# Patient Record
Sex: MALE | Race: WHITE | NOT HISPANIC OR LATINO | Employment: OTHER | URBAN - METROPOLITAN AREA
[De-identification: names, ages, dates, MRNs, and addresses within clinical notes are randomized per-mention and may not be internally consistent; named-entity substitution may affect disease eponyms.]

---

## 2017-02-16 ENCOUNTER — AMBULATORY - HEALTHEAST (OUTPATIENT)
Dept: VASCULAR SURGERY | Facility: CLINIC | Age: 82
End: 2017-02-16

## 2017-02-16 ENCOUNTER — AMBULATORY - HEALTHEAST (OUTPATIENT)
Dept: ADMINISTRATIVE | Facility: REHABILITATION | Age: 82
End: 2017-02-16

## 2017-02-16 ENCOUNTER — OFFICE VISIT (OUTPATIENT)
Dept: FAMILY MEDICINE | Facility: CLINIC | Age: 82
End: 2017-02-16

## 2017-02-16 VITALS
TEMPERATURE: 97.7 F | DIASTOLIC BLOOD PRESSURE: 74 MMHG | SYSTOLIC BLOOD PRESSURE: 149 MMHG | WEIGHT: 167 LBS | OXYGEN SATURATION: 97 % | HEART RATE: 67 BPM | BODY MASS INDEX: 27.82 KG/M2 | RESPIRATION RATE: 20 BRPM | HEIGHT: 65 IN

## 2017-02-16 DIAGNOSIS — B35.6 TINEA CRURIS: ICD-10-CM

## 2017-02-16 DIAGNOSIS — R21 GROIN RASH: ICD-10-CM

## 2017-02-16 DIAGNOSIS — I10 ESSENTIAL HYPERTENSION, BENIGN: Primary | ICD-10-CM

## 2017-02-16 DIAGNOSIS — I73.9 PERIPHERAL VASCULAR DISEASE (H): ICD-10-CM

## 2017-02-16 DIAGNOSIS — I73.9 CLAUDICATION (H): ICD-10-CM

## 2017-02-16 DIAGNOSIS — M19.019 OSTEOARTHRITIS OF GLENOHUMERAL JOINT, UNSPECIFIED LATERALITY: ICD-10-CM

## 2017-02-16 DIAGNOSIS — E11.9 TYPE 2 DIABETES MELLITUS WITHOUT COMPLICATION, WITHOUT LONG-TERM CURRENT USE OF INSULIN (H): ICD-10-CM

## 2017-02-16 DIAGNOSIS — E78.01 FAMILIAL HYPERCHOLESTEROLEMIA: ICD-10-CM

## 2017-02-16 LAB
BUN SERPL-MCNC: 19 MG/DL (ref 7–30)
CALCIUM SERPL-MCNC: 9.5 MG/DL (ref 8.5–10.4)
CHLORIDE SERPLBLD-SCNC: 102 MMOL/L (ref 94–109)
CHOLEST SERPL-MCNC: 124 MG/DL
CHOLEST/HDLC SERPL: 3.3 RATIO
CO2 SERPL-SCNC: 27 MMOL/L (ref 20–32)
CREAT SERPL-MCNC: 1.3 MG/DL (ref 0.8–1.5)
EGFR CALCULATED (BLACK REFERENCE): 68 ML/MIN
EGFR CALCULATED (NON BLACK REFERENCE): 56.2 ML/MIN
GLUCOSE SERPL-MCNC: 135 MG/DL (ref 60–109)
HBA1C MFR BLD: 6.3 % (ref 4.1–5.7)
HDLC SERPL-MCNC: 38 MG/DL
LDLC SERPL CALC-MCNC: 61 MG/DL (ref 0–99)
POTASSIUM SERPL-SCNC: 4.7 MMOL/L (ref 3.4–5.3)
SODIUM SERPL-SCNC: 140 MMOL/L (ref 133–144)
TRIGL SERPL-MCNC: 122 MG/DL
VLDL-CHOLESTEROL: 24 MG/DL (ref 7–32)

## 2017-02-16 RX ORDER — CLOTRIMAZOLE AND BETAMETHASONE DIPROPIONATE 10; .64 MG/G; MG/G
CREAM TOPICAL 2 TIMES DAILY
Qty: 30 G | Refills: 1 | Status: SHIPPED | OUTPATIENT
Start: 2017-02-16 | End: 2017-05-01

## 2017-02-16 RX ORDER — CLOTRIMAZOLE 1 %
CREAM (GRAM) TOPICAL 2 TIMES DAILY
Qty: 30 G | Refills: 3 | Status: CANCELLED | OUTPATIENT
Start: 2017-02-16

## 2017-02-16 NOTE — NURSING NOTE
Eye Exam referral  Foot exam- today  Labs due: A1c, Lipids, Urine MACR, BMP  Flu shot- Pt reports getting late October, early nov 2016  PCV13

## 2017-02-16 NOTE — PATIENT INSTRUCTIONS
New medication for jock itch-       Physical Therapy referral - Arthritis of both shoulder    Vascular surgery referral - Peripheral vascular disease      Follow up after you see vascular surgeon and angiogram is complete      ~~~~~~~~~~~~~~~~~~~~~~~    Your medication list is printed, please keep this with you, it is helpful to bring this current list to any other medical appointments, the emergency room or hospital.    If you had lab testing today and your results are reassuring or normal they will be be mailed to you within 7 days.     If the lab tests need quick action we will call you with the results.   The phone number we will call with results is # [phone] (home) . If this is not the best number please call our clinic and change the number.    If you need any refills please call your pharmacy and they will contact us.    If you have any further concerns or wish to schedule another appointment you must call our office during normal business hours  [phone] (8-5:00 M-F)  If you have urgent medical questions that cannot wait  you may also call [phone] at any time of day.  If you have a medical emergency please call 911.    Thank you for coming to [facility].    What: Physical Therapy   Where: [facility], [address] [place] [address] [address], [place] [address]   When: 02/20/17 at 1:30pm    Who is with:   Telephone: [phone]  Fax:   Any additional comments:   Scheduled by: Trini Duenas      Referral for ( TEST )  :      Vascular Surgery  LOCATION/PLACE/Provider :    [facility]  DATE & TIME :     To be determined   PHONE :     [phone]  FAX :     [phone]    Office notes, med list, allergies and labs faxed to clinic on 2-16-17  ADDITIONAL INFORMATION :     Vascular center will contact patient to schedule appointment   Appointment made by clinic staff/:    Gali    2-17-17

## 2017-02-16 NOTE — LETTER
February 22, 2017      Zack Johns  [place] [facility] [id number]        Dear Zack,    The cholesterol levels are good. The diabetes is still under good control    Please see below for your test results.    Resulted Orders   Hemoglobin A1c ([facility])   Result Value Ref Range    Hemoglobin A1C 6.3 (H) 4.1 - 5.7 %   Basic Metabolic Panel ([facility]) - Results < 1 hr   Result Value Ref Range    Glucose 135.0 (H) 60.0 - 109.0 mg/dL    Urea Nitrogen 19.0 7.0 - 30.0 mg/dL    Creatinine 1.3 0.8 - 1.5 mg/dL    Sodium 140.0 133.0 - 144.0 mmol/L    Potassium 4.7 3.4 - 5.3 mmol/L    Chloride 102.0 94.0 - 109.0 mmol/L    Carbon Dioxide 27.0 20.0 - 32.0 mmol/L    Calcium 9.5 8.5 - 10.4 mg/dL    eGFR Calculated (Non Black Reference) 56.2 (L) >60.0 mL/min    eGFR Calculated (Black Reference) 68.0 >60.0 mL/min   Lipid Panel ([facility]) - Results < 1 hr   Result Value Ref Range    Cholesterol 124.0 <200.0 mg/dL    Triglycerides 122.0 <150.0 mg/dL    HDL Cholesterol 38.0 (L) >40.0 mg/dL      Comment:      If diabetic or CVD then reference range <100    VLDL-Cholesterol 24.0 7.0 - 32.0 mg/dL    LDL Cholesterol Direct 61.0 0.0 - 99.0 mg/dL    Cholesterol/HDL Ratio 3.3 <5.0 RATIO       If you have any questions, please call the clinic to make an appointment.    Sincerely,    Reece Weir MD

## 2017-02-16 NOTE — PROGRESS NOTES
HPI:       Zack Johns is a 82 year old  male who presents for claudication, tinea cruris, and shoulder pain.     Has had increasing claudication - unable to walk around the lawn when mowing. Has to stop every time time before finishing a [place] around the lawn. Unable to go places. Unable to exercise or walk. Significantly effecting his lifestyle. He has waited to do anything for two years, but finds it is not getting to be too much. Occurs regularly at specific distance. Relieved following 20 seconds of rest.        Also complains of increasing shoulder stiffness bilaterally. Now getting difficult to raise arms up to take things from shelves.  No history of injury, no localizing pain.  Feels that the shoulder are getting stiffer.  No radiculopathy.        Also complains of groin rash and itching. Has previously had 'jock itch' and been treated for this with clotrimazole. Has also received diprolene cream for treatment of itching. Has been having more itching lately. Had a good response with diprolene, but not helped with over the counter medications.        History of 'diabetes', however patient has been able to keep his A1c under 6.0-6.5 with medications and activity.  Currently on no medications for glucose control.                 PMHX:   Current Medications:   Current Outpatient Prescriptions   Medication Sig Dispense Refill     lisinopril-hydrochlorothiazide (PRINZIDE,ZESTORETIC) 20-12.5 MG per tablet Take 1 tablet by mouth daily 60 tablet 0     atorvastatin (LIPITOR) 40 MG tablet Take 1 tablet (40 mg) by mouth daily 60 tablet 0     metoprolol (LOPRESSOR) 25 MG tablet Take 0.5 tablets (12.5 mg) by mouth 2 times daily 60 tablet 0     aspirin 81 MG tablet Take 1 tablet by mouth daily       acetaminophen (TYLENOL) 500 MG tablet Take 500-1,000 mg by mouth every 6 hours as needed       alprostadil (EDEX) 20 MCG injection Use as directed       Multiple Vitamin (MULTIVITAMIN) per tablet Take 1 tablet by  "mouth daily.       nitroglycerin (NITROSTAT) 0.4 MG SL tablet Take one under the tongue prn chest pain. Repeat at 5 min intervals x 2 if needed       zinc 50 MG TABS Take 50 mg by mouth daily.         Existing Problems  Patient Active Problem List   Diagnosis     Actinic keratosis     Cardiovascular disease     Essential hypertension, benign     CT Lung Pulmonary Nodule Multiple     Diabetes mellitus, type 2 (H)     Diverticulosis of large intestine     Hyperlipidemia     Peripheral vascular disease (H)     Internal hemorrhoids     Local infection of skin and subcutaneous tissue     Disorder of bursae and tendons in shoulder region     Synovial cyst     Trigger finger, acquired     Osteoarthritis of glenohumeral joint       Allergies:  Allergies   Allergen Reactions     Pletal [Cilostazol]        Previous labs:  Lab Results   Component Value Date    CHOL 141.0 12/05/2014    TRIG 148.0 12/05/2014    HDL 42.0 12/05/2014    .0 12/05/2014    BUN 23.0 12/05/2014    CO2 31.0 12/05/2014               Review of Systems:    CONSTITUTIONAL: no fatigue, no unexpected change in weight  SKIN: no worrisome rashes, no worrisome moles, no worrisome lesions  EYES: no acute vision problems or changes  ENT: no ear problems, no mouth problems, no throat problems  RESP: no significant cough, no shortness of breath  CV: History of previous MI.  N current chest pain, no palpitations, no new or worsening peripheral edema  GI: no nausea, no vomiting, no constipation, no diarrhea          Physical Exam:     Vitals:    02/16/17 0903   BP: 149/74   BP Location: Right arm   Patient Position: Chair   Cuff Size: Adult Regular   Pulse: 67   Resp: 20   Temp: 97.7  F (36.5  C)   TempSrc: Oral   SpO2: 97%   Weight: 167 lb (75.8 kg)   Height: 5' 4.5\" (163.8 cm)     Body mass index is 28.22 kg/(m^2).    GENERAL:alert, well hydrated, no distress  EYES: Eyes grossly normal to inspection, extraocular movements - intact, and PERRL  HENT: ear canals- " some cerumen in the left ear. Right is normal.; TMs- normal; Nose- normal; Mouth- no ulcers, no lesions  NECK: no tenderness, no adenopathy, no asymmetry, no masses, no stiffness; thyroid- normal to palpation  RESP: lungs clear to auscultation - no rales, no rhonchi, no wheezes  CV: regular rates and rhythm, normal S1 S2, no S3 or S4 and no murmur, no click or rub -  - circumcised. No erythema in inguinal crease.  ABDOMEN: soft, no tenderness, no  hepatosplenomegaly, no masses, normal bowel sounds  EXTREMITIES: DTR-KJ normal and symmetrical.  Pulses- Popliteal -           1/4 R 2/4 L                Dorsalis pedis    1/4 R, 1/4 L                Posterior Tib       1/4 R, 1/4 L               Labs and Procedures     Orders Only on 12/05/2014   Component Date Value Ref Range Status     Hemoglobin A1C 12/05/2014 6.0* 4.1 - 5.7 % Final     Glucose 12/05/2014 124.0* 60.0 - 109.0 mg/dL Final     Urea Nitrogen 12/05/2014 23.0  7.0 - 30.0 mg/dL Final     Creatinine 12/05/2014 0.9  0.8 - 1.5 mg/dL Final     Sodium 12/05/2014 142.0  133.0 - 144.0 mmol/L Final     Potassium 12/05/2014 4.1  3.4 - 5.3 mmol/L Final     Chloride 12/05/2014 100.0  94.0 - 109.0 mmol/L Final     Carbon Dioxide 12/05/2014 31.0  20.0 - 32.0 mmol/L Final     Calcium 12/05/2014 9.2  8.5 - 10.4 mg/dL Final     eGFR Calculated (Non Black Referen* 12/05/2014 86.3  >60.0 mL/min Final     eGFR Calculated (Black Reference) 12/05/2014 104.4  >60.0 mL/min Final     Cholesterol 12/05/2014 141.0  <200.0 mg/dL Final     Triglycerides 12/05/2014 148.0  <150.0 mg/dL Final     HDL Cholesterol 12/05/2014 42.0  >40.0 mg/dL Final    If diabetic or CVD then reference range <100     VLDL-Cholesterol 12/05/2014 30.0  7.0 - 32.0 mg/dL Final     LDL Cholesterol Direct 12/05/2014 69.0  0.0 - 99.0 mg/dL Final     Cholesterol/HDL Ratio 12/05/2014 3.3  <5.0 RATIO Final              Assessment and Plan     1.Progressing claudication that now affects his lifestyle and ability to  move around.  Will refer to vascular surgery for further evaluation and possible intervention.   2. Shoulder pain - degenerative joint disease with increasing stiffness. Will initiate physical therapy for increasing flexibility and strength.  Refer to PT for evaluation and treatment.   3. Tinea cruris by history - no evidence currently of ongoing erythema or existing fungal infection. Has had good success with dirprolene, and has been treated with clotrimazole before.  Will provide lotrisone (antifungal and steroid) for symptomatic relief.    4. Pre-diabetes- no futher treatment at this time.   5. HTN, post MI - continue current therapy at this time.  No further alteration at this time.       Options for treatment and follow-up care were reviewed with the patient and/or guardian. Zack Johns and/or guardian engaged in the decision making process and verbalized understanding of the options discussed and agreed with the final plan.    Reece Weir MD

## 2017-02-16 NOTE — MR AVS SNAPSHOT
After Visit Summary   2/16/2017    Zack Johns    MRN: [id number]           Patient Information     Date Of Birth          9/19/1934        Visit Information        Provider Department      2/16/2017 9:00 AM Reece Weir MD [facility]        Today's Diagnoses     Essential hypertension, benign    -  1    Diabetes mellitus, type 2 (H)        Hyperlipidemia        Groin rash        Osteoarthritis of glenohumeral joint, unspecified laterality        Peripheral vascular disease (H)        Tinea cruris          Care Instructions    New medication for jock itch-       Physical Therapy referral - Arthritis of both shoulder    Vascular surgery referral - Peripheral vascular disease      Follow up after you see vascular surgeon and angiogram is complete      ~~~~~~~~~~~~~~~~~~~~~~~    Your medication list is printed, please keep this with you, it is helpful to bring this current list to any other medical appointments, the emergency room or hospital.    If you had lab testing today and your results are reassuring or normal they will be be mailed to you within 7 days.     If the lab tests need quick action we will call you with the results.   The phone number we will call with results is # [phone] (home) . If this is not the best number please call our clinic and change the number.    If you need any refills please call your pharmacy and they will contact us.    If you have any further concerns or wish to schedule another appointment you must call our office during normal business hours  [phone] (8-5:00 M-F)  If you have urgent medical questions that cannot wait  you may also call [phone] at any time of day.  If you have a medical emergency please call [phone].    Thank you for coming to [facility].          Follow-ups after your visit        Additional Services     PHYSICAL THERAPY REFERRAL       PT/OT REFERRAL  Zack Johns  9/19/1934  Phone #: [phone]  "(home)    needed: No  Language: English    PT/OT  Facility:   Other: [facility]     History: Arthritis, Bilateral shoulders    Precautions/Contraindications: None      Surgical Procedure/Test Results: None    Treatment Goals:   Increase: Flexibility    Prognosis: good    Visits: Up to 10    Plan: Flexibility/ Strength Exercise            VASCULAR SURGERY REFERRAL       Your provider has referred you to:  [facility]) Vascular Center    Please be aware that coverage of these services is subject to the terms and limitations of your health insurance plan.  Call member services at your health plan with any benefit or coverage questions.      Please bring the following with you to your appointment:  Medications     (1) Any X-Rays, CTs or MRIs which have been performed.  Contact the facility where they were done to arrange for  prior to your scheduled appointment.    (2) List of current medications   (3) This referral request   (4) Any documents/labs given to you for this referral                  Who to contact     Please call your clinic at [phone] to:    Ask questions about your health    Make or cancel appointments    Discuss your medicines    Learn about your test results    Speak to your doctor   If you have compliments or concerns about an experience at your clinic, or if you wish to file a complaint, please contact [facility] Patient Relations at [phone] or email us at [email or web address]         Additional Information About Your Visit        Care EveryWhere ID     This is your Care EveryWhere ID. This could be used by other organizations to access your [place] medical records  [id number]        Your Vitals Were     Pulse Temperature Respirations Height Pulse Oximetry BMI (Body Mass Index)    67 97.7  F (36.5  C) (Oral) 20 5' 4.5\" (163.8 cm) 97% 28.22 kg/m2       Blood Pressure from Last 3 Encounters:   02/16/17 149/74   12/08/14 " 126/72   06/09/14 132/62    Weight from Last 3 Encounters:   02/16/17 167 lb (75.8 kg)   12/08/14 166 lb (75.3 kg)   06/09/14 167 lb (75.8 kg)              We Performed the Following     Basic Metabolic Panel ([facility]) - Results < 1 hr     C FOOT EXAM  NO CHARGE     Hemoglobin A1c ([facility])     Lipid Panel ([facility]) - Results < 1 hr     PHYSICAL THERAPY REFERRAL     VASCULAR SURGERY REFERRAL          Today's Medication Changes          These changes are accurate as of: 2/16/17  9:57 AM.  If you have any questions, ask your nurse or doctor.               Start taking these medicines.        Dose/Directions    clotrimazole-betamethasone cream   Commonly known as:  LOTRISONE   Used for:  Tinea cruris   Started by:  Reece Weir MD        Apply topically 2 times daily   Quantity:  30 g   Refills:  1            Where to get your medicines      Some of these will need a paper prescription and others can be bought over the counter.  Ask your nurse if you have questions.     Bring a paper prescription for each of these medications     clotrimazole-betamethasone cream                Primary Care Provider Office Phone # Fax #    Reece Weir [phone]       [facility] [address] [place] [address]        Thank you!     Thank you for choosing [facility]  for your care. Our goal is always to provide you with excellent care. Hearing back from our patients is one way we can continue to improve our services. Please take a few minutes to complete the written survey that you may receive in the mail after your visit with us. Thank you!             Your Updated Medication List - Protect others around you: Learn how to safely use, store and throw away your medicines at www.disposemymeds.org.          This list is accurate as of: 2/16/17  9:57 AM.  Always use your most recent med list.                   Brand Name Dispense Instructions for use    acetaminophen 500 MG tablet     TYLENOL     Take 500-1,000 mg by mouth every 6 hours as needed       aspirin 81 MG tablet      Take 1 tablet by mouth daily       atorvastatin 40 MG tablet    LIPITOR    60 tablet    Take 1 tablet (40 mg) by mouth daily       clotrimazole-betamethasone cream    LOTRISONE    30 g    Apply topically 2 times daily       EDEX 20 MCG kit   Generic drug:  alprostadil      Use as directed       lisinopril-hydrochlorothiazide 20-12.5 MG per tablet    PRINZIDE/ZESTORETIC    60 tablet    Take 1 tablet by mouth daily       metoprolol 25 MG tablet    LOPRESSOR    60 tablet    Take 0.5 tablets (12.5 mg) by mouth 2 times daily       multivitamin per tablet      Take 1 tablet by mouth daily.       NITROSTAT 0.4 MG sublingual tablet   Generic drug:  nitroglycerin      Take one under the tongue prn chest pain. Repeat at 5 min intervals x 2 if needed       zinc 50 MG Tabs      Take 50 mg by mouth daily.

## 2017-02-20 ENCOUNTER — OFFICE VISIT - HEALTHEAST (OUTPATIENT)
Dept: PHYSICAL THERAPY | Facility: REHABILITATION | Age: 82
End: 2017-02-20

## 2017-02-20 DIAGNOSIS — M25.511 CHRONIC PAIN OF BOTH SHOULDERS: ICD-10-CM

## 2017-02-20 DIAGNOSIS — M25.611 DECREASED ROM OF RIGHT SHOULDER: ICD-10-CM

## 2017-02-20 DIAGNOSIS — M19.012 PRIMARY OSTEOARTHRITIS OF BOTH SHOULDERS: ICD-10-CM

## 2017-02-20 DIAGNOSIS — M62.81 GENERALIZED MUSCLE WEAKNESS: ICD-10-CM

## 2017-02-20 DIAGNOSIS — G89.29 CHRONIC PAIN OF BOTH SHOULDERS: ICD-10-CM

## 2017-02-20 DIAGNOSIS — M25.512 CHRONIC PAIN OF BOTH SHOULDERS: ICD-10-CM

## 2017-02-20 DIAGNOSIS — M19.011 PRIMARY OSTEOARTHRITIS OF BOTH SHOULDERS: ICD-10-CM

## 2017-02-20 DIAGNOSIS — M25.612 DECREASED ROM OF LEFT SHOULDER: ICD-10-CM

## 2017-02-21 NOTE — PROGRESS NOTES
Please call patient and send results letter  The cholesterol levels are good. The diabetes is still under good control.

## 2017-02-23 DIAGNOSIS — I10 ESSENTIAL HYPERTENSION, BENIGN: ICD-10-CM

## 2017-02-23 DIAGNOSIS — I25.2 CORONARY ARTERY DISEASE WITH HX OF MYOCARDIAL INFARCT W/O HX OF CABG: ICD-10-CM

## 2017-02-23 DIAGNOSIS — I25.10 CORONARY ARTERY DISEASE WITH HX OF MYOCARDIAL INFARCT W/O HX OF CABG: ICD-10-CM

## 2017-02-23 RX ORDER — ATORVASTATIN CALCIUM 40 MG/1
40 TABLET, FILM COATED ORAL DAILY
Qty: 90 TABLET | Refills: 1 | Status: SHIPPED
Start: 2017-02-23 | End: 2017-08-24

## 2017-02-23 RX ORDER — METOPROLOL TARTRATE 25 MG/1
12.5 TABLET, FILM COATED ORAL 2 TIMES DAILY
Qty: 90 TABLET | Refills: 1 | Status: SHIPPED
Start: 2017-02-23 | End: 2017-08-24

## 2017-02-23 RX ORDER — LISINOPRIL AND HYDROCHLOROTHIAZIDE 12.5; 2 MG/1; MG/1
1 TABLET ORAL DAILY
Qty: 90 TABLET | Refills: 1 | Status: SHIPPED
Start: 2017-02-23 | End: 2017-08-24

## 2017-03-01 ENCOUNTER — TRANSFERRED RECORDS (OUTPATIENT)
Dept: HEALTH INFORMATION MANAGEMENT | Facility: CLINIC | Age: 82
End: 2017-03-01

## 2017-03-02 ENCOUNTER — OFFICE VISIT - HEALTHEAST (OUTPATIENT)
Dept: VASCULAR SURGERY | Facility: CLINIC | Age: 82
End: 2017-03-02

## 2017-03-02 ENCOUNTER — RECORDS - HEALTHEAST (OUTPATIENT)
Dept: VASCULAR ULTRASOUND | Facility: CLINIC | Age: 82
End: 2017-03-02

## 2017-03-02 ENCOUNTER — TRANSFERRED RECORDS (OUTPATIENT)
Dept: HEALTH INFORMATION MANAGEMENT | Facility: CLINIC | Age: 82
End: 2017-03-02

## 2017-03-02 DIAGNOSIS — I73.9 CLAUDICATION (H): ICD-10-CM

## 2017-03-02 DIAGNOSIS — I73.9 PAD (PERIPHERAL ARTERY DISEASE) (H): ICD-10-CM

## 2017-03-02 DIAGNOSIS — Z87.891 HISTORY OF SMOKING: ICD-10-CM

## 2017-03-02 DIAGNOSIS — I73.9 PERIPHERAL VASCULAR DISEASE, UNSPECIFIED (H): ICD-10-CM

## 2017-03-02 ASSESSMENT — MIFFLIN-ST. JEOR: SCORE: 1355.32

## 2017-03-07 ENCOUNTER — COMMUNICATION - HEALTHEAST (OUTPATIENT)
Dept: VASCULAR SURGERY | Facility: CLINIC | Age: 82
End: 2017-03-07

## 2017-03-07 ENCOUNTER — RECORDS - HEALTHEAST (OUTPATIENT)
Dept: ADMINISTRATIVE | Facility: OTHER | Age: 82
End: 2017-03-07

## 2017-03-10 ENCOUNTER — HOSPITAL ENCOUNTER (OUTPATIENT)
Dept: INTERVENTIONAL RADIOLOGY/VASCULAR | Facility: HOSPITAL | Age: 82
Discharge: HOME | End: 2017-03-10
Attending: SURGERY

## 2017-03-10 DIAGNOSIS — I73.9 PAD (PERIPHERAL ARTERY DISEASE) (H): ICD-10-CM

## 2017-03-10 LAB
CREAT SERPL-MCNC: 1.11 MG/DL (ref 0.7–1.3)
GFR SERPL CREATININE-BSD FRML MDRD: >60 ML/MIN/1.73M2

## 2017-03-10 ASSESSMENT — MIFFLIN-ST. JEOR: SCORE: 1396.14

## 2017-03-13 ENCOUNTER — COMMUNICATION - HEALTHEAST (OUTPATIENT)
Dept: INTERVENTIONAL RADIOLOGY/VASCULAR | Facility: HOSPITAL | Age: 82
End: 2017-03-13

## 2017-03-14 ENCOUNTER — AMBULATORY - HEALTHEAST (OUTPATIENT)
Dept: VASCULAR SURGERY | Facility: CLINIC | Age: 82
End: 2017-03-14

## 2017-03-14 ENCOUNTER — COMMUNICATION - HEALTHEAST (OUTPATIENT)
Dept: VASCULAR SURGERY | Facility: CLINIC | Age: 82
End: 2017-03-14

## 2017-04-06 ENCOUNTER — OFFICE VISIT - HEALTHEAST (OUTPATIENT)
Dept: VASCULAR SURGERY | Facility: CLINIC | Age: 82
End: 2017-04-06

## 2017-04-06 ENCOUNTER — TRANSFERRED RECORDS (OUTPATIENT)
Dept: HEALTH INFORMATION MANAGEMENT | Facility: CLINIC | Age: 82
End: 2017-04-06

## 2017-04-06 DIAGNOSIS — E78.5 HYPERLIPIDEMIA: ICD-10-CM

## 2017-04-06 DIAGNOSIS — I73.9 PAD (PERIPHERAL ARTERY DISEASE) (H): ICD-10-CM

## 2017-04-06 DIAGNOSIS — Z87.891 HISTORY OF SMOKING: ICD-10-CM

## 2017-04-06 DIAGNOSIS — I10 HYPERTENSION: ICD-10-CM

## 2017-04-06 DIAGNOSIS — I73.9 CLAUDICATION (H): ICD-10-CM

## 2017-04-06 ASSESSMENT — MIFFLIN-ST. JEOR: SCORE: 1396.14

## 2017-04-07 ENCOUNTER — TRANSFERRED RECORDS (OUTPATIENT)
Dept: HEALTH INFORMATION MANAGEMENT | Facility: CLINIC | Age: 82
End: 2017-04-07

## 2017-04-14 ENCOUNTER — AMBULATORY - HEALTHEAST (OUTPATIENT)
Dept: SURGERY | Facility: CLINIC | Age: 82
End: 2017-04-14

## 2017-05-01 ENCOUNTER — OFFICE VISIT (OUTPATIENT)
Dept: FAMILY MEDICINE | Facility: CLINIC | Age: 82
End: 2017-05-01

## 2017-05-01 VITALS
BODY MASS INDEX: 28.36 KG/M2 | WEIGHT: 170.2 LBS | HEART RATE: 67 BPM | SYSTOLIC BLOOD PRESSURE: 145 MMHG | DIASTOLIC BLOOD PRESSURE: 67 MMHG | RESPIRATION RATE: 18 BRPM | OXYGEN SATURATION: 97 % | TEMPERATURE: 97.3 F | HEIGHT: 65 IN

## 2017-05-01 DIAGNOSIS — H61.22 IMPACTED CERUMEN OF LEFT EAR: ICD-10-CM

## 2017-05-01 DIAGNOSIS — Z01.818 PREOP GENERAL PHYSICAL EXAM: Primary | ICD-10-CM

## 2017-05-01 LAB
BUN SERPL-MCNC: 22 MG/DL (ref 7–30)
CALCIUM SERPL-MCNC: 9.3 MG/DL (ref 8.5–10.4)
CHLORIDE SERPLBLD-SCNC: 102 MMOL/L (ref 94–109)
CO2 SERPL-SCNC: 29 MMOL/L (ref 20–32)
CREAT SERPL-MCNC: 1.4 MG/DL (ref 0.8–1.5)
EGFR CALCULATED (BLACK REFERENCE): 62.4 ML/MIN
EGFR CALCULATED (NON BLACK REFERENCE): 51.6 ML/MIN
GLUCOSE SERPL-MCNC: 98 MG/DL (ref 60–109)
HCT VFR BLD AUTO: 42 % (ref 40–53)
HEMOGLOBIN: 13.1 G/DL (ref 13.3–17.7)
MCH RBC QN AUTO: 30.7 PG (ref 26.5–35)
MCHC RBC AUTO-ENTMCNC: 31.2 G/DL (ref 32–36)
MCV RBC AUTO: 98.4 FL (ref 78–100)
PLATELET # BLD AUTO: 260 K/UL (ref 150–450)
POTASSIUM SERPL-SCNC: 4.9 MMOL/L (ref 3.4–5.3)
RBC # BLD AUTO: 4.27 M/UL (ref 4.4–5.9)
SODIUM SERPL-SCNC: 137 MMOL/L (ref 133–144)
WBC # BLD AUTO: 8 K/UL (ref 4–11)

## 2017-05-01 NOTE — MR AVS SNAPSHOT
After Visit Summary   5/1/2017    Zack Johns    MRN: [id number]           Patient Information     Date Of Birth          9/19/1934        Visit Information        Provider Department      5/1/2017 1:00 PM Reece Weir MD [facility]        Today's Diagnoses     Preop general physical exam    -  1    Impacted cerumen of left ear          Care Instructions      Presurgery Checklist  You are scheduled to have surgery. The healthcare staff will try to make your stay comfortable. Use the guidelines below to remind yourself what to do before surgery. Be sure to follow any specific pre-op instructions from your surgeon or nurse.   Preparing for Surgery  Ask your surgeon if you ll need a blood transfusion during surgery and if so, how to prepare for it. In some cases, you can donate blood before surgery. If needed, this blood can be given back (transfused) to you during or after surgery.  If you are having abdominal surgery, ask what you need to do to clear your bowel.  Tell your surgeon if you have allergies to any medications or foods.  Arrange for an adult family member or friend to drive you home after surgery. If possible, have someone ready to help you at home as you recover.  Call the surgeon if you get a cold, fever, sore throat, diarrhea, or other health problem just before surgery. Your surgeon can decide whether or not to postpone the surgery.  Medications  Tell your surgeon about all medications you take, including prescription and over-the-counter products such as herbal remedies and vitamins. Ask if you should continue taking them.  If you take ibuprofen, naproxen, or  blood thinners  such as aspirin, clopidogrel (Plavix), or warfarin (Coumadin), ask your surgeon whether you should stop taking them and how long before surgery you should stop.  You may be told to take antibiotics just before surgery to prevent infection. If so, follow instructions carefully on how to take  them.  If you are told to take medications called anticoagulants to prevent blood clots after surgery, be sure to follow the instructions on how to take them.  Stop Smoking  If you smoke, healing may take longer. So at least 2 week(s) before surgery, stop smoking.  Bathing or Showering Before Surgery  If instructed, wash with antibacterial soap. Afterward, do not use lotions or powders.  If you are having surgery on the head, you may be asked to shampoo with antibacterial soap. Follow instructions for doing so.  Do Not Remove Hair from the Surgery Site  Do not shave hair from the incision site, unless you are given specific instructions to do so. Usually, if hair needs to be removed, it will be done at the hospital right before surgery.  Don t Eat or Drink  Your doctor will tell you when to stop eating and drinking. If you do not follow your doctor's instructions, your procedure may be postponed or rescheduled for another day.  If your surgeon tells you to continue any medications, take them with small sips of water.  You can brush your teeth and rinse your mouth, but don t swallow any water.  Day of Surgery  Do not wear makeup. Do not use perfume, deodorant, or hairspray. Remove nail polish and artificial nails.  Leave jewelry (including rings), watches, and other valuables at home.  Be sure to bring health insurance cards or forms and a photo ID.  Bring a list of your medications (include the name, dose, how often you take them, and the time last dose was taken).  Arrive on time at the hospital or surgery facility.    ~No eating after midnight  ~No Aspirin, Tylenol  ~Do not take Lisinopril the day of surgery  ~Take Atorvastatin medication the night before  ~On day of surgery, take only Metoprolol half tablet with a sip of water        Follow-ups after your visit        Who to contact     Please call your clinic at [phone] to:    Ask questions about your health    Make or cancel appointments    Discuss your  "medicines    Learn about your test results    Speak to your doctor   If you have compliments or concerns about an experience at your clinic, or if you wish to file a complaint, please contact [facility] Patient Relations at [phone] or email us at [email or web address]         Additional Information About Your Visit        Care EveryWhere ID     This is your Care EveryWhere ID. This could be used by other organizations to access your [place] medical records  [id number]        Your Vitals Were     Pulse Temperature Respirations Height Pulse Oximetry BMI (Body Mass Index)    67 97.3  F (36.3  C) (Oral) 18 5' 4.5\" (163.8 cm) 97% 28.76 kg/m2       Blood Pressure from Last 3 Encounters:   05/01/17 145/67   02/16/17 149/74   12/08/14 126/72    Weight from Last 3 Encounters:   05/01/17 170 lb 3.2 oz (77.2 kg)   02/16/17 167 lb (75.8 kg)   12/08/14 166 lb (75.3 kg)              We Performed the Following     Basic Metabolic Panel ([facility])  - Results < 1 hr     CBC with Plt ([facility])     EKG 12-lead complete w/read - Clinics     REMOVE IMPACTED ELIZ        Primary Care Provider Office Phone # Fax #    Reece Weir [phone]       [facility] [address] [place] [address]        Thank you!     Thank you for choosing [facility]  for your care. Our goal is always to provide you with excellent care. Hearing back from our patients is one way we can continue to improve our services. Please take a few minutes to complete the written survey that you may receive in the mail after your visit with us. Thank you!             Your Updated Medication List - Protect others around you: Learn how to safely use, store and throw away your medicines at www.disposemymeds.org.          This list is accurate as of: 5/1/17  2:34 PM.  Always use your most recent med list.                   Brand Name Dispense Instructions for use    acetaminophen " 500 MG tablet    TYLENOL     Take 500-1,000 mg by mouth every 6 hours as needed       aspirin 81 MG tablet      Take 1 tablet by mouth daily       atorvastatin 40 MG tablet    LIPITOR    90 tablet    Take 1 tablet (40 mg) by mouth daily       lisinopril-hydrochlorothiazide 20-12.5 MG per tablet    PRINZIDE/ZESTORETIC    90 tablet    Take 1 tablet by mouth daily       metoprolol 25 MG tablet    LOPRESSOR    90 tablet    Take 0.5 tablets (12.5 mg) by mouth 2 times daily       multivitamin per tablet      Take 1 tablet by mouth daily.       NITROSTAT 0.4 MG sublingual tablet   Generic drug:  nitroglycerin      Take one under the tongue prn chest pain. Repeat at 5 min intervals x 2 if needed       zinc 50 MG Tabs      Take 50 mg by mouth daily.

## 2017-05-01 NOTE — LETTER
May 2, 2017      Zack Johns  [address]  [facility] [id number]        Dear Zack,    Your pre op testing show some worseing of kidney tests    Please see below for your test results.    Resulted Orders   Basic Metabolic Panel ([facility])  - Results < 1 hr   Result Value Ref Range    Glucose 98.0 60.0 - 109.0 mg/dL    Urea Nitrogen 22.0 7.0 - 30.0 mg/dL    Creatinine 1.4 0.8 - 1.5 mg/dL    Sodium 137.0 133.0 - 144.0 mmol/L    Potassium 4.9 3.4 - 5.3 mmol/L    Chloride 102.0 94.0 - 109.0 mmol/L    Carbon Dioxide 29.0 20.0 - 32.0 mmol/L    Calcium 9.3 8.5 - 10.4 mg/dL    eGFR Calculated (Non Black Reference) 51.6 (L) >60.0 mL/min    eGFR Calculated (Black Reference) 62.4 >60.0 mL/min   CBC with Plt ([facility])   Result Value Ref Range    WBC 8.0 4.0 - 11.0 K/uL    RBC 4.27 (L) 4.40 - 5.90 M/uL    Hemoglobin 13.1 (L) 13.3 - 17.7 g/dL    Hematocrit 42.0 40.0 - 53.0 %    MCV 98.4 78.0 - 100.0 fL    MCH 30.7 26.5 - 35.0 pg    MCHC 31.2 (L) 32.0 - 36.0 g/dL    Platelets 260.0 150.0 - 450.0 K/uL       If you have any questions, please call the clinic to make an appointment.    Sincerely,    Reece Weir MD

## 2017-05-01 NOTE — PATIENT INSTRUCTIONS
Presurgery Checklist  You are scheduled to have surgery. The healthcare staff will try to make your stay comfortable. Use the guidelines below to remind yourself what to do before surgery. Be sure to follow any specific pre-op instructions from your surgeon or nurse.   Preparing for Surgery  Ask your surgeon if you ll need a blood transfusion during surgery and if so, how to prepare for it. In some cases, you can donate blood before surgery. If needed, this blood can be given back (transfused) to you during or after surgery.  If you are having abdominal surgery, ask what you need to do to clear your bowel.  Tell your surgeon if you have allergies to any medications or foods.  Arrange for an adult family member or friend to drive you home after surgery. If possible, have someone ready to help you at home as you recover.  Call the surgeon if you get a cold, fever, sore throat, diarrhea, or other health problem just before surgery. Your surgeon can decide whether or not to postpone the surgery.  Medications  Tell your surgeon about all medications you take, including prescription and over-the-counter products such as herbal remedies and vitamins. Ask if you should continue taking them.  If you take ibuprofen, naproxen, or  blood thinners  such as aspirin, clopidogrel (Plavix), or warfarin (Coumadin), ask your surgeon whether you should stop taking them and how long before surgery you should stop.  You may be told to take antibiotics just before surgery to prevent infection. If so, follow instructions carefully on how to take them.  If you are told to take medications called anticoagulants to prevent blood clots after surgery, be sure to follow the instructions on how to take them.  Stop Smoking  If you smoke, healing may take longer. So at least 2 week(s) before surgery, stop smoking.  Bathing or Showering Before Surgery  If instructed, wash with antibacterial soap. Afterward, do not use lotions or powders.  If you  are having surgery on the head, you may be asked to shampoo with antibacterial soap. Follow instructions for doing so.  Do Not Remove Hair from the Surgery Site  Do not shave hair from the incision site, unless you are given specific instructions to do so. Usually, if hair needs to be removed, it will be done at the hospital right before surgery.  Don t Eat or Drink  Your doctor will tell you when to stop eating and drinking. If you do not follow your doctor's instructions, your procedure may be postponed or rescheduled for another day.  If your surgeon tells you to continue any medications, take them with small sips of water.  You can brush your teeth and rinse your mouth, but don t swallow any water.  Day of Surgery  Do not wear makeup. Do not use perfume, deodorant, or hairspray. Remove nail polish and artificial nails.  Leave jewelry (including rings), watches, and other valuables at home.  Be sure to bring health insurance cards or forms and a photo ID.  Bring a list of your medications (include the name, dose, how often you take them, and the time last dose was taken).  Arrive on time at the hospital or surgery facility.    ~No eating after midnight  ~No Aspirin, Tylenol  ~Do not take Lisinopril the day of surgery  ~Take Atorvastatin medication the night before  ~On day of surgery, take only Metoprolol half tablet with a sip of water    Pre-op faxed to fax number:  [phone]   Location:   [facility]  Date:  5/1/17    Time: 2:49pm  By:  KEIRA Curtis

## 2017-05-01 NOTE — NURSING NOTE
5/1/2017 [facility] Previsit Plan   Called, spoke with pt and reminded of appt time and to bring all forms to appt.  DUE FOR:  Eye Exam referral  Labs due: Urine Microalbumin, TSH  Advance Directive Planning  PCV13  Preop workup    Care everywhere for HE signed

## 2017-05-01 NOTE — PROGRESS NOTES
[facility] [address] [place] [address]  Phone: [phone]  Fax: [phone]    5/1/2017    Adult PRE-OP Evaluation:    Zack Johns, 9/19/1934 presents for pre-operative evaluation and assessment as requested by Dr. Carty, prior to undergoing surgery for treatment of claudication.     Proposed procedure: endearterectomy of both legs.      Date of Surgery/ Procedure: May 3, 2017  Hospital/Surgical Facility: [facility], Fax: [phone]     Primary Physician: Reece Weir  Type of Anesthesia Anticipated: General  History of anesthesia complications: NONE  History of  abnormal bleeding: NONE   History of blood transfusions: NO  Patient has a Health Care Directive or Living Will:  YES His wife has this.    PH: Patient has a history of claudication for several years. This has recently been evaluated by Dr. Carty and found to amenable to surgical procedure.  Pain has been getting worse for the patient, and now comes on at walking about 100 yards.  This has begun to significant. y affect his lifestyle, and he would like it repaired.        Preoperative Questions   1. YES - Do you have a history of heart attack, stroke, stent, bypass or surgery on an artery in the head, neck, heart or legs? Had a heart attack in 1989. No stent.  No longer has chest pain. Carries nitroglycerin, but has never used it.   2. NO - Do you ever have any pain or discomfort in your chest?  3. YES - Have you ever had a severe pain across the front of your chest lasting for half an hour or more? When he had a heart attack he had arm pain mostly, but some chest pain at that time.  He has not had chest pain since that time. He does not experience chest pain currently.   4. NO - Do you have a history of Congestive Heart Failure?  5. NO - Are you troubled by shortness of breath when: walking on the level/ up a slight hill/ at night?  6. NO - Does your chest ever sound wheezy or whistling?  7. NO - Do you  currently have a cold, bronchitis or other respiratory infection?  8. NO - Have you had a cold, bronchitis or other respiratory infection within the last 2 weeks?  9. NO - Do you usually have a cough?  10. NO - Do you sometimes get pains in the calves of your legs when you walk?  11. NO - Do you or anyone in your family have previous history of blood clots?   11. NO - Do you or anyone in your family have previous history of blood clots?  12. NO - Do you or does anyone in your family have a serious bleeding problem such as prolonged bleeding following surgeries or cuts?  13. NO - Have you ever had problems with anemia or been told to take iron pills?  14. NO - Have you had any abnormal blood loss such as black, tarry or bloody stools, or abnormal vaginal bleeding?  15. NO - Have you ever had a blood transfusion?  16. NO - Have you or any of your relatives ever had problems with anesthesia?  17. NO - Do you have sleep apnea, excessive snoring or daytime drowsiness?  18. NO - Do you have any prosthetic heart valves?  19. NO - Do you have prosthetic joints?      Patient Active Problem List   Diagnosis     Actinic keratosis     Cardiovascular disease     Essential hypertension, benign     CT Lung Pulmonary Nodule Multiple     Diabetes mellitus, type 2 (H)     Diverticulosis of large intestine     Hyperlipidemia     Peripheral vascular disease (H)     Internal hemorrhoids     Local infection of skin and subcutaneous tissue     Disorder of bursae and tendons in shoulder region     Synovial cyst     Trigger finger, acquired     Osteoarthritis of glenohumeral joint         Current Outpatient Prescriptions on File Prior to Visit:  atorvastatin (LIPITOR) 40 MG tablet Take 1 tablet (40 mg) by mouth daily   lisinopril-hydrochlorothiazide (PRINZIDE/ZESTORETIC) 20-12.5 MG per tablet Take 1 tablet by mouth daily   metoprolol (LOPRESSOR) 25 MG tablet Take 0.5 tablets (12.5 mg) by mouth 2 times daily   aspirin 81 MG tablet Take 1  "tablet by mouth daily   acetaminophen (TYLENOL) 500 MG tablet Take 500-1,000 mg by mouth every 6 hours as needed   Multiple Vitamin (MULTIVITAMIN) per tablet Take 1 tablet by mouth daily.   nitroglycerin (NITROSTAT) 0.4 MG SL tablet Take one under the tongue prn chest pain. Repeat at 5 min intervals x 2 if needed   zinc 50 MG TABS Take 50 mg by mouth daily.     No current facility-administered medications on file prior to visit.     OTC products: None, except as noted above    Allergies   Allergen Reactions     Pletal [Cilostazol]      Latex Allergy: NO    Social History     Social History     Marital status:      Spouse name: N/A     Number of children: N/A     Years of education: N/A     Social History Main Topics     Smoking status: Former Smoker     Smokeless tobacco: None     Alcohol use None     Drug use: None     Sexual activity: Not Asked     Other Topics Concern     None     Social History Narrative       REVIEW OF SYSTEMS:   Constitutional, HEENT, cardiovascular, pulmonary, gi and gu systems are negative, except as otherwise noted.    EXAM:   Patient Vitals for the past 24 hrs:   BP Temp Temp src Pulse Resp SpO2 Height Weight   05/01/17 1300 145/67 - - - - - - -   05/01/17 1252 146/66 97.3  F (36.3  C) Oral 67 18 97 % 5' 4.5\" (163.8 cm) 170 lb 3.2 oz (77.2 kg)     Body mass index is 28.76 kg/(m^2).  GENERAL: healthy, alert and no distress  EYES: Eyes grossly normal to inspection, extraocular movements - intact, and PERRL  HENT: ear canals- Left cerumen obstruction right canal normal; TMs- normal; Nose- normal; Mouth- no ulcers, no lesions  NECK: no tenderness, no adenopathy, no asymmetry, no masses, no stiffness; thyroid- normal to palpation  RESP: lungs clear to auscultation - no rales, no rhonchi, no wheezes  CV: regular rates and rhythm, normal S1 S2, no S3 or S4 and no murmur, no click or rub -  ABDOMEN: soft, no tenderness, no  hepatosplenomegaly, no masses, normal bowel sounds  MS: " extremities- no gross deformities noted, no edema  SKIN: no suspicious lesions, no rashes  NEURO: strength and tone- normal, sensory exam- grossly normal, mentation- intact, speech- normal, reflexes- symmetric  BACK: no CVA tenderness, no paralumbar tenderness  PSYCH: Alert and oriented times 3; speech- coherent , normal rate and volume; able to articulate logical thoughts  LYMPHATICS: ant. cervical- normal, post. cervical- normal    DIAGNOSTICS:      EKG: appears normal, NSR, normal axis, normal intervals, no acute ST/T changes c/w ischemia, no LVH by voltage criteria, unchanged from previous tracings  Labs Resulted Today:   Results for orders placed or performed in visit on 02/16/17   Hemoglobin A1c ([facility])   Result Value Ref Range    Hemoglobin A1C 6.3 (H) 4.1 - 5.7 %   Basic Metabolic Panel ([facility]) - Results < 1 hr   Result Value Ref Range    Glucose 135.0 (H) 60.0 - 109.0 mg/dL    Urea Nitrogen 19.0 7.0 - 30.0 mg/dL    Creatinine 1.3 0.8 - 1.5 mg/dL    Sodium 140.0 133.0 - 144.0 mmol/L    Potassium 4.7 3.4 - 5.3 mmol/L    Chloride 102.0 94.0 - 109.0 mmol/L    Carbon Dioxide 27.0 20.0 - 32.0 mmol/L    Calcium 9.5 8.5 - 10.4 mg/dL    eGFR Calculated (Non Black Reference) 56.2 (L) >60.0 mL/min    eGFR Calculated (Black Reference) 68.0 >60.0 mL/min   Lipid Panel ([facility]) - Results < 1 hr   Result Value Ref Range    Cholesterol 124.0 <200.0 mg/dL    Triglycerides 122.0 <150.0 mg/dL    HDL Cholesterol 38.0 (L) >40.0 mg/dL    VLDL-Cholesterol 24.0 7.0 - 32.0 mg/dL    LDL Cholesterol Direct 61.0 0.0 - 99.0 mg/dL    Cholesterol/HDL Ratio 3.3 <5.0 RATIO       RISK ASSESSMENT:     Cardiovascular Risk:  -Patient is able to walk up a flight of stairs without chest pain.  -The patient does not have chest pain with exertion.  -Patient does not have a history of congestive heart failure.    -The patient does not have a history of stroke and does not have a history of valvular disease.    Pulmonary Risk:  -In terms of risk  factors for pulmonary complication, the patient previous smoker - quit in 1984.    Perioperative Complications:  -The patient does not have a history of bleeding or clotting problems in the past.    -The patient has not had complications from surgeries.    -The patient does not have a family history of any anesthesia or surgical complications.      IMPRESSION:   Reason for surgery/procedure: Claudication - both legs when walking about 100 yards.     The proposed surgical procedure is considered INTERMEDIATE risk.    For above listed surgery and anesthesia:   Patient is at moderate risk for surgery/procedure and perioperative/procedure complications.    RECOMMENDATIONS:     Labs:  EKG and CBC, BMP    Fasting:  NPO for 12 hours prior to surgery    Preop Plan:  --Patient has   Cardiovascular Risk  Performs 4 METs exercise without symptoms (Climb a flight of stairs) .   Patient is already on a Beta Blocker. Continue Betablocker therapy after surgery, using Beta blocker order set as necessary for NPO status.    --Patient is taking an  Ace inhibitor or Angiotensin Receptor Blocker (ARB) and should HOLD this medication for the 24 hours prior to surgery.  Left ear canal irrigated - cleared of cerumen.      Medications:  Patient should take their regular medications the morning of surgery unless otherwise instructed.    Hold aspirin 7 days prior to surgery.  Hold ibuprofen for 5 days prior.  Hold ACE inhibitors and ARB's the day of surgery.  Take metoprolol AM of surgery.   Take statin and metoprolol evening before surgery.       Reece Weir MD    Please contact our office if there are any further questions or information required about this patient.

## 2017-05-01 NOTE — PROGRESS NOTES
Please call patient and send results letter  Your pre op testing show some worseing of kidney tests, suggesting

## 2017-05-02 ASSESSMENT — MIFFLIN-ST. JEOR: SCORE: 1370.05

## 2017-05-03 ENCOUNTER — SURGERY - HEALTHEAST (OUTPATIENT)
Dept: SURGERY | Facility: HOSPITAL | Age: 82
End: 2017-05-03

## 2017-05-03 ENCOUNTER — ANESTHESIA - HEALTHEAST (OUTPATIENT)
Dept: SURGERY | Facility: HOSPITAL | Age: 82
End: 2017-05-03

## 2017-05-04 ASSESSMENT — MIFFLIN-ST. JEOR: SCORE: 1370.05

## 2017-05-08 ENCOUNTER — TELEPHONE (OUTPATIENT)
Dept: FAMILY MEDICINE | Facility: CLINIC | Age: 82
End: 2017-05-08

## 2017-05-08 NOTE — TELEPHONE ENCOUNTER
Date of discharge: 05/05/2017  Facility of discharge: [facility]  Patient concerns about condition: No concerns at this time.  Patient concerns about medications: No concerns at this time.  Full med reconciliation will be completed at clinic visit.  Patient concerns about transitioning: No concerns at this time.  Clinic office visit appointment date: I called the pt to help set up a hospital follow up, the pt stated that he did not feel like he should see the doctor again.  Pt stated that he saw his doctor for the pre-op for his surgery, and he will just follow up with his surgeon in about a week anyway.   Patient reminded to bring all medications (prescription and over-the-counter) to clinic appointment: Yes    Using the date of discharge as day 1, the 30th day post discharge is 06/04/2017.

## 2017-05-17 ENCOUNTER — COMMUNICATION - HEALTHEAST (OUTPATIENT)
Dept: VASCULAR SURGERY | Facility: CLINIC | Age: 82
End: 2017-05-17

## 2017-05-17 ENCOUNTER — RECORDS - HEALTHEAST (OUTPATIENT)
Dept: VASCULAR ULTRASOUND | Facility: CLINIC | Age: 82
End: 2017-05-17

## 2017-05-17 ENCOUNTER — AMBULATORY - HEALTHEAST (OUTPATIENT)
Dept: VASCULAR SURGERY | Facility: CLINIC | Age: 82
End: 2017-05-17

## 2017-05-17 DIAGNOSIS — I73.9 PERIPHERAL VASCULAR DISEASE, UNSPECIFIED (H): ICD-10-CM

## 2017-05-17 DIAGNOSIS — Z98.890 H/O ENDARTERECTOMY: ICD-10-CM

## 2017-05-17 DIAGNOSIS — I73.9 PERIPHERAL VASCULAR DISEASE (H): ICD-10-CM

## 2017-05-17 DIAGNOSIS — Z98.890 OTHER SPECIFIED POSTPROCEDURAL STATES: ICD-10-CM

## 2017-05-23 ENCOUNTER — AMBULATORY - HEALTHEAST (OUTPATIENT)
Dept: VASCULAR SURGERY | Facility: CLINIC | Age: 82
End: 2017-05-23

## 2017-05-25 ENCOUNTER — OFFICE VISIT - HEALTHEAST (OUTPATIENT)
Dept: VASCULAR SURGERY | Facility: CLINIC | Age: 82
End: 2017-05-25

## 2017-05-25 ENCOUNTER — TRANSFERRED RECORDS (OUTPATIENT)
Dept: HEALTH INFORMATION MANAGEMENT | Facility: CLINIC | Age: 82
End: 2017-05-25

## 2017-05-25 DIAGNOSIS — Z09 POSTOP CHECK: ICD-10-CM

## 2017-05-25 DIAGNOSIS — I10 HYPERTENSION: ICD-10-CM

## 2017-05-25 DIAGNOSIS — Z87.891 HISTORY OF SMOKING: ICD-10-CM

## 2017-05-25 DIAGNOSIS — Z98.890 H/O ENDARTERECTOMY: ICD-10-CM

## 2017-05-25 DIAGNOSIS — E78.5 HYPERLIPIDEMIA: ICD-10-CM

## 2017-05-25 DIAGNOSIS — I73.9 PAD (PERIPHERAL ARTERY DISEASE) (H): ICD-10-CM

## 2017-05-25 ASSESSMENT — MIFFLIN-ST. JEOR: SCORE: 1369.86

## 2017-08-24 DIAGNOSIS — I10 ESSENTIAL HYPERTENSION, BENIGN: ICD-10-CM

## 2017-08-24 DIAGNOSIS — I25.2 CORONARY ARTERY DISEASE WITH HX OF MYOCARDIAL INFARCT W/O HX OF CABG: ICD-10-CM

## 2017-08-24 DIAGNOSIS — I25.10 CORONARY ARTERY DISEASE WITH HX OF MYOCARDIAL INFARCT W/O HX OF CABG: ICD-10-CM

## 2017-08-24 RX ORDER — ATORVASTATIN CALCIUM 40 MG/1
40 TABLET, FILM COATED ORAL DAILY
Qty: 90 TABLET | Refills: 3 | Status: SHIPPED | OUTPATIENT
Start: 2017-08-24 | End: 2018-08-20

## 2017-08-24 RX ORDER — METOPROLOL TARTRATE 25 MG/1
12.5 TABLET, FILM COATED ORAL 2 TIMES DAILY
Qty: 90 TABLET | Refills: 3 | Status: SHIPPED | OUTPATIENT
Start: 2017-08-24 | End: 2018-08-20

## 2017-08-24 RX ORDER — LISINOPRIL AND HYDROCHLOROTHIAZIDE 12.5; 2 MG/1; MG/1
1 TABLET ORAL DAILY
Qty: 90 TABLET | Refills: 3 | Status: SHIPPED | OUTPATIENT
Start: 2017-08-24 | End: 2018-08-20

## 2017-11-14 ENCOUNTER — RECORDS - HEALTHEAST (OUTPATIENT)
Dept: ADMINISTRATIVE | Facility: OTHER | Age: 82
End: 2017-11-14

## 2017-11-14 ENCOUNTER — RECORDS - HEALTHEAST (OUTPATIENT)
Dept: VASCULAR ULTRASOUND | Facility: CLINIC | Age: 82
End: 2017-11-14

## 2017-11-14 DIAGNOSIS — I73.9 PERIPHERAL VASCULAR DISEASE, UNSPECIFIED (H): ICD-10-CM

## 2017-11-22 ENCOUNTER — COMMUNICATION - HEALTHEAST (OUTPATIENT)
Dept: VASCULAR SURGERY | Facility: CLINIC | Age: 82
End: 2017-11-22

## 2017-12-11 ENCOUNTER — TRANSFERRED RECORDS (OUTPATIENT)
Dept: HEALTH INFORMATION MANAGEMENT | Facility: CLINIC | Age: 82
End: 2017-12-11

## 2017-12-11 ENCOUNTER — OFFICE VISIT - HEALTHEAST (OUTPATIENT)
Dept: VASCULAR SURGERY | Facility: CLINIC | Age: 82
End: 2017-12-11

## 2017-12-11 DIAGNOSIS — I73.9 PAD (PERIPHERAL ARTERY DISEASE) (H): ICD-10-CM

## 2017-12-18 ENCOUNTER — TELEPHONE (OUTPATIENT)
Dept: FAMILY MEDICINE | Facility: CLINIC | Age: 82
End: 2017-12-18

## 2017-12-18 NOTE — TELEPHONE ENCOUNTER
Attempted to reach patient to help facilitate a hospital follow up appointment.  for RC    Date of discharge: 12/16/2017  Facility of discharge: [facility]  Patient concerns about condition: No concerns at this time.  Patient concerns about medications: No concerns at this time.  Full med reconciliation will be completed at clinic visit.  Patient concerns about transitioning: Concerns include that he needs to schedule holter placement at [facility], needs order.  Clinic office visit appointment date: 12/21/2017  Dr Weir  Patient reminded to bring all medications (prescription and over-the-counter) to clinic appointment: Yes    Using the date of discharge as day 1, the 30th day post discharge is 1/15/2017.    12/18/17 Patient calls back and RS till 12/22 with Dr Bravo

## 2017-12-22 ENCOUNTER — OFFICE VISIT (OUTPATIENT)
Dept: FAMILY MEDICINE | Facility: CLINIC | Age: 82
End: 2017-12-22
Payer: COMMERCIAL

## 2017-12-22 VITALS
TEMPERATURE: 97.4 F | DIASTOLIC BLOOD PRESSURE: 74 MMHG | HEART RATE: 59 BPM | BODY MASS INDEX: 28.66 KG/M2 | RESPIRATION RATE: 18 BRPM | OXYGEN SATURATION: 99 % | SYSTOLIC BLOOD PRESSURE: 165 MMHG | WEIGHT: 172 LBS | HEIGHT: 65 IN

## 2017-12-22 DIAGNOSIS — R09.81 NASAL CONGESTION: ICD-10-CM

## 2017-12-22 DIAGNOSIS — I10 BENIGN ESSENTIAL HYPERTENSION: ICD-10-CM

## 2017-12-22 DIAGNOSIS — R55 SYNCOPE, UNSPECIFIED SYNCOPE TYPE: Primary | ICD-10-CM

## 2017-12-22 NOTE — PROGRESS NOTES
Hospitalization Follow-up Visit         [facility]       Hospital Follow-up Visit:    Hospital:  [facility]   Date of Admission: 12/15/17  Date of Discharge: 12/16/17  Reason(s) for Admission: Syncope    Problems taking medications regularly:  None  Post Discharge Medication Reconciliation: discharge medications reconciled, continue medications without change.  Problems adhering to non-medication therapy:  None  Medications reviewed by: by myself.  Summary of hospitalization:  [facility] discharge summary reviewed  Diagnostic Tests/Treatments reviewed.  Follow up needed: none  Other Healthcare Providers Involved in Patient s Care: None  Update since discharge: improved.   Plan of care communicated with patient     Presented to [facility] after an episode of unwitnessed episode of syncope. He does not remember what exactly happened but did noted that he woke with soaked in urine and felt a bit dizzy. Denies any prior episode of syncope and similar symptoms with his previous myocardial infarction decades ago. He denied chest pain, shortness of breath, weakness, headache, or palpitations. He was admitted and monitor overnight without any further events. Was discharge after 24 hours and was told that it was likely due to vasovagal vs dehydration given decrease PO intake prior to event. EKG + troponin ×3 were within normal limits and no other concerning associated signs or symptoms.    Since his discharge, he feels that he is back to baseline. He has not had any further episodes of syncope or presyncope. He check his pulse everyday and states that his pulse has been normal. Denies any issues with medications and has not started any other medications. Has been trying to drink more fluid and keep well hydrated. Denies any chest pain, lightheadedness, dizziness, presyncope, back pain, abdominal pain, diarrhea, constipation, numbness or weakness. Has been having issues with nasal congestion which is chronic and no  "different despite hospitalization. No other concerns.         Review of Systems:   CONSTITUTIONAL: no fatigue, no unexpected change in weight  SKIN: no worrisome rashes, no worrisome moles, no worrisome lesions  EYES: no acute vision problems or changes  ENT: no ear problems, no mouth problems, no throat problems  RESP: no significant cough, no shortness of breath  CV: no chest pain, no palpitations, no new or worsening peripheral edema  GI: no nausea, no vomiting, no constipation, no diarrhea  : no frequency, no dysuria, no hematuria  MS: no claudication, no myalgias, no joint aches  NEURO: no weakness, no dizziness, no syncope, no headaches  PSYCHIATRIC: NEGATIVE for changes in mood or affect            Physical Exam:     Vitals:    12/22/17 0954   BP: 165/74   Pulse: 59   Resp: 18   Temp: 97.4  F (36.3  C)   TempSrc: Oral   SpO2: 99%   Weight: 172 lb (78 kg)   Height: 5' 5\" (165.1 cm)     Body mass index is 28.62 kg/(m^2).    GENERAL: healthy, alert and no distress  EYES: Eyes grossly normal to inspection, extraocular movements - intact, and PERRL  NECK: no tenderness, no adenopathy, no asymmetry, no masses, no stiffness; thyroid- normal to palpation  RESP: lungs clear to auscultation - no rales, no rhonchi, no wheezes  CV: regular rates and rhythm, normal S1 S2, no S3 or S4 and no murmur, no click or rub -  ABDOMEN: soft, no tenderness, no  hepatosplenomegaly, no masses, normal bowel sounds  MS: extremities- no gross deformities noted, no edema  NEURO: strength and tone- normal, sensory exam- grossly normal, mentation- intact, speech- normal, reflexes- symmetric  PSYCH: Alert and oriented times 3; speech- coherent , normal rate and volume; able to articulate logical thoughts, able to abstract reason, no tangential thoughts, no hallucinations or delusions, affect- normal         Results:   Results from the last 24 hours  No results found for this or any previous visit (from the past 24 hour(s)).    Assessment " and Plan     Zack was seen today for hospital f/u.  Diagnoses and all orders for this visit:    Syncope, unspecified syncope type  Vasovagal vs dehydration vs cardiac arrhythmias. Cardiac monitor during hospitalization was unremarkable and cardiac workup was negative. Discuss etiology with patient but given no further episode, will not pursue cardiac monitoring at this time. Did discuss further evaluation with stress test given CAD history, which he will think about.  - Continue with current medication  - Consider stress test, further discussion at future visit    Benign essential hypertension  Elevated today but stated that BP was well controlled during hospitalization. Currently taking metoprolol and Prinzide 20-12.5.  - Continue with current anti-hypertensive medication  - Recheck BP at next visit (CPE exam)    Nasal congestion  Long standing issues and has not tried any therapy. Would like over the counter solution and avoid medication as much as possible. Discuss options such as Neti Pot or nasal saline  - Try Neti Pot  - Try Nasal Saline    E&M code to be billed if TCM cannot be: 90012    Type of decision making: Moderate complexity (72569)    Options for treatment and follow-up care were reviewed with the patient  Zack Johns   engaged in the decision making process and verbalized understanding of the options discussed and agreed with the final plan.      Jagdish Bravo MD

## 2017-12-22 NOTE — PATIENT INSTRUCTIONS
- MAY USE NETI POT FOR NASAL CONGESTION  - MAY USE NASAL SALINE FOR NASAL CONGESTION  - CONTINUE WITH CURRENT MEDICATIONS  - FOLLOW UP IN 1 MONTH FOR CPE  - WORK ON HEALTHIER DIET AND WEIGHT LOSS

## 2017-12-22 NOTE — MR AVS SNAPSHOT
After Visit Summary   2017    Zack Johns    MRN: [id number]           Patient Information     Date Of Birth          1934        Visit Information        Provider Department      2017 10:00 AM Jagdish Bravo MD [facility]        Today's Diagnoses     Syncope, unspecified syncope type    -  1    Benign essential hypertension          Care Instructions    - MAY USE NETI POT FOR NASAL CONGESTION  - MAY USE NASAL SALINE FOR NASAL CONGESTION  - CONTINUE WITH CURRENT MEDICATIONS  - FOLLOW UP IN 1 MONTH FOR CPE  - WORK ON HEALTHIER DIET AND WEIGHT LOSS              Follow-ups after your visit        Follow-up notes from your care team     Return in about 1 month (around 2018).      Who to contact     Please call your clinic at [phone] to:    Ask questions about your health    Make or cancel appointments    Discuss your medicines    Learn about your test results    Speak to your doctor   If you have compliments or concerns about an experience at your clinic, or if you wish to file a complaint, please contact [facility] Patient Relations at [phone] or email us at [email or web address]         Additional Information About Your Visit        MyChart Information     [employer] is an electronic gateway that provides easy, online access to your medical records. With [employer], you can request a clinic appointment, read your test results, renew a prescription or communicate with your care team.     To sign up for [employer] visit the website at [email or web address]   You will be asked to enter the access code listed below, as well as some personal information. Please follow the directions to create your username and password.     Your access code is: [email or web address]  Expires: 3/22/2018 10:41 AM     Your access code will  in 90 days. If you need help or a new code, please contact your [facility]  "or call [phone] for assistance.        Care EveryWhere ID     This is your Care EveryWhere ID. This could be used by other organizations to access your [place] medical records  [id number]        Your Vitals Were     Pulse Temperature Respirations Height Pulse Oximetry BMI (Body Mass Index)    59 97.4  F (36.3  C) (Oral) 18 5' 5\" (165.1 cm) 99% 28.62 kg/m2       Blood Pressure from Last 3 Encounters:   12/22/17 165/74   05/01/17 145/67   02/16/17 149/74    Weight from Last 3 Encounters:   12/22/17 172 lb (78 kg)   05/01/17 170 lb 3.2 oz (77.2 kg)   02/16/17 167 lb (75.8 kg)              Today, you had the following     No orders found for display       Primary Care Provider Office Phone # Fax #    Reece Weir [phone]       [facility] [address] [place] [address]        Equal Access to Services     [facility]: Hadii aad ku hadasho Soomaali, waaxda luqadaha, qaybta kaalmada adeegyada, waxay nasrin hayraz bland . So [facility] [phone].    ATENCIÓN: Si habla español, tiene a andrews disposición servicios gratuitos de asistencia lingüística. [facility] [phone].    We comply with applicable federal civil rights laws and [place] laws. We do not discriminate on the basis of race, color, national origin, age, disability, sex, sexual orientation, or gender identity.            Thank you!     Thank you for choosing [facility]  for your care. Our goal is always to provide you with excellent care. Hearing back from our patients is one way we can continue to improve our services. Please take a few minutes to complete the written survey that you may receive in the mail after your visit with us. Thank you!             Your Updated Medication List - Protect others around you: Learn how to safely use, store and throw away your medicines at www.disposemymeds.org.          This list is accurate as of: 12/22/17 10:41 AM.  Always use your most recent " med list.                   Brand Name Dispense Instructions for use Diagnosis    acetaminophen 500 MG tablet    TYLENOL     Take 500-1,000 mg by mouth every 6 hours as needed        aspirin 81 MG tablet      Take 1 tablet by mouth daily        atorvastatin 40 MG tablet    LIPITOR    90 tablet    Take 1 tablet (40 mg) by mouth daily    Coronary artery disease with hx of myocardial infarct w/o hx of CABG       lisinopril-hydrochlorothiazide 20-12.5 MG per tablet    PRINZIDE/ZESTORETIC    90 tablet    Take 1 tablet by mouth daily    Coronary artery disease with hx of myocardial infarct w/o hx of CABG, Essential hypertension, benign       metoprolol 25 MG tablet    LOPRESSOR    90 tablet    Take 0.5 tablets (12.5 mg) by mouth 2 times daily    Coronary artery disease with hx of myocardial infarct w/o hx of CABG, Essential hypertension, benign       multivitamin per tablet      Take 1 tablet by mouth daily.        NITROSTAT 0.4 MG sublingual tablet   Generic drug:  nitroGLYcerin      Take one under the tongue prn chest pain. Repeat at 5 min intervals x 2 if needed        zinc 50 MG Tabs      Take 50 mg by mouth daily.

## 2017-12-22 NOTE — PROGRESS NOTES
Preceptor Attestation:  Patient's case reviewed and discussed with Jagdish Bravo MD Patient seen and discussed with the resident.. I agree with assessment and plan of care.  Supervising Physician:  Ivette Varela MD  [facility]

## 2017-12-28 ENCOUNTER — TELEPHONE (OUTPATIENT)
Dept: FAMILY MEDICINE | Facility: CLINIC | Age: 82
End: 2017-12-28

## 2017-12-28 NOTE — TELEPHONE ENCOUNTER
TCM APPOINTMENT FOLLOW UP    Language:English    Medication questions: no    Specialist follow up: no    Concerns since last visit: no    Next appointment at [facility]:Yes    Comments: Pt stated that he is doing fine, pt is coming in later in January for a follow up.      @[facility]@ Yes   Wero Rodriguez

## 2018-01-24 ENCOUNTER — TELEPHONE (OUTPATIENT)
Dept: FAMILY MEDICINE | Facility: CLINIC | Age: 83
End: 2018-01-24

## 2018-01-24 NOTE — TELEPHONE ENCOUNTER
"[facility] Family Medicine phone call message- general phone call:    Reason for call: Patient is coming in tomorrow for a physical appt. He had a couple of questions. I explained to him that with fasting, water with medications is usually okay prior to coming in. Called Shanta to confirm however she wanted me to send a message to the [facility] team to make sure that it is okay if patient takes his medications tomorrow in the morning since fasting labs will be done. Patient states he will not be home later so to please leave a detailed message simply stating \"yes, its okay to take your medications in the morning\" or \"No, its not okay to take medications\". Please call, and advise.     Return call needed: Yes    OK to leave a message on voice mail? Yes    Primary language: English      needed? No    Call taken on January 24, 2018 at 9:43 AM by Amarilis Huang    "

## 2018-01-24 NOTE — TELEPHONE ENCOUNTER
Called at 10:25 AM on 01/24/18. Informed patient he should take his morning medications with water prior to coming in. He can drink as much water as he wants. Also reminded patient to bring in all medications to his visit.    Dutch Murrieta MD  Family Medicine Resident, R3

## 2018-01-25 ENCOUNTER — OFFICE VISIT (OUTPATIENT)
Dept: FAMILY MEDICINE | Facility: CLINIC | Age: 83
End: 2018-01-25
Payer: COMMERCIAL

## 2018-01-25 VITALS
RESPIRATION RATE: 16 BRPM | TEMPERATURE: 97.8 F | HEIGHT: 65 IN | OXYGEN SATURATION: 98 % | BODY MASS INDEX: 28.69 KG/M2 | HEART RATE: 63 BPM | SYSTOLIC BLOOD PRESSURE: 146 MMHG | WEIGHT: 172.2 LBS | DIASTOLIC BLOOD PRESSURE: 65 MMHG

## 2018-01-25 DIAGNOSIS — H61.21 IMPACTED CERUMEN OF RIGHT EAR: ICD-10-CM

## 2018-01-25 DIAGNOSIS — R73.03 PREDIABETES: Primary | ICD-10-CM

## 2018-01-25 DIAGNOSIS — I10 BENIGN ESSENTIAL HYPERTENSION: ICD-10-CM

## 2018-01-25 LAB
BUN SERPL-MCNC: 20 MG/DL (ref 7–30)
CALCIUM SERPL-MCNC: 9.7 MG/DL (ref 8.5–10.4)
CHLORIDE SERPLBLD-SCNC: 98 MMOL/L (ref 94–109)
CHOLEST SERPL-MCNC: 135 MG/DL
CHOLEST/HDLC SERPL: 3 RATIO
CO2 SERPL-SCNC: 27 MMOL/L (ref 20–32)
CREAT SERPL-MCNC: 1.2 MG/DL (ref 0.8–1.5)
EGFR CALCULATED (BLACK REFERENCE): 74.4 ML/MIN
EGFR CALCULATED (NON BLACK REFERENCE): 61.5 ML/MIN
GLUCOSE SERPL-MCNC: 136 MG/DL (ref 60–109)
HBA1C MFR BLD: 6.1 % (ref 4.1–5.7)
HCT VFR BLD AUTO: 44.1 % (ref 40–53)
HDLC SERPL-MCNC: 45 MG/DL
HEMOGLOBIN: 13.6 G/DL (ref 13.3–17.7)
LDLC SERPL CALC-MCNC: 61 MG/DL (ref 0–99)
MCH RBC QN AUTO: 29.9 PG (ref 26.5–35)
MCHC RBC AUTO-ENTMCNC: 30.8 G/DL (ref 32–36)
MCV RBC AUTO: 96.9 FL (ref 78–100)
PLATELET # BLD AUTO: 263 K/UL (ref 150–450)
POTASSIUM SERPL-SCNC: 4 MMOL/L (ref 3.4–5.3)
RBC # BLD AUTO: 4.55 M/UL (ref 4.4–5.9)
SODIUM SERPL-SCNC: 138 MMOL/L (ref 133–144)
TRIGL SERPL-MCNC: 145 MG/DL
VLDL-CHOLESTEROL: 29 MG/DL (ref 7–32)
WBC # BLD AUTO: 7.9 K/UL (ref 4–11)

## 2018-01-25 NOTE — PROGRESS NOTES
HPI:       Zack Johns is a 83 year old  male who presents for recheck of BP and prediabetes.  Patient returns for recheck. He has been feeling well. No further episodes of syncope after his last episode which put him in the hospital in December.  Appears that episode of syncope was secondary to mild dehydration and HCTZ.  Has history of prediabetes - will recheck.   Impacted cerumen right ear.   Has several small skin tags he would like frozen if possible               PMHX:   Current Medications:   Current Outpatient Prescriptions   Medication Sig Dispense Refill     atorvastatin (LIPITOR) 40 MG tablet Take 1 tablet (40 mg) by mouth daily 90 tablet 3     lisinopril-hydrochlorothiazide (PRINZIDE/ZESTORETIC) 20-12.5 MG per tablet Take 1 tablet by mouth daily 90 tablet 3     metoprolol (LOPRESSOR) 25 MG tablet Take 0.5 tablets (12.5 mg) by mouth 2 times daily 90 tablet 3     aspirin 81 MG tablet Take 1 tablet by mouth daily       acetaminophen (TYLENOL) 500 MG tablet Take 500-1,000 mg by mouth every 6 hours as needed       Multiple Vitamin (MULTIVITAMIN) per tablet Take 1 tablet by mouth daily.       zinc 50 MG TABS Take 50 mg by mouth daily.       nitroglycerin (NITROSTAT) 0.4 MG SL tablet Take one under the tongue prn chest pain. Repeat at 5 min intervals x 2 if needed         Existing Problems  Patient Active Problem List   Diagnosis     Actinic keratosis     Cardiovascular disease     Essential hypertension, benign     CT Lung Pulmonary Nodule Multiple     Diabetes mellitus, type 2 (H)     Diverticulosis of large intestine     Hyperlipidemia     Peripheral vascular disease (H)     Internal hemorrhoids     Local infection of skin and subcutaneous tissue     Disorder of bursae and tendons in shoulder region     Synovial cyst     Trigger finger, acquired     Osteoarthritis of glenohumeral joint     Syncope       Allergies:  Allergies   Allergen Reactions     Pletal [Cilostazol]        Previous  "labs:  Lab Results   Component Value Date    HGB 13.6 01/25/2018    HCT 44.1 01/25/2018    CHOL 135.0 01/25/2018    TRIG 145.0 01/25/2018    HDL 45.0 01/25/2018    .0 01/25/2018    BUN 20.0 01/25/2018    CO2 27.0 01/25/2018               Review of Systems:    CONSTITUTIONAL: no fatigue, no unexpected change in weight  SKIN: no worrisome rashes, no worrisome moles, no worrisome lesions  EYES: no acute vision problems or changes  ENT: mild decrease in hearing, no mouth problems, no throat problems  RESP: no significant cough, no shortness of breath  CV: no chest pain, no palpitations, no new or worsening peripheral edema  GI: no nausea, no vomiting, no constipation, no diarrhea          Physical Exam:     Vitals:    01/25/18 0845   BP: 146/65   BP Location: Right arm   Pulse: 63   Resp: 16   Temp: 97.8  F (36.6  C)   TempSrc: Oral   SpO2: 98%   Weight: 172 lb 3.2 oz (78.1 kg)   Height: 5' 5\" (165.1 cm)     Body mass index is 28.66 kg/(m^2).    GENERAL:alert, well hydrated, no distress  EYES: Eyes grossly normal to inspection, extraocular movements - intact, and PERRL  HENT: ear canals- right - cerumen obstruction, left normal; TMs- normal; Nose- normal; Mouth- no ulcers, no lesions  NECK: no tenderness, no adenopathy, no asymmetry, no masses, no stiffness; thyroid- normal to palpation  RESP: lungs clear to auscultation - no rales, no rhonchi, no wheezes  CV: regular rates and rhythm, normal S1 S2, no S3 or S4 and no murmur, no click or rub -    Procedure note:   Cryo gun used for freezing three lesions: skin tag right base of neck, seborrheic keratosis on right wrist, and another on the left forearm.  60 second double freeze technique. No complications. Patient did well following procedure.              Labs and Procedures     Office Visit on 05/01/2017   Component Date Value Ref Range Status     Glucose 05/01/2017 98.0  60.0 - 109.0 mg/dL Final     Urea Nitrogen 05/01/2017 22.0  7.0 - 30.0 mg/dL Final     " Creatinine 05/01/2017 1.4  0.8 - 1.5 mg/dL Final     Sodium 05/01/2017 137.0  133.0 - 144.0 mmol/L Final     Potassium 05/01/2017 4.9  3.4 - 5.3 mmol/L Final     Chloride 05/01/2017 102.0  94.0 - 109.0 mmol/L Final     Carbon Dioxide 05/01/2017 29.0  20.0 - 32.0 mmol/L Final     Calcium 05/01/2017 9.3  8.5 - 10.4 mg/dL Final     eGFR Calculated (Non Black Referen* 05/01/2017 51.6* >60.0 mL/min Final     eGFR Calculated (Black Reference) 05/01/2017 62.4  >60.0 mL/min Final     WBC 05/01/2017 8.0  4.0 - 11.0 K/uL Final     RBC 05/01/2017 4.27* 4.40 - 5.90 M/uL Final     Hemoglobin 05/01/2017 13.1* 13.3 - 17.7 g/dL Final     Hematocrit 05/01/2017 42.0  40.0 - 53.0 % Final     MCV 05/01/2017 98.4  78.0 - 100.0 fL Final     MCH 05/01/2017 30.7  26.5 - 35.0 pg Final     MCHC 05/01/2017 31.2* 32.0 - 36.0 g/dL Final     Platelets 05/01/2017 260.0  150.0 - 450.0 K/uL Final              Assessment and Plan     1.Freeze of three small skin lesions  2. History of  prediabetes - Screen for diabetes  3. Hypertension - continued mild elevation.  In view of syncopal episode, will hold on further treatment at this time. Recheck at next visit.   4. Cholesterol screen, and recheck of bloods for HTN check.    5. Irrigated right external ear canal to remove impacted cerumen.       Options for treatment and follow-up care were reviewed with the patient and/or guardian. Zack Johns and/or guardian engaged in the decision making process and verbalized understanding of the options discussed and agreed with the final plan.    Reece Weir MD

## 2018-01-25 NOTE — NURSING NOTE
DUE FOR:  Eye Exam referral  Labs due: A1C, urine microalbumin, TSH, Lipids  Wellness Visit  Fall Risk Assessment  PCV13  DM Foot exam    Not able to review HM with patient, provider ready to see patient.

## 2018-01-25 NOTE — LETTER
January 30, 2018      Zack Johns  [address]  [facility] [id number]        Dear Zack,    Your cholesterol is in good shape. Still at risk of develop diabetes (pre-diabetes), and other blood tests within expected values.     Please see below for your test results.    Resulted Orders   Hemoglobin A1c ([facility])   Result Value Ref Range    Hemoglobin A1C 6.1 (H) 4.1 - 5.7 %   Basic Metabolic Panel ([facility])  - Results < 1 hr   Result Value Ref Range    Glucose 136.0 (H) 60.0 - 109.0 mg/dL    Urea Nitrogen 20.0 7.0 - 30.0 mg/dL    Creatinine 1.2 0.8 - 1.5 mg/dL    Sodium 138.0 133.0 - 144.0 mmol/L    Potassium 4.0 3.4 - 5.3 mmol/L    Chloride 98.0 94.0 - 109.0 mmol/L    Carbon Dioxide 27.0 20.0 - 32.0 mmol/L    Calcium 9.7 8.5 - 10.4 mg/dL    eGFR Calculated (Non Black Reference) 61.5 >60.0 mL/min    eGFR Calculated (Black Reference) 74.4 >60.0 mL/min   Lipid Panel ([facility])   Result Value Ref Range    Cholesterol 135.0 <200.0 mg/dL    Triglycerides 145.0 <150.0 mg/dL    HDL Cholesterol 45.0 >40.0 mg/dL      Comment:      If diabetic or CVD then reference range <100    VLDL-Cholesterol 29.0 7.0 - 32.0 mg/dL    LDL Cholesterol Direct 61.0 0.0 - 99.0 mg/dL    Cholesterol/HDL Ratio 3.0 <5.0 RATIO   CBC with Plt ([facility])   Result Value Ref Range    WBC 7.9 4.0 - 11.0 K/uL    RBC 4.55 4.40 - 5.90 M/uL    Hemoglobin 13.6 13.3 - 17.7 g/dL    Hematocrit 44.1 40.0 - 53.0 %    MCV 96.9 78.0 - 100.0 fL    MCH 29.9 26.5 - 35.0 pg    MCHC 30.8 (L) 32.0 - 36.0 g/dL    Platelets 263.0 150.0 - 450.0 K/uL       If you have any questions, please call the clinic to make an appointment.    Sincerely,    Reece Weir MD

## 2018-01-25 NOTE — MR AVS SNAPSHOT
After Visit Summary   1/25/2018    Zack Johns    MRN: [id number]           Patient Information     Date Of Birth          9/19/1934        Visit Information        Provider Department      1/25/2018 8:40 AM Reece Weir MD [facility]        Today's Diagnoses     Type 2 diabetes mellitus without complication, without long-term current use of insulin (H)          Care Instructions    You can try chondroitin for arthritis pain    You can try Naprosyn/ Naproxen (Aleve) for headaches.    We will call you when blood work result is back.      Your medication list is printed, please keep this with you, it is helpful to bring this current list to any other medical appointments, the emergency room or hospital.    If you had lab testing today and your results are reassuring or normal they will be be mailed to you within 7 days.     If the lab tests need quick action we will call you with the results.   The phone number we will call with results is # [phone] (home) . If this is not the best number please call our clinic and change the number.    If you need any refills please call your pharmacy and they will contact us.    If you have any further concerns or wish to schedule another appointment you must call our office during normal business hours  [phone] (8-5:00 M-F)  If you have urgent medical questions that cannot wait  you may also call [phone] at any time of day.  If you have a medical emergency please call 482.    Thank you for coming to [facility].            Follow-ups after your visit        Who to contact     Please call your clinic at [phone] to:    Ask questions about your health    Make or cancel appointments    Discuss your medicines    Learn about your test results    Speak to your doctor   If you have compliments or concerns about an experience at your clinic, or if you wish to file a complaint, please contact [facility]  "Physicians Patient Relations at [phone] or email us at [email or web address]         Additional Information About Your Visit        Tipjoyhart Information     [employer] is an electronic gateway that provides easy, online access to your medical records. With [employer], you can request a clinic appointment, read your test results, renew a prescription or communicate with your care team.     To sign up for [employer] visit the website at [email or web address]   You will be asked to enter the access code listed below, as well as some personal information. Please follow the directions to create your username and password.     Your access code is: [email or web address]  Expires: 3/22/2018 10:41 AM     Your access code will  in 90 days. If you need help or a new code, please contact your [facility] or call [phone] for assistance.        Care EveryWhere ID     This is your Care EveryWhere ID. This could be used by other organizations to access your [place] medical records  [id number]        Your Vitals Were     Pulse Temperature Respirations Height Pulse Oximetry BMI (Body Mass Index)    63 97.8  F (36.6  C) (Oral) 16 5' 5\" (165.1 cm) 98% 28.66 kg/m2       Blood Pressure from Last 3 Encounters:   18 146/65   17 165/74   17 145/67    Weight from Last 3 Encounters:   18 172 lb 3.2 oz (78.1 kg)   17 172 lb (78 kg)   17 170 lb 3.2 oz (77.2 kg)              We Performed the Following     Basic Metabolic Panel ([facility])  - Results < 1 hr     C FOOT EXAM  NO CHARGE     CBC with Plt ([facility])     Hemoglobin A1c ([facility])     Lipid Panel ([facility])        Primary Care Provider Office Phone # Fax #    Reece Weir [phone]       [facility] [address] [place] [address]        Equal Access to Services     RISHI THOMAS AH: Hadii yoselin vaughano Sojaime, waaxda luqadaha, qaybdo levi, [facility] idiin " kaleigh pilarviviana judahparrish latoddmickey ah. So [facility] [phone].    ATENCIÓN: Si carrolla eve, tiene a andrews disposición servicios gratuitos de asistencia lingüística. Alisson al [phone].    We comply with applicable federal civil rights laws and [place] laws. We do not discriminate on the basis of race, color, national origin, age, disability, sex, sexual orientation, or gender identity.            Thank you!     Thank you for choosing [facility]  for your care. Our goal is always to provide you with excellent care. Hearing back from our patients is one way we can continue to improve our services. Please take a few minutes to complete the written survey that you may receive in the mail after your visit with us. Thank you!             Your Updated Medication List - Protect others around you: Learn how to safely use, store and throw away your medicines at www.disposemymeds.org.          This list is accurate as of 1/25/18  9:24 AM.  Always use your most recent med list.                   Brand Name Dispense Instructions for use Diagnosis    acetaminophen 500 MG tablet    TYLENOL     Take 500-1,000 mg by mouth every 6 hours as needed        aspirin 81 MG tablet      Take 1 tablet by mouth daily        atorvastatin 40 MG tablet    LIPITOR    90 tablet    Take 1 tablet (40 mg) by mouth daily    Coronary artery disease with hx of myocardial infarct w/o hx of CABG       lisinopril-hydrochlorothiazide 20-12.5 MG per tablet    PRINZIDE/ZESTORETIC    90 tablet    Take 1 tablet by mouth daily    Coronary artery disease with hx of myocardial infarct w/o hx of CABG, Essential hypertension, benign       metoprolol tartrate 25 MG tablet    LOPRESSOR    90 tablet    Take 0.5 tablets (12.5 mg) by mouth 2 times daily    Coronary artery disease with hx of myocardial infarct w/o hx of CABG, Essential hypertension, benign       multivitamin per tablet      Take 1 tablet by mouth daily.        NITROSTAT 0.4 MG sublingual tablet    Generic drug:  nitroGLYcerin      Take one under the tongue prn chest pain. Repeat at 5 min intervals x 2 if needed        zinc 50 MG Tabs      Take 50 mg by mouth daily.

## 2018-01-30 NOTE — PROGRESS NOTES
Please call patient and send results letter  Your cholesterol is in good shape. Still at risk of develop diabetes (pre-diabetes), and other blood tests within expected values.

## 2018-02-13 ENCOUNTER — TRANSFERRED RECORDS (OUTPATIENT)
Dept: HEALTH INFORMATION MANAGEMENT | Facility: CLINIC | Age: 83
End: 2018-02-13

## 2018-05-14 ENCOUNTER — RECORDS - HEALTHEAST (OUTPATIENT)
Dept: VASCULAR ULTRASOUND | Facility: CLINIC | Age: 83
End: 2018-05-14

## 2018-05-14 ENCOUNTER — MEDICAL CORRESPONDENCE (OUTPATIENT)
Dept: HEALTH INFORMATION MANAGEMENT | Facility: CLINIC | Age: 83
End: 2018-05-14

## 2018-05-14 ENCOUNTER — OFFICE VISIT - HEALTHEAST (OUTPATIENT)
Dept: VASCULAR SURGERY | Facility: CLINIC | Age: 83
End: 2018-05-14

## 2018-05-14 DIAGNOSIS — I73.9 PERIPHERAL VASCULAR DISEASE, UNSPECIFIED (H): ICD-10-CM

## 2018-05-14 DIAGNOSIS — I73.9 PVD (PERIPHERAL VASCULAR DISEASE) (H): ICD-10-CM

## 2018-06-12 ENCOUNTER — COMMUNICATION - HEALTHEAST (OUTPATIENT)
Dept: VASCULAR SURGERY | Facility: CLINIC | Age: 83
End: 2018-06-12

## 2018-07-03 ENCOUNTER — HOSPITAL ENCOUNTER (OUTPATIENT)
Dept: NUCLEAR MEDICINE | Facility: HOSPITAL | Age: 83
Discharge: HOME | End: 2018-07-03

## 2018-07-03 ENCOUNTER — TRANSFERRED RECORDS (OUTPATIENT)
Dept: HEALTH INFORMATION MANAGEMENT | Facility: CLINIC | Age: 83
End: 2018-07-03

## 2018-07-03 ENCOUNTER — HOSPITAL ENCOUNTER (OUTPATIENT)
Dept: CARDIOLOGY | Facility: HOSPITAL | Age: 83
Discharge: HOME | End: 2018-07-03

## 2018-07-03 DIAGNOSIS — R07.9 ACUTE CHEST PAIN: ICD-10-CM

## 2018-07-03 LAB
CV STRESS CURRENT BP HE: NORMAL
CV STRESS CURRENT HR HE: 55
CV STRESS CURRENT HR HE: 56
CV STRESS CURRENT HR HE: 63
CV STRESS CURRENT HR HE: 65
CV STRESS CURRENT HR HE: 65
CV STRESS CURRENT HR HE: 66
CV STRESS CURRENT HR HE: 67
CV STRESS CURRENT HR HE: 67
CV STRESS CURRENT HR HE: 68
CV STRESS CURRENT HR HE: 68
CV STRESS CURRENT HR HE: 70
CV STRESS CURRENT HR HE: 71
CV STRESS CURRENT HR HE: 71
CV STRESS DEVIATION TIME HE: NORMAL
CV STRESS ECHO PERCENT HR HE: NORMAL
CV STRESS EXERCISE STAGE HE: NORMAL
CV STRESS FINAL RESTING BP HE: NORMAL
CV STRESS FINAL RESTING HR HE: 66
CV STRESS MAX HR HE: 73
CV STRESS MAX TREADMILL GRADE HE: 0
CV STRESS MAX TREADMILL SPEED HE: 0
CV STRESS PEAK DIA BP HE: NORMAL
CV STRESS PEAK SYS BP HE: NORMAL
CV STRESS PHASE HE: NORMAL
CV STRESS PROTOCOL HE: NORMAL
CV STRESS RESTING PT POSITION HE: NORMAL
CV STRESS ST DEVIATION AMOUNT HE: NORMAL
CV STRESS ST DEVIATION ELEVATION HE: NORMAL
CV STRESS ST EVELATION AMOUNT HE: NORMAL
CV STRESS TEST TYPE HE: NORMAL
CV STRESS TOTAL STAGE TIME MIN 1 HE: NORMAL
NUC STRESS EJECTION FRACTION: 60 %
STRESS ECHO BASELINE BP: NORMAL
STRESS ECHO BASELINE HR: 56
STRESS ECHO CALCULATED PERCENT HR: 53 %
STRESS ECHO LAST STRESS BP: NORMAL
STRESS ECHO LAST STRESS HR: 70
STRESS ECHO POST ESTIMATED WORKLOAD: 1
STRESS ECHO POST EXERCISE DUR MIN: 4
STRESS ECHO POST EXERCISE DUR SEC: 0

## 2018-07-09 ENCOUNTER — TRANSFERRED RECORDS (OUTPATIENT)
Dept: HEALTH INFORMATION MANAGEMENT | Facility: CLINIC | Age: 83
End: 2018-07-09

## 2018-07-09 ENCOUNTER — OFFICE VISIT - HEALTHEAST (OUTPATIENT)
Dept: VASCULAR SURGERY | Facility: CLINIC | Age: 83
End: 2018-07-09

## 2018-07-09 DIAGNOSIS — I73.9 PAD (PERIPHERAL ARTERY DISEASE) (H): ICD-10-CM

## 2018-07-13 ENCOUNTER — COMMUNICATION - HEALTHEAST (OUTPATIENT)
Dept: VASCULAR SURGERY | Facility: CLINIC | Age: 83
End: 2018-07-13

## 2018-07-16 ENCOUNTER — COMMUNICATION - HEALTHEAST (OUTPATIENT)
Dept: CARDIOLOGY | Facility: CLINIC | Age: 83
End: 2018-07-16

## 2018-07-16 DIAGNOSIS — R94.39 ABNORMAL CARDIOVASCULAR STRESS TEST: ICD-10-CM

## 2018-07-26 ENCOUNTER — COMMUNICATION - HEALTHEAST (OUTPATIENT)
Dept: CARDIOLOGY | Facility: CLINIC | Age: 83
End: 2018-07-26

## 2018-07-26 ENCOUNTER — SURGERY - HEALTHEAST (OUTPATIENT)
Dept: CARDIOLOGY | Facility: CLINIC | Age: 83
End: 2018-07-26

## 2018-07-31 ENCOUNTER — OFFICE VISIT (OUTPATIENT)
Dept: FAMILY MEDICINE | Facility: CLINIC | Age: 83
End: 2018-07-31
Payer: COMMERCIAL

## 2018-07-31 VITALS
HEART RATE: 62 BPM | HEIGHT: 66 IN | RESPIRATION RATE: 18 BRPM | SYSTOLIC BLOOD PRESSURE: 114 MMHG | WEIGHT: 172 LBS | TEMPERATURE: 98 F | BODY MASS INDEX: 27.64 KG/M2 | DIASTOLIC BLOOD PRESSURE: 62 MMHG | OXYGEN SATURATION: 95 %

## 2018-07-31 DIAGNOSIS — Z01.818 PREOP GENERAL PHYSICAL EXAM: Primary | ICD-10-CM

## 2018-07-31 DIAGNOSIS — I10 ESSENTIAL HYPERTENSION, BENIGN: ICD-10-CM

## 2018-07-31 PROBLEM — R94.39 ABNORMAL CARDIOVASCULAR STRESS TEST: Status: ACTIVE | Noted: 2018-07-20

## 2018-07-31 PROBLEM — R07.9 CHEST PAIN: Status: ACTIVE | Noted: 2018-06-16

## 2018-07-31 LAB
% GRANULOCYTES: 77.1 %G (ref 40–75)
BUN SERPL-MCNC: 21 MG/DL (ref 7–30)
CALCIUM SERPL-MCNC: 9.5 MG/DL (ref 8.5–10.4)
CHLORIDE SERPLBLD-SCNC: 98 MMOL/L (ref 94–109)
CO2 SERPL-SCNC: 30 MMOL/L (ref 20–32)
CREAT SERPL-MCNC: 1.1 MG/DL (ref 0.8–1.5)
EGFR CALCULATED (BLACK REFERENCE): 82.2 ML/MIN
EGFR CALCULATED (NON BLACK REFERENCE): 67.9 ML/MIN
GLUCOSE SERPL-MCNC: 148 MG/DL (ref 60–109)
GRANULOCYTES #: 6 K/UL (ref 1.6–8.3)
HCT VFR BLD AUTO: 41.7 % (ref 40–53)
HEMOGLOBIN: 13.3 G/DL (ref 13.3–17.7)
LYMPHOCYTES # BLD AUTO: 1.3 K/UL (ref 0.8–5.3)
LYMPHOCYTES NFR BLD AUTO: 16.7 %L (ref 20–48)
MCH RBC QN AUTO: 30.6 PG (ref 26.5–35)
MCHC RBC AUTO-ENTMCNC: 31.9 G/DL (ref 32–36)
MCV RBC AUTO: 96 FL (ref 78–100)
MID #: 0.5 K/UL (ref 0–2.2)
MID %: 6.2 %M (ref 0–20)
PLATELET # BLD AUTO: 261 K/UL (ref 150–450)
POTASSIUM SERPL-SCNC: 4.3 MMOL/L (ref 3.4–5.3)
RBC # BLD AUTO: 4.34 M/UL (ref 4.4–5.9)
SODIUM SERPL-SCNC: 139 MMOL/L (ref 133–144)
WBC # BLD AUTO: 7.8 K/UL (ref 4–11)

## 2018-07-31 NOTE — PROGRESS NOTES
[facility] [address] [place] [address]  Phone: [phone]  Fax: [phone]    7/31/2018    Adult PRE-OP Evaluation:    Zack Johns, 9/19/1934 presents for pre-operative evaluation and assessment as requested by Dr. Moss, prior to undergoing surgery/procedure for treatment of Coronary angiogram .    Proposed procedure: Stent placement after positive stress test    Date of Surgery/ Procedure: 8/6/18  Hospital/Surgical Facility: [facility], Fax: [phone]     Primary Physician: Reece Weir  Type of Anesthesia Anticipated: Local  History of anesthesia complications: NONE  History of  abnormal bleeding: NONE   History of blood transfusions: NO  Patient has a Health Care Directive or Living Will:  YES, is on file with wife    Preoperative Questions   1. NO - Do you have a history of heart attack, stroke, stent, bypass or surgery on an artery in the head, neck, heart or legs?  3. YES - Have you ever had a severe pain across the front of your chest lasting for half an hour or more? Had an episode in early June, was hospitalized and then outpatient stress test was positive.   3. NO - Have you ever had a severe pain across the front of your chest lasting for half an hour or more?  4. NO - Do you have a history of Congestive Heart Failure?  5. NO - Are you troubled by shortness of breath when: walking on the level/ up a slight hill/ at night?  6. NO - Does your chest ever sound wheezy or whistling?  7. NO - Do you currently have a cold, bronchitis or other respiratory infection?  8. NO - Have you had a cold, bronchitis or other respiratory infection within the last 2 weeks?  9. YES - Do you usually have a cough? Dry cough, no productive, thinks it might be from lisinopril  10. YES - Do you sometimes get pains in the calves of your legs when you walk? Is having stents placed in heart prior to femoral artery stents  11. NO - Do you or anyone in your family have previous  history of blood clots?  12. NO - Do you or does anyone in your family have a serious bleeding problem such as prolonged bleeding following surgeries or cuts?  13. NO - Have you ever had problems with anemia or been told to take iron pills?  14. NO - Have you had any abnormal blood loss such as black, tarry or bloody stools, or abnormal vaginal bleeding?  15. NO - Have you ever had a blood transfusion?  16. NO - Have you or any of your relatives ever had problems with anesthesia?  17. NO - Do you have sleep apnea, excessive snoring or daytime drowsiness?  18. NO - Do you have any prosthetic heart valves?  19. NO - Do you have prosthetic joints?  20. NO - Is there any chance that you may be pregnant?    Patient Active Problem List   Diagnosis     Actinic keratosis     Cardiovascular disease     Essential hypertension, benign     CT Lung Pulmonary Nodule Multiple     Diabetes mellitus, type 2 (H)     Diverticulosis of large intestine     Hyperlipidemia     Peripheral vascular disease (H)     Internal hemorrhoids     Local infection of skin and subcutaneous tissue     Disorder of bursae and tendons in shoulder region     Synovial cyst     Trigger finger, acquired     Osteoarthritis of glenohumeral joint     Syncope         Current Outpatient Prescriptions on File Prior to Visit:  acetaminophen (TYLENOL) 500 MG tablet Take 500-1,000 mg by mouth every 6 hours as needed   aspirin 81 MG tablet Take 1 tablet by mouth daily   atorvastatin (LIPITOR) 40 MG tablet Take 1 tablet (40 mg) by mouth daily   lisinopril-hydrochlorothiazide (PRINZIDE/ZESTORETIC) 20-12.5 MG per tablet Take 1 tablet by mouth daily   metoprolol (LOPRESSOR) 25 MG tablet Take 0.5 tablets (12.5 mg) by mouth 2 times daily   Multiple Vitamin (MULTIVITAMIN) per tablet Take 1 tablet by mouth daily.   nitroglycerin (NITROSTAT) 0.4 MG SL tablet Take one under the tongue prn chest pain. Repeat at 5 min intervals x 2 if needed   zinc 50 MG TABS Take 50 mg by mouth  "daily.     No current facility-administered medications on file prior to visit.     OTC products: None, except as noted above    Allergies   Allergen Reactions     Pletal [Cilostazol]      Latex Allergy: NO    Social History     Social History     Marital status:      Spouse name: N/A     Number of children: N/A     Years of education: N/A     Social History Main Topics     Smoking status: Former Smoker     Smokeless tobacco: Never Used     Alcohol use None     Drug use: None     Sexual activity: Not Asked     Other Topics Concern     None     Social History Narrative       REVIEW OF SYSTEMS:   Constitutional, HEENT, cardiovascular, pulmonary, GI, , musculoskeletal, neuro, skin, endocrine and psych systems are negative, except as otherwise noted.    EXAM:     Patient Vitals for the past 24 hrs:   BP Temp Temp src Pulse Resp SpO2 Height Weight   07/31/18 1109 114/62 - - - - - - -   07/31/18 1104 146/56 98  F (36.7  C) Oral 62 18 95 % 5' 5.5\" (166.4 cm) 172 lb (78 kg)     Body mass index is 28.19 kg/(m^2).  GENERAL: healthy, alert and no distress  EYES: Eyes grossly normal to inspection, extraocular movements - intact, and PERRL  HENT: Nose- normal; Mouth- no ulcers, no lesions  NECK: no tenderness, no adenopathy, no asymmetry, no masses, no stiffness; thyroid- normal to palpation  RESP: lungs clear to auscultation though diminished in apices bilaterally - no rales, no rhonchi, no wheezes  CV: regular rates and rhythm though distant heart sounds, normal S1 S2, no S3 or S4 and no murmur, no click or rub -  ABDOMEN: soft, no tenderness, no  hepatosplenomegaly, no masses, normal bowel sounds  MS: extremities- no gross deformities noted, no edema  SKIN: no suspicious lesions, no rashes  NEURO: strength and tone- normal, sensory exam- grossly normal, mentation- intact, speech- normal, reflexes- symmetric  BACK: no CVA tenderness, no paralumbar tenderness  PSYCH: Alert and oriented times 3; speech- coherent , " normal rate and volume; able to articulate logical thoughts      DIAGNOSTICS:      Patient failed outpatient nuclear stress test.     RISK ASSESSMENT:     Cardiovascular Risk:  -Patient is able to walk up a flight of stairs without chest pain.  -The patient does not have chest pain at rest.  -Patient does not have a history of congestive heart failure.    -The patient does not have a history of stroke and does not have a history of valvular disease.    Pulmonary Risk:  -In terms of risk factors for pulmonary complication, the patient is older then 60    Perioperative Complications:  -The patient does not have a history of bleeding or clotting problems in the past.    -The patient has not had complications from surgeries.    -The patient does not have a family history of any anesthesia or surgical complications.      IMPRESSION:   Reason for surgery/procedure: Coronary stent to get femoral stents placed    The proposed surgical procedure is considered HIGH risk.    For above listed surgery and anesthesia:   Patient is at moderate risk for surgery/procedure and perioperative/procedure complications.    RECOMMENDATIONS:     Labs:  Hgb and BMP     7/31/2018 12:07   Sodium 139.0   Potassium 4.3   Chloride 98.0   Carbon Dioxide 30.0   Urea Nitrogen 21.0   Creatinine 1.1   eGFR Calculated (Black Reference) 82.2   eGFR Calculated (Non Black Reference) 67.9   Calcium 9.5   Glucose 148.0 (H)   WBC 7.8   Hemoglobin 13.3   Hematocrit 41.7   Platelets 261.0   RBC 4.34 (L)   MCV 96.0       Fasting:  NPO for 12 hours prior to surgery    Preop Plan:  --Approval given to proceed with proposed procedure, without further diagnostic evaluation    Medications:  Patient should HOLD regular medications morning of procedure, take later in the day.   Was told to take 4 baby aspirin day of procedure      Dallas Watkins MD    Please contact our office if there are any further questions or information required about this patient.

## 2018-07-31 NOTE — PROGRESS NOTES
Preceptor Attestation:   Patient seen, evaluated and discussed with the resident. I have verified the content of the note, which accurately reflects my assessment of the patient and the plan of care.    Supervising Physician:Avril Avila MD    [facility]

## 2018-07-31 NOTE — MR AVS SNAPSHOT
After Visit Summary   7/31/2018    Zack Johns    MRN: [id number]           Patient Information     Date Of Birth          9/19/1934        Visit Information        Provider Department      7/31/2018 11:00 AM Dallas Watkins MD [facility]        Today's Diagnoses     Preop general physical exam    -  1    Essential hypertension, benign          Care Instructions      Presurgery Checklist  You are scheduled to have surgery. The healthcare staff will try to make your stay comfortable. Use the guidelines below to remind yourself what to do before surgery. Be sure to follow any specific pre-op instructions from your surgeon or nurse.   Preparing for Surgery  Ask your surgeon if you ll need a blood transfusion during surgery and if so, how to prepare for it. In some cases, you can donate blood before surgery. If needed, this blood can be given back (transfused) to you during or after surgery.  If you are having abdominal surgery, ask what you need to do to clear your bowel.  Tell your surgeon if you have allergies to any medications or foods.  Arrange for an adult family member or friend to drive you home after surgery. If possible, have someone ready to help you at home as you recover.  Call the surgeon if you get a cold, fever, sore throat, diarrhea, or other health problem just before surgery. Your surgeon can decide whether or not to postpone the surgery.  Medications  Tell your surgeon about all medications you take, including prescription and over-the-counter products such as herbal remedies and vitamins. Ask if you should continue taking them.  If you take ibuprofen, naproxen, or  blood thinners  such as aspirin, clopidogrel (Plavix), or warfarin (Coumadin), ask your surgeon whether you should stop taking them and how long before surgery you should stop.  You may be told to take antibiotics just before surgery to prevent infection. If so, follow instructions carefully on how to  take them.  If you are told to take medications called anticoagulants to prevent blood clots after surgery, be sure to follow the instructions on how to take them.  Stop Smoking  If you smoke, healing may take longer. So at least 2 week(s) before surgery, stop smoking.  Bathing or Showering Before Surgery  If instructed, wash with antibacterial soap. Afterward, do not use lotions or powders.  If you are having surgery on the head, you may be asked to shampoo with antibacterial soap. Follow instructions for doing so.  Do Not Remove Hair from the Surgery Site  Do not shave hair from the incision site, unless you are given specific instructions to do so. Usually, if hair needs to be removed, it will be done at the hospital right before surgery.  Don t Eat or Drink  Your doctor will tell you when to stop eating and drinking. If you do not follow your doctor's instructions, your procedure may be postponed or rescheduled for another day.  If your surgeon tells you to continue any medications, take them with small sips of water.  You can brush your teeth and rinse your mouth, but don t swallow any water.  Day of Surgery  Do not wear makeup. Do not use perfume, deodorant, or hairspray. Remove nail polish and artificial nails.  Leave jewelry (including rings), watches, and other valuables at home.  Be sure to bring health insurance cards or forms and a photo ID.  Bring a list of your medications (include the name, dose, how often you take them, and the time last dose was taken).  Arrive on time at the hospital or surgery facility.          Follow-ups after your visit        Your next 10 appointments already scheduled     Aug 15, 2018  2:40 PM CDT   Return Visit with Dallas Watkins MD   [facility] ([facility])    [address] [place] [address]   [phone]              Who to contact     Please call your clinic at [phone] to:    Ask questions about your health    Make or cancel  "appointments    Discuss your medicines    Learn about your test results    Speak to your doctor            Additional Information About Your Visit        Care EveryWhere ID     This is your Care EveryWhere ID. This could be used by other organizations to access your [facility] medical records  [id number]        Your Vitals Were     Pulse Temperature Respirations Height Pulse Oximetry BMI (Body Mass Index)    62 98  F (36.7  C) (Oral) 18 5' 5.5\" (166.4 cm) 95% 28.19 kg/m2       Blood Pressure from Last 3 Encounters:   07/31/18 114/62   01/25/18 146/65   12/22/17 165/74    Weight from Last 3 Encounters:   07/31/18 172 lb (78 kg)   01/25/18 172 lb 3.2 oz (78.1 kg)   12/22/17 172 lb (78 kg)              We Performed the Following     Basic Metabolic Panel ([facility])     CBC with Diff Plt ([facility])        Primary Care Provider Office Phone # Fax #    Reece Weir [phone]       [facility] [place] [address]        Equal Access to Services     [facility]: Hadii aad ku hadasho Soomaali, waaxda luqadaha, qaybta kaalmada adeegyada, gloria bland . So [facility] [phone].    ATENCIÓN: Si habla español, tiene a andrews disposición servicios gratuitos de asistencia lingüística. Llame al [phone].    We comply with applicable federal civil rights laws and [place] laws. We do not discriminate on the basis of race, color, national origin, age, disability, sex, sexual orientation, or gender identity.            Thank you!     Thank you for choosing [facility]  for your care. Our goal is always to provide you with excellent care. Hearing back from our patients is one way we can continue to improve our services. Please take a few minutes to complete the written survey that you may receive in the mail after your visit with us. Thank you!             Your Updated Medication List - Protect others around you: Learn how to safely use, store and throw away your " medicines at www.disposemymeds.org.          This list is accurate as of 7/31/18  4:08 PM.  Always use your most recent med list.                   Brand Name Dispense Instructions for use Diagnosis    acetaminophen 500 MG tablet    TYLENOL     Take 500-1,000 mg by mouth every 6 hours as needed        aspirin 81 MG tablet      Take 1 tablet by mouth daily        atorvastatin 40 MG tablet    LIPITOR    90 tablet    Take 1 tablet (40 mg) by mouth daily    Coronary artery disease with hx of myocardial infarct w/o hx of CABG       lisinopril-hydrochlorothiazide 20-12.5 MG per tablet    PRINZIDE/ZESTORETIC    90 tablet    Take 1 tablet by mouth daily    Coronary artery disease with hx of myocardial infarct w/o hx of CABG, Essential hypertension, benign       metoprolol tartrate 25 MG tablet    LOPRESSOR    90 tablet    Take 0.5 tablets (12.5 mg) by mouth 2 times daily    Coronary artery disease with hx of myocardial infarct w/o hx of CABG, Essential hypertension, benign       multivitamin per tablet      Take 1 tablet by mouth daily.        NITROSTAT 0.4 MG sublingual tablet   Generic drug:  nitroGLYcerin      Take one under the tongue prn chest pain. Repeat at 5 min intervals x 2 if needed        zinc 50 MG Tabs      Take 50 mg by mouth daily.

## 2018-08-01 ENCOUNTER — AMBULATORY - HEALTHEAST (OUTPATIENT)
Dept: CARDIOLOGY | Facility: CLINIC | Age: 83
End: 2018-08-01

## 2018-08-06 ENCOUNTER — SURGERY - HEALTHEAST (OUTPATIENT)
Dept: CARDIOLOGY | Facility: CLINIC | Age: 83
End: 2018-08-06

## 2018-08-06 ASSESSMENT — MIFFLIN-ST. JEOR: SCORE: 1409.74

## 2018-08-13 ENCOUNTER — COMMUNICATION - HEALTHEAST (OUTPATIENT)
Dept: CARDIOLOGY | Facility: CLINIC | Age: 83
End: 2018-08-13

## 2018-08-13 ENCOUNTER — SURGERY - HEALTHEAST (OUTPATIENT)
Dept: CARDIOLOGY | Facility: CLINIC | Age: 83
End: 2018-08-13

## 2018-08-13 DIAGNOSIS — I25.82 CHRONIC TOTAL OCCLUSION OF NATIVE CORONARY ARTERY: ICD-10-CM

## 2018-08-13 DIAGNOSIS — I25.10 CHRONIC TOTAL OCCLUSION OF NATIVE CORONARY ARTERY: ICD-10-CM

## 2018-08-13 DIAGNOSIS — I25.10 CAD (CORONARY ARTERY DISEASE): ICD-10-CM

## 2018-08-20 DIAGNOSIS — I25.10 CORONARY ARTERY DISEASE WITH HX OF MYOCARDIAL INFARCT W/O HX OF CABG: ICD-10-CM

## 2018-08-20 DIAGNOSIS — I25.2 CORONARY ARTERY DISEASE WITH HX OF MYOCARDIAL INFARCT W/O HX OF CABG: ICD-10-CM

## 2018-08-20 DIAGNOSIS — I10 ESSENTIAL HYPERTENSION, BENIGN: ICD-10-CM

## 2018-08-20 RX ORDER — METOPROLOL TARTRATE 25 MG/1
12.5 TABLET, FILM COATED ORAL 2 TIMES DAILY
Qty: 90 TABLET | Refills: 3 | Status: SHIPPED | OUTPATIENT
Start: 2018-08-20 | End: 2019-07-30

## 2018-08-20 RX ORDER — LISINOPRIL AND HYDROCHLOROTHIAZIDE 12.5; 2 MG/1; MG/1
1 TABLET ORAL DAILY
Qty: 90 TABLET | Refills: 3 | Status: SHIPPED | OUTPATIENT
Start: 2018-08-20 | End: 2019-07-30

## 2018-08-20 RX ORDER — ATORVASTATIN CALCIUM 40 MG/1
40 TABLET, FILM COATED ORAL DAILY
Qty: 90 TABLET | Refills: 3 | Status: SHIPPED | OUTPATIENT
Start: 2018-08-20 | End: 2019-07-30

## 2018-08-22 ENCOUNTER — SURGERY - HEALTHEAST (OUTPATIENT)
Dept: CARDIOLOGY | Facility: CLINIC | Age: 83
End: 2018-08-22

## 2018-08-22 ASSESSMENT — MIFFLIN-ST. JEOR: SCORE: 1396.14

## 2018-08-23 ASSESSMENT — MIFFLIN-ST. JEOR: SCORE: 1414.28

## 2018-08-27 ENCOUNTER — AMBULATORY - HEALTHEAST (OUTPATIENT)
Dept: VASCULAR SURGERY | Facility: CLINIC | Age: 83
End: 2018-08-27

## 2018-08-27 ENCOUNTER — COMMUNICATION - HEALTHEAST (OUTPATIENT)
Dept: VASCULAR SURGERY | Facility: CLINIC | Age: 83
End: 2018-08-27

## 2018-08-27 DIAGNOSIS — I73.9 PAD (PERIPHERAL ARTERY DISEASE) (H): ICD-10-CM

## 2018-08-27 DIAGNOSIS — I73.9 CLAUDICATION (H): ICD-10-CM

## 2018-08-31 ENCOUNTER — AMBULATORY - HEALTHEAST (OUTPATIENT)
Dept: VASCULAR SURGERY | Facility: CLINIC | Age: 83
End: 2018-08-31

## 2018-09-04 ENCOUNTER — OFFICE VISIT (OUTPATIENT)
Dept: FAMILY MEDICINE | Facility: CLINIC | Age: 83
End: 2018-09-04
Payer: COMMERCIAL

## 2018-09-04 VITALS
BODY MASS INDEX: 28.16 KG/M2 | HEIGHT: 65 IN | DIASTOLIC BLOOD PRESSURE: 63 MMHG | WEIGHT: 169 LBS | TEMPERATURE: 98.2 F | SYSTOLIC BLOOD PRESSURE: 103 MMHG | RESPIRATION RATE: 16 BRPM | OXYGEN SATURATION: 96 % | HEART RATE: 74 BPM

## 2018-09-04 DIAGNOSIS — I73.9 PERIPHERAL VASCULAR DISEASE (H): ICD-10-CM

## 2018-09-04 DIAGNOSIS — I10 ESSENTIAL HYPERTENSION, BENIGN: ICD-10-CM

## 2018-09-04 DIAGNOSIS — I25.10 CARDIOVASCULAR DISEASE: Primary | ICD-10-CM

## 2018-09-04 NOTE — PATIENT INSTRUCTIONS
Thank you for coming to [facility].  Follow up with cardiology as scheduled Friday, 9/7/18    **If you had lab testing today and your results are reassuring or normal they will be be mailed to you within 7 days.   **If the lab tests need quick action we will call you with the results.  The phone number we will call with results is # [phone] (home) . If this is not the best number please call our clinic and change the number.  If you need any refills please call your pharmacy and they will contact us.  If you have any concerns about today's visit or wish to schedule another appointment please call our office during normal business hours [phone] (8-5:00 M-F)  If you have urgent medical concerns please call [phone] at any time of the day.  If you a medical emergency please call [phone]  Again thank you for choosing [facility] and please let us know how we can best partner with you to improve you and your family's health.

## 2018-09-04 NOTE — MR AVS SNAPSHOT
After Visit Summary   9/4/2018    Zack Johns    MRN: [id number]           Patient Information     Date Of Birth          9/19/1934        Visit Information        Provider Department      9/4/2018 3:00 PM Dallas Watkins MD [facility]        Today's Diagnoses     Cardiovascular disease    -  1    Essential hypertension, benign        Peripheral vascular disease (H)          Care Instructions    Thank you for coming to [facility].  Follow up with cardiology as scheduled Friday, 9/7/18    **If you had lab testing today and your results are reassuring or normal they will be be mailed to you within 7 days.   **If the lab tests need quick action we will call you with the results.  The phone number we will call with results is # [phone] (home) . If this is not the best number please call our clinic and change the number.  If you need any refills please call your pharmacy and they will contact us.  If you have any concerns about today's visit or wish to schedule another appointment please call our office during normal business hours [phone] (8-5:00 M-F)  If you have urgent medical concerns please call [phone] at any time of the day.  If you a medical emergency please call [phone]  Again thank you for choosing [facility] and please let us know how we can best partner with you to improve you and your family's health.              Follow-ups after your visit        Who to contact     Please call your clinic at [phone] to:    Ask questions about your health    Make or cancel appointments    Discuss your medicines    Learn about your test results    Speak to your doctor            Additional Information About Your Visit        Care EveryWhere ID     This is your Care EveryWhere ID. This could be used by other organizations to access your [place] medical records  [id number]        Your Vitals Were     Pulse Temperature Respirations Height Pulse Oximetry  "BMI (Body Mass Index)    74 98.2  F (36.8  C) (Oral) 16 5' 5\" (165.1 cm) 96% 28.12 kg/m2       Blood Pressure from Last 3 Encounters:   09/04/18 103/63   07/31/18 114/62   01/25/18 146/65    Weight from Last 3 Encounters:   09/04/18 169 lb (76.7 kg)   07/31/18 172 lb (78 kg)   01/25/18 172 lb 3.2 oz (78.1 kg)              Today, you had the following     No orders found for display       Primary Care Provider Office Phone # Fax #    Reece Weir [phone]       [facility] [place] [address]        Equal Access to Services     RISHI THOMAS : Hadii aad ku hadasho Sojuanali, waaxda luqadaha, qaybta kaalmada adeegyada, gloria bland . So [facility] [phone].    ATENCIÓN: Si habla español, tiene a andrews disposición servicios gratuitos de asistencia lingüística. [facility] [phone].    We comply with applicable federal civil rights laws and [place] laws. We do not discriminate on the basis of race, color, national origin, age, disability, sex, sexual orientation, or gender identity.            Thank you!     Thank you for choosing [facility]  for your care. Our goal is always to provide you with excellent care. Hearing back from our patients is one way we can continue to improve our services. Please take a few minutes to complete the written survey that you may receive in the mail after your visit with us. Thank you!             Your Updated Medication List - Protect others around you: Learn how to safely use, store and throw away your medicines at www.disposemymeds.org.          This list is accurate as of 9/4/18  3:40 PM.  Always use your most recent med list.                   Brand Name Dispense Instructions for use Diagnosis    acetaminophen 500 MG tablet    TYLENOL     Take 500-1,000 mg by mouth every 6 hours as needed        aspirin 81 MG tablet      Take 1 tablet by mouth daily        atorvastatin 40 MG tablet    LIPITOR    90 tablet    Take 1 " tablet (40 mg) by mouth daily    Coronary artery disease with hx of myocardial infarct w/o hx of CABG       lisinopril-hydrochlorothiazide 20-12.5 MG per tablet    PRINZIDE/ZESTORETIC    90 tablet    Take 1 tablet by mouth daily    Coronary artery disease with hx of myocardial infarct w/o hx of CABG, Essential hypertension, benign       metoprolol tartrate 25 MG tablet    LOPRESSOR    90 tablet    Take 0.5 tablets (12.5 mg) by mouth 2 times daily    Coronary artery disease with hx of myocardial infarct w/o hx of CABG, Essential hypertension, benign       multivitamin per tablet      Take 1 tablet by mouth daily.        NITROSTAT 0.4 MG sublingual tablet   Generic drug:  nitroGLYcerin      Take one under the tongue prn chest pain. Repeat at 5 min intervals x 2 if needed        ticagrelor 90 MG tablet    BRILINTA     Take 90 mg by mouth        zinc 50 MG Tabs      Take 50 mg by mouth daily.

## 2018-09-04 NOTE — PROGRESS NOTES
"Assessment and Plan     1. Cardiovascular disease  Had two stents placed on 8/22/18 (about two weeks ago). Doing well. No chest pain. Tolerating medications well. Does have follow up with cardiology to be cleared for activities this Friday, 9/7.    2. Essential hypertension, benign  Well controlled today to 103/63. Will continue current regimen    3. Peripheral vascular disease (H)  Is getting stents placed bilaterally in lower extremities in October. Stent placement was first part of \"preop\"      Options for treatment and follow-up care were reviewed with the patient and/or guardian. Zack Johns and/or guardian engaged in the decision making process and verbalized understanding of the options discussed and agreed with the final plan.    Dallas Watkins MD   [facility]  Pager #: [phone]    Precepted today with: Dr. Danielle           HPI:       Zack Johns is a 83 year old male who presents for post op follow up.     He had two stents placed on 8/22/18 (about two weeks ago)  Not having any issues  No chest pain  Has some SOB - not an acute process  Asking if he can cut his grass with his push mower  Has follow up with Cardiology this Friday, 9/7  Starting taking Brilinta and thinks this medicine in affecting his stools  Says the color is dark brown  No blood in stool or water      Is scheduled to have stents placed in his legs in October.            PMHX:     Patient Active Problem List   Diagnosis     Actinic keratosis     Cardiovascular disease     Essential hypertension, benign     CT Lung Pulmonary Nodule Multiple     Diabetes mellitus, type 2 (H)     Diverticulosis of large intestine     Hyperlipidemia     Peripheral vascular disease (H)     Internal hemorrhoids     Local infection of skin and subcutaneous tissue     Disorder of bursae and tendons in shoulder region     Synovial cyst     Trigger finger, acquired     Osteoarthritis of glenohumeral joint     Syncope     " Abnormal cardiovascular stress test     Chest pain       Current Outpatient Prescriptions   Medication Sig Dispense Refill     aspirin 81 MG tablet Take 1 tablet by mouth daily       atorvastatin (LIPITOR) 40 MG tablet Take 1 tablet (40 mg) by mouth daily 90 tablet 3     lisinopril-hydrochlorothiazide (PRINZIDE/ZESTORETIC) 20-12.5 MG per tablet Take 1 tablet by mouth daily 90 tablet 3     metoprolol tartrate (LOPRESSOR) 25 MG tablet Take 0.5 tablets (12.5 mg) by mouth 2 times daily 90 tablet 3     Multiple Vitamin (MULTIVITAMIN) per tablet Take 1 tablet by mouth daily.       zinc 50 MG TABS Take 50 mg by mouth daily.       acetaminophen (TYLENOL) 500 MG tablet Take 500-1,000 mg by mouth every 6 hours as needed       nitroglycerin (NITROSTAT) 0.4 MG SL tablet Take one under the tongue prn chest pain. Repeat at 5 min intervals x 2 if needed         Social History     Social History     Marital status:      Spouse name: N/A     Number of children: N/A     Years of education: N/A     Occupational History     Not on file.     Social History Main Topics     Smoking status: Former Smoker     Smokeless tobacco: Never Used     Alcohol use Yes      Comment: occasionally.     Drug use: No     Sexual activity: Not on file     Other Topics Concern     Not on file     Social History Narrative          Allergies   Allergen Reactions     Pletal [Cilostazol]        No results found for this or any previous visit (from the past 24 hour(s)).         Review of Systems:   C: NEGATIVE for fatigue, unexpected change in weight  E: NEGATIVE for acute vision problems or changes  R: NEGATIVE for significant cough or shortness of breath  CV: NEGATIVE for chest pain, palpitations or new or worsening peripheral edema  P: NEGATIVE for changes in mood or affect     Complete ROS negative unless noted above or in HPI       Physical Exam:     Vitals:    09/04/18 1454   BP: 103/63   Pulse: 74   Resp: 16   Temp: 98.2  F (36.8  C)   TempSrc: Oral  "  SpO2: 96%   Weight: 169 lb (76.7 kg)   Height: 5' 5\" (165.1 cm)     Body mass index is 28.12 kg/(m^2).    GENERAL APPEARANCE: alert and no acute distress  PSYCH: mentation appears normal and affect normal/bright  RESP: lungs clear to auscultation - no rales, rhonchi or wheezes  CV: regular rate and rhythm, normal S1 S2, no S3 or S4 and no murmur, click or rub -  EXT: no cyanosis or edema in lower extremities  SKIN: no venous stasis changes      "

## 2018-09-07 ENCOUNTER — AMBULATORY - HEALTHEAST (OUTPATIENT)
Dept: CARDIOLOGY | Facility: CLINIC | Age: 83
End: 2018-09-07

## 2018-09-07 ENCOUNTER — AMBULATORY - HEALTHEAST (OUTPATIENT)
Dept: VASCULAR SURGERY | Facility: CLINIC | Age: 83
End: 2018-09-07

## 2018-09-07 ENCOUNTER — COMMUNICATION - HEALTHEAST (OUTPATIENT)
Dept: CARDIOLOGY | Facility: CLINIC | Age: 83
End: 2018-09-07

## 2018-09-07 ENCOUNTER — OFFICE VISIT - HEALTHEAST (OUTPATIENT)
Dept: CARDIOLOGY | Facility: CLINIC | Age: 83
End: 2018-09-07

## 2018-09-07 DIAGNOSIS — I10 ESSENTIAL HYPERTENSION: ICD-10-CM

## 2018-09-07 DIAGNOSIS — I25.10 CORONARY ARTERY DISEASE INVOLVING NATIVE CORONARY ARTERY OF NATIVE HEART, ANGINA PRESENCE UNSPECIFIED: ICD-10-CM

## 2018-09-07 DIAGNOSIS — Z95.5 S/P CORONARY ARTERY STENT PLACEMENT: ICD-10-CM

## 2018-09-07 DIAGNOSIS — I25.10 CHRONIC TOTAL OCCLUSION OF NATIVE CORONARY ARTERY: ICD-10-CM

## 2018-09-07 DIAGNOSIS — I25.82 CHRONIC TOTAL OCCLUSION OF CORONARY ARTERY: ICD-10-CM

## 2018-09-07 DIAGNOSIS — E11.9 TYPE 2 DIABETES MELLITUS (H): ICD-10-CM

## 2018-09-07 DIAGNOSIS — I73.9 PAD (PERIPHERAL ARTERY DISEASE) (H): ICD-10-CM

## 2018-09-07 DIAGNOSIS — I25.82 CHRONIC TOTAL OCCLUSION OF NATIVE CORONARY ARTERY: ICD-10-CM

## 2018-09-07 DIAGNOSIS — R94.39 ABNORMAL CARDIOVASCULAR STRESS TEST: ICD-10-CM

## 2018-09-07 DIAGNOSIS — E78.5 DYSLIPIDEMIA, GOAL LDL BELOW 70: ICD-10-CM

## 2018-09-07 LAB
ALT SERPL W P-5'-P-CCNC: 19 U/L (ref 0–45)
AST SERPL W P-5'-P-CCNC: 19 U/L (ref 0–40)

## 2018-09-07 ASSESSMENT — MIFFLIN-ST. JEOR: SCORE: 1400.67

## 2018-09-07 NOTE — PROGRESS NOTES
Preceptor Attestation:   Patient seen, evaluated and discussed with the resident. I have verified the content of the note, which accurately reflects my assessment of the patient and the plan of care.  Supervising Physician:[place] Danielle DO [facility]

## 2018-09-27 ENCOUNTER — AMBULATORY - HEALTHEAST (OUTPATIENT)
Dept: CARDIAC REHAB | Facility: HOSPITAL | Age: 83
End: 2018-09-27

## 2018-09-27 DIAGNOSIS — Z95.5 STENTED CORONARY ARTERY: ICD-10-CM

## 2018-09-28 ENCOUNTER — OFFICE VISIT (OUTPATIENT)
Dept: FAMILY MEDICINE | Facility: CLINIC | Age: 83
End: 2018-09-28
Payer: COMMERCIAL

## 2018-09-28 VITALS
OXYGEN SATURATION: 97 % | BODY MASS INDEX: 28.12 KG/M2 | WEIGHT: 168.8 LBS | TEMPERATURE: 97.9 F | HEART RATE: 74 BPM | SYSTOLIC BLOOD PRESSURE: 113 MMHG | HEIGHT: 65 IN | RESPIRATION RATE: 18 BRPM | DIASTOLIC BLOOD PRESSURE: 70 MMHG

## 2018-09-28 DIAGNOSIS — I73.9 PVD (PERIPHERAL VASCULAR DISEASE) (H): ICD-10-CM

## 2018-09-28 DIAGNOSIS — E11.9 TYPE 2 DIABETES MELLITUS WITHOUT COMPLICATION, WITHOUT LONG-TERM CURRENT USE OF INSULIN (H): ICD-10-CM

## 2018-09-28 DIAGNOSIS — Z01.818 PRE-OP EXAM: Primary | ICD-10-CM

## 2018-09-28 LAB
BUN SERPL-MCNC: 21 MG/DL (ref 7–30)
CALCIUM SERPL-MCNC: 9.7 MG/DL (ref 8.5–10.4)
CHLORIDE SERPLBLD-SCNC: 99 MMOL/L (ref 94–109)
CO2 SERPL-SCNC: 27 MMOL/L (ref 20–32)
CREAT SERPL-MCNC: 0.8 MG/DL (ref 0.8–1.5)
EGFR CALCULATED (BLACK REFERENCE): >90 ML/MIN
EGFR CALCULATED (NON BLACK REFERENCE): >90 ML/MIN
GLUCOSE SERPL-MCNC: 140 MG/DL (ref 60–109)
HBA1C MFR BLD: 5.9 % (ref 4.1–5.7)
HCT VFR BLD AUTO: 41.5 % (ref 40–53)
HEMOGLOBIN: 13.4 G/DL (ref 13.3–17.7)
MCH RBC QN AUTO: 30.8 PG (ref 26.5–35)
MCHC RBC AUTO-ENTMCNC: 32.3 G/DL (ref 32–36)
MCV RBC AUTO: 95.4 FL (ref 78–100)
PLATELET # BLD AUTO: 281 K/UL (ref 150–450)
POTASSIUM SERPL-SCNC: 3.9 MMOL/L (ref 3.4–5.3)
RBC # BLD AUTO: 4.35 M/UL (ref 4.4–5.9)
SODIUM SERPL-SCNC: 142 MMOL/L (ref 133–144)
WBC # BLD AUTO: 9 K/UL (ref 4–11)

## 2018-09-28 NOTE — PROGRESS NOTES
Pt is a 84 year old male here today for:   Pre-op clearance for:  femoral angiogram with possible stent placement and endarterectomy on 10/3/2018 with Dr. Kumar.    Cardiac Risk stratification:   High risk surgery? (vascular/ open intrathoracic or intraperitoneal) - Yes   Ischemic heart disease? (prior MI, current chest pain, pos stress test, nitrate therapy, ECG w/ Q waves) - Yes - s/p revascularization and stent placement on 8/22/18; abnl stress test in 7/2018  Heart failure? - No   Significant valvular disease? - No   Significant Arrhythmia? (high-grade heart block, vent/supraventricular, symptomatic bradycardia) - No   CVA? No   DM?  Yes - diet-controlled  Renal failure? (Cr > 2.0)? No     No risk factors - 0.4% (95% CI: 0.1-0.8)   One risk factor - 1.0% (95% CI: 0.5-1.4)   Two risk factors - 2.4% (95% CI: 1.3-3.5)   Three or more risk factors - 5.4% (95% CI: 2.8-7.9)     Calvillo Score: 0.94  [email or web address]    Based on:  1) Age -84  2) Creatinine (Abnl if >1.5) - Normal  3) ASA Class  ASA 3: A patient with a severe systemic disease that is not life-threatening. Example: Patient with some functional limitation as a result of disease (e.g., poorly treated hypertension or diabetes, morbid obesity, chronic renal failure, a bronchospastic disease with intermittent exacerbation, stable angina, implanted pacemaker).   4) Type of Surgery - peripheral vascular  5) Preoperative Functional Status - Totally Independent    2) DM - diet-controlled  Hemoglobin A1C   Date Value Ref Range Status   01/25/2018 6.1 (H) 4.1 - 5.7 % Final   02/16/2017 6.3 (H) 4.1 - 5.7 % Final   12/05/2014 6.0 (H) 4.1 - 5.7 % Final     3) HTN - stable  BP Readings from Last 6 Encounters:   09/28/18 113/70   09/04/18 103/63   07/31/18 114/62   01/25/18 146/65   12/22/17 165/74   05/01/17 145/67     4) CAD - started on dual antiplatlet therapy after stents  No CP now  Now has a new sensation of  "shortness of breath -> mowed lawn slowly and did not experience; went to cardiac rehab yesterday and did not experience is assoc w/ exertion  Per my review, most common adverse reaction to ticagrelor is dyspnea (14%)    Past Medical History:   Diagnosis Date     Claudication (H)      Coronary artery disease     s/p MI     Diabetes mellitus type 2, controlled (H)      Hypertension      Malignant neoplasm of prostate (H) 8/7/2006-10/5/2006    DENIED BY PATIENT (see note from 12/08/2014).  RADIOTHERAPY BY      Osteoarthritis of glenohumeral joint     Left. Injected at [facility] 02/10/2015.      Past Surgical History:   Procedure Laterality Date     ARTHROSCOPY KNEE       CARDIAC SURGERY      two stents placed 8/22/18     PROSTATE BIOPSY, NEEDLE, SATURATION SAMPLING  1.13.2003    \"Biopsy fo the Prostate Needle\"     RELEASE TRIGGER FINGER        Current Outpatient Prescriptions   Medication Sig Dispense Refill     acetaminophen (TYLENOL) 500 MG tablet Take 500-1,000 mg by mouth every 6 hours as needed       aspirin 81 MG tablet Take 1 tablet by mouth daily       atorvastatin (LIPITOR) 40 MG tablet Take 1 tablet (40 mg) by mouth daily 90 tablet 3     lisinopril-hydrochlorothiazide (PRINZIDE/ZESTORETIC) 20-12.5 MG per tablet Take 1 tablet by mouth daily 90 tablet 3     metoprolol tartrate (LOPRESSOR) 25 MG tablet Take 0.5 tablets (12.5 mg) by mouth 2 times daily 90 tablet 3     Multiple Vitamin (MULTIVITAMIN) per tablet Take 1 tablet by mouth daily.       nitroglycerin (NITROSTAT) 0.4 MG SL tablet Take one under the tongue prn chest pain. Repeat at 5 min intervals x 2 if needed       ticagrelor (BRILINTA) 90 MG tablet Take 90 mg by mouth       zinc 50 MG TABS Take 50 mg by mouth daily.        Allergies   Allergen Reactions     Pletal [Cilostazol]       Social History   Substance Use Topics     Smoking status: Former Smoker     Smokeless tobacco: Never Used     Alcohol use Yes      Comment: occasionally.    " "  Family History   Problem Relation Age of Onset     Diabetes No family hx of      Breast Cancer No family hx of      Colon Cancer No family hx of      Prostate Cancer No family hx of      Other Cancer No family hx of       ROS:   Gen- no weight change, no fevers/chills   Head/ Eyes- no blurred vision, no headaches   ENT- no cough, no congestion, no URI symptoms   Cardiac - no palpitations, no chest pain   Respiratory - + shortness of breath - see HPI , no wheezing   GI - no nausea, no vomiting, no diarrhea, no constipation   Urinary - no dysuria, no polyuria   Neuro - no numbness, no tingling   Remainder of ROS negative.     Exam:   /70  Pulse 74  Temp 97.9  F (36.6  C) (Oral)  Resp 18  Ht 5' 5\" (165.1 cm)  Wt 168 lb 12.8 oz (76.6 kg)  SpO2 97%  BMI 28.09 kg/m2   Alert and oriented x 3; No acute distress   HEENT:  oropharynx clear   Neck: no masses, no lymphadenopathy   Resp: clear to auscultation bilaterally, no wheezing/ronchi   CV: rate/rhythm regular, no murmurs/rubs/gallops   ABd: soft, + bowel sounds, nontender/nondistended, no rebound/guarding   Extrem: no clubbing/cyanosis/edema   Neuro: no focal deficits   Derm: no rashes     EKG - NSR, old inferior infarct; no change from previous    (Z01.818) Pre-op exam  (primary encounter diagnosis)  Comment: pt is high-risk for procedure based on risk stratification, but has already undergone stress testing and subsequent stent procedure. He has already been seen by cardiology in follow-up and therefore is appropriate to proceed with the planned vascular surgery  Plan: CBC with Plt ([facility]), Basic Metabolic Panel         ([facility]) - Results < 1 hr            (I73.9) PVD (peripheral vascular disease) (H)  Comment:   Plan: see above; pre-op done today for pending femoral angiogram with possible stent placement and endarterectomy on 10/3/2018 with Dr. Kumar.      (E11.9) Type 2 diabetes mellitus without complication, without long-term current use of insulin " (H)  Comment: diet-controlled  Plan: Hemoglobin A1c ([facility] )            Norris Kincaid MD  September 28, 2018  3:30 PM

## 2018-09-28 NOTE — MR AVS SNAPSHOT
"              After Visit Summary   9/28/2018    Zack Johns    MRN: [id number]           Patient Information     Date Of Birth          9/19/1934        Visit Information        Provider Department      9/28/2018 2:20 PM Norris Kincaid MD [facility]        Today's Diagnoses     Pre-op exam    -  1    Type 2 diabetes mellitus without complication, without long-term current use of insulin (H)        PVD (peripheral vascular disease) (H)           Follow-ups after your visit        Your next 10 appointments already scheduled     Oct 03, 2018 12:00 PM CDT   Other hospital with Deshawn Kumar MD, Simba TREJO PA-C   [facility] ([facility])    [facility] ([facility])   [phone]              Who to contact     Please call your clinic at [phone] to:    Ask questions about your health    Make or cancel appointments    Discuss your medicines    Learn about your test results    Speak to your doctor            Additional Information About Your Visit        Care EveryWhere ID     This is your Care EveryWhere ID. This could be used by other organizations to access your [place] medical records  [id number]        Your Vitals Were     Pulse Temperature Respirations Height Pulse Oximetry BMI (Body Mass Index)    74 97.9  F (36.6  C) (Oral) 18 5' 5\" (165.1 cm) 97% 28.09 kg/m2       Blood Pressure from Last 3 Encounters:   09/28/18 113/70   09/04/18 103/63   07/31/18 114/62    Weight from Last 3 Encounters:   09/28/18 168 lb 12.8 oz (76.6 kg)   09/04/18 169 lb (76.7 kg)   07/31/18 172 lb (78 kg)              We Performed the Following     Basic Metabolic Panel ([facility]) - Results < 1 hr     CBC with Plt ([facility])     Hemoglobin A1c ([facility])        Primary Care Provider Office Phone # Fax #    Reece Weir [phone]       [facility] [place] [address]        Equal Access to Services     " R[facility]I [facility] : Hadii aad ku haddeso Sojuanali, waaxda luqadaha, qaybta kaalmada adeegyada, gloria antoinein hayaamickey quezadaviviana staton edwina . So [facility] [phone].    ATENCIÓN: Si habla español, tiene a andrews disposición servicios gratuitos de asistencia lingüística. Llame al [phone].    We comply with applicable federal civil rights laws and [place] laws. We do not discriminate on the basis of race, color, national origin, age, disability, sex, sexual orientation, or gender identity.            Thank you!     Thank you for choosing [facility]  for your care. Our goal is always to provide you with excellent care. Hearing back from our patients is one way we can continue to improve our services. Please take a few minutes to complete the written survey that you may receive in the mail after your visit with us. Thank you!             Your Updated Medication List - Protect others around you: Learn how to safely use, store and throw away your medicines at www.disposemymeds.org.          This list is accurate as of 9/28/18 11:59 PM.  Always use your most recent med list.                   Brand Name Dispense Instructions for use Diagnosis    acetaminophen 500 MG tablet    TYLENOL     Take 500-1,000 mg by mouth every 6 hours as needed        aspirin 81 MG tablet      Take 1 tablet by mouth daily        atorvastatin 40 MG tablet    LIPITOR    90 tablet    Take 1 tablet (40 mg) by mouth daily    Coronary artery disease with hx of myocardial infarct w/o hx of CABG       lisinopril-hydrochlorothiazide 20-12.5 MG per tablet    PRINZIDE/ZESTORETIC    90 tablet    Take 1 tablet by mouth daily    Coronary artery disease with hx of myocardial infarct w/o hx of CABG, Essential hypertension, benign       metoprolol tartrate 25 MG tablet    LOPRESSOR    90 tablet    Take 0.5 tablets (12.5 mg) by mouth 2 times daily    Coronary artery disease with hx of myocardial infarct w/o hx of CABG, Essential hypertension, benign        multivitamin per tablet      Take 1 tablet by mouth daily.        NITROSTAT 0.4 MG sublingual tablet   Generic drug:  nitroGLYcerin      Take one under the tongue prn chest pain. Repeat at 5 min intervals x 2 if needed        ticagrelor 90 MG tablet    BRILINTA     Take 90 mg by mouth        zinc 50 MG Tabs      Take 50 mg by mouth daily.

## 2018-09-28 NOTE — Clinical Note
Pre-op for pt.  Should be safe to proceed given he is now s/p stents and doing cardiac rehab.  Please let me know if you have any questions/ concerns. Hemal Pisano

## 2018-09-28 NOTE — LETTER
October 1, 2018      Zack Johns  [address]  [facility] [address]        Dear Zack,     All of your labwork was stable/ normal including your diabetes test (A1c) which was 5.9.  This is nearly in the non-diabetic range so keep up the excellent work. Please keep me updated on your shortness of breath and best of luck with your upcoming procedure.r    Please see below for your test results.    Resulted Orders   CBC with Plt ([facility])   Result Value Ref Range    WBC 9.0 4.0 - 11.0 K/uL    RBC 4.35 (L) 4.40 - 5.90 M/uL    Hemoglobin 13.4 13.3 - 17.7 g/dL    Hematocrit 41.5 40.0 - 53.0 %    MCV 95.4 78.0 - 100.0 fL    MCH 30.8 26.5 - 35.0 pg    MCHC 32.3 32.0 - 36.0 g/dL    Platelets 281.0 150.0 - 450.0 K/uL   Basic Metabolic Panel ([facility]) - Results < 1 hr   Result Value Ref Range    Glucose 140.0 (H) 60.0 - 109.0 mg/dL    Urea Nitrogen 21.0 7.0 - 30.0 mg/dL    Creatinine 0.8 0.8 - 1.5 mg/dL    Sodium 142.0 133.0 - 144.0 mmol/L    Potassium 3.9 3.4 - 5.3 mmol/L    Chloride 99.0 94.0 - 109.0 mmol/L    Carbon Dioxide 27.0 20.0 - 32.0 mmol/L    Calcium 9.7 8.5 - 10.4 mg/dL    eGFR Calculated (Non Black Reference) >90 >60.0 mL/min    eGFR Calculated (Black Reference) >90 >60.0 mL/min   Hemoglobin A1c ([facility])   Result Value Ref Range    Hemoglobin A1C 5.9 (H) 4.1 - 5.7 %       If you have any questions, please call the clinic to make an appointment.    Sincerely,    Norris Kincaid MD

## 2018-09-28 NOTE — NURSING NOTE
What: ENDARTERECTOMY, FEMORAL - ILIAC STENT, FEMORAL ARTERY ANGIOPLASTY AND STENT   Where: [facility]  Who: Deshawn Ordonez  When:10/3/2018  Time:10:00AM    Anesthesia: General    Fax:

## 2018-10-02 ENCOUNTER — ANESTHESIA - HEALTHEAST (OUTPATIENT)
Dept: SURGERY | Facility: CLINIC | Age: 83
End: 2018-10-02

## 2018-10-02 ENCOUNTER — COMMUNICATION - HEALTHEAST (OUTPATIENT)
Dept: VASCULAR SURGERY | Facility: CLINIC | Age: 83
End: 2018-10-02

## 2018-10-03 ENCOUNTER — APPOINTMENT (OUTPATIENT)
Dept: SURGERY | Facility: PHYSICIAN GROUP | Age: 83
End: 2018-10-03
Payer: COMMERCIAL

## 2018-10-03 ENCOUNTER — SURGERY - HEALTHEAST (OUTPATIENT)
Dept: SURGERY | Facility: CLINIC | Age: 83
End: 2018-10-03

## 2018-10-03 PROCEDURE — 75716 ARTERY X-RAYS ARMS/LEGS: CPT | Mod: 26 | Performed by: SURGERY

## 2018-10-03 PROCEDURE — 37221 ZZHC REVASC ILIAC ART, ANGIO/STENT, INIT VESSEL: CPT | Mod: 50 | Performed by: SURGERY

## 2018-10-03 PROCEDURE — 37221 ZZHC REVASC ILIAC ART, ANGIO/STENT, INIT VESSEL: CPT | Mod: AS | Performed by: PHYSICIAN ASSISTANT

## 2018-10-03 ASSESSMENT — MIFFLIN-ST. JEOR: SCORE: 1418.82

## 2018-10-05 ASSESSMENT — MIFFLIN-ST. JEOR: SCORE: 1421.99

## 2018-10-06 ASSESSMENT — MIFFLIN-ST. JEOR: SCORE: 1430.16

## 2018-10-07 ASSESSMENT — MIFFLIN-ST. JEOR: SCORE: 1414.73

## 2018-10-08 ENCOUNTER — COMMUNICATION - HEALTHEAST (OUTPATIENT)
Dept: VASCULAR SURGERY | Facility: CLINIC | Age: 83
End: 2018-10-08

## 2018-10-11 ENCOUNTER — AMBULATORY - HEALTHEAST (OUTPATIENT)
Dept: CARDIAC REHAB | Facility: HOSPITAL | Age: 83
End: 2018-10-11

## 2018-10-11 DIAGNOSIS — Z95.5 STENTED CORONARY ARTERY: ICD-10-CM

## 2018-10-19 ENCOUNTER — RECORDS - HEALTHEAST (OUTPATIENT)
Dept: VASCULAR ULTRASOUND | Facility: CLINIC | Age: 83
End: 2018-10-19

## 2018-10-19 ENCOUNTER — OFFICE VISIT - HEALTHEAST (OUTPATIENT)
Dept: VASCULAR SURGERY | Facility: CLINIC | Age: 83
End: 2018-10-19

## 2018-10-19 ENCOUNTER — RECORDS - HEALTHEAST (OUTPATIENT)
Dept: ADMINISTRATIVE | Facility: OTHER | Age: 83
End: 2018-10-19

## 2018-10-19 DIAGNOSIS — I73.9 PERIPHERAL VASCULAR DISEASE, UNSPECIFIED (H): ICD-10-CM

## 2018-10-19 DIAGNOSIS — I70.211 INTERMITTENT CLAUDICATION OF RIGHT LOWER EXTREMITY DUE TO ATHEROSCLEROSIS (H): ICD-10-CM

## 2018-10-19 DIAGNOSIS — Z95.828 PRESENCE OF OTHER VASCULAR IMPLANTS AND GRAFTS: ICD-10-CM

## 2018-10-25 ENCOUNTER — AMBULATORY - HEALTHEAST (OUTPATIENT)
Dept: VASCULAR SURGERY | Facility: CLINIC | Age: 83
End: 2018-10-25

## 2018-10-25 DIAGNOSIS — I25.10 CORONARY ARTERY DISEASE INVOLVING NATIVE CORONARY ARTERY OF NATIVE HEART, ANGINA PRESENCE UNSPECIFIED: ICD-10-CM

## 2018-10-30 ENCOUNTER — AMBULATORY - HEALTHEAST (OUTPATIENT)
Dept: CARDIAC REHAB | Facility: HOSPITAL | Age: 83
End: 2018-10-30

## 2018-10-30 DIAGNOSIS — Z95.5 STENTED CORONARY ARTERY: ICD-10-CM

## 2018-11-01 ENCOUNTER — AMBULATORY - HEALTHEAST (OUTPATIENT)
Dept: CARDIAC REHAB | Facility: HOSPITAL | Age: 83
End: 2018-11-01

## 2018-11-01 DIAGNOSIS — Z95.5 STENTED CORONARY ARTERY: ICD-10-CM

## 2018-11-05 ENCOUNTER — RECORDS - HEALTHEAST (OUTPATIENT)
Dept: ADMINISTRATIVE | Facility: OTHER | Age: 83
End: 2018-11-05

## 2018-11-05 ENCOUNTER — AMBULATORY - HEALTHEAST (OUTPATIENT)
Dept: CARDIOLOGY | Facility: CLINIC | Age: 83
End: 2018-11-05

## 2018-11-05 ENCOUNTER — TRANSFERRED RECORDS (OUTPATIENT)
Dept: HEALTH INFORMATION MANAGEMENT | Facility: CLINIC | Age: 83
End: 2018-11-05

## 2018-11-05 ENCOUNTER — OFFICE VISIT - HEALTHEAST (OUTPATIENT)
Dept: CARDIOLOGY | Facility: CLINIC | Age: 83
End: 2018-11-05

## 2018-11-05 DIAGNOSIS — E78.2 MIXED HYPERLIPIDEMIA: ICD-10-CM

## 2018-11-05 DIAGNOSIS — I25.10 CHRONIC TOTAL OCCLUSION OF NATIVE CORONARY ARTERY: ICD-10-CM

## 2018-11-05 DIAGNOSIS — I25.82 CHRONIC TOTAL OCCLUSION OF NATIVE CORONARY ARTERY: ICD-10-CM

## 2018-11-05 DIAGNOSIS — R94.39 ABNORMAL CARDIOVASCULAR STRESS TEST: ICD-10-CM

## 2018-11-05 DIAGNOSIS — I73.9 PAD (PERIPHERAL ARTERY DISEASE) (H): ICD-10-CM

## 2018-11-05 ASSESSMENT — MIFFLIN-ST. JEOR: SCORE: 1396.14

## 2018-11-06 ENCOUNTER — AMBULATORY - HEALTHEAST (OUTPATIENT)
Dept: CARDIAC REHAB | Facility: HOSPITAL | Age: 83
End: 2018-11-06

## 2018-11-06 DIAGNOSIS — Z95.5 STENTED CORONARY ARTERY: ICD-10-CM

## 2018-11-08 ENCOUNTER — AMBULATORY - HEALTHEAST (OUTPATIENT)
Dept: CARDIAC REHAB | Facility: HOSPITAL | Age: 83
End: 2018-11-08

## 2018-11-08 DIAGNOSIS — Z95.5 STENTED CORONARY ARTERY: ICD-10-CM

## 2018-11-13 ENCOUNTER — AMBULATORY - HEALTHEAST (OUTPATIENT)
Dept: CARDIAC REHAB | Facility: HOSPITAL | Age: 83
End: 2018-11-13

## 2018-11-13 DIAGNOSIS — Z95.5 STENTED CORONARY ARTERY: ICD-10-CM

## 2018-11-15 ENCOUNTER — AMBULATORY - HEALTHEAST (OUTPATIENT)
Dept: CARDIAC REHAB | Facility: HOSPITAL | Age: 83
End: 2018-11-15

## 2018-11-15 DIAGNOSIS — Z95.5 STENTED CORONARY ARTERY: ICD-10-CM

## 2018-11-20 ENCOUNTER — AMBULATORY - HEALTHEAST (OUTPATIENT)
Dept: CARDIAC REHAB | Facility: HOSPITAL | Age: 83
End: 2018-11-20

## 2018-11-20 DIAGNOSIS — Z95.5 STENTED CORONARY ARTERY: ICD-10-CM

## 2018-11-27 ENCOUNTER — AMBULATORY - HEALTHEAST (OUTPATIENT)
Dept: CARDIAC REHAB | Facility: HOSPITAL | Age: 83
End: 2018-11-27

## 2018-11-27 DIAGNOSIS — Z95.5 STENTED CORONARY ARTERY: ICD-10-CM

## 2018-11-29 ENCOUNTER — AMBULATORY - HEALTHEAST (OUTPATIENT)
Dept: CARDIAC REHAB | Facility: HOSPITAL | Age: 83
End: 2018-11-29

## 2018-11-29 DIAGNOSIS — Z95.5 STENTED CORONARY ARTERY: ICD-10-CM

## 2018-12-04 ENCOUNTER — AMBULATORY - HEALTHEAST (OUTPATIENT)
Dept: CARDIAC REHAB | Facility: HOSPITAL | Age: 83
End: 2018-12-04

## 2018-12-04 DIAGNOSIS — Z95.5 STENTED CORONARY ARTERY: ICD-10-CM

## 2018-12-06 ENCOUNTER — AMBULATORY - HEALTHEAST (OUTPATIENT)
Dept: CARDIAC REHAB | Facility: HOSPITAL | Age: 83
End: 2018-12-06

## 2018-12-06 DIAGNOSIS — Z95.5 STENTED CORONARY ARTERY: ICD-10-CM

## 2018-12-11 ENCOUNTER — AMBULATORY - HEALTHEAST (OUTPATIENT)
Dept: CARDIAC REHAB | Facility: HOSPITAL | Age: 83
End: 2018-12-11

## 2018-12-11 DIAGNOSIS — Z95.5 STENTED CORONARY ARTERY: ICD-10-CM

## 2018-12-13 ENCOUNTER — AMBULATORY - HEALTHEAST (OUTPATIENT)
Dept: CARDIAC REHAB | Facility: HOSPITAL | Age: 83
End: 2018-12-13

## 2018-12-13 DIAGNOSIS — Z95.5 STENTED CORONARY ARTERY: ICD-10-CM

## 2018-12-18 ENCOUNTER — AMBULATORY - HEALTHEAST (OUTPATIENT)
Dept: CARDIAC REHAB | Facility: HOSPITAL | Age: 83
End: 2018-12-18

## 2018-12-18 DIAGNOSIS — Z95.5 STENTED CORONARY ARTERY: ICD-10-CM

## 2018-12-20 ENCOUNTER — AMBULATORY - HEALTHEAST (OUTPATIENT)
Dept: CARDIAC REHAB | Facility: HOSPITAL | Age: 83
End: 2018-12-20

## 2018-12-20 DIAGNOSIS — Z95.5 STENTED CORONARY ARTERY: ICD-10-CM

## 2018-12-27 ENCOUNTER — AMBULATORY - HEALTHEAST (OUTPATIENT)
Dept: CARDIAC REHAB | Facility: HOSPITAL | Age: 83
End: 2018-12-27

## 2018-12-27 DIAGNOSIS — Z95.5 STENTED CORONARY ARTERY: ICD-10-CM

## 2019-01-03 ENCOUNTER — AMBULATORY - HEALTHEAST (OUTPATIENT)
Dept: CARDIAC REHAB | Facility: HOSPITAL | Age: 84
End: 2019-01-03

## 2019-01-03 DIAGNOSIS — Z95.5 STENTED CORONARY ARTERY: ICD-10-CM

## 2019-01-08 ENCOUNTER — AMBULATORY - HEALTHEAST (OUTPATIENT)
Dept: CARDIAC REHAB | Facility: HOSPITAL | Age: 84
End: 2019-01-08

## 2019-01-08 DIAGNOSIS — Z95.5 STENTED CORONARY ARTERY: ICD-10-CM

## 2019-01-10 ENCOUNTER — AMBULATORY - HEALTHEAST (OUTPATIENT)
Dept: CARDIAC REHAB | Facility: HOSPITAL | Age: 84
End: 2019-01-10

## 2019-01-10 DIAGNOSIS — Z95.5 STENTED CORONARY ARTERY: ICD-10-CM

## 2019-01-15 ENCOUNTER — AMBULATORY - HEALTHEAST (OUTPATIENT)
Dept: CARDIAC REHAB | Facility: HOSPITAL | Age: 84
End: 2019-01-15

## 2019-01-15 DIAGNOSIS — Z95.5 STENTED CORONARY ARTERY: ICD-10-CM

## 2019-01-17 ENCOUNTER — AMBULATORY - HEALTHEAST (OUTPATIENT)
Dept: CARDIAC REHAB | Facility: HOSPITAL | Age: 84
End: 2019-01-17

## 2019-01-17 DIAGNOSIS — Z95.5 STENTED CORONARY ARTERY: ICD-10-CM

## 2019-01-22 ENCOUNTER — AMBULATORY - HEALTHEAST (OUTPATIENT)
Dept: CARDIAC REHAB | Facility: HOSPITAL | Age: 84
End: 2019-01-22

## 2019-01-22 DIAGNOSIS — Z95.5 STENTED CORONARY ARTERY: ICD-10-CM

## 2019-01-24 ENCOUNTER — AMBULATORY - HEALTHEAST (OUTPATIENT)
Dept: CARDIAC REHAB | Facility: HOSPITAL | Age: 84
End: 2019-01-24

## 2019-01-24 DIAGNOSIS — Z95.5 STENTED CORONARY ARTERY: ICD-10-CM

## 2019-03-25 ENCOUNTER — RECORDS - HEALTHEAST (OUTPATIENT)
Dept: ADMINISTRATIVE | Facility: OTHER | Age: 84
End: 2019-03-25

## 2019-03-25 ENCOUNTER — OFFICE VISIT (OUTPATIENT)
Dept: FAMILY MEDICINE | Facility: CLINIC | Age: 84
End: 2019-03-25
Payer: COMMERCIAL

## 2019-03-25 VITALS
TEMPERATURE: 97.9 F | DIASTOLIC BLOOD PRESSURE: 76 MMHG | HEART RATE: 55 BPM | BODY MASS INDEX: 27.89 KG/M2 | SYSTOLIC BLOOD PRESSURE: 169 MMHG | RESPIRATION RATE: 16 BRPM | WEIGHT: 167.6 LBS | OXYGEN SATURATION: 98 %

## 2019-03-25 DIAGNOSIS — R94.39 ABNORMAL CARDIOVASCULAR STRESS TEST: ICD-10-CM

## 2019-03-25 DIAGNOSIS — R29.898 SHOULDER WEAKNESS: ICD-10-CM

## 2019-03-25 DIAGNOSIS — M25.512 ACUTE PAIN OF LEFT SHOULDER: Primary | ICD-10-CM

## 2019-03-25 RX ORDER — NITROGLYCERIN 0.4 MG/1
0.4 TABLET SUBLINGUAL EVERY 5 MIN PRN
Qty: 30 TABLET | Refills: 0 | Status: SHIPPED | OUTPATIENT
Start: 2019-03-25 | End: 2021-11-30

## 2019-03-25 NOTE — PROGRESS NOTES
"       HPI:     Zack Johns is a 84 year old  male with PMH of arthritis of both shoulders who presents with acute left shoulder pain and limited ROM.     Zack Johns is here on his own.     A few days ago he was doing chores around the house when he felt a snap and sudden pain in his left shoulder. He doesn't remember the exact activity he was doing. He has had continued pain since that time and quite limited range of motion. He has been needing help from his wife to get dressed because he can't internally rotate or abduct his arm. Can't get wallet out of back pocket so he's been now storing in the front pocket. No numbness or tingling. Has been taking tylenol \"arthritis strength\" at home (he shows me the bottle is just XR acetaminophen) and this helps a bit. He really does not want to take any other pills for it.     He has been through a lot of surgeries in the last year so he's hoping he doesn't have to have surgery.     He has appt with Dr. Weir on Thursday for other concerns but his main focus today is the shoulder since it's so bothersome.              PMHX:     Patient Active Problem List   Diagnosis     Actinic keratosis     Cardiovascular disease     Essential hypertension, benign     CT Lung Pulmonary Nodule Multiple     Diabetes mellitus, type 2 (H)     Diverticulosis of large intestine     Hyperlipidemia     Peripheral vascular disease (H)     Internal hemorrhoids     Local infection of skin and subcutaneous tissue     Disorder of bursae and tendons in shoulder region     Synovial cyst     Trigger finger, acquired     Osteoarthritis of glenohumeral joint     Syncope     Abnormal cardiovascular stress test     Chest pain     Coronary atherosclerosis       Current Outpatient Medications   Medication Sig Dispense Refill     aspirin 81 MG tablet Take 1 tablet by mouth daily       atorvastatin (LIPITOR) 40 MG tablet Take 1 tablet (40 mg) by mouth daily 90 tablet 3     " lisinopril-hydrochlorothiazide (PRINZIDE/ZESTORETIC) 20-12.5 MG per tablet Take 1 tablet by mouth daily 90 tablet 3     metoprolol tartrate (LOPRESSOR) 25 MG tablet Take 0.5 tablets (12.5 mg) by mouth 2 times daily 90 tablet 3     Multiple Vitamin (MULTIVITAMIN) per tablet Take 1 tablet by mouth daily.       nitroglycerin (NITROSTAT) 0.4 MG SL tablet Take one under the tongue prn chest pain. Repeat at 5 min intervals x 2 if needed       ticagrelor (BRILINTA) 90 MG tablet Take 90 mg by mouth       zinc 50 MG TABS Take 50 mg by mouth daily.       acetaminophen (TYLENOL) 500 MG tablet Take 500-1,000 mg by mouth every 6 hours as needed         Social History     Tobacco Use     Smoking status: Former Smoker     Smokeless tobacco: Never Used   Substance Use Topics     Alcohol use: Yes     Comment: occasionally.     Drug use: No       Social History     Social History Narrative     Not on file       Allergies   Allergen Reactions     Pletal [Cilostazol]        No results found for this or any previous visit (from the past 24 hour(s)).         Review of Systems:   7 point ROS negative except as noted.           Physical Exam:     Vitals:    03/25/19 1015   BP: 169/76   Pulse: 55   Resp: 16   Temp: 97.9  F (36.6  C)   TempSrc: Oral   SpO2: 98%   Weight: 76 kg (167 lb 9.6 oz)     Body mass index is 27.89 kg/m .    General: Alert, well-appearing very pleasant male in NAD  HEENT: PERRL, moist oral mucus membranes    Right shoulder/Upper Extremity  Inspection:  Skin normal in appearance, no erythema/induration  Palpation:  Nontender to palpation over biceps tendon and AC joint  Motor:  5/5 supraspinatus, ER, subscapularis (belly press), , finger abduction, wrist extension, wrist flexion, biceps, triceps, and deltoid  Sensory:  Sensation intact to light touch throughout all dermatomes  Vascular: Warm and well perfused  ROM:  FE 0-180 , ER 70 , IR L1, no crepitance    Left shoulder/Upper Extremity  Inspection:  Skin normal in  appearance, no erythema/induration  Palpation:  + Tender to palpation over biceps tendon at coracoid and no tenderness at AC joint  Motor:  3/5 supraspinatus, 3/5 ER, 2/5 subscapularis (belly press), 5/5 , 5/5 finger abduction, 5/5 wrist extension, 5/5 wrist flexion, 5/5 biceps, and 3/5 deltoid  Sensory:  Sensation intact to light touch throughout all dermatomes  Vascular: Warm and well perfused  ROM:  Abduction- 45 deg, ER- limited, IR -cannot make it to midline. Makes it to about SI joint, no crepitace  Patient has significant pain with abduction and internal rotation of the arm.       Skin: No rash on limited skin exam  Psych: Mood appropriate to visit content, full affect, rational thought content and process      Assessment and Plan     1. Acute pain of left shoulder  I am concerned for rotator cuff tear vs impingement syndrome. Given his degree of limited mobility as well as acute onset of symptoms, will evaluated for tear with MRI. He will try to get this today or tomorrow. Has appt Thursday with Destin. If torn he is willing to go to ortho and I would recommend referral if this is the case.   - MR Shoulder Left w/o Contrast; Future      3. Abnormal cardiovascular stress test  Patient needs refill for nitroglycerin. Has known CAD. No increase in chest pain recently.   - nitroGLYcerin (NITROSTAT) 0.4 MG sublingual tablet; Place 1 tablet (0.4 mg) under the tongue every 5 minutes as needed for chest pain Repeat at 5 min intervals x 2 if needed  Dispense: 30 tablet; Refill: 0    Options for treatment and follow-up care were reviewed with the patient and/or guardian. Zack Johns and/or guardian engaged in the decision making process and verbalized understanding of the options discussed and agreed with the final plan.    Avril Avila MD  [facility]   Pager: [phone]

## 2019-03-25 NOTE — PATIENT INSTRUCTIONS
Referral for ( TEST )  :      MRI Shoulder (L) w/o Contrast   LOCATION/PLACE/Provider :    [facility] [place] [facility]   DATE & TIME :     3- at 4:00  PHONE :     [phone]  FAX :     [phone]  Appointment made by clinic staff/:    Trini

## 2019-03-28 ENCOUNTER — HOSPITAL ENCOUNTER (OUTPATIENT)
Dept: MRI IMAGING | Facility: HOSPITAL | Age: 84
Discharge: HOME | End: 2019-03-28
Attending: STUDENT IN AN ORGANIZED HEALTH CARE EDUCATION/TRAINING PROGRAM

## 2019-03-28 ENCOUNTER — OFFICE VISIT (OUTPATIENT)
Dept: FAMILY MEDICINE | Facility: CLINIC | Age: 84
End: 2019-03-28
Payer: COMMERCIAL

## 2019-03-28 DIAGNOSIS — R73.9 HIGH BLOOD SUGAR: ICD-10-CM

## 2019-03-28 DIAGNOSIS — G89.29 CHRONIC LEFT SHOULDER PAIN: ICD-10-CM

## 2019-03-28 DIAGNOSIS — R29.898 SHOULDER WEAKNESS: ICD-10-CM

## 2019-03-28 DIAGNOSIS — Z00.00 WELLNESS EXAMINATION: Primary | ICD-10-CM

## 2019-03-28 DIAGNOSIS — E78.01 FAMILIAL HYPERCHOLESTEROLEMIA: Primary | ICD-10-CM

## 2019-03-28 DIAGNOSIS — M25.512 ACUTE PAIN OF LEFT SHOULDER: ICD-10-CM

## 2019-03-28 DIAGNOSIS — M25.512 CHRONIC LEFT SHOULDER PAIN: ICD-10-CM

## 2019-03-28 LAB
ALT SERPL-CCNC: 24 U/L (ref 0–45)
AST SERPL-CCNC: 28 U/L (ref 0–55)
CHOLEST SERPL-MCNC: 140 MG/DL
CHOLEST/HDLC SERPL: 3.3 RATIO
GLUCOSE SERPL-MCNC: 141 MG/DL (ref 60–109)
HBA1C MFR BLD: 6.1 % (ref 4.1–5.7)
HDLC SERPL-MCNC: 42 MG/DL
LDLC SERPL CALC-MCNC: 66 MG/DL (ref 0–99)
TRIGL SERPL-MCNC: 157 MG/DL
VLDL-CHOLESTEROL: 31 MG/DL (ref 7–32)

## 2019-03-28 RX ORDER — NAPROXEN 500 MG/1
500 TABLET ORAL 2 TIMES DAILY WITH MEALS
Qty: 60 TABLET | Refills: 1 | Status: SHIPPED | OUTPATIENT
Start: 2019-03-28 | End: 2019-05-30

## 2019-03-28 NOTE — NURSING NOTE
"Medicare Wellness Visit  Health Risk Assessment           Health Risk Assessment / Review of Systems     Constitutional: Any fevers or night sweats? No     Eyes:  Vision problems   No     Hearing Do you feel you have hearing loss?  No     Cardiovascular: Any chest pain, fast or irregular heart beat, calf pain with walking?     No           Respiratory:   Any breathing problems or cough?   No     Gastrointestinal: Any stomach or stool problems?   No      Genitourinary: Do you have difficulty controlling urination?   No     Muscles and Joints: Any joint stiffness or soreness?    YES bilateral shoulder pain. Left shoulder pain, \"snapped\", has MRI scheduled for today    Skin: Any concerning lesions or moles?    YES concerning moles/skin tags on neck. WOuld like removed or dermatology referral    Nervous System: Any loss of strength or feeling, numbness or tingling, shaking, dizziness, or headache?  No     Mental Health: Any depression, anxiety or problems sleeping?    No     Cognition: Do you have any problems with your memory?   YES recall sometimes lacking, \"tip of tongue\" and cannot remember names of shows, movies.            Medical Care     What other specialists or organizations are involved in your medical care?    Patient Care Team       Relationship Specialty Notifications Start End    Reece Weir MD PCP - [facility]  9/24/13     Phone: [phone] Fax: [phone] [address] [place] [address]          Have you been to the ER or overnight in the hospital in the last year?   YES venous surgery, stents placed          Social History / Home Safety       Marital Status:  Who lives in your household? Self and wife    Do you feel threatened or controlled by a partner, ex-partner or anyone in your life? No     Has anyone hurt you physically, for example by pushing, hitting, slapping or kicking you   or forcing you to have sex? No          Does your home have any of the " following safety concerns; loose rugs in the hallway,  bathrooms with no grab bars by the tub or toilet, stairs with no handrails or poorly lit areas?  No     Do you need help with dressing yourself, bathing, eating or getting around your home?  No     Do you need help with the phone, transportation, shopping, preparing meals, housework, laundry, medications or managing money?No       Risk Behaviors and Healthy Habits     History   Smoking Status     Former Smoker   Smokeless Tobacco     Never Used     How many servings of fruits and vegetables do you eat a day? 1-2/day. Mostly eats out at restaurants, educated on 5/day    Exercise: 2-3 days/week for an average of 15-30 minutes walking      Do you frequently drive without a seatbelt? No     Do you use tobacco?  No     Do you use any other drugs? No         Do you use alcohol?Yes  Number of drinks per day 1-2/day  Number of drinking days a week 5      Frailty Assessment            Have you lost 10 or more pounds unintentionally in the previous year? No     How difficult is walking from one room to the other on the same level?not   No     Is it difficult to lift or carry something as heavy as 10 pounds?not   No    Compared with most (men/women) your age, would you say  that you are more active, less active, or about the same? less   Yes; r/t leg pain and arthiritis    Timed up and Go, could not complete  FALL RISK ASSESSMENT 3/28/2019 1/25/2018   Fallen 2 or more times in the past year? No No   Any fall with injury in the past year? No No         Advance Directives:  Discussed with patient and family as appropriate. Has patient  completed advance directives and/or a living will? yes  Provided blank copy if wanting to update current. Requested brining current to next clinic visit to be scanned in.    Ryanne Marcus RN

## 2019-03-28 NOTE — PROGRESS NOTES
HPI:       Zack Johns is a 84 year old  male who presents for  Had stents placed this last year - two stents for  Coronary artery. Presents today for regular checkup. No acute problems   1. CV- On aspirin and Ticagrelor. No chest pains. Had nitroglycerin refilled, but has not used it.   2. Peripheral vascular disease - had two surgeries this year for claudication.  Has done well since that time.  Is able to walk without leg pain now.   3. Shoulder pain - left. Patient experienced another snap in the left arm - localizes pain at the long head of the biceps in the shoulder. Was seen earlier for this, and was prescribed an MRI for further evaluation. Scheduled this afternoon.   4. High blood sugar in the past. The A1c was better last September, but this was after his two surggerie, and may be falsely lowered due to blood loss.  Will recheck today.   5. Check lipids at patient request.   No new complaints at this time.                  PMHX:   Current Medications:   Current Outpatient Medications   Medication Sig Dispense Refill     acetaminophen (TYLENOL) 500 MG tablet Take 500-1,000 mg by mouth every 6 hours as needed       aspirin 81 MG tablet Take 1 tablet by mouth daily       atorvastatin (LIPITOR) 40 MG tablet Take 1 tablet (40 mg) by mouth daily 90 tablet 3     lisinopril-hydrochlorothiazide (PRINZIDE/ZESTORETIC) 20-12.5 MG per tablet Take 1 tablet by mouth daily 90 tablet 3     metoprolol tartrate (LOPRESSOR) 25 MG tablet Take 0.5 tablets (12.5 mg) by mouth 2 times daily 90 tablet 3     Multiple Vitamin (MULTIVITAMIN) per tablet Take 1 tablet by mouth daily.       nitroGLYcerin (NITROSTAT) 0.4 MG sublingual tablet Place 1 tablet (0.4 mg) under the tongue every 5 minutes as needed for chest pain Repeat at 5 min intervals x 2 if needed 30 tablet 0     ticagrelor (BRILINTA) 90 MG tablet Take 90 mg by mouth       zinc 50 MG TABS Take 50 mg by mouth daily.         Existing Problems  Patient Active  Problem List   Diagnosis     Actinic keratosis     Cardiovascular disease     Essential hypertension, benign     CT Lung Pulmonary Nodule Multiple     Diabetes mellitus, type 2 (H)     Diverticulosis of large intestine     Hyperlipidemia     Peripheral vascular disease (H)     Internal hemorrhoids     Local infection of skin and subcutaneous tissue     Disorder of bursae and tendons in shoulder region     Synovial cyst     Trigger finger, acquired     Osteoarthritis of glenohumeral joint     Syncope     Abnormal cardiovascular stress test     Chest pain     Coronary atherosclerosis       Allergies:  Allergies   Allergen Reactions     Pletal [Cilostazol]        Previous labs:  Lab Results   Component Value Date    HGB 13.4 09/28/2018    HCT 41.5 09/28/2018    CHOL 135.0 01/25/2018    TRIG 145.0 01/25/2018    HDL 45.0 01/25/2018    .0 09/28/2018    BUN 21.0 09/28/2018    CO2 27.0 09/28/2018               Review of Systems:    CONSTITUTIONAL: no fatigue, no unexpected change in weight  SKIN: no worrisome rashes, no worrisome moles, no worrisome lesions  EYES: no acute vision problems or changes  ENT: no ear problems, no mouth problems, no throat problems  RESP: no significant cough, no shortness of breath  CV: no chest pain, no palpitations, no new or worsening peripheral edema  GI: no nausea, no vomiting, no constipation, no diarrhea          Physical Exam:   Vitals WNL - as recorded by nurse.  (Not showing up here - ZEYNEP)  There is no height or weight on file to calculate BMI.    GENERAL:alert, well hydrated, no distress  EYES: Eyes grossly normal to inspection, extraocular movements - intact, and PERRL  HENT: ear canals- normal; TMs- normal; Nose- normal; Mouth- no ulcers, no lesions  NECK: no tenderness, no adenopathy, no asymmetry, no masses, no stiffness; thyroid- normal to palpation  RESP: lungs clear to auscultation - no rales, no rhonchi, no wheezes  CV: regular rates and rhythm, normal S1 S2, no S3 or S4  and no murmur, no click or rub -               Labs and Procedures     Office Visit on 09/28/2018   Component Date Value Ref Range Status     WBC 09/28/2018 9.0  4.0 - 11.0 K/uL Final     RBC 09/28/2018 4.35* 4.40 - 5.90 M/uL Final     Hemoglobin 09/28/2018 13.4  13.3 - 17.7 g/dL Final     Hematocrit 09/28/2018 41.5  40.0 - 53.0 % Final     MCV 09/28/2018 95.4  78.0 - 100.0 fL Final     MCH 09/28/2018 30.8  26.5 - 35.0 pg Final     MCHC 09/28/2018 32.3  32.0 - 36.0 g/dL Final     Platelets 09/28/2018 281.0  150.0 - 450.0 K/uL Final     Glucose 09/28/2018 140.0* 60.0 - 109.0 mg/dL Final     Urea Nitrogen 09/28/2018 21.0  7.0 - 30.0 mg/dL Final     Creatinine 09/28/2018 0.8  0.8 - 1.5 mg/dL Final     Sodium 09/28/2018 142.0  133.0 - 144.0 mmol/L Final     Potassium 09/28/2018 3.9  3.4 - 5.3 mmol/L Final     Chloride 09/28/2018 99.0  94.0 - 109.0 mmol/L Final     Carbon Dioxide 09/28/2018 27.0  20.0 - 32.0 mmol/L Final     Calcium 09/28/2018 9.7  8.5 - 10.4 mg/dL Final     eGFR Calculated (Non Black Referen* 09/28/2018 >90  >60.0 mL/min Final     eGFR Calculated (Black Reference) 09/28/2018 >90  >60.0 mL/min Final     Hemoglobin A1C 09/28/2018 5.9* 4.1 - 5.7 % Final              Assessment and Plan     1.Left arm and shoulder - MRI scheduled for this afternoon. Suspect an injury to the long head of the biceps.   2. A1c screen - the last one was after surgery and may be erroneously low. Will repeat today.   3. Lipid screen- per patient request.   4. Skin tags. Patient has multiple skin tags.  Have agreed to remove a couple of these today.   Two pedunculated skin tags at the base of the neck identified. Both less than 1 cm in size.  Two skin tags removed with #15 scalpel. Silver nitrate cautery. Bandaged in usual sterile fashion.   Discussed that we could do other flat lesions using cautery at a later time.   Both lesions were completely normal looking skin tags - not sent to pathology.       Options for treatment and  follow-up care were reviewed with the patient and/or guardian. Zack Johns and/or guardian engaged in the decision making process and verbalized understanding of the options discussed and agreed with the final plan.    Reece Weir MD

## 2019-03-28 NOTE — PATIENT INSTRUCTIONS
PERSONAL PREVENTIVE SERVICES PLAN - SERVICES     Review these tests with your medical staff then decide which ones you want and take this page home for your reference      SCREENING TESTS     Description   Year of Last Screening   Recommended Today?   Heart disease screening blood tests    Cholesterol level Reducing cholesterol can reduce your risk of heart attacks by 25%.  Screening is recommended yearly if you are at risk of heart disease otherwise every 4-5 years 01/2018 Yes; Recommended and ordered.   Diabetes screening tests    Hemoglobin A1c blood test   Finding and treating diabetes early can reduce complications.  Screening recommended/covered yearly if you have high blood pressure, high cholesterol, obesity (BMI >30), or a history of high blood glucose tests; or 2 of the following: family history of diabetes, overweight (BMI >25 but <30), age 65 years or older, and a history of diabetes of pregnancy or gave birth to baby weighing more than 9 lbs. 10/2018 No; is up to date.   Hepatitis B screening Finding hepatitis B early can reduce complications.  Screening is recommended for persons with selected risk factors. - No: is not indicated today.   Hepatitis C screening Finding hepatitis C early can reduce complications.  Screening is recommended for all persons born from 1945 through 1965 and for those with selected other risk factors.  - No: is not indicated today.   HIV screening Finding HIV early can reduce complications.  Screening is recommended for persons with risk factors for HIV infection. - No: is not indicated today.   Glaucoma screening Early detection of glaucoma can prevent blindness.   Please talk to your eye doctor about this.       SCREENING TESTS     Description   Year of Last Screening   Recommended Today?   Colorectal cancer screening    Fecal occult blood test     Screening colonoscopy Screening for colon cancer has been shown to reduce death from colon cancer by 25-30%. Screening  recommended to start at 50 years and continuing until age 75 years.   - No: is not indicated today.   Breast Cancer Screening (women)    Mammogram Mammogram screening for breast cancer has been shown to reduce the risk of dying from breast cancer and prolong life. Screening is recommended every 1-2 years for women aged 50 to 74 years.  - No: is not indicated today.   Cervical Cancer screening (women)    Pap Cervical pap smears can reduce cervical cancer. Screening is recommended annually if high risk (history of abnormal pap smears) otherwise every 2-3 years, stop screening at 65 years of age if history of normal paps. - No: is not indicated today.   Screening for Osteoporosis:  Bone mass measurements (women)    Dexa Scan Screening and treating Osteoporosis can reduce the risk of hip and spine fractures. Screening is recommended in women 65 years or older and in women and men at risk of osteoporosis. - No: is not indicated today.   Screening for Lung Cancer     Low-dose CT scanning Screening can reduce mortality in persons aged 55-80 who have smoked at least 30 pack-years and who are either still smoking or have quit in the past 15 years. - Yes; Recommended and ordered.   Abdominal Aortic Aneurysm (AAA) screening    Ultrasound (US)   An aneurysm treated before rupture is very safe -a ruptured aneurysm can be fatal.  Screening  by US for AAA is limited to patients who meet one of the following criteria:    Men who are 65-75 years old and have smoked more than 100 cigarettes in their lifetime    Anyone with a family history of abdominal aortic aneurysm - No: is not indicated today.     Here are your recommended immunizations.  Take this home for your reference.                                                    IMMUNIZATIONS Description Recommend today?     Influenza (Flu shot) Prevents flu; should get every year No; is up to date.   PCV 13 Pneumonia vaccination; you get it once Yes; Recommended    PPSV 23 Second  pneumonia vaccination; usually get it 1 year after PCV 13 No; is up to date.   Zoster (Shingles) Prevents shingles; you get it once  (Check with Part D insurance for coverage, must receive at a pharmacy, not clinic) Yes; Recommended    Tetanus Prevents tetanus; once every 10 years Yes; Recommended      Hepatitis B  If you have any of the following risk factors you should be immunized for hepatitis B: severe kidney disease, people who live in the same house as a carrier of Hepatitis B virus, people who live in  institutions (e.g. nursing homes or group homes), homosexual men, patients with hemophilia who received Factor VIII or IX concentrates, abusers of illicit injectable drugs No: is not indicated today.      PATIENT INSTRUCTIONS    Yearly exam:     See your health care provider every year in order to review changes in your health, review medicines that you take, and discuss preventive care needs such as immunizations and cancer screening.    Get a flu shot each year.     Advance Directives:    If you have not done so, you are encouraged to complete advance directives and/or a living will.   More information about advance directives can be found at: http://www.[place]med.org/advocacy/Key-Issues/Advance-Directives    Nutrition:     Eat at least 5 servings of fruits and vegetables each day.     Eat whole-grain bread, whole-wheat pasta and brown rice instead of white grains and rice.     Talk to your doctor about Calcium and Vitamin D.     Lifestyle:    Exercise for at least 150 minutes a week (30 minutes a day, 5 days a week). This will help you control your weight and prevent disease.     Limit alcohol to one drink per day.     If you smoke, try to quit - your doctor will be happy to help.     Wear sunscreen to prevent skin cancer.     See your dentist every six months for an exam and cleaning.     See your eye doctor every 1 to 2 years to screen for conditions such as glaucoma, macular degeneration and  cataracts.

## 2019-03-28 NOTE — LETTER
March 29, 2019      Zack Johns  [address]  [facility] [address]        Dear Zack,    The A1c test suggests a higher than normal risk for developing diabetes in the future.  The cholesterol is excellent, and needs no further treatment. .  Please see below for your test results.    Resulted Orders   Lipid Panel Plus ([facility]) - Results < 1 hr   Result Value Ref Range    Glucose 141.0 (H) 60.0 - 109.0 mg/dL    ALT 24.0 0.0 - 45.0 U/L    AST 28.0 0.0 - 55.0 U/L    Cholesterol 140.0 <200.0 mg/dL    Triglycerides 157.0 (H) <150.0 mg/dL    HDL Cholesterol 42.0 >40.0 mg/dL      Comment:      If diabetic or CVD then reference range <100    VLDL-Cholesterol 31.0 7.0 - 32.0 mg/dL    LDL Cholesterol Direct 66.0 0.0 - 99.0 mg/dL    Cholesterol/HDL Ratio 3.3 <5.0 RATIO   Hemoglobin A1c ([facility])   Result Value Ref Range    Hemoglobin A1C 6.1 (H) 4.1 - 5.7 %       If you have any questions, please call the clinic to make an appointment.    Sincerely,    Reece Weir MD

## 2019-03-28 NOTE — RESULT ENCOUNTER NOTE
Please call patient and send results letter  The A1c test suggests a higher than normal risk for developing diabetes in the future.  The cholesterol is excellent, and needs no further treatment. .

## 2019-04-04 ENCOUNTER — OFFICE VISIT (OUTPATIENT)
Dept: FAMILY MEDICINE | Facility: CLINIC | Age: 84
End: 2019-04-04
Payer: COMMERCIAL

## 2019-04-04 VITALS
BODY MASS INDEX: 27.29 KG/M2 | SYSTOLIC BLOOD PRESSURE: 150 MMHG | DIASTOLIC BLOOD PRESSURE: 78 MMHG | OXYGEN SATURATION: 99 % | TEMPERATURE: 98 F | RESPIRATION RATE: 18 BRPM | WEIGHT: 164 LBS | HEART RATE: 57 BPM

## 2019-04-04 DIAGNOSIS — L91.8 SKIN TAG: ICD-10-CM

## 2019-04-04 DIAGNOSIS — M19.012 OSTEOARTHRITIS OF LEFT GLENOHUMERAL JOINT: Primary | ICD-10-CM

## 2019-04-04 DIAGNOSIS — R73.03 PREDIABETES: ICD-10-CM

## 2019-04-04 NOTE — PATIENT INSTRUCTIONS
Result Impression   CONCLUSION:  1.  Severe end-stage left glenohumeral joint osteoarthritis.  2.  Acquired glenoid retroversion with diminished glenoid bone stock and circumferential glenoid labral tearing.  3.  Moderate-sized shoulder joint effusion with synovitis and likely loose bodies.  4.  Moderate supraspinatus, infraspinatus and subscapularis tendinopathy.  5.  Intermediate to high-grade partial-thickness tearing at the supraspinatus footprint. Poorly defined intrasubstance partial-thickness infraspinatus tearing. High-grade deep fibers tearing superior subscapularis.  6.  Moderate to severe chronic teres minor muscle atrophy.  7.  Moderate acromioclavicular joint osteoarthritis.  8.  Moderate to severe long head biceps tendinopathy with medial subluxation.   Result Narrative   EXAM: MR SHOULDER WO CONTRAST LEFT  LOCATION: [facility]  DATE/TIME: 3/28/2019 4:41 PM    INDICATION: Left shoulder pain.  COMPARISON: None.  TECHNIQUE: Unenhanced.    INTERPRETATION:  ROTATOR CUFF: Moderate supraspinatus tendinopathy. Partial-thickness tear at the supraspinatus footprint extends anteroposterior 1 cm, intermediate to high-grade (images 8-10 of series 3; image 21 of series 5). No definitive full-thickness supraspinatus   tear or medial myotendinous junctional retraction.    Moderate infraspinatus tendinopathy with poorly defined intrasubstance partial-thickness tearing. No full-thickness tear or medial myotendinous junctional retraction.    Moderate subscapularis tendinopathy with high-grade deep fibers tearing of the superior subscapularis.    Teres minor tendon intact. No tendinopathy.    Mild supraspinatus, infraspinatus and subscapularis muscle atrophy with moderate to severe teres minor muscle atrophy. No substantial rotator cuff intramuscular edema.    CORACOACROMIAL ARCH: Type II acromion. Intact coracoacromial and coracoclavicular ligaments. Mild posterior subluxation of the humeral head. No fluid in  the subacromial-subdeltoid bursa.    ACROMIOCLAVICULAR JOINT: Moderate acromioclavicular joint osteoarthritis.    BICEPS TENDON: Moderate-severe long head biceps tendinopathy with mild medial subluxation at no donna detachment from the superior labral-bicipital anchor.    GLENOHUMERAL JOINT AND LABRUM: Severe glenohumeral joint osteoarthritis with large areas of full-thickness cartilage denudation and bone-on-bone abutment. Juxta-articular marrow edema and large marginal osteophyte production. Diminished glenoid bone   stock with acquired glenoid retroversion. Moderate-sized shoulder joint effusion with synovitis/debris. Several small loose bodies suspected. Mild inferior glenohumeral pericapsulitis.    BONES AND SOFT TISSUES: No acute left shoulder fracture or dislocation. Deltoid muscle bulk normal. Visualized chest wall and axilla unremarkable. No Hill-Sachs lesion.   Other Result Information   Interface, Rad Results In - 03/29/2019 10:38 AM CDT  EXAM: MR SHOULDER WO CONTRAST LEFT  LOCATION: [facility]  DATE/TIME: 3/28/2019 4:41 PM    INDICATION: Left shoulder pain.  COMPARISON: None.  TECHNIQUE: Unenhanced.    INTERPRETATION:  ROTATOR CUFF: Moderate supraspinatus tendinopathy. Partial-thickness tear at the supraspinatus footprint extends anteroposterior 1 cm, intermediate to high-grade (images 8-10 of series 3; image 21 of series 5). No definitive full-thickness supraspinatus   tear or medial myotendinous junctional retraction.    Moderate infraspinatus tendinopathy with poorly defined intrasubstance partial-thickness tearing. No full-thickness tear or medial myotendinous junctional retraction.    Moderate subscapularis tendinopathy with high-grade deep fibers tearing of the superior subscapularis.    Teres minor tendon intact. No tendinopathy.    Mild supraspinatus, infraspinatus and subscapularis muscle atrophy with moderate to severe teres minor muscle atrophy. No substantial rotator cuff  intramuscular edema.    CORACOACROMIAL ARCH: Type II acromion. Intact coracoacromial and coracoclavicular ligaments. Mild posterior subluxation of the humeral head. No fluid in the subacromial-subdeltoid bursa.    ACROMIOCLAVICULAR JOINT: Moderate acromioclavicular joint osteoarthritis.    BICEPS TENDON: Moderate-severe long head biceps tendinopathy with mild medial subluxation at no donna detachment from the superior labral-bicipital anchor.    GLENOHUMERAL JOINT AND LABRUM: Severe glenohumeral joint osteoarthritis with large areas of full-thickness cartilage denudation and bone-on-bone abutment. Juxta-articular marrow edema and large marginal osteophyte production. Diminished glenoid bone   stock with acquired glenoid retroversion. Moderate-sized shoulder joint effusion with synovitis/debris. Several small loose bodies suspected. Mild inferior glenohumeral pericapsulitis.    BONES AND SOFT TISSUES: No acute left shoulder fracture or dislocation. Deltoid muscle bulk normal. Visualized chest wall and axilla unremarkable. No Hill-Sachs lesion.    IMPRESSION:   CONCLUSION:  1.  Severe end-stage left glenohumeral joint osteoarthritis.  2.  Acquired glenoid retroversion with diminished glenoid bone stock and circumferential glenoid labral tearing.  3.  Moderate-sized shoulder joint effusion with synovitis and likely loose bodies.  4.  Moderate supraspinatus, infraspinatus and subscapularis tendinopathy.  5.  Intermediate to high-grade partial-thickness tearing at the supraspinatus footprint. Poorly defined intrasubstance partial-thickness infraspinatus tearing. High-grade deep fibers tearing superior subscapularis.  6.  Moderate to severe chronic teres minor muscle atrophy.  7.  Moderate acromioclavicular joint osteoarthritis.  8.  Moderate to severe long head biceps tendinopathy with medial subluxation.

## 2019-04-04 NOTE — PROGRESS NOTES
HPI:       Zack Johns is a 84 year old  male who presents for discussion of labs and skin tag removal.   Discussed results of blood tests, including a discussion of prediabetes. Discussed methods avoiding the development of diabetes, including weight loss and exercise. Discussed other aspects of blood testing, including lipids and renal function. Previous lesions were healing well with no evidence of erythema or drainage.     Patient wanted removal of several more skin tags. He had two removed last week. Today we identified 5 additional (2 skin tags and 3 seborrheic keratosis).  These were treated with cryotherapy.     Procedure Note: ,These were treated using cryotherapy using liquid nitrogen.  Liquid was applied using cotton swab, and the liquid nitrogen cryogun.  Each lesion was treated with a 60 second double freeze technique.  Sites were located at the base of the neck on the left side, and one seborrheic keratosis was located medial to the left clavicle.  Patient tolerated procedure well and without complication.        Discussed the results of the MRI of the shoulder. Patient has severe arthritis, and some evidence of tendon damage.  Patient is not interested in discussing surgical options at this time. Will continue with naprosyn at this time.          PMHX:   Current Medications:   Current Outpatient Medications   Medication Sig Dispense Refill     acetaminophen (TYLENOL) 500 MG tablet Take 500-1,000 mg by mouth every 6 hours as needed       aspirin 81 MG tablet Take 1 tablet by mouth daily       atorvastatin (LIPITOR) 40 MG tablet Take 1 tablet (40 mg) by mouth daily 90 tablet 3     lisinopril-hydrochlorothiazide (PRINZIDE/ZESTORETIC) 20-12.5 MG per tablet Take 1 tablet by mouth daily 90 tablet 3     metoprolol tartrate (LOPRESSOR) 25 MG tablet Take 0.5 tablets (12.5 mg) by mouth 2 times daily 90 tablet 3     Multiple Vitamin (MULTIVITAMIN) per tablet Take 1 tablet by mouth daily.        naproxen (NAPROSYN) 500 MG tablet Take 1 tablet (500 mg) by mouth 2 times daily (with meals) 60 tablet 1     nitroGLYcerin (NITROSTAT) 0.4 MG sublingual tablet Place 1 tablet (0.4 mg) under the tongue every 5 minutes as needed for chest pain Repeat at 5 min intervals x 2 if needed 30 tablet 0     ticagrelor (BRILINTA) 90 MG tablet Take 90 mg by mouth       zinc 50 MG TABS Take 50 mg by mouth daily.         Existing Problems  Patient Active Problem List   Diagnosis     Actinic keratosis     Cardiovascular disease     Essential hypertension, benign     CT Lung Pulmonary Nodule Multiple     Diabetes mellitus, type 2 (H)     Diverticulosis of large intestine     Hyperlipidemia     Peripheral vascular disease (H)     Internal hemorrhoids     Local infection of skin and subcutaneous tissue     Disorder of bursae and tendons in shoulder region     Synovial cyst     Trigger finger, acquired     Osteoarthritis of glenohumeral joint     Syncope     Abnormal cardiovascular stress test     Chest pain     Coronary atherosclerosis       Allergies:  Allergies   Allergen Reactions     Pletal [Cilostazol]        Previous labs:  Lab Results   Component Value Date    HGB 13.4 09/28/2018    HCT 41.5 09/28/2018    CHOL 140.0 03/28/2019    TRIG 157.0 (H) 03/28/2019    HDL 42.0 03/28/2019    ALT 24.0 03/28/2019    AST 28.0 03/28/2019    .0 09/28/2018    BUN 21.0 09/28/2018    CO2 27.0 09/28/2018               Review of Systems:    CONSTITUTIONAL: no fatigue, no unexpected change in weight  SKIN: no worrisome rashes, no worrisome moles, no worrisome lesions  EYES: no acute vision problems or changes  ENT: no ear problems, no mouth problems, no throat problems  RESP: no significant cough, no shortness of breath  CV: no chest pain, no palpitations, no new or worsening peripheral edema  GI: no nausea, no vomiting, no constipation, no diarrhea          Physical Exam:     Vitals:    04/04/19 0910   BP: 150/78   Pulse: 57   Resp: 18    Temp: 98  F (36.7  C)   TempSrc: Oral   SpO2: 99%   Weight: 74.4 kg (164 lb)     Body mass index is 27.29 kg/m .    GENERAL:alert, well hydrated, no distress  EYES: Eyes grossly normal to inspection, extraocular movements - intact, and PERRL  HENT: ear canals- normal; TMs- normal; Nose- normal; Mouth- no ulcers, no lesions  NECK: no tenderness, no adenopathy, no asymmetry, no masses, no stiffness;   Two pedunculated skin tags and three sebK lesions, all less than 1 cm, at the left base of the neck. These were treated with cryotherapy using iquid nitrogen as described above.   RESP: lungs clear to auscultation - no rales, no rhonchi, no wheezes  CV: regular rates and rhythm, normal S1 S2, no S3 or S4 and no murmur, no click or rub -               Labs and Procedures     Office Visit on 03/28/2019   Component Date Value Ref Range Status     Glucose 03/28/2019 141.0* 60.0 - 109.0 mg/dL Final     ALT 03/28/2019 24.0  0.0 - 45.0 U/L Final     AST 03/28/2019 28.0  0.0 - 55.0 U/L Final     Cholesterol 03/28/2019 140.0  <200.0 mg/dL Final     Triglycerides 03/28/2019 157.0* <150.0 mg/dL Final     HDL Cholesterol 03/28/2019 42.0  >40.0 mg/dL Final    If diabetic or CVD then reference range <100     VLDL-Cholesterol 03/28/2019 31.0  7.0 - 32.0 mg/dL Final     LDL Cholesterol Direct 03/28/2019 66.0  0.0 - 99.0 mg/dL Final     Cholesterol/HDL Ratio 03/28/2019 3.3  <5.0 RATIO Final     Hemoglobin A1C 03/28/2019 6.1* 4.1 - 5.7 % Final              Assessment and Plan     1.Discussed laboratory results.   2. Prediabetes - discussed methods of lowering the risk of developing  diabetes.   3. Procedure - treated 5 skin lesions using cryotherapy.   4. Discussed MRI of shoulder, and provided the patient, at his request, of the MRI results.   5. Discussed risks and benefits of naprosyn in the face of the existing heart problems. The naprosyn appears to be effectively controlling the pain at this time, and I advised continuing naprosyn  use at this time.    Options for treatment and follow-up care were reviewed with the patient and/or guardian. Zack Johns and/or guardian engaged in the decision making process and verbalized understanding of the options discussed and agreed with the final plan.    Reece Weir MD

## 2019-04-19 ENCOUNTER — OFFICE VISIT - HEALTHEAST (OUTPATIENT)
Dept: VASCULAR SURGERY | Facility: CLINIC | Age: 84
End: 2019-04-19

## 2019-04-19 DIAGNOSIS — I70.213 INTERMITTENT CLAUDICATION OF BOTH LOWER EXTREMITIES DUE TO ATHEROSCLEROSIS (H): ICD-10-CM

## 2019-05-30 DIAGNOSIS — E78.01 FAMILIAL HYPERCHOLESTEROLEMIA: ICD-10-CM

## 2019-06-03 RX ORDER — NAPROXEN 500 MG/1
500 TABLET ORAL 2 TIMES DAILY WITH MEALS
Qty: 60 TABLET | Refills: 2 | Status: SHIPPED | OUTPATIENT
Start: 2019-06-03 | End: 2019-10-23

## 2019-07-30 DIAGNOSIS — I10 ESSENTIAL HYPERTENSION, BENIGN: ICD-10-CM

## 2019-07-30 DIAGNOSIS — I25.2 CORONARY ARTERY DISEASE WITH HX OF MYOCARDIAL INFARCT W/O HX OF CABG: ICD-10-CM

## 2019-07-30 DIAGNOSIS — I25.10 CORONARY ARTERY DISEASE WITH HX OF MYOCARDIAL INFARCT W/O HX OF CABG: ICD-10-CM

## 2019-07-30 NOTE — TELEPHONE ENCOUNTER
[facility] phone call message- patient requesting a refill:    Full Medication Name: Atorvastatin 40 MG, lisinooril , metoprolol 25 mg, ticagrelor 90 MG    Dose:     Pharmacy confirmed as   [facility]  [address] [facility] [address]  Phone: [phone] Fax: [phone]    [id number] [place], [place] - [address] [place] [address] [address]  [address] [place] [id number]  Phone: [phone] Fax: [phone]  : No: [facility]   Phone [phone]    Additional Comments: Patient would like [facility] medication refill. Patient try calling them but he did not know who his new PCP was at the time so they could not fill his mediation. Please refill or call patient.     OK to leave a message on voice mail? Yes    Primary language: English      needed? No    Call taken on July 30, 2019 at 9:42 AM by Liborio Geronimo

## 2019-07-30 NOTE — TELEPHONE ENCOUNTER
Message to physician: Patient called and requesting a refill     Date of last visit: 4/4/2019     Date of next visit if scheduled: Visit date not found      Last Comprehensive Metabolic Panel:  Sodium   Date Value Ref Range Status   09/28/2018 142.0 133.0 - 144.0 mmol/L Final     Potassium   Date Value Ref Range Status   09/28/2018 3.9 3.4 - 5.3 mmol/L Final     Chloride   Date Value Ref Range Status   09/28/2018 99.0 94.0 - 109.0 mmol/L Final     Carbon Dioxide   Date Value Ref Range Status   09/28/2018 27.0 20.0 - 32.0 mmol/L Final     Glucose   Date Value Ref Range Status   03/28/2019 141.0 (H) 60.0 - 109.0 mg/dL Final     Urea Nitrogen   Date Value Ref Range Status   09/28/2018 21.0 7.0 - 30.0 mg/dL Final     Creatinine   Date Value Ref Range Status   09/28/2018 0.8 0.8 - 1.5 mg/dL Final     GFR Estimate   Date Value Ref Range Status   09/24/2013 > 60 >60 ml/min/1.73m2 Final     Calcium   Date Value Ref Range Status   09/28/2018 9.7 8.5 - 10.4 mg/dL Final       BP Readings from Last 3 Encounters:   04/04/19 150/78   03/25/19 169/76   09/28/18 113/70       Lab Results   Component Value Date    A1C 6.1 03/28/2019    A1C 5.9 09/28/2018    A1C 6.1 01/25/2018    A1C 6.3 02/16/2017    A1C 6.0 12/05/2014                Please complete refill and CLOSE ENCOUNTER.  Closing the encounter signifies the refill is complete.

## 2019-08-01 RX ORDER — LISINOPRIL AND HYDROCHLOROTHIAZIDE 12.5; 2 MG/1; MG/1
1 TABLET ORAL DAILY
Qty: 90 TABLET | Refills: 0 | Status: SHIPPED | OUTPATIENT
Start: 2019-08-01 | End: 2019-10-21

## 2019-08-01 RX ORDER — ATORVASTATIN CALCIUM 40 MG/1
40 TABLET, FILM COATED ORAL DAILY
Qty: 90 TABLET | Refills: 0 | Status: SHIPPED | OUTPATIENT
Start: 2019-08-01 | End: 2019-10-21

## 2019-08-01 RX ORDER — METOPROLOL TARTRATE 25 MG/1
12.5 TABLET, FILM COATED ORAL 2 TIMES DAILY
Qty: 90 TABLET | Refills: 0 | Status: SHIPPED | OUTPATIENT
Start: 2019-08-01 | End: 2019-10-21

## 2019-08-30 ENCOUNTER — COMMUNICATION - HEALTHEAST (OUTPATIENT)
Dept: VASCULAR SURGERY | Facility: CLINIC | Age: 84
End: 2019-08-30

## 2019-10-21 DIAGNOSIS — I10 ESSENTIAL HYPERTENSION, BENIGN: ICD-10-CM

## 2019-10-21 DIAGNOSIS — I25.2 CORONARY ARTERY DISEASE WITH HX OF MYOCARDIAL INFARCT W/O HX OF CABG: ICD-10-CM

## 2019-10-21 DIAGNOSIS — I25.10 CORONARY ARTERY DISEASE WITH HX OF MYOCARDIAL INFARCT W/O HX OF CABG: ICD-10-CM

## 2019-10-23 ENCOUNTER — OFFICE VISIT (OUTPATIENT)
Dept: FAMILY MEDICINE | Facility: CLINIC | Age: 84
End: 2019-10-23
Payer: COMMERCIAL

## 2019-10-23 VITALS
RESPIRATION RATE: 20 BRPM | SYSTOLIC BLOOD PRESSURE: 145 MMHG | BODY MASS INDEX: 27.49 KG/M2 | HEIGHT: 65 IN | TEMPERATURE: 98.2 F | OXYGEN SATURATION: 99 % | HEART RATE: 74 BPM | WEIGHT: 165 LBS | DIASTOLIC BLOOD PRESSURE: 62 MMHG

## 2019-10-23 DIAGNOSIS — E78.01 FAMILIAL HYPERCHOLESTEROLEMIA: ICD-10-CM

## 2019-10-23 DIAGNOSIS — I73.9 PERIPHERAL VASCULAR DISEASE (H): ICD-10-CM

## 2019-10-23 DIAGNOSIS — I25.10 ATHEROSCLEROSIS OF NATIVE CORONARY ARTERY OF NATIVE HEART WITHOUT ANGINA PECTORIS: Primary | ICD-10-CM

## 2019-10-23 DIAGNOSIS — I10 ESSENTIAL HYPERTENSION, BENIGN: ICD-10-CM

## 2019-10-23 RX ORDER — ATORVASTATIN CALCIUM 40 MG/1
40 TABLET, FILM COATED ORAL DAILY
Qty: 90 TABLET | Refills: 1 | Status: SHIPPED | OUTPATIENT
Start: 2019-10-23 | End: 2019-11-11

## 2019-10-23 RX ORDER — METOPROLOL TARTRATE 25 MG/1
12.5 TABLET, FILM COATED ORAL 2 TIMES DAILY
Qty: 90 TABLET | Refills: 1 | Status: SHIPPED | OUTPATIENT
Start: 2019-10-23 | End: 2019-11-11

## 2019-10-23 RX ORDER — LISINOPRIL AND HYDROCHLOROTHIAZIDE 12.5; 2 MG/1; MG/1
1 TABLET ORAL DAILY
Qty: 90 TABLET | Refills: 1 | Status: SHIPPED | OUTPATIENT
Start: 2019-10-23 | End: 2019-11-11

## 2019-10-23 ASSESSMENT — MIFFLIN-ST. JEOR: SCORE: 1365.93

## 2019-10-23 NOTE — LETTER
10/23/2019      RE: Zack Johns  [address]  [facility] [address]       Assessment and Plan     1. Atherosclerosis of native coronary artery of native heart without angina pectoris  It doesn't sound that the patient has a reason (easily discoverable) for DAPT beyond one year. Chart review shows stenting on 22 Aug 19. I don't personally know enough of the data for ticagrelor vs clopidogrel. Recommend follow up with cardiology for the discussion. Likely would benefit from continued platelet inhibition with clopidogrel for his PAD without ASA (if ticagrelor was stopped). I don't know if monotherapy with ticagrelor is sufficient for PAD. Will defer these decisions to cardiology and vascular surgery. Given his age, it would be great to get to a single agent if safe. He would also like the cost reduction. Discussed this with the patient at length.  - ticagrelor (BRILINTA) 90 MG tablet; Take 1 tablet (90 mg) by mouth 2 times daily  Dispense: 60 tablet; Refill: 2  - CARDIOLOGY EVAL ADULT REFERRAL    2. Peripheral vascular disease (H)  See above discussion.  - ticagrelor (BRILINTA) 90 MG tablet; Take 1 tablet (90 mg) by mouth 2 times daily  Dispense: 60 tablet; Refill: 2    3. Familial hypercholesterolemia  Continue lipitor. Refill order processed in t-con but was seen today anyway.    4. Essential hypertension, benign  Controlled (per JNC8). Continue. Recheck in 6 months. Refill order processed in t-con but was seen today anyway.    Wants to get his shoulders looked at. Will see at earliest convenience.  Overall, I counseled and coordinated care on the above issues for greater than 50% of the 45 minute face-to-face visit.  Options for treatment and follow-up care were reviewed with the patient and/or guardian. Zack Johns and/or guardian engaged in the decision making process and verbalized understanding of the options discussed and agreed with the final plan. I answered all of their questions.    Haim  RICKI Galaviz III, MD, FAAFP  [facility]  10/23/19 1:40 PM           HPI:       Zack Johns is a 85 year old  male  Patient presents with:  Refill Request: Brilinta for stents in the heart  Medication Reconciliation: completed.       Patient urgently needs refill of Brilinta as he just took his last pill. He was told that his stents will close if he doesn't take it. He has been on DAPT for at least a year. Does complain of easy bruising but no other bleeding events.    Has follow up with vascular surgery on Friday. Doesn't remember the last time he saw cards.    Tolerating his statin well. Needs a refill.    The patient denies signs and symptoms of orthostatic hypotension. Denies chest pain.         PMHX:     Patient Active Problem List   Diagnosis     Actinic keratosis     Cardiovascular disease     Essential hypertension, benign     CT Lung Pulmonary Nodule Multiple     Diabetes mellitus, type 2 (H)     Diverticulosis of large intestine     Hyperlipidemia     Peripheral vascular disease (H)     Internal hemorrhoids     Local infection of skin and subcutaneous tissue     Disorder of bursae and tendons in shoulder region     Synovial cyst     Trigger finger, acquired     Osteoarthritis of glenohumeral joint     Syncope     Abnormal cardiovascular stress test     Chest pain     Coronary atherosclerosis       Current Outpatient Medications   Medication Sig Dispense Refill     aspirin 81 MG tablet Take 1 tablet by mouth daily       Multiple Vitamin (MULTIVITAMIN) per tablet Take 1 tablet by mouth daily.       nitroGLYcerin (NITROSTAT) 0.4 MG sublingual tablet Place 1 tablet (0.4 mg) under the tongue every 5 minutes as needed for chest pain Repeat at 5 min intervals x 2 if needed 30 tablet 0     ticagrelor (BRILINTA) 90 MG tablet Take 1 tablet (90 mg) by mouth 2 times daily 60 tablet 2     zinc 50 MG TABS Take 50 mg by mouth daily.       acetaminophen (TYLENOL) 500 MG tablet Take 500-1,000 mg by  mouth every 6 hours as needed       atorvastatin (LIPITOR) 40 MG tablet Take 1 tablet (40 mg) by mouth daily 90 tablet 1     lisinopril-hydrochlorothiazide (PRINZIDE/ZESTORETIC) 20-12.5 MG tablet Take 1 tablet by mouth daily 90 tablet 1     metoprolol tartrate (LOPRESSOR) 25 MG tablet Take 0.5 tablets (12.5 mg) by mouth 2 times daily 90 tablet 1       Social History     Socioeconomic History     Marital status:      Spouse name: Not on file     Number of children: Not on file     Years of education: Not on file     Highest education level: Not on file   Occupational History     Not on file   Social Needs     Financial resource strain: Not on file     Food insecurity:     Worry: Not on file     Inability: Not on file     Transportation needs:     Medical: Not on file     Non-medical: Not on file   Tobacco Use     Smoking status: Former Smoker     Smokeless tobacco: Never Used   Substance and Sexual Activity     Alcohol use: Yes     Comment: occasionally.     Drug use: No     Sexual activity: Not on file   Lifestyle     Physical activity:     Days per week: Not on file     Minutes per session: Not on file     Stress: Not on file   Relationships     Social connections:     Talks on phone: Not on file     Gets together: Not on file     Attends [employer] service: Not on file     Active member of club or organization: Not on file     Attends meetings of clubs or organizations: Not on file     Relationship status: Not on file     Intimate partner violence:     Fear of current or ex partner: Not on file     Emotionally abused: Not on file     Physically abused: Not on file     Forced sexual activity: Not on file   Other Topics Concern     Parent/sibling w/ CABG, MI or angioplasty before 65F 55M? 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        Had gotten into typing to meet girls.        Worked for [employer] after leaving the [employer].       Allergies  "  Allergen Reactions     Pletal [Cilostazol]        No results found for this or any previous visit (from the past 24 hour(s)).         Review of Systems:     Review of Systems   All other systems reviewed and are negative.           Physical Exam:     Vitals:    10/23/19 1108   BP: (!) 145/62   Pulse: 74   Resp: 20   Temp: 98.2  F (36.8  C)   TempSrc: Oral   SpO2: 99%   Weight: 74.8 kg (165 lb)   Height: 1.66 m (5' 5.35\")     Body mass index is 27.16 kg/m .    Physical Exam  Vitals signs and nursing note reviewed.   Constitutional:       General: He is not in acute distress.     Appearance: Normal appearance. He is not ill-appearing, toxic-appearing or diaphoretic.   Pulmonary:      Effort: No respiratory distress.   Neurological:      Mental Status: He is alert and oriented to person, place, and time.     Haim Galaviz MD  "

## 2019-10-23 NOTE — Clinical Note
10/23/2019      RE: Zack Fierro  [facility] [id number]         Assessment and Plan     1. Atherosclerosis of native coronary artery of native heart without angina pectoris  It doesn't sound that the patient has a reason (easily discoverable) for DAPT beyond one year.  - ticagrelor (BRILINTA) 90 MG tablet; Take 1 tablet (90 mg) by mouth 2 times daily  Dispense: 60 tablet; Refill: 2  - CARDIOLOGY EVAL ADULT REFERRAL    2. Peripheral vascular disease (H)  ***  - ticagrelor (BRILINTA) 90 MG tablet; Take 1 tablet (90 mg) by mouth 2 times daily  Dispense: 60 tablet; Refill: 2    3. Familial hypercholesterolemia  ***    4. Essential hypertension, benign  ***      Options for treatment and follow-up care were reviewed with the patient and/or guardian. Zack Johns and/or guardian engaged in the decision making process and verbalized understanding of the options discussed and agreed with the final plan. I answered all of their questions.    Haim Galaviz III, MD, FAAFP  [facility]  10/23/19 1:40 PM           HPI:       Zack Johns is a 85 year old  male  Patient presents with:  Refill Request: Brilinta for stents in the heart  Medication Reconciliation: completed.       ***    {:[id number]}         PMHX:     Patient Active Problem List   Diagnosis     Actinic keratosis     Cardiovascular disease     Essential hypertension, benign     CT Lung Pulmonary Nodule Multiple     Diabetes mellitus, type 2 (H)     Diverticulosis of large intestine     Hyperlipidemia     Peripheral vascular disease (H)     Internal hemorrhoids     Local infection of skin and subcutaneous tissue     Disorder of bursae and tendons in shoulder region     Synovial cyst     Trigger finger, acquired     Osteoarthritis of glenohumeral joint     Syncope     Abnormal cardiovascular stress test     Chest pain     Coronary atherosclerosis       Current Outpatient Medications   Medication Sig Dispense  Refill     aspirin 81 MG tablet Take 1 tablet by mouth daily       Multiple Vitamin (MULTIVITAMIN) per tablet Take 1 tablet by mouth daily.       nitroGLYcerin (NITROSTAT) 0.4 MG sublingual tablet Place 1 tablet (0.4 mg) under the tongue every 5 minutes as needed for chest pain Repeat at 5 min intervals x 2 if needed 30 tablet 0     ticagrelor (BRILINTA) 90 MG tablet Take 1 tablet (90 mg) by mouth 2 times daily 60 tablet 2     zinc 50 MG TABS Take 50 mg by mouth daily.       acetaminophen (TYLENOL) 500 MG tablet Take 500-1,000 mg by mouth every 6 hours as needed       atorvastatin (LIPITOR) 40 MG tablet Take 1 tablet (40 mg) by mouth daily 90 tablet 1     lisinopril-hydrochlorothiazide (PRINZIDE/ZESTORETIC) 20-12.5 MG tablet Take 1 tablet by mouth daily 90 tablet 1     metoprolol tartrate (LOPRESSOR) 25 MG tablet Take 0.5 tablets (12.5 mg) by mouth 2 times daily 90 tablet 1       Social History     Socioeconomic History     Marital status:      Spouse name: Not on file     Number of children: Not on file     Years of education: Not on file     Highest education level: Not on file   Occupational History     Not on file   Social Needs     Financial resource strain: Not on file     Food insecurity:     Worry: Not on file     Inability: Not on file     Transportation needs:     Medical: Not on file     Non-medical: Not on file   Tobacco Use     Smoking status: Former Smoker     Smokeless tobacco: Never Used   Substance and Sexual Activity     Alcohol use: Yes     Comment: occasionally.     Drug use: No     Sexual activity: Not on file   Lifestyle     Physical activity:     Days per week: Not on file     Minutes per session: Not on file     Stress: Not on file   Relationships     Social connections:     Talks on phone: Not on file     Gets together: Not on file     Attends Cheondoism service: Not on file     Active member of club or organization: Not on file     Attends meetings of clubs or organizations: Not on  "file     Relationship status: Not on file     Intimate partner violence:     Fear of current or ex partner: Not on file     Emotionally abused: Not on file     Physically abused: Not on file     Forced sexual activity: Not on file   Other Topics Concern     Parent/sibling w/ CABG, MI or angioplasty before 65F 55M? 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        Had gotten into typing to meet girls.        Worked for [employer] after leaving the [employer].       Allergies   Allergen Reactions     Pletal [Cilostazol]        No results found for this or any previous visit (from the past 24 hour(s)).         Review of Systems:     ROS         Physical Exam:     Vitals:    10/23/19 1108   BP: (!) 145/62   Pulse: 74   Resp: 20   Temp: 98.2  F (36.8  C)   TempSrc: Oral   SpO2: 99%   Weight: 74.8 kg (165 lb)   Height: 1.66 m (5' 5.35\")     Body mass index is 27.16 kg/m .    Physical Exam          Assessment and Plan     1. Atherosclerosis of native coronary artery of native heart without angina pectoris  It doesn't sound that the patient has a reason (easily discoverable) for DAPT beyond one year. Chart review shows stenting on 22 Aug 19. I don't personally know enough of the data for ticagrelor vs clopidogrel. Recommend follow up with cardiology for the discussion. Likely would benefit from continued platelet inhibition with clopidogrel for his PAD without ASA (if ticagrelor was stopped). I don't know if monotherapy with ticagrelor is sufficient for PAD. Will defer these decisions to cardiology and vascular surgery. Given his age, it would be great to get to a single agent if safe. He would also like the cost reduction. Discussed this with the patient at length.  - ticagrelor (BRILINTA) 90 MG tablet; Take 1 tablet (90 mg) by mouth 2 times daily  Dispense: 60 tablet; Refill: 2  - CARDIOLOGY EVAL ADULT REFERRAL    2. Peripheral vascular disease (H)  See above " discussion.  - ticagrelor (BRILINTA) 90 MG tablet; Take 1 tablet (90 mg) by mouth 2 times daily  Dispense: 60 tablet; Refill: 2    3. Familial hypercholesterolemia  Continue lipitor. Refill order processed in t-con but was seen today anyway.    4. Essential hypertension, benign  Controlled (per JNC8). Continue. Recheck in 6 months. Refill order processed in t-con but was seen today anyway.    Wants to get his shoulders looked at. Will see at earliest convenience.    Overall, I counseled and coordinated care on the above issues for greater than 50% of the 45 minute face-to-face visit.    Options for treatment and follow-up care were reviewed with the patient and/or guardian. Zack Johns and/or guardian engaged in the decision making process and verbalized understanding of the options discussed and agreed with the final plan. I answered all of their questions.    Haim Galaviz III, MD, FAAFP  [facility]  10/23/19 1:40 PM           HPI:       Zack Johns is a 85 year old  male  Patient presents with:  Refill Request: Brilinta for stents in the heart  Medication Reconciliation: completed.       Patient urgently needs refill of Brilinta as he just took his last pill. He was told that his stents will close if he doesn't take it. He has been on DAPT for at least a year. Does complain of easy bruising but no other bleeding events.    Has follow up with vascular surgery on Friday. Doesn't remember the last time he saw cards.    Tolerating his statin well. Needs a refill.    The patient denies signs and symptoms of orthostatic hypotension. Denies chest pain.         PMHX:     Patient Active Problem List   Diagnosis     Actinic keratosis     Cardiovascular disease     Essential hypertension, benign     CT Lung Pulmonary Nodule Multiple     Diabetes mellitus, type 2 (H)     Diverticulosis of large intestine     Hyperlipidemia     Peripheral vascular disease (H)     Internal hemorrhoids     Local  infection of skin and subcutaneous tissue     Disorder of bursae and tendons in shoulder region     Synovial cyst     Trigger finger, acquired     Osteoarthritis of glenohumeral joint     Syncope     Abnormal cardiovascular stress test     Chest pain     Coronary atherosclerosis       Current Outpatient Medications   Medication Sig Dispense Refill     aspirin 81 MG tablet Take 1 tablet by mouth daily       Multiple Vitamin (MULTIVITAMIN) per tablet Take 1 tablet by mouth daily.       nitroGLYcerin (NITROSTAT) 0.4 MG sublingual tablet Place 1 tablet (0.4 mg) under the tongue every 5 minutes as needed for chest pain Repeat at 5 min intervals x 2 if needed 30 tablet 0     ticagrelor (BRILINTA) 90 MG tablet Take 1 tablet (90 mg) by mouth 2 times daily 60 tablet 2     zinc 50 MG TABS Take 50 mg by mouth daily.       acetaminophen (TYLENOL) 500 MG tablet Take 500-1,000 mg by mouth every 6 hours as needed       atorvastatin (LIPITOR) 40 MG tablet Take 1 tablet (40 mg) by mouth daily 90 tablet 1     lisinopril-hydrochlorothiazide (PRINZIDE/ZESTORETIC) 20-12.5 MG tablet Take 1 tablet by mouth daily 90 tablet 1     metoprolol tartrate (LOPRESSOR) 25 MG tablet Take 0.5 tablets (12.5 mg) by mouth 2 times daily 90 tablet 1       Social History     Socioeconomic History     Marital status:      Spouse name: Not on file     Number of children: Not on file     Years of education: Not on file     Highest education level: Not on file   Occupational History     Not on file   Social Needs     Financial resource strain: Not on file     Food insecurity:     Worry: Not on file     Inability: Not on file     Transportation needs:     Medical: Not on file     Non-medical: Not on file   Tobacco Use     Smoking status: Former Smoker     Smokeless tobacco: Never Used   Substance and Sexual Activity     Alcohol use: Yes     Comment: occasionally.     Drug use: No     Sexual activity: Not on file   Lifestyle     Physical activity:      "Days per week: Not on file     Minutes per session: Not on file     Stress: Not on file   Relationships     Social connections:     Talks on phone: Not on file     Gets together: Not on file     Attends Jainism service: Not on file     Active member of club or organization: Not on file     Attends meetings of clubs or organizations: Not on file     Relationship status: Not on file     Intimate partner violence:     Fear of current or ex partner: Not on file     Emotionally abused: Not on file     Physically abused: Not on file     Forced sexual activity: Not on file   Other Topics Concern     Parent/sibling w/ CABG, MI or angioplasty before 65F 55M? 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        Had gotten into typing to meet girls.        Worked for [employer] after leaving the [employer].       Allergies   Allergen Reactions     Pletal [Cilostazol]        No results found for this or any previous visit (from the past 24 hour(s)).         Review of Systems:     Review of Systems   All other systems reviewed and are negative.           Physical Exam:     Vitals:    10/23/19 1108   BP: (!) 145/62   Pulse: 74   Resp: 20   Temp: 98.2  F (36.8  C)   TempSrc: Oral   SpO2: 99%   Weight: 74.8 kg (165 lb)   Height: 1.66 m (5' 5.35\")     Body mass index is 27.16 kg/m .    Physical Exam  Vitals signs and nursing note reviewed.   Constitutional:       General: He is not in acute distress.     Appearance: Normal appearance. He is not ill-appearing, toxic-appearing or diaphoretic.   Pulmonary:      Effort: No respiratory distress.   Neurological:      Mental Status: He is alert and oriented to person, place, and time.             Haim Galaviz MD  "

## 2019-10-23 NOTE — PATIENT INSTRUCTIONS
Please ask vascular surgery and cardiology what their preference is for your blood thinners. Currently on DAPT. Can do plavix only for PAD?    Referral for :     Cardiology    LOCATION/PLACE/Provider :    [facility] (aka [facility]) w/Dr. Cruz  DATE & TIME :    Dec. 20th at 3:00 pm   PHONE :     [phone]  FAX :    [phone]  Appointment made by clinic staff/:    Missy

## 2019-10-23 NOTE — PROGRESS NOTES
Assessment and Plan     1. Atherosclerosis of native coronary artery of native heart without angina pectoris  It doesn't sound that the patient has a reason (easily discoverable) for DAPT beyond one year. Chart review shows stenting on 22 Aug 18. I don't personally know enough of the data for ticagrelor vs clopidogrel. Recommend follow up with cardiology for the discussion. Likely would benefit from continued platelet inhibition with clopidogrel for his PAD without ASA (if ticagrelor was stopped). I don't know if monotherapy with ticagrelor is sufficient for PAD. Will defer these decisions to cardiology and vascular surgery. Given his age, it would be great to get to a single agent if safe. He would also like the cost reduction. Discussed this with the patient at length.  - ticagrelor (BRILINTA) 90 MG tablet; Take 1 tablet (90 mg) by mouth 2 times daily  Dispense: 60 tablet; Refill: 2  - CARDIOLOGY EVAL ADULT REFERRAL    2. Peripheral vascular disease (H)  See above discussion.  - ticagrelor (BRILINTA) 90 MG tablet; Take 1 tablet (90 mg) by mouth 2 times daily  Dispense: 60 tablet; Refill: 2    3. Familial hypercholesterolemia  Continue lipitor. Refill order processed in t-con but was seen today anyway.    4. Essential hypertension, benign  Controlled (per JNC8). Continue. Recheck in 6 months. Refill order processed in t-con but was seen today anyway.    Wants to get his shoulders looked at. Will see at earliest convenience.    Overall, I counseled and coordinated care on the above issues for greater than 50% of the 45 minute face-to-face visit.    Options for treatment and follow-up care were reviewed with the patient and/or guardian. Zack Johns and/or guardian engaged in the decision making process and verbalized understanding of the options discussed and agreed with the final plan. I answered all of their questions.    Haim Galaviz III, MD, FAAFP  [facility]  10/23/19 1:40 PM            HPI:       Zakc Johns is a 85 year old  male  Patient presents with:  Refill Request: Brilinta for stents in the heart  Medication Reconciliation: completed.       Patient urgently needs refill of Brilinta as he just took his last pill. He was told that his stents will close if he doesn't take it. He has been on DAPT for at least a year. Does complain of easy bruising but no other bleeding events.    Has follow up with vascular surgery on Friday. Doesn't remember the last time he saw cards.    Tolerating his statin well. Needs a refill.    The patient denies signs and symptoms of orthostatic hypotension. Denies chest pain.         PMHX:     Patient Active Problem List   Diagnosis     Actinic keratosis     Cardiovascular disease     Essential hypertension, benign     CT Lung Pulmonary Nodule Multiple     Diabetes mellitus, type 2 (H)     Diverticulosis of large intestine     Hyperlipidemia     Peripheral vascular disease (H)     Internal hemorrhoids     Local infection of skin and subcutaneous tissue     Disorder of bursae and tendons in shoulder region     Synovial cyst     Trigger finger, acquired     Osteoarthritis of glenohumeral joint     Syncope     Abnormal cardiovascular stress test     Chest pain     Coronary atherosclerosis       Current Outpatient Medications   Medication Sig Dispense Refill     aspirin 81 MG tablet Take 1 tablet by mouth daily       Multiple Vitamin (MULTIVITAMIN) per tablet Take 1 tablet by mouth daily.       nitroGLYcerin (NITROSTAT) 0.4 MG sublingual tablet Place 1 tablet (0.4 mg) under the tongue every 5 minutes as needed for chest pain Repeat at 5 min intervals x 2 if needed 30 tablet 0     ticagrelor (BRILINTA) 90 MG tablet Take 1 tablet (90 mg) by mouth 2 times daily 60 tablet 2     zinc 50 MG TABS Take 50 mg by mouth daily.       acetaminophen (TYLENOL) 500 MG tablet Take 500-1,000 mg by mouth every 6 hours as needed       atorvastatin (LIPITOR) 40 MG tablet Take 1  tablet (40 mg) by mouth daily 90 tablet 1     lisinopril-hydrochlorothiazide (PRINZIDE/ZESTORETIC) 20-12.5 MG tablet Take 1 tablet by mouth daily 90 tablet 1     metoprolol tartrate (LOPRESSOR) 25 MG tablet Take 0.5 tablets (12.5 mg) by mouth 2 times daily 90 tablet 1       Social History     Socioeconomic History     Marital status:      Spouse name: Not on file     Number of children: Not on file     Years of education: Not on file     Highest education level: Not on file   Occupational History     Not on file   Social Needs     Financial resource strain: Not on file     Food insecurity:     Worry: Not on file     Inability: Not on file     Transportation needs:     Medical: Not on file     Non-medical: Not on file   Tobacco Use     Smoking status: Former Smoker     Smokeless tobacco: Never Used   Substance and Sexual Activity     Alcohol use: Yes     Comment: occasionally.     Drug use: No     Sexual activity: Not on file   Lifestyle     Physical activity:     Days per week: Not on file     Minutes per session: Not on file     Stress: Not on file   Relationships     Social connections:     Talks on phone: Not on file     Gets together: Not on file     Attends Islam service: Not on file     Active member of club or organization: Not on file     Attends meetings of clubs or organizations: Not on file     Relationship status: Not on file     Intimate partner violence:     Fear of current or ex partner: Not on file     Emotionally abused: Not on file     Physically abused: Not on file     Forced sexual activity: Not on file   Other Topics Concern     Parent/sibling w/ CABG, MI or angioplasty before 65F 55M? 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        Had gotten into typing to meet girls.        Worked for [employer] after leaving the [employer].       Allergies   Allergen Reactions     Pletal [Cilostazol]        No results found for this or any  "previous visit (from the past 24 hour(s)).         Review of Systems:     Review of Systems   All other systems reviewed and are negative.           Physical Exam:     Vitals:    10/23/19 1108   BP: (!) 145/62   Pulse: 74   Resp: 20   Temp: 98.2  F (36.8  C)   TempSrc: Oral   SpO2: 99%   Weight: 74.8 kg (165 lb)   Height: 1.66 m (5' 5.35\")     Body mass index is 27.16 kg/m .    Physical Exam  Vitals signs and nursing note reviewed.   Constitutional:       General: He is not in acute distress.     Appearance: Normal appearance. He is not ill-appearing, toxic-appearing or diaphoretic.   Pulmonary:      Effort: No respiratory distress.   Neurological:      Mental Status: He is alert and oriented to person, place, and time.           "

## 2019-10-23 NOTE — NURSING NOTE
"Chief Complaint   Patient presents with     Refill Request     Brilinta for stents in the heart     Medication Reconciliation     completed.        BP (!) 145/62   Pulse 74   Temp 98.2  F (36.8  C) (Oral)   Resp 20   Ht 1.66 m (5' 5.35\")   Wt 74.8 kg (165 lb)   SpO2 99%   BMI 27.16 kg/m      "

## 2019-10-25 ENCOUNTER — OFFICE VISIT - HEALTHEAST (OUTPATIENT)
Dept: VASCULAR SURGERY | Facility: CLINIC | Age: 84
End: 2019-10-25

## 2019-10-25 DIAGNOSIS — I73.9 PAD (PERIPHERAL ARTERY DISEASE) (H): ICD-10-CM

## 2019-10-25 ASSESSMENT — MIFFLIN-ST. JEOR: SCORE: 1400.67

## 2019-10-30 ENCOUNTER — RECORDS - HEALTHEAST (OUTPATIENT)
Dept: ADMINISTRATIVE | Facility: OTHER | Age: 84
End: 2019-10-30

## 2019-11-06 ENCOUNTER — OFFICE VISIT (OUTPATIENT)
Dept: FAMILY MEDICINE | Facility: CLINIC | Age: 84
End: 2019-11-06
Payer: COMMERCIAL

## 2019-11-06 VITALS
WEIGHT: 160 LBS | SYSTOLIC BLOOD PRESSURE: 126 MMHG | HEIGHT: 65 IN | DIASTOLIC BLOOD PRESSURE: 70 MMHG | TEMPERATURE: 97.3 F | BODY MASS INDEX: 26.66 KG/M2 | OXYGEN SATURATION: 98 % | HEART RATE: 72 BPM | RESPIRATION RATE: 16 BRPM

## 2019-11-06 DIAGNOSIS — M75.41 IMPINGEMENT SYNDROME OF SHOULDER REGION, RIGHT: ICD-10-CM

## 2019-11-06 DIAGNOSIS — M75.42 IMPINGEMENT SYNDROME OF SHOULDER REGION, LEFT: ICD-10-CM

## 2019-11-06 DIAGNOSIS — S16.1XXA STRAIN OF NECK MUSCLE, INITIAL ENCOUNTER: Primary | ICD-10-CM

## 2019-11-06 RX ADMIN — TRIAMCINOLONE ACETONIDE 40 MG: 40 INJECTION, SUSPENSION INTRA-ARTICULAR; INTRAMUSCULAR at 11:33

## 2019-11-06 ASSESSMENT — MIFFLIN-ST. JEOR: SCORE: 1343.25

## 2019-11-06 NOTE — PATIENT INSTRUCTIONS
Referral for :     Physical Therapy    LOCATION/PLACE/Provider :    [facility] ([facility] [facility])   DATE & TIME :   Referral faxed, location will contact patient  PHONE :     [phone]  FAX :    [phone]  Appointment made by clinic staff/:    Missy

## 2019-11-06 NOTE — PROGRESS NOTES
Assessment and Plan     1. Strain of neck muscle, initial encounter  Discussed the natural history of the disease. Start physical therapy. Answered all of their questions. Return to clinic in 1-2 months if not improving. Had significant side effects from the narcotics he got in the ED and would consider avoiding these in the future. Not asking for them today anyway.  - PHYSICAL THERAPY REFERRAL    2. Impingement syndrome of shoulder region, left  Discussed the natural history of the disease. Offered injection and he accepted. Start physical therapy. Answered all of their questions. Return to clinic in 1-2 months if not improving.  - PHYSICAL THERAPY REFERRAL  - DRAIN/INJECT LARGE JOINT/BURSA  - triamcinolone (KENALOG-40) injection 40 mg    3. Impingement syndrome of shoulder region, right  As above.  - PHYSICAL THERAPY REFERRAL  - DRAIN/INJECT LARGE JOINT/BURSA  - triamcinolone (KENALOG-40) injection 40 mg    Options for treatment and follow-up care were reviewed with the patient and/or guardian. Zack Johns and/or guardian engaged in the decision making process and verbalized understanding of the options discussed and agreed with the final plan. I answered all of their questions.    Haim Galaviz III, MD, FAAFP  [facility]  11/07/19 10:51 AM           HPI:       Zack Johns is a 85 year old  male  Patient presents with:  Pain: Neck and bilateral shoulder pain, started a coupel weeks ago  Medication Reconciliation: Completed    Complains of neck pain for 1-2 weeks. Slowing getting better. Denies numbness, tingling, or weakness. Denies trauma. Wonders if his shoulder pain is the cause. Went to the ED a couple of times for it.    Complains of bilateral shoulder pain for several years. No trauma. Has done physical therapy before which was helpful. Also had done steroid injections with improvement. Interested in doing this again. Pain increases with above the head movements and  improves with rest.    Interested in physical therapy for these conditions.         PMHX:     Patient Active Problem List   Diagnosis     Actinic keratosis     Cardiovascular disease     Essential hypertension, benign     CT Lung Pulmonary Nodule Multiple     Diabetes mellitus, type 2 (H)     Diverticulosis of large intestine     Hyperlipidemia     Peripheral vascular disease (H)     Internal hemorrhoids     Local infection of skin and subcutaneous tissue     Disorder of bursae and tendons in shoulder region     Synovial cyst     Trigger finger, acquired     Osteoarthritis of glenohumeral joint     Syncope     Abnormal cardiovascular stress test     Chest pain     Coronary atherosclerosis       Current Outpatient Medications   Medication Sig Dispense Refill     acetaminophen (TYLENOL) 500 MG tablet Take 500-1,000 mg by mouth every 6 hours as needed       aspirin 81 MG tablet Take 1 tablet by mouth daily       atorvastatin (LIPITOR) 40 MG tablet Take 1 tablet (40 mg) by mouth daily 90 tablet 1     lisinopril-hydrochlorothiazide (PRINZIDE/ZESTORETIC) 20-12.5 MG tablet Take 1 tablet by mouth daily 90 tablet 1     metoprolol tartrate (LOPRESSOR) 25 MG tablet Take 0.5 tablets (12.5 mg) by mouth 2 times daily 90 tablet 1     Multiple Vitamin (MULTIVITAMIN) per tablet Take 1 tablet by mouth daily.       nitroGLYcerin (NITROSTAT) 0.4 MG sublingual tablet Place 1 tablet (0.4 mg) under the tongue every 5 minutes as needed for chest pain Repeat at 5 min intervals x 2 if needed 30 tablet 0     ticagrelor (BRILINTA) 90 MG tablet Take 1 tablet (90 mg) by mouth 2 times daily 60 tablet 2     zinc 50 MG TABS Take 50 mg by mouth daily.         Social History     Socioeconomic History     Marital status:      Spouse name: Not on file     Number of children: Not on file     Years of education: Not on file     Highest education level: Not on file   Occupational History     Not on file   Social Needs     Financial resource strain:  "Not on file     Food insecurity:     Worry: Not on file     Inability: Not on file     Transportation needs:     Medical: Not on file     Non-medical: Not on file   Tobacco Use     Smoking status: Former Smoker     Smokeless tobacco: Never Used   Substance and Sexual Activity     Alcohol use: Yes     Comment: occasionally.     Drug use: No     Sexual activity: Not on file   Lifestyle     Physical activity:     Days per week: Not on file     Minutes per session: Not on file     Stress: Not on file   Relationships     Social connections:     Talks on phone: Not on file     Gets together: Not on file     Attends Taoism service: Not on file     Active member of club or organization: Not on file     Attends meetings of clubs or organizations: Not on file     Relationship status: Not on file     Intimate partner violence:     Fear of current or ex partner: Not on file     Emotionally abused: Not on file     Physically abused: Not on file     Forced sexual activity: Not on file   Other Topics Concern     Parent/sibling w/ CABG, MI or angioplasty before 65F 55M? Not Asked   Social History Narrative    Was drafted into the [employer] between [place] [employer]. Worked in admin.        Had gotten into typing to meet girls.        Worked for [employer] after leaving the [employer].       Allergies   Allergen Reactions     Pletal [Cilostazol]        No results found for this or any previous visit (from the past 24 hour(s)).         Review of Systems:     Review of Systems   Constitutional: Negative.    Respiratory: Negative.    Cardiovascular: Negative.    Neurological: Negative.    Psychiatric/Behavioral: Negative.             Physical Exam:     Vitals:    11/06/19 1041   BP: 126/70   Pulse: 72   Resp: 16   Temp: 97.3  F (36.3  C)   TempSrc: Oral   SpO2: 98%   Weight: 72.6 kg (160 lb)   Height: 1.66 m (5' 5.35\")     Body mass index is 26.34 kg/m .    Physical Exam  Vitals signs and nursing note reviewed.   Constitutional:       General: He is " not in acute distress.     Appearance: Normal appearance. He is not ill-appearing, toxic-appearing or diaphoretic.   Neck:      Musculoskeletal: Normal range of motion. Pain with movement and muscular tenderness present. No edema, erythema, neck rigidity, torticollis or spinous process tenderness.     Pulmonary:      Effort: No respiratory distress.   Musculoskeletal:      Right shoulder: He exhibits decreased range of motion and pain. He exhibits no tenderness, no bony tenderness, no swelling, no effusion, no crepitus, no deformity and normal strength.      Left shoulder: He exhibits decreased range of motion. He exhibits no tenderness, no bony tenderness, no swelling, no effusion, no crepitus, no deformity and normal strength.      Comments: Shoulders: positive neers and modified toure bilaterally, left worse than right   Neurological:      Mental Status: He is alert and oriented to person, place, and time.

## 2019-11-07 ENCOUNTER — AMBULATORY - HEALTHEAST (OUTPATIENT)
Dept: ADMINISTRATIVE | Facility: REHABILITATION | Age: 84
End: 2019-11-07

## 2019-11-07 DIAGNOSIS — S16.1XXA STRAIN OF NECK MUSCLE, INITIAL ENCOUNTER: ICD-10-CM

## 2019-11-07 DIAGNOSIS — M75.42 IMPINGEMENT SYNDROME OF SHOULDER REGION, LEFT: ICD-10-CM

## 2019-11-07 DIAGNOSIS — M75.41 IMPINGEMENT SYNDROME OF SHOULDER REGION, RIGHT: ICD-10-CM

## 2019-11-07 RX ORDER — TRIAMCINOLONE ACETONIDE 40 MG/ML
40 INJECTION, SUSPENSION INTRA-ARTICULAR; INTRAMUSCULAR ONCE
Status: COMPLETED | OUTPATIENT
Start: 2019-11-06 | End: 2019-11-07

## 2019-11-07 RX ORDER — TRIAMCINOLONE ACETONIDE 40 MG/ML
40 INJECTION, SUSPENSION INTRA-ARTICULAR; INTRAMUSCULAR ONCE
Status: COMPLETED | OUTPATIENT
Start: 2019-11-06 | End: 2019-11-06

## 2019-11-07 RX ADMIN — TRIAMCINOLONE ACETONIDE 40 MG: 40 INJECTION, SUSPENSION INTRA-ARTICULAR; INTRAMUSCULAR at 11:35

## 2019-11-07 ASSESSMENT — ENCOUNTER SYMPTOMS
NEUROLOGICAL NEGATIVE: 1
CONSTITUTIONAL NEGATIVE: 1
PSYCHIATRIC NEGATIVE: 1
RESPIRATORY NEGATIVE: 1
CARDIOVASCULAR NEGATIVE: 1

## 2019-11-07 NOTE — PROCEDURES
Subacromial INJECTION  The patient was prepped with alcohol. A 22G needle was inserted via the posterior lateral approach into the right subacromial space and 40 mg of Kenalog and 6 mL of 1% lido w/o epi was injected. The same procedure was repeated on the left side. The patient tolerated the procedure well and had improvement in their pain post procedure. EBL 0.    Haim Galaviz III, MD, FAAFP  [facility]  11/07/19 11:38 AM

## 2019-11-11 DIAGNOSIS — I25.10 CORONARY ARTERY DISEASE WITH HX OF MYOCARDIAL INFARCT W/O HX OF CABG: ICD-10-CM

## 2019-11-11 DIAGNOSIS — I25.10 ATHEROSCLEROSIS OF NATIVE CORONARY ARTERY OF NATIVE HEART WITHOUT ANGINA PECTORIS: ICD-10-CM

## 2019-11-11 DIAGNOSIS — I25.2 CORONARY ARTERY DISEASE WITH HX OF MYOCARDIAL INFARCT W/O HX OF CABG: ICD-10-CM

## 2019-11-11 DIAGNOSIS — I10 ESSENTIAL HYPERTENSION, BENIGN: ICD-10-CM

## 2019-11-11 DIAGNOSIS — I73.9 PERIPHERAL VASCULAR DISEASE (H): ICD-10-CM

## 2019-11-11 RX ORDER — METOPROLOL TARTRATE 25 MG/1
12.5 TABLET, FILM COATED ORAL 2 TIMES DAILY
Qty: 90 TABLET | Refills: 1 | Status: SHIPPED | OUTPATIENT
Start: 2019-11-11 | End: 2020-05-06

## 2019-11-11 RX ORDER — ATORVASTATIN CALCIUM 40 MG/1
40 TABLET, FILM COATED ORAL DAILY
Qty: 90 TABLET | Refills: 1 | Status: SHIPPED | OUTPATIENT
Start: 2019-11-11 | End: 2020-05-06

## 2019-11-11 RX ORDER — LISINOPRIL AND HYDROCHLOROTHIAZIDE 12.5; 2 MG/1; MG/1
1 TABLET ORAL DAILY
Qty: 90 TABLET | Refills: 1 | Status: SHIPPED | OUTPATIENT
Start: 2019-11-11 | End: 2020-05-06

## 2019-11-14 ENCOUNTER — TRANSFERRED RECORDS (OUTPATIENT)
Dept: HEALTH INFORMATION MANAGEMENT | Facility: CLINIC | Age: 84
End: 2019-11-14

## 2019-11-14 ENCOUNTER — OFFICE VISIT - HEALTHEAST (OUTPATIENT)
Dept: PHYSICAL THERAPY | Facility: REHABILITATION | Age: 84
End: 2019-11-14

## 2019-11-14 ENCOUNTER — MEDICAL CORRESPONDENCE (OUTPATIENT)
Dept: HEALTH INFORMATION MANAGEMENT | Facility: CLINIC | Age: 84
End: 2019-11-14

## 2019-11-14 DIAGNOSIS — M54.2 NECK PAIN: ICD-10-CM

## 2019-11-25 ENCOUNTER — COMMUNICATION - HEALTHEAST (OUTPATIENT)
Dept: CARDIOLOGY | Facility: CLINIC | Age: 84
End: 2019-11-25

## 2019-12-03 ENCOUNTER — OFFICE VISIT - HEALTHEAST (OUTPATIENT)
Dept: PHYSICAL THERAPY | Facility: REHABILITATION | Age: 84
End: 2019-12-03

## 2019-12-03 DIAGNOSIS — M54.2 NECK PAIN: ICD-10-CM

## 2019-12-17 DIAGNOSIS — I73.9 PERIPHERAL VASCULAR DISEASE (H): ICD-10-CM

## 2019-12-17 DIAGNOSIS — I25.10 ATHEROSCLEROSIS OF NATIVE CORONARY ARTERY OF NATIVE HEART WITHOUT ANGINA PECTORIS: ICD-10-CM

## 2019-12-20 ENCOUNTER — OFFICE VISIT - HEALTHEAST (OUTPATIENT)
Dept: CARDIOLOGY | Facility: CLINIC | Age: 84
End: 2019-12-20

## 2019-12-20 DIAGNOSIS — I25.82 CHRONIC TOTAL OCCLUSION OF NATIVE CORONARY ARTERY: ICD-10-CM

## 2019-12-20 DIAGNOSIS — I25.10 CHRONIC TOTAL OCCLUSION OF NATIVE CORONARY ARTERY: ICD-10-CM

## 2019-12-20 DIAGNOSIS — I10 ESSENTIAL HYPERTENSION: ICD-10-CM

## 2019-12-20 RX ORDER — TICAGRELOR 90 MG/1
TABLET ORAL
Qty: 60 TABLET | Refills: 0 | Status: SHIPPED | OUTPATIENT
Start: 2019-12-20 | End: 2019-12-31

## 2019-12-20 ASSESSMENT — MIFFLIN-ST. JEOR: SCORE: 1368.92

## 2019-12-30 DIAGNOSIS — I73.9 PERIPHERAL VASCULAR DISEASE (H): ICD-10-CM

## 2019-12-30 DIAGNOSIS — I25.10 ATHEROSCLEROSIS OF NATIVE CORONARY ARTERY OF NATIVE HEART WITHOUT ANGINA PECTORIS: ICD-10-CM

## 2020-01-13 RX ORDER — TICAGRELOR 90 MG/1
TABLET ORAL
Qty: 60 TABLET | Refills: 11 | Status: SHIPPED | OUTPATIENT
Start: 2020-01-13 | End: 2021-02-12

## 2020-01-13 NOTE — TELEPHONE ENCOUNTER
Cards recommended one more year of this. Refilled.    Haim Galaviz III, MD, FAAFP  [facility]  01/13/20 5:17 PM

## 2020-05-05 DIAGNOSIS — I10 ESSENTIAL HYPERTENSION, BENIGN: ICD-10-CM

## 2020-05-05 DIAGNOSIS — I25.2 CORONARY ARTERY DISEASE WITH HX OF MYOCARDIAL INFARCT W/O HX OF CABG: ICD-10-CM

## 2020-05-05 DIAGNOSIS — I25.10 CORONARY ARTERY DISEASE WITH HX OF MYOCARDIAL INFARCT W/O HX OF CABG: ICD-10-CM

## 2020-05-07 RX ORDER — ATORVASTATIN CALCIUM 40 MG/1
TABLET, FILM COATED ORAL
Qty: 90 TABLET | Refills: 3 | Status: SHIPPED | OUTPATIENT
Start: 2020-05-07 | End: 2021-04-19

## 2020-05-07 RX ORDER — LISINOPRIL AND HYDROCHLOROTHIAZIDE 12.5; 2 MG/1; MG/1
TABLET ORAL
Qty: 90 TABLET | Refills: 0 | Status: SHIPPED | OUTPATIENT
Start: 2020-05-07 | End: 2020-08-05

## 2020-05-07 RX ORDER — METOPROLOL TARTRATE 25 MG/1
TABLET, FILM COATED ORAL
Qty: 90 TABLET | Refills: 0 | Status: SHIPPED | OUTPATIENT
Start: 2020-05-07 | End: 2020-08-05

## 2020-05-07 NOTE — TELEPHONE ENCOUNTER
I refilled his lipitor for a year.  I refilled his BP agents for 3 months. He should come to clinic for a recheck before it runs out if COVID has abated enough.    Haim Galaviz III, MD, FAAFP  [facility]  05/07/20 3:25 PM

## 2020-05-12 NOTE — TELEPHONE ENCOUNTER
Spoke to patient, informed him that Dr Galaviz would like to see him before he run out of his blood pressure medication.

## 2020-06-26 ENCOUNTER — OFFICE VISIT - HEALTHEAST (OUTPATIENT)
Dept: VASCULAR SURGERY | Facility: CLINIC | Age: 85
End: 2020-06-26

## 2020-06-26 ENCOUNTER — RECORDS - HEALTHEAST (OUTPATIENT)
Dept: VASCULAR ULTRASOUND | Facility: CLINIC | Age: 85
End: 2020-06-26

## 2020-06-26 DIAGNOSIS — I73.9 PERIPHERAL VASCULAR DISEASE, UNSPECIFIED (H): ICD-10-CM

## 2020-06-26 DIAGNOSIS — R29.898 LOWER EXTREMITY WEAKNESS: ICD-10-CM

## 2020-06-26 DIAGNOSIS — I73.9 PAD (PERIPHERAL ARTERY DISEASE) (H): ICD-10-CM

## 2020-06-26 DIAGNOSIS — I73.9 CLAUDICATION (H): ICD-10-CM

## 2020-06-26 ASSESSMENT — MIFFLIN-ST. JEOR: SCORE: 1409.74

## 2020-07-13 LAB — RETINOPATHY: NORMAL

## 2020-08-04 DIAGNOSIS — I10 ESSENTIAL HYPERTENSION, BENIGN: ICD-10-CM

## 2020-08-04 DIAGNOSIS — I25.10 CORONARY ARTERY DISEASE WITH HX OF MYOCARDIAL INFARCT W/O HX OF CABG: ICD-10-CM

## 2020-08-04 DIAGNOSIS — I25.2 CORONARY ARTERY DISEASE WITH HX OF MYOCARDIAL INFARCT W/O HX OF CABG: ICD-10-CM

## 2020-08-05 RX ORDER — METOPROLOL TARTRATE 25 MG/1
TABLET, FILM COATED ORAL
Qty: 90 TABLET | Refills: 0 | Status: SHIPPED | OUTPATIENT
Start: 2020-08-05 | End: 2020-10-22

## 2020-08-05 RX ORDER — LISINOPRIL AND HYDROCHLOROTHIAZIDE 12.5; 2 MG/1; MG/1
TABLET ORAL
Qty: 90 TABLET | Refills: 0 | Status: SHIPPED | OUTPATIENT
Start: 2020-08-05 | End: 2020-10-22

## 2020-09-25 ENCOUNTER — RECORDS - HEALTHEAST (OUTPATIENT)
Dept: VASCULAR ULTRASOUND | Facility: CLINIC | Age: 85
End: 2020-09-25

## 2020-09-25 ENCOUNTER — OFFICE VISIT - HEALTHEAST (OUTPATIENT)
Dept: VASCULAR SURGERY | Facility: CLINIC | Age: 85
End: 2020-09-25

## 2020-09-25 DIAGNOSIS — I73.9 PAD (PERIPHERAL ARTERY DISEASE) (H): ICD-10-CM

## 2020-09-25 DIAGNOSIS — I73.9 PERIPHERAL VASCULAR DISEASE, UNSPECIFIED (H): ICD-10-CM

## 2020-09-25 DIAGNOSIS — R29.898 OTHER SYMPTOMS AND SIGNS INVOLVING THE MUSCULOSKELETAL SYSTEM: ICD-10-CM

## 2020-10-20 DIAGNOSIS — I10 ESSENTIAL HYPERTENSION, BENIGN: ICD-10-CM

## 2020-10-20 DIAGNOSIS — I25.2 CORONARY ARTERY DISEASE WITH HX OF MYOCARDIAL INFARCT W/O HX OF CABG: ICD-10-CM

## 2020-10-20 DIAGNOSIS — I25.10 CORONARY ARTERY DISEASE WITH HX OF MYOCARDIAL INFARCT W/O HX OF CABG: ICD-10-CM

## 2020-10-22 RX ORDER — LISINOPRIL AND HYDROCHLOROTHIAZIDE 12.5; 2 MG/1; MG/1
TABLET ORAL
Qty: 90 TABLET | Refills: 1 | Status: SHIPPED | OUTPATIENT
Start: 2020-10-22 | End: 2021-04-20

## 2020-10-22 RX ORDER — METOPROLOL TARTRATE 25 MG/1
TABLET, FILM COATED ORAL
Qty: 90 TABLET | Refills: 1 | Status: SHIPPED | OUTPATIENT
Start: 2020-10-22 | End: 2021-04-20

## 2021-01-15 ENCOUNTER — AMBULATORY - HEALTHEAST (OUTPATIENT)
Dept: OTHER | Facility: CLINIC | Age: 86
End: 2021-01-15

## 2021-01-15 ENCOUNTER — DOCUMENTATION ONLY (OUTPATIENT)
Dept: OTHER | Facility: CLINIC | Age: 86
End: 2021-01-15

## 2021-02-01 DIAGNOSIS — I73.9 PERIPHERAL VASCULAR DISEASE (H): ICD-10-CM

## 2021-02-01 DIAGNOSIS — I25.10 ATHEROSCLEROSIS OF NATIVE CORONARY ARTERY OF NATIVE HEART WITHOUT ANGINA PECTORIS: ICD-10-CM

## 2021-02-12 RX ORDER — TICAGRELOR 90 MG/1
TABLET ORAL
Qty: 60 TABLET | Refills: 1 | Status: SHIPPED | OUTPATIENT
Start: 2021-02-12 | End: 2021-09-20

## 2021-02-12 NOTE — TELEPHONE ENCOUNTER
Previous cardiology note from Dr. Cruz talked about only continuing this for one more year but I can't tell what the plan after would be. Please have the patient follow up with Dr. Cruz but I will send in a small refill.    Haim Galaviz III, MD, FAAFP  [facility]  02/12/21 9:10 AM

## 2021-02-12 NOTE — TELEPHONE ENCOUNTER
Spoke to Patient and informed him that Dr. Galaviz sent over a small refill. Instructed to make an appointment and follow-up with Dr. Guillen. Provided the number for Dr. Cruz office. Also, gave pt the phone number to schedule an appointment to get the COVID vaccine.

## 2021-03-03 ENCOUNTER — OFFICE VISIT - HEALTHEAST (OUTPATIENT)
Dept: CARDIOLOGY | Facility: CLINIC | Age: 86
End: 2021-03-03

## 2021-03-03 DIAGNOSIS — I25.82 CHRONIC TOTAL OCCLUSION OF NATIVE CORONARY ARTERY: ICD-10-CM

## 2021-03-03 DIAGNOSIS — I25.10 CORONARY ARTERY DISEASE INVOLVING NATIVE CORONARY ARTERY OF NATIVE HEART, ANGINA PRESENCE UNSPECIFIED: ICD-10-CM

## 2021-03-03 DIAGNOSIS — I10 ESSENTIAL HYPERTENSION: ICD-10-CM

## 2021-03-03 DIAGNOSIS — I73.9 PAD (PERIPHERAL ARTERY DISEASE) (H): ICD-10-CM

## 2021-03-03 DIAGNOSIS — I25.10 CHRONIC TOTAL OCCLUSION OF NATIVE CORONARY ARTERY: ICD-10-CM

## 2021-03-03 ASSESSMENT — MIFFLIN-ST. JEOR: SCORE: 1377.99

## 2021-03-05 ENCOUNTER — IMMUNIZATION (OUTPATIENT)
Dept: NURSING | Facility: CLINIC | Age: 86
End: 2021-03-05
Payer: COMMERCIAL

## 2021-03-05 PROCEDURE — 0001A PR COVID VAC PFIZER DIL RECON 30 MCG/0.3 ML IM: CPT

## 2021-03-05 PROCEDURE — 91300 PR COVID VAC PFIZER DIL RECON 30 MCG/0.3 ML IM: CPT

## 2021-03-26 ENCOUNTER — IMMUNIZATION (OUTPATIENT)
Dept: NURSING | Facility: CLINIC | Age: 86
End: 2021-03-26
Attending: FAMILY MEDICINE
Payer: COMMERCIAL

## 2021-03-26 PROCEDURE — 91300 PR COVID VAC PFIZER DIL RECON 30 MCG/0.3 ML IM: CPT

## 2021-03-26 PROCEDURE — 0002A PR COVID VAC PFIZER DIL RECON 30 MCG/0.3 ML IM: CPT

## 2021-04-18 DIAGNOSIS — I25.2 CORONARY ARTERY DISEASE WITH HX OF MYOCARDIAL INFARCT W/O HX OF CABG: ICD-10-CM

## 2021-04-18 DIAGNOSIS — I25.10 CORONARY ARTERY DISEASE WITH HX OF MYOCARDIAL INFARCT W/O HX OF CABG: ICD-10-CM

## 2021-04-19 DIAGNOSIS — I25.2 CORONARY ARTERY DISEASE WITH HX OF MYOCARDIAL INFARCT W/O HX OF CABG: ICD-10-CM

## 2021-04-19 DIAGNOSIS — I25.10 CORONARY ARTERY DISEASE WITH HX OF MYOCARDIAL INFARCT W/O HX OF CABG: ICD-10-CM

## 2021-04-19 DIAGNOSIS — I10 ESSENTIAL HYPERTENSION, BENIGN: ICD-10-CM

## 2021-04-20 RX ORDER — LISINOPRIL AND HYDROCHLOROTHIAZIDE 12.5; 2 MG/1; MG/1
TABLET ORAL
Qty: 90 TABLET | Refills: 3 | Status: SHIPPED | OUTPATIENT
Start: 2021-04-20 | End: 2022-04-15

## 2021-04-20 RX ORDER — ATORVASTATIN CALCIUM 40 MG/1
TABLET, FILM COATED ORAL
Qty: 90 TABLET | Refills: 3 | Status: SHIPPED | OUTPATIENT
Start: 2021-04-20 | End: 2022-04-15

## 2021-04-20 RX ORDER — METOPROLOL TARTRATE 25 MG/1
TABLET, FILM COATED ORAL
Qty: 90 TABLET | Refills: 3 | Status: SHIPPED | OUTPATIENT
Start: 2021-04-20 | End: 2022-04-15

## 2021-05-24 ENCOUNTER — RECORDS - HEALTHEAST (OUTPATIENT)
Dept: ADMINISTRATIVE | Facility: CLINIC | Age: 86
End: 2021-05-24

## 2021-05-26 ENCOUNTER — RECORDS - HEALTHEAST (OUTPATIENT)
Dept: ADMINISTRATIVE | Facility: CLINIC | Age: 86
End: 2021-05-26

## 2021-05-27 ENCOUNTER — RECORDS - HEALTHEAST (OUTPATIENT)
Dept: ADMINISTRATIVE | Facility: CLINIC | Age: 86
End: 2021-05-27

## 2021-05-28 ENCOUNTER — RECORDS - HEALTHEAST (OUTPATIENT)
Dept: ADMINISTRATIVE | Facility: CLINIC | Age: 86
End: 2021-05-28

## 2021-05-28 NOTE — PATIENT INSTRUCTIONS - HE
Please follow up with your cardiologist in the near future regarding your shortness of breath. Dr. Garcia [phone]

## 2021-05-28 NOTE — PROGRESS NOTES
6m f/u. NO STUDIES NEEDED. 10/3/18 Bilateral Femoral Iliac endarterectomy. ASA and statin. Currently doing conservative treatment. Patient complains of weakness in legs and feet in the morning when waking up. He has exercise his feet a little bit every morning before actually getting up. he states his primary believes it is arthritis related. No pain. He states that he gets pain with walking at first but after a few steps pain goes away and he is fine.   
Mr. Johns is a very pleasant 84-year-old gentleman who I have seen previously for bilateral lower extremity claudication.    He has bilateral superficial femoral artery disease.  Once previously, under my care he has undergone attempted recanalization of the right superficial femoral artery.  I was unable to recanalize the right superficial femoral artery.  Surgery was performed in October 2018.    He tells me that he is able to walk without any difficulty.  This is certainly an improvement from before.  Complaint, however, is that in early morning.  He reports that his legs feel cold in the morning.  He does not wake up with rest pain or nocturnal pain.  He does not consider any of the above to have lifestyle limiting implications.    I have reassured him that with mild claudication risk of limb loss is low.  As far as the discomfort in the morning is concerned that this is due to poor perfusion.  I explained the role of gravity and enhancing perfusion of the day.  I have advised him to spend some time sitting up in his bed and letting his feet warm up before he starts to ambulate.  I do not think surgical intervention is advised for the symptoms.    I will see him back in 6 months for a clinic visit and to go over his symptomatology.  He also tells me that he has been having increasing shortness of breath.  He has had previous coronary stents and has known coronary artery disease.  I would be requesting that he visit with his cardiologist sometime soon.  
present

## 2021-05-30 ENCOUNTER — RECORDS - HEALTHEAST (OUTPATIENT)
Dept: ADMINISTRATIVE | Facility: CLINIC | Age: 86
End: 2021-05-30

## 2021-05-30 VITALS — WEIGHT: 165 LBS | BODY MASS INDEX: 27.49 KG/M2 | HEIGHT: 65 IN

## 2021-05-30 VITALS — BODY MASS INDEX: 26.21 KG/M2 | WEIGHT: 167 LBS | HEIGHT: 67 IN

## 2021-05-30 VITALS — BODY MASS INDEX: 28.32 KG/M2 | HEIGHT: 65 IN | WEIGHT: 170 LBS

## 2021-05-31 ENCOUNTER — RECORDS - HEALTHEAST (OUTPATIENT)
Dept: ADMINISTRATIVE | Facility: CLINIC | Age: 86
End: 2021-05-31

## 2021-05-31 VITALS — BODY MASS INDEX: 29.13 KG/M2 | WEIGHT: 172.3 LBS

## 2021-05-31 VITALS — BODY MASS INDEX: 29.02 KG/M2 | HEIGHT: 64 IN | WEIGHT: 170 LBS

## 2021-06-01 VITALS — BODY MASS INDEX: 26.37 KG/M2 | WEIGHT: 168 LBS | HEIGHT: 67 IN

## 2021-06-01 VITALS — BODY MASS INDEX: 26.84 KG/M2 | HEIGHT: 67 IN | WEIGHT: 171 LBS

## 2021-06-01 VITALS — WEIGHT: 170 LBS | HEIGHT: 67 IN | BODY MASS INDEX: 26.68 KG/M2

## 2021-06-02 VITALS — BODY MASS INDEX: 26.31 KG/M2 | WEIGHT: 168 LBS

## 2021-06-02 VITALS — WEIGHT: 167 LBS | WEIGHT: 167 LBS | HEIGHT: 67 IN | BODY MASS INDEX: 26.21 KG/M2 | BODY MASS INDEX: 26.16 KG/M2

## 2021-06-02 VITALS — WEIGHT: 168 LBS | BODY MASS INDEX: 26.31 KG/M2

## 2021-06-02 VITALS — BODY MASS INDEX: 26.16 KG/M2 | WEIGHT: 167 LBS

## 2021-06-02 VITALS — WEIGHT: 166 LBS | BODY MASS INDEX: 26 KG/M2

## 2021-06-02 VITALS — BODY MASS INDEX: 26 KG/M2 | WEIGHT: 166 LBS

## 2021-06-02 VITALS — WEIGHT: 167 LBS | BODY MASS INDEX: 26.16 KG/M2

## 2021-06-02 VITALS — WEIGHT: 171.1 LBS | BODY MASS INDEX: 26.85 KG/M2 | HEIGHT: 67 IN

## 2021-06-02 VITALS — BODY MASS INDEX: 26.94 KG/M2 | WEIGHT: 172 LBS

## 2021-06-02 NOTE — PROGRESS NOTES
Mr. Johns underwent bilateral femoral endarterectomies and common iliac artery stenting for bilateral lower extremity claudication.  This was done in October 2018.  He continues to do well and is able to walk without much difficulty.  He does have arthritis of the lower extremities can be troubling in the morning when he is stiffness otherwise he is symptoms are much improved and continued to improve with time.    I have reassured him of this.  We will see him back in 6 months with ultrasonography of bilateral iliac artery stents.

## 2021-06-02 NOTE — PROGRESS NOTES
6m f/u. NO studies. 10/3/18 Bilateral femoral iliac endarterectomy. Asa, statin. Currently doing conservative treatment.

## 2021-06-03 VITALS
BODY MASS INDEX: 26.37 KG/M2 | SYSTOLIC BLOOD PRESSURE: 122 MMHG | RESPIRATION RATE: 16 BRPM | WEIGHT: 168 LBS | TEMPERATURE: 98.3 F | DIASTOLIC BLOOD PRESSURE: 58 MMHG | HEART RATE: 80 BPM | HEIGHT: 67 IN

## 2021-06-03 NOTE — PROGRESS NOTES
"[facility] Rehabilitation Daily Progress     Patient Name: Zack Johns  Date: 12/3/2019  Visit #:   PTA visit #:  -    Date of evaluation: 2019  Referral Diagnosis: Strain of neck muscle, initial encounter  Referring provider: Haim Galaviz III, MD  Visit Diagnosis:     ICD-10-CM    1. Neck pain M54.2          Assessment:     Increased cervical ROM and decreased c/o neck pain.  Bilateral upper trap muscle spasm softens after manual therapy.  HEP/POC compliance is  good .  Patient demonstrates understanding/independence with home program.  Response to Intervention exercises and manual therapy.  Patient is benefitting from skilled physical therapy and is making steady progress toward functional goals.    Patient desires to continue with home exercises and will call if further visits needed, will hold chart open for a month and discharge.       Goal Status:  Pt. will demonstrate/verbalize independence in self-management of condition in : 6 weeks;Progressing toward  Pt. will be independent with home exercise program in : 6 weeks;Progressing toward  Patient Turn Head: for driving;for watching traffic;for conversation;with partial ROM;with less pain;in 6 weeks;Progressing toward  Patient will reach / maintain arm movement: forward;overhead;for home chores;for dressing;with less pain;in 6 weeks;Progressing toward    No data recorded    Plan / Patient Education:     Continue with initial plan of care.patient will do trial of home exercises for 2 weeks and call to schedule visits as needed, will hold chart open for a month.    Subjective:     Pain Ratin  \"I can turn my head now\"      Objective:     Cervical ROM:  Neck in -18o extension           Date: 11-14-19 12-3-19      *Indicate scale AROM AROM AROM   Cervical Flexion 30o 35o      Cervical Extension 10o pain 10o min, pain        Right Left Right Left Right Left   Cervical Sidebending 10o 21o  20o 28o        Cervical Rotation 30o pain 40o pain " 35o   46o       Cervical Protraction         Cervical Retraction               Treatment Today     Exercises:  Exercise #1: shoulders rolls, cervical flexion, lateral flexion, rotation x 10 repetitions each with multiple verbal cues to improve posture.  Comment #1: pendulum, provided written instructions.      TREATMENT MINUTES COMMENTS   Evaluation     Self-care/ Home management     Manual therapy 15 Position seated:  Myofascial release (layers 1-3):bilateral: teres major, pec minor, upper trap. Bilateral: Cervical paraspinals, scalenes, SCM.  -left side-glides of C3-C6 grade 3     Vertical thoracic release, pectoral stretch.    Bilateral segundo scapular release.     Neuromuscular Re-education     Therapeutic Activity     Therapeutic Exercises 15    Gait training     Modality__________________                Total 30    Blank areas are intentional and mean the treatment did not include these items.       Idania Duncan PT  12/3/2019

## 2021-06-03 NOTE — TELEPHONE ENCOUNTER
----- Message from Jayjay Cruz MD sent at 11/22/2019  9:48 AM CST -----      ----- Message -----  From: Ml Zhao  Sent: 10/30/2019   8:56 AM CST  To: Jayjay Cruz MD

## 2021-06-03 NOTE — PATIENT INSTRUCTIONS - HE
-KEEP THE FACE LOOKING FORWARD AND WITH YOUR ARMS ACROSS THE CHEST, TURN YOUR BODY TO ONE SIDE FOR  5 SECONDS  AND REPEAT TO THE OTHER SIDE.

## 2021-06-03 NOTE — PROGRESS NOTES
[facility] Certification Request    November 14, 2019      Patient: Zack Johns  MR Number: [id number]  YOB: 1934  Date of Visit: 11/14/2019      Dear Haim Harry III, MD:    Thank you for this referral.   We are seeing Zack Johns for Physical Therapy of cervical strain.    Medicare and/or Medicaid requires physician review and approval of the treatment plan. Please review the plan of care and verify that you agree with the therapy plan of care by co-signing this note.      Plan of Care  Authorization / Certification Start Date: 11/14/19  Authorization / Certification End Date: 01/13/20  Authorization / Certification Number of Visits: 6-8  Communication with: Referral Source  Patient Related Instruction: Nature of Condition;Treatment plan and rationale;Self Care instruction;Basis of treatment;Posture;Precautions;Next steps;Expected outcome  Times per Week: 1  Number of Weeks: every other week  Number of Visits: 6-8  Discharge Planning: patient will be discharge to self care when goals met.  Therapeutic Exercise: ROM;Stretching;Strengthening  Manual Therapy: myofascial release      Goals:  Pt. will demonstrate/verbalize independence in self-management of condition in : 6 weeks  Pt. will be independent with home exercise program in : 6 weeks  Patient Turn Head: for driving;for watching traffic;for conversation;with partial ROM;with less pain;in 6 weeks  Patient will reach / maintain arm movement: forward;overhead;for home chores;for dressing;with less pain;in 6 weeks        If you have any questions or concerns, please don't hesitate to call.    Sincerely,      Idania Duncan, PT, CLSRUTHI-YA        Physician recommendation:     ___ Follow therapist's recommendation        ___ Modify therapy      *Physician co-signature indicates they certify the need for these services furnished within this plan and while under their care.      [facility]   Cervical Thoracic  "Initial Evaluation  Preferred name :Anthony  Patient Name: Zack Johns  Date of evaluation: 11/14/2019  Referral Diagnosis: Strain of neck muscle, initial encounter  Referring provider: Haim Galaviz III, MD  Visit Diagnosis:     ICD-10-CM    1. Neck pain M54.2        Assessment:     Zack Johns is a 85 y.o. male who presents to therapy today with chief complaints of neck recently, shoulders for years. Onset date of sx was 2-3 months ago got worse.  Pt reported h/o was hospitalized due to neck pain 10 days ago and had a shot in both shoulders at his MD follow up, it helped for 1-2 days and it came back as before.  Pain symptoms are 4-7/10.  Functional impairments include turn head and move shoulders, had to stop playing golf.  Pt demo's signs and sx consistent with cervical strain, bilateral upper trap increased muscle spasm, poor rounded posture, limited cervical and bilateral shoulders ROM.  Patient desires to perform the exercises at home and return in 2 weeks.        Skilled PT is required to improved posture, to increase cervical and shoulders ROM.  Pt. is appropriate for skilled PT intervention as outlined in the Plan of Care (POC).  Pt. is a good candidate for skilled PT services to improve pain levels and function.    Insurance:  medicare  1/8  Goals:  Pt. will demonstrate/verbalize independence in self-management of condition in : 6 weeks  Pt. will be independent with home exercise program in : 6 weeks  Patient Turn Head: for driving;for watching traffic;for conversation;with partial ROM;with less pain;in 6 weeks  Patient will reach / maintain arm movement: forward;overhead;for home chores;for dressing;with less pain;in 6 weeks    Patient's goal:\"get rid of pain\".  Patient's expectations/goals are not realistic.    Barriers to Learning or Achieving Goals:  Chronicity of the problem.  Financial situation.       Plan / Patient Instructions:        Plan of Care:   Authorization / Certification Start " Date: 19  Authorization / Certification End Date: 20  Authorization / Certification Number of Visits: 6-8  Communication with: Referral Source  Patient Related Instruction: Nature of Condition;Treatment plan and rationale;Self Care instruction;Basis of treatment;Posture;Precautions;Next steps;Expected outcome  Times per Week: 1  Number of Weeks: every other week  Number of Visits: 6-8  Discharge Planning: patient will be discharge to self care when goals met.  Therapeutic Exercise: ROM;Stretching;Strengthening  Manual Therapy: myofascial release      Plan for next visit: manual therapy, cervical and shoulders exercises progression as able, upgrade HEP.     Subjective:         Social information:   Living Situation:single family home   Occupation:retired   Work Status:NA   Equipment Available: None    History of Present Illness:    Zack is a 85 y.o. male who presents to therapy today with complaints of neck and shoulders pain. Date of onset/duration of symptoms is 1-2 months ago got worse. Onset was gradual. Symptoms are constant and not improving. He reports  an episodic  history of similar symptoms. He describes their previous level of function as not limited    Pain Ratin  Pain rating at best: 3  Pain rating at worst: 6  Pain description: aching and pain    Functional limitations are described as occurring with:   lifting  reaching at shoulder height, overhead and behind back  yard work/house work, groom/dress.    Patient reports benefit from:  rest           Objective:      Note: Items left blank indicates the item was not performed or not indicated at the time of the evaluation.    Patient Outcome Measures :    Neck Disability Score in %: 30     Scores range from 0-100%, where a score of 0% represents minimal pain and maximal function. The minmal clinically important difference is a score reduction of 10%.    Cervical Thoracic Examination  1. Neck pain       Involved side: Bilateral  Posture  Observation:      General sitting posture is  fair.  General standing posture is fair.  Cervical:  Moderate forward head  Shoulder/Thoracic complex: Moderate bilateral scapular winging  Lumbopelvic complex: Moderately decreased lumbar lordosis    Cervical ROM:  Neck in -18o extension  Date: 11-14-19     *Indicate scale AROM AROM AROM   Cervical Flexion 30o     Cervical Extension 10o apin      Right Left Right Left Right Left   Cervical Sidebending 10o 21o       Cervical Rotation 30o pain 40o pain       Cervical Protraction      Cervical Retraction      Thoracic Flexion      Thoracic Extension      Thoracic Sidebending         Thoracic Rotation           Shoulder/Elbow ROM  Date:      Shoulder and Elbow ROM ( )   AROM in degrees AROM in degrees AROM in degrees    Right Left Right Left Right Left   Shoulder Flexion (0-180 ) 50o 58o       Shoulder Abduction (0-180 ) 75o 50o       Shoulder Extension (0-60 )         Shoulder ER (0-90 )         Shoulder IR (0-70 )         Shoulder IR/EXT         Elbow Flexion (150 )         Elbow Extension (0 )          PROM in degrees PROM in degrees PROM in degrees    Right Left Right Left Right Left   Shoulder Flexion (0-180 )         Shoulder Abduction (0-180 )         Shoulder Extension (0-60 )         Shoulder ER (0-90 )         Shoulder IR (0-70 )         Elbow Flexion (150 )         Elbow Extension (0 )               Strength   : cervical 4-/5    Left hand  20 kg, right  18 kg  Sensation   Intact.        Flexibility:    Palpation:    Passive Mobility-Joint Integrity: WNL.    Cervical Special Tests     Cervical Special Tests Right Left UE Nerve Mobility Right Left   Cervical compression   Median nerve     Cervical distraction   Ulnar nerve     Spurling s test   Radial nerve     Shoulder abduction sign + + Thoracic outlet     Deep neck flexor endurance test   Jamilah     Upper cervical rotation   Adson s     Sharper-Arina   Cervical rotation lateral flexion     Alar ligament test   " Other:     Other:   Other:           Treatment Today     TREATMENT MINUTES COMMENTS   Evaluation 30 low   Self-care/ Home management     Manual therapy 15 MFR Bilateral cervical and segundo scapular  With tissue bending \"S\", \"C\"good release   Neuromuscular Re-education     Therapeutic Activity     Therapeutic Exercises 15 flow   Gait training     Modality__________________                Total 30    Blank areas are intentional and mean the treatment did not include these items.     PT Evaluation Code: (Please list factors)  Patient History/Comorbidities: chronic neck pain, poor posture.  Examination: neck pain, shoulders pain.  Clinical Presentation: stable.  Clinical Decision Making: low.    Patient History/  Comorbidities Examination  (body structures and functions, activity limitations, and/or participation restrictions) Clinical Presentation Clinical Decision Making (Complexity)   No documented Comorbidities or personal factors 1-2 Elements Stable and/or uncomplicated Low   1-2 documented comorbidities or personal factor 3 Elements Evolving clinical presentation with changing characteristics Moderate   3-4 documented comorbidities or personal factors 4 or more Unstable and unpredictable High              Idania Duncan PT  11/14/2019  2:00 PM                 "

## 2021-06-04 VITALS
SYSTOLIC BLOOD PRESSURE: 128 MMHG | HEIGHT: 67 IN | BODY MASS INDEX: 25.27 KG/M2 | DIASTOLIC BLOOD PRESSURE: 58 MMHG | RESPIRATION RATE: 18 BRPM | WEIGHT: 161 LBS | HEART RATE: 85 BPM

## 2021-06-04 VITALS
DIASTOLIC BLOOD PRESSURE: 50 MMHG | HEART RATE: 84 BPM | SYSTOLIC BLOOD PRESSURE: 136 MMHG | TEMPERATURE: 98.4 F | HEIGHT: 67 IN | RESPIRATION RATE: 20 BRPM | BODY MASS INDEX: 26.68 KG/M2 | WEIGHT: 170 LBS

## 2021-06-04 NOTE — PATIENT INSTRUCTIONS - HE
1. Continue Brilinta for 1 more year.  Lower cost alternatives are available, if you are interested.  2. Work your way up to walking 20 minutes on a daily basis.  3. Follow-up with your primary care provider, or here as needed.

## 2021-06-05 ENCOUNTER — RECORDS - HEALTHEAST (OUTPATIENT)
Dept: VASCULAR SURGERY | Facility: CLINIC | Age: 86
End: 2021-06-05

## 2021-06-05 VITALS
HEIGHT: 67 IN | HEART RATE: 88 BPM | DIASTOLIC BLOOD PRESSURE: 50 MMHG | RESPIRATION RATE: 16 BRPM | BODY MASS INDEX: 25.58 KG/M2 | SYSTOLIC BLOOD PRESSURE: 118 MMHG | WEIGHT: 163 LBS

## 2021-06-05 VITALS
TEMPERATURE: 98.2 F | SYSTOLIC BLOOD PRESSURE: 118 MMHG | DIASTOLIC BLOOD PRESSURE: 58 MMHG | RESPIRATION RATE: 15 BRPM | BODY MASS INDEX: 25.53 KG/M2 | WEIGHT: 163 LBS | HEART RATE: 60 BPM

## 2021-06-05 DIAGNOSIS — I73.9 PERIPHERAL VASCULAR DISEASE (H): ICD-10-CM

## 2021-06-07 NOTE — PROGRESS NOTES
[facility] Discharge Summary  Patient Name: Zack Johns  Date: 4/13/2020    Referral Diagnosis: Strain of neck muscle, initial encounter  Referring provider: Haim Galaviz III, MD  Visit Diagnosis:   1. Neck pain         Goals:not met.      Patient was seen for 02 visits from 11/14/2019 to 12/3/2019 with 0 missed appointments.  The patient attended therapy initially, but did not finish the therapy sessions prescribed.  Goals were not fully achieved. Explanation for goals not achieved: no return.    Patient received a home program.    Therapy will be discontinued at this time.  The patient will need a new referral to resume.    Thank you for your referral.  Idania Duncan PT  4/13/2020  10:51 AM

## 2021-06-09 ENCOUNTER — RECORDS - HEALTHEAST (OUTPATIENT)
Dept: ADMINISTRATIVE | Facility: CLINIC | Age: 86
End: 2021-06-09

## 2021-06-09 NOTE — PROGRESS NOTES
VASCULAR SURGERY CLINIC Progress Note   VASCULAR SURGEON: Deshawn Kumar MD    LOCATION:  [facility]    Zack Johns   Medical Record #:  [id number]  YOB: 1934  Age:  85 y.o.     Date of Service: 6/26/2020    PRIMARY CARE PROVIDER: Haim Galaviz III, MD    Reason for follow up visit: Bilateral lower extremity peripheral arterial disease.    IMPRESSION: 1.  Patent bilateral common iliac artery stents.  2.  Lower extremity complaints are a combination of arthritis, peripheral arterial disease and generalized decline due to aging process.    RECOMMENDATION/RISKS:    I do not feel that we are at that point where a revascularization would be beneficial.  His symptoms are multifactorial and therefore I believe continued medical management and physical therapy for strengthening and conditioning of the lower extremities will be helpful.  We will see him back in the vascular surgery clinic in 3 months with ankle-brachial indices without and then with exercise.    HPI:  Zack Johns is a 85 y.o. male who was seen in follow up today for bilateral lower extremity peripheral arterial disease.  In October 2018 he underwent bilateral common iliac artery stenting.  He tells me that over the past few months he has experienced weakness in his lower extremities when he wakes up.  Soon after he wakes up he feels weakness in his legs and he has to do some exercises including squats before he would attempt to walk.  Specifically he denies feeling of coldness, pain or numbness.  During the day he can walk any distance he wants without experiencing claudication.    REVIEW OF SYSTEMS:    A 12 point ROS was reviewed and is negative except for as noted in history of presenting illness.     PMH:    Past Medical History:   Diagnosis Date     Actinic keratitis      Arthritis     bilat shoulders     CAD (coronary artery disease)      Cardiac arrest (H) 09/04/89     Claudication (H)      Diverticulosis       HTN (hypertension)      Hyperlipidemia      MI, old      Multiple pulmonary nodules      Peripheral vascular disease, unspecified (H)      Syncope      Trigger finger, acquired      Type 2 diabetes mellitus (H) 11/6/2012          Past Surgical History:   Procedure Laterality Date     APPENDECTOMY       CARDIAC CATHETERIZATION  08/22/2018     of rca     CV CORONARY ANGIOGRAM N/A 8/6/2018    Procedure: Coronary Angiogram;  Surgeon: Jeane Garcia MD;  Location: [facility] Cath Lab;  Service:      CV CORONARY ANGIOGRAM N/A 8/22/2018    Procedure: Coronary Angiogram;  Surgeon: Jeane Garcia MD;  Location: [facility] Cath Lab;  Service:      CV LEFT HEART CATHETERIZATION WO LEFT VETRICULOGRAM Left 8/6/2018    Procedure: Left Heart Catheterization Without Left Ventriculogram;  Surgeon: Jeane Garcia MD;  Location: [facility] Cath Lab;  Service:      CV LEFT HEART CATHETERIZATION WO LEFT VETRICULOGRAM Left 8/22/2018    Procedure: Left Heart Catheterization Without Left Ventriculogram;  Surgeon: Jeane Garcia MD;  Location: [facility] Cath Lab;  Service:      FEMORAL ENDARTERECTOMY Bilateral 05/03/2017     KNEE ARTHROSCOPY       NOSE SURGERY         ALLERGIES:  Patient has no known allergies.    MEDS:    Current Outpatient Medications:      acetaminophen (TYLENOL) 500 MG tablet, Take 500-1,000 mg by mouth every 6 (six) hours as needed for pain or fever. , Disp: , Rfl:      ascorbic acid (VITAMIN C) 500 MG tablet, Take 500 mg by mouth daily., Disp: , Rfl:      aspirin 81 MG EC tablet, Take 81 mg by mouth daily., Disp: , Rfl:      aspirin-calcium carbonate 81 mg-300 mg calcium(777 mg) Tab, Take 1 tablet by mouth., Disp: , Rfl:      atorvastatin (LIPITOR) 40 MG tablet, Take 40 mg by mouth bedtime., Disp: , Rfl:      lisinopril-hydrochlorothiazide (PRINZIDE,ZESTORETIC) 20-12.5 mg per tablet, Take 1 tablet by mouth daily., Disp: , Rfl:      metoprolol (LOPRESSOR) 25 MG tablet, Take 12.5 mg by mouth 2 (two) times a  "day. , Disp: , Rfl:      multivitamin (ONE A DAY) per tablet, Take 1 tablet by mouth daily. , Disp: , Rfl:      naproxen (NAPROSYN) 500 MG tablet, TK 1 T PO BID WITH MEALS, Disp: , Rfl: 1     nitroglycerin (NITROSTAT) 0.4 MG SL tablet, Place 0.4 mg under the tongue every 5 (five) minutes as needed for chest pain., Disp: , Rfl:      ticagrelor (BRILINTA) 90 mg Tab, Take 1 tablet (90 mg total) by mouth 2 (two) times a day., Disp: 180 tablet, Rfl: 3     zinc 50 mg Tab, Take 50 mg by mouth daily. , Disp: , Rfl:     SOCIAL HABITS:    Social History     Tobacco Use   Smoking Status Former Smoker     Packs/day: 2.00     Years: 40.00     Pack years: 80.00     Types: Cigarettes     Last attempt to quit: 4/10/1984     Years since quittin.2   Smokeless Tobacco Never Used       Social History     Substance and Sexual Activity   Alcohol Use Yes     Alcohol/week: 6.0 standard drinks     Types: 6 Shots of liquor per week       Social History     Substance and Sexual Activity   Drug Use No       FAMILY HISTORY:    Family History   Problem Relation Age of Onset     No Medical Problems Mother      No Medical Problems Father        PE:  /50   Pulse 84   Temp 98.4  F (36.9  C)   Resp 20   Ht 5' 7\" (1.702 m)   Wt 170 lb (77.1 kg) Comment: pt states  BMI 26.63 kg/m    Wt Readings from Last 1 Encounters:   20 170 lb (77.1 kg)     Body mass index is 26.63 kg/m .    EXAM:  GENERAL: This is a well-developed 85 y.o. male who appears his stated age    CARDIAC:  Normal S1 and S2, no Murmur  CHEST/LUNG:  Clear lung fields bilaterally  MUSCULOSKELETAL: Grossly normal and both lower extremities are intact.  NEUROLOGIC: Focally intact, Alert and oriented x 3.   INTEGUMENT: No open lesions or ulcers  Pulse Exam:   Carotid: No Bruit    Radial: Left +2   Right  +2    Femoral: Left +2   Right  +2    DIAGNOSTIC STUDIES:     Images:  Us Iliac Artery Bilateral    Result Date: 2020  Bilateral Iliac Arteries with Stents " IIndication:  Surveillance of bilateral iliac stents   Angiogram Date:  BILATERAL ILIAC STENTS (10-3-18), BILATERAL FEMORAL ENDARTERECTOMIES (05-03-17).                                  Previous Study: 10/19/18 History: Previous Smoker, PAD, CAD, MI, Angioplasty and Vascular Surgery Technique: Duplex imaging is performed utilizing gray-scale, two dimensional images, and color-flow imaging. Doppler waveform analysis and spectral Doppler imagine is also performed. Velocities and Waveforms Distal Aorta 97 and waveform is Triphasic. Right Iliac Artery Location Velocities (cm/sec)  Waveforms Right ESTRELLA Proximal  209  T Right ESTRELLA Mid 200 T  Right ESTRLELA Distal 141  T Right Iliac Artery Stent ----------------------  ------------------- Proximal to Stent  97  T Proximal Stent  139  T Distal Stent  158  T Distal to Stent  192  T Waveforms: T=Triphasic, M=Monophasic, B=Biphasic Left Iliac Artery Location Velocities (cm/sec) Waveforms Left ESTRELLA Proximal  209  T Left ESTRELLA Mid  220  134 Left ESTRELLA Distal  134  T Left Iliac Artery Stent ---------------------- ------------------- Proximal to Stent  97  T Proximal Stent  168 T Distal Stent  209  T Distal to Stent  188  T Waveforms: T=Triphasic, M=Monophasic, B=Biphasic Comments: iliacs are difficult to visualize Impression: Patent bilateral common iliac artery stents. RUDDY KUMAR M.D., FACS, VI       I personally reviewed the images and my interpretation is as noted above.      Total time spent 30 minutes face to face with patient with more than 50% time spent in counseling and coordination of care.    Ruddy Kumar MD  VASCULAR SURGERY

## 2021-06-09 NOTE — H&P
[facility] Radiology History and Physical Note  Date/Time: 3/10/2017/7:54 AM    Procedure Requested: Bilateral lower extremity angiogram  Requesting Provider: Leo Carty MD    HPI: Zack Johns is a 82 y.o. male with history of HTN, CAD, MI, PVD. He was recently seen in clinic by Dr. Carty for claudication. Patient reports calf pain extending up into thigh while walking about 200 yards. He thinks the right may be slightly worse than left but that they are about evenly painful. Once he rests, the pain goes away. Patient reports claudication started about 2 years ago and has become progressively worse over the last year. ABIs obtained which show abnormal resting ABIs, with abnormal exercise response bilaterally, consistent with significant peripheral arterial disease. Patient presents today for bilateral lower extremity angiogram.     IMAGING:   EXAM: RESTING SEGMENTAL PRESSURES AND POSTEXERCISE ANKLE-BRACHIAL INDICES (ABIs)  3/2/2017 9:44 AM     INDICATION: Peripheral vascular disease. Lower extremity claudication.  COMPARISON: 12/11/2014.     FINDINGS:  Right:  PTA 0.61  DPA 0.61     Left:  PTA 0.77  DPA 0.77     Previous ABIs were 0.51 on the right and 0.92 on the left. Current segmental pressures demonstrate a diminished right high thigh index, 0.65, predicting significant inflow disease. On the left, there is a more gradual decline, suggesting multilevel   disease. Digital pressures predict adequate wound healing potential, 64 mmHg on the right and 72 mmHg on the left.     Distal waveforms are relatively dampened on the right compared to the left.     The patient walked on a treadmill for 5 minutes at 1.5 mph and 10% incline, complaining of bilateral leg pain at 2.5 minutes. The postexercise ABIs were markedly abnormal, 0.26 on the right and 0.34 on the left.      IMPRESSION:   1. Abnormal resting ABIs, with abnormal exercise response bilaterally, consistent with significant peripheral arterial disease.  These findings also correlate with symptoms of claudication.  2. Segmental pressures predict significant inflow disease on the right.    NPO Status: MN  Anticoagulation/Antiplatelets/Bleeding tendencies: Aspirin  Antibiotics: None    REVIEW OF SYMPTOMS: Denies recent fevers, chills, night sweats, abdominal pain, N/V/D.    PAST MEDICAL HISTORY:   Past Medical History:   Diagnosis Date     Arthritis     bilat shoulders     CAD (coronary artery disease)      Cardiac arrest 09/04/89     Claudication      HTN (hypertension)      Hyperlipidemia      MI, old      Peripheral vascular disease, unspecified        PAST SURGICAL HISTORY:   Past Surgical History:   Procedure Laterality Date     APPENDECTOMY       KNEE ARTHROSCOPY         ALLERGIES:  Pletal [cilostazol]    MEDICATIONS:  Current Outpatient Prescriptions   Medication Sig Dispense Refill     acetaminophen (TYLENOL) 500 MG tablet Take 500 mg by mouth every 6 (six) hours as needed for pain.       ascorbic acid (VITAMIN C) 500 MG tablet Take 500 mg by mouth daily.       aspirin 325 MG tablet Take 81 mg by mouth daily.        atorvastatin (LIPITOR) 40 MG tablet Take 40 mg by mouth bedtime.       lisinopril-hydrochlorothiazide (PRINZIDE,ZESTORETIC) 20-12.5 mg per tablet Take 1 tablet by mouth daily.       metoprolol (LOPRESSOR) 25 MG tablet Take 25 mg by mouth 2 (two) times a day.       multivitamin therapeutic (THERAGRAN) tablet Take 1 tablet by mouth daily.       nitroglycerin (NITROSTAT) 0.4 MG SL tablet Place 0.4 mg under the tongue every 5 (five) minutes as needed for chest pain.       zinc gluconate 50 mg tablet Take 50 mg by mouth daily.       clotrimazole (LOTRIMIN) 1 % cream Apply topically 2 (two) times a day as needed.        dextrose 5% SolP with alprostadil 500 mcg/mL Soln 20 mcg/mL Infuse into a venous catheter continuous.       naproxen (NAPROSYN) 500 MG tablet Take 500 mg by mouth 2 (two) times a day as needed.        Current Facility-Administered  "Medications   Medication Dose Route Frequency Provider Last Rate Last Dose     lidocaine 10 mg/mL (1 %) injection 0.1-0.3 mL  0.1-0.3 mL Subcutaneous PRN Missy Curiel CNP         sodium chloride 0.9 % flush 3 mL (NS)  3 mL Intravenous Line Care Missy Curiel CNP         sodium chloride 0.9%  125 mL/hr Intravenous Continuous Missy Curiel  mL/hr at 03/10/17 0746 125 mL/hr at 03/10/17 0746       LABS:  INR (no units)   Date Value   03/10/2017 0.95     HEMOGLOBIN (g/dL)   Date Value   03/10/2017 13.9 (L)     PLATELETS (thou/uL)   Date Value   03/10/2017 275     CREATININE (mg/dL)   Date Value   03/10/2017 1.11       EXAM:  Visit Vitals     /69     Pulse (!) 59     Temp 97.4  F (36.3  C) (Oral)     Resp 16     Ht 5' 7\" (1.702 m)     Wt 167 lb (75.8 kg)     SpO2 100%     BMI 26.16 kg/m2     General: Stable. In no acute distress.  Neuro: A&O x 3.   Resp: Lungs clear to auscultation bilaterally.  Cardio: S1S2 and reg, without murmur, clicks or rubs  Vascular: +2/4 bilateral femoral pulses  Skin: Without excoriations, ecchymosis, erythema, lesions or open sores on bilateral groin.    Pre-Sedation Assessment:  Mallampati Airway Classification: Class 3: soft and hard palates clearly visible  Previous reaction to anesthesia/sedation: no  Sedation plan based on assessment: Moderate  Sleep Apnea: no  Dentures: yes  COPD: no  ASA Classification: ASA 3 - Patient with moderate systemic disease with functional limitations     ASSESSMENT: 82 year old male with history of PVD and worsening claudication.    PLAN: Bilateral lower extremity angiogram with possible stent, angioplasty, lysis with sedation.     The procedure, risks and moderate sedation were discussed with the patient, all questions answered and patient agrees to proceed with the procedure. Written consent obtained.    Missy Curiel    : Interventional Radiology   [phone]    "

## 2021-06-09 NOTE — PROGRESS NOTES
82 year old returning patient, eval PAD w/ increasing claudication. Patient w/ HTN, HLD and Hx of smoking. Patients last ABIs done 2014 showed. Patient is having ABIs prior to appt. Patient reports increased claudication bilaterally,  Right greater than left leg. He is not able to walk as far he had previously, can walk roughly 100-200 yards before he stops due to significant pain. Patient has no contrast allergies or decreased KF.

## 2021-06-09 NOTE — PROGRESS NOTES
[facility] Discharge Summary  Patient Name: Zack Johns  Date: 3/28/2017  Referral Diagnosis: Osteoarthritis of GHJ, unspecified laterality  Referring provider: Reece Weir MD  Visit Diagnosis:   1. Chronic pain of both shoulders     2. Primary osteoarthritis of both shoulders     3. Generalized muscle weakness     4. Decreased ROM of right shoulder     5. Decreased ROM of left shoulder         Goals:  Pt. will demonstrate/verbalize independence in self-management of condition in : 6 weeks;Not Met  Pt. will be independent with home exercise program in : 4 weeks;Not Met  Patient will decrease : SPADI score;by _ points;for improved quality of function;in 6 weeks;Not Met  by ___ points: 9  Pt will: be able to lift gallon of milk with less pain and difficulty in order to improve QOL within 8 weeks. (GOAL NOT MET)  Pt will: be able to dress with less pain and difficulty within 8 weeks in order to improve QOL. (GOAL NOT MET)    Patient was seen for 1 visits.  Patient was referred to Orthopedics. He did not return or contact PT again after 1st visit.    Therapy will be discontinued at this time.  The patient will need a new referral to resume.    Thank you for your referral.  Matthew Sinha, PT, DPT  3/28/2017  12:10 PM    [facility]   Shoulder Initial Evaluation    Patient Name: Zack Johns  Date of evaluation: 3/28/2017  Referral Diagnosis:  Osteoarthritis of glenohumeral joint, unspecified laterality   Referring provider: Reece Weir MD  Visit Diagnosis:     ICD-10-CM    1. Chronic pain of both shoulders M25.512     G89.29     M25.511    2. Primary osteoarthritis of both shoulders M19.011     M19.012    3. Generalized muscle weakness M62.81    4. Decreased ROM of right shoulder M25.611    5. Decreased ROM of left shoulder M25.612        Assessment:      Zack Johns is a 82 y.o. male who presents to therapy today with chief complaints of chronic bilateral shoulder  pain that seems to be getting worse. Onset date of sx was about 10 years ago with gradual onset.  Pt has had injections and PT in the past with some relief.  Pain symptoms are only present with use of shoulders.  Functional impairments include difficulty with dressing, bathing, lifting, reaching, weightbearing through UE's, and performing recreational activities.  Pt demo's signs and sx consistent with bilateral shoulder pain, likely due to osteoarthritis. Due to severity of his symptoms, it is unclear if he also has a labral tear. I'm referring patient to Ortho for further evaluation and treatment options to help improve his rehab potential.    Goals:  Pt. will demonstrate/verbalize independence in self-management of condition in : 6 weeks;Not Met  Pt. will be independent with home exercise program in : 4 weeks;Not Met  Patient will decrease : SPADI score;by _ points;for improved quality of function;in 6 weeks;Not Met  by ___ points: 9  Pt will: be able to lift gallon of milk with less pain and difficulty in order to improve QOL within 8 weeks. (GOAL NOT MET)  Pt will: be able to dress with less pain and difficulty within 8 weeks in order to improve QOL. (GOAL NOT MET)    Patient's expectations/goals are realistic.    Barriers to Learning or Achieving Goals:  Chronicity of the problem.  Age.        Plan / Patient Instructions:        Plan of Care:   No Data Recorded     Exercises:  Exercise #1: pendulum  Comment #1: x10-20 in bilateral shoulders (bent over, no WBing due to increase in shoulder pain)    POC and pathology of condition were reviewed with patient.  Pt. is in agreement with the Plan of Care  A Home Exercise Program (HEP) was initiated today.  Pt. was instructed in exercises by PT and patient was given a handout with detailed instructions.       Plan for next visit: SPADI  .   Subjective:       Social information:   Occupation:retired (office work)   Work Status:NA    Live in home with wife. Wife is in  good health       History of Present Illness:    Zack is a 82 y.o. male who presents to therapy today with complaints of chronic bilateral shoulder pain (R>L). Date of onset/duration of symptoms is about 10 years ago without injury. Onset was gradual. Was an athlete growing up. Symptoms are intermittent and getting worse. He describes their previous level of function as limited with activities involving shoulders and walking due to vascular issues. He has had therapy and injections in the past, which helped a little bit. He does mow his own lawn. Pain in his shoulders only with use. Picking up a gallon of milk causes quite a bit of pain if he lifts with one hand; he has to use 2. Has not seen an orthopedic surgeon. Hasn't had x-rays in several years ([facility]).    Hobbies include: walking (but limited due to vascular issues), golf (had to stop because of shoulder pain, chess, swimming (although he hasn't done this in awhile)    PMH: PVD-seeing vascular for this.    Pain Ratin  Pain rating at best: 0  Pain rating at worst:   Pain description: aching, pain, sharp and soreness    Functional limitations are described as occurring with:   Dressing  Lifting   Reaching  recreation         Objective:      Note: Items left blank indicates the item was not performed or not indicated at the time of the evaluation.    Shoulder Examination  1. Chronic pain of both shoulders     2. Primary osteoarthritis of both shoulders     3. Generalized muscle weakness     4. Decreased ROM of right shoulder     5. Decreased ROM of left shoulder       Involved side: Bilateral  Posture Observation:      General sitting posture is  fair. Protracted head and shoulders      Shoulder/Elbow ROM    Date:      Shoulder and Elbow ROM ( )   AROM in degrees AROM in degrees AROM in degrees    Right Left Right Left Right Left   Shoulder Flexion (0-180 ) 130, limited by pain 130, limited by pain       Shoulder Abduction (0-180 ) 80 degrees,  limited by pain 80 degrees, limited by pain       Shoulder Extension (0-60 )         Shoulder ER (0-90 ) C3 with index finger, limited by pain C3 with index finger. Limited by pain       Shoulder IR (0-70 ) L5, limited by pain L5, limited by pain       Shoulder IR/Ext         Elbow Flexion (150 )         Elbow Extension (0 )          PROM in degrees PROM in degrees PROM in degrees    Right Left Right Left Right Left   Shoulder Flexion (0-180 )         Shoulder Abduction (0-180 )         Shoulder Extension (0-60 )         Shoulder ER (0-90 )         Shoulder IR (0-70 )         Elbow Flexion (150 )         Elbow Extension (0 )           Shoulder/Elbow Strength: patient unable to attain full ROM so MMT was not performed. ~<3/5 B UE strength, limited by pain    Flexibility:  poor    Palpation: tender B biceps proximal insertion, B SS, B TM    Increased pain with weightbearing of UE's on countertop for pendulum exs      Shoulder Special Tests   *unable to get patient into most testing positions due to pain  Impingement Cluster Right (+/-) Left (+/-) Rotator Cuff Tests Right (+/-) Left (+/-)   Eldridge-Schuyler + + Drop Arm Sign     Painful Arc + + Hornblowers     Infraspinatus Test + + ERLS     AC Tests Right (+/-) Left (+/-) IRLS     Active Compression   Labral Tests Right (+/-) Left (+/-)   Crossbody Adduction + + Biceps Load Test II     AC Resisted Extension   Jerk Test     GH Instability Tests Right (+/-) Left (+/-) Mell Test     Sulcus Sign   Biceps Tests Right (+/-) Left (+/-)   Apprehension   Speed     Relocation   Keri salcido     Other:   Other: N[place]alvador + +   Other:   Other: RAJ scour (labrum) + +       Treatment Today    TREATMENT MINUTES COMMENTS   Evaluation 15    Self-care/ Home management     Manual therapy     Neuromuscular Re-education     Therapeutic Activity     Therapeutic Exercises 30 Discussed PT POC and pathology of condition. Discussed PT findings. Answered patient questions. Recommend patient use ice as  needed at home. Began HEP. Recommend patient see ortho for possible injections   Gait training     Modality__________________                Total 45    Blank areas are intentional and mean the treatment did not include these items.     PT Evaluation Code: (Please list factors)  Patient History/Comorbidities: PVD  Examination: shoulder pain, decreased ROM, weakness  Clinical Presentation: stable  Clinical Decision Making: low    Patient History/  Comorbidities Examination  (body structures and functions, activity limitations, and/or participation restrictions) Clinical Presentation Clinical Decision Making (Complexity)   No documented Comorbidities or personal factors 1-2 Elements Stable and/or uncomplicated Low   1-2 documented comorbidities or personal factor 3 Elements Evolving clinical presentation with changing characteristics Moderate   3-4 documented comorbidities or personal factors 4 or more Unstable and unpredictable High              Matthew Sinha, PT, DPT  3/28/2017  1:28 PM

## 2021-06-09 NOTE — PATIENT INSTRUCTIONS - HE
Ultrasound    Thank you for choosing [facility] as partners in your care.  Please read the following information before your appointment.  Feel free to ask questions.    An ultrasound is an exam that uses sound waves to create pictures.  The special camera that takes the pictures is called a transducer.  An ultrasound technologist will perform the exam under the direction of a physician.    Preparation  Ultrasound preps vary.  If you are scheduled for an Aorta Ultrasound, please do not eat or drink anything 8 hours before your exam.  You may take daily medications with a small sip of water.  There is no preparation required for any of the other ultrasound exams performed at [facility].    The Examination  You may or may not need to change clothes for your exam.  The technologist will explain the exam to you.  He or she will ask you a few questions and answer any questions you may have.    You will lie on a table and a gel will be applied to your skin.  A small handheld instrument called a transducer will be moved across the area we are looking at while pictures are taken.  Also, your exam may include measuring your blood pressure on your arms, legs and/or toes.    When the Exam Is Completed  Your physician will receive the results of the exam and explain them to you.  You may return to your normal diet and activities unless otherwise directed by your doctor.  Feel free to ask questions if something is not clear to you.  We welcome comments.    At [facility], we are dedicated to providing the best possible care.  Thank you for taking time to read these instructions.  We hope your experience is as pleasant as possible.      You are scheduled for the following exam(s):    []Carotid Duplex:  Evaluates the carotid arteries in the neck that feed the brain with blood.  Gel is applied to the skin of the neck.  A transducer will be placed on the gel covered areas to obtain images and evaluate  and listen to the blood flow in the arteries.  This exam takes approximately 30 minutes.    []Venous Duplex:  Evaluates the veins that carry blood to the heart from the arms and/or legs.  Gel is applied to the skin of the arms and/or legs.  A transducer will be placed on the gel covered areas to obtain images and evaluate flow in the veins.  This exam takes approximately 30 minutes per arm/leg.    []Arterial Duplex:  Evaluates the arteries that feed the arms and legs with blood.  Gel is applied to the skin of the arms and/or legs.  A transducer will be placed on the gel covered areas to obtain images and listen to the blood flow in the arms and/or legs.  This exam takes approximately 30 minutes per arm/leg.    [x]KIRK or Segmental Pressures:  Ultrasound and blood pressure cuffs are used to evaluate the arteries that supply the arms and legs with blood.  Several blood pressure cuffs will be place on your arms and legs.  When inflated, the cuffs will provide blood pressure readings that are used to determine where blockages in the arteries may be.  You may be asked to do a moderate level of exercise during this exam.  This exam takes approximately 30-60 minutes.    []Aorta:  Evaluates the abdominal aorta for blockages and aneurysm.  Gel is applied to the stomach.  A transducer will be placed on the gel covered areas to obtain images of the aorta.  This exam takes approximately 30 minutes.    Prep instructions:  Do not eat or drink anything 8 hours before procedure.  You may take daily medications with a small sip of water.

## 2021-06-09 NOTE — PROGRESS NOTES
PAD follow-up, patient underwent LE angio 03/10/2007 that showed bilateral disease. discuss bilateral CFA/proximal SFA endarterectomy with possible endovascular reconstruction of the distal right SFA. Patient reports continued claudication with short distances.

## 2021-06-09 NOTE — PROGRESS NOTES
"HPI: Mr. Johns presents for follow-up of his peripheral vascular disease.  We have last seen him in December 2014 for claudication.  He notes that he tried walking, with some initial success, but now he has noted increased pain and shortened claudication distance.  He has bilateral calf claudication at approximately 200 yards.  It is slightly worse on the right than the left, but his left side has worsened and so they are almost equally bad.    Allergies, Medications, Social History, Past Medical History and Past Surgical History were reviewed and are noted in the chart.      Visit Vitals     /68 (Patient Site: Left Arm, Patient Position: Sitting, Cuff Size: Adult Regular)     Pulse 70     Temp 98.8  F (37.1  C) (Temporal)     Resp 16     Ht 5' 5\" (1.651 m)     Wt 165 lb (74.8 kg)     BMI 27.46 kg/m2     Body mass index is 27.46 kg/(m^2).    EXAM:  GENERAL: This is a well developed male in no distress.  HEAD AND NECK: Cranial nerves intact. No neck masses or bruits.  CHEST/LUNG: Clear  GROINS: Both femoral pulse palpable.  EXTREM: Hair growth to mid-calf. Elevation pallor and dependency rubor. No pedal pulses palpable.      IMAGES:   Us Arterial Pressures With Exercise Legs Bilateral    Result Date: 3/2/2017  EXAM: RESTING SEGMENTAL PRESSURES AND POSTEXERCISE ANKLE-BRACHIAL INDICES (ABIs) 3/2/2017 9:44 AM INDICATION: Peripheral vascular disease. Lower extremity claudication. COMPARISON: 12/11/2014. FINDINGS: Right: PTA 0.61 DPA 0.61 Left: PTA 0.77 DPA 0.77 Previous ABIs were 0.51 on the right and 0.92 on the left. Current segmental pressures demonstrate a diminished right high thigh index, 0.65, predicting significant inflow disease. On the left, there is a more gradual decline, suggesting multilevel disease. Digital pressures predict adequate wound healing potential, 64 mmHg on the right and 72 mmHg on the left. Distal waveforms are relatively dampened on the right compared to the left. The patient walked " on a treadmill for 5 minutes at 1.5 mph and 10% incline, complaining of bilateral leg pain at 2.5 minutes. The postexercise ABIs were markedly abnormal, 0.26 on the right and 0.34 on the left.     1.  Abnormal resting ABIs, with abnormal exercise response bilaterally, consistent with significant peripheral arterial disease. These findings also correlate with symptoms of claudication. 2.  Segmental pressures predict significant inflow disease on the right.       I reviewed his ABIs with him. The raw numbers suggest worsening of his peripheral vascular disease.      Assessment/Plan: Mr. Johns appears to have worsening of his peripheral vascular disease.  He is claudication, and I discussed with him both an exercise regime and angiography.  He prefers to have an angiogram, as he has tried an exercise regime in the past with limited success, and his claudication significantly interferes with his activities of daily living.  We will obtain an angiogram to evaluate the situation, and he will return after the angiogram.      Leo Carty MD

## 2021-06-09 NOTE — PROGRESS NOTES
"HPI: Mr. Johns presents for follow-up of his peripheral vascular disease.  He had undergone an angiogram 3 weeks ago, and returns for discussion of the findings and further intervention.  He is still having difficulty walking, and this significantly interferes with his activities of daily life.    Allergies, Medications, Social History, Past Medical History and Past Surgical History were reviewed and are noted in the chart.      /70 (Patient Site: Left Arm, Patient Position: Sitting, Cuff Size: Adult Regular)  Pulse 68  Temp 97.8  F (36.6  C) (Oral)   Resp 16  Ht 5' 7\" (1.702 m)  Wt 167 lb (75.8 kg)  BMI 26.16 kg/m2  Body mass index is 26.16 kg/(m^2).    EXAM:  GENERAL: This is a well developed male in no distress.  IMAGES:   Ir Extremity Angiogram Bilateral    Result Date: 3/10/2017  [facility] 03/10/2017 PROCEDURE: ABDOMINAL AORTOGRAM AND BILATERAL LOWER EXTREMITY ANGIOGRAM 1. ABDOMINAL AORTOGRAM. 2. BILATERAL LOWER EXTREMITY ANGIOGRAM. 3. EXTERNAL ILIAC CONTRALATERAL SECOND ORDER SELECTIVE CATHETERIZATION 4. MODERATE SEDATION ATTENDING: Shay Schilling MD. INDICATION: The patient is an 82-year-old male with a history of peripheral vascular disease and bilateral claudication and presents to enter radiology for bilateral lower extremity angiography with possible intervention. SEDATION: Versed 4 mg. Fentanyl 200 mcg. The procedure was performed with administration intravenous conscious sedation with appropriate preoperative, intraoperative, and postoperative evaluation. 69 minutes of supervised face to face conscious sedation time was provided by a radiology nurse under my direct supervision. ANTIBIOTICS: None. ADDITIONAL MEDICATIONS: None. FLUOROSCOPIC TIME: 11.1 minutes. AIR KERMA (Ka,r): 801 mGy. EBL:  Minimal CONTRAST: 80 mL of Visipaque COMPLICATIONS: No immediate complications. STERILE BARRIER TECHNIQUE: Maximum sterile barrier technique was used. Cutaneous antisepsis was performed at " the operative site with application of 2% chlorhexidine and large sterile drape. Prior to the procedure, the surgeon and assistant performed hand  hygiene and wore hat, mask, sterile gown, and sterile gloves during the entire procedure. PROCEDURE: Informed consent was obtained. The patient was placed supine on the fluoroscopy table. Patient was sterilely prepped and draped in normal fashion. Under ultrasound guidance, the left common femoral artery was identified and appeared patent. An image of this was saved. A 21-gauge micropuncture needle, again under ultrasound guidance, was advanced into the left common femoral artery. A 0.018 wire was placed through the needle into the vessel. The needle was exchanged for 3/5 Nigerian coaxial dilator. The wire and inner 3 Nigerian dilator removed. A 0.035 inch [employer]son  wire was then placed through the 5 Nigerian coaxial dilator. The dilator was exchanged over the wire for a 5 Nigerian vascular sheath. A 5 Nigerian Omni Flush catheter was advanced into the upper abdominal aorta followed by a flush abdominal aortogram. The catheter was pulled back into the distal abdominal aorta followed by a flush pelvic angiogram. Using the side port of the access sheath, left lower extremity angiography was performed. Utilizing a 5 Nigerian glide Cobra catheter and 0.035 inch angled Glidewire, the catheter was advanced into the contralateral right common femoral artery followed by right lower extremity angiography.  The findings in the angiogram were discussed with the patient's vascular surgeon (Dr. Leo Carty) where the decision was made to postpone endovascular reconstruction in favor of bilateral CFA/proximal SFA endarterectomy. Access was removed. Hemostasis was obtained with manual compression. FINDINGS: ABDOMEN AND PELVIS: Normal caliber abdominal aorta. Patent renal arteries. Mild disease seen at the distal aorta with involvement of the ostia of both the left and right common iliac arteries  due to bulky atherosclerotic plaque. LEFT LOWER EXTREMITY: Bulky atherosclerotic plaque burden causing severe stenosis seen in the left common femoral artery and left superficial femoral artery. The profunda femoral artery is patent. The remainder of the superficial femoral artery does demonstrate bulky atherosclerotic plaque but only mild disease. The popliteal artery is intact with three-vessel runoff. The PTA and EVANGELISTA are both patent to the level of the foot. RIGHT LOWER EXTREMITY: Bulky atherosclerotic plaque involving the common femoral artery causing moderate stenosis in the proximal superficial femoral artery causing severe stenosis. There is a short segment occlusion involving the superficial femoral artery distally with reconstitution at the adductor hiatus. The profunda femoral artery is patent. The popliteal artery is patent with three-vessel runoff. The PTA and EVANGELISTA are both patent to the foot.     1.  Bulky atherosclerotic plaque burden in the distal abdominal aorta causing mild stenosis of the aorta and bilateral common iliac artery ostia. The abdominal aorta otherwise demonstrates a normal caliber. 2.  RIGHT LEG: Bulky atherosclerotic plaque burden involving the common femoral artery causing moderate stenosis and proximal superficial femoral artery causing severe stenosis. Short segment occlusion of the distal superficial femoral artery with reconstitution at the adductor hiatus. Popliteal artery is patent with good three-vessel runoff. 3.  LEFT LEG: Bulky atherosclerotic plaque burden involving the common femoral artery and proximal superficial femoral artery causing severe stenosis. The remainder of the superficial femoral artery does demonstrate bulky atherosclerotic plaque but only mild stenosis. Popliteal artery is patent with good three-vessel runoff. PLAN: After discussion with Dr. Leo Carty, we'll tentatively plan for the patient to proceed with bilateral CFA/proximal SFA endarterectomy with  possible endovascular reconstruction of the distal right SFA after surgery.  CPT codes for physician reference only: 21622 [id number] x 4 [id number]         Assessment/Plan: Mr. Johns has bilateral common femoral and proximal superficial femoral disease.  In addition, he has a short segment occlusion in the distal SFA on the right side.  I discussed this at length with him and josie him diagrams of the obstruction.  On the left side, we should perform a femoral endarterectomy; but on the right side there are several options.  We could perform an endarterectomy, and assess the results.  Alternatively we could perform a femoral-popliteal bypass.  Lastly, there is the option of an endovascular intervention for the distal SFA lesion if the femoral endarterectomy is insufficient.  We decided that we would proceed with bilateral femoral endarterectomies, and if the endarterectomy on the right side is insufficient to relieve his discomfort we will send him for an angiogram for stenting of the distal SFA lesion.    We will set him up for the procedure.    Total time spent 25 minutes face to face with patient with more than 50% time spent in counseling and coordination of care.     Leo Carty MD

## 2021-06-09 NOTE — PROGRESS NOTES
US PRIOR. 6 month f/u. 10/3/18 Bilateral femoral iliac endarterectomy. Asa, brilinta, statin. He states in the last 3-4 months he needs to exercise his legs first thing in the morning to get his blood pumping so that he can walk afterwards. He states he has not fallen but he would if he did not do this morning exercise.

## 2021-06-10 NOTE — PROGRESS NOTES
Follow-up, PAD, patient is s/p bilateral femoral endarterectomy 05/03/2017. Acute postoperative respiratory insufficiency. Patient had  post procedure groin lump, US done 05/17/2017 showed no evidence for femoral pseudoaneurysm. Patient reports decreased claudication, was able to mow his loan w/o significant leg pain.

## 2021-06-10 NOTE — ANESTHESIA POSTPROCEDURE EVALUATION
Patient: Zack Johns  BILATERAL FEMORAL ENDARTERECTOMIES  Anesthesia type: general    Patient location: PACU  Last vitals:   Vitals:    05/03/17 1630   BP: 126/69   Pulse: 76   Resp: 17   Temp:    SpO2: 97%     Post vital signs: stable  Level of consciousness: awake and responds to simple questions  Post-anesthesia pain: pain controlled  Post-anesthesia nausea and vomiting: no  Pulmonary: unassisted, return to baseline  Cardiovascular: stable and blood pressure at baseline  Hydration: adequate  Anesthetic events: no    QCDR Measures:  ASA# 11 - Barbara-op Cardiac Arrest: ASA11B - Patient did NOT experience unanticipated cardiac arrest  ASA# 12 - Barbara-op Mortality Rate: ASA12B - Patient did NOT die  ASA# 13 - PACU Re-Intubation Rate: ASA13B - Patient did NOT require a new airway mgmt  ASA# 10 - Composite Anes Safety: ASA10A - No serious adverse event  ASA# 38 - New Corneal Injury: ASA38A - No new exposure keratitis or corneal abrasion in PACU    Additional Notes:

## 2021-06-10 NOTE — ANESTHESIA CARE TRANSFER NOTE
Last vitals:   Vitals:    05/03/17 1600   BP: 178/77   Pulse: 80   Resp: 14   Temp: 36.4  C (97.6  F)   SpO2: 100%     Patient's level of consciousness is drowsy  Spontaneous respirations: yes  Maintains airway independently: yes  Dentition unchanged: yes  Oropharynx: oropharynx clear of all foreign objects    QCDR Measures:  ASA# 20 - Surgical Safety Checklist: ASA20A - Safety Checks Done  PQRS# 430 - Adult PONV Prevention: 4558F - Pt received => 2 anti-emetic agents (different classes) preop & intraop  ASA# 8 - Peds PONV Prevention: NA - Not pediatric patient, not GA or 2 or more risk factors NOT present  PQRS# 424 - Barbara-op Temp Management: 4559F - At least one body temp DOCUMENTED => 35.5C or 95.9F within required timeframe  PQRS# 426 - PACU Transfer Protocol: - Transfer of care checklist used  ASA# 14 - Acute Post-op Pain: ASA14B - Patient did NOT experience pain >= 7 out of 10    I completed my SBAR handoff to the receiving nurse per policy and procedure.

## 2021-06-10 NOTE — ANESTHESIA PREPROCEDURE EVALUATION
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

## 2021-06-10 NOTE — PROGRESS NOTES
"HPI: Pt is here for follow up of a bilateral femoral endarterectomy.  He is doing well. His appetite is good, and bowel function regular.  No fevers or chills. Ambulating without problems.  He was able to mow his lawn yesterday without stopping.  He had presented with a right groin lump a few weeks ago, and underwent a right groin ultrasound that showed no evidence of a pseudoaneurysm.  He notes that the lump is diminishing in size.    Allergies, Medications, Social History, Past Medical History and Past Surgical History were reviewed and are noted in the chart.    /76 (Patient Site: Left Arm, Patient Position: Sitting, Cuff Size: Adult Regular)  Pulse 78  Temp 97.7  F (36.5  C)  Resp 14  Ht 5' 4.49\" (1.638 m)  Wt 170 lb (77.1 kg)  BMI 28.74 kg/m2      EXAM: This is a  82 y.o. male in no distress  GENERAL: Appears well  CHEST clear  CVS S1S2 NSR  ABDOMEN: Soft, non-tender.   EXTREM: Incisions healing nicely.  No evidence of significant right groin mass.  Feet perfused.  Left pedal pulses faintly palpable, right pedal pulses not palpable.    Assessment/Plan: Zack Johns is following up after bilateral femoral endarterectomy. Doing well. Return in 6 months or sooner if any problems.    Leo Carty MD  [facility] Department of Surgery  "

## 2021-06-11 NOTE — PROGRESS NOTES
VASCULAR SURGERY OUTPATIENT CONSULT OR VISIT   VASCULAR SURGEON: Rowan Cole MD    LOCATION:  [facility]    Zack Johns   Medical Record #:  [id number]  YOB: 1934  Age:  86 y.o.     Date of Service: 9/25/2020    PRIMARY CARE PROVIDER: Haim Galaviz III, MD    Reason for consultation: Follow-up for surveillance of bilateral common femoral artery endarterectomy and bilateral common iliac artery stenting    IMPRESSION:   86-year-old gentleman with history of coronary artery disease, hypertension, hyperlipidemia, and peripheral vascular disease who is status post bilateral common femoral artery endarterectomies and bilateral common iliac artery stenting who presents for surveillance.  Patient currently reports that he has no claudication symptoms, rest pain, or wounds on his feet.  Patient is very grateful for the care that he is received and the position that he is in with regards to his lower extremity symptoms.  His main complaint is bilateral shoulder pain.  Ankle-brachial indexes are 0.63 on the right with toe pressures of 49.  Patient had a right KIRK that came down to 0.3 with walking.    RECOMMENDATION:   -Given that patient is currently asymptomatic and reports overall improvement with bilateral lower extremities, will have patient follow-up in 1 year with repeat arterial duplex and ABIs.        HPI:  Zack Johns is a 86-year-old gentleman with history of coronary artery disease, hypertension, hyperlipidemia, and peripheral vascular disease who is status post bilateral common femoral artery endarterectomies and bilateral common iliac artery stenting who presents for surveillance.  Patient currently reports that he has no claudication symptoms, rest pain, or wounds on his feet.  Patient is very grateful for the care that he is received and the position that he is in with regards to his lower extremity symptoms.  His main complaint is bilateral shoulder pain.   Ankle-brachial indexes are 0.63 on the right with toe pressures of 49.  Patient had a right KIRK that came down to 0.3 with walking.      PHH:    Past Medical History:   Diagnosis Date     Actinic keratitis      Arthritis     bilat shoulders     CAD (coronary artery disease)      Cardiac arrest (H) 09/04/89     Claudication (H)      Diverticulosis      HTN (hypertension)      Hyperlipidemia      MI, old      Multiple pulmonary nodules      Peripheral vascular disease, unspecified (H)      Syncope      Trigger finger, acquired      Type 2 diabetes mellitus (H) 11/6/2012        Past Surgical History:   Procedure Laterality Date     APPENDECTOMY       CARDIAC CATHETERIZATION  08/22/2018     of rca     CV CORONARY ANGIOGRAM N/A 8/6/2018    Procedure: Coronary Angiogram;  Surgeon: Jeane Garcia MD;  Location: [facility];  Service:      CV CORONARY ANGIOGRAM N/A 8/22/2018    Procedure: Coronary Angiogram;  Surgeon: Jeane Garcia MD;  Location: [facility];  Service:      CV LEFT HEART CATHETERIZATION WO LEFT VETRICULOGRAM Left 8/6/2018    Procedure: Left Heart Catheterization Without Left Ventriculogram;  Surgeon: Jeane Garcia MD;  Location: [facility];  Service:      CV LEFT HEART CATHETERIZATION WO LEFT VETRICULOGRAM Left 8/22/2018    Procedure: Left Heart Catheterization Without Left Ventriculogram;  Surgeon: Jeane Garcia MD;  Location: [facility];  Service:      FEMORAL ENDARTERECTOMY Bilateral 05/03/2017     KNEE ARTHROSCOPY       NOSE SURGERY         ALLERGIES:  Patient has no known allergies.    MEDS:    Current Outpatient Medications:      acetaminophen (TYLENOL) 500 MG tablet, Take 500-1,000 mg by mouth every 6 (six) hours as needed for pain or fever. , Disp: , Rfl:      ascorbic acid (VITAMIN C) 500 MG tablet, Take 500 mg by mouth daily., Disp: , Rfl:      aspirin 81 MG EC tablet, Take 81 mg by mouth daily., Disp: , Rfl:      aspirin-calcium carbonate 81 mg-300  mg calcium(777 mg) Tab, Take 1 tablet by mouth., Disp: , Rfl:      atorvastatin (LIPITOR) 40 MG tablet, Take 40 mg by mouth bedtime., Disp: , Rfl:      lisinopril-hydrochlorothiazide (PRINZIDE,ZESTORETIC) 20-12.5 mg per tablet, Take 1 tablet by mouth daily., Disp: , Rfl:      metoprolol (LOPRESSOR) 25 MG tablet, Take 12.5 mg by mouth 2 (two) times a day. , Disp: , Rfl:      multivitamin (ONE A DAY) per tablet, Take 1 tablet by mouth daily. , Disp: , Rfl:      naproxen (NAPROSYN) 500 MG tablet, TK 1 T PO BID WITH MEALS, Disp: , Rfl: 1     nitroglycerin (NITROSTAT) 0.4 MG SL tablet, Place 0.4 mg under the tongue every 5 (five) minutes as needed for chest pain., Disp: , Rfl:      ticagrelor (BRILINTA) 90 mg Tab, Take 1 tablet (90 mg total) by mouth 2 (two) times a day., Disp: 180 tablet, Rfl: 3     zinc 50 mg Tab, Take 50 mg by mouth daily. , Disp: , Rfl:     SOCIAL HABITS:    Social History     Tobacco Use   Smoking Status Former Smoker     Packs/day: 2.00     Years: 40.00     Pack years: 80.00     Types: Cigarettes     Last attempt to quit: 4/10/1984     Years since quittin.4   Smokeless Tobacco Never Used       Social History     Substance and Sexual Activity   Alcohol Use Yes     Alcohol/week: 6.0 standard drinks     Types: 6 Shots of liquor per week       Social History     Substance and Sexual Activity   Drug Use No       FAMILY HISTORY:    Family History   Problem Relation Age of Onset     No Medical Problems Mother      No Medical Problems Father        REVIEW OF SYSTEMS:    A 12 point ROS was reviewed and except for what is listed in the HPI above, all others are negative    PE:  /58   Pulse 60   Temp 98.2  F (36.8  C)   Resp 15   Wt 163 lb (73.9 kg)   BMI 25.53 kg/m    Wt Readings from Last 1 Encounters:   20 163 lb (73.9 kg)     Body mass index is 25.53 kg/m .    EXAM:  General: Well-appearing gentleman  Lungs: Unlabored on room air  Cardiac: Regular  Extremities: Palpable femoral  pulses bilaterally; monophasic DP and PT signals bilaterally    DIAGNOSTIC STUDIES:     Images:  I personally reviewed the images and my interpretation is diminished ABIs on the right side consistent with moderate peripheral arterial disease.    LABS:      Sodium   Date Value Ref Range Status   10/30/2019 135 (L) 136 - 145 mmol/L Final   10/27/2019 135 (L) 136 - 145 mmol/L Final   10/05/2018 136 136 - 145 mmol/L Final     Potassium   Date Value Ref Range Status   10/30/2019 4.0 3.5 - 5.0 mmol/L Final   10/27/2019 3.9 3.5 - 5.0 mmol/L Final   10/05/2018 4.1 3.5 - 5.0 mmol/L Final     Chloride   Date Value Ref Range Status   10/30/2019 102 98 - 107 mmol/L Final   10/27/2019 103 98 - 107 mmol/L Final   10/05/2018 108 (H) 98 - 107 mmol/L Final     BUN   Date Value Ref Range Status   10/30/2019 40 (H) 8 - 28 mg/dL Final   10/27/2019 27 8 - 28 mg/dL Final   10/05/2018 27 8 - 28 mg/dL Final     Creatinine   Date Value Ref Range Status   10/30/2019 1.69 (H) 0.70 - 1.30 mg/dL Final   10/27/2019 1.37 (H) 0.70 - 1.30 mg/dL Final   10/05/2018 1.11 0.70 - 1.30 mg/dL Final     Hemoglobin   Date Value Ref Range Status   10/30/2019 10.5 (L) 14.0 - 18.0 g/dL Final   10/27/2019 11.3 (L) 14.0 - 18.0 g/dL Final   10/07/2018 7.4 (L) 14.0 - 18.0 g/dL Final     Platelets   Date Value Ref Range Status   10/30/2019 333 140 - 440 thou/uL Final   10/27/2019 327 140 - 440 thou/uL Final   10/07/2018 202 140 - 440 thou/uL Final     BNP   Date Value Ref Range Status   06/16/2018 59 0 - 93 pg/mL Final   12/15/2017 89 0 - 93 pg/mL Final     INR   Date Value Ref Range Status   10/30/2019 1.17 (H) 0.90 - 1.10 Final   06/16/2018 0.95 0.90 - 1.10 Final   12/15/2017 0.96 0.90 - 1.10 Final       Total time spent 30 minutes face to face with patient with more than 50% time spent in counseling and coordination of care.    Elie Childress MD  VASCULAR SURGERY     I have seen and evaluated this patient with Dr Childress .  Past medical history, surgical  history, past family history review of systems, HPI, and physical exam were reviewed by me personally.  I agree with all the above.        Blue Horton MD, [facility]  VASCULAR SURGERY

## 2021-06-11 NOTE — PROGRESS NOTES
Patient is in clinic today to see MD Della Horton.      Patient is here for a 3 month follow up to discuss PAD    The patient does not smoke.    Pt is currently taking aspirin, Brilinta and statin.    Pt rates pain 6/10 located at shoulders.    The provider has been notified that the patient has no complaints.       At the end of the visit the patient was provided with education both verbally and on their AVS regarding procedures or testing.

## 2021-06-14 NOTE — PROGRESS NOTES
Pt is here for f/u after bilat. endarterectomy. KIRK's show  abnormal, measuring 0.56. TBI is also abnormal. Previously, on 3/2/2017, the KIRK was measured at 0.61. Doppler ultrasound demonstrates patency of the right ower extremity arteries. Readingin indicates  significant disease in one of the areas of shadowing bulky atherosclerotic plaque. Radiology recommended further evaluation with diagnostic angiography. Left KIRK .81 previous .77. Pt maintains on ASA and HTN medication. Pt not walking a lot but the little he does is improved after endarts. Pt maintains on statin, HTN medication and ASA.

## 2021-06-15 NOTE — PATIENT INSTRUCTIONS - HE
1. Stop Brilinta  2. Start clopidogrel 75 mg daily.  This is also known as Plavix  3. Get back on your exercise regimen, targeting 150 minutes of walking each week.  This could be done outdoors or on your treadmill.  Swimming pool exercises may be better for your back discomfort.  4. I look forward to seeing you back in 1 year.

## 2021-06-16 NOTE — TELEPHONE ENCOUNTER
Telephone Encounter by Mitali Bustamante RN at 11/25/2019  3:12 PM     Author: Mitali Bustamante RN Service: -- Author Type: Registered Nurse    Filed: 11/25/2019  3:12 PM Encounter Date: 11/25/2019 Status: Signed    : Mitali Bustamante RN (Registered Nurse)       Patient scheduled follow up 12/20.    -----------------------------  Author: Jayjay Cruz MD Service: -- Author Type: Physician   Filed: 11/22/2019  9:48 AM Encounter Date: 10/23/2019 Status: Signed   : Jayjay Cruz MD (Physician)        []Hide copied text    []Hover for details  Please call patient and tell them we need to discuss his meds in clinic f/u. [facility]

## 2021-06-17 NOTE — PATIENT INSTRUCTIONS - HE
Patient Instructions by Idania Duncan PT at 11/14/2019  2:00 PM     Author: Idania Duncan PT Service: -- Author Type: Physical Therapist    Filed: 11/14/2019  2:42 PM Encounter Date: 11/14/2019 Status: Signed    : Idania Duncan PT (Physical Therapist)       Continue doing your exercises 10 repetitions of each 2 times a day.     PENDULUM SHOULDER FORWARD/BACK    Shift your body weight forward then back to allow your injured arm to swing forward and back freely. Your injured arm should be fully relaxed.    PENDULUM SHOULDER LATERAL    Shift your body weight side to side to allow your injured arm to swing side to side freely. Your injured arm should be fully relaxed.    PENDULUM SHOULDER CIRCLES    Shift your body weight in circles to allow your injured arm to swing in circles freely. Your injured arm should be fully relaxed.          SHOULDER ROLLS    Move your shoulders in a circular pattern as shown so that your are moving in an up, back and down direction. Perform small cicles if needed for comfort.

## 2021-06-18 NOTE — PATIENT INSTRUCTIONS - HE
Patient Instructions by Ella Cooper RN at 9/25/2020 11:00 AM     Author: Ella Cooper RN Service: -- Author Type: Registered Nurse    Filed: 9/25/2020 11:07 AM Encounter Date: 9/25/2020 Status: Signed    : Ella Cooper RN (Registered Nurse)       Ankle-Brachial Index (KIRK) or Physiologic Test    Description  An ankle-brachial index test is relatively pain free. Blood pressure cuffs of various sizes are placed on your thigh, calf, foot and toes.  Similar to having your blood pressure checked with an arm cuff, as the technician inflates the cuffs, they progressively tighten and are then quickly released.  You may feel some discomfort, but generally for less than 60 seconds for each measurement. You will be asked to remove your socks and shoes and possibly your pants or shorts. Gowns will be provided. It usually takes about 30-60 minutes.   Depending on the initial readings and patient symptoms, you may be asked to perform a light walk on a treadmill.  The technician will apply ultrasound gel, usually warmed for your comfort, to your ankles and wrists. Through the gel, the technician will use a small hand-held device that emits sound waves.  Risks  There are typically no side effects or complications associated with a physiologic study.  How to Prepare  Eat and take medications as usual.  There is no preparation required for an ankle-brachial index (KIRK) or physiologic exam.  What Can I Expect After the Test?  The technician will send the ultrasound images to your vascular surgeon for evaluation. Typically, a report is available in 2-3 days. If anything critical is found, it is standard practice to notify the vascular surgeon immediately.  Reference: https://vascular.org/patient-resources/vascular-tests

## 2021-06-18 NOTE — PROGRESS NOTES
Mr. Johns underwent bilateral femoral endarterectomies in May 2017. He still feels heaviness and pain in his lower extremities. Right worse than left.  I reviewed the images of the non invasive studies myself.   Right ABIs are 0.59 and 0.60 and left are 0.91 and 1.01.   Duplex shows high grade right SFA disease.  I believe there is room for improvement on the right. Also as I was revieweing the pre operative arteriogram, I noticed bilateral common iliac artery disease.   We will plan for a CTA of abdomen and pelvis with run off.   I will call him after I look at the CTA.

## 2021-06-18 NOTE — PROGRESS NOTES
6  month f/u, PVD. Bilat. femoral endarts on 5/3/17 by Dr. Carty. He is able to walk a couple of blocks without stopping for 10-12 seconds. He is able to walk on his treadmill for 15 minutes.He does not have rest pain. DM2, HTN, CAD. ASA, statin.

## 2021-06-19 NOTE — PROGRESS NOTES
F/U post MR stress test. Able to walk 50 yards without pain. Rt leg pain>Lt. in calf. Denies pain at rest. On ASA and Lipitor. ER visit 6/16 for chest pain. Denies chest pain since.

## 2021-06-19 NOTE — LETTER
Letter by Deshawn Kumar MD at      Author: Deshawn Kumar MD Service: -- Author Type: --    Filed:  Encounter Date: 4/19/2019 Status: (Other)         Reece Weir MD  [address] [place] [address]                                  April 19, 2019    Patient: Zack Johns   MR Number: [id number]   YOB: 1934   Date of Visit: 4/19/2019     Dear Dr. Destin MD:    Thank you for referring Zack Johns to me for evaluation. Below are the relevant portions of my assessment and plan of care.    If you have questions, please do not hesitate to call me. I look forward to following Zack along with you.    Sincerely,        Deshawn Kumar MD            MD Stacy Alonzo Kamran M, MD  4/19/2019 11:57 AM  Sign at close encounter  Mr. Johns is a very pleasant 84-year-old gentleman who I have seen previously for bilateral lower extremity claudication.    He has bilateral superficial femoral artery disease.  Once previously, under my care he has undergone attempted recanalization of the right superficial femoral artery.  I was unable to recanalize the right superficial femoral artery.  Surgery was performed in October 2018.    He tells me that he is able to walk without any difficulty.  This is certainly an improvement from before.  Complaint, however, is that in early morning.  He reports that his legs feel cold in the morning.  He does not wake up with rest pain or nocturnal pain.  He does not consider any of the above to have lifestyle limiting implications.    I have reassured him that with mild claudication risk of limb loss is low.  As far as the discomfort in the morning is concerned that this is due to poor perfusion.  I explained the role of gravity and enhancing perfusion of the day.  I have advised him to spend some time sitting up in his bed and letting his feet warm up before he starts to ambulate.  I do not think surgical intervention is advised for the  symptoms.    I will see him back in 6 months for a clinic visit and to go over his symptomatology.  He also tells me that he has been having increasing shortness of breath.  He has had previous coronary stents and has known coronary artery disease.  I would be requesting that he visit with his cardiologist sometime soon.

## 2021-06-19 NOTE — LETTER
Letter by Deshawn Kumar MD at      Author: Deshawn Kumar MD Service: -- Author Type: --    Filed:  Encounter Date: 8/30/2019 Status: (Other)         08/30/19          Zack Johns  [address] [facility] [address]    Dear Zack,    As a valued [facility] patient, your healthcare needs are our priority. We are contacting you in regards to your appointment on October 18, 2019 with Dr. Kumar. Our clinic records indicate we have attempted to contact you to re-schedule the appointment, but we are unable to leave a message for a call back due to you not having a voicemail box. To prevent further delays in your care please contact our office to re-schedule your appointment as soon as possible.      Sincerely,  Claudette

## 2021-06-19 NOTE — LETTER
Letter by Idania Duncan PT at      Author: Idania Duncan PT Service: -- Author Type: --    Filed:  Encounter Date: 11/14/2019 Status: Signed         November 14, 2019    Haim Galaviz III, MD  [address] [place] [address]    Patient: Zack Johns   MR Number: [id number]   YOB: 1934   Date of Visit: 11/14/2019       Dear Dr. Guillaume MD:    Thank you for this referral.   We are seeing Zack Johns for Physical Therapy of cervical strain.    Medicare and/or Medicaid requires physician review and approval of the treatment plan. Please review the plan of care and verify that you agree with the therapy plan of care by co-signing this note.      Plan of Care  Authorization / Certification Start Date: 11/14/19  Authorization / Certification End Date: 01/13/20  Authorization / Certification Number of Visits: 6-8  Communication with: Referral Source  Patient Related Instruction: Nature of Condition;Treatment plan and rationale;Self Care instruction;Basis of treatment;Posture;Precautions;Next steps;Expected outcome  Times per Week: 1  Number of Weeks: every other week  Number of Visits: 6-8  Discharge Planning: patient will be discharge to self care when goals met.  Therapeutic Exercise: ROM;Stretching;Strengthening  Manual Therapy: myofascial release      Goals  Pt. will demonstrate/verbalize independence in self-management of condition in : 6 weeks  Pt. will be independent with home exercise program in : 6 weeks  Patient [place]: for driving;for watching traffic;for conversation;with partial ROM;with less pain;in 6 weeks  Patient will reach / maintain arm movement: forward;overhead;for home chores;for dressing;with less pain;in 6 weeks          If you have any questions or concerns, please don't hesitate to call.    Sincerely,    Idania Duncan PT      CC  No Recipients        Physician recommendation:     ___ Follow therapist's recommendation        ___ Modify  therapy      I certify the need for these services furnished within this plan and while under my care.    Referring Provider's  Printed Name:   _____________________________________________    Referring Provider's signature:  __________________________________________________  Date/time:    ________________        After signing this form, please return to the fax number in the header.  Thank you.

## 2021-06-19 NOTE — PROGRESS NOTES
Mr. Johns returns to clinic for follow-up.  He continues to have short distance lifestyle limiting claudication in the right lower extremity.  I reviewed the CT angiogram of the abdomen and pelvis with runoff.  He has high-grade stenosis of the origin of the right common iliac artery and moderate stenosis of the left common iliac artery.  He has 80-90% stenosis of the proximal superficial femoral artery which I suspect is the endpoint of the previous endarterectomy.  He also has multiple areas of stenosis in the superficial femoral artery along with a few areas of short segment occlusion.  My plan would have been to stent the right common and left common iliac artery as well as redo endarterectomy of the right common femoral artery and endovascular intervention for the superficial femoral artery.  He recently underwent a stress test which is grossly positive.  I have gotten in touch with Dr. Garcia from intervention cardiology for potential cardiac catheterization and intervention as necessary.  Once that is completed we can proceed with surgical intervention.  All of this was explained to him in detail.  All questions answered.

## 2021-06-20 NOTE — ANESTHESIA PREPROCEDURE EVALUATION
Anesthesia Evaluation      Patient summary reviewed     Airway   Mallampati: III   Pulmonary - negative ROS and normal exam                          Cardiovascular - normal exam  (+) hypertension, past MI, CAD, CABG/stent, ,     ECG reviewed     ROS comment: Recent stents x 2  EF 60%     Neuro/Psych - negative ROS     Endo/Other    (+) diabetes mellitus type 2 well controlled,      GI/Hepatic/Renal - negative ROS      Other findings: Hb 12.6, K 4.1      Dental    (+) lower dentures                       Anesthesia Plan  Planned anesthetic: general endotracheal    ASA 3   Induction: intravenous   Anesthetic plan and risks discussed with: patient  Anesthesia plan special considerations: video-assisted,   Post-op plan: routine recovery

## 2021-06-20 NOTE — PROGRESS NOTES
Pt came in for his 2nd day of cardiac rehab. He had L/E PAD surgery on 10/2/2018 and is still quite sore and we did not get a resume Outpatient CR order. He also had some low Hgb at discharge and has a f/u on 10/19/2018. We are going to hold him from any further rehab until he feels better and we get a resume cardiac rehab order.

## 2021-06-20 NOTE — PROGRESS NOTES
Surgery/Procedure: Endareteectomy femorla iliac stent femoral artery angioplasty and stent     Special Equipment: vascular c-arm     Location: [facility]    Date: 10/3/2018    Time:1200pm     Surgeon: Dr. Kumar    Assist: to be determined     Length of Surgery: 240 minutes    OR Confirmed/ :  Yes with franck  on 9/7/2018    Orders In:  Yes    Provider Team Notified:  Yes    Entered on [employer] Calendar:  Yes    Post Op: 10/19/2018 uls and ov Dr Kumar     Pre-op Instructions Reviewed with Nurse:  Lonny

## 2021-06-20 NOTE — ANESTHESIA CARE TRANSFER NOTE
Last vitals:   Vitals:    10/03/18 1704   BP: 142/68   Pulse: 82   Resp: 20   Temp: 36.4  C (97.6  F)   SpO2: 100%     Patient's level of consciousness is awake and drowsy  Spontaneous respirations: yes  Maintains airway independently: yes  Dentition unchanged: yes  Oropharynx: oropharynx clear of all foreign objects    QCDR Measures:  ASA# 20 - Surgical Safety Checklist: WHO surgical safety checklist completed prior to induction  PQRS# 430 - Adult PONV Prevention: 4558F - Pt received => 2 anti-emetic agents (different classes) preop & intraop  ASA# 8 - Peds PONV Prevention: NA - Not pediatric patient, not GA or 2 or more risk factors NOT present  PQRS# 424 - Barbara-op Temp Management: 4559F - At least one body temp DOCUMENTED => 35.5C or 95.9F within required timeframe  PQRS# 426 - PACU Transfer Protocol: - Transfer of care checklist used  ASA# 14 - Acute Post-op Pain: ASA14B - Patient did NOT experience pain >= 7 out of 10

## 2021-06-20 NOTE — ANESTHESIA POSTPROCEDURE EVALUATION
Patient: Zack Johns  ENDARTERECTOMY, FEMORAL - ILIAC STENT, FEMORAL ARTERY ANGIOPLASTY AND STENT, LEFT FEMORAL ARTERY CUTDOWN   Anesthesia type: general    Patient location: PACU  Last vitals:   Vitals:    10/03/18 2000   BP: 97/51   Pulse: 83   Resp: 14   Temp:    SpO2: 98%     Post vital signs: stable  Level of consciousness: awake and responds to simple questions  Post-anesthesia pain: pain controlled  Post-anesthesia nausea and vomiting: no  Pulmonary: face mask  Cardiovascular: stable and blood pressure at baseline  Hydration: adequate  Anesthetic events: yes: Pt experienced episode of unresponsiveness with HR less than 40 bpm. Pt received mask ventilation briefly and was restored to consciousness and stable VS.    QCDR Measures:  ASA# 11 - Barbara-op Cardiac Arrest: ASA11B - Patient did NOT experience unanticipated cardiac arrest  ASA# 12 - Barbara-op Mortality Rate: ASA12B - Patient did NOT die  ASA# 13 - PACU Re-Intubation Rate: ASA13B - Patient did NOT require a new airway mgmt  ASA# 10 - Composite Anes Safety: ASA10A - No serious adverse event    Additional Notes:Repeat 12 lead EKG showed no changes from baseline.

## 2021-06-20 NOTE — PROGRESS NOTES
ITP ASSESSMENT   Assessment Day: Initial  Session Number: 1  Precautions: Falls/ PAD/ Arthritis of shoulders  Diagnosis: Stent  Risk Stratification: Medium  Referring Provider: Jeane Garcia MD  EXERCISE  Exercise Assessment: Initial   Pt completed 11.5 min of low level exercise the first session of CR without sx's. Met level was 1.9                       Exercise Plan  Goals Next 30 days  ADL'S: Resume yardwork without sx's  Leisure: Resume walking program  Work: Continue volunteer        Education Goals: All goals in this section met  Education Goals Met: Patient can state cardiac s/s and appropriate emergency response.;Has system for taking medication.;Medication review.    Exercise Prescription  Exercise Mode: Treadmill;Bike;Nustep;Arm Erg.  Frequency: 2 x week  Duration: 25-40 min  Intensity / THR: 20-30 beats above resting heart rate  RPE 11-14  Progression / Met level: 2-2.6  Resistive Training?: Yes (As tolerated)    Current Exercise (mins/week): 5    Interventions  Home Exercise:  Mode: Walking/ TM  Frequency: 3-4 x week  Duration: 15-20 min    Education Material : Educational videos;Provide written material;Individual education and counseling;Offer educational classes    Education Completed  Exercise Education Completed: Cardiac Anatomy;Signs and Symptoms;Medication review;RPE;Emergency Plan;Home Exercise;Warm up/cool down;FITT Principles;BP/HR Reponse to exercise;Benefits of Exercise;End point of exercise            Exercise Follow-up/Discharge  Follow up/Discharge: Discussed home walking program NUTRITION  Nutrition Assessment: Initial    Nutrition Risk Factors:  Nutrition Risk Factors: Diabetes;Dyslipidemia  HbA1c: 6.1  Monitors blood sugar at home: No  Cholesterol: 135  LDL: 61  HDL: 45  Triglycerides: 145    Nutrition Plan  Interventions  Other Nutrition Intervention: Diet Class;Therapist/Pt Discussion;Educational Videos;Provide with Written Material    Education Completed  Nutrition  "Education Completed: Risk factor overview    Goals  Nutrition Goals (Next 30 days): Patient will follow a low sodium diet;Patient will follow a low saturated fat diet;Patient able to demonstrate carbohydrate counting;Patient can identify their risk factors for CAD    Goals Met  Nutrition Goals Met: Completed Nutritional Risk Screen;Provided Rate your Plate Survey;Reviewed Dietitian schedule    Height, Weight, and  BMI  Weight: 172 lb (78 kg)  Height: 5' 6\" (1.676 m)  BMI: 27.77    Nutrition Follow-up  Follow-up/Discharge: Enc pt to return diet habit survey and consult with the dietitian       Other Risk Factors  Other Risk Factor Assessment: Initial    HTN Risk Factor: Hypertension    Pre Exercise BP: 132/58  Post Exercise BP: 146/65    Hypertension Plan  Goals  HTN Goals: Follow low sodium diet;Take medication as prescribed;Exercises regularly    Goals Met  HTN Goals Met: Take medication as prescribed    HTN Interventions  HTN Interventions: Diet consult;Therapist/patient discussion;Provide written material;Offer educational classes;Offer educational videos    HTN Education Completed  HTN Education Completed: Medication review;Risk factor overview    Tobacco Risk Factor: NA      Risk Factor Follow-up   Follow-up/Discharge: Reviewed risk factors and offered education for risk factor management   PSYCHOSOCIAL  Psychosocial Assessment: Initial     [facility] Q of L Summary Score: 22    SHELBY-D Score: 0    Psychosocial Risk Factor: Stress (\"with recent health issues\")    Psychosocial Plan  Interventions  Interventions: Offer Spiritual Care consult;Offer educational videos and classes;Provide written material;Individual education and counseling    Education Completed  Education Completed: Relaxation/Coping Techniques;S/S of depression;Effects of stress on body    Goals  Goals (Next 30 days): Identify stressors;Practicing stress management skills    Goals Met  Goals Met: Identified Support system;Oriented to stress " management classes    Psychosocial Follow-up  Follow-up/Discharge: Reviewed effects of stress      Patient involved in Goal setting?: Yes    Signature: _____________________________________________________________    Date: __________________    Time: __________________See Doc Flowsheet

## 2021-06-20 NOTE — PROGRESS NOTES
Cardiac Rehab  Phase II Assessment    Assessment Date: 9-27-18    Diagnosis: CAD, Known  of RCA    Procedure: Successful staged PCI of RCA  and LUZ x 2   Date of Onset: 8-22-18  ICD/Pacemaker: No   Post-procedure Complications: none  ECG History: SB EF%:60%  Past Medical History:   Patient Active Problem List   Diagnosis     Essential hypertension     H/O endarterectomy     Type 2 diabetes mellitus (H)     Diverticular disease of large intestine     Hyperlipidemia     Acquired trigger finger     Coronary artery disease involving native coronary artery of native heart, angina presence unspecified     Abnormal cardiovascular stress test     Chronic total occlusion of native coronary artery     PAD (peripheral artery disease) (H)     Past Medical History:   Diagnosis Date     Actinic keratitis      Arthritis     bilat shoulders     CAD (coronary artery disease)      Cardiac arrest (H) 09/04/89     Claudication (H)      Diverticulosis      HTN (hypertension)      Hyperlipidemia      MI, old      Multiple pulmonary nodules      Peripheral vascular disease, unspecified      Syncope      Trigger finger, acquired      Type 2 diabetes mellitus (H) 11/6/2012     Past Surgical History:   Procedure Laterality Date     APPENDECTOMY       CARDIAC CATHETERIZATION  08/22/2018     of rca     CV CORONARY ANGIOGRAM N/A 8/6/2018    Procedure: Coronary Angiogram;  Surgeon: Jeane Garcia MD;  Location: [facility];  Service:      CV CORONARY ANGIOGRAM N/A 8/22/2018    Procedure: Coronary Angiogram;  Surgeon: Jeane Garcia MD;  Location: [facility];  Service:      CV LEFT HEART CATHETERIZATION WO LEFT VETRICULOGRAM Left 8/6/2018    Procedure: Left Heart Catheterization Without Left Ventriculogram;  Surgeon: Jeane Garcia MD;  Location: [facility] Lab;  Service:      CV LEFT HEART CATHETERIZATION WO LEFT VETRICULOGRAM Left 8/22/2018    Procedure: Left Heart Catheterization Without Left  Ventriculogram;  Surgeon: Jeane Garcia MD;  Location: [facility] Cath Lab;  Service:      FEMORAL ENDARTERECTOMY Bilateral 05/03/2017     KNEE ARTHROSCOPY       NOSE SURGERY         Physical Assessment  Precautions/ Physical Limitations: Falls /  PAD/ Arthritis of shoulders  Oxygen: No  O2 Sats: 95-96% Lung Sounds: Clear Edema: none  Incisions: na  Sleeping Pattern: fair   Appetite: good   Nutrition Risk Screen: no risk at this time    Pain  Location: none  Intensity: (0-10 scale) 0  Pt does have PAD and c/o leg discomfort with walking. He is scheduled for a femoral angiogram and possible stent placement on 11-3-18     Psychosocial/ Emotional Health  1. In the past 12 months, have you been in a relationship where you have been abused physically, emotionally, sexually or financially? No  notified: NA  2. Who do you turn to for emotional support?: Wife  3. Do you have cultural or spiritual needs? No  4. Have there been any major life changes in the past 12 months? No    Referral Information  Primary Physician: Reece Weir MD  Cardiologist: Anthony  Surgeon: jose    Home exercise/Equipment: TM    Patient's long-term goal(s): none    1. Living Accommodations: Home Steps: Yes      Support people at home: Wife   2. Marital Status:   3. Family is able to assist with cares      Alevism/Community involvement: yes  4. Recreation/Hobbies: Spend time with family/ Volunteer         See Doc Flowsheet

## 2021-06-21 ENCOUNTER — COMMUNICATION - HEALTHEAST (OUTPATIENT)
Dept: VASCULAR SURGERY | Facility: CLINIC | Age: 86
End: 2021-06-21

## 2021-06-21 NOTE — PROGRESS NOTES
"ITP ASSESSMENT   Assessment Day: 60 Day    Session Number: 9  Precautions: Falls    Diagnosis: Stent    Risk Stratification: Medium    Referring Provider: Reece Weir MD  EXERCISE  Exercise Assessment: Reassessment     Pt continues to participate/tolerates low level outpatient cardiac rehab 2x/week                           Exercise Plan  Goals Next 30 days  ADL'S: Use TM 3-5x/week for 15-30 minutes    Work: Volunteer  more often.      Education Goals: All goals in this section met    Education Goals Met: Patient can state cardiac s/s and appropriate emergency response.;Has system for taking medication.;Medication review.                          Goals Met    Initial ADL's goals met: Not met. Due to L/E surgery he was unable to do yardwork.    Initial Leisure goals met: Not met, he is just starting to walk on TM.    Intial Work goals met: Met. 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    Initial Progression: Doing well enough in rehab. Very little tolerance to increase workloads.      Exercise Prescription  Exercise Mode: Treadmill;Bike;Nustep;Arm Erg.    Frequency: 2x/week    Duration: 30-40 minutes    Intensity / THR: 20-30 beats above resting heart rate    RPE 11-14  Progression / Met level: 2-3    Resistive Training?: Yes      Current Exercise (mins/week): 20      Interventions  Home Exercise:  Mode: walking/TM    Frequency: daily    Duration: 20-30 minutes      Education Material : Educational videos;Provide written material;Individual education and counseling;Offer educational classes      Education Completed  Exercise Education Completed: Cardiac Anatomy;Signs and Symptoms;Medication review;RPE;Emergency Plan;Home Exercise;Warm up/cool down;FITT Principles;BP/HR Reponse to exercise;Benefits of Exercise;End point of exercise              Exercise Follow-up/Discharge  Follow up/Discharge: He has been using his TM \"a little\" knows that he does need to increase his home exercise to get " "stronger.   NUTRITION  Nutrition Assessment: Reassessment      Nutrition Risk Factors:  Nutrition Risk Factors: Diabetes;Dyslipidemia  HbA1c: 6.1  Monitors blood sugar at home: No  Cholesterol: 135  LDL: 61  HDL: 45  Triglycerides: 145      Nutrition Plan  Interventions    Other Nutrition Intervention: Diet Class;Therapist/Pt Discussion;Educational Videos;Provide with Written Material      Education Completed  Nutrition Education Completed: Low Saturated fat diet;Risk factor overview      Goals  Nutrition Goals (Next 30 days): Patient will follow a low sodium diet;Patient will follow a low saturated fat diet;Patient able to demonstrate carbohydrate counting;Patient can identify their risk factors for CAD      Goals Met  Nutrition Goals Met: Completed Nutritional Risk Screen;Provided Rate your Plate Survey;Reviewed Dietitian schedule      Height, Weight, and  BMI  Weight: 168 lb (76.2 kg)  Height: 5' 6\" (1.676 m)  BMI: 27.13      Nutrition Follow-up  Follow-up/Discharge: will encourage him to complete diet survery         Other Risk Factors  Other Risk Factor Assessment: Reassessment      HTN Risk Factor: Hypertension      Pre Exercise BP: 120/66  Post Exercise BP: 120/68      Hypertension Plan  Goals  HTN Goals: Follow low sodium diet;Take medication as prescribed;Exercises regularly      Goals Met  HTN Goals Met: Take medication as prescribed      HTN Interventions  HTN Interventions: Diet consult;Therapist/patient discussion;Provide written material;Offer educational classes;Offer educational videos      HTN Education Completed  HTN Education Completed: Medication review;Risk factor overview      Tobacco Risk Factor: NA        Risk Factor Follow-up   Follow-up/Discharge: Still has not met with dietician, schedule is not working out for him to meet with her.     PSYCHOSOCIAL  Psychosocial Assessment: Reassessment       [facility] LUCY Q of L Summary Score: 22      SHELBY-D Score: 0      Psychosocial Risk Factor: " Stress      Psychosocial Plan  Interventions  Interventions: Offer Spiritual Care consult;Offer educational videos and classes;Provide written material;Individual education and counseling      Education Completed  Education Completed: Relaxation/Coping Techniques;S/S of depression;Effects of stress on body      Goals  Goals (Next 30 days): Identify stressors;Practicing stress management skills      Goals Met  Goals Met: Identified Support system;Oriented to stress management classes      Psychosocial Follow-up  Follow-up/Discharge: handling stress, if any without problerm.             Patient involved in Goal setting?: Yes      Signature: _____________________________________________________________    Date: __________________    Time: __________________See Doc Flowsheet

## 2021-06-21 NOTE — PROGRESS NOTES
CARDIOLOGY CLINIC CONSULT NOTE     Assessment/Plan:   1.  Coronary artery disease, status post inferior myocardial infarction in 1989.  Recent opening of chronic total occlusion of the right coronary artery apparently and without associated symptoms.  Doing well  2.  Peripheral arterial disease, status post recent revascularization.  Encouraged to follow-up with surgeon to answer questions regarding activity and incisional healing that were not answered with previous visit.  3.  Atherosclerotic disease.  Encouraged to continue his atorvastatin and walk at a gradually increasing pace.  4.  Essential hypertension.  Less than ideal control today.  Advised to monitor his blood pressure and check in with primary care provider regarding medication adjustments.    Follow-up one year or as needed     History of Present Illness:     It is my pleasure to see Zack Johns at the [facility] [place] [facility] clinic for evaluation of coronary artery disease.    Zack Johns is a 84 y.o. male with a past medical history of coronary artery disease, status post inferior myocardial infarction 1989, has had no recurrent anginal symptoms since that time.  He presented this summer with claudication symptoms.  He was seen by a doctor Breanna, who suggested peripheral revascularization surgery.  Preoperatively he underwent nuclear stress testing which showed inferior infarct and ischemia.  Coronary angiography showed chronic total occlusion of the right coronary artery.  Although he was having no symptoms of angina, he underwent percutaneous opening of his chronic total occlusion.  This procedure went well.  He subsequently underwent peripheral revascularization.  Since that time, he has been fearful of resuming activities without direction.  He continues to have incisional pain in his thighs, but no calf claudication.  He did try to see Dr. Carlos in follow-up but reports that the physician was running late and did not give time to  answer his questions.  He thought he was coming in today to see vascular surgery for follow-up.    Since his coronary procedure, he is continued to feel well.  He is chronically short of breath with activities but this is begun to improve since he is started walking again.  He was able to walk on the treadmill at home last night for 12 minutes with some shortness of breath but no change in his thigh pain with activities.  He had no claudication in the calf.  He has not followed up with his primary care provider, he sees him rarely.    Past Medical History:     Patient Active Problem List   Diagnosis     Essential hypertension     H/O endarterectomy     Type 2 diabetes mellitus (H)     Diverticular disease of large intestine     Hyperlipidemia     Acquired trigger finger     Coronary artery disease involving native coronary artery of native heart, angina presence unspecified     Abnormal cardiovascular stress test     Chronic total occlusion of native coronary artery     PAD (peripheral artery disease) (H)       Past Surgical History:     Past Surgical History:   Procedure Laterality Date     APPENDECTOMY       CARDIAC CATHETERIZATION  08/22/2018     of rca     CV CORONARY ANGIOGRAM N/A 8/6/2018    Procedure: Coronary Angiogram;  Surgeon: Jeane Garcia MD;  Location: [facility] Lab;  Service:      CV CORONARY ANGIOGRAM N/A 8/22/2018    Procedure: Coronary Angiogram;  Surgeon: Jeane Garcia MD;  Location: [facility];  Service:      CV LEFT HEART CATHETERIZATION WO LEFT VETRICULOGRAM Left 8/6/2018    Procedure: Left Heart Catheterization Without Left Ventriculogram;  Surgeon: Jeane Garcia MD;  Location: [facility] Lab;  Service:      CV LEFT HEART CATHETERIZATION WO LEFT VETRICULOGRAM Left 8/22/2018    Procedure: Left Heart Catheterization Without Left Ventriculogram;  Surgeon: Jeane Garcia MD;  Location: [facility] Lab;  Service:      FEMORAL ENDARTERECTOMY Bilateral 05/03/2017      KNEE ARTHROSCOPY       NOSE SURGERY         Family History:     Family History   Problem Relation Age of Onset     No Medical Problems Mother      No Medical Problems Father      Family history reviewed and is not pertinent to the chief complaint or presenting problem    Social History:    reports that he quit smoking about 34 years ago. His smoking use included Cigarettes. He has a 80.00 pack-year smoking history. He has never used smokeless tobacco. He reports that he drinks about 3.6 oz of alcohol per week  He reports that he does not use illicit drugs.    Exercise: Fearful of walking because he has been given no instruction by Dr. Carlos.  Has now started physical therapy but is upset that it took so long.          Meds:     Current Outpatient Prescriptions   Medication Sig Dispense Refill     acetaminophen (TYLENOL) 500 MG tablet Take 500-1,000 mg by mouth every 6 (six) hours as needed for pain or fever.        ascorbic acid (VITAMIN C) 500 MG tablet Take 500 mg by mouth daily.       aspirin 81 MG EC tablet Take 81 mg by mouth daily.       atorvastatin (LIPITOR) 40 MG tablet Take 40 mg by mouth bedtime.       lisinopril-hydrochlorothiazide (PRINZIDE,ZESTORETIC) 20-12.5 mg per tablet Take 1 tablet by mouth daily.       metoprolol (LOPRESSOR) 25 MG tablet Take 12.5 mg by mouth 2 (two) times a day.        multivitamin (ONE A DAY) per tablet Take 1 tablet by mouth daily.        nitroglycerin (NITROSTAT) 0.4 MG SL tablet Place 0.4 mg under the tongue every 5 (five) minutes as needed for chest pain.       ticagrelor (BRILINTA) 90 mg Tab Take 1 tablet (90 mg total) by mouth 2 (two) times a day. 180 tablet 3     zinc 50 mg Tab Take 50 mg by mouth daily.        No current facility-administered medications for this visit.        Allergies:   Review of patient's allergies indicates no known allergies.    Review of Systems:     General: WNL  Eyes: WNL  Ears/Nose/Throat: WNL  Lungs: Shortness of Breath  Heart: Shortness  "of Breath with activity  Stomach: Diarrhea  Bladder: WNL  Muscle/Joints: WNL  Skin: Poor Wound Healing  Nervous System: WNL  Mental Health: WNL     Blood: WNL        Objective:      Physical Exam  167 lb (75.8 kg)  5' 7\" (1.702 m)  Body mass index is 26.16 kg/(m^2).  /52 (Patient Site: Right Arm, Patient Position: Sitting, Cuff Size: Adult Regular)  Pulse 74  Resp 18  Ht 5' 7\" (1.702 m)  Wt 167 lb (75.8 kg)  BMI 26.16 kg/m2      General Appearance : Awake, Alert, No acute distress  HEENT: No Scleral icterus; the mucous membranes were pink and moist.  Conjunctivae not injected  Neck:  No cervical bruits, jugular venous distention, or thyromegaly   Chest: The spine was straight  Lungs: Respirations unlabored; the lungs are clear to auscultation.  No wheezing   Cardiovascular:   Normal point of maximal impulse.  Auscultation reveals normal first and second heart sounds with no murmurs, rubs, or gallops.  Carotid, radial, and dorsalis pedal pulses are intact and symmetric.  Abdomen: No organomegaly, masses, bruits, or tenderness. Bowels sounds are present  Extremities: Diminished distal pulses but warm.  No edema  Skin: No xanthelasma. Warm, Dry.  Musculoskeletal: No tenderness.  Neurologic: Alert and oriented ×3.        Cardiac Imaging Studies:  Nuclear stress test 6/2018:  Conclusion     The pharmacologic nuclear stress test is abnormal.    Stress nuclear images demonstrate a medium size area of moderate to severe ischemia involving the inferior and inferoseptal wall, from base to apex, with small area of transmural infarction involving the mid to basal inferior wall.    The left ventricular ejection fraction is 60% with inferior hypokinesis.    The images of 2/13/2008 were not available for comparison.    The patient is at an intermediate risk of future cardiac ischemic events.     Coronary angiogram 7/2018:    LM 30% mid stenosis  LAD mild to moderate diffuse dz  Ramus 50-60% proximal stenosis  LCx mild " dz, small vessel  RCA occluded proximally; collateral filling of PDA/NURYS from LCA    Lab Review   Lab Results   Component Value Date     10/05/2018    K 4.1 10/05/2018     (H) 10/05/2018    CO2 22 10/05/2018    BUN 27 10/05/2018    CREATININE 1.11 10/05/2018    CALCIUM 7.7 (L) 10/05/2018     Lab Results   Component Value Date    WBC 9.4 10/07/2018    HGB 7.4 (L) 10/07/2018    HCT 22.3 (L) 10/07/2018    MCV 93 10/07/2018     10/07/2018     No results found for: CHOL, TRIG, HDL, LDLCALC  Lab Results   Component Value Date    TROPONINI <0.01 06/17/2018     Lab Results   Component Value Date    BNP 59 06/16/2018     No results found for: TSH    Jayjay Cruz MD [facility]    His note created using Dragon voice recognition software. Sound alike errors may have escaped editing.

## 2021-06-21 NOTE — PROGRESS NOTES
Barb Johns underwent left superficial femoral artery cutdown and right common femoral artery cutdown for the purpose of placement of bilateral common iliac artery stents.  Surgery was done in 3 October 2018.  Patient tells me that he is recovering slowly and is actually able to walk more.  Both surgical incisions are healing well.  On ultrasonography his iliac stents are widely patent.  Interestingly there is no improvement in the ankle-brachial indices on either side.  I discussed the findings with the patient.  Our recommendation would be to continue with conservative management at this time.  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  And I do not be possible to do an up and over approach from the left side.  If he continues to have significant pain after at least 6 months of conservative therapy then we will consider doing a right femoral-popliteal bypass.  We will see him back in 6 months.

## 2021-06-21 NOTE — PROGRESS NOTES
ITP ASSESSMENT   Assessment Day: 30 Day  Session Number: 2  Precautions: falls  Diagnosis: Stent  Risk Stratification: Medium  Referring Provider: Reece Weir MD  EXERCISE  Exercise Assessment: Reassessment  Pt has not returned for outpatient cardiac rehab since his 10/3/2018 L/E stent placement. He is doing well despite some tightness in the groin surgical sight.                          Exercise Plan  Goals Next 30 days  Work: Volunteer  more often.    Education Goals: All goals in this section met  Education Goals Met: Patient can state cardiac s/s and appropriate emergency response.;Has system for taking medication.;Medication review.                        Goals Met  Initial ADL's goals met: Not met. Due to L/E surgery he was unable to do yardwork.  Initial Leisure goals met: Not met, he is just starting to walk on TM.  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   Initial Progression: Slow progress due to L/E surgery.     Exercise Prescription  Exercise Mode: Treadmill;Bike;Nustep;Arm Erg.  Frequency: 2x/week  Duration: 30-40 minutes  Intensity / THR: 20-30 beats above resting heart rate  RPE 11-14  Progression / Met level: 2-3  Resistive Training?: Yes    Current Exercise (mins/week): 20    Interventions  Home Exercise:  Mode: walking/TM  Frequency: daily   Duration: 10-15 minutes    Education Material : Educational videos;Provide written material;Individual education and counseling;Offer educational classes    Education Completed  Exercise Education Completed: Cardiac Anatomy;Signs and Symptoms;Medication review;RPE;Emergency Plan;Home Exercise;Warm up/cool down;FITT Principles;BP/HR Reponse to exercise;Benefits of Exercise;End point of exercise            Exercise Follow-up/Discharge  Follow up/Discharge: Encouraged him to slowly get back in to home TM use.         NUTRITION  Nutrition Assessment: Reassessment    Nutrition Risk Factors:  Nutrition Risk Factors:  "Diabetes;Dyslipidemia  HbA1c: 6.1  Monitors blood sugar at home: No  Cholesterol: 135  LDL: 61  HDL: 45  Triglycerides: 145    Nutrition Plan  Interventions  Other Nutrition Intervention: Diet Class;Therapist/Pt Discussion    Education Completed  Nutrition Education Completed: Risk factor overview    Goals  Nutrition Goals (Next 30 days): Patient will follow a low sodium diet;Patient will follow a low saturated fat diet;Patient able to demonstrate carbohydrate counting;Patient can identify their risk factors for CAD    Goals Met  Nutrition Goals Met: Completed Nutritional Risk Screen;Provided Rate your Plate Survey;Reviewed Dietitian schedule    Height, Weight, and  BMI  Weight: 167 lb (75.8 kg)  Height: 5' 6\" (1.676 m)  BMI: 26.97    Nutrition Follow-up  Follow-up/Discharge: will encourage him to complete diet survery     Other Risk Factors  Other Risk Factor Assessment: Reassessment    HTN Risk Factor: Hypertension    Pre Exercise BP: 130/60  Post Exercise BP: 136/48    Hypertension Plan  Goals  HTN Goals: Follow low sodium diet;Take medication as prescribed;Exercises regularly    Goals Met  HTN Goals Met: Take medication as prescribed    HTN Interventions  HTN Interventions: Diet consult;Therapist/patient discussion;Provide written material;Offer educational classes;Offer educational videos    HTN Education Completed  HTN Education Completed: Medication review;Risk factor overview    Tobacco Risk Factor: NA      Risk Factor Follow-up   Follow-up/Discharge: Will need to reschedule his times to allow him to see the dietician.        PSYCHOSOCIAL  Psychosocial Assessment: Reassessment     [place] COOP Q of L Summary Score: 22    SHELBY-D Score: 0    Psychosocial Risk Factor: Stress    Psychosocial Plan  Interventions  Interventions: Offer Spiritual Care consult;Offer educational videos and classes;Provide written material;Individual education and counseling    Education Completed  Education Completed: " Relaxation/Coping Techniques;S/S of depression;Effects of stress on body    Goals  Goals (Next 30 days): Identify stressors;Practicing stress management skills    Goals Met  Goals Met: Identified Support system;Oriented to stress management classes    Psychosocial Follow-up  Follow-up/Discharge: Starting to feel better, so stress is less.          Patient involved in Goal setting?: Yes    Signature: _____________________________________________________________    Date: __________________    Time: __________________See Doc Flowsheet

## 2021-06-23 NOTE — PROGRESS NOTES
ITP ASSESSMENT   Assessment Day: 120 Day    Session Number: 22  Precautions: Falls    Diagnosis: Stent    Risk Stratification: Medium    Referring Provider: Reece Weir MD   ITP: Dr. Villeda  EXERCISE  Exercise Assessment: Reassessment    Pt exercised for 40+ minutes at 2.6 mets without any complaints.                          Exercise Plan  Goals Next 30 days  ADL'S: Use Treadmill Daily 20-30 minutes    Leisure: Meet with dietician    Education Goals: All goals in this section met    Education Goals Met: Patient can state cardiac s/s and appropriate emergency response.;Has system for taking medication.;Medication review.                        Goals Met  90 day ADL'S goals met: Goal Met - Pt is using TM at home    90 day Leisure goals met: Goal Not Met  90 Day Progress: Pt is slowly progressing towards goals.    60 day ADL'S goals met: Has used TM 2x/week    60 Day Progression: Pt hurt his knee, limited TM use.    Initial ADL's goals met: Not met. Due to L/E surgery he was unable to do yardwork.    Initial Leisure goals met: Not met, he is just starting to walk on TM.    Intial Work goals met: Met. 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    Initial Progression: Doing well enough in rehab. Very little tolerance to increase workloads.    Exercise Prescription  Exercise Mode: Treadmill;Bike;Nustep;Arm Erg.    Frequency: 2x/week    Duration: 40 Minutes    Intensity / THR: 20-30 beats above resting heart rate    RPE 11-14  Progression / Met level: 2.5-3.8    Resistive Training?: Yes    Current Exercise (mins/week): 120    Interventions  Home Exercise:  Mode: Walking/TM    Frequency: Daily    Duration: 20-30 Minutes    Education Material : Educational videos;Provide written material;Individual education and counseling;Offer educational classes    Education Completed  Exercise Education Completed: Cardiac Anatomy;Signs and Symptoms;Medication review;RPE;Emergency Plan;Home Exercise;Warm up/cool down;FITT  "Principles;BP/HR Reponse to exercise;Benefits of Exercise;End point of exercise            Exercise Follow-up/Discharge  Follow up/Discharge: Reviewed home exercise program. Encouraged to increase as tolerated.   NUTRITION  Nutrition Assessment: Reassessment    Nutrition Risk Factors:  Nutrition Risk Factors: Diabetes;Dyslipidemia  HbA1c: 6.1  Monitors blood sugar at home: No  Cholesterol: 135  LDL: 61  HDL: 45  Triglycerides: 145    Nutrition Plan  Interventions  Other Nutrition Intervention: Diet Class;Therapist/Pt Discussion;Educational Videos;Provide with Written Material    Education Completed  Nutrition Education Completed: Low Saturated fat diet;Risk factor overview    Goals  Nutrition Goals (Next 30 days): Patient will follow a low sodium diet;Patient will follow a low saturated fat diet;Patient able to demonstrate carbohydrate counting;Patient can identify their risk factors for CAD;Patient knows appropriate portion size    Goals Met  Nutrition Goals Met: Completed Nutritional Risk Screen;Provided Rate your Plate Survey;Reviewed Dietitian schedule    Height, Weight, and  BMI  Weight: 168 lb (76.2 kg)  Height: 5' 6\" (1.676 m)  BMI: 27.13    Nutrition Follow-up  Follow-up/Discharge: Will encourage pt to meet with dietician         Other Risk Factors  Other Risk Factor Assessment: Reassessment    HTN Risk Factor: Hypertension    Pre Exercise BP: 110/50  Post Exercise BP: 110/52    Hypertension Plan  Goals  HTN Goals: Exercises regularly    Goals Met  HTN Goals Met: Take medication as prescribed;Follow low sodium diet    HTN Interventions  HTN Interventions: Diet consult;Therapist/patient discussion;Provide written material;Offer educational videos;Offer educational classes    HTN Education Completed  HTN Education Completed: Medication review;Risk factor overview    Tobacco Risk Factor: NA    Risk Factor Follow-up   Follow-up/Discharge: Continue to offer support and education on risk factor management.     " PSYCHOSOCIAL  Psychosocial Assessment: Reassessment    [facility]: [facility]    SHELBY-D Score: 0    Psychosocial Risk Factor: Stress    Psychosocial Plan  Interventions  Interventions: Offer Spiritual Care consult;Offer educational videos and classes;Provide written material;Individual education and counseling    Education Completed  Education Completed: Relaxation/Coping Techniques;S/S of depression;Effects of stress on body    Goals  Goals (Next 30 days): Practicing stress management skills;Improvement in [facility] COOP score    Goals Met  Goals Met: Identified Support system;Oriented to stress management classes    Psychosocial Follow-up  Follow-up/Discharge: Pt states having minimal stress. States he has a good support system.       Patient involved in Goal setting?: Yes      Signature: _____________________________________________________________    Date: __________________    Time: __________________

## 2021-06-23 NOTE — PATIENT INSTRUCTIONS - HE
[facility] Exercise Program    Exercise Goal:    Modality Duration Intensity   Warm-up 5 min low        Treadmill 30-60 min 2.0-2.5mph        Walking 30-60 min 2mph pace        Cool Down 5 min low   Strength     Stretching       Target Heart Rate: 20-20>resting heart rate    Range of Perceived Exertion: 11-13    Perceived exertion  (RPE)  Jermaine Scale   6  7      Very, very light  8  9      Very light  10  11    Fairly light  12  13    Somewhat hard  14  15    Hard  16  17    Very hard  18  19    Very, very hard  20     Special Comments/ Recommendations:  -follow up with Physician  -exercise daily  -follow a cardiac diet  -have fun!    Stop Exercise!!! If any of the following occur:    Angina/chest pain    Dizziness    Excessive perspiration/cold sweats    Abnormal shortness of breath    Changes in heart rate (slow, fast, irregular)    Sudden fatigue or numbness    Nausea      Also...    Avoid extreme temperatures - exercise indoors if necessary    Wait at least 1 hour after a meal before strenuous activity    Do not exercise if you have a fever or are ill    Wear comfortable, supportive athletic clothing

## 2021-06-23 NOTE — PROGRESS NOTES
ITP ASSESSMENT   Assessment Day: 150 Day - Discharge    Session Number: 24  Precautions: Falls    Diagnosis: Stent    Risk Stratification: Medium    Referring Provider: Reece Weir MD  EXERCISE  Exercise Assessment: Discharge     Completed 24 sessions of rehab at peak of 2.5                       Exercise Plan    Education Goals Met: Patient can state cardiac s/s and appropriate emergency response.;Has system for taking medication.;Medication review.                          Goals Met    120 day ADL'S goals met: Pt is using TM 30 min most days - goal met    120 Day Progress: Progressed well - ready for discharge      90 day ADL'S goals met: Goal Met - Pt is using TM at home    90 day Leisure goals met: Goal Not Met      90 Day Progress: Pt is slowly progressing towards goals.      60 day ADL'S goals met: Has used TM 2x/week    60 Day Progression: Pt hurt his knee, limited TM use.        Initial ADL's goals met: Not met. Due to L/E surgery he was unable to do yardwork.    Initial Leisure goals met: Not met, he is just starting to walk on TM.    Intial Work goals met: Met. 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    Initial Progression: Doing well enough in rehab. Very little tolerance to increase workloads.        Current Exercise (mins/week): 200      Interventions  Home Exercise:  Mode: TM    Frequency: 4-7 days/week    Duration: 30-40'      Education Material : Educational videos;Provide written material;Offer educational classes;Individual education and counseling      Education Completed  Exercise Education Completed: Cardiac Anatomy;Signs and Symptoms;Medication review;RPE;Emergency Plan;Home Exercise;Warm up/cool down;FITT Principles;BP/HR Reponse to exercise;Benefits of Exercise;End point of exercise              Exercise Follow-up/Discharge  Follow up/Discharge: Reviewed and issued home exercise program   NUTRITION  Nutrition Assessment: Discharge      Nutrition Risk Factors:  Nutrition Risk  "Factors: Diabetes;Dyslipidemia  HbA1c: 6.1  Monitors blood sugar at home: No  Cholesterol: 135  LDL: 61  HDL: 45  Triglycerides: 145      Nutrition Plan  Interventions    Other Nutrition Intervention: Diet Class;Therapist/Pt Discussion;Educational Videos;Provide with Written Material      Education Completed  Nutrition Education Completed: Low Saturated fat diet;Risk factor overview;Low sodium diet;Weight management        Goals Met  Nutrition Goals Met: Completed Nutritional Risk Screen;Provided Rate your Plate Survey;Reviewed Dietitian schedule;Patient can identify their risk factors for CAD;Patient follows a low sodium diet;Patient has lost weight      Height, Weight, and  BMI  Weight: 168 lb (76.2 kg)  Height: 5' 6\" (1.676 m)  BMI: 27.13    Pre BMI: 27.13     Nutrition Follow-up  Follow-up/Discharge: Pt states understanding of heart healthy diet         Other Risk Factors  Other Risk Factor Assessment: Discharge      HTN Risk Factor: Hypertension      Pre Exercise BP: 112/56  Post Exercise BP: 130/58      Hypertension Plan    Goals Met  HTN Goals Met: Follow low sodium diet;Take medication as prescribed;Exercises regularly      HTN Interventions  HTN Interventions: Diet consult;Therapist/patient discussion;Provide written material;Offer educational videos;Offer educational classes      HTN Education Completed  HTN Education Completed: Low sodium diet;Medication review;Risk factor overview      Tobacco Risk Factor: NA    Risk Factor Follow-up   Follow-up/Discharge: Reviewed all risk factors and plan for RF modifications     PSYCHOSOCIAL  Psychosocial Assessment: Discharge       [place] COOP Q of L Summary Score: 16    [facility] Q [facility] L Summary Score: 22     SHELBY-D Score: 0    Pre SHELBY-D Score: 0     Psychosocial Risk Factor: Stress      Psychosocial Plan  Interventions  If SHELBY-D > 15 send letter to MD  Interventions: Offer Spiritual Care consult;Offer educational videos and classes;Provide written " material;Individual education and counseling      Education Completed  Education Completed: Relaxation/Coping Techniques;Effects of stress on body        Goals Met  Goals Met: Identified Support system;Oriented to stress management classes;Identify stressors;Improvement in [place] COOP score;Practicing stress management skills      Psychosocial Follow-up  Follow-up/Discharge: Reviewed importance of stress mgmt       Patient involved in Goal setting?: Yes      Signature: _____________________________________________________________    Date: __________________    Time: __________________

## 2021-06-26 NOTE — PROGRESS NOTES
Progress Notes by Lonny Damico RN at 10/19/2018 10:00 AM     Author: Lonny Damico RN Service: -- Author Type: Registered Nurse    Filed: 10/26/2018 12:33 PM Encounter Date: 10/19/2018 Status: Signed    : Lonny Damico RN (Registered Nurse)       2wk post op of 10/3/18 Bilateral Femoral Iliac endarterectomy. ASA and statin    Left leg is still sore due to bruising from surgery. Incisions are dry and intact. No signs of infection. Still has a few steristrips on the right groin.       Right groin incision      Left groin incision.

## 2021-06-26 NOTE — PROGRESS NOTES
Progress Notes by Janine Corbin NP at 9/7/2018 10:30 AM     Author: Janine Corbin NP Service: -- Author Type: Nurse Practitioner    Filed: 9/7/2018 11:54 AM Encounter Date: 9/7/2018 Status: Signed    : Janine Corbin NP (Nurse Practitioner)                 Click to link to [facility] HEART CARE NOTE      Assessment/Recommendations   1.  Coronary artery disease with known  of pRCA (per angio on 8/6/18):An elective coronary angiogram on 8/6/2018 showed  of proximal RCA and he returned on 8/22/2018 and subsequently underwent successful staged PCI  of RCA  with a drug-eluting stent ×2. Dual antiplatelet therapy is being used with aspirin indefinitely and ticagrelor for 1 year.  We discussed the importance of antiplatelet therapy and talking with his cardiologist prior to stopping these medications for any reason.    Patient denies any  chest pain or angina since recent stent placement.Risk factor modification and lifestyle management topics were discussed including managing comorbidities, weight loss, heart healthy diet, exercise and stress reduction.  Patient doesn't have cardiac rehab order in place- will communicate this to Dr. Garcia.    2.  Dyslipidemia: Zack Johns is on high intensity statin therapy with Atorvastatin 40 mg daily. Most recent LDL is 61 in January 2108. Most recent ALT/AST in 2011 (WNL).  We discussed a diet low in saturated fat, weight loss, and exercise along with medication for better control of cholesterol.     3.  Hypertension: His blood pressure is 9[address]84 and HR 52.  Currently on metoprolol tartrate, lisinopril/HCTZ. Patient reports some cough. He was recommended to discuss with PCP for possible change to ARB.    4.  Diabetes: Most recent A1C is 6.1. We discussed the importance of tightly controlled blood sugars to minimize risk of worsening coronary artery disease. Defer to PCP for diabetes managment.    5.  History of known PAD with status  "post endarterectomy in May 2017: He is scheduled to have repeat femoral angiogram with possible stent placement and adenoidectomy on 10/3/2018 with Dr. Kumar.    Follow up with cardiologist in 8 weeks (needs new primary cardiology and prefers  location) and follow up with Dr. Kumar as scheduled in October post procedure.     History of Present Illness    Zack Johns is 83 y.o. with a significant past medical history of peripheral vascular disease with status post endarterectomy in May 2017, hypertension, diabetes type 2, hyperlipidemia, and recent diagnosis of coronary artery disease who is seen at [facility] for post coronary intervention follow up.  Patient initially had a consult with Dr. Kumar for ongoing leg pain and fatigue July,2018 . Per Dr. Kumar's note, \"He has high-grade stenosis of the origin of right common iliac artery and moderate stenosis of the left common iliac artery.  He was also noted to have 80-90% stenosis in proximal superficial femoral artery and multiple area of stenosis in the superficial femoral arteries along with few areas of short segment of occlusio\".  He was recommended to have repeat femoral angiogram with possible stent placement and repeat endarterectomy. He had also recommended for potential cardiac catheterization prior to his leg procedure. He underwent pharmacologic stress test showed medium-sized area of moderate to severe ischemia involving inferior inferolateral wall from base to apex with small area of transmural infarction involving mid to basal inferior wall with an EF of 60% with inferior hypokinesis. An elective coronary angiogram on 8/6/2018 showed  of proximal RCA and he returned on 8/22/2018 ans subsequently underwent successful staged PCI  of RCA  with a drug-eluting stent ×2. Dual antiplatelet therapy is being used with aspirin indefinitely and ticagrelor for 1 year.     Today, patient denies further chest pain since recent stent " placement. He realized that he has been experiencing left sided chest pain until recent placement since about 3 months ago.He further denies fatigue, lightheadedness, shortness of breath, dyspnea on exertion, orthopnea, PND, palpitations, chest pain, abdominal fullness/bloating and lower extremity edema. His main concern right now is leg fatigue and pain which limits his physical activity.  He is scheduled to have femoral angiogram with possible stent placement and a neurectomy on 10/3/2018.  He will be following up with Dr. Kumar post procedure.    Coronary Angiogram 8/22/18 - reviewed  Conclusion      82 yo M with known CAD (moderate LM, LAD dz, with an occluded RCA), referred for revascularization prior to planned vascular surgery     Successful PCI of prox to mid RCA  using retrograde approach, PTCA and finally stenting with overlapping 3x12 and 2.5x38 Synergy LUZ     0% residuals, KALIE 3 flow post     (Complex case with the use of multiple wires and support catheters to allow passage through occlusion)      Recommendations   ?   DAPT x 1 year   Ongoing risk factor control  Vascular surgery as planned by Dr Kumar        Physical Examination Review of Systems   Vitals:    09/07/18 1032   BP: 132/56   Pulse: (!) 52   Resp: 14   Temp: 97.9  F (36.6  C)     Body mass index is 26.31 kg/(m^2).  Wt Readings from Last 3 Encounters:   09/07/18 168 lb (76.2 kg)   08/23/18 171 lb (77.6 kg)   08/06/18 170 lb (77.1 kg)       General Appearance:     Alert, cooperative and in no acute distress.   ENT/Mouth: membranes moist, no oral lesions or bleeding gums.      EYES:  no scleral icterus, normal conjunctivae   Neck: no carotid bruits or thyromegaly   Chest/Lungs:   lungs are clear to auscultation, no rales or wheezing, respirations unlabored   Cardiovascular:   Heart rate regular. Normal first and second heart sounds with no murmurs, rubs, or gallops; the carotid, radial and posterior tibial pulses are intact, Jugular  venous pressure flat with no HJR, no edema bilateral lower extremities    Abdomen:  Large but soft, nontender, nondistended, bowel sounds present   Extremities: no cyanosis or clubbing   Skin:  Neurologic: warm, dry.  mood and affect are appropriate, alert and oriented x3     Puncture Site: Right radial and Right femoral sites are intact. Radial pulses intact and symmetrical.  CMS intact. Weak pedal pulse -know PAD      General: WNL  Eyes: WNL  Ears/Nose/Throat: WNL  Lungs: WNL  Heart: WNL  Stomach: Diarrhea  Bladder: WNL  Muscle/Joints: WNL  Skin: WNL  Nervous System: WNL  Mental Health: WNL     Blood: WNL     Medical History  Surgical History Family History Social History   Past Medical History:   Diagnosis Date   ? Actinic keratitis    ? Arthritis     bilat shoulders   ? CAD (coronary artery disease)    ? Cardiac arrest (H) 09/04/89   ? Claudication (H)    ? Diverticulosis    ? HTN (hypertension)    ? Hyperlipidemia    ? MI, old    ? Multiple pulmonary nodules    ? Peripheral vascular disease, unspecified    ? Syncope    ? Trigger finger, acquired    ? Type 2 diabetes mellitus (H) 11/6/2012    Past Surgical History:   Procedure Laterality Date   ? APPENDECTOMY     ? CARDIAC CATHETERIZATION  08/22/2018     of rca   ? CV CORONARY ANGIOGRAM N/A 8/6/2018    Procedure: Coronary Angiogram;  Surgeon: Jeane Garcia MD;  Location: [facility];  Service:    ? CV CORONARY ANGIOGRAM N/A 8/22/2018    Procedure: Coronary Angiogram;  Surgeon: Jeane Garcia MD;  Location: [facility];  Service:    ? CV LEFT HEART CATHETERIZATION WO LEFT VETRICULOGRAM Left 8/6/2018    Procedure: Left Heart Catheterization Without Left Ventriculogram;  Surgeon: Jeane Garcia MD;  Location: [facility];  Service:    ? CV LEFT HEART CATHETERIZATION WO LEFT VETRICULOGRAM Left 8/22/2018    Procedure: Left Heart Catheterization Without Left Ventriculogram;  Surgeon: Jeane Garcia MD;  Location: [facility]  Lab;  Service:    ? FEMORAL ENDARTERECTOMY Bilateral 05/03/2017   ? KNEE ARTHROSCOPY     ? NOSE SURGERY      Family History   Problem Relation Age of Onset   ? No Medical Problems Mother    ? No Medical Problems Father     Social History     Social History   ? Marital status:      Spouse name: N/A   ? Number of children: N/A   ? Years of education: N/A     Occupational History   ? Not on file.     Social History Main Topics   ? Smoking status: Former Smoker     Packs/day: 2.00     Years: 40.00     Types: Cigarettes     Quit date: 4/10/1984   ? Smokeless tobacco: Never Used   ? Alcohol use 3.6 oz/week     6 Shots of liquor per week   ? Drug use: No   ? Sexual activity: Not on file     Other Topics Concern   ? Not on file     Social History Narrative          Medications  Allergies   Current Outpatient Prescriptions   Medication Sig Dispense Refill   ? ascorbic acid (VITAMIN C) 500 MG tablet Take 500 mg by mouth daily.     ? aspirin 81 MG EC tablet Take 81 mg by mouth daily.     ? atorvastatin (LIPITOR) 40 MG tablet Take 40 mg by mouth bedtime.     ? lisinopril-hydrochlorothiazide (PRINZIDE,ZESTORETIC) 20-12.5 mg per tablet Take 1 tablet by mouth daily.     ? metoprolol (LOPRESSOR) 25 MG tablet Take 12.5 mg by mouth 2 (two) times a day.      ? multivitamin (ONE A DAY) per tablet Take 1 tablet by mouth daily.      ? nitroglycerin (NITROSTAT) 0.4 MG SL tablet Place 0.4 mg under the tongue every 5 (five) minutes as needed for chest pain.     ? ticagrelor (BRILINTA) 90 mg Tab Take 1 tablet (90 mg total) by mouth 2 (two) times a day. 180 tablet 3   ? zinc 50 mg Tab Take 50 mg by mouth daily.      ? acetaminophen (TYLENOL) 500 MG tablet Take 500-1,000 mg by mouth every 6 (six) hours as needed for pain or fever.        No current facility-administered medications for this visit.       No Known Allergies      Lab Results    Chemistry CBC BNP   Lab Results   Component Value Date    CREATININE 1.07 08/22/2018    BUN 20  08/22/2018     08/22/2018    K 4.1 08/22/2018     08/22/2018    CO2 26 08/22/2018     Creatinine (mg/dL)   Date Value   08/22/2018 1.07   08/06/2018 1.21   06/17/2018 1.07   06/16/2018 1.16    Lab Results   Component Value Date    WBC 7.4 08/22/2018    HGB 12.6 (L) 08/22/2018    HCT 38.7 (L) 08/22/2018    MCV 93 08/22/2018     08/22/2018    Lab Results   Component Value Date    BNP 59 06/16/2018     BNP (pg/mL)   Date Value   06/16/2018 59   12/15/2017 89        Janine Corbin [facility], [facility]        This note has been dictated using voice recognition software. Any grammatical, typographical, or context distortions are unintentional and inherent to the software

## 2021-06-28 NOTE — PROGRESS NOTES
"Progress Notes by Jayjay Cruz MD at 12/20/2019  3:10 PM     Author: Jayjay Cruz MD Service: -- Author Type: Physician    Filed: 12/20/2019  4:07 PM Encounter Date: 12/20/2019 Status: Signed    : Jayjay Cruz MD (Physician)       CARDIOLOGY CLINIC  NOTE     Assessment/Plan:   1.  Coronary artery disease, status post inferior myocardial infarction in 1989.  2018 opening of chronic total occlusion of the right coronary artery.  Stable on current medication.  Would continue dual antiplatelet therapy as long as it is well-tolerated for another year.  Brilinta could be discontinued at that time, but aspirin should be continued long-term.  2.  Peripheral arterial disease, status post revascularization 2018.  Encouraged to walk on a daily basis  3.  Atherosclerotic disease.  Encouraged to continue his atorvastatin and walk at a gradually increasing pace.  4.  Essential hypertension.  Reasonably controlled today.  Encouraged follow-up with primary care provider    Follow-up as needed.  He wishes to follow-up in the future with his primary care provider only.     History of Present Illness:       Zack Johns is a 85 y.o. male with a past medical history of coronary artery disease, status post inferior myocardial infarction 1989, has had no recurrent anginal symptoms since that time.  He presented 1 year ago with claudication symptoms and after abnormal preoperative nuclear stress test subsequently underwent revascularization of a chronic total occlusion of the right coronary artery despite no symptoms.    He returns today for routine follow-up.  He reports he does not know why he needs to follow-up with cardiology \"my heart is good, I think\".  He denies chest pain or exertional dyspnea.  He has not resumed much walking because of joint pains.  He denies any claudication symptoms.  He is unhappy with the price of Brilinta, but is unwilling to switch to clopidogrel at this time.  He denies " breathing difficulties, bleeding problems, or palpitations.      Past Medical History:     Patient Active Problem List   Diagnosis   ? Essential hypertension   ? H/O endarterectomy   ? Type 2 diabetes mellitus (H)   ? Diverticular disease of large intestine   ? Hyperlipidemia   ? Acquired trigger finger   ? Coronary artery disease involving native coronary artery of native heart, angina presence unspecified   ? Abnormal cardiovascular stress test   ? Chronic total occlusion of native coronary artery   ? PAD (peripheral artery disease) (H)   ? Neck pain   ? Upper respiratory tract infection, unspecified type       Past Surgical History:     Past Surgical History:   Procedure Laterality Date   ? APPENDECTOMY     ? CARDIAC CATHETERIZATION  08/22/2018     of rca   ? CV CORONARY ANGIOGRAM N/A 8/6/2018    Procedure: Coronary Angiogram;  Surgeon: Jeane Garcia MD;  Location: [facility] Lab;  Service:    ? CV CORONARY ANGIOGRAM N/A 8/22/2018    Procedure: Coronary Angiogram;  Surgeon: Jeane Garcia MD;  Location: [facility] Lab;  Service:    ? CV LEFT HEART CATHETERIZATION WO LEFT VETRICULOGRAM Left 8/6/2018    Procedure: Left Heart Catheterization Without Left Ventriculogram;  Surgeon: Jeane Garcia MD;  Location: [facility] Lab;  Service:    ? CV LEFT HEART CATHETERIZATION WO LEFT VETRICULOGRAM Left 8/22/2018    Procedure: Left Heart Catheterization Without Left Ventriculogram;  Surgeon: Jeane Garcia MD;  Location: [facility] Lab;  Service:    ? FEMORAL ENDARTERECTOMY Bilateral 05/03/2017   ? KNEE ARTHROSCOPY     ? NOSE SURGERY         Family History:     Family History   Problem Relation Age of Onset   ? No Medical Problems Mother    ? No Medical Problems Father      Family history reviewed and is not pertinent to the chief complaint or presenting problem    Social History:    reports that he quit smoking about 35 years ago. His smoking use included cigarettes. He has a 80.00 pack-year  "smoking history. He has never used smokeless tobacco. He reports current alcohol use of about 6.0 standard drinks of alcohol per week. He reports that he does not use drugs.    Exercise:  Minimal walking, As his joints hurt.  Denies claudication.        Meds:     Current Outpatient Medications   Medication Sig Dispense Refill   ? acetaminophen (TYLENOL) 500 MG tablet Take 500-1,000 mg by mouth every 6 (six) hours as needed for pain or fever.      ? ascorbic acid (VITAMIN C) 500 MG tablet Take 500 mg by mouth daily.     ? aspirin 81 MG EC tablet Take 81 mg by mouth daily.     ? aspirin-calcium carbonate 81 mg-300 mg calcium(777 mg) Tab Take 1 tablet by mouth.     ? atorvastatin (LIPITOR) 40 MG tablet Take 40 mg by mouth bedtime.     ? HYDROcodone-acetaminophen 5-325 mg per tablet Take 1 tablet by mouth every 4 (four) hours as needed for pain. 20 tablet 0   ? lisinopril-hydrochlorothiazide (PRINZIDE,ZESTORETIC) 20-12.5 mg per tablet Take 1 tablet by mouth daily.     ? metoprolol (LOPRESSOR) 25 MG tablet Take 12.5 mg by mouth 2 (two) times a day.      ? multivitamin (ONE A DAY) per tablet Take 1 tablet by mouth daily.      ? naproxen (NAPROSYN) 500 MG tablet TK 1 T PO BID WITH MEALS  1   ? nitroglycerin (NITROSTAT) 0.4 MG SL tablet Place 0.4 mg under the tongue every 5 (five) minutes as needed for chest pain.     ? ticagrelor (BRILINTA) 90 mg Tab Take 1 tablet (90 mg total) by mouth 2 (two) times a day. 180 tablet 3   ? zinc 50 mg Tab Take 50 mg by mouth daily.        No current facility-administered medications for this visit.        Allergies:   Patient has no known allergies.    Review of Systems:     General: WNL  Eyes: WNL  Ears/Nose/Throat: WNL  Lungs: WNL  Heart: WNL  Stomach: WNL  Bladder: WNL  Muscle/Joints: WNL  Skin: WNL  Nervous System: WNL  Mental Health: WNL     Blood: Easy Bruising        Objective:      Physical Exam  161 lb (73 kg)  5' 7\" (1.702 m)  Body mass index is 25.22 kg/m .  /58 (Patient " "Site: Left Arm, Patient Position: Sitting, Cuff Size: Adult Regular)   Pulse 85   Resp 18   Ht 5' 7\" (1.702 m)   Wt 161 lb (73 kg)   BMI 25.22 kg/m        General Appearance : Awake, Alert, No acute distress  HEENT: No Scleral icterus; the mucous membranes were pink and moist.  Conjunctivae not injected  Neck:  No cervical bruits, jugular venous distention, or thyromegaly   Chest: The spine was straight  Lungs: Respirations unlabored; the lungs are clear to auscultation.  No wheezing   Cardiovascular:   Normal point of maximal impulse.  Auscultation reveals normal first and second heart sounds with no murmurs, rubs, or gallops.  Carotid, radial, and dorsalis pedal pulses are intact and symmetric.  Abdomen: No organomegaly, masses, bruits, or tenderness. Bowels sounds are present  Extremities: Warm.  No edema   Skin: No xanthelasma. Warm, Dry.  Musculoskeletal: No tenderness.  Neurologic: Alert and oriented ×3.        Cardiac Imaging Studies:  Nuclear stress test 6/2018:  Conclusion     The pharmacologic nuclear stress test is abnormal.    Stress nuclear images demonstrate a medium size area of moderate to severe ischemia involving the inferior and inferoseptal wall, from base to apex, with small area of transmural infarction involving the mid to basal inferior wall.    The left ventricular ejection fraction is 60% with inferior hypokinesis.    The images of 2/13/2008 were not available for comparison.    The patient is at an intermediate risk of future cardiac ischemic events.     Coronary angiogram 7/2018:    LM 30% mid stenosis  LAD mild to moderate diffuse dz  Ramus 50-60% proximal stenosis  LCx mild dz, small vessel  RCA occluded proximally; collateral filling of PDA/NURYS from LCA    Lab Review   Lab Results   Component Value Date     (L) 10/30/2019    K 4.0 10/30/2019     10/30/2019    CO2 21 (L) 10/30/2019    BUN 40 (H) 10/30/2019    CREATININE 1.69 (H) 10/30/2019    CALCIUM 9.3 10/30/2019 "     Lab Results   Component Value Date    WBC 14.8 (H) 10/30/2019    HGB 10.5 (L) 10/30/2019    HCT 31.9 (L) 10/30/2019    MCV 90 10/30/2019     10/30/2019     No results found for: CHOL, TRIG, HDL, LDLCALC  Lab Results   Component Value Date    TROPONINI 0.03 10/27/2019     Lab Results   Component Value Date    BNP 59 06/16/2018     No results found for: TSH    Jayjay Cruz MD [facility]        His note created using Dragon voice recognition software. Sound alike errors may have escaped editing.

## 2021-06-30 NOTE — PROGRESS NOTES
Progress Notes by Jayjay Cruz MD at 3/3/2021  2:30 PM     Author: Jayjay Cruz MD Service: -- Author Type: Physician    Filed: 3/3/2021  3:52 PM Encounter Date: 3/3/2021 Status: Signed    : Jayjay Cruz MD (Physician)       CARDIOLOGY CLINIC  NOTE     Assessment/Plan:   1.  Coronary artery disease, status post inferior myocardial infarction in 1989.  8/2018 underwent percutaneous opening of chronic total occlusion of the right coronary artery.  Discussed that at this point there is no indication for him to continue Brilinta.  He is quite reluctant to stop this agent.  We will replace it with clopidogrel, which he can continue for his peripheral artery disease.  Suspect eventually we could discontinue aspirin, if he remains stable  2.  Peripheral arterial disease, status post revascularization 2018.  Stable symptoms on present medical regimen.  Encouraged to get back to walking on a treadmill.  He is considering joining a swimming pool which may be easier on his other joints.  3.  Atherosclerotic disease.  Continue atorvastatin   4.  Essential hypertension.  Good control on his present medical regimen    Follow-up 1 year     History of Present Illness:       Zack Johns is a 86 y.o. male with a past medical history of coronary artery disease, status post inferior myocardial infarction 1989, has had no recurrent anginal symptoms since that time.  He presented 2018 year ago with claudication symptoms and after abnormal preoperative nuclear stress test subsequently underwent revascularization of a chronic total occlusion of the right coronary artery despite no symptoms.  He returns today for routine follow-up.    As I saw him last year he has had no chest pains.  He is not exercising as much as he once did but does have a treadmill in his basement which he walks on when his back allows him to.  He has had no palpitations or lightheadedness.  He does report easy bruising on his aspirin and  Brilinta combination.  He has had no palpitations, syncope or near syncopal spells.  His sleep is mostly restorative.      Past Medical History:     Patient Active Problem List   Diagnosis   ? Essential hypertension   ? H/O endarterectomy   ? Type 2 diabetes mellitus (H)   ? Diverticular disease of large intestine   ? Hyperlipidemia   ? Acquired trigger finger   ? Coronary artery disease involving native coronary artery of native heart, angina presence unspecified   ? Abnormal cardiovascular stress test   ? Chronic total occlusion of native coronary artery   ? PAD (peripheral artery disease) (H)   ? Neck pain   ? Upper respiratory tract infection, unspecified type       Past Surgical History:     Past Surgical History:   Procedure Laterality Date   ? APPENDECTOMY     ? CARDIAC CATHETERIZATION  08/22/2018     of rca   ? CV CORONARY ANGIOGRAM N/A 8/6/2018    Procedure: Coronary Angiogram;  Surgeon: Jeane Garcia MD;  Location: [facility];  Service:    ? CV CORONARY ANGIOGRAM N/A 8/22/2018    Procedure: Coronary Angiogram;  Surgeon: Jeane Garcia MD;  Location: [facility];  Service:    ? CV LEFT HEART CATHETERIZATION WO LEFT VETRICULOGRAM Left 8/6/2018    Procedure: Left Heart Catheterization Without Left Ventriculogram;  Surgeon: Jeane Garcia MD;  Location: [facility] Cath Lab;  Service:    ? CV LEFT HEART CATHETERIZATION WO LEFT VETRICULOGRAM Left 8/22/2018    Procedure: Left Heart Catheterization Without Left Ventriculogram;  Surgeon: Jeane Garcia MD;  Location: [facility] Cath Lab;  Service:    ? FEMORAL ENDARTERECTOMY Bilateral 05/03/2017   ? KNEE ARTHROSCOPY     ? NOSE SURGERY         Family History:     Family History   Problem Relation Age of Onset   ? No Medical Problems Mother    ? No Medical Problems Father      Family history reviewed and is not pertinent to the chief complaint or presenting problem    Social History:    reports that he quit smoking about 36 years ago. His  "smoking use included cigarettes. He has a 80.00 pack-year smoking history. He has never used smokeless tobacco. He reports current alcohol use of about 6.0 standard drinks of alcohol per week. He reports that he does not use drugs.    Exercise:  Walks some on his basement treadmill, limited by low back pain and various joint pains.  Denies claudication.    Sleep history:  Mostly restorative no snoring.    Meds:     Current Outpatient Medications   Medication Sig Dispense Refill   ? acetaminophen (TYLENOL) 500 MG tablet Take 500-1,000 mg by mouth every 6 (six) hours as needed for pain or fever.      ? ascorbic acid (VITAMIN C) 500 MG tablet Take 500 mg by mouth daily.     ? aspirin 81 MG EC tablet Take 81 mg by mouth daily.     ? atorvastatin (LIPITOR) 40 MG tablet Take 40 mg by mouth bedtime.     ? lisinopril-hydrochlorothiazide (PRINZIDE,ZESTORETIC) 20-12.5 mg per tablet Take 1 tablet by mouth daily.     ? metoprolol (LOPRESSOR) 25 MG tablet Take 12.5 mg by mouth 2 (two) times a day.      ? multivitamin (ONE A DAY) per tablet Take 1 tablet by mouth daily.      ? naproxen (NAPROSYN) 500 MG tablet TK 1 T PO BID WITH MEALS  1   ? nitroglycerin (NITROSTAT) 0.4 MG SL tablet Place 0.4 mg under the tongue every 5 (five) minutes as needed for chest pain.     ? ticagrelor (BRILINTA) 90 mg Tab Take 1 tablet (90 mg total) by mouth 2 (two) times a day. 180 tablet 3   ? zinc 50 mg Tab Take 50 mg by mouth daily.      ? aspirin-calcium carbonate 81 mg-300 mg calcium(777 mg) Tab Take 1 tablet by mouth.       No current facility-administered medications for this visit.        Allergies:   Patient has no known allergies.    Review of Systems:     General: WNL  Eyes: WNL  Ears/Nose/Throat: WNL  Lungs: WNL  Heart: WNL  Stomach: WNL  Bladder: WNL  Muscle/Joints: WNL  Skin: WNL  Nervous System: WNL  Mental Health: WNL     Blood: WNL        Objective:      Physical Exam  163 lb (73.9 kg)  5' 7\" (1.702 m)  Body mass index is 25.53 " "kg/m .  /50 (Patient Site: Left Arm, Patient Position: Sitting, Cuff Size: Adult Regular)   Pulse 88   Resp 16   Ht 5' 7\" (1.702 m)   Wt 163 lb (73.9 kg)   BMI 25.53 kg/m        General Appearance : Awake, Alert, No acute distress  HEENT: No Scleral icterus; the mucous membranes were pink and moist.  Conjunctivae not injected  Neck:  No cervical bruits, jugular venous distention, or thyromegaly   Chest: The spine was straight  Lungs: Respirations unlabored; the lungs are clear to auscultation.  No wheezing   Cardiovascular:   Normal point of maximal impulse.  Auscultation reveals normal first and second heart sounds with no murmurs, rubs, or gallops.  Carotid, radial, and dorsalis pedal pulses are intact and symmetric.  Abdomen: No organomegaly, masses, bruits, or tenderness. Bowels sounds are present  Extremities: Warm.  No edema   Skin: No xanthelasma. Warm, Dry.  Musculoskeletal: No tenderness.  Neurologic: Alert and oriented ×3.        Cardiac Imaging Studies:  Nuclear stress test 6/2018:  Conclusion     The pharmacologic nuclear stress test is abnormal.    Stress nuclear images demonstrate a medium size area of moderate to severe ischemia involving the inferior and inferoseptal wall, from base to apex, with small area of transmural infarction involving the mid to basal inferior wall.    The left ventricular ejection fraction is 60% with inferior hypokinesis.    The images of 2/13/2008 were not available for comparison.    The patient is at an intermediate risk of future cardiac ischemic events.     Coronary angiogram 7/2018:    LM 30% mid stenosis  LAD mild to moderate diffuse dz  Ramus 50-60% proximal stenosis  LCx mild dz, small vessel  RCA occluded proximally; collateral filling of PDA/NURYS from LCA    Lab Review   Lab Results   Component Value Date     (L) 10/30/2019    K 4.0 10/30/2019     10/30/2019    CO2 21 (L) 10/30/2019    BUN 40 (H) 10/30/2019    CREATININE 1.69 (H) 10/30/2019 "    CALCIUM 9.3 10/30/2019     Lab Results   Component Value Date    WBC 14.8 (H) 10/30/2019    HGB 10.5 (L) 10/30/2019    HCT 31.9 (L) 10/30/2019    MCV 90 10/30/2019     10/30/2019     No results found for: CHOL, TRIG, HDL, LDLCALC  Lab Results   Component Value Date    TROPONINI 0.03 10/27/2019     Lab Results   Component Value Date    BNP 59 06/16/2018     No results found for: TSH    Jayjay Cruz MD [facility]        His note created using Dragon voice recognition software. Sound alike errors may have escaped editing.

## 2021-07-04 NOTE — LETTER
Letter by Blue Horton MD at      Author: Blue Horton MD Service: -- Author Type: --    Filed:  Encounter Date: 6/21/2021 Status: (Other)         06/21/21      Zack Johns  [address]  [facility] [address]    Dear Zack,    Below you will find the details for your upcoming appointment with [facility]:     -Monday 9/20/21 starting at 1:15PM for an ultrasound followed by an office visit with Dr. Derek Horton    We are located in the [facility] at: [address], [address], [place], [address]  Please arrive 10-15 minutes early to your appointment. Please wear a mask. Be sure to bring in your insurance cards as well as a photo ID. If you are having any symptoms of a cough, fever, rash, loss of taste and smell, or shortness of breath we ask that you please call and reschedule your appointment.  If you have any questions or concerns, please call our office at: [phone]    Sincerely,   [facility]

## 2021-07-14 PROBLEM — I25.10 CAD (CORONARY ARTERY DISEASE): Status: RESOLVED | Noted: 2018-08-13 | Resolved: 2018-09-07

## 2021-08-03 PROBLEM — I73.9 PERIPHERAL VASCULAR DISEASE (H): Status: RESOLVED | Noted: 2017-05-03 | Resolved: 2018-09-07

## 2021-09-20 ENCOUNTER — OFFICE VISIT (OUTPATIENT)
Dept: VASCULAR SURGERY | Facility: CLINIC | Age: 86
End: 2021-09-20
Attending: SURGERY
Payer: COMMERCIAL

## 2021-09-20 ENCOUNTER — ANCILLARY PROCEDURE (OUTPATIENT)
Dept: VASCULAR ULTRASOUND | Facility: CLINIC | Age: 86
End: 2021-09-20
Attending: SURGERY
Payer: COMMERCIAL

## 2021-09-20 VITALS — HEART RATE: 68 BPM | TEMPERATURE: 97.9 F | SYSTOLIC BLOOD PRESSURE: 116 MMHG | DIASTOLIC BLOOD PRESSURE: 54 MMHG

## 2021-09-20 DIAGNOSIS — I73.9 PAD (PERIPHERAL ARTERY DISEASE) (H): ICD-10-CM

## 2021-09-20 DIAGNOSIS — I73.9 CLAUDICATION (H): ICD-10-CM

## 2021-09-20 DIAGNOSIS — I73.9 PAD (PERIPHERAL ARTERY DISEASE) (H): Primary | ICD-10-CM

## 2021-09-20 PROBLEM — I25.10 CHRONIC TOTAL OCCLUSION OF NATIVE CORONARY ARTERY: Status: ACTIVE | Noted: 2018-08-13

## 2021-09-20 PROBLEM — I25.82 CHRONIC TOTAL OCCLUSION OF NATIVE CORONARY ARTERY: Status: ACTIVE | Noted: 2018-08-13

## 2021-09-20 PROBLEM — M54.2 NECK PAIN: Status: ACTIVE | Noted: 2019-10-27

## 2021-09-20 PROBLEM — J06.9 UPPER RESPIRATORY TRACT INFECTION, UNSPECIFIED TYPE: Status: ACTIVE | Noted: 2019-10-27

## 2021-09-20 PROCEDURE — 93922 UPR/L XTREMITY ART 2 LEVELS: CPT | Mod: 26 | Performed by: SURGERY

## 2021-09-20 PROCEDURE — 93925 LOWER EXTREMITY STUDY: CPT

## 2021-09-20 PROCEDURE — 93922 UPR/L XTREMITY ART 2 LEVELS: CPT

## 2021-09-20 PROCEDURE — 93925 LOWER EXTREMITY STUDY: CPT | Mod: 26 | Performed by: SURGERY

## 2021-09-20 PROCEDURE — 99213 OFFICE O/P EST LOW 20 MIN: CPT | Performed by: SURGERY

## 2021-09-20 RX ORDER — CLOPIDOGREL BISULFATE 75 MG/1
75 TABLET ORAL
COMMUNITY
Start: 2021-03-03 | End: 2021-11-30

## 2021-09-20 NOTE — PROGRESS NOTES
VASCULAR SURGERY CLINIC CONSULTATION    VASCULAR SURGEON: Dr. Horton    LOCATION: [facility]       Zack Johns  Medical Record #:  [id number]  YOB: 1934  Age:  87 year old     Date of Service: 9/20/2021     PRIMARY CARE PROVIDER: Haim Galaviz      Reason for visit:  Followup PAD    IMPRESSION:  Bilateral leg weakness, no obvious claudication or rest pain. Otherwise doing well. Patent bilateral iliac stents. LLE with triphasic flow down to DPA and R leg with triphasic flow till mid SFA. There is a mid SFA ooclusion with monophasic flow distally. US ABIs (R slightly lower clair previous)  stable with adequate toe pressures     RECOMMENDATION:      - Recommended exercise regimen . Continue ASA and statin  - Followup in Vascular clinic in 6 months with repeat non invasive testing     HPI:  Zack Johns is a 87 year old male who was seen today in followup for PAD. Reports bilateral LE weakness on ambulation, feels that since he stoppd exercising. Denies any cramping pain, claudication or rest pain, No open wounds.         PHH:    Past Medical History:   Diagnosis Date     Actinic keratitis      Arthritis     bilat shoulders     CAD (coronary artery disease)      Cardiac arrest (H) 09/04/89     Claudication (H)      Claudication (H)      Coronary artery disease     s/p MI     Diabetes mellitus type 2, controlled (H)      Diverticulosis      HTN (hypertension)      Hyperlipidemia      Hypertension      Malignant neoplasm of prostate (H) 8/7/2006-10/5/2006    DENIED BY PATIENT (see note from 12/08/2014).  RADIOTHERAPY BY DR.CHO LEGGETT, old      Multiple pulmonary nodules      Osteoarthritis of glenohumeral joint     Left. Injected at [facility] 02/10/2015.     Peripheral vascular disease, unspecified (H)      Syncope      Trigger finger, acquired      Type 2 diabetes mellitus (H) 11/6/2012         Past Surgical History:   Procedure Laterality Date     APPENDECTOMY        "ARTHROSCOPY KNEE       ARTHROSCOPY KNEE       CARDIAC CATHETERIZATION  08/22/2018     of rca     CARDIAC SURGERY  08/22/2018    two stents placed 8/22/18     CV CORONARY ANGIOGRAM N/A 8/6/2018    Procedure: Coronary Angiogram;  Surgeon: Jeane Garcia MD;  Location: [facility];  Service:      CV CORONARY ANGIOGRAM N/A 8/22/2018    Procedure: Coronary Angiogram;  Surgeon: Jeane Garcia MD;  Location: [facility];  Service:      CV LEFT HEART CATHETERIZATION WITHOUT LEFT VENTRICULOGRAM Left 8/6/2018    Procedure: Left Heart Catheterization Without Left Ventriculogram;  Surgeon: Jeane Garcia MD;  Location: [facility];  Service:      CV LEFT HEART CATHETERIZATION WITHOUT LEFT VENTRICULOGRAM Left 8/22/2018    Procedure: Left Heart Catheterization Without Left Ventriculogram;  Surgeon: Jeane Garcia MD;  Location: [facility];  Service:      FEMORAL ENDARTERECTOMY Bilateral 05/03/2017     IR EXTREMITY ANGIOGRAM BILATERAL  3/10/2017     NOSE SURGERY       PROSTATE BIOPSY, NEEDLE, SATURATION SAMPLING  1.13.2003    \"Biopsy fo the Prostate Needle\"     RELEASE TRIGGER FINGER          ALLERGIES:     Allergies   Allergen Reactions     Pletal [Cilostazol]         MEDS:    Current Outpatient Medications   Medication     acetaminophen (TYLENOL) 500 MG tablet     aspirin 81 MG tablet     atorvastatin (LIPITOR) 40 MG tablet     clopidogrel (PLAVIX) 75 MG tablet     lisinopril-hydrochlorothiazide (ZESTORETIC) 20-12.5 MG tablet     metoprolol tartrate (LOPRESSOR) 25 MG tablet     Multiple Vitamin (MULTIVITAMIN) per tablet     nitroGLYcerin (NITROSTAT) 0.4 MG sublingual tablet     zinc 50 MG TABS     No current facility-administered medications for this visit.        SOCIAL HABITS:    History   Smoking Status     Former Smoker     Packs/day: 2.00     Years: 40.00     Types: Cigarettes     Quit date: 4/10/1984   Smokeless Tobacco     Never Used        Alcohol Use:      Frequency of Alcohol " Consumption:      Average Number of Drinks:      Frequency of Binge Drinking:         History   Drug Use No        FAMILY HISTORY:    Family History   Problem Relation Age of Onset     Diabetes No family hx of      Breast Cancer No family hx of      Colon Cancer No family hx of      Prostate Cancer No family hx of      Other Cancer No family hx of      No Known Problems Mother      No Known Problems Father        REVIEW OF SYSTEMS:    A 12 point ROS was reviewed and except for what is listed in the HPI above, all others are negative    PE:  /54   Pulse 68   Temp 97.9  F (36.6  C)    Wt Readings from Last 1 Encounters:   03/03/21 73.9 kg (163 lb)     There is no height or weight on file to calculate BMI.    EXAM:  GENERAL: This is a well-developed 87 year old male who appears his stated age  EYES: Grossly normal.  MOUTH: Buccal mucosa normal   CARDIAC:  NS1 S2, No Murmur  CHEST/LUNG:  Clear lung fields bilaterally   GASTROINTESINAL (ABDOMEN): Soft, non-tender, B/S present, no pulsatile mass  MUSCULOSKELETAL: Grossly normal and both lower extremities are intact.  HEME/LYMPH: No lymphedema  NEUROLOGIC: Focally intact, Alert and oriented x 3.   PSYCH: appropriate affect  INTEGUMENT: No open lesions or ulcers            DIAGNOSTIC STUDIES:     Images:  US Lower Extremity Arterial Duplex Bilateral    Result Date: 9/20/2021  Arterial Duplex Ultrasound (Date: 09/20/21) Lower Extremity Artery Evaluation Indication: SURVEILLANCE: PAD. HX: BILATERAL ILIAC ARTERY STENTS ([id number]),  BILATERAL PROXIMAL SUPERFICIAL ARTERIAL ENDARTERECTOMIES (5/3/2017), MI YEARS AGO.  Previous: 9/25/2020, 10/23/2018 History: Previous Smoker, PAD, CAD, MI, Angioplasty and Vascular Surgery Technique: Duplex imaging is performed utilizing gray-scale, two-dimensional images, and color-flow imaging. Doppler waveform analysis and spectral Doppler imaging is also performed. LOWER EXTREMITY ARTERIAL DUPLEX EXAM WITH WAVEFORMS Right Leg:(cm/s)  Location: Velocities Waveforms EIA:   135  T CFA:   124  T PFA:   210  T SFA Proximal:   181/ 108/ 129 B SFA Mid:   OCCLUDED  N/A SFA Distal:   135  M Popliteal Artery:   42 /35 M PTA:   53   M EVANGELISTA:   37  M DPA:   43  M Waveforms: T=Triphasic, M=Monophasic, B=Biphasic Stenotic Profile Ratio: 181 / 124 = 1.45 Left Leg:(cm/s) Location: Velocities Waveforms EIA:   240  B CFA:   168  B PFA:   203  B SFA Proximal:   195 / 155 B SFA Mid:   154 /181  B SFA Distal:   144  B Popliteal Artery:   50/  102 B PTA:   54   B EVANGELISTA:   130  B DPA:   48  B Waveforms: T=Triphasic, M=Monophasic, B=Biphasic Comments: RIGHT POPLITEAL FOSSA BAKERS CYST (5.17 X 1.28 X 2.11 cm) Impression: Right Lower Extremity: Transition to monophasic flow at the level of the mid SFA consistent with occlusion of the mid SFA. Left Lower Extremity: Moderately elevated velocities in the external iliac artery, common femoral artery, profunda femoris, and proximal SFA without changingin the quality of the waveform suggesting that increase in velocity is probably not significant. Reference: Category Normal 1-19% 20-49% 50-99% Occluded PSV <160 cm/sec without spectral broadening <160 cm/sec with spectral broadening Increased Increased Absent Flow Ratio N/A N/A < 2.0 >2.0 N/A Post-Stenotic Turbulence No No No Yes N/A     US KIRK Doppler No Exercise 1-2 Levels Bilateral    Result Date: 9/20/2021  BILATERAL RESTING ANKLE-BRACHIAL INDICES (KIRK'S) (Date: 09/20/21) Indication: SURVEILLANCE KIRK'S: PAD. HX: BILATERAL ILIAC ARTERY STENTS ([id number]),  BILATERAL PROXIMAL SUPERFICIAL ARTERIAL ENDARTERECTOMIES (5/3/2017), MI YEARS AGO. Previous: 9/25/2020, 10/23/2018 History: Previous Smoker, PAD, CAD, MI, Angioplasty and Vascular Surgery  Resting KIRK's          Right: mmHg Index     Brachial: 165  Ankle-(PT): 86 0.52 Ankle-(DP): 97 0.59           Digit: 63 0.38               Left: mmHg Index     Brachial: 152  Ankle-(PT): 144 0.87 Ankle-(DP): 142 0.86           Digit: 87 0.53  Resting ankle-brachial index of 0.59 on the right. Toe Pressures of 63 mmHg and TBI of 0.38  Resting ankle-brachial index of 0.87 on the left. Toe Pressures of 87 mmHg and TBI of 0.53  WAVEFORMS: The right dorsalis pedis and posterior tibial arteries show biphasic waveforms. The left dorsalis pedis and posterior tibial arteries show biphasic waveforms.  Impression:  1. RIGHT LOWER EXTREMITY: KIRK is Abnormal with an KIRK of 0.59. and Mildly abnormal, but adequate for healing toe pressures of 63 mmHg. 2. LEFT LOWER EXTREMITY: KIRK is Abnormal with an KIRK of 0.87. and Normal toe pressures of 87 mmHg. Reference: Wound classification Grade KIRK Ankle Systolic Pressure Toe Pressures 0 > 0.80 > 100 mmHg > 60 mmHg 1 0.6 - 0.79 70 - 100 mmHg 40 - 59 mmHg 2 0.4 - 0.59 50-70 mmHg 30 - 39 mmHg 3 < 0.39 < 50 mmHg < 30 mmHg Digit Pressures DBI Disease Category > 0.70 Normal < 0.70 Abnormal > 30 mmHg Potential wound healing < 30 mmHg Impaired wound healing Ankle Brachial Pressures KIRK Disease Category > 1.3  Likely vessel calcification with monophasic waveforms, non-diagnostic 0.95-1.30 Normal with multiphasic waveforms 0.50-0.95 Single level disease 0.30-0.50 Multilevel disease < 0.30 Critical limb ischema Volume Plethysmography Recording (VPR) at all levels Normal Sharp systolic peak, fast upstroke, prominent dicrotic notch in wave Mild Sharp systolic peak, fast upstroke, absent dicrotic notch in wave Moderate Flattened systolic peak, slowed upstroke, absent dicrotic notch in wave Severe Low-amplitude wave with = upslope and down slope Occluded Flat Line Multiple Level Segmental Pressures  Normal Abnormal Upper Thigh > 1.2 pressure indices, <30 mmHg between lateral and horizontal cuffs < 0.8 pressure indices suggest proximal occlusion, 0.8-1.2 pressure indices suggest inflow disease, > 30 mmHg between lateral and horizontal cuffs  Lower Thigh < 30 mmHg between lateral and horizontal cuffs > 30 mmHg between lateral and horizontal  cuffs Upper Calf < 30 mmHg between lateral and horizontal cuffs > 30 mmHg between lateral and horizontal cuffs Note: As limb diameter increases, pressure measurements also increase.      I personally reviewed the images and my interpretation is    Patent bilateral iliac stents. LLE with triphasic flow down to DPA and R leg with triphasic flow till mid SFA. There is a mid SFA ooclusion with monophasic flow distally. US ABIs (R slightly lower clair previous)  stable with adequate toe pressures     LABS:      Sodium   Date Value Ref Range Status   10/30/2019 135 (L) 136 - 145 mmol/L Final   10/27/2019 135 (L) 136 - 145 mmol/L Final   10/05/2018 136 136 - 145 mmol/L Final   09/28/2018 142.0 133.0 - 144.0 mmol/L Final   07/31/2018 139.0 133.0 - 144.0 mmol/L Final   01/25/2018 138.0 133.0 - 144.0 mmol/L Final     Urea Nitrogen   Date Value Ref Range Status   10/30/2019 40 (H) 8 - 28 mg/dL Final   10/27/2019 27 8 - 28 mg/dL Final   10/05/2018 27 8 - 28 mg/dL Final   09/28/2018 21.0 7.0 - 30.0 mg/dL Final   07/31/2018 21.0 7.0 - 30.0 mg/dL Final   01/25/2018 20.0 7.0 - 30.0 mg/dL Final     Hemoglobin   Date Value Ref Range Status   10/30/2019 10.5 (L) 14.0 - 18.0 g/dL Final   10/27/2019 11.3 (L) 14.0 - 18.0 g/dL Final   10/07/2018 7.4 (L) 14.0 - 18.0 g/dL Final   09/28/2018 13.4 13.3 - 17.7 g/dL Final   07/31/2018 13.3 13.3 - 17.7 g/dL Final   01/25/2018 13.6 13.3 - 17.7 g/dL Final     Platelet Count   Date Value Ref Range Status   10/30/2019 333 140 - 440 thou/uL Final   10/27/2019 327 140 - 440 thou/uL Final   10/07/2018 202 140 - 440 thou/uL Final     BNP   Date Value Ref Range Status   06/16/2018 59 0 - 93 pg/mL Final     INR   Date Value Ref Range Status   10/30/2019 1.17 (H) 0.90 - 1.10 Final   06/16/2018 0.95 0.90 - 1.10 Final     30  minutes spent on the day of encounter doing chart review, history and exam, documentation, and further activities as noted.     Mini Marcus MD, MD  [facility]  Surgery

## 2021-09-20 NOTE — PATIENT INSTRUCTIONS
A Walking Program for Peripheral Arterial Disease (PAD)    Peripheral arterial disease (PAD) is a condition where the arteries in the legs are severely damaged. When you have PAD, walking can be painful. So you may start to walk less. Walking less makes your leg muscles weaker. It then becomes harder and more painful to walk. A walking program can break this cycle. Here's how to get started.       Walking farther without pain  Exercise strengthens leg muscles that are out of shape. It also trains these muscles to work with less oxygen. This helps your leg muscles work better even with reduced blood flow to your legs. When you have PAD, a walking program can be helpful. Your program can:     Help you walk longer and farther without claudication. This is an ache in your legs during exercise that goes away with rest.     Let you do more and be more active    Add to your overall health and well-being    Help you control your blood sugar and blood pressure    Help you become healthier with no risk and at little or no cost  Getting started  Your local hospital, vascular center, or cardiac rehab center may have a special walking program for people with PAD. If so, this is your best option. But if you can t find a program, or it s not covered by insurance, you can still walk on your own. Follow these steps at each session:     Step 1. Start at a pace that lets you walk for 5 to 10 minutes before you start to feel claudication. This feeling is unpleasant, but it doesn t hurt you. Keep going until the pain makes you feel that you need to stop.     Step 2. Stop and rest for 3 to 5 minutes, just long enough for the pain to go away. You can rest standing or sitting. (Some people like to bring along a cane or a lightweight folding chair.)     Step 3. Again walk at a pace that lets you walk for 5 to 10 minutes more before you feel pain. This may be slower than your starting pace in step 1. Then rest again.     Step 4. Repeat this  process until you ve walked for 45 minutes. This should be about 60 to 80 minutes total, including resting time. You may not be able to do a full 45 minutes at first. Do as much as you can, and increase your time as you improve.   Making the most of your program    Walk at least 3 times a week. Take no more than 2 days off between sessions. If you stop walking, even for a week or two, you can lose the health benefits of your program.     Find a good place to walk. A treadmill or a track may be better at first. That way, you won t run the risk of going too far and finding that you can t walk back. Be sure to have a place to walk indoors in bad weather, such as a gym or a mall.     Wear shoes with sturdy, flexible soles. The heel should fit without slipping. You should have room to wiggle your toes.     Keep track of how long you walk. A pedometer will show your daily progress. It can also show how much farther you can walk as time goes by.     Ask a friend to keep you company. You may enjoy walking with someone else. Or you may want to make your walking sessions a time to relax by yourself.     Make it fun. Listen to music while you walk and rest. Walk in a favorite park. Get to know the people at the gym, or the folks that you pass on your route. While you rest, you can window-shop, read, or feed the birds. Do whatever will help you enjoy your exercise sessions.       3543-2298 [employer]. [address] [place] [address], [place] [id number]. All rights reserved. This information is not intended as a substitute for professional medical care. Always follow your healthcare professional's instructions          Ankle-Brachial Index (KIRK) or Physiologic Test    Description  An ankle-brachial index test is relatively pain free. Blood pressure cuffs of various sizes are placed on your thigh, calf, foot and toes.  Similar to having your blood pressure checked with an arm cuff, as the technician inflates the cuffs, they  progressively tighten and are then quickly released.  You may feel some discomfort, but generally for less than 60 seconds for each measurement. You will be asked to remove your socks and shoes and possibly your pants or shorts. Gowns will be provided. It usually takes about 30-60 minutes.   Depending on the initial readings and patient symptoms, you may be asked to perform a light walk on a treadmill.  The technician will apply ultrasound gel, usually warmed for your comfort, to your ankles and wrists. Through the gel, the technician will use a small hand-held device that emits sound waves.  Risks  There are typically no side effects or complications associated with a physiologic study.  How to Prepare  Eat and take medications as usual.  There is no preparation required for an ankle-brachial index (KIRK) or physiologic exam.  What Can I Expect After the Test?  The technician will send the ultrasound images to your vascular surgeon for evaluation. Typically, a report is available in 2-3 days. If anything critical is found, it is standard practice to notify the vascular surgeon immediately.  Reference: https://vascular.org/patient-resources/vascular-tests

## 2021-11-30 ENCOUNTER — OFFICE VISIT (OUTPATIENT)
Dept: FAMILY MEDICINE | Facility: CLINIC | Age: 86
End: 2021-11-30
Payer: COMMERCIAL

## 2021-11-30 VITALS
TEMPERATURE: 98.2 F | HEIGHT: 65 IN | RESPIRATION RATE: 20 BRPM | WEIGHT: 173 LBS | DIASTOLIC BLOOD PRESSURE: 69 MMHG | BODY MASS INDEX: 28.82 KG/M2 | HEART RATE: 70 BPM | SYSTOLIC BLOOD PRESSURE: 132 MMHG | OXYGEN SATURATION: 95 %

## 2021-11-30 DIAGNOSIS — Z00.00 WELLNESS EXAMINATION: Primary | ICD-10-CM

## 2021-11-30 DIAGNOSIS — I10 ESSENTIAL HYPERTENSION, BENIGN: ICD-10-CM

## 2021-11-30 DIAGNOSIS — I73.9 PERIPHERAL VASCULAR DISEASE (H): ICD-10-CM

## 2021-11-30 DIAGNOSIS — R73.03 PREDIABETES: ICD-10-CM

## 2021-11-30 DIAGNOSIS — I25.10 ATHEROSCLEROSIS OF NATIVE CORONARY ARTERY OF NATIVE HEART WITHOUT ANGINA PECTORIS: ICD-10-CM

## 2021-11-30 DIAGNOSIS — Z00.00 MEDICARE ANNUAL WELLNESS VISIT, SUBSEQUENT: Primary | ICD-10-CM

## 2021-11-30 DIAGNOSIS — M65.4 DE QUERVAIN'S DISEASE (TENOSYNOVITIS): ICD-10-CM

## 2021-11-30 LAB
ALBUMIN SERPL-MCNC: 3.5 G/DL (ref 3.4–5)
ALP SERPL-CCNC: 82 U/L (ref 40–150)
ALT SERPL W P-5'-P-CCNC: 19 U/L
ANION GAP SERPL CALCULATED.3IONS-SCNC: 8 MMOL/L (ref 3–14)
AST SERPL W P-5'-P-CCNC: 28 U/L (ref 0–45)
BILIRUB SERPL-MCNC: 0.6 MG/DL (ref 0.2–1.3)
BUN SERPL-MCNC: 27 MG/DL (ref 7–30)
CALCIUM SERPL-MCNC: 8.8 MG/DL (ref 8.5–10.1)
CHLORIDE BLD-SCNC: 106 MMOL/L
CHOLEST SERPL-MCNC: 124 MG/DL
CO2 SERPL-SCNC: 27 MMOL/L (ref 20–32)
CREAT SERPL-MCNC: 1.2 MG/DL
FASTING STATUS PATIENT QL REPORTED: ABNORMAL
GFR SERPL CREATININE-BSD FRML MDRD: 54 ML/MIN/1.73M2
GLUCOSE BLD-MCNC: 130 MG/DL (ref 70–99)
HBA1C MFR BLD: 6.4 % (ref 0–5.6)
HDLC SERPL-MCNC: 41 MG/DL
LDLC SERPL CALC-MCNC: 47 MG/DL
POTASSIUM BLD-SCNC: 4.6 MMOL/L (ref 3.4–5.3)
PROT SERPL-MCNC: 6.4 G/DL (ref 6.8–8.8)
SODIUM SERPL-SCNC: 141 MMOL/L (ref 133–144)
TRIGL SERPL-MCNC: 181 MG/DL

## 2021-11-30 PROCEDURE — 99213 OFFICE O/P EST LOW 20 MIN: CPT | Mod: 25 | Performed by: FAMILY MEDICINE

## 2021-11-30 PROCEDURE — G0439 PPPS, SUBSEQ VISIT: HCPCS | Performed by: FAMILY MEDICINE

## 2021-11-30 PROCEDURE — 83036 HEMOGLOBIN GLYCOSYLATED A1C: CPT | Performed by: FAMILY MEDICINE

## 2021-11-30 PROCEDURE — 36415 COLL VENOUS BLD VENIPUNCTURE: CPT | Performed by: FAMILY MEDICINE

## 2021-11-30 PROCEDURE — 80053 COMPREHEN METABOLIC PANEL: CPT | Performed by: FAMILY MEDICINE

## 2021-11-30 PROCEDURE — 99207 PR NO BILLABLE SERVICE THIS VISIT: CPT

## 2021-11-30 PROCEDURE — 80061 LIPID PANEL: CPT | Performed by: FAMILY MEDICINE

## 2021-11-30 RX ORDER — NITROGLYCERIN 0.4 MG/1
0.4 TABLET SUBLINGUAL EVERY 5 MIN PRN
Qty: 30 TABLET | Refills: 0 | Status: SHIPPED | OUTPATIENT
Start: 2021-11-30 | End: 2023-03-21

## 2021-11-30 ASSESSMENT — MIFFLIN-ST. JEOR: SCORE: 1385.98

## 2021-11-30 NOTE — PATIENT INSTRUCTIONS
PERSONAL PREVENTIVE SERVICES PLAN - SERVICES     Review these tests with your medical staff then decide which ones you want and take this page home for your reference      SCREENING TESTS     Description   Year of Last Screening   Recommended Today?   Heart disease screening blood tests    Cholesterol level Reducing cholesterol can reduce your risk of heart attacks by 25%.  Screening is recommended yearly if you are at risk of heart disease otherwise every 4-5 years 3/28/2019 Yes; Recommended.   Diabetes screening tests    Hemoglobin A1c blood test   Finding and treating diabetes early can reduce complications.  Screening recommended/covered yearly if you have high blood pressure, high cholesterol, obesity (BMI >30), or a history of high blood glucose tests; or 2 of the following: family history of diabetes, overweight (BMI >25 but <30), age 65 years or older, and a history of diabetes of pregnancy or gave birth to baby weighing more than 9 lbs. 3/28/2019  hgbA1c  6.1 Yes; Recommended.   Hepatitis B screening Finding hepatitis B early can reduce complications.  Screening is recommended for persons with selected risk factors.  No: is not indicated today.   Hepatitis C screening Finding hepatitis C early can reduce complications.  Screening is recommended for all persons born from 1945 through 1965 and for those with selected other risk factors.   No: is not indicated today.   HIV screening Finding HIV early can reduce complications.  Screening is recommended for persons with risk factors for HIV infection.  No: is not indicated today.   Glaucoma screening Early detection of glaucoma can prevent blindness.   Please talk to your eye doctor about this.       SCREENING TESTS     Description   Year of Last Screening   Recommended Today?   Colorectal cancer screening    Fecal occult blood test     Screening colonoscopy Screening for colon cancer has been shown to reduce death from colon cancer by 25-30%. Screening  recommended to start at 50 years and continuing until age 75 years.    No: is not indicated today.   Breast Cancer Screening (women)    Mammogram Mammogram screening for breast cancer has been shown to reduce the risk of dying from breast cancer and prolong life. Screening is recommended every 1-2 years for women aged 50 to 74 years.   No: is not indicated today.   Cervical Cancer screening (women)    Pap Cervical pap smears can reduce cervical cancer. Screening is recommended annually if high risk (history of abnormal pap smears) otherwise every 2-3 years, stop screening at 65 years of age if history of normal paps.  No: is not indicated today.   Screening for Osteoporosis:  Bone mass measurements (women)    Dexa Scan Screening and treating Osteoporosis can reduce the risk of hip and spine fractures. Screening is recommended in women 65 years or older and in women and men at risk of osteoporosis.  No: is not indicated today.   Screening for Lung Cancer     Low-dose CT scanning Screening can reduce mortality in persons aged 55-80 who have smoked at least 30 pack-years and who are either still smoking or have quit in the past 15 years.  No: is not indicated today.   Abdominal Aortic Aneurysm (AAA) screening    Ultrasound (US)   An aneurysm treated before rupture is very safe -a ruptured aneurysm can be fatal.  Screening  by US for AAA is limited to patients who meet one of the following criteria:    Men who are 65-75 years old and have smoked more than 100 cigarettes in their lifetime    Anyone with a family history of abdominal aortic aneurysm  No: is not indicated today.     Here are your recommended immunizations.  Take this home for your reference.                                                    IMMUNIZATIONS Description Recommend today?     Influenza (Flu shot) Prevents flu; should get every year No; is up to date.   PCV 13 Pneumonia vaccination; you get it once Yes; Recommended   PPSV 23 Second pneumonia  vaccination; usually get it 1 year after PCV 13 No: is not indicated today.   Zoster (Shingles) Prevents shingles; you get it once  (Check with Part D insurance for coverage, must receive at a pharmacy, not clinic) No: is not indicated today.   Tetanus Prevents tetanus; once every 10 years No; is up to date.     Hepatitis B  If you have any of the following risk factors you should be immunized for hepatitis B: severe kidney disease, people who live in the same house as a carrier of Hepatitis B virus, people who live in  institutions (e.g. nursing homes or group homes), homosexual men, patients with hemophilia who received Factor VIII or IX concentrates, abusers of illicit injectable drugs No: is not indicated today.      PATIENT INSTRUCTIONS    Yearly exam:     See your health care provider every year in order to review changes in your health, review medicines that you take, and discuss preventive care needs such as immunizations and cancer screening.    Get a flu shot each year.     Advance Directives:    If you have not done so, you are encouraged to complete advance directives and/or a living will.   More information about advance directives can be found at: http://www.mnmed.org/advocacy/Key-Issues/Advance-Directives    Nutrition:     Eat at least 5 servings of fruits and vegetables each day.     Eat whole-grain bread, whole-wheat pasta and brown rice instead of white grains and rice.     Talk to your doctor about Calcium and Vitamin D.     Lifestyle:    Exercise for at least 150 minutes a week (30 minutes a day, 5 days a week). This will help you control your weight and prevent disease.     Limit alcohol to one drink per day.     If you smoke, try to quit - your doctor will be happy to help.     Wear sunscreen to prevent skin cancer.     See your dentist every six months for an exam and cleaning.     See your eye doctor every 1 to 2 years to screen for conditions such as glaucoma, macular degeneration and  cataracts.

## 2021-11-30 NOTE — PROGRESS NOTES
"    Annual Wellness Visit for 65 years and older    HPI  This 87 year old male presents as an established patient  Haim Galaviz who presents for an annual wellness visit.    Other issues patient wants to be addressed today:  Chief Complaint   Patient presents with     Wellness Visit     no pressing concerns      Medication Reconciliation     completed      Wrist Pain     Left, no trauma     Complains of wrist pain for several days. No trauma. No inciting event. Better with rest and worse with thumb movement. Mild/moderate pain. Hasn't tried anything to help it.    He has an advanced directive on file.    His walking has significantly decreased. Talking to his wife, they are not able to travel or even go for walks around the neighborhood because his legs get tired. The patient repeatedly talks about needing to use the treadmill at home and that it's \"[his] fault.\" He comes back to this point several times. Feels that his legs take a few minutes to \"wake up\" in the morning. If he gets moving too quickly he feels that he would fall. Has not been falling.    Does lament not being able to play golf anymore due to his shoulder pain.    Overall, is thankful for his health and longevity.    Needs a refill of his nitroglycerin.    Isn't taking Plavix as far as he knows.    Patient Active Problem List   Diagnosis     Actinic keratosis     Cardiovascular disease     Essential hypertension, benign     CT Lung Pulmonary Nodule Multiple     Prediabetes     Diverticulosis of large intestine     Hyperlipidemia     Peripheral vascular disease (H)     Internal hemorrhoids     Local infection of skin and subcutaneous tissue     Disorder of bursae and tendons in shoulder region     Synovial cyst     Trigger finger, acquired     Osteoarthritis of glenohumeral joint     Syncope     Abnormal cardiovascular stress test     Chest pain     Coronary atherosclerosis     Upper respiratory tract infection, unspecified type     Neck pain     " Chronic total occlusion of native coronary artery     Past Medical History:   Diagnosis Date     Actinic keratitis      Arthritis     bilat shoulders     CAD (coronary artery disease)      Cardiac arrest (H) 09/04/89     Claudication (H)      Claudication (H)      Coronary artery disease     s/p MI     Diabetes mellitus type 2, controlled (H)      Diverticulosis      HTN (hypertension)      Hyperlipidemia      Hypertension      Malignant neoplasm of prostate (H) 8/7/2006-10/5/2006    DENIED BY PATIENT (see note from 12/08/2014).  RADIOTHERAPY BY DR.CHO LEGGETT, old      Multiple pulmonary nodules      Osteoarthritis of glenohumeral joint     Left. Injected at [facility] 02/10/2015.     Peripheral vascular disease, unspecified (H)      Syncope      Trigger finger, acquired      Type 2 diabetes mellitus (H) 11/6/2012       Family History   Problem Relation Age of Onset     Diabetes No family hx of      Breast Cancer No family hx of      Colon Cancer No family hx of      Prostate Cancer No family hx of      Other Cancer No family hx of      No Known Problems Mother      No Known Problems Father      Problem List, Family History and past Medical History reviewed and  unchanged/updated.    Nursing Notes:   Leisa Jasmine  11/30/2021  3:08 PM  Signed  [facility]    Diclofenac and nitroglycerin     [id number]    Leisa Jasmine, EMT  November 30, 2021  3:08 PM      Frailty Assessment  1. Weight loss (>5% in year)  No  Wt Readings from Last 5 Encounters:   11/30/21 78.5 kg (173 lb)   03/03/21 73.9 kg (163 lb)   09/25/20 73.9 kg (163 lb)   06/26/20 77.1 kg (170 lb)   12/20/19 73 kg (161 lb)       2. Exhaustion (perceived effort for a given activity)   How difficult is walking from one room to the other on the same level?not   No     3. Weakness (handgrip strength)   How difficult is lifting or carrying something as heavy as 10 pounds? not   No    4. Decreased physical activity    Compared with most  (men/women) your age, would you say  that you are more active, less active, or about the same? less   Yes    5. Slow gait speed (timed up and go > 3.52 sec.)  No  FALL RISK ASSESSMENT 11/30/2021 3/28/2019 1/25/2018   Fallen 2 or more times in the past year? No No No   Any fall with injury in the past year? No No No   Timed Up and Go Test/Seconds (13.5 is a fall risk; contact physician) 13 - -       Frailty screen score: 1    Frail Assessment:1-2 Prefrail      EVALUATION OF COGNITIVE FUNCTION  Mood/affect:Normal  Appearance:Normal  Family member/caregiver input: Normal    PHQ-2 Score:   PHQ-2 ( 1999 Pfizer) 11/30/2021 11/6/2019   Q1: Little interest or pleasure in doing things 0 0   Q2: Feeling down, depressed or hopeless 0 0   PHQ-2 Score 0 0   PHQ-2 Total Score (12-17 Years)- Positive if 3 or more points; Administer PHQ-A if positive - 0       PHQ-9 Score:   No flowsheet data found.    Mini Cog Test:    Recall result:  2 points   Clock Draw Test result: Normal    Cognitive screen is:Negative    Other Assessments: Has ASCVD, on therapy    Advance Directives: Discussed with patient and family as appropriate. Has patient completed advance directives and/or a living will? yes    Immunization History   Administered Date(s) Administered     COVID-19,PF,Pfizer (12+ Yrs) 03/05/2021, 03/26/2021, 10/20/2021     FLU 6-35 months 09/09/2020     Flu 65+ Years 10/01/2018     Influenza (H1N1) 01/07/2010     Influenza (High Dose) 3 valent vaccine 09/23/2013, 10/02/2014, 10/14/2016, 10/11/2017, 11/15/2017, 10/01/2019     Influenza (IIV3) PF 10/21/2004, 11/05/2010, 10/14/2011, 10/11/2012, 11/01/2013, 11/01/2014, 10/31/2016, 10/01/2018     Influenza Vaccine, 6+MO IM (QUADRIVALENT W/PRESERVATIVES) 10/01/2019     Pneumococcal 23 valent 10/12/2000     TD (ADULT, 7+) 03/24/2009     Td (Adult), Adsorbed 03/24/2009     Tdap (Adacel,Boostrix) 06/09/2014     Tdap (Adult) Unspecified Formulation 06/09/2014     Zoster vaccine, live 02/05/2009  "    Reviewed Immunization Record Today  Physical Exam  Vitals: /69   Pulse 70   Temp 98.2  F (36.8  C)   Resp 20   Ht 1.65 m (5' 4.96\")   Wt 78.5 kg (173 lb)   SpO2 95%   BMI 28.82 kg/m    BMI= Body mass index is 28.82 kg/m .  EXAM:  Physical Exam  Vitals and nursing note reviewed.   Constitutional:       General: He is not in acute distress.     Appearance: Normal appearance. He is not ill-appearing, toxic-appearing or diaphoretic.   Cardiovascular:      Rate and Rhythm: Normal rate and regular rhythm.   Pulmonary:      Effort: No respiratory distress.   Musculoskeletal:      Left wrist: Bony tenderness present. No swelling, deformity, effusion or snuff box tenderness. Normal range of motion.        Hands:    Neurological:      Mental Status: He is alert and oriented to person, place, and time.       Recent Results (from the past 168 hour(s))   Comprehensive metabolic panel    Collection Time: 11/30/21  2:10 PM   Result Value Ref Range    Sodium 141 133 - 144 mmol/L    Potassium 4.6 3.4 - 5.3 mmol/L    Chloride 106 mmol/L    Carbon Dioxide (CO2) 27 20 - 32 mmol/L    Anion Gap 8 3 - 14 mmol/L    Urea Nitrogen 27 7 - 30 mg/dL    Creatinine 1.20 mg/dL    Calcium 8.8 8.5 - 10.1 mg/dL    Glucose 130 (H) 70 - 99 mg/dL    Alkaline Phosphatase 82 40 - 150 U/L    AST 28 0 - 45 U/L    ALT 19 U/L    Protein Total 6.4 (L) 6.8 - 8.8 g/dL    Albumin 3.5 3.4 - 5.0 g/dL    Bilirubin Total 0.6 0.2 - 1.3 mg/dL    GFR Estimate 54 (L) >60 mL/min/1.73m2   Hemoglobin A1c    Collection Time: 11/30/21  2:10 PM   Result Value Ref Range    Hemoglobin A1C 6.4 (H) 0.0 - 5.6 %   Lipid panel    Collection Time: 11/30/21  2:10 PM   Result Value Ref Range    Cholesterol 124 <=199 mg/dL    Triglycerides 181 (H) <=149 mg/dL    Direct Measure HDL 41 >=40 mg/dL    LDL Cholesterol Calculated 47 <=129 mg/dL    Patient Fasting > 8hrs? Unknown          Assessment and Plan:  Reviewed Preventive Services and Plan form with patient as specified " in Patient Instructions.  Positive findings on assessment: De Quervains, leg pain/weakness    1. Medicare annual wellness visit, subsequent  Reviewed counseling sheet.  With his decreased walking and activity, we had a long discussion about going to physical therapy with him and his wife. He was originally against this but eventually accepted. His wife is very excited about this idea. Check in on this in 1 month.    2. Atherosclerosis of native coronary artery of native heart without angina pectoris  Refill of nitroglycerin sent. In review of his cardiology notes, they had stopped Brilinta and replaced it with Plavix. The patient doesn't have this bottle with him today and doesn't recall being on it. I'll try to send a message to Cardiology to see if they think that he should be on this. He does continue on ASA and has this on hand. When he saw Vascular, they have him only on ASA.  - Lipid panel; Future  - Lipid panel  - nitroGLYcerin (NITROSTAT) 0.4 MG sublingual tablet; Place 1 tablet (0.4 mg) under the tongue every 5 minutes as needed for chest pain Repeat at 5 min intervals x 2 if needed  Dispense: 30 tablet; Refill: 0    3. De Quervain's disease (tenosynovitis)  Discussed the natural history of the disease. Start scheduled topical NSAIDs. Reviewed the Sports Medicine Patient Advisor with the patient. Currently only mild symptoms. Plan for recheck in 1 month with possible injection if needed. Answered all of their questions.  - diclofenac (VOLTAREN) 1 % topical gel; Apply 2 g topically 4 times daily (to left thumb/wrist)  Dispense: 50 g; Refill: 1    4. Essential hypertension, benign  Controlled. Continue. Due for lytes today.  - Comprehensive metabolic panel; Future  - Comprehensive metabolic panel    5. Peripheral vascular disease (H)  See above discussions.  - Physical Therapy Referral; Future    6. Prediabetes  Still pre-diabetes. Hopefully, we can increase his physical activity. Continue to monitor. Plan  for recheck in 6-12 months.  - Hemoglobin A1c; Future  - Hemoglobin A1c    Options for treatment and follow-up care were reviewed with the Zack JEAN Andreas and/or guardian engaged in the decision making process and verbalized understanding of the options discussed and agreed with the final plan.      Haim Galaviz III, MD, FAAFP  [facility]  12/03/21 2:43 PM

## 2021-11-30 NOTE — NURSING NOTE
[facility].    Diclofenac and nitroglycerin     [id number]    Leisa Jasmine, EMT  November 30, 2021  3:08 PM

## 2021-11-30 NOTE — PROGRESS NOTES
Medicare Wellness Visit  Health Risk Assessment           Health Risk Assessment / Review of Systems     Constitutional: Any fevers or night sweats? No     Eyes:  Vision problems   No     Hearing Do you feel you have hearing loss?   YES -currently receiving further evaluation at hearing aid clinic.    Cardiovascular: Any chest pain, fast or irregular heart beat, calf pain with walking?     No           Respiratory:   Any breathing problems or cough?    YES -chronic, every once in awhile cough,clearing of throat. Has had intermittently over years. Not worse,not of concern at the moment.    Gastrointestinal: Any stomach or stool problems?   No      Genitourinary: Do you have difficulty controlling urination?   No     Muscles and Joints: Any joint stiffness or soreness?    YES -generalized body aches, hx arthritis in bilateral shoulders. For past two months has had left wrist discomfort,no injury/trauma.    Skin: Any concerning lesions or moles?   No     Nervous System: Any loss of strength or feeling, numbness or tingling, shaking, dizziness, or headache?  No     Mental Health: Any depression, anxiety or problems sleeping?    No     Cognition: Do you have any problems with your memory?   YES -noticed decrease in short term memory.           Medical Care     What other specialists or organizations are involved in your medical care?  NONE  Patient Care Team       Relationship Specialty Notifications Start End    Haim Galaviz PCP - General Family Practice  4/26/19     Phone: [phone] Fax: [phone] [address] [place] [address]    Haim Galaviz Assigned PCP   5/27/21     Phone: [phone] Fax: [phone] [address] [place] [address]    Blue Horton MD Assigned Heart and Vascular Provider   9/26/21     Phone: [phone] Fax: [phone]         [address] [facility] [address] [facility] [address]          Have you been to the ER or overnight in the hospital  in the last year?  No          Social History / Home Safety       Marital Status:  Who lives in your household? Self and wife    Do you feel threatened or controlled by a partner, ex-partner or anyone in your life? No     Has anyone hurt you physically, for example by pushing, hitting, slapping or kicking you   or forcing you to have sex? No          Does your home have any of the following safety concerns; loose rugs in the hallway,  bathrooms with no grab bars by the tub or toilet, stairs with no handrails or poorly lit areas?  No     Do you need help with dressing yourself, bathing, eating or getting around your home?  No     Do you need help with the phone, transportation, shopping, preparing meals, housework, laundry, medications or managing money?No       Risk Behaviors and Healthy Habits     History   Smoking Status     Former Smoker     Packs/day: 2.00     Years: 40.00     Types: Cigarettes     Quit date: 4/10/1984   Smokeless Tobacco     Never Used     How many servings of fruits and vegetables do you eat a day? Not consistent intake, may be < 1 a day. Reviewed daily intake recommendation and encouraged when able to increase intake.    Exercise: None No routine exercise     Do you frequently drive without a seatbelt? No     Do you use tobacco?  No     Do you use any other drugs? No         Do you use alcohol?No, very seldom may drink only.      Frailty Assessment            Have you lost 10 or more pounds unintentionally in the previous year? No     How difficult is walking from one room to the other on the same level?not       Is it difficult to lift or carry something as heavy as 10 pounds?not      Compared with most (men/women) your age, would you say  that you are more active, less active, or about the same? less        FALL RISK ASSESSMENT 11/30/2021 3/28/2019 1/25/2018   Fallen 2 or more times in the past year? No No No   Any fall with injury in the past year? No No No   Timed Up and Go  Test/Seconds (13.5 is a fall risk; contact physician) 13 - -         Advance Directives:   Discussed with patient and family as appropriate. Has patient  completed advance directives and/or a living will? No-blank copy of an advance directive has been given to patient to take home, review and complete it at his convenience.      Shanta Pan RN

## 2021-11-30 NOTE — Clinical Note
Dr. Cruz,  Our shared patient, Zack oJhns, saw me this week. As I was reviewing his medicines, I saw that he didn't have the clopidogrel that you prescribed and doesn't remember having it. He does remember you stopping the Brilinta and doesn't have that anymore.    If you think he should restart the clopidogrel, I can have my office reach out to him.    Thanks for helping me take care of this patient.    Zach Galaviz III, MD, FAAFP  [facility]  12/03/21 4:50 PM

## 2021-12-27 ENCOUNTER — HOSPITAL ENCOUNTER (OUTPATIENT)
Dept: PHYSICAL THERAPY | Facility: REHABILITATION | Age: 86
End: 2021-12-27
Attending: FAMILY MEDICINE
Payer: COMMERCIAL

## 2021-12-27 DIAGNOSIS — M79.604 PAIN IN BOTH LOWER EXTREMITIES: ICD-10-CM

## 2021-12-27 DIAGNOSIS — Z74.09 DECREASED STRENGTH, ENDURANCE, AND MOBILITY: ICD-10-CM

## 2021-12-27 DIAGNOSIS — I73.9 PERIPHERAL VASCULAR DISEASE (H): Primary | ICD-10-CM

## 2021-12-27 DIAGNOSIS — R53.1 DECREASED STRENGTH, ENDURANCE, AND MOBILITY: ICD-10-CM

## 2021-12-27 DIAGNOSIS — M62.81 MUSCLE WEAKNESS (GENERALIZED): ICD-10-CM

## 2021-12-27 DIAGNOSIS — R68.89 DECREASED STRENGTH, ENDURANCE, AND MOBILITY: ICD-10-CM

## 2021-12-27 DIAGNOSIS — M79.605 PAIN IN BOTH LOWER EXTREMITIES: ICD-10-CM

## 2021-12-27 PROCEDURE — 97161 PT EVAL LOW COMPLEX 20 MIN: CPT | Mod: GP | Performed by: PHYSICAL THERAPIST

## 2021-12-27 PROCEDURE — 97110 THERAPEUTIC EXERCISES: CPT | Mod: GP | Performed by: PHYSICAL THERAPIST

## 2021-12-27 NOTE — PROGRESS NOTES
"   12/27/21 1500   General Information   Type of Visit Initial OP Ortho PT Evaluation   Start of Care Date 12/27/21   Certification Required? Yes   Medical Diagnosis Peripheral vascular disease (H)       Present No   Body Part(s)   Body Part(s) Lumbar Spine/SI   Presentation and Etiology   Pertinent history of current problem (include personal factors and/or comorbidities that impact the POC) Pt reports he has leg pain in the morning that affects his ability to get up right away. He has to do some exercises in the morning - prior to getting up. He has several surgeries for his heart that involved going into his groin. He is not really active and has pain with getting up. He also reports pain into his wrist from arthritis.    Impairments A. Pain;G. Impaired balance;H. Impaired gait;F. Decreased strength and endurance   Symptom Location taz thighs and into lower legs   How/Where did it occur Other  (peripheral artery disease)   Onset date of current episode/exacerbation   (started a long time ago)   Chronicity Chronic   Pain rating (0-10 point scale) Best (/10);Worst (/10)   Best (/10) 0   Worst (/10) 5   Pain quality   (\"pain with walking\")   Frequency of pain/symptoms C. With activity   Pain/symptoms are: Worse in the morning   Pain/symptoms exacerbated by B. Walking   Pain/symptoms eased by C. Rest   Fall Risk Screen   Fall screen completed by PT   Have you fallen 2 or more times in the past year? No   Have you fallen and had an injury in the past year? No   Is patient a fall risk? No   Abuse Screen (yes response referral indicated)   Feels Unsafe at Home or Work/School no   Feels Threatened by Someone no   Does Anyone Try to Keep You From Having Contact with Others or Doing Things Outside Your Home? no   Physical Signs of Abuse Present no   Lumbar Spine/SI Objective Findings   Hip Flexion (L2) Strength 4/5 onR, 4-/5 on L   Hip Extension Strength weakness noted with bridge exercise   Knee " "Extension (L3) Strength 5/5 taz   Ankle Dorsiflexion (L4) Strength 5/5 taz   Lumbar/Hip/Knee/Foot Strength Comments APTA 30 sec STS: 11 reps with \"tired feeling\" in his knees, Age-matched norm: 11   Lumbar/SI Special Tests Comments 2 min walk test: 100 m, attempted 6 min walk test- stopped once to rest standing, able to complete 4 min at 181 m, Age-matched norm: 436 m (6 min walk), 134 m (2 min walk)   Planned Therapy Interventions   Planned Therapy Interventions gait training;manual therapy;neuromuscular re-education;ROM;strengthening;stretching;balance training;prosthetic fitting/training   Clinical Impression   Criteria for Skilled Therapeutic Interventions Met yes, treatment indicated   PT Diagnosis PAD, LE weakness, decreased strength, endurance and function   Influenced by the following impairments LE weakness, pain with prolonged ambulation, inactivity   Functional limitations due to impairments walking, stairs, community mobility, shopping   Clinical Presentation Stable/Uncomplicated   Clinical Decision Making (Complexity) Low complexity   Therapy Frequency 1 time/week   Predicted Duration of Therapy Intervention (days/wks) 8-12 weeks for up to 8 sessions   Risk & Benefits of therapy have been explained Yes   Patient, Family & other staff in agreement with plan of care Yes   Clinical Impression Comments Pt presents with taz LE pain from peripheral artery disease. Pt reports his pain started after walking even short distances and is worse in the morning when he wakes up. His pain limits his overall mobility and activity level throughout the day.   ORTHO GOALS   PT Ortho Eval Goals 1;2;3   Ortho Goal 1   Goal Identifier HEP   Goal Description Pt will be independent in HEP to improve function and endurance   Target Date 01/26/22   Ortho Goal 2   Goal Identifier Walking   Goal Description Pt will be able to walk for 20 min total (with rest breaks if needed) to improve aerobic endurance and community mobility "   Target Date 03/27/22   Ortho Goal 3   Goal Identifier 6 min walk   Goal Description Pt will be able to complete the 6 min walk test without stopping and >230 m    Target Date 03/27/22   Total Evaluation Time   PT Eval, Low Complexity Minutes (09762) 27   Therapy Certification   Certification date from 12/27/21   Certification date to 03/27/22   Medical Diagnosis Peripheral vascular disease (H)     Kelley Castellano, PT, DPT, CLT-YA

## 2021-12-27 NOTE — PROGRESS NOTES
[facility]    OUTPATIENT PHYSICAL THERAPY ORTHOPEDIC EVALUATION  PLAN OF TREATMENT FOR OUTPATIENT REHABILITATION  (COMPLETE FOR INITIAL CLAIMS ONLY)  Patient's Last Name, First Name, M.I.  YOB: 1934  AndreasZack  TED    Provider s Name:  [facility]   Medical Record No.  [id number]   Start of Care Date:  12/27/21   Onset Date:   11/30/21   Type:     _X__PT   ___OT   ___SLP Medical Diagnosis:  Peripheral vascular disease (H)     PT Diagnosis:  PAD, LE weakness, decreased strength, endurance and function   Visits from SOC:  1      _________________________________________________________________________________  Plan of Treatment/Functional Goals:  gait training,manual therapy,neuromuscular re-education,ROM,strengthening,stretching,balance training,prosthetic fitting/training           Goals  Goal Identifier: HEP  Goal Description: Pt will be independent in HEP to improve function and endurance  Target Date: 01/26/22    Goal Identifier: Walking  Goal Description: Pt will be able to walk for 20 min total (with rest breaks if needed) to improve aerobic endurance and community mobility  Target Date: 03/27/22    Goal Identifier: 6 min walk  Goal Description: Pt will be able to complete the 6 min walk test without stopping and >230 m   Target Date: 03/27/22                         Therapy Frequency:  1 time/week  Predicted Duration of Therapy Intervention:  8-12 weeks for up to 8 sessions    Kelley Castellano, PT                 I CERTIFY THE NEED FOR THESE SERVICES FURNISHED UNDER        THIS PLAN OF TREATMENT AND WHILE UNDER MY CARE     (Physician co-signature of this document indicates review and certification of the therapy plan).                       Certification Date From:  12/27/21   Certification Date To:  03/27/22    Referring Provider:   Haim WARD  Guillaume    Initial Assessment        See Epic Evaluation Start of Care Date: 12/27/21

## 2022-01-03 ENCOUNTER — HOSPITAL ENCOUNTER (OUTPATIENT)
Dept: PHYSICAL THERAPY | Facility: REHABILITATION | Age: 87
End: 2022-01-03
Payer: COMMERCIAL

## 2022-01-03 DIAGNOSIS — M79.605 PAIN IN BOTH LOWER EXTREMITIES: ICD-10-CM

## 2022-01-03 DIAGNOSIS — R53.1 DECREASED STRENGTH, ENDURANCE, AND MOBILITY: ICD-10-CM

## 2022-01-03 DIAGNOSIS — M79.604 PAIN IN BOTH LOWER EXTREMITIES: ICD-10-CM

## 2022-01-03 DIAGNOSIS — Z74.09 DECREASED STRENGTH, ENDURANCE, AND MOBILITY: ICD-10-CM

## 2022-01-03 DIAGNOSIS — I73.9 PERIPHERAL VASCULAR DISEASE (H): Primary | ICD-10-CM

## 2022-01-03 DIAGNOSIS — R68.89 DECREASED STRENGTH, ENDURANCE, AND MOBILITY: ICD-10-CM

## 2022-01-03 DIAGNOSIS — M62.81 MUSCLE WEAKNESS (GENERALIZED): ICD-10-CM

## 2022-01-03 PROCEDURE — 97110 THERAPEUTIC EXERCISES: CPT | Mod: CQ | Performed by: PHYSICAL THERAPY ASSISTANT

## 2022-01-17 ENCOUNTER — HOSPITAL ENCOUNTER (OUTPATIENT)
Dept: PHYSICAL THERAPY | Facility: REHABILITATION | Age: 87
End: 2022-01-17
Payer: COMMERCIAL

## 2022-01-17 DIAGNOSIS — M79.604 PAIN IN BOTH LOWER EXTREMITIES: ICD-10-CM

## 2022-01-17 DIAGNOSIS — M62.81 MUSCLE WEAKNESS (GENERALIZED): ICD-10-CM

## 2022-01-17 DIAGNOSIS — I73.9 PERIPHERAL VASCULAR DISEASE (H): Primary | ICD-10-CM

## 2022-01-17 DIAGNOSIS — R53.1 DECREASED STRENGTH, ENDURANCE, AND MOBILITY: ICD-10-CM

## 2022-01-17 DIAGNOSIS — R68.89 DECREASED STRENGTH, ENDURANCE, AND MOBILITY: ICD-10-CM

## 2022-01-17 DIAGNOSIS — Z74.09 DECREASED STRENGTH, ENDURANCE, AND MOBILITY: ICD-10-CM

## 2022-01-17 DIAGNOSIS — M79.605 PAIN IN BOTH LOWER EXTREMITIES: ICD-10-CM

## 2022-01-17 PROCEDURE — 97110 THERAPEUTIC EXERCISES: CPT | Mod: GP | Performed by: PHYSICAL THERAPIST

## 2022-01-19 ENCOUNTER — OFFICE VISIT (OUTPATIENT)
Dept: FAMILY MEDICINE | Facility: CLINIC | Age: 87
End: 2022-01-19
Payer: COMMERCIAL

## 2022-01-19 VITALS
DIASTOLIC BLOOD PRESSURE: 72 MMHG | SYSTOLIC BLOOD PRESSURE: 137 MMHG | RESPIRATION RATE: 22 BRPM | HEART RATE: 57 BPM | WEIGHT: 178 LBS | OXYGEN SATURATION: 97 % | BODY MASS INDEX: 29.66 KG/M2 | TEMPERATURE: 97.5 F | HEIGHT: 65 IN

## 2022-01-19 DIAGNOSIS — M25.512 CHRONIC PAIN OF BOTH SHOULDERS: ICD-10-CM

## 2022-01-19 DIAGNOSIS — M25.511 CHRONIC PAIN OF BOTH SHOULDERS: ICD-10-CM

## 2022-01-19 DIAGNOSIS — G89.29 CHRONIC PAIN OF BOTH SHOULDERS: ICD-10-CM

## 2022-01-19 DIAGNOSIS — I73.9 PERIPHERAL VASCULAR DISEASE (H): Primary | ICD-10-CM

## 2022-01-19 DIAGNOSIS — M65.4 DE QUERVAIN'S DISEASE (TENOSYNOVITIS): ICD-10-CM

## 2022-01-19 PROCEDURE — 99215 OFFICE O/P EST HI 40 MIN: CPT | Mod: 25 | Performed by: FAMILY MEDICINE

## 2022-01-19 PROCEDURE — 99417 PROLNG OP E/M EACH 15 MIN: CPT | Performed by: FAMILY MEDICINE

## 2022-01-19 PROCEDURE — 20550 NJX 1 TENDON SHEATH/LIGAMENT: CPT | Performed by: FAMILY MEDICINE

## 2022-01-19 RX ORDER — TRIAMCINOLONE ACETONIDE 40 MG/ML
20 INJECTION, SUSPENSION INTRA-ARTICULAR; INTRAMUSCULAR ONCE
Status: COMPLETED | OUTPATIENT
Start: 2022-01-19 | End: 2022-01-19

## 2022-01-19 RX ORDER — LIDOCAINE HYDROCHLORIDE 10 MG/ML
1 INJECTION, SOLUTION INFILTRATION; PERINEURAL ONCE
Status: COMPLETED | OUTPATIENT
Start: 2022-01-19 | End: 2022-01-19

## 2022-01-19 RX ADMIN — TRIAMCINOLONE ACETONIDE 20 MG: 40 INJECTION, SUSPENSION INTRA-ARTICULAR; INTRAMUSCULAR at 12:30

## 2022-01-19 RX ADMIN — LIDOCAINE HYDROCHLORIDE 1 ML: 10 INJECTION, SOLUTION INFILTRATION; PERINEURAL at 12:30

## 2022-01-19 ASSESSMENT — MIFFLIN-ST. JEOR: SCORE: 1406.1

## 2022-01-19 NOTE — PROCEDURES
DE QUERVAIN'S INJECTION  The patient was prepped with alcohol. A 25G needle was inserted via the proximal approach into the left de quervains tendon sheath and 20 mg of Kenalog and 1 mL of 1% lido w/o epi was injected. The patient tolerated the procedure well and had improvement in their pain post procedure.    Haim Galaviz III, MD, FAAFP  [facility]  01/19/22 1:14 PM

## 2022-01-19 NOTE — PROGRESS NOTES
Assessment and Plan     1. Peripheral vascular disease (H)  Has started physical therapy but doesn't have too many sessions left and hasn't made much personal progress with a routine. Wife seems at her wits end with him. I created a calendar for him to chart his exercise on the treadmill. Goal is 5 sessions per week starting at 10 min a day and increasing 5 min/day/week. Recheck in 4 weeks. Discussed his expectations for aging and what we could do to help him live better. Encouraged the wife to shift the responsibility for the required workouts to me and not her. He is to bring his assignment checklist to his next appointment.    2. De Quervain's disease (tenosynovitis)  Still mild/moderate even with the topical diclofenac. Offered injection and the patient accepted. Significant improvement in pain post-procedure. Return to clinic as needed. OK to stop the diclofenac.  - Injection Single Tendon Sheath/Ligament [20550]  - triamcinolone (KENALOG-40) injection 20 mg  - lidocaine 1 % injection 1 mL    3. Chronic pain of both shoulders  Didn't have time to really get into today. On chart review, I see that I injected his shoulders previously and he went to physical therapy. Hope to check back in on this in the future. Start scheduled tylenol 1000 BID.    On reflection, I have some background concern for geriatric depression with his potentially profound lack of motivation. Plan to watch this.    91 minutes spent on the date of the encounter doing chart review, history and exam, documentation, and further activities as noted above. This time is in addition to the required time for the procedure.    Options for treatment and follow-up care were reviewed with the patient and/or guardian. Zack Johns and/or guardian engaged in the decision making process and verbalized understanding of the options discussed and agreed with the final plan. I answered all of their questions.    Haim Galaviz III, MD, FAAFP  .  [facility]  01/19/22 1:15 PM           HPI:       Zack Johns is a 87 year old  male  Patient presents with:  Follow Up: PT- seems to be helping slightly (maybe) he would like to start doing better   Medication Reconciliation    Needs to be doing a better job with physical therapy but does think that it will help. Has gone twice. Isn't using the treadmill at home yet.    Has shoulder pain all of the time. Previous history of PT and injections. It limits his activity. Quit golfing years ago. Not currently taking medicine for it.    His thumb/wrist is doing a little better. Would like a refill of the voltaren. Doesn't have pain at rest.    His wife continues to be frustrated with his lack of effort at the home exercises.    Morning routine:  Gets up around 8-8:30. Goes to get breakfast and [employer] or [employer]. Eats, reads paper.  Skips lunch, not very hungry.         PMHX:     Patient Active Problem List   Diagnosis     Actinic keratosis     Cardiovascular disease     Essential hypertension, benign     CT Lung Pulmonary Nodule Multiple     Prediabetes     Diverticulosis of large intestine     Hyperlipidemia     Peripheral vascular disease (H)     Internal hemorrhoids     Local infection of skin and subcutaneous tissue     Disorder of bursae and tendons in shoulder region     Synovial cyst     Trigger finger, acquired     Osteoarthritis of glenohumeral joint     Syncope     Abnormal cardiovascular stress test     Chest pain     Coronary atherosclerosis     Upper respiratory tract infection, unspecified type     Neck pain     Chronic total occlusion of native coronary artery       Current Outpatient Medications   Medication Sig Dispense Refill     acetaminophen (TYLENOL) 500 MG tablet Take 500-1,000 mg by mouth every 6 hours as needed       aspirin 81 MG tablet Take 1 tablet by mouth daily       atorvastatin (LIPITOR) 40 MG tablet TAKE 1 TABLET DAILY 90 tablet 3     diclofenac (VOLTAREN) 1 %  topical gel Apply 2 g topically 4 times daily (to left thumb/wrist) 50 g 1     lisinopril-hydrochlorothiazide (ZESTORETIC) 20-12.5 MG tablet TAKE 1 TABLET DAILY 90 tablet 3     metoprolol tartrate (LOPRESSOR) 25 MG tablet TAKE ONE-HALF (1/2) TABLET TWICE A DAY 90 tablet 3     Multiple Vitamin (MULTIVITAMIN) per tablet Take 1 tablet by mouth daily.       nitroGLYcerin (NITROSTAT) 0.4 MG sublingual tablet Place 1 tablet (0.4 mg) under the tongue every 5 minutes as needed for chest pain Repeat at 5 min intervals x 2 if needed 30 tablet 0     zinc 50 MG TABS Take 50 mg by mouth daily.         Social History     Socioeconomic History     Marital status:      Spouse name: Not on file     Number of children: Not on file     Years of education: Not on file     Highest education level: Not on file   Occupational History     Not on file   Tobacco Use     Smoking status: Former Smoker     Packs/day: 2.00     Years: 40.00     Pack years: 80.00     Types: Cigarettes     Quit date: 4/10/1984     Years since quittin.8     Smokeless tobacco: Never Used   Substance and Sexual Activity     Alcohol use: Yes     Alcohol/week: 6.0 standard drinks     Comment: occasionally.     Drug use: No     Sexual activity: Not on file   Other Topics Concern     Parent/sibling w/ CABG, MI or angioplasty before 65F 55M? 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        Had gotten into typing to meet girls.        Worked for [employer] after leaving the [employer].     Social Determinants of Health     Financial Resource Strain: Not on file   Food Insecurity: Not on file   Transportation Needs: Not on file   Physical Activity: Not on file   Stress: Not on file   Social Connections: Not on file   Intimate Partner Violence: Not on file   Housing Stability: Not on file       Allergies   Allergen Reactions     Pletal [Cilostazol]        No results found for this or any previous visit (from  "the past 24 hour(s)).         Review of Systems:     ROS         Physical Exam:     Vitals:    01/19/22 1134 01/19/22 1137 01/19/22 1148   BP: (!) 151/77 (!) 174/66 137/72   Pulse: 57     Resp: 22     Temp: 97.5  F (36.4  C)     TempSrc: Oral     SpO2: 97%     Weight: 80.7 kg (178 lb)     Height: 1.646 m (5' 4.8\")       Body mass index is 29.8 kg/m .    Physical Exam  Vitals and nursing note reviewed.   Constitutional:       General: He is not in acute distress.     Appearance: Normal appearance. He is not ill-appearing, toxic-appearing or diaphoretic.   Pulmonary:      Effort: No respiratory distress.   Musculoskeletal:        Hands:       Comments: Positive Finklesteins, pain increased with resisted extension   Neurological:      Mental Status: He is alert and oriented to person, place, and time.           "

## 2022-01-31 ENCOUNTER — HOSPITAL ENCOUNTER (OUTPATIENT)
Dept: PHYSICAL THERAPY | Facility: REHABILITATION | Age: 87
End: 2022-01-31
Payer: COMMERCIAL

## 2022-01-31 DIAGNOSIS — R68.89 DECREASED STRENGTH, ENDURANCE, AND MOBILITY: ICD-10-CM

## 2022-01-31 DIAGNOSIS — M62.81 MUSCLE WEAKNESS (GENERALIZED): ICD-10-CM

## 2022-01-31 DIAGNOSIS — R53.1 DECREASED STRENGTH, ENDURANCE, AND MOBILITY: ICD-10-CM

## 2022-01-31 DIAGNOSIS — M79.605 PAIN IN BOTH LOWER EXTREMITIES: ICD-10-CM

## 2022-01-31 DIAGNOSIS — Z74.09 DECREASED STRENGTH, ENDURANCE, AND MOBILITY: ICD-10-CM

## 2022-01-31 DIAGNOSIS — M79.604 PAIN IN BOTH LOWER EXTREMITIES: ICD-10-CM

## 2022-01-31 DIAGNOSIS — I73.9 PERIPHERAL VASCULAR DISEASE (H): Primary | ICD-10-CM

## 2022-01-31 PROCEDURE — 97110 THERAPEUTIC EXERCISES: CPT | Mod: GP | Performed by: PHYSICAL THERAPIST

## 2022-02-16 ENCOUNTER — OFFICE VISIT (OUTPATIENT)
Dept: FAMILY MEDICINE | Facility: CLINIC | Age: 87
End: 2022-02-16
Payer: COMMERCIAL

## 2022-02-16 VITALS
OXYGEN SATURATION: 95 % | TEMPERATURE: 97.6 F | WEIGHT: 174.08 LBS | HEART RATE: 55 BPM | BODY MASS INDEX: 29 KG/M2 | SYSTOLIC BLOOD PRESSURE: 145 MMHG | RESPIRATION RATE: 16 BRPM | DIASTOLIC BLOOD PRESSURE: 65 MMHG | HEIGHT: 65 IN

## 2022-02-16 DIAGNOSIS — I73.9 PERIPHERAL VASCULAR DISEASE (H): Primary | ICD-10-CM

## 2022-02-16 PROCEDURE — 99215 OFFICE O/P EST HI 40 MIN: CPT | Performed by: FAMILY MEDICINE

## 2022-02-16 ASSESSMENT — ENCOUNTER SYMPTOMS: CONSTITUTIONAL NEGATIVE: 1

## 2022-02-16 NOTE — PROGRESS NOTES
Assessment and Plan     1. Peripheral vascular disease (H)  His activity has increased from 0 min per week to 60+ minutes. He is very happy with the progress as is his wife. We co-created goals which really ends with the ability to travel internationally. We all agree that this would likely require his ability to do 45-60 min per walk at a minimum. This goal was written on his new assignment chart. I encouraged a follow up in 4 weeks and he accepted. I encouraged him to push through his leg/knee pain as exercise is good for his arthritis and his PAD. Change his nighttime dose of tylenol to suppertime (since this is right before he usually exercises).    49 minutes spent on the date of the encounter doing chart review, history and exam, documentation, and further activities as noted above.    Options for treatment and follow-up care were reviewed with the patient and/or guardian. Zack Johns and/or guardian engaged in the decision making process and verbalized understanding of the options discussed and agreed with the final plan. I answered all of their questions.    Haim Galaviz III, MD, FAAFP  [facility]  02/16/22 12:19 PM           HPI:       Zack Johns is a 87 year old  male  Patient presents with:  Follow Up: walking      Uses his treadmill 5-7 times per week. Maximum time was 20 min at a time. Mostly has been around 15min at a time. He has been taking the tylenol 1000mg twice a day, morning and before bed. He usually uses the treadmill after supper. Has also been attending physical therapy. Does feel like he is partially limited by pain and this could be a reason to stop the treadmill early.    Goals for him and his wife are to be able to travel. Proabably needs to be able to walk at least 45-60 minutes at a time. She would like them to be able to return to [place] for walks and also to travel to [place] (to see her family) and to [place] (to meet his).    His thumb is  better.    He is accompanied by his wife who also provides history.         PMHX:     Patient Active Problem List   Diagnosis     Actinic keratosis     Cardiovascular disease     Essential hypertension, benign     CT Lung Pulmonary Nodule Multiple     Prediabetes     Diverticulosis of large intestine     Hyperlipidemia     Peripheral vascular disease (H)     Internal hemorrhoids     Local infection of skin and subcutaneous tissue     Disorder of bursae and tendons in shoulder region     Synovial cyst     Trigger finger, acquired     Osteoarthritis of glenohumeral joint     Syncope     Abnormal cardiovascular stress test     Chest pain     Coronary atherosclerosis     Upper respiratory tract infection, unspecified type     Neck pain     Chronic total occlusion of native coronary artery       Current Outpatient Medications   Medication Sig Dispense Refill     acetaminophen (TYLENOL) 500 MG tablet Take 500-1,000 mg by mouth every 6 hours as needed       aspirin 81 MG tablet Take 1 tablet by mouth daily       atorvastatin (LIPITOR) 40 MG tablet TAKE 1 TABLET DAILY 90 tablet 3     lisinopril-hydrochlorothiazide (ZESTORETIC) 20-12.5 MG tablet TAKE 1 TABLET DAILY 90 tablet 3     metoprolol tartrate (LOPRESSOR) 25 MG tablet TAKE ONE-HALF (1/2) TABLET TWICE A DAY 90 tablet 3     Multiple Vitamin (MULTIVITAMIN) per tablet Take 1 tablet by mouth daily.       zinc 50 MG TABS Take 50 mg by mouth daily.       nitroGLYcerin (NITROSTAT) 0.4 MG sublingual tablet Place 1 tablet (0.4 mg) under the tongue every 5 minutes as needed for chest pain Repeat at 5 min intervals x 2 if needed (Patient not taking: Reported on 2/16/2022) 30 tablet 0       Social History     Socioeconomic History     Marital status:      Spouse name: Not on file     Number of children: Not on file     Years of education: Not on file     Highest education level: Not on file   Occupational History     Not on file   Tobacco Use     Smoking status: Former  "Smoker     Packs/day: 2.00     Years: 40.00     Pack years: 80.00     Types: Cigarettes     Quit date: 4/10/1984     Years since quittin.8     Smokeless tobacco: Never Used   Substance and Sexual Activity     Alcohol use: Yes     Alcohol/week: 6.0 standard drinks     Comment: occasionally.     Drug use: No     Sexual activity: Not on file   Other Topics Concern     Parent/sibling w/ CABG, MI or angioplasty before 65F 55M? 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        Had gotten into typing to meet girls.        Worked for [employer] after leaving the [employer].     Social Determinants of Health     Financial Resource Strain: Not on file   Food Insecurity: Not on file   Transportation Needs: Not on file   Physical Activity: Not on file   Stress: Not on file   Social Connections: Not on file   Intimate Partner Violence: Not on file   Housing Stability: Not on file       Allergies   Allergen Reactions     Pletal [Cilostazol]        No results found for this or any previous visit (from the past 24 hour(s)).         Review of Systems:     Review of Systems   Constitutional: Negative.             Physical Exam:     Vitals:    22 1119 22 1123   BP: (!) 156/65 (!) 145/65   BP Location: Right arm Right arm   Cuff Size: Adult Regular Adult Regular   Pulse: 55    Resp: 16    Temp: 97.6  F (36.4  C)    TempSrc: Oral    SpO2: 95%    Weight: 79 kg (174 lb 1.3 oz)    Height: 1.651 m (5' 5\")      Body mass index is 28.97 kg/m .    Physical Exam  Vitals and nursing note reviewed.   Constitutional:       General: He is not in acute distress.     Appearance: Normal appearance. He is not ill-appearing, toxic-appearing or diaphoretic.   Pulmonary:      Effort: No respiratory distress.   Neurological:      Mental Status: He is alert and oriented to person, place, and time.           "

## 2022-02-17 ENCOUNTER — HOSPITAL ENCOUNTER (OUTPATIENT)
Dept: PHYSICAL THERAPY | Facility: REHABILITATION | Age: 87
End: 2022-02-17
Payer: COMMERCIAL

## 2022-02-17 DIAGNOSIS — I73.9 PERIPHERAL VASCULAR DISEASE (H): Primary | ICD-10-CM

## 2022-02-17 DIAGNOSIS — R68.89 DECREASED STRENGTH, ENDURANCE, AND MOBILITY: ICD-10-CM

## 2022-02-17 DIAGNOSIS — Z74.09 DECREASED STRENGTH, ENDURANCE, AND MOBILITY: ICD-10-CM

## 2022-02-17 DIAGNOSIS — R53.1 DECREASED STRENGTH, ENDURANCE, AND MOBILITY: ICD-10-CM

## 2022-02-17 DIAGNOSIS — M62.81 MUSCLE WEAKNESS (GENERALIZED): ICD-10-CM

## 2022-02-17 DIAGNOSIS — M79.605 PAIN IN BOTH LOWER EXTREMITIES: ICD-10-CM

## 2022-02-17 DIAGNOSIS — M79.604 PAIN IN BOTH LOWER EXTREMITIES: ICD-10-CM

## 2022-02-17 PROCEDURE — 97110 THERAPEUTIC EXERCISES: CPT | Mod: GP | Performed by: PHYSICAL THERAPIST

## 2022-02-17 NOTE — PROGRESS NOTES
[facility]    Outpatient Physical Therapy Discharge Note  Patient: Zack Johns  : 1934    Beginning/End Dates of Reporting Period:  21 to 22    Referring Provider:   Haim Galaviz III, MD, [facility]    Therapy Diagnosis: PAD, LE weakness, decreased strength, endurance and function      Client Self Report: Pt still has not been as consistent with his HEP but has been walking on treadmill for 15 minutes. He does get limited by his R knee pain.     Objective Measurements:  Objective Measure: 6 min walk test  Details: 230 m with 3 standing rest breaks, 105 m at 2 min         Goals:  Goal Identifier HEP   Goal Description Pt will be independent in HEP to improve function and endurance   Target Date 22   Date Met      Progress (detail required for progress note): inconsistent performance at this time     Goal Identifier Walking   Goal Description Pt will be able to walk for 20 min total (with rest breaks if needed) to improve aerobic endurance and community mobility   Target Date 22   Date Met      Progress (detail required for progress note): improving- up to 15 min on the treadmill     Goal Identifier 6 min walk   Goal Description Pt will be able to complete the 6 min walk test without stopping and >230 m    Target Date 22   Date Met  22   Progress (detail required for progress note): Met         Plan:  Discharge from therapy.    Discharge:    Reason for Discharge: Pt has met 6 min walk test and has reached a plateau towards other goals. He is to continue with his HEP and walking program.     Equipment Issued: none    Discharge Plan: Patient to continue home program.    Kelley Castellano, PT, DPT, CLT-YA

## 2022-03-16 ENCOUNTER — OFFICE VISIT (OUTPATIENT)
Dept: FAMILY MEDICINE | Facility: CLINIC | Age: 87
End: 2022-03-16
Payer: COMMERCIAL

## 2022-03-16 VITALS
OXYGEN SATURATION: 97 % | WEIGHT: 175 LBS | DIASTOLIC BLOOD PRESSURE: 65 MMHG | BODY MASS INDEX: 29.16 KG/M2 | RESPIRATION RATE: 20 BRPM | TEMPERATURE: 98.2 F | HEIGHT: 65 IN | SYSTOLIC BLOOD PRESSURE: 138 MMHG | HEART RATE: 66 BPM

## 2022-03-16 DIAGNOSIS — F32.1 CURRENT MODERATE EPISODE OF MAJOR DEPRESSIVE DISORDER WITHOUT PRIOR EPISODE (H): Primary | ICD-10-CM

## 2022-03-16 DIAGNOSIS — I73.9 PERIPHERAL VASCULAR DISEASE (H): ICD-10-CM

## 2022-03-16 DIAGNOSIS — I25.2 CORONARY ARTERY DISEASE WITH HX OF MYOCARDIAL INFARCT W/O HX OF CABG: ICD-10-CM

## 2022-03-16 DIAGNOSIS — I25.10 CORONARY ARTERY DISEASE WITH HX OF MYOCARDIAL INFARCT W/O HX OF CABG: ICD-10-CM

## 2022-03-16 PROCEDURE — 99417 PROLNG OP E/M EACH 15 MIN: CPT | Performed by: FAMILY MEDICINE

## 2022-03-16 PROCEDURE — 99215 OFFICE O/P EST HI 40 MIN: CPT | Performed by: FAMILY MEDICINE

## 2022-03-16 RX ORDER — CITALOPRAM HYDROBROMIDE 20 MG/1
20 TABLET ORAL DAILY
Qty: 30 TABLET | Refills: 1 | Status: SHIPPED | OUTPATIENT
Start: 2022-03-16 | End: 2022-04-19

## 2022-03-16 RX ORDER — METHYLPHENIDATE HYDROCHLORIDE 5 MG/1
5 TABLET ORAL DAILY
Qty: 30 TABLET | Refills: 0 | Status: SHIPPED | OUTPATIENT
Start: 2022-04-16 | End: 2022-05-16

## 2022-03-16 RX ORDER — METHYLPHENIDATE HYDROCHLORIDE 5 MG/1
5 TABLET ORAL DAILY
Qty: 30 TABLET | Refills: 0 | Status: SHIPPED | OUTPATIENT
Start: 2022-03-16 | End: 2022-04-15

## 2022-03-16 ASSESSMENT — GERIATRIC DEPRESSION SCALE SHORT VERSION (GDS-SV)
DO YOU THINK IT IS WONDERFUL TO BE ALIVE NOW: YES
DO YOU OFTEN FEEL HELPLESS: YES
DO YOU FEEL PRETTY WORTHLESS THE WAY YOU ARE NOW: NO
DO YOU OFTEN GET BORED: YES
DO YOU FEEL YOU HAVE MORE PROBLEMS WITH MEMORY THAN MOST: NO
DO YOU FEEL HAPPY MOST OF THE TIME: YES
ARE YOU AFRAID THAT SOMETHING BAD IS GOING TO HAPPEN TO YOU: YES
DO YOU FEEL THAT YOUR LIFE IS EMPTY: NO
GDS TOTAL SCORE: 5
HAVE YOU DROPPED MANY OF YOUR ACTIVITIES AND INTERESTS?: NO
ARE YOU BASICALLY SATISFIED WITH YOUR LIFE: YES
DO YOU FEEL FULL OF ENERGY: NO
DO YOU FEEL THAT YOUR SITUATION IS HOPELESS: YES
ARE YOU IN GOOD SPIRITS MOST OF THE TIME: YES
DO YOU PREFER TO STAY AT HOME, RATHER THAN GOING OUT AND DOING NEW THINGS: NO
DO YOU THINK THAT MOST PEOPLE ARE BETTER OFF THAN YOU ARE: NO

## 2022-03-16 ASSESSMENT — ENCOUNTER SYMPTOMS
CARDIOVASCULAR NEGATIVE: 1
SHORTNESS OF BREATH: 0

## 2022-03-16 NOTE — PROGRESS NOTES
Assessment and Plan     1. Current moderate episode of major depressive disorder without prior episode (H)  Had a long discussion with him and his wife about the diagnosis. He is willing to try the medicine for a month and recheck. Reviewed combination therapy and talked about side effects. Stable and asymptomatic CAD and I worry that he won't get better exercise with out maximum treatment for his depression. Recheck in 1 month.  - citalopram (CELEXA) 20 MG tablet; Take 1 tablet (20 mg) by mouth daily  Dispense: 30 tablet; Refill: 1  - methylphenidate (RITALIN) 5 MG tablet; Take 1 tablet (5 mg) by mouth daily  Dispense: 30 tablet; Refill: 0  - methylphenidate (RITALIN) 5 MG tablet; Take 1 tablet (5 mg) by mouth daily  Dispense: 30 tablet; Refill: 0    2. Peripheral vascular disease (H)  Continues to slowly increase his exercise. New goal calendar completed. He is only allowed to take off one day if he isn't meeting at least 15 min per exercise. Goals are now 20-25 minutes. Increase tylenol to tid. He was instructed to push through leg pain but should stop if he feels unsteady or that he will fall. Recheck in 1 month.    3. Coronary artery disease with hx of myocardial infarct w/o hx of CABG  See above.    71 minutes spent on the date of the encounter doing chart review, history and exam, documentation, and further activities as noted above.    Options for treatment and follow-up care were reviewed with the patient and/or guardian. Zack Johns and/or guardian engaged in the decision making process and verbalized understanding of the options discussed and agreed with the final plan. I answered all of their questions.    Haim Galaviz III, MD, FAAFP  [facility]  03/16/22 12:38 PM           HPI:       Zack Johns is a 87 year old  male  Patient presents with:  Follow Up: walking and mood      Continues to have low motivation to get things done. His wife sent me a letter concerned for  "his mood and motivation. In her two page typed letter to me, here are the highlights: \"prod him to get onto the treadmill, to work on our taxes with me, to call  to get the form\" and notes that this was not previously necessary. He even needs \"to be reminded to change underwear, take a shower, and shave.\" She notes that their sons have noted these behaviors.    Geriatric Depression Scale is 5/15. (bored, something bad to happen, helpless, energy, hopeless)    Most weeks gets 4-5 days on the treadmill. Max time across the past month was 17 when goal was 15-20 minutes. He stops when his legs start to hurt.    No recent chest pain or palpitations. Sees cardiology regularly. His vascular appointment was rescheduled and is upcoming.         PMHX:     Patient Active Problem List   Diagnosis     Actinic keratosis     Cardiovascular disease     Essential hypertension, benign     CT Lung Pulmonary Nodule Multiple     Prediabetes     Diverticulosis of large intestine     Hyperlipidemia     Peripheral vascular disease (H)     Internal hemorrhoids     Local infection of skin and subcutaneous tissue     Disorder of bursae and tendons in shoulder region     Synovial cyst     Trigger finger, acquired     Osteoarthritis of glenohumeral joint     Syncope     Abnormal cardiovascular stress test     Chest pain     Coronary atherosclerosis     Upper respiratory tract infection, unspecified type     Neck pain     Chronic total occlusion of native coronary artery       Current Outpatient Medications   Medication Sig Dispense Refill     citalopram (CELEXA) 20 MG tablet Take 1 tablet (20 mg) by mouth daily 30 tablet 1     methylphenidate (RITALIN) 5 MG tablet Take 1 tablet (5 mg) by mouth daily 30 tablet 0     [START ON 4/16/2022] methylphenidate (RITALIN) 5 MG tablet Take 1 tablet (5 mg) by mouth daily 30 tablet 0     acetaminophen (TYLENOL) 500 MG tablet Take 500-1,000 mg by mouth every 6 hours as needed       aspirin 81 MG tablet Take " 1 tablet by mouth daily       atorvastatin (LIPITOR) 40 MG tablet TAKE 1 TABLET DAILY 90 tablet 3     lisinopril-hydrochlorothiazide (ZESTORETIC) 20-12.5 MG tablet TAKE 1 TABLET DAILY 90 tablet 3     metoprolol tartrate (LOPRESSOR) 25 MG tablet TAKE ONE-HALF (1/2) TABLET TWICE A DAY 90 tablet 3     Multiple Vitamin (MULTIVITAMIN) per tablet Take 1 tablet by mouth daily.       nitroGLYcerin (NITROSTAT) 0.4 MG sublingual tablet Place 1 tablet (0.4 mg) under the tongue every 5 minutes as needed for chest pain Repeat at 5 min intervals x 2 if needed (Patient not taking: Reported on 2022) 30 tablet 0     zinc 50 MG TABS Take 50 mg by mouth daily.         Social History     Socioeconomic History     Marital status:      Spouse name: Not on file     Number of children: Not on file     Years of education: Not on file     Highest education level: Not on file   Occupational History     Not on file   Tobacco Use     Smoking status: Former Smoker     Packs/day: 2.00     Years: 40.00     Pack years: 80.00     Types: Cigarettes     Quit date: 4/10/1984     Years since quittin.9     Smokeless tobacco: Never Used   Substance and Sexual Activity     Alcohol use: Yes     Alcohol/week: 6.0 standard drinks     Comment: occasionally.     Drug use: No     Sexual activity: Not on file   Other Topics Concern     Parent/sibling w/ CABG, MI or angioplasty before 65F 55M? Not Asked   Social History Narrative    Was drafted into the [employer] between [place] and [place]. Worked in admin.        Had gotten into typing to meet girls.        Worked for [employer] after leaving the [employer].     Social Determinants of Health     Financial Resource Strain: Not on file   Food Insecurity: Not on file   Transportation Needs: Not on file   Physical Activity: Not on file   Stress: Not on file   Social Connections: Not on file   Intimate Partner Violence: Not on file   Housing Stability: Not on file       Allergies   Allergen Reactions     Pletal  "[Cilostazol]        No results found for this or any previous visit (from the past 24 hour(s)).         Review of Systems:     Review of Systems   Respiratory: Negative for shortness of breath.    Cardiovascular: Negative.             Physical Exam:     Vitals:    03/16/22 1121   BP: 138/65   Pulse: 66   Resp: 20   Temp: 98.2  F (36.8  C)   TempSrc: Oral   SpO2: 97%   Weight: 79.4 kg (175 lb)   Height: 1.651 m (5' 5\")     Body mass index is 29.12 kg/m .    Physical Exam  Vitals and nursing note reviewed.   Constitutional:       General: He is not in acute distress.     Appearance: Normal appearance. He is not ill-appearing, toxic-appearing or diaphoretic.   Pulmonary:      Effort: No respiratory distress.   Neurological:      Mental Status: He is alert and oriented to person, place, and time.           "

## 2022-03-28 ENCOUNTER — OFFICE VISIT (OUTPATIENT)
Dept: VASCULAR SURGERY | Facility: CLINIC | Age: 87
End: 2022-03-28
Attending: SURGERY
Payer: COMMERCIAL

## 2022-03-28 ENCOUNTER — ANCILLARY PROCEDURE (OUTPATIENT)
Dept: VASCULAR ULTRASOUND | Facility: CLINIC | Age: 87
End: 2022-03-28
Attending: SURGERY
Payer: COMMERCIAL

## 2022-03-28 VITALS — TEMPERATURE: 99.2 F | HEART RATE: 52 BPM

## 2022-03-28 DIAGNOSIS — I73.9 PAD (PERIPHERAL ARTERY DISEASE) (H): ICD-10-CM

## 2022-03-28 DIAGNOSIS — I73.9 PAD (PERIPHERAL ARTERY DISEASE) (H): Primary | ICD-10-CM

## 2022-03-28 PROCEDURE — 99213 OFFICE O/P EST LOW 20 MIN: CPT | Performed by: SURGERY

## 2022-03-28 PROCEDURE — 93922 UPR/L XTREMITY ART 2 LEVELS: CPT

## 2022-03-28 PROCEDURE — 93922 UPR/L XTREMITY ART 2 LEVELS: CPT | Mod: 26 | Performed by: SURGERY

## 2022-03-28 NOTE — PROGRESS NOTES
US BEFORE, 6 month f/u PAD, s/p bilateral iliac stents 10/2018, bilateral femoral endart 5/2017. Last visit c/o bilateral LE weakness on ambulation, feels this started when stopped exercising. Patient is walking approx. 6 days a week for 15 minutes on the treadmill. Has c/o right knee pain with walking, he has a appointment with his PCP for this. On ASA, statin.

## 2022-03-28 NOTE — PATIENT INSTRUCTIONS
Patient Education     Discharge Instructions for Peripheral Arterial Disease (PAD)    You have been diagnosed with peripheral arterial disease (PAD). Peripheral arteries deliver oxygen-rich blood to your legs and feet. Over time, your blood vessel walls may thicken as they build up with a fatty substance (plaque). As plaque builds up in an artery, blood flow can be reduced or even blocked. This causes PAD. This can lead to pain when you walk (claudication) and pain when you rest. It can even cause ulcers or tissue death due to lack of blood supply (gangrene).  Home care    Stay at a healthy weight. Get help to lose any extra pounds (kilograms).    Eat more fresh fruits and vegetables    Limit canned, dried, packaged, and fast foods.    Limit your salt intake. Don t add more salt to your food at the table.    Season foods with herbs instead of salt when you cook.    Lower the amount of cholesterol, and saturated and trans fats in your diet.     Start an exercise program. Ask your healthcare provider how to get started. You can benefit from simple activities such as walking or gardening.    Break your smoking habit. Join a stop-smoking program for a better chance of success.    Take your medicines as directed. Don t skip doses.    If you have diabetes, manage your blood sugar as directed by your healthcare provider.    Follow-up  Talk to your healthcare provider about treatment options. These may include an exercise program, medicines, angioplasty, or surgery.  When to call your healthcare provider  Call your provider or get medical care right away if you have any of the following:    Pain in your legs or a feeling that your legs are weak or giving out    Constant tingling, numbness, weakness, or coldness in your feet    Change in the color of your toes    Open sores that won t heal on your toes, feet, or legs    Chest pain    Shortness of breath    Trouble speaking or understanding  Radha last reviewed this  educational content on 10/1/2019    2254-4936 [employer]. All rights reserved. This information is not intended as a substitute for professional medical care. Always follow your healthcare professional's instructions.    Ankle-Brachial Index (KIRK) or Physiologic Test    Description  An ankle-brachial index test is relatively pain free. Blood pressure cuffs of various sizes are placed on your thigh, calf, foot and toes.  Similar to having your blood pressure checked with an arm cuff, as the technician inflates the cuffs, they progressively tighten and are then quickly released.  You may feel some discomfort, but generally for less than 60 seconds for each measurement. You will be asked to remove your socks and shoes and possibly your pants or shorts. Gowns will be provided. It usually takes about 30-60 minutes.   Depending on the initial readings and patient symptoms, you may be asked to perform a light walk on a treadmill.  The technician will apply ultrasound gel, usually warmed for your comfort, to your ankles and wrists. Through the gel, the technician will use a small hand-held device that emits sound waves.  Risks  There are typically no side effects or complications associated with a physiologic study.  How to Prepare  Eat and take medications as usual.  There is no preparation required for an ankle-brachial index (KIRK) or physiologic exam.  What Can I Expect After the Test?  The technician will send the ultrasound images to your vascular surgeon for evaluation. Typically, a report is available in 2-3 days. If anything critical is found, it is standard practice to notify the vascular surgeon immediately.  Reference: https://vascular.org/patient-resources/vascular-tests

## 2022-03-28 NOTE — PROGRESS NOTES
VASCULAR SURGERY PROGRESS NOTE   VASCULAR SURGEON: Blue Horton MD, RPVI     LOCATION:  [facility]     Zack Johns   Medical Record #:  [id number]  YOB: 1934  Age:  87 year old     Date of Service: 3/28/2022    PRIMARY CARE PROVIDER: Haim Galaviz      Reason for visit:  Follow up PAD    IMPRESSION:  87 year old male with bilateral leg weakness. Hx PAD s/p bilateral femoral endarterectomies in 2017. Known right mid SFA occlusion with distal reconstitution, asymptomatic, US/ABIs today stable with adequate toe pressures.     RECOMMENDATION/RISKS:    - Continue exercise regimen  - Will follow up with PCP for right knee XR/workup  - Follow up with Vascular Surgery in 1 year with repeat studies    HPI:  Zack Johns is a 87 year old male who was seen today in follow up for PAD. Since last visit in Sept 2021, has been exercising more over the last 1-2 months. Is up to walking 10-12 minutes on the treadmill, but then develops limiting right anteromedial knee pain, which was not present prior to increasing his activity level. Does not have any pain or cramping in the calves. No rest pain or open wounds. Otherwise feels well, no other complaints.    REVIEW OF SYSTEMS:    A 12 point ROS was reviewed and is negative as stated in HPI.     PHH:    Past Medical History:   Diagnosis Date     Actinic keratitis      Arthritis     bilat shoulders     CAD (coronary artery disease)      Cardiac arrest (H) 09/04/89     Claudication (H)      Claudication (H)      Coronary artery disease     s/p MI     Diabetes mellitus type 2, controlled (H)      Diverticulosis      HTN (hypertension)      Hyperlipidemia      Hypertension      Malignant neoplasm of prostate (H) 8/7/2006-10/5/2006    DENIED BY PATIENT (see note from 12/08/2014).  RADIOTHERAPY BY DR.CHO LEGGETT, old      Multiple pulmonary nodules      Osteoarthritis of glenohumeral joint     Left. Injected at [facility]  "02/10/2015.     Peripheral vascular disease, unspecified (H)      Syncope      Trigger finger, acquired      Type 2 diabetes mellitus (H) 11/6/2012          Past Surgical History:   Procedure Laterality Date     APPENDECTOMY       ARTHROSCOPY KNEE       ARTHROSCOPY KNEE       CARDIAC CATHETERIZATION  08/22/2018     of rca     CARDIAC SURGERY  08/22/2018    two stents placed 8/22/18     CV CORONARY ANGIOGRAM N/A 8/6/2018    Procedure: Coronary Angiogram;  Surgeon: Jeane Garcia MD;  Location: [facility];  Service:      CV CORONARY ANGIOGRAM N/A 8/22/2018    Procedure: Coronary Angiogram;  Surgeon: Jeane Garcia MD;  Location: [facility];  Service:      CV LEFT HEART CATHETERIZATION WITHOUT LEFT VENTRICULOGRAM Left 8/6/2018    Procedure: Left Heart Catheterization Without Left Ventriculogram;  Surgeon: Jeane Garcia MD;  Location: [facility];  Service:      CV LEFT HEART CATHETERIZATION WITHOUT LEFT VENTRICULOGRAM Left 8/22/2018    Procedure: Left Heart Catheterization Without Left Ventriculogram;  Surgeon: Jeane Garcia MD;  Location: [facility];  Service:      FEMORAL ENDARTERECTOMY Bilateral 05/03/2017     IR EXTREMITY ANGIOGRAM BILATERAL  3/10/2017     NOSE SURGERY       PROSTATE BIOPSY, NEEDLE, SATURATION SAMPLING  1.13.2003    \"Biopsy fo the Prostate Needle\"     RELEASE TRIGGER FINGER         ALLERGIES:  Pletal [cilostazol]    MEDS:    Current Outpatient Medications:      acetaminophen (TYLENOL) 500 MG tablet, Take 500-1,000 mg by mouth every 6 hours as needed, Disp: , Rfl:      aspirin 81 MG tablet, Take 1 tablet by mouth daily, Disp: , Rfl:      atorvastatin (LIPITOR) 40 MG tablet, TAKE 1 TABLET DAILY, Disp: 90 tablet, Rfl: 3     citalopram (CELEXA) 20 MG tablet, Take 1 tablet (20 mg) by mouth daily, Disp: 30 tablet, Rfl: 1     lisinopril-hydrochlorothiazide (ZESTORETIC) 20-12.5 MG tablet, TAKE 1 TABLET DAILY, Disp: 90 tablet, Rfl: 3     methylphenidate " (RITALIN) 5 MG tablet, Take 1 tablet (5 mg) by mouth daily, Disp: 30 tablet, Rfl: 0     [START ON 4/16/2022] methylphenidate (RITALIN) 5 MG tablet, Take 1 tablet (5 mg) by mouth daily, Disp: 30 tablet, Rfl: 0     metoprolol tartrate (LOPRESSOR) 25 MG tablet, TAKE ONE-HALF (1/2) TABLET TWICE A DAY, Disp: 90 tablet, Rfl: 3     Multiple Vitamin (MULTIVITAMIN) per tablet, Take 1 tablet by mouth daily., Disp: , Rfl:      nitroGLYcerin (NITROSTAT) 0.4 MG sublingual tablet, Place 1 tablet (0.4 mg) under the tongue every 5 minutes as needed for chest pain Repeat at 5 min intervals x 2 if needed (Patient not taking: Reported on 2/16/2022), Disp: 30 tablet, Rfl: 0     zinc 50 MG TABS, Take 50 mg by mouth daily., Disp: , Rfl:     SOCIAL HABITS:    History   Smoking Status     Former Smoker     Packs/day: 2.00     Years: 40.00     Types: Cigarettes     Quit date: 4/10/1984   Smokeless Tobacco     Never Used     Social History    Substance and Sexual Activity      Alcohol use: Yes        Alcohol/week: 6.0 standard drinks        Comment: occasionally.      History   Drug Use No       FAMILY HISTORY:    Family History   Problem Relation Age of Onset     Diabetes No family hx of      Breast Cancer No family hx of      Colon Cancer No family hx of      Prostate Cancer No family hx of      Other Cancer No family hx of      No Known Problems Mother      No Known Problems Father        PE:  There were no vitals taken for this visit.  Wt Readings from Last 1 Encounters:   03/16/22 79.4 kg (175 lb)     There is no height or weight on file to calculate BMI.    EXAM:  GENERAL: This is a well-developed 87 year old male who appears his stated age  EYES: Grossly normal.  MOUTH: Buccal mucosa normal   CARDIAC: Normal   CHEST/LUNG: Nonlabored breathing on room air, wearing face mask   GASTROINTESINAL soft nondistended  MUSCULOSKELETAL: Grossly normal and both lower extremities are intact.  HEME/LYMPH: No lymphedema  NEUROLOGIC: Focally intact,  Alert and oriented x 3.   PSYCH: appropriate affect  INTEGUMENT: No open lesions or ulcers      DIAGNOSTIC STUDIES:     Images:    I personally reviewed the images. KIRK bilateral lower extremities stable: R 0.62 L 0.85, toe pressures R 55 L 52.    LABS:      Sodium   Date Value Ref Range Status   11/30/2021 141 133 - 144 mmol/L Final   10/30/2019 135 (L) 136 - 145 mmol/L Final   10/27/2019 135 (L) 136 - 145 mmol/L Final   09/28/2018 142.0 133.0 - 144.0 mmol/L Final   07/31/2018 139.0 133.0 - 144.0 mmol/L Final   01/25/2018 138.0 133.0 - 144.0 mmol/L Final     Urea Nitrogen   Date Value Ref Range Status   11/30/2021 27 7 - 30 mg/dL Final   10/30/2019 40 (H) 8 - 28 mg/dL Final   10/27/2019 27 8 - 28 mg/dL Final   09/28/2018 21.0 7.0 - 30.0 mg/dL Final   07/31/2018 21.0 7.0 - 30.0 mg/dL Final   01/25/2018 20.0 7.0 - 30.0 mg/dL Final     Hemoglobin   Date Value Ref Range Status   10/30/2019 10.5 (L) 14.0 - 18.0 g/dL Final   10/27/2019 11.3 (L) 14.0 - 18.0 g/dL Final   10/07/2018 7.4 (L) 14.0 - 18.0 g/dL Final   09/28/2018 13.4 13.3 - 17.7 g/dL Final   07/31/2018 13.3 13.3 - 17.7 g/dL Final   01/25/2018 13.6 13.3 - 17.7 g/dL Final     Platelet Count   Date Value Ref Range Status   10/30/2019 333 140 - 440 thou/uL Final   10/27/2019 327 140 - 440 thou/uL Final   10/07/2018 202 140 - 440 thou/uL Final     BNP   Date Value Ref Range Status   06/16/2018 59 0 - 93 pg/mL Final     INR   Date Value Ref Range Status   10/30/2019 1.17 (H) 0.90 - 1.10 Final   06/16/2018 0.95 0.90 - 1.10 Final       20 minutes spent on the day of encounter doing chart review, history and exam, documentation, and further activities as noted.     65109 (15-29 minutes)   46074 (30-40 minutes)   85432 (45-59 minutes)   61235 (60-74 minutes)     Established Patients:   74937 (10-19 minutes)   96602 (20-29 minutes)   24440 (30-39 minutes)   35056 (40-54 minutes)     Sheeba Jurado MD  PGY-2 General Surgery   VASCULAR SURGERY

## 2022-04-15 DIAGNOSIS — I25.10 CORONARY ARTERY DISEASE WITH HX OF MYOCARDIAL INFARCT W/O HX OF CABG: ICD-10-CM

## 2022-04-15 DIAGNOSIS — I10 ESSENTIAL HYPERTENSION, BENIGN: ICD-10-CM

## 2022-04-15 DIAGNOSIS — I25.2 CORONARY ARTERY DISEASE WITH HX OF MYOCARDIAL INFARCT W/O HX OF CABG: ICD-10-CM

## 2022-04-19 ENCOUNTER — OFFICE VISIT (OUTPATIENT)
Dept: FAMILY MEDICINE | Facility: CLINIC | Age: 87
End: 2022-04-19
Payer: COMMERCIAL

## 2022-04-19 VITALS
DIASTOLIC BLOOD PRESSURE: 54 MMHG | TEMPERATURE: 97.8 F | WEIGHT: 174 LBS | BODY MASS INDEX: 28.99 KG/M2 | HEART RATE: 58 BPM | OXYGEN SATURATION: 96 % | HEIGHT: 65 IN | RESPIRATION RATE: 20 BRPM | SYSTOLIC BLOOD PRESSURE: 159 MMHG

## 2022-04-19 DIAGNOSIS — I73.9 PERIPHERAL VASCULAR DISEASE (H): ICD-10-CM

## 2022-04-19 DIAGNOSIS — F32.1 CURRENT MODERATE EPISODE OF MAJOR DEPRESSIVE DISORDER WITHOUT PRIOR EPISODE (H): Primary | ICD-10-CM

## 2022-04-19 PROCEDURE — 99215 OFFICE O/P EST HI 40 MIN: CPT | Performed by: FAMILY MEDICINE

## 2022-04-19 RX ORDER — METHYLPHENIDATE HYDROCHLORIDE 5 MG/1
5 TABLET ORAL DAILY
Qty: 30 TABLET | Refills: 0 | Status: SHIPPED | OUTPATIENT
Start: 2022-05-20 | End: 2022-05-23

## 2022-04-19 RX ORDER — METHYLPHENIDATE HYDROCHLORIDE 5 MG/1
5 TABLET ORAL DAILY
Qty: 30 TABLET | Refills: 0 | Status: SHIPPED | OUTPATIENT
Start: 2022-04-19 | End: 2022-05-19

## 2022-04-19 RX ORDER — CITALOPRAM HYDROBROMIDE 20 MG/1
20 TABLET ORAL DAILY
Qty: 60 TABLET | Refills: 1 | Status: SHIPPED | OUTPATIENT
Start: 2022-04-19 | End: 2022-05-24

## 2022-04-19 ASSESSMENT — PATIENT HEALTH QUESTIONNAIRE - PHQ9: SUM OF ALL RESPONSES TO PHQ QUESTIONS 1-9: 0

## 2022-04-19 NOTE — PROGRESS NOTES
"  Assessment and Plan     1. Current moderate episode of major depressive disorder without prior episode (H)  Had a long talk about depression and what that means and doesn't mean. Discussed how the meds are helping. Reviewed that his bottle for the ritalin will always say \"No Refills\" because they are controlled substances. However, I have sent over an additional prescription that he should be able to fill in a month. Encouraged them to contact us if they run out of meds or aren't sure what to do. Recheck in about 6 weeks. New exercise goals written on a calendar. Discussed communications between spouses and nagging vs supporting and how to work through this in a supportive way.  - methylphenidate (RITALIN) 5 MG tablet; Take 1 tablet (5 mg) by mouth daily  Dispense: 30 tablet; Refill: 0  - methylphenidate (RITALIN) 5 MG tablet; Take 1 tablet (5 mg) by mouth daily  Dispense: 30 tablet; Refill: 0  - citalopram (CELEXA) 20 MG tablet; Take 1 tablet (20 mg) by mouth daily  Dispense: 60 tablet; Refill: 1    2. Peripheral vascular disease (H)    54 minutes spent on the date of the encounter doing chart review, history and exam, documentation, and further activities as noted above.    Options for treatment and follow-up care were reviewed with the patient and/or guardian. Zack Johns and/or guardian engaged in the decision making process and verbalized understanding of the options discussed and agreed with the final plan. I answered all of their questions.    Haim Galaviz III, MD, FAAFP  [facility]  04/19/22 4:58 PM           HPI:       Zack Johns is a 87 year old  male  Patient presents with:  Follow Up: Legs- feels like if he does more than 10 minutes of exercises then his knees get very sore so he does not like doing them   MOOD CHANGES: Still feels \"old\". Says is learning how to \"be old\" but friends are \"in the same boat\"  Medication Reconciliation: Has not been on ritalin for 2 weeks " "now. Wife thinks \"when taking both mood pills things were better\"    Continues to walk on the treadmill about 5 times per week. Seems to be averaging about 10-12 minutes per time. He and his wife noticed a significant difference when he was on the ritalin. Ran out last week and didn't have a refill. Continues on the Celexa and had a refill there. Has a more positive outlook on life and has been more motivated at home to do things. No CP or shortness of breath. Will stop his walking after his knees get some pains. Is taking TID tylenol pretty regularly.    Went to his vascular surgeon and they had a good checkup. No changes.         PMHX:     Patient Active Problem List   Diagnosis     Actinic keratosis     Cardiovascular disease     Essential hypertension, benign     CT Lung Pulmonary Nodule Multiple     Prediabetes     Diverticulosis of large intestine     Hyperlipidemia     Peripheral vascular disease (H)     Internal hemorrhoids     Local infection of skin and subcutaneous tissue     Disorder of bursae and tendons in shoulder region     Synovial cyst     Trigger finger, acquired     Osteoarthritis of glenohumeral joint     Syncope     Abnormal cardiovascular stress test     Chest pain     Coronary atherosclerosis     Upper respiratory tract infection, unspecified type     Neck pain     Chronic total occlusion of native coronary artery       Current Outpatient Medications   Medication Sig Dispense Refill     citalopram (CELEXA) 20 MG tablet Take 1 tablet (20 mg) by mouth daily 60 tablet 1     methylphenidate (RITALIN) 5 MG tablet Take 1 tablet (5 mg) by mouth daily 30 tablet 0     [START ON 5/20/2022] methylphenidate (RITALIN) 5 MG tablet Take 1 tablet (5 mg) by mouth daily 30 tablet 0     methylphenidate (RITALIN) 5 MG tablet Take 1 tablet (5 mg) by mouth daily 30 tablet 0     acetaminophen (TYLENOL) 500 MG tablet Take 500-1,000 mg by mouth every 6 hours as needed       aspirin 81 MG tablet Take 1 tablet by mouth " daily       atorvastatin (LIPITOR) 40 MG tablet TAKE 1 TABLET DAILY 90 tablet 3     lisinopril-hydrochlorothiazide (ZESTORETIC) 20-12.5 MG tablet TAKE 1 TABLET DAILY 90 tablet 3     metoprolol tartrate (LOPRESSOR) 25 MG tablet TAKE ONE-HALF (1/2) TABLET TWICE A DAY 90 tablet 3     Multiple Vitamin (MULTIVITAMIN) per tablet Take 1 tablet by mouth daily.       nitroGLYcerin (NITROSTAT) 0.4 MG sublingual tablet Place 1 tablet (0.4 mg) under the tongue every 5 minutes as needed for chest pain Repeat at 5 min intervals x 2 if needed (Patient not taking: Reported on 2022) 30 tablet 0     zinc 50 MG TABS Take 50 mg by mouth daily.         Social History     Socioeconomic History     Marital status:      Spouse name: Not on file     Number of children: Not on file     Years of education: Not on file     Highest education level: Not on file   Occupational History     Not on file   Tobacco Use     Smoking status: Former Smoker     Packs/day: 2.00     Years: 40.00     Pack years: 80.00     Types: Cigarettes     Quit date: 4/10/1984     Years since quittin.0     Smokeless tobacco: Never Used   Substance and Sexual Activity     Alcohol use: Yes     Alcohol/week: 6.0 standard drinks     Comment: occasionally.     Drug use: No     Sexual activity: Not on file   Other Topics Concern     Parent/sibling w/ CABG, MI or angioplasty before 65F 55M? 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        Had gotten into typing to meet girls.        Worked for [employer] after leaving the [employer].     Social Determinants of Health     Financial Resource Strain: Not on file   Food Insecurity: Not on file   Transportation Needs: Not on file   Physical Activity: Not on file   Stress: Not on file   Social Connections: Not on file   Intimate Partner Violence: Not on file   Housing Stability: Not on file       Allergies   Allergen Reactions     Pletal [Cilostazol]        No  "results found for this or any previous visit (from the past 24 hour(s)).         Review of Systems:     ROS         Physical Exam:     Vitals:    04/19/22 1459 04/19/22 1502   BP: (!) 161/72 (!) 159/54   Pulse: 58    Resp: 20    Temp: 97.8  F (36.6  C)    SpO2: 96%    Weight: 78.9 kg (174 lb)    Height: 1.66 m (5' 5.35\")      Body mass index is 28.64 kg/m .    Physical Exam      "

## 2022-04-22 RX ORDER — ATORVASTATIN CALCIUM 40 MG/1
TABLET, FILM COATED ORAL
Qty: 90 TABLET | Refills: 3 | Status: SHIPPED | OUTPATIENT
Start: 2022-04-22 | End: 2023-04-12

## 2022-04-22 RX ORDER — METOPROLOL TARTRATE 25 MG/1
TABLET, FILM COATED ORAL
Qty: 90 TABLET | Refills: 1 | Status: SHIPPED | OUTPATIENT
Start: 2022-04-22 | End: 2022-10-12

## 2022-04-22 RX ORDER — LISINOPRIL AND HYDROCHLOROTHIAZIDE 12.5; 2 MG/1; MG/1
TABLET ORAL
Qty: 90 TABLET | Refills: 1 | Status: SHIPPED | OUTPATIENT
Start: 2022-04-22 | End: 2022-10-12

## 2022-05-23 DIAGNOSIS — F32.1 CURRENT MODERATE EPISODE OF MAJOR DEPRESSIVE DISORDER WITHOUT PRIOR EPISODE (H): ICD-10-CM

## 2022-05-23 NOTE — TELEPHONE ENCOUNTER
[facility] phone call message- medication clarification/question:    Full Medication Name: methylphenidate (RITALIN) & citalopram (CELEXA)        Dose: 5 MG tablet & 20 MG tablet    Question: Per wife patient is completely out of medication and did not notice  And would like to know if  is able to send refills to pharmacy. Call and advise if needed.      [id number] [address]  [facility] [place] [facility] [address]  : Yes    OK to leave a message on voice mail? Yes    Primary language: English      needed? No    Call taken on May 23, 2022 at 10:42 AM by Nina Hurd

## 2022-05-23 NOTE — TELEPHONE ENCOUNTER
Message to physician:     Date of last visit: 4/19/2022    Date of next visit if scheduled:     Potassium   Date Value Ref Range Status   11/30/2021 4.6 3.4 - 5.3 mmol/L Final   09/28/2018 3.9 3.4 - 5.3 mmol/L Final     Creatinine   Date Value Ref Range Status   11/30/2021 1.20 mg/dL Final   09/28/2018 0.8 0.8 - 1.5 mg/dL Final     GFR Estimate   Date Value Ref Range Status   11/30/2021 54 (L) >60 mL/min/1.73m2 Final     Comment:     As of July 11, 2021, eGFR is calculated by the CKD-EPI creatinine equation, without race adjustment. eGFR can be influenced by muscle mass, exercise, and diet. The reported eGFR is an estimation only and is only applicable if the renal function is stable.   10/30/2019 39 (L) >60 mL/min/1.73m2 Final   09/24/2013 > 60 >60 ml/min/1.73m2 Final       BP Readings from Last 3 Encounters:   04/19/22 (!) 159/54   03/16/22 138/65   02/16/22 (!) 145/65       Hemoglobin A1C POCT   Date Value Ref Range Status   03/28/2019 6.1 (H) 4.1 - 5.7 % Final     Hemoglobin A1C   Date Value Ref Range Status   11/30/2021 6.4 (H) 0.0 - 5.6 % Final     Comment:     Normal <5.7%   Prediabetes 5.7-6.4%    Diabetes 6.5% or higher     Note: Adopted from ADA consensus guidelines.       Please complete refill and CLOSE ENCOUNTER.  Closing the encounter signifies the refill is complete.

## 2022-05-24 RX ORDER — METHYLPHENIDATE HYDROCHLORIDE 5 MG/1
5 TABLET ORAL DAILY
Qty: 30 TABLET | Refills: 0 | Status: SHIPPED | OUTPATIENT
Start: 2022-05-24 | End: 2022-06-17

## 2022-05-24 RX ORDER — CITALOPRAM HYDROBROMIDE 20 MG/1
20 TABLET ORAL DAILY
Qty: 60 TABLET | Refills: 1 | Status: SHIPPED | OUTPATIENT
Start: 2022-05-24 | End: 2022-07-19

## 2022-05-24 NOTE — TELEPHONE ENCOUNTER
1. Current moderate episode of major depressive disorder without prior episode (H)  Refilled. Had previously sent so I'm not sure what the issue was.  - methylphenidate (RITALIN) 5 MG tablet; Take 1 tablet (5 mg) by mouth daily  Dispense: 30 tablet; Refill: 0  - citalopram (CELEXA) 20 MG tablet; Take 1 tablet (20 mg) by mouth daily  Dispense: 60 tablet; Refill: 1      Haim Galaviz III, MD, FAAFP  [facility]  05/24/22 12:19 PM

## 2022-06-08 ENCOUNTER — OFFICE VISIT (OUTPATIENT)
Dept: FAMILY MEDICINE | Facility: CLINIC | Age: 87
End: 2022-06-08
Payer: COMMERCIAL

## 2022-06-08 VITALS
TEMPERATURE: 97.9 F | HEIGHT: 64 IN | SYSTOLIC BLOOD PRESSURE: 116 MMHG | DIASTOLIC BLOOD PRESSURE: 55 MMHG | HEART RATE: 46 BPM | OXYGEN SATURATION: 97 % | WEIGHT: 171 LBS | RESPIRATION RATE: 20 BRPM | BODY MASS INDEX: 29.19 KG/M2

## 2022-06-08 DIAGNOSIS — I73.9 PERIPHERAL VASCULAR DISEASE (H): ICD-10-CM

## 2022-06-08 DIAGNOSIS — F32.1 CURRENT MODERATE EPISODE OF MAJOR DEPRESSIVE DISORDER WITHOUT PRIOR EPISODE (H): Primary | ICD-10-CM

## 2022-06-08 PROCEDURE — 99215 OFFICE O/P EST HI 40 MIN: CPT | Mod: 25 | Performed by: FAMILY MEDICINE

## 2022-06-08 PROCEDURE — 90732 PPSV23 VACC 2 YRS+ SUBQ/IM: CPT | Performed by: FAMILY MEDICINE

## 2022-06-08 PROCEDURE — G0009 ADMIN PNEUMOCOCCAL VACCINE: HCPCS | Performed by: FAMILY MEDICINE

## 2022-06-08 ASSESSMENT — ENCOUNTER SYMPTOMS: CONSTITUTIONAL NEGATIVE: 1

## 2022-06-08 NOTE — PROGRESS NOTES
"  Assessment and Plan     1. Current moderate episode of major depressive disorder without prior episode (H)  Continues to improve. Not yet ready to get refill and they are having trouble navigating the future refill portion of the methylphenadate. They will call for the next one. New calendar created for his exercise and want to continue to build towards 30 minutes a day. Recheck in 4-6 weeks.    2. Peripheral vascular disease (H)  As above. Discussed the best treatment for his legs is more walking.    51 minutes spent on the date of the encounter doing chart review, history and exam, documentation, and further activities as noted above.    Options for treatment and follow-up care were reviewed with the patient and/or guardian. Zack Johns and/or guardian engaged in the decision making process and verbalized understanding of the options discussed and agreed with the final plan. I answered all of their questions.    Haim Galaviz III, MD, FAAFP  [facility]  06/08/22 11:05 AM           HPI:       Zack Johns is a 87 year old  male  Patient presents with:  Follow Up: Legs and mood. Feels that legs are the same. Wife is \"not hounding him\" he has been better at being accountable. Was able to walk all around campus at granddaughter's graduation and attributes that to the work he has been doing. Not meeting goals completely but wants to keep pushing even when it feels painful.     Consistently walking at least 5 days a week. Times range from 15-24 minutes regularly. Pain is getting better with continued walking and scheduled tylenol. Was able to walk all over [facility] for a graduation last month. Gets on the treadmill on his own accord most days. Denies chest pain.    He and his wife are very happy with his progress. Would like a new goal calendar created.         PMHX:     Patient Active Problem List   Diagnosis     Actinic keratosis     Cardiovascular disease     Essential " hypertension, benign     CT Lung Pulmonary Nodule Multiple     Prediabetes     Diverticulosis of large intestine     Hyperlipidemia     Peripheral vascular disease (H)     Internal hemorrhoids     Local infection of skin and subcutaneous tissue     Disorder of bursae and tendons in shoulder region     Synovial cyst     Trigger finger, acquired     Osteoarthritis of glenohumeral joint     Syncope     Abnormal cardiovascular stress test     Chest pain     Coronary atherosclerosis     Upper respiratory tract infection, unspecified type     Neck pain     Chronic total occlusion of native coronary artery       Current Outpatient Medications   Medication Sig Dispense Refill     acetaminophen (TYLENOL) 500 MG tablet Take 500-1,000 mg by mouth every 6 hours as needed       aspirin 81 MG tablet Take 1 tablet by mouth daily       atorvastatin (LIPITOR) 40 MG tablet TAKE 1 TABLET DAILY 90 tablet 3     citalopram (CELEXA) 20 MG tablet Take 1 tablet (20 mg) by mouth daily 60 tablet 1     lisinopril-hydrochlorothiazide (ZESTORETIC) 20-12.5 MG tablet TAKE 1 TABLET DAILY 90 tablet 1     methylphenidate (RITALIN) 5 MG tablet Take 1 tablet (5 mg) by mouth daily 30 tablet 0     metoprolol tartrate (LOPRESSOR) 25 MG tablet TAKE ONE-HALF (1/2) TABLET TWICE A DAY 90 tablet 1     Multiple Vitamin (MULTIVITAMIN) per tablet Take 1 tablet by mouth daily.       nitroGLYcerin (NITROSTAT) 0.4 MG sublingual tablet Place 1 tablet (0.4 mg) under the tongue every 5 minutes as needed for chest pain Repeat at 5 min intervals x 2 if needed 30 tablet 0     zinc 50 MG TABS Take 50 mg by mouth daily.         Social History     Socioeconomic History     Marital status:      Spouse name: Not on file     Number of children: Not on file     Years of education: Not on file     Highest education level: Not on file   Occupational History     Not on file   Tobacco Use     Smoking status: Former Smoker     Packs/day: 2.00     Years: 40.00     Pack years:  "80.00     Types: Cigarettes     Quit date: 4/10/1984     Years since quittin.1     Smokeless tobacco: Never Used   Substance and Sexual Activity     Alcohol use: Yes     Alcohol/week: 6.0 standard drinks     Comment: occasionally.     Drug use: No     Sexual activity: Not on file   Other Topics Concern     Parent/sibling w/ CABG, MI or angioplasty before 65F 55M? 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        Had gotten into typing to meet girls.        Worked for [employer] after leaving the [employer].     Social Determinants of Health     Financial Resource Strain: Not on file   Food Insecurity: Not on file   Transportation Needs: Not on file   Physical Activity: Not on file   Stress: Not on file   Social Connections: Not on file   Intimate Partner Violence: Not on file   Housing Stability: Not on file       Allergies   Allergen Reactions     Pletal [Cilostazol]        No results found for this or any previous visit (from the past 24 hour(s)).         Review of Systems:     Review of Systems   Constitutional: Negative.             Physical Exam:     Vitals:    22 1026 22 1028 22 1103   BP: (!) 165/66 (!) 156/70 116/55   Pulse: (!) 46     Resp: 20     Temp: 97.9  F (36.6  C)     SpO2: 97%     Weight: 77.6 kg (171 lb)     Height: 1.613 m (5' 3.5\")       Body mass index is 29.82 kg/m .    Physical Exam  Vitals and nursing note reviewed.   Constitutional:       General: He is not in acute distress.     Appearance: Normal appearance. He is not ill-appearing, toxic-appearing or diaphoretic.   Pulmonary:      Effort: No respiratory distress.   Neurological:      Mental Status: He is alert and oriented to person, place, and time.           "

## 2022-06-17 DIAGNOSIS — F32.1 CURRENT MODERATE EPISODE OF MAJOR DEPRESSIVE DISORDER WITHOUT PRIOR EPISODE (H): ICD-10-CM

## 2022-06-17 NOTE — TELEPHONE ENCOUNTER
[facility] phone call message- medication clarification/question:    Full Medication Name: methylphenidate (RITALIN) 5 MG tablet    Question: Patient wife requesting refill.     [facility]    [id number] [address] AT [facility] [address]: Yes    OK to leave a message on voice mail? Yes    Primary language: English      needed? No    Call taken on June 17, 2022 at 3:36 PM by Navi Arana

## 2022-06-19 RX ORDER — METHYLPHENIDATE HYDROCHLORIDE 5 MG/1
5 TABLET ORAL DAILY
Qty: 30 TABLET | Refills: 0 | Status: SHIPPED | OUTPATIENT
Start: 2022-06-19 | End: 2022-07-19

## 2022-07-19 ENCOUNTER — OFFICE VISIT (OUTPATIENT)
Dept: FAMILY MEDICINE | Facility: CLINIC | Age: 87
End: 2022-07-19
Payer: COMMERCIAL

## 2022-07-19 VITALS
TEMPERATURE: 98.7 F | WEIGHT: 170 LBS | OXYGEN SATURATION: 95 % | HEART RATE: 68 BPM | BODY MASS INDEX: 28.32 KG/M2 | HEIGHT: 65 IN | RESPIRATION RATE: 20 BRPM

## 2022-07-19 DIAGNOSIS — F43.21 GRIEF: Primary | ICD-10-CM

## 2022-07-19 DIAGNOSIS — F32.1 CURRENT MODERATE EPISODE OF MAJOR DEPRESSIVE DISORDER WITHOUT PRIOR EPISODE (H): ICD-10-CM

## 2022-07-19 DIAGNOSIS — I73.9 PERIPHERAL VASCULAR DISEASE (H): ICD-10-CM

## 2022-07-19 PROCEDURE — 99215 OFFICE O/P EST HI 40 MIN: CPT | Performed by: FAMILY MEDICINE

## 2022-07-19 RX ORDER — METHYLPHENIDATE HYDROCHLORIDE 5 MG/1
5 TABLET ORAL DAILY
Qty: 30 TABLET | Refills: 0 | Status: SHIPPED | OUTPATIENT
Start: 2022-07-19 | End: 2022-08-23

## 2022-07-19 RX ORDER — CITALOPRAM HYDROBROMIDE 20 MG/1
20 TABLET ORAL DAILY
Qty: 90 TABLET | Refills: 3 | Status: SHIPPED | OUTPATIENT
Start: 2022-07-19 | End: 2024-01-27

## 2022-07-19 ASSESSMENT — PATIENT HEALTH QUESTIONNAIRE - PHQ9: SUM OF ALL RESPONSES TO PHQ QUESTIONS 1-9: 3

## 2022-07-20 ASSESSMENT — ENCOUNTER SYMPTOMS
DIAPHORESIS: 0
SHORTNESS OF BREATH: 0
CHILLS: 0
WEIGHT LOSS: 0
FEVER: 0

## 2022-07-20 NOTE — PROGRESS NOTES
"  Assessment and Plan     1. Grief  Initially, it seemed that this was a flare of his geriatric depression. However, it is very much connected chronologically to the death events of his brother. Large emotional and spiritual support provided. His wife is also at risk of burnout and support was provided here as well. Recheck in 3 weeks. If still not starting a return to baseline, consider increasing Ritalin to 10 mg. Encouraged him to decrease his exposure to the news since it isn't helping either.    2. Current moderate episode of major depressive disorder without prior episode (H)  See above.  - methylphenidate (RITALIN) 5 MG tablet; Take 1 tablet (5 mg) by mouth daily  Dispense: 30 tablet; Refill: 0  - citalopram (CELEXA) 20 MG tablet; Take 1 tablet (20 mg) by mouth daily  Dispense: 90 tablet; Refill: 3    3. Peripheral vascular disease (H)    63 minutes spent on the date of the encounter doing chart review, history and exam, documentation, and further activities as noted above.  Options for treatment and follow-up care were reviewed with the patient and/or guardian. Zack Johns and/or guardian engaged in the decision making process and verbalized understanding of the options discussed and agreed with the final plan. I answered all of their questions.    Haim Galaviz III, MD, FAAFP  [facility]  22 9:18 AM           HPI:       Zack Johns is a 87 year old  male  Patient presents with:  Follow Up: Follow up on legs and mood. Phq -9 looks good  Tired all the time  Recheck Medication: Like to go over medication with MD.  Need refill       His exercise, memory, energy, and motivation have significantly decreased. Only exercising 3-4/week. Rarely does more than 12 min. This is a significant change from last month. After discussion, we were able to narrow down that everything changed at the time that his brother entered hospice for cancer. He  at the beginning of the \"homework\" " period for [employer]'s exercise.    He also complains that the news, and the world's atrocities, are weighing significantly on him.    He and his wife like to know current events so they know what to pray for.         PMHX:     Patient Active Problem List   Diagnosis     Actinic keratosis     Cardiovascular disease     Essential hypertension, benign     CT Lung Pulmonary Nodule Multiple     Prediabetes     Diverticulosis of large intestine     Hyperlipidemia     Peripheral vascular disease (H)     Internal hemorrhoids     Local infection of skin and subcutaneous tissue     Disorder of bursae and tendons in shoulder region     Synovial cyst     Trigger finger, acquired     Osteoarthritis of glenohumeral joint     Syncope     Abnormal cardiovascular stress test     Chest pain     Coronary atherosclerosis     Upper respiratory tract infection, unspecified type     Neck pain     Chronic total occlusion of native coronary artery       Current Outpatient Medications   Medication Sig Dispense Refill     citalopram (CELEXA) 20 MG tablet Take 1 tablet (20 mg) by mouth daily 90 tablet 3     methylphenidate (RITALIN) 5 MG tablet Take 1 tablet (5 mg) by mouth daily 30 tablet 0     acetaminophen (TYLENOL) 500 MG tablet Take 500-1,000 mg by mouth every 6 hours as needed       aspirin 81 MG tablet Take 1 tablet by mouth daily       atorvastatin (LIPITOR) 40 MG tablet TAKE 1 TABLET DAILY 90 tablet 3     lisinopril-hydrochlorothiazide (ZESTORETIC) 20-12.5 MG tablet TAKE 1 TABLET DAILY 90 tablet 1     metoprolol tartrate (LOPRESSOR) 25 MG tablet TAKE ONE-HALF (1/2) TABLET TWICE A DAY 90 tablet 1     Multiple Vitamin (MULTIVITAMIN) per tablet Take 1 tablet by mouth daily.       nitroGLYcerin (NITROSTAT) 0.4 MG sublingual tablet Place 1 tablet (0.4 mg) under the tongue every 5 minutes as needed for chest pain Repeat at 5 min intervals x 2 if needed 30 tablet 0     zinc 50 MG TABS Take 50 mg by mouth daily.         Social History  "    Socioeconomic History     Marital status:      Spouse name: Not on file     Number of children: Not on file     Years of education: Not on file     Highest education level: Not on file   Occupational History     Not on file   Tobacco Use     Smoking status: Former Smoker     Packs/day: 2.00     Years: 40.00     Pack years: 80.00     Types: Cigarettes     Quit date: 4/10/1984     Years since quittin.3     Smokeless tobacco: Never Used   Substance and Sexual Activity     Alcohol use: Yes     Alcohol/week: 6.0 standard drinks     Comment: occasionally.     Drug use: No     Sexual activity: Not on file   Other Topics Concern     Parent/sibling w/ CABG, MI or angioplasty before 65F 55M? 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        Had gotten into typing to meet girls.        Worked for [employer] after leaving the [employer].     Social Determinants of Health     Financial Resource Strain: Not on file   Food Insecurity: Not on file   Transportation Needs: Not on file   Physical Activity: Not on file   Stress: Not on file   Social Connections: Not on file   Intimate Partner Violence: Not on file   Housing Stability: Not on file       Allergies   Allergen Reactions     Pletal [Cilostazol]        No results found for this or any previous visit (from the past 24 hour(s)).         Review of Systems:     Review of Systems   Constitutional: Positive for malaise/fatigue. Negative for chills, diaphoresis, fever and weight loss.   Respiratory: Negative for shortness of breath.    Cardiovascular: Negative for chest pain and leg swelling.            Physical Exam:     Vitals:    22 1550   Pulse: 68   Resp: 20   Temp: 98.7  F (37.1  C)   TempSrc: Oral   SpO2: 95%   Weight: 77.1 kg (170 lb)   Height: 1.66 m (5' 5.35\")     Body mass index is 27.98 kg/m .    Physical Exam  Vitals and nursing note reviewed.   Constitutional:       General: He is not in acute " distress.     Appearance: Normal appearance. He is not ill-appearing, toxic-appearing or diaphoretic.   Cardiovascular:      Rate and Rhythm: Normal rate and regular rhythm.      Pulses: Normal pulses.      Heart sounds: Normal heart sounds. No murmur heard.  Pulmonary:      Effort: Pulmonary effort is normal. No respiratory distress.      Breath sounds: Normal breath sounds. No stridor. No wheezing, rhonchi or rales.   Musculoskeletal:      Right lower leg: No edema.      Left lower leg: No edema.   Neurological:      Mental Status: He is alert and oriented to person, place, and time.

## 2022-08-09 ENCOUNTER — OFFICE VISIT (OUTPATIENT)
Dept: FAMILY MEDICINE | Facility: CLINIC | Age: 87
End: 2022-08-09
Payer: COMMERCIAL

## 2022-08-09 VITALS
RESPIRATION RATE: 18 BRPM | HEIGHT: 63 IN | DIASTOLIC BLOOD PRESSURE: 54 MMHG | HEART RATE: 54 BPM | WEIGHT: 171 LBS | BODY MASS INDEX: 30.3 KG/M2 | SYSTOLIC BLOOD PRESSURE: 140 MMHG | OXYGEN SATURATION: 96 % | TEMPERATURE: 97.7 F

## 2022-08-09 DIAGNOSIS — F32.1 CURRENT MODERATE EPISODE OF MAJOR DEPRESSIVE DISORDER WITHOUT PRIOR EPISODE (H): ICD-10-CM

## 2022-08-09 DIAGNOSIS — G31.84 MILD COGNITIVE IMPAIRMENT: ICD-10-CM

## 2022-08-09 DIAGNOSIS — I73.9 PERIPHERAL VASCULAR DISEASE (H): ICD-10-CM

## 2022-08-09 DIAGNOSIS — F43.21 GRIEF: Primary | ICD-10-CM

## 2022-08-09 PROCEDURE — 99215 OFFICE O/P EST HI 40 MIN: CPT | Performed by: FAMILY MEDICINE

## 2022-08-11 ASSESSMENT — ENCOUNTER SYMPTOMS: CONSTITUTIONAL NEGATIVE: 1

## 2022-08-11 NOTE — PROGRESS NOTES
Assessment and Plan     1. Grief    2. Current moderate episode of major depressive disorder without prior episode (H)    3. Peripheral vascular disease (H)    4. Mild cognitive impairment    Comparing his activity to now, he is only about 10-15 minutes per week off of his average from before his brother  and seems to be slowly returning to that previous baseline. Had a long discussion about how mood, grief, and cognitive impairment (as a separate thing) lead to memory loss and how they are all connected. Discussed supplements and the lack of evidence for their help in Anthony's situation. Discussed positive spiritual behaviors within their [employer] views and how to leverage them. Recheck in 4-6 weeks. May consider increasing the methyphenidate to 10mg at that time if they both feel like we should try for more improvement.    48 minutes spent on the date of the encounter doing chart review, history and exam, documentation, and further activities as noted above.    Options for treatment and follow-up care were reviewed with the patient and/or guardian. Zack Johns and/or guardian engaged in the decision making process and verbalized understanding of the options discussed and agreed with the final plan. I answered all of their questions.    Haim Galaviz III, MD, FAAFP  [facility]  22 3:18 PM           HPI:       Zack Johns is a 87 year old  male  Patient presents with:  Follow Up: Monthly check up         Maybe doing a little better. Still reading the news frequently and getting bummed about it but doesn't seem to perseverate on it as much. Has been on the treadmill some and even did 26 minutes yesterday! His wife accompanied him and is still concerned. He continues to be forgetful. Continues to mourn the loss of his brother.    Continues to get pain in his legs with longer distances.         PMHX:     Patient Active Problem List   Diagnosis     Actinic keratosis      Cardiovascular disease     Essential hypertension, benign     CT Lung Pulmonary Nodule Multiple     Prediabetes     Diverticulosis of large intestine     Hyperlipidemia     Peripheral vascular disease (H)     Internal hemorrhoids     Local infection of skin and subcutaneous tissue     Disorder of bursae and tendons in shoulder region     Synovial cyst     Trigger finger, acquired     Osteoarthritis of glenohumeral joint     Syncope     Abnormal cardiovascular stress test     Chest pain     Coronary atherosclerosis     Upper respiratory tract infection, unspecified type     Neck pain     Chronic total occlusion of native coronary artery       Current Outpatient Medications   Medication Sig Dispense Refill     acetaminophen (TYLENOL) 500 MG tablet Take 500-1,000 mg by mouth every 6 hours as needed       aspirin 81 MG tablet Take 1 tablet by mouth daily       atorvastatin (LIPITOR) 40 MG tablet TAKE 1 TABLET DAILY 90 tablet 3     citalopram (CELEXA) 20 MG tablet Take 1 tablet (20 mg) by mouth daily 90 tablet 3     lisinopril-hydrochlorothiazide (ZESTORETIC) 20-12.5 MG tablet TAKE 1 TABLET DAILY 90 tablet 1     methylphenidate (RITALIN) 5 MG tablet Take 1 tablet (5 mg) by mouth daily 30 tablet 0     metoprolol tartrate (LOPRESSOR) 25 MG tablet TAKE ONE-HALF (1/2) TABLET TWICE A DAY 90 tablet 1     Multiple Vitamin (MULTIVITAMIN) per tablet Take 1 tablet by mouth daily.       nitroGLYcerin (NITROSTAT) 0.4 MG sublingual tablet Place 1 tablet (0.4 mg) under the tongue every 5 minutes as needed for chest pain Repeat at 5 min intervals x 2 if needed 30 tablet 0     zinc 50 MG TABS Take 50 mg by mouth daily.         Social History     Socioeconomic History     Marital status:      Spouse name: Not on file     Number of children: Not on file     Years of education: Not on file     Highest education level: Not on file   Occupational History     Not on file   Tobacco Use     Smoking status: Former Smoker     Packs/day:  "2.00     Years: 40.00     Pack years: 80.00     Types: Cigarettes     Quit date: 4/10/1984     Years since quittin.3     Smokeless tobacco: Never Used   Substance and Sexual Activity     Alcohol use: Yes     Alcohol/week: 6.0 standard drinks     Comment: occasionally.     Drug use: No     Sexual activity: Not on file   Other Topics Concern     Parent/sibling w/ CABG, MI or angioplasty before 65F 55M? Not Asked   Social History Narrative    Was drafted into the [employer] between [place] and [place]. Worked in admin.        Had gotten into typing to meet girls.        Worked for [employer] after leaving the [employer].     Social Determinants of Health     Financial Resource Strain: Not on file   Food Insecurity: Not on file   Transportation Needs: Not on file   Physical Activity: Not on file   Stress: Not on file   Social Connections: Not on file   Intimate Partner Violence: Not on file   Housing Stability: Not on file       Allergies   Allergen Reactions     Pletal [Cilostazol]        No results found for this or any previous visit (from the past 24 hour(s)).         Review of Systems:     Review of Systems   Constitutional: Negative.             Physical Exam:     Vitals:    22 1605 22 1609 22 1654   BP: (!) 161/80 (!) 148/58 (!) 140/54   Pulse: 54     Resp: 18     Temp: 97.7  F (36.5  C)     SpO2: 96%     Weight: 77.6 kg (171 lb)     Height: 1.6 m (5' 3\")       Body mass index is 30.29 kg/m .    Physical Exam  Vitals and nursing note reviewed.   Constitutional:       General: He is not in acute distress.     Appearance: Normal appearance. He is not ill-appearing, toxic-appearing or diaphoretic.   Pulmonary:      Effort: No respiratory distress.   Neurological:      Mental Status: He is alert and oriented to person, place, and time.           "

## 2022-08-22 ENCOUNTER — DOCUMENTATION ONLY (OUTPATIENT)
Dept: FAMILY MEDICINE | Facility: CLINIC | Age: 87
End: 2022-08-22

## 2022-08-22 NOTE — PROGRESS NOTES
August 22, 2022    I am the on-call resident. Received a page about medication question.    Patient is requesting refill of methylphenidate. Has been working well.   Wife did not realize it was running out until this afternoon. 1 tablet left for tomorrow. That's why she called now. Very apologetic and knows they should be doing this during the day.   Patient is not suicidal.     I discussed that missing 1 or 2 doses of this medicine is not life threatening.   It is a schedule II substance and so should not be refilled overnight per clinic protocol.   I encouraged them to call the clinic and request refill through the daytime channels.     Messaged Dr. Galaviz as well.     Haroon Corbett DO   [facility] Resident   Pager#[phone]

## 2022-08-23 DIAGNOSIS — F32.1 CURRENT MODERATE EPISODE OF MAJOR DEPRESSIVE DISORDER WITHOUT PRIOR EPISODE (H): Primary | ICD-10-CM

## 2022-08-23 RX ORDER — METHYLPHENIDATE HYDROCHLORIDE 5 MG/1
5 TABLET ORAL DAILY
Qty: 30 TABLET | Refills: 0 | Status: SHIPPED | OUTPATIENT
Start: 2022-08-23 | End: 2022-09-20

## 2022-08-23 NOTE — PROGRESS NOTES
1. Current moderate episode of major depressive disorder without prior episode (H)  - methylphenidate (RITALIN) 5 MG tablet; Take 1 tablet (5 mg) by mouth daily  Dispense: 30 tablet; Refill: 0      Haim Galaviz III, MD, FAAFP  [facility]  08/23/22 7:49 AM

## 2022-09-20 ENCOUNTER — TELEPHONE (OUTPATIENT)
Dept: FAMILY MEDICINE | Facility: CLINIC | Age: 87
End: 2022-09-20

## 2022-09-20 DIAGNOSIS — F32.1 CURRENT MODERATE EPISODE OF MAJOR DEPRESSIVE DISORDER WITHOUT PRIOR EPISODE (H): ICD-10-CM

## 2022-09-20 RX ORDER — METHYLPHENIDATE HYDROCHLORIDE 5 MG/1
5 TABLET ORAL DAILY
Qty: 30 TABLET | Refills: 0 | Status: SHIPPED | OUTPATIENT
Start: 2022-09-20 | End: 2022-10-20

## 2022-09-20 NOTE — TELEPHONE ENCOUNTER
PCP is not in clinic until Friday, 9/23/2022. Will route to faculty physician to review and further advise or address request as patient states he is out of medication. Thank you for your help. Latoya BRAN

## 2022-09-20 NOTE — TELEPHONE ENCOUNTER
[facility] phone call message- medication clarification/question:    Full Medication Name:     methylphenidate (RITALIN)       Dose:     5 MG tablet    Question:     Caller advised out of medication and is requesting for medication. Per caller need to call in each time. Please place order or call to advise if need. Thanks.     [id number] [address]  AT [facility] [address]: Yes    OK to leave a message on voice mail? Yes    Primary language: English      needed? No    Call taken on September 20, 2022 at 8:48 AM by Katharine Ortiz

## 2022-10-11 DIAGNOSIS — I25.2 CORONARY ARTERY DISEASE WITH HX OF MYOCARDIAL INFARCT W/O HX OF CABG: ICD-10-CM

## 2022-10-11 DIAGNOSIS — I10 ESSENTIAL HYPERTENSION, BENIGN: ICD-10-CM

## 2022-10-11 DIAGNOSIS — I25.10 CORONARY ARTERY DISEASE WITH HX OF MYOCARDIAL INFARCT W/O HX OF CABG: ICD-10-CM

## 2022-10-13 RX ORDER — LISINOPRIL AND HYDROCHLOROTHIAZIDE 12.5; 2 MG/1; MG/1
TABLET ORAL
Qty: 90 TABLET | Refills: 3 | Status: SHIPPED | OUTPATIENT
Start: 2022-10-13 | End: 2023-10-09

## 2022-10-13 RX ORDER — METOPROLOL TARTRATE 25 MG/1
TABLET, FILM COATED ORAL
Qty: 90 TABLET | Refills: 3 | Status: SHIPPED | OUTPATIENT
Start: 2022-10-13 | End: 2023-10-09

## 2022-10-18 ENCOUNTER — TELEPHONE (OUTPATIENT)
Dept: FAMILY MEDICINE | Facility: CLINIC | Age: 87
End: 2022-10-18

## 2022-10-18 DIAGNOSIS — F32.1 CURRENT MODERATE EPISODE OF MAJOR DEPRESSIVE DISORDER WITHOUT PRIOR EPISODE (H): ICD-10-CM

## 2022-10-18 NOTE — TELEPHONE ENCOUNTER
[facility] phone call message- medication clarification/question:    Full Medication Name:     methylphenidate (RITALIN)       Dose:   5 MG tablet      Question:     Spouse called to placed in medication. Out of medication. Please refill medication and or call to advise if needed. Thanks.     Pharmacy confirmed as    [id number] [address] AT [facility] [address]  : Yes    OK to leave a message on voice mail? Yes    Primary language: English      needed? No    Call taken on October 18, 2022 at 11:33 AM by Katharine Ortiz

## 2022-10-20 RX ORDER — METHYLPHENIDATE HYDROCHLORIDE 5 MG/1
5 TABLET ORAL DAILY
Qty: 30 TABLET | Refills: 0 | Status: SHIPPED | OUTPATIENT
Start: 2022-10-20 | End: 2022-11-18

## 2022-10-20 NOTE — TELEPHONE ENCOUNTER
1. Current moderate episode of major depressive disorder without prior episode (H)  - methylphenidate (RITALIN) 5 MG tablet; Take 1 tablet (5 mg) by mouth daily  Dispense: 30 tablet; Refill: 0      Haim Galaviz III, MD, FAAFP  [facility]  10/20/22 4:52 PM   no

## 2022-11-18 DIAGNOSIS — F32.1 CURRENT MODERATE EPISODE OF MAJOR DEPRESSIVE DISORDER WITHOUT PRIOR EPISODE (H): ICD-10-CM

## 2022-11-18 RX ORDER — METHYLPHENIDATE HYDROCHLORIDE 5 MG/1
5 TABLET ORAL DAILY
Qty: 30 TABLET | Refills: 0 | Status: SHIPPED | OUTPATIENT
Start: 2022-11-18 | End: 2022-12-19

## 2022-11-18 NOTE — TELEPHONE ENCOUNTER
[facility] Medicine [facility] phone call message- medication clarification/question:    Full Medication Name: methylphenidate (RITALIN)  Dose: 5 MG tablet    Question: Spouse called requesting refill on medication.    Pharmacy confirmed as    [facility] #[id number] - [facility] [address] AT [facility] [address]: Yes    OK to leave a message on voice mail? Yes    Primary language: English      needed? No    Call taken on November 18, 2022 at 8:24 AM by Navi Conteh

## 2022-11-18 NOTE — TELEPHONE ENCOUNTER
Message to physician:     Date of last visit: 8/9/2022    Date of next visit if scheduled:     Potassium   Date Value Ref Range Status   11/30/2021 4.6 3.4 - 5.3 mmol/L Final   09/28/2018 3.9 3.4 - 5.3 mmol/L Final     Creatinine   Date Value Ref Range Status   11/30/2021 1.20 mg/dL Final   09/28/2018 0.8 0.8 - 1.5 mg/dL Final     GFR Estimate   Date Value Ref Range Status   11/30/2021 54 (L) >60 mL/min/1.73m2 Final     Comment:     As of July 11, 2021, eGFR is calculated by the CKD-EPI creatinine equation, without race adjustment. eGFR can be influenced by muscle mass, exercise, and diet. The reported eGFR is an estimation only and is only applicable if the renal function is stable.   10/30/2019 39 (L) >60 mL/min/1.73m2 Final   09/24/2013 > 60 >60 ml/min/1.73m2 Final       BP Readings from Last 3 Encounters:   08/09/22 (!) 140/54   06/08/22 116/55   04/19/22 (!) 159/54       Hemoglobin A1C   Date Value Ref Range Status   11/30/2021 6.4 (H) 0.0 - 5.6 % Final     Comment:     Normal <5.7%   Prediabetes 5.7-6.4%    Diabetes 6.5% or higher     Note: Adopted from ADA consensus guidelines.   03/28/2019 6.1 (H) 4.1 - 5.7 % Final       Please complete refill and CLOSE ENCOUNTER.  Closing the encounter signifies the refill is complete.

## 2022-12-19 DIAGNOSIS — F32.1 CURRENT MODERATE EPISODE OF MAJOR DEPRESSIVE DISORDER WITHOUT PRIOR EPISODE (H): ICD-10-CM

## 2022-12-19 RX ORDER — METHYLPHENIDATE HYDROCHLORIDE 5 MG/1
5 TABLET ORAL DAILY
Qty: 30 TABLET | Refills: 0 | Status: SHIPPED | OUTPATIENT
Start: 2022-12-19 | End: 2023-01-25

## 2022-12-19 NOTE — TELEPHONE ENCOUNTER
[facility] Clinic phone call message- medication clarification/question:    Full Medication Name: methylphenidate   Dose: (RITALIN) 5 MG tablet    Question: Spouse called to request refills for patient.      Pharmacy [facility] as    [id number] - [facility] [address] AT [facility] [address]  : Yes    OK to leave a message on voice mail? Yes    Primary language: English      needed? No    Call taken on December 19, 2022 at 8:41 AM by Nina Hurd

## 2023-01-25 ENCOUNTER — TELEPHONE (OUTPATIENT)
Dept: FAMILY MEDICINE | Facility: CLINIC | Age: 88
End: 2023-01-25
Payer: COMMERCIAL

## 2023-01-25 DIAGNOSIS — F32.1 CURRENT MODERATE EPISODE OF MAJOR DEPRESSIVE DISORDER WITHOUT PRIOR EPISODE (H): Primary | ICD-10-CM

## 2023-01-25 NOTE — TELEPHONE ENCOUNTER
[facility] phone call message- medication clarification/question:    Full Medication Name: methylphenidate (RITALIN)   Dose: 5 MG tablet    Question: Spouse calling to request refills      Pharmacy confirmed as    [facility] #[id number] - [facility] [address] AT [facility] [address]  : Yes    OK to leave a message on voice mail? Yes    Primary language: English      needed? No    Call taken on January 25, 2023 at 10:27 AM by Nina Hurd

## 2023-01-27 RX ORDER — METHYLPHENIDATE HYDROCHLORIDE 5 MG/1
5 TABLET ORAL DAILY
Qty: 30 TABLET | Refills: 0 | Status: SHIPPED | OUTPATIENT
Start: 2023-01-27 | End: 2023-02-20

## 2023-01-27 NOTE — TELEPHONE ENCOUNTER
reviewed. Last filled 12/20/22. Requested prescription was sent to pharmacy on record. For adjunct therapy for depression.  E-prescription is currently down. Will try to send again later today.

## 2023-02-20 DIAGNOSIS — F32.1 CURRENT MODERATE EPISODE OF MAJOR DEPRESSIVE DISORDER WITHOUT PRIOR EPISODE (H): ICD-10-CM

## 2023-02-20 RX ORDER — METHYLPHENIDATE HYDROCHLORIDE 5 MG/1
5 TABLET ORAL DAILY
Qty: 30 TABLET | Refills: 0 | Status: SHIPPED | OUTPATIENT
Start: 2023-02-20 | End: 2023-02-27

## 2023-02-27 ENCOUNTER — TELEPHONE (OUTPATIENT)
Dept: FAMILY MEDICINE | Facility: CLINIC | Age: 88
End: 2023-02-27
Payer: COMMERCIAL

## 2023-02-27 DIAGNOSIS — F32.1 CURRENT MODERATE EPISODE OF MAJOR DEPRESSIVE DISORDER WITHOUT PRIOR EPISODE (H): ICD-10-CM

## 2023-02-27 RX ORDER — METHYLPHENIDATE HYDROCHLORIDE 5 MG/1
5 TABLET ORAL DAILY
Qty: 30 TABLET | Refills: 0 | Status: SHIPPED | OUTPATIENT
Start: 2023-02-27 | End: 2023-04-07

## 2023-02-27 NOTE — TELEPHONE ENCOUNTER
[facility] Medicine Clinic phone call message- medication clarification/question:    Full Medication Name:     methylphenidate (RITALIN) 5 MG tablet      Dose:     Sig - Route: Take 1 tablet (5 mg) by mouth daily - Oral        Question:       Spouse is saying that pharmacy call on Wednesday that they are having issue with [employer] and can not  medication. Can we reach out to pharmacy and when we find out the answer please call spouse back. Spouse advise patient is out of medicaiotn.     Pharmacy confirmed as    [id number] [place] [id number] AT [facility] [address]  : Yes    OK to leave a message on voice mail? Yes    Primary language: English      needed? No    Call taken on February 27, 2023 at 10:03 AM by Katharine Ortiz

## 2023-02-27 NOTE — TELEPHONE ENCOUNTER
1. Current moderate episode of major depressive disorder without prior episode (H)  Upcoming appointment.  - methylphenidate (RITALIN) 5 MG tablet; Take 1 tablet (5 mg) by mouth daily  Dispense: 30 tablet; Refill: 0      Haim Galaviz III, MD, FAAFP  [facility]  02/27/23 4:26 PM

## 2023-03-20 DIAGNOSIS — I73.9 PAD (PERIPHERAL ARTERY DISEASE) (H): Primary | ICD-10-CM

## 2023-03-21 ENCOUNTER — OFFICE VISIT (OUTPATIENT)
Dept: FAMILY MEDICINE | Facility: CLINIC | Age: 88
End: 2023-03-21
Payer: COMMERCIAL

## 2023-03-21 VITALS
HEART RATE: 90 BPM | RESPIRATION RATE: 20 BRPM | BODY MASS INDEX: 30.65 KG/M2 | DIASTOLIC BLOOD PRESSURE: 63 MMHG | TEMPERATURE: 98.8 F | WEIGHT: 173 LBS | OXYGEN SATURATION: 96 % | SYSTOLIC BLOOD PRESSURE: 154 MMHG

## 2023-03-21 DIAGNOSIS — Z00.00 WELLNESS EXAMINATION: Primary | ICD-10-CM

## 2023-03-21 DIAGNOSIS — I25.10 ATHEROSCLEROSIS OF NATIVE CORONARY ARTERY OF NATIVE HEART WITHOUT ANGINA PECTORIS: ICD-10-CM

## 2023-03-21 DIAGNOSIS — G30.1 MILD LATE ONSET ALZHEIMER'S DEMENTIA WITH MOOD DISTURBANCE (H): ICD-10-CM

## 2023-03-21 DIAGNOSIS — F02.A3 MILD LATE ONSET ALZHEIMER'S DEMENTIA WITH MOOD DISTURBANCE (H): ICD-10-CM

## 2023-03-21 LAB
ANION GAP SERPL CALCULATED.3IONS-SCNC: 10 MMOL/L (ref 3–14)
BUN SERPL-MCNC: 27 MG/DL (ref 7–30)
CALCIUM SERPL-MCNC: 9.3 MG/DL (ref 8.5–10.1)
CHLORIDE BLD-SCNC: 105 MMOL/L (ref 94–109)
CO2 SERPL-SCNC: 28 MMOL/L (ref 20–32)
CREAT SERPL-MCNC: 1.4 MG/DL (ref 0.66–1.25)
ERYTHROCYTE [DISTWIDTH] IN BLOOD BY AUTOMATED COUNT: 15 % (ref 10–15)
GFR SERPL CREATININE-BSD FRML MDRD: 48 ML/MIN/1.73M2
GLUCOSE BLD-MCNC: 113 MG/DL (ref 70–99)
HBA1C MFR BLD: 6.2 % (ref 0–5.6)
HCT VFR BLD AUTO: 34.4 % (ref 40–53)
HGB BLD-MCNC: 11 G/DL (ref 13.3–17.7)
MCH RBC QN AUTO: 29.3 PG (ref 26.5–33)
MCHC RBC AUTO-ENTMCNC: 32 G/DL (ref 31.5–36.5)
MCV RBC AUTO: 92 FL (ref 78–100)
PLATELET # BLD AUTO: 207 10E3/UL (ref 150–450)
POTASSIUM BLD-SCNC: 4.7 MMOL/L (ref 3.4–5.3)
RBC # BLD AUTO: 3.75 10E6/UL (ref 4.4–5.9)
SODIUM SERPL-SCNC: 143 MMOL/L (ref 133–144)
TSH SERPL DL<=0.005 MIU/L-ACNC: 1.45 UIU/ML (ref 0.3–4.2)
VIT B12 SERPL-MCNC: 676 PG/ML (ref 232–1245)
WBC # BLD AUTO: 8 10E3/UL (ref 4–11)

## 2023-03-21 PROCEDURE — 85027 COMPLETE CBC AUTOMATED: CPT | Performed by: FAMILY MEDICINE

## 2023-03-21 PROCEDURE — 99207 PR NO BILLABLE SERVICE THIS VISIT: CPT

## 2023-03-21 PROCEDURE — 36415 COLL VENOUS BLD VENIPUNCTURE: CPT | Performed by: FAMILY MEDICINE

## 2023-03-21 PROCEDURE — 83036 HEMOGLOBIN GLYCOSYLATED A1C: CPT | Performed by: FAMILY MEDICINE

## 2023-03-21 PROCEDURE — G0439 PPPS, SUBSEQ VISIT: HCPCS | Performed by: FAMILY MEDICINE

## 2023-03-21 PROCEDURE — 99215 OFFICE O/P EST HI 40 MIN: CPT | Mod: 25 | Performed by: FAMILY MEDICINE

## 2023-03-21 PROCEDURE — 84443 ASSAY THYROID STIM HORMONE: CPT | Performed by: FAMILY MEDICINE

## 2023-03-21 PROCEDURE — 80048 BASIC METABOLIC PNL TOTAL CA: CPT | Performed by: FAMILY MEDICINE

## 2023-03-21 PROCEDURE — 82607 VITAMIN B-12: CPT | Performed by: FAMILY MEDICINE

## 2023-03-21 RX ORDER — BNT162B2 ORIGINAL AND OMICRON BA.4/BA.5 .1125; .1125 MG/2.25ML; MG/2.25ML
INJECTION, SUSPENSION INTRAMUSCULAR
COMMUNITY
Start: 2022-10-11

## 2023-03-21 RX ORDER — BNT162B2 0.23 MG/2.25ML
INJECTION, SUSPENSION INTRAMUSCULAR
COMMUNITY
Start: 2022-04-19

## 2023-03-21 RX ORDER — NITROGLYCERIN 0.4 MG/1
0.4 TABLET SUBLINGUAL EVERY 5 MIN PRN
Qty: 30 TABLET | Refills: 0 | Status: SHIPPED | OUTPATIENT
Start: 2023-03-21

## 2023-03-21 RX ORDER — INFLUENZA A VIRUS A/MICHIGAN/45/2015 X-275 (H1N1) ANTIGEN (FORMALDEHYDE INACTIVATED), INFLUENZA A VIRUS A/SINGAPORE/INFIMH-16-0019/2016 IVR-186 (H3N2) ANTIGEN (FORMALDEHYDE INACTIVATED), INFLUENZA B VIRUS B/PHUKET/3073/2013 ANTIGEN (FORMALDEHYDE INACTIVATED), AND INFLUENZA B VIRUS B/MARYLAND/15/2016 BX-69A ANTIGEN (FORMALDEHYDE INACTIVATED) 60; 60; 60; 60 UG/.7ML; UG/.7ML; UG/.7ML; UG/.7ML
INJECTION, SUSPENSION INTRAMUSCULAR
COMMUNITY
Start: 2022-10-03

## 2023-03-21 RX ORDER — DONEPEZIL HYDROCHLORIDE 5 MG/1
5 TABLET, FILM COATED ORAL AT BEDTIME
Qty: 30 TABLET | Refills: 1 | Status: SHIPPED | OUTPATIENT
Start: 2023-03-21 | End: 2023-03-22

## 2023-03-21 ASSESSMENT — PATIENT HEALTH QUESTIONNAIRE - PHQ9: SUM OF ALL RESPONSES TO PHQ QUESTIONS 1-9: 8

## 2023-03-21 NOTE — PATIENT INSTRUCTIONS
PERSONAL PREVENTIVE SERVICES PLAN - SERVICES     Review these tests with your medical staff then decide which ones you want and take this page home for your reference      SCREENING TESTS     Description   Year of Last Screening   Recommended Today?   Heart disease screening blood tests  Cholesterol level Reducing cholesterol can reduce your risk of heart attacks by 25%.  Screening is recommended yearly if you are at risk of heart disease otherwise every 4-5 years 11/30/21  On statin Yes; Recommended    Diabetes screening tests  Hemoglobin A1c blood test   Finding and treating diabetes early can reduce complications.  Screening recommended/covered yearly if you have high blood pressure, high cholesterol, obesity (BMI >30), or a history of high blood glucose tests; or 2 of the following: family history of diabetes, overweight (BMI >25 but <30), age 65 years or older, and a history of diabetes of pregnancy or gave birth to baby weighing more than 9 lbs. 33/20/21  hgbA1c  6.4 Yes; Recommended    Hepatitis B screening Finding hepatitis B early can reduce complications.  Screening is recommended for persons with selected risk factors.  No: is not indicated today.   Hepatitis C screening Finding hepatitis C early can reduce complications.  Screening is recommended for all persons born from 1945 through 1965 and for those with selected other risk factors.   No: is not indicated today.   HIV screening Finding HIV early can reduce complications.  Screening is recommended for persons with risk factors for HIV infection.  No: is not indicated today.   Glaucoma screening Early detection of glaucoma can prevent blindness.   Please talk to your eye doctor about this.       SCREENING TESTS     Description   Year of Last Screening   Recommended Today?   Colorectal cancer screening  Fecal occult blood test   Screening colonoscopy Screening for colon cancer has been shown to reduce death from colon cancer by 25-30%. Screening  recommended to start at 50 years and continuing until age 75 years.    No: is not indicated today.   Breast Cancer Screening (women)  Mammogram Mammogram screening for breast cancer has been shown to reduce the risk of dying from breast cancer and prolong life. Screening is recommended every 1-2 years for women aged 50 to 74 years.   No: is not indicated today.   Cervical Cancer screening (women)  Pap Cervical pap smears can reduce cervical cancer. Screening is recommended annually if high risk (history of abnormal pap smears) otherwise every 2-3 years, stop screening at 65 years of age if history of normal paps.  No: is not indicated today.   Screening for Osteoporosis:  Bone mass measurements (women)  Dexa Scan Screening and treating Osteoporosis can reduce the risk of hip and spine fractures. Screening is recommended in women 65 years or older and in women and men at risk of osteoporosis.  No: is not indicated today.   Screening for Lung Cancer   Low-dose CT scanning Screening can reduce mortality in persons aged 55-80 who have smoked at least 30 pack-years and who are either still smoking or have quit in the past 15 years. 6/6/18  See report    Abdominal Aortic Aneurysm (AAA) screening  Ultrasound (US)   An aneurysm treated before rupture is very safe -a ruptured aneurysm can be fatal.  Screening  by US for AAA is limited to patients who meet one of the following criteria:  Men who are 65-75 years old and have smoked more than 100 cigarettes in their lifetime  Anyone with a family history of abdominal aortic aneurysm  No: is not indicated today.     Here are your recommended immunizations.  Take this home for your reference.                                                    IMMUNIZATIONS Description Recommend today?     Influenza (Flu shot) Prevents flu; should get every year No; is up to date.   PCV 13 Pneumonia vaccination; you get it once No: is not indicated today.   PPSV 23 Second pneumonia vaccination;  usually get it 1 year after PCV 13 No; is up to date.   Zoster (Shingles) Prevents shingles; you get it once  (Check with Part D insurance for coverage, must receive at a pharmacy, not clinic) DUE for #2 Shingrix   Tetanus Prevents tetanus; once every 10 years No; is up to date.     Hepatitis B  If you have any of the following risk factors you should be immunized for hepatitis B: severe kidney disease, people who live in the same house as a carrier of Hepatitis B virus, people who live in  institutions (e.g. nursing homes or group homes), homosexual men, patients with hemophilia who received Factor VIII or IX concentrates, abusers of illicit injectable drugs No: is not indicated today.      PATIENT INSTRUCTIONS    Yearly exam:   See your health care provider every year in order to review changes in your health, review medicines that you take, and discuss preventive care needs such as immunizations and cancer screening.  Get a flu shot each year.     Advance Directives:  If you have not done so, you are encouraged to complete advance directives and/or a living will.   More information about advance directives can be found at: http://www.[place]med.org/advocacy/Key-Issues/Advance-Directives    Nutrition:   Eat at least 5 servings of fruits and vegetables each day.   Eat whole-grain bread, whole-wheat pasta and brown rice instead of white grains and rice.   Talk to your doctor about Calcium and Vitamin D.     Lifestyle:  Exercise for at least 150 minutes a week (30 minutes a day, 5 days a week). This will help you control your weight and prevent disease.   Limit alcohol to one drink per day.   If you smoke, try to quit - your doctor will be happy to help.   Wear sunscreen to prevent skin cancer.   See your dentist every six months for an exam and cleaning.   See your eye doctor every 1 to 2 years to screen for conditions such as glaucoma, macular degeneration and cataracts.              What is dementia?  Dementia is the  "general term for a group of brain disorders that cause memory problems and make it hard to think clearly (figure 1).  What symptoms does dementia cause?  The symptoms of dementia often start off very mild and get worse slowly. Symptoms can include:  ?Forgetfulness  ?Acting confused or disoriented  ?Trouble with speech and writing (for example, not being able to find the right words for things)  ?Trouble concentrating and reasoning  ?Problems with tasks such as paying bills or balancing a checkbook  ?Getting lost in familiar places  As dementia gets worse, people might:  ?Have episodes of anger or aggression  ?See things that aren't there or believe things that aren't true  ?Be unable to eat, bathe, dress, or do other everyday tasks  ?Lose bladder and bowel control  What are the different kinds of dementia?  The most common kinds include:  ?Alzheimer disease - Alzheimer disease is the most common cause of dementia. It is a disorder in which brain cells slowly die over time.  ?Vascular dementia - Vascular dementia happens when parts of the brain do not get enough blood. This can happen when blood vessels in the brain get blocked with blood clots or damaged by high blood pressure or aging. This form of dementia is most common among people who have had strokes or who are at risk for strokes.  ?Parkinson disease dementia - Parkinson disease is a brain disorder that affects movement. It causes trembling, stiffness, and slowness. As Parkinson disease gets worse, some people develop dementia. \"Lewy body dementia\" is a related form of dementia.  ?Other causes of dementia - Dementia can also happen if a person's brain has been damaged. For example, having many head injuries can lead to dementia.  Should I see a doctor or nurse?  Yes, you should see a doctor or nurse if you think you or someone close to you is showing signs of dementia. Sometimes memory loss and confusion are caused by medical problems other than dementia that " can be treated. For example, people with diabetes sometimes show signs of confusion when their blood sugar is not well controlled.  Are there tests I should have?  Your doctor or nurse will decide which tests you should have based on your individual situation. Many people with signs of dementia do not need a brain scan. That's because the tests that are most useful are the ones that look at how you answer questions and do certain tasks. Even so, your doctor might want to do a brain scan (either CT or MRI) to make sure that your symptoms are not caused by a problem unrelated to dementia.  How is dementia treated?  That depends on what kind of dementia you have. If you have Alzheimer disease, there are medicines that might help some. If you have vascular dementia, your doctor will focus on keeping your blood pressure and cholesterol as close to normal as possible. Doing that can help reduce further damage to the brain.  Sadly, there really aren't good treatments for most types of dementia. But doctors can sometimes treat troubling symptoms that come with dementia, such as depression or anxiety.  How do I stay safe?  If you have dementia, you might not be aware of how much your condition affects you. Trust your family and friends to tell you when it is no longer safe for you to drive, cook, or do other things that could be dangerous.  Be aware, too, that people with dementia often fall and hurt themselves. To reduce the risk of falls, it's a good idea to:  ?Secure loose rugs or use non-skid backing on rugs  ?Tuck away loose wires or electrical cords  ?Wear sturdy, comfortable shoes  ?Keep walkways well lit  Can dementia be prevented?  There are no proven ways to prevent dementia. But here are some things that seem to help keep the brain healthy:  ?Physical activity  ?A healthy diet  ?Social interaction        What plans should we make for the future?  After your family member is diagnosed with Alzheimer disease (or  "another form of dementia), you will need to make plans for their:  ?Living situation - If the person lives alone, you need to make sure they are safe and have the help they need to take care of themselves. A time will come when they cannot live alone, and it's important to plan for this. You might have them live at home with help, or move to a nursing home.  ?Decision-making - Early on, your family member should choose a \"power of .\" A power of  is someone who can make decisions for the person when they are no longer able to. It's important for the power of  to talk to the person to understand what they would want in different situations, especially toward the end of life. For example, some people would want to be put on a breathing machine if they could not breathe on their own, while other people would not. It is hard to have these conversations. But it's important to have them as early as possible, while the person is still able to make their own decisions.  ?Money - People have trouble understanding how to manage their money as their disease gets worse. If your family member takes care of their own money, it's important to check that they do it correctly. You will also need to make a plan for when they are unable to manage their money without help.  ?Driving - If your family member still drives, talk with the doctor about when they should stop. The right time to stop is different for each person.  The symptoms of Alzheimer disease get worse over time. It is important to be aware of this and change your plans regularly as needed.  How can I make the home safe?  To help keep your family member from getting hurt around the house, you can:  ?Keep walkways clear of clutter. If you have loose rugs, remove them or tack them down.  ?Put a handrail and non-slip mat in the bathtub or shower.  ?Put child-proof locks on cabinets with dangerous items (such as matches or medicines). You can also put " "child-proof covers on the stove.  How can I keep my family member from wandering away?  To keep them from wandering away or getting lost, you can:  ?Lock the outside door. If your family member can unlock the door, put another lock on the door that they can't reach.  ?Have them wear or carry identification at all times.  ?Put a system in your home that lets you know when people enter or leave.  There are products available that use GPS technology to let you track where your family member is. The Alzheimer's Association also has a program to support caregivers and people who are at risk of wandering. They provide an identification bracelet, necklace, or tag your family member can wear. They can also help if a person does get lost. You can learn more online at: www.medicalert.org/alz.  How can I make daily activities easier?  To make your daily activities easier, you can:  ?Schedule appointments, visits, and activities for times of the day when your family member is at their best.  ?Do activities they enjoy or can still help with.  ?Plan on taking extra time for activities or to get where you are going.  ?Stick to a routine, and avoid new or crowded places.  ?Use simple words, short sentences, and a calm voice (but don't use \"baby talk\"). When you give directions, give only 1 direction at a time.  ?Avoid giving them too many choices. For example, offer only 2 choices for lunch.  ?Buy clothes and shoes that are easy to put on and take off.  ?Remember that it doesn't help to argue. Try to move on to something else.  How can I avoid bladder or bowel accidents?  In the earlier stages of the disease, you can limit accidents by:  ?Having your family member use the toilet every few hours  ?Not giving them drinks before bedtime  In the later stages of dementia, most people will need to wear a pad or adult diaper.  What if my family member isn't eating enough?  To get them to eat more, you can:  ?Give them many small meals " each day, instead of 3 large ones.  ?Give them high-protein or high-calorie drinks, such as shakes.  ?Make food easier to eat by putting it in a bowl or cutting it up.  ?Try making the food taste better by adding spices, sweet and sour flavoring, or soy sauce.  As the disease gets worse, the person might need to be fed.  How can I help my family member sleep better at night?  To help them sleep better at night, you can:  ?Not let them nap during the day.  ?Make sure they get enough exercise and sunlight during the day (but not right before bedtime).  ?Open the shades in the morning to let light in.  ?Keep their wake-up time and bedtime the same every day. Don't have them go to bed too early. Older people require less sleep than younger people, and an early bedtime could lead the person to wake up too early.  ?Keep the bedroom quiet, cool, and dark at night.  ?Ask the doctor or nurse if any of your family member's medicines might be making their sleep worse.  What if my family member's behavior changes suddenly?  If the person's behavior changes suddenly, call the doctor or nurse. It could mean they have an infection or another medical problem that requires treatment. That's because symptoms of Alzheimer disease often get worse when people get infections, especially bladder or lung infections.  What if I am having a hard time?  If you are having a hard time, let the doctor or nurse know so that they can find ways to get you help. Taking care of a person with Alzheimer disease is a tough job, and it usually gets harder as the disease gets worse. Don't feel bad or guilty about asking for help. Make sure to ask for help right away if your family member hurts or threatens you.  One way to get help is to take a break from caregiving. You can hire an aide to help you bathe, dress, or feed your family member. You can also find an adult day care program for your family member to go to during the day.  Another way to get help  is to join a support group for caregivers of people with Alzheimer disease. It can help to talk to other people who are going through similar things.  The Alzheimer's Association has a lot of information for caregivers. Their website is www.alz.org. Their toll-free phone number in the  is 1-529.964.4082.    Senior Linkage Line  https://[place].gov/senior-linkage-line/  1-713.787.2551

## 2023-03-21 NOTE — PROGRESS NOTES
Medicare Wellness Visit  Health Risk Assessment           Health Risk Assessment / Review of Systems     Constitutional: Any fevers or night sweats? No     Eyes:  Vision problems   No     Hearing Do you feel you have hearing loss?  No     Cardiovascular: Any chest pain, fast or irregular heart beat, calf pain with walking?     No           Respiratory:   Any breathing problems or cough?   No     Gastrointestinal: Any stomach or stool problems?   No      Genitourinary: Do you have difficulty controlling urination?   No     Muscles and Joints: Any joint stiffness or soreness?    YES -generalized, arthritic discomfort in bilateral shoulders.    Skin: Any concerning lesions or moles?   No     Nervous System: Any loss of strength or feeling, numbness or tingling, shaking, dizziness, or headache?   YES -generalized loss of strength related to age per patient.    Mental Health: Any depression, anxiety or problems sleeping?    No     Cognition: Do you have any problems with your memory?   YES - reports having trouble remembering things, short term memory. Forgetful of hygiene routine, has to be prompted to brush teeth, shave and shower.           Medical Care     What other specialists or organizations are involved in your medical care?  none  Patient Care Team       Relationship Specialty Notifications Start End    Haim Galaviz MD PCP - General Family Practice  4/26/19     Phone: [phone] Fax: [phone] [address] [place] [address]    Blue Horton MD Assigned Heart and Vascular Provider   9/26/21     Phone: [phone] Fax: [phone]         [facility] [address]    Haim Galaviz MD Assigned PCP   12/12/21     Phone: [phone] Fax: [phone]         [facility] [place] [address]          Have you been to the ER or overnight in the hospital in the last year?  No          Social History / Home Safety       Marital  Status:  Who lives in your household? Self and wife    Do you feel threatened or controlled by a partner, ex-partner or anyone in your life? No     Has anyone hurt you physically, for example by pushing, hitting, slapping or kicking you   or forcing you to have sex? No          Does your home have any of the following safety concerns; loose rugs in the hallway,  bathrooms with no grab bars by the tub or toilet, stairs with no handrails or poorly lit areas?  No     Do you need help with dressing yourself, bathing, eating or getting around your home?  No     Do you need help with the phone, transportation, shopping, preparing meals, housework, laundry, medications or managing money?No       Risk Behaviors and Healthy Habits     History   Smoking Status     Former     Packs/day: 2.00     Years: 40.00     Types: Cigarettes     Quit date: 4/10/1984   Smokeless Tobacco     Never     How many servings of fruits and vegetables do you eat a day? 1 serving a day. Reviewed daily intake recommendation and encouraged when able to increase intake.    Exercise: None No exercise routine, occasionally would walk on treadmill otherwise does not get or do much physical activity exercise.    Do you frequently drive without a seatbelt? No     Do you use tobacco?  No     Do you use any other drugs? No         Do you use alcohol?Yes, has a shot glass of Amaretto almost every evening  Number of drinks per day : 1  Number of drinking days a week : 5      Frailty Assessment            Have you lost 10 or more pounds unintentionally in the previous year? No     How difficult is walking from one room to the other on the same level?not       Is it difficult to lift or carry something as heavy as 10 pounds?not      Compared with most (men/women) your age, would you say  that you are more active, less active, or about the same? less        FALL RISK ASSESSMENT 3/21/2023 7/19/2022 11/30/2021 3/28/2019 1/25/2018   Fallen 2 or more times in  the past year? No No No No No   Any fall with injury in the past year? No No No No No   Timed Up and Go Test/Seconds (13.5 is a fall risk; contact physician) 13 - 13 - -         Advance Directives:   Discussed with patient and family as appropriate. Has patient  completed advance directives and/or a living will? Yes, does not wish to make any changes to advance directive completed in 2020.       Shanta Pan RN

## 2023-03-22 ENCOUNTER — HOSPITAL ENCOUNTER (OUTPATIENT)
Dept: CT IMAGING | Facility: HOSPITAL | Age: 88
Discharge: HOME | End: 2023-03-22
Attending: FAMILY MEDICINE | Admitting: FAMILY MEDICINE
Payer: COMMERCIAL

## 2023-03-22 DIAGNOSIS — G30.1 MILD LATE ONSET ALZHEIMER'S DEMENTIA WITH MOOD DISTURBANCE (H): ICD-10-CM

## 2023-03-22 DIAGNOSIS — F02.A3 MILD LATE ONSET ALZHEIMER'S DEMENTIA WITH MOOD DISTURBANCE (H): ICD-10-CM

## 2023-03-22 PROCEDURE — 70450 CT HEAD/BRAIN W/O DYE: CPT

## 2023-03-22 RX ORDER — DONEPEZIL HYDROCHLORIDE 5 MG/1
TABLET, FILM COATED ORAL
Qty: 90 TABLET | Refills: 0 | Status: SHIPPED | OUTPATIENT
Start: 2023-03-22 | End: 2023-04-25

## 2023-03-22 NOTE — TELEPHONE ENCOUNTER
Message to physician:     Date of last visit: 3/21/2023    Date of next visit if scheduled:     Potassium   Date Value Ref Range Status   03/21/2023 4.7 3.4 - 5.3 mmol/L Final   09/28/2018 3.9 3.4 - 5.3 mmol/L Final     Creatinine   Date Value Ref Range Status   03/21/2023 1.40 (H) 0.66 - 1.25 mg/dL Final   09/28/2018 0.8 0.8 - 1.5 mg/dL Final     GFR Estimate   Date Value Ref Range Status   03/21/2023 48 (L) >60 mL/min/1.73m2 Final     Comment:     eGFR calculated using 2021 CKD-EPI equation.   10/30/2019 39 (L) >60 mL/min/1.73m2 Final   09/24/2013 > 60 >60 ml/min/1.73m2 Final       BP Readings from Last 3 Encounters:   03/21/23 (!) 154/63   08/09/22 (!) 140/54   06/08/22 116/55       Hemoglobin A1C   Date Value Ref Range Status   03/21/2023 6.2 (H) 0.0 - 5.6 % Final     Comment:     Normal <5.7%   Prediabetes 5.7-6.4%    Diabetes 6.5% or higher     Note: Adopted from ADA consensus guidelines.   03/28/2019 6.1 (H) 4.1 - 5.7 % Final       Please complete refill and CLOSE ENCOUNTER.  Closing the encounter signifies the refill is complete.

## 2023-03-23 NOTE — PROGRESS NOTES
SUBJECTIVE:   Zack is a 88 year old who presents for Preventive Visit.  No flowsheet data found.  Are you in the first 12 months of your Medicare coverage?  No    HPI    Wife also really wants him looked at for his memory. It had gotten a little better after starting the citalopram and Adderall. However, it has been getting worse. He also isn't very motivated to get on the treadmill. She doesn't let him drive very far from home and never on the interstate. He has not gotten lost. Sadie (wife) has mostly taken over the finances.    Have you ever done Advance Care Planning? (For example, a Health Directive, POLST, or a discussion with a medical provider or your loved ones about your wishes): Yes, advance care planning is on file.     Fall risk  Fallen 2 or more times in the past year?: No  Any fall with injury in the past year?: No  Timed Up and Go Test (>13.5 is fall risk; contact physician) : 13    Cognitive Screening MOCA =   Patient's background education, high school with some college courses    Do you have sleep apnea, excessive snoring or daytime drowsiness?: no    Reviewed and updated as needed this visit by clinical staff    Allergies  Meds              Reviewed and updated as needed this visit by Provider                 Social History     Tobacco Use     Smoking status: Former     Packs/day: 2.00     Years: 40.00     Pack years: 80.00     Types: Cigarettes     Quit date: 4/10/1984     Years since quittin.9     Smokeless tobacco: Never   Substance Use Topics     Alcohol use: Yes     Alcohol/week: 6.0 standard drinks     Comment: occasionally.       Do you have a current opioid prescription? No  Do you use any other controlled substances or medications that are not prescribed by a provider? Alcohol      Current providers sharing in care for this patient include:  Patient Care Team:  Haim Galaviz MD as PCP - General (Family Practice)  Blue Horton MD as Assigned Heart and  Vascular Provider  Haim Galaviz MD as Assigned PCP    The following health maintenance items are reviewed in Epic and correct as of today:  Health Maintenance   Topic Date Due     DEPRESSION ACTION PLAN  Never done     ZOSTER IMMUNIZATION (2 of 3) 04/02/2009     MEDICARE ANNUAL WELLNESS VISIT  11/30/2022     Pneumococcal Vaccine: 65+ Years (2 - PCV) 06/08/2023     PHQ-9  09/21/2023     FALL RISK ASSESSMENT  03/21/2024     DTAP/TDAP/TD IMMUNIZATION (3 - Td or Tdap) 06/09/2024     ADVANCE CARE PLANNING  01/15/2026     INFLUENZA VACCINE  Completed     COVID-19 Vaccine  Completed     IPV IMMUNIZATION  Aged Out     MENINGITIS IMMUNIZATION  Aged Out     Patient Active Problem List   Diagnosis     Actinic keratosis     Cardiovascular disease     Essential hypertension, benign     CT Lung Pulmonary Nodule Multiple     Prediabetes     Diverticulosis of large intestine     Hyperlipidemia     Peripheral vascular disease (H)     Internal hemorrhoids     Local infection of skin and subcutaneous tissue     Disorder of bursae and tendons in shoulder region     Synovial cyst     Trigger finger, acquired     Osteoarthritis of glenohumeral joint     Syncope     Abnormal cardiovascular stress test     Chest pain     Coronary atherosclerosis     Upper respiratory tract infection, unspecified type     Neck pain     Chronic total occlusion of native coronary artery     Past Surgical History:   Procedure Laterality Date     APPENDECTOMY       ARTHROSCOPY KNEE       ARTHROSCOPY KNEE       CARDIAC CATHETERIZATION  08/22/2018     of rca     CARDIAC SURGERY  08/22/2018    two stents placed 8/22/18     CV CORONARY ANGIOGRAM N/A 8/6/2018    Procedure: Coronary Angiogram;  Surgeon: Jeane Garcia MD;  Location: [facility];  Service:      CV CORONARY ANGIOGRAM N/A 8/22/2018    Procedure: Coronary Angiogram;  Surgeon: Jeane Garcia MD;  Location: [facility];  Service:      CV LEFT HEART CATHETERIZATION WITHOUT LEFT  "VENTRICULOGRAM Left 2018    Procedure: Left Heart Catheterization Without Left Ventriculogram;  Surgeon: Jeane Garcia MD;  Location: [facility];  Service:      CV LEFT HEART CATHETERIZATION WITHOUT LEFT VENTRICULOGRAM Left 2018    Procedure: Left Heart Catheterization Without Left Ventriculogram;  Surgeon: Jeane Garcia MD;  Location: [facility];  Service:      FEMORAL ENDARTERECTOMY Bilateral 2017     IR EXTREMITY ANGIOGRAM BILATERAL  3/10/2017     NOSE SURGERY       PROSTATE BIOPSY, NEEDLE, SATURATION SAMPLING  1    \"Biopsy fo the Prostate Needle\"     RELEASE TRIGGER FINGER         Social History     Tobacco Use     Smoking status: Former     Packs/day: 2.00     Years: 40.00     Pack years: 80.00     Types: Cigarettes     Quit date: 4/10/1984     Years since quittin.9     Smokeless tobacco: Never   Substance Use Topics     Alcohol use: Yes     Alcohol/week: 6.0 standard drinks     Comment: occasionally.     Family History   Problem Relation Age of Onset     Diabetes No family hx of      Breast Cancer No family hx of      Colon Cancer No family hx of      Prostate Cancer No family hx of      Other Cancer No family hx of      No Known Problems Mother      No Known Problems Father            Review of Systems  CONSTITUTIONAL: NEGATIVE for fever, chills, change in weight  ENT/MOUTH: NEGATIVE for ear, mouth and throat problems  RESP: NEGATIVE for significant cough or SOB  CV: NEGATIVE for chest pain, palpitations or peripheral edema    OBJECTIVE:   BP (!) 154/63   Pulse 90   Temp 98.8  F (37.1  C) (Tympanic)   Resp 20   Wt 78.5 kg (173 lb)   SpO2 96%   BMI 30.65 kg/m   Estimated body mass index is 30.65 kg/m  as calculated from the following:    Height as of 22: 1.6 m (5' 3\").    Weight as of this encounter: 78.5 kg (173 lb).  Physical Exam  Vitals and nursing note reviewed.   Constitutional:       General: He is not in acute distress.     Appearance: " Normal appearance. He is not ill-appearing, toxic-appearing or diaphoretic.   Pulmonary:      Effort: No respiratory distress.   Neurological:      Mental Status: He is alert and oriented to person, place, and time.   Psychiatric:         Attention and Perception: Attention and perception normal.         Mood and Affect: Mood normal. Affect is not flat.         Speech: Speech normal.         Behavior: Behavior normal. Behavior is cooperative.         Thought Content: Thought content normal.         Cognition and Memory: Memory is impaired.           ASSESSMENT / PLAN:     1. Wellness examination  Really up to date on most things and has aged out of most screenings. POA on file.  - Hemoglobin A1c; Future  - CBC with platelets; Future  - Vitamin B12; Future  - TSH with free T4 reflex; Future  - Basic metabolic panel; Future  - Hemoglobin A1c  - CBC with platelets  - Vitamin B12  - TSH with free T4 reflex  - Basic metabolic panel    2. 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 Discussed this at length with the patient and wife. Information given in the AVS. Currently, both the wife and patient think he is safe to drive with their current limits on his activities. Offered OT driving assessment and the patient refused. Continue to monitor and offer. Start Aricept. Reviewed expectations and side effects. R/o secondary causes with labs and CT. Recheck in 1 month and consider titrating Aricept.  - CT HEAD W/O CONTRAST; Future    3. Atherosclerosis of native coronary artery of native heart without angina pectoris  Refill provided. Follows with specialty clinics.  - nitroGLYcerin (NITROSTAT) 0.4 MG sublingual tablet; Place 1 tablet (0.4 mg) under the tongue every 5 minutes as needed for chest pain Repeat at 5 min intervals x 2 if needed  Dispense: 30 tablet; Refill: 0    55 additional minutes were spent on the date of the  "encounter doing chart review, history and exam, documentation, and further activities as noted above beyond the requirements and needs of the medicare exam.    COUNSELING:  Reviewed preventive health counseling, as reflected in patient instructions       Regular exercise      BMI:   Estimated body mass index is 30.65 kg/m  as calculated from the following:    Height as of 8/9/22: 1.6 m (5' 3\").    Weight as of this encounter: 78.5 kg (173 lb).         He reports that he quit smoking about 38 years ago. His smoking use included cigarettes. He has a 80.00 pack-year smoking history. He has never used smokeless tobacco.      Appropriate preventive services were discussed with this patient, including applicable screening as appropriate for cardiovascular disease, diabetes, osteopenia/osteoporosis, and glaucoma.  As appropriate for age/gender, discussed screening for colorectal cancer, prostate cancer, breast cancer, and cervical cancer. Checklist reviewing preventive services available has been given to the patient.    Reviewed patients plan of care and provided an AVS. The Basic Care Plan (routine screening as documented in Health Maintenance) and dementia information for Zack meets the Care Plan requirement. This Care Plan has been established and reviewed with the spouse.      Haim Galaviz MD  [facility]      "

## 2023-04-06 ENCOUNTER — TELEPHONE (OUTPATIENT)
Dept: FAMILY MEDICINE | Facility: CLINIC | Age: 88
End: 2023-04-06
Payer: COMMERCIAL

## 2023-04-06 NOTE — TELEPHONE ENCOUNTER
[facility] phone call message- medication clarification/question:    Full Medication Name: methylphenidate (RITALIN) 5 MG tablet    Question: Spouse called for a refill on medication. If able to fill please send to pharmacy thank you.      Pharm[facility]    [id number] [address] AT [facility] [address]: Yes    OK to leave a message on voice mail? Yes    Primary language: English      needed? No    Call taken on April 6, 2023 at 10:24 AM by Navi Conteh

## 2023-04-07 DIAGNOSIS — F32.1 CURRENT MODERATE EPISODE OF MAJOR DEPRESSIVE DISORDER WITHOUT PRIOR EPISODE (H): ICD-10-CM

## 2023-04-07 RX ORDER — METHYLPHENIDATE HYDROCHLORIDE 5 MG/1
5 TABLET ORAL DAILY
Qty: 30 TABLET | Refills: 0 | Status: SHIPPED | OUTPATIENT
Start: 2023-04-07 | End: 2023-05-09

## 2023-04-10 DIAGNOSIS — I25.10 CORONARY ARTERY DISEASE WITH HX OF MYOCARDIAL INFARCT W/O HX OF CABG: ICD-10-CM

## 2023-04-10 DIAGNOSIS — I25.2 CORONARY ARTERY DISEASE WITH HX OF MYOCARDIAL INFARCT W/O HX OF CABG: ICD-10-CM

## 2023-04-12 RX ORDER — ATORVASTATIN CALCIUM 40 MG/1
TABLET, FILM COATED ORAL
Qty: 90 TABLET | Refills: 3 | Status: SHIPPED | OUTPATIENT
Start: 2023-04-12 | End: 2024-03-12

## 2023-04-25 ENCOUNTER — OFFICE VISIT (OUTPATIENT)
Dept: FAMILY MEDICINE | Facility: CLINIC | Age: 88
End: 2023-04-25
Payer: COMMERCIAL

## 2023-04-25 VITALS
BODY MASS INDEX: 30.11 KG/M2 | DIASTOLIC BLOOD PRESSURE: 88 MMHG | OXYGEN SATURATION: 98 % | RESPIRATION RATE: 20 BRPM | WEIGHT: 170 LBS | SYSTOLIC BLOOD PRESSURE: 138 MMHG | HEART RATE: 90 BPM

## 2023-04-25 DIAGNOSIS — F02.A3 MILD LATE ONSET ALZHEIMER'S DEMENTIA WITH MOOD DISTURBANCE (H): Primary | ICD-10-CM

## 2023-04-25 DIAGNOSIS — G30.1 MILD LATE ONSET ALZHEIMER'S DEMENTIA WITH MOOD DISTURBANCE (H): Primary | ICD-10-CM

## 2023-04-25 PROCEDURE — 99417 PROLNG OP E/M EACH 15 MIN: CPT | Performed by: FAMILY MEDICINE

## 2023-04-25 PROCEDURE — 99215 OFFICE O/P EST HI 40 MIN: CPT | Performed by: FAMILY MEDICINE

## 2023-04-25 RX ORDER — DONEPEZIL HYDROCHLORIDE 10 MG/1
10 TABLET, FILM COATED ORAL AT BEDTIME
Qty: 90 TABLET | Refills: 1 | Status: SHIPPED | OUTPATIENT
Start: 2023-04-25 | End: 2023-10-31

## 2023-04-25 ASSESSMENT — PATIENT HEALTH QUESTIONNAIRE - PHQ9: SUM OF ALL RESPONSES TO PHQ QUESTIONS 1-9: 2

## 2023-04-25 ASSESSMENT — ENCOUNTER SYMPTOMS: CONSTITUTIONAL NEGATIVE: 1

## 2023-04-25 NOTE — PROGRESS NOTES
Assessment and Plan     1. 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 Discussed the natural history of the disease. I think he needs to do a driving assessment with OT if he wants to keep driving. Referral placed. Resources given and reviewed with the family. Answered their questions. I also believe that they need to plan to move out of the house to look forward to the support that Anthony needs. Recheck in 1 month.  - Occupational Therapy Referral; Future  - donepezil (ARICEPT) 10 MG tablet; Take 1 tablet (10 mg) by mouth At Bedtime  Dispense: 90 tablet; Refill: 1    96 minutes spent on the date of the encounter doing chart review, history and exam, documentation, and further activities as noted above.    Options for treatment and follow-up care were reviewed with the patient and/or guardian. Zack Johns and/or guardian engaged in the decision making process and verbalized understanding of the options discussed and agreed with the final plan. I answered all of their questions.    Haim Galaviz III, MD, FAAFP  [facility]  04/25/23 10:55 AM           HPI:       Zack Johns is a 88 year old  male  Patient presents with:  Follow Up: Follow up       Presents with his wife and son to review his results and talk about his memory. Is taking the Aricept without difficulty. Anthony doesn't remember the memory testing from last time nor much of our discussion. Continues to not get on the treadmill much. Is doing more puzzles.    Son has his concerns about being in their current house. Wife doesn't feel like they should be their anymore either. Neither of them think that Anthony should be driving. Anthony thinks that he is safe and feels very ganged up on.         PMHX:     Patient Active Problem List   Diagnosis     Actinic keratosis     Cardiovascular disease     Essential  hypertension, benign     CT Lung Pulmonary Nodule Multiple     Prediabetes     Diverticulosis of large intestine     Hyperlipidemia     Peripheral vascular disease (H)     Internal hemorrhoids     Local infection of skin and subcutaneous tissue     Disorder of bursae and tendons in shoulder region     Synovial cyst     Trigger finger, acquired     Osteoarthritis of glenohumeral joint     Syncope     Abnormal cardiovascular stress test     Chest pain     Coronary atherosclerosis     Upper respiratory tract infection, unspecified type     Neck pain     Chronic total occlusion of native coronary artery       Current Outpatient Medications   Medication Sig Dispense Refill     donepezil (ARICEPT) 10 MG tablet Take 1 tablet (10 mg) by mouth At Bedtime 90 tablet 1     lisinopril-hydrochlorothiazide (ZESTORETIC) 20-12.5 MG tablet TAKE 1 TABLET DAILY 90 tablet 3     methylphenidate (RITALIN) 5 MG tablet Take 1 tablet (5 mg) by mouth daily 30 tablet 0     metoprolol tartrate (LOPRESSOR) 25 MG tablet TAKE ONE-HALF (1/2) TABLET TWICE A DAY 90 tablet 3     acetaminophen (TYLENOL) 500 MG tablet Take 500-1,000 mg by mouth every 6 hours as needed       aspirin 81 MG tablet Take 1 tablet by mouth daily       atorvastatin (LIPITOR) 40 MG tablet TAKE 1 TABLET DAILY 90 tablet 3     citalopram (CELEXA) 20 MG tablet Take 1 tablet (20 mg) by mouth daily 90 tablet 3     FLUZONE HIGH-DOSE QUADRIVALENT 0.7 ML JEFFERY injection        Multiple Vitamin (MULTIVITAMIN) per tablet Take 1 tablet by mouth daily.       nitroGLYcerin (NITROSTAT) 0.4 MG sublingual tablet Place 1 tablet (0.4 mg) under the tongue every 5 minutes as needed for chest pain Repeat at 5 min intervals x 2 if needed 30 tablet 0     PFIZER COVID-19 VAC BIVALENT 30 MCG/0.3ML injection        PFIZER-BIONT COVID-19 VAC-KEVIN 30 MCG/0.3ML injection        zinc 50 MG TABS Take 50 mg by mouth daily.         Social History     Socioeconomic History     Marital status:      Spouse  name: Not on file     Number of children: Not on file     Years of education: Not on file     Highest education level: Not on file   Occupational History     Not on file   Tobacco Use     Smoking status: Former     Packs/day: 2.00     Years: 40.00     Pack years: 80.00     Types: Cigarettes     Quit date: 4/10/1984     Years since quittin.0     Smokeless tobacco: Never   Vaping Use     Vaping status: Not on file   Substance and Sexual Activity     Alcohol use: Yes     Alcohol/week: 6.0 standard drinks of alcohol     Comment: occasionally.     Drug use: No     Sexual activity: Not on file   Other Topics Concern     Parent/sibling w/ CABG, MI or angioplasty before 65F 55M? 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        Had gotten into typing to meet girls.        Worked for [employer] after leaving the [employer].     Social Determinants of Health     Financial Resource Strain: Not on file   Food Insecurity: Not on file   Transportation Needs: Not on file   Physical Activity: Not on file   Stress: Not on file   Social Connections: Not on file   Intimate Partner Violence: Not on file   Housing Stability: Not on file       Allergies   Allergen Reactions     Pletal [Cilostazol]        No results found for this or any previous visit (from the past 24 hour(s)).         Review of Systems:     Review of Systems   Constitutional: Negative.             Physical Exam:     Vitals:    23 0854 23 1022 23 1038   BP: (!) 145/63 138/89 138/88   Pulse: 90     Resp: 20     SpO2: 98%     Weight: 77.1 kg (170 lb)       Body mass index is 30.11 kg/m .    Physical Exam  Vitals and nursing note reviewed.   Constitutional:       General: He is not in acute distress.     Appearance: Normal appearance. He is not ill-appearing, toxic-appearing or diaphoretic.   Pulmonary:      Effort: No respiratory distress.   Neurological:      Mental Status: He is alert.

## 2023-04-25 NOTE — PATIENT INSTRUCTIONS
Resources for Caregivers from the National [place] on Aging:  https://www.frances.nih.gov/health/alzheimers/caregiving    Alzheimer's Association  alz.org/care    Senior Linkage Line  https://[place].gov/senior-linkage-line/

## 2023-05-09 ENCOUNTER — HOSPITAL ENCOUNTER (OUTPATIENT)
Dept: OCCUPATIONAL THERAPY | Facility: CLINIC | Age: 88
Setting detail: THERAPIES SERIES
Discharge: HOME | End: 2023-05-09
Attending: FAMILY MEDICINE
Payer: COMMERCIAL

## 2023-05-09 DIAGNOSIS — F32.1 CURRENT MODERATE EPISODE OF MAJOR DEPRESSIVE DISORDER WITHOUT PRIOR EPISODE (H): ICD-10-CM

## 2023-05-09 DIAGNOSIS — F02.A3 MILD LATE ONSET ALZHEIMER'S DEMENTIA WITH MOOD DISTURBANCE (H): ICD-10-CM

## 2023-05-09 DIAGNOSIS — G30.1 MILD LATE ONSET ALZHEIMER'S DEMENTIA WITH MOOD DISTURBANCE (H): ICD-10-CM

## 2023-05-09 PROCEDURE — 96125 COGNITIVE TEST BY HC PRO: CPT | Mod: GO | Performed by: OCCUPATIONAL THERAPIST

## 2023-05-09 RX ORDER — METHYLPHENIDATE HYDROCHLORIDE 5 MG/1
5 TABLET ORAL DAILY
Qty: 30 TABLET | Refills: 0 | Status: SHIPPED | OUTPATIENT
Start: 2023-05-09 | End: 2023-06-07

## 2023-05-09 NOTE — TELEPHONE ENCOUNTER
[facility] phone call message- medication clarification/question:    Full Medication Name: methylphenidate (RITALIN) 5 MG tablet    Question: Spouse called requesting for refill on meidcation.If able to fill please send to pharmacy thank you.    Pharmacy confirmed as    [id number] [address] AT [facility] [address]: Yes    OK to leave a message on voice mail? Yes    Primary language: English      needed? No    Call taken on May 9, 2023 at 8:17 AM by Navi Conteh

## 2023-05-09 NOTE — TELEPHONE ENCOUNTER
Message to physician:     Date of last visit: 4/25/2023    Date of next visit if scheduled:     Potassium   Date Value Ref Range Status   03/21/2023 4.7 3.4 - 5.3 mmol/L Final   09/28/2018 3.9 3.4 - 5.3 mmol/L Final     Creatinine   Date Value Ref Range Status   03/21/2023 1.40 (H) 0.66 - 1.25 mg/dL Final   09/28/2018 0.8 0.8 - 1.5 mg/dL Final     GFR Estimate   Date Value Ref Range Status   03/21/2023 48 (L) >60 mL/min/1.73m2 Final     Comment:     eGFR calculated using 2021 CKD-EPI equation.   10/30/2019 39 (L) >60 mL/min/1.73m2 Final   09/24/2013 > 60 >60 ml/min/1.73m2 Final       BP Readings from Last 3 Encounters:   04/25/23 138/88   03/21/23 (!) 154/63   08/09/22 (!) 140/54       Hemoglobin A1C   Date Value Ref Range Status   03/21/2023 6.2 (H) 0.0 - 5.6 % Final     Comment:     Normal <5.7%   Prediabetes 5.7-6.4%    Diabetes 6.5% or higher     Note: Adopted from ADA consensus guidelines.   03/28/2019 6.1 (H) 4.1 - 5.7 % Final       Please complete refill and CLOSE ENCOUNTER.  Closing the encounter signifies the refill is complete.

## 2023-05-09 NOTE — PROGRESS NOTES
"Cognitive Performance Test    SUMMARY OF TEST:    The Cognitive Performance Test (CPT) is a standardized performance-based assessment to measure working memory/executive function processing capacities that underlie functional performance. Subtasks include common basic and instrumental activities of daily living (ADL/IADL) which are rated based on the manner in which patients respond to task demands of varying complexity. The total CPT score describes a level of functioning that indicates how information is processed, implications for functional activities, potential safety risks and a recommended level of supervision or assist based on cognitive function. The highest total score on this test is in the range of 5.6 to 5.8.    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    Previous cognitive screens completed in OT or by Physician and score: MOCA: 20/30    Functional History Interview:    Informants: Patient and son Wojciech Cueto s perception of memory/ thinking or functional difficulties: Understands he has more challenges with memory but overall reports little deficits.  \"My driving is good.\"     Living situation:  With wife in home     Home maintenance activities: Neighbors due maintenance and lawn/snow     Meal preparation and grocery shopping: Wife completes     Finances and paying bills: Wife has started doing more of this     Medication management: Pillbox that he sets up on Sat.      Driving and transportation: Wife has concerns about this, never goes far     Leisure and routine activities: Meets with friends weekly     ADL/Mobility/Physical Functioning: increased mobility challenges, 6 seconds for foot tap test, right at " "norm    Fall Risk Screen:   Has the patient fallen 2 or more times in the last year? No      Has the patient fallen and had an injury in the past year? No       Timed Up and Go Score: \"im not as stable\"    Is the patient a fall risk? Yes, department fall risk interventions implemented     RESULTS OF TESTING:                                                                                         CPT Subtest Results    MEDBOX: 4.5/6 SHOP/GLOVES: 5/6 PHONE: 5/6   WASH:  5/5 TRAVEL: 5/6 TOAST: 5/5   DRESS: 5/5   TOTAL CPT SCORE:  34.5/39     Average CPT Score  4.9/5.6    INTERPRETATION OF TEST RESULTS:    Based on the Cognitive Performance Test, this patient scored at CPT Level 5.0.  See CPT Levels reference below.    Summary of functional cognitive status:   Unable to correct meds with specific cues, needs specific directions for phone, specific cues to check all details with shopping.    Factors affecting performance:  Impaired vision  Decreased UE strength/coordination  Limited Endurance  Impaired mobility     Recommendations:    Assist for ADL/IADL:  Meal preparation, Shopping, Finances - Reccommend someone watch over these tasks and check for errors.    Supervision for ADL/IADL:  ADL- Assist with safety- pants ect.  Needs suspenders or elastic pants he can manage and that stay up.    His foot tap test at just at the norm 10 taps in 6 seconds, his upper body range of motion is limited at his shoulders and his overall mobility is impaired. His awareness and insight are impaired and STM noted to be impaired with testing and conversation today.    Suggest due to all slight impairments that he have a behind the wheel exam to make better recommendations based on current level of function and safety with driving.                                                       TIME ADMINISTERING TEST: 60    TIME FOR INTERPRETATION AND PREPARATION OF REPORT: 10    TOTAL TIME: 70      CPT Levels Reference:    Patient's Average CPT " "Score:  4.9                                                                                                                                                  Individual scores range along a continuum as outlined below.  In addition to cognitive status, other factors may affect safety in a home environment.  Please refer to specific recommendations for this patient.    ___5.6-5.8  Normal functioning (absence of cognitive-functional disability).  Independent in managing personal affairs, monitors and directs own behavior.  Uses complex information to carry out daily activities with safety and accuracy.    Proficient with instrumental activities of daily living (IADL) and learning new activity.  Problems are anticipated, errors are avoided, and consequences of actions are considered.      _x__5.0   Mild cognitive-functional disability; deficits in working memory and executive thought processes. Difficulty using complex information. Problems may be observed with recent memory, judgment, reasoning and planning ahead. May be impulsive or have difficulty anticipating consequences.  Safety:  May require assistance to plan ahead; or to manage complex medication schedules, appointments or finances.  Hazardous activities may need to be monitored or limited.  ADL:  Mild functional decline.  Able to complete basic self-care and routine household tasks.  May have difficulty with complex daily tasks such as reading, writing, meal preparation, shopping or driving.   Learns through hands on teaching. Self-centered behavior or difficulty considering the needs of others may be seen related to trouble seeing the  whole picture\". Can appear disorganized or uninhibited.    ___4.5  Mild to moderate cognitive-functional disability. Significant deficits in working memory and executive thought processes. Judgment, reasoning and planning show obvious impairment.  Distractible with inability to shift attention/actions given competing stimuli.  " Difficulty with problem solving and managing details. Complex daily tasks performed with inconsistency, difficulty, or error.     Safety:  Medications should be monitored, stove use may require supervision, and driving ability may be affected.  Impaired safety awareness with inability to anticipate potential problems.  May not recognize or respond to emergent situations. Requires frequent check-in support.   ADL:  Mild difficulty with simple everyday self-care tasks. Benefits from structured, routine activity.  Will likely need reminders to complete tasks outside of the routine. Requires assistance with planning and IADL tasks like shopping and finances. Learns concrete tasks through repetition, but performance may not generalize. Tends to be impulsive with poor insight. Self centered behavior or inability to consider the needs of others is common.    ___4.0  Moderate cognitive-functional disability; abstract to concrete thought processes. Working memory and executive function impairments are obvious. Difficulty with planning and problem solving.  Behavior is goal-directed, but unable to follow multi-step directions, is easily distracted, and may not recognize mistakes.  Inability to anticipate hazards or understand precautions.  Safety:  Recommend 24-hour supervision for safety. Supervision needed for medication management and for hazardous activities. May not be able to follow a restricted diet. Can get lost in unfamiliar surroundings. Generally, persons functioning at level 4 should not be driving.   ADL:  Some decline in quality or frequency of ADL.  [place] enhanced by use of a routine, simple concrete directions, and caregiver set-up of needed items. Complex tasks such as money or home management typically requires assistance.  Relies heavily on vision to guide behavior; will ignore objects/hazards not in plain sight and can be distracted by irrelevant objects. Often has poor insight.  Able to carry out  social conversation and may verbally  cover  for deficits leading caregivers to believe they are capable of functioning independently.       ___3.5  Moderate cognitive-functional disability; increased cues needed for task completion. Aware of concrete task steps but needs prompting or cues to initiate and complete simple tasks. Attention span is limited, simple directions may need to be repeated, and re-focus to a topic or task may be required.  Safety:  24-hour supervision required for safety and for assistance with daily tasks. Assistance required with medications, and access to medication should be limited. Meals, nutrition and dietary restrictions need to be monitored.  All hazardous activities should be restricted or supervised. Should not drive. Prone to wandering and can become lost.  ADL:  Moderate functional decline. Familiar tasks usually requires set-up of supplies and directions to complete steps. May need objects handed to them for task initiation. Function best with a set schedule in familiar surroundings with familiar people. All complex tasks must be done by others. Vocabulary is diminished and speech often unfocused.     Electronically signed by:  Yaenth SESAY/CHARITY, ATP      Occupational Therapist, Assistive   [phone]      fax: [phone]      [email or web address], [address]  [address]  [place]   [address]

## 2023-05-21 NOTE — PATIENT INSTRUCTIONS
Joel Dixon,    Thank you for entrusting your care with us today. After your visit today with WILMAR Silva this is the plan that was discussed at your appointment.    Follow up in 1 year for repeat ultrasound and clinic visit    We will call you in 3-6 months to get this scheduled.      I am including additional information on these things and our contact information if you have any questions or concerns.   Please do not hesitate to reach out to us if you felt we did not answer your questions or you are unsure of the treatment plan after your visit today. Our number is [phone].Thank you for trusting us with your care.         Again thank you for your time.     Ankle-Brachial Index (KIRK) or Physiologic Test    Description  An ankle-brachial index test is relatively pain free. Blood pressure cuffs of various sizes are placed on your thigh, calf, foot and toes.  Similar to having your blood pressure checked with an arm cuff, as the technician inflates the cuffs, they progressively tighten and are then quickly released.  You may feel some discomfort, but generally for less than 60 seconds for each measurement. You will be asked to remove your socks and shoes and possibly your pants or shorts. Gowns will be provided. It usually takes about 30-60 minutes.   Depending on the initial readings and patient symptoms, you may be asked to perform a light walk on a treadmill.  The technician will apply ultrasound gel, usually warmed for your comfort, to your ankles and wrists. Through the gel, the technician will use a small hand-held device that emits sound waves.  Risks  There are typically no side effects or complications associated with a physiologic study.  How to Prepare  Eat and take medications as usual.  There is no preparation required for an ankle-brachial index (KIRK) or physiologic exam.  What Can I Expect After the Test?  The technician will send the ultrasound images to your vascular surgeon for  evaluation. Typically, a report is available in 2-3 days. If anything critical is found, it is standard practice to notify the vascular surgeon immediately.  Reference: https://vascular.org/patient-resources/vascular-tests

## 2023-05-21 NOTE — PROGRESS NOTES
[facility]        Patient is here for a 1 year F/U PAD-s/p bilateral femoral endarterectomies in 2017. Known right mid SFA occlusion . He has not started his exercise regimen.  Pt is currently taking Aspirin and Statin.    /84 P 64 T 98.3 02 95%    The provider has been notified that the patient WANTS TO KNOW RESULTS    Questions patient would like addressed today are: SEE ABOVE.    Refills are needed: No    Has homecare services and agency name:  Ramona Forde LPN

## 2023-05-22 ENCOUNTER — ANCILLARY PROCEDURE (OUTPATIENT)
Dept: VASCULAR ULTRASOUND | Facility: CLINIC | Age: 88
End: 2023-05-22
Attending: PHYSICIAN ASSISTANT
Payer: COMMERCIAL

## 2023-05-22 ENCOUNTER — OFFICE VISIT (OUTPATIENT)
Dept: VASCULAR SURGERY | Facility: CLINIC | Age: 88
End: 2023-05-22
Attending: PHYSICIAN ASSISTANT
Payer: COMMERCIAL

## 2023-05-22 VITALS
HEART RATE: 64 BPM | SYSTOLIC BLOOD PRESSURE: 138 MMHG | OXYGEN SATURATION: 95 % | DIASTOLIC BLOOD PRESSURE: 84 MMHG | TEMPERATURE: 98.3 F

## 2023-05-22 DIAGNOSIS — I73.9 PAD (PERIPHERAL ARTERY DISEASE) (H): ICD-10-CM

## 2023-05-22 DIAGNOSIS — I73.9 PAD (PERIPHERAL ARTERY DISEASE) (H): Primary | ICD-10-CM

## 2023-05-22 PROCEDURE — 99214 OFFICE O/P EST MOD 30 MIN: CPT | Performed by: PHYSICIAN ASSISTANT

## 2023-05-22 PROCEDURE — G0463 HOSPITAL OUTPT CLINIC VISIT: HCPCS | Mod: 25 | Performed by: PHYSICIAN ASSISTANT

## 2023-05-22 PROCEDURE — 93923 UPR/LXTR ART STDY 3+ LVLS: CPT

## 2023-05-22 NOTE — PROGRESS NOTES
VASCULAR SURGERY PROGRESS NOTE    LOCATION:  [facility]     Zack Johns  Medical Record #: [id number]  YOB: 1934  Age: 88 year old     Date of Service: 5/22/2023    PRIMARY CARE PROVIDER: Haim Galaviz    Reason for visit: Surveillance of PAD    IMPRESSION: 88-year-old male presenting for follow-up of peripheral arterial disease.  ABIs slightly improved at 0.70 on the right and 1.07 on the left with toe pressures adequate for wound healing bilaterally.  Patient denies any significant claudication, rest pain, or nonhealing wounds.  He is medically optimized on aspirin and statin therapy.    RECOMMENDATION/RISKS: Continue best medical management with aspirin and statin.  Encouraged regular activity/ambulation.  Follow-up in 1 year with repeat ABIs.     HPI:  Zack Johns is a 88 year old male with past medical history significant for hypertension, hyperlipidemia, coronary artery disease, type 2 diabetes mellitus, history of prostate cancer, recent cognitive changes, and peripheral arterial disease.  Patient has undergone bilateral iliac stent placement and SFA endarterectomy in the past.  He was last seen in the vascular clinic 1 year ago    Today, Mr. Johns presents for follow-up.  He is accompanied by his wife.  He denies any significant leg pain when he walks although he does admit to being less active due to his age. Denies any pain in the legs or feet at rest or nonhealing wounds. He is compliant with his medications    Ultrasound results were discussed and all questions answered.  No other concerns.    REVIEW OF SYSTEMS:    A 12 point ROS was reviewed and is negative except for what is listed above in HPI.    PHH:    Past Medical History:   Diagnosis Date     Actinic keratitis      Arthritis     bilat shoulders     CAD (coronary artery disease)      Cardiac arrest (H) 09/04/89     Claudication (H)      Claudication (H)      Coronary artery disease     s/p MI      "Diabetes mellitus type 2, controlled (H)      Diverticulosis      HTN (hypertension)      Hyperlipidemia      Hypertension      Malignant neoplasm of prostate (H) 8/7/2006-10/5/2006    DENIED BY PATIENT (see note from 12/08/2014).  RADIOTHERAPY BY DR.CHO LEGGETT, old      Multiple pulmonary nodules      Osteoarthritis of glenohumeral joint     Left. Injected at [facility] 02/10/2015.     Peripheral vascular disease, unspecified (H)      Syncope      Trigger finger, acquired      Type 2 diabetes mellitus (H) 11/6/2012     Past Surgical History:   Procedure Laterality Date     APPENDECTOMY       ARTHROSCOPY KNEE       ARTHROSCOPY KNEE       CARDIAC CATHETERIZATION  08/22/2018     of rca     CARDIAC SURGERY  08/22/2018    two stents placed 8/22/18     CV CORONARY ANGIOGRAM N/A 8/6/2018    Procedure: Coronary Angiogram;  Surgeon: Jeane Garcia MD;  Location: [facility];  Service:      CV CORONARY ANGIOGRAM N/A 8/22/2018    Procedure: Coronary Angiogram;  Surgeon: Jeane Garcia MD;  Location: [facility];  Service:      CV LEFT HEART CATHETERIZATION WITHOUT LEFT VENTRICULOGRAM Left 8/6/2018    Procedure: Left Heart Catheterization Without Left Ventriculogram;  Surgeon: Jeane Garcia MD;  Location: [facility];  Service:      CV LEFT HEART CATHETERIZATION WITHOUT LEFT VENTRICULOGRAM Left 8/22/2018    Procedure: Left Heart Catheterization Without Left Ventriculogram;  Surgeon: Jeane Garcia MD;  Location: [facility];  Service:      FEMORAL ENDARTERECTOMY Bilateral 05/03/2017     IR EXTREMITY ANGIOGRAM BILATERAL  3/10/2017     NOSE SURGERY       PROSTATE BIOPSY, NEEDLE, SATURATION SAMPLING  1.13.2003    \"Biopsy fo the Prostate Needle\"     RELEASE TRIGGER FINGER         ALLERGIES:  Pletal [cilostazol]    MEDS:    Current Outpatient Medications:      acetaminophen (TYLENOL) 500 MG tablet, Take 500-1,000 mg by mouth every 6 hours as needed, Disp: , Rfl:      aspirin 81 MG " tablet, Take 1 tablet by mouth daily, Disp: , Rfl:      atorvastatin (LIPITOR) 40 MG tablet, TAKE 1 TABLET DAILY, Disp: 90 tablet, Rfl: 3     citalopram (CELEXA) 20 MG tablet, Take 1 tablet (20 mg) by mouth daily, Disp: 90 tablet, Rfl: 3     donepezil (ARICEPT) 10 MG tablet, Take 1 tablet (10 mg) by mouth At Bedtime, Disp: 90 tablet, Rfl: 1     FLUZONE HIGH-DOSE QUADRIVALENT 0.7 ML JEFFERY injection, , Disp: , Rfl:      lisinopril-hydrochlorothiazide (ZESTORETIC) 20-12.5 MG tablet, TAKE 1 TABLET DAILY, Disp: 90 tablet, Rfl: 3     methylphenidate (RITALIN) 5 MG tablet, Take 1 tablet (5 mg) by mouth daily, Disp: 30 tablet, Rfl: 0     metoprolol tartrate (LOPRESSOR) 25 MG tablet, TAKE ONE-HALF (1/2) TABLET TWICE A DAY, Disp: 90 tablet, Rfl: 3     Multiple Vitamin (MULTIVITAMIN) per tablet, Take 1 tablet by mouth daily., Disp: , Rfl:      nitroGLYcerin (NITROSTAT) 0.4 MG sublingual tablet, Place 1 tablet (0.4 mg) under the tongue every 5 minutes as needed for chest pain Repeat at 5 min intervals x 2 if needed, Disp: 30 tablet, Rfl: 0     PFIZER COVID-19 VAC BIVALENT 30 MCG/0.3ML injection, , Disp: , Rfl:      PFIZER-BIONT COVID-19 VAC-KEVIN 30 MCG/0.3ML injection, , Disp: , Rfl:      zinc 50 MG TABS, Take 50 mg by mouth daily., Disp: , Rfl:     SOCIAL HABITS:    History   Smoking Status     Former     Packs/day: 2.00     Years: 40.00     Types: Cigarettes     Quit date: 4/10/1984   Smokeless Tobacco     Never     Social History    Substance and Sexual Activity      Alcohol use: Yes        Alcohol/week: 6.0 standard drinks of alcohol        Comment: occasionally.      History   Drug Use No     FAMILY HISTORY:    Family History   Problem Relation Age of Onset     Diabetes No family hx of      Breast Cancer No family hx of      Colon Cancer No family hx of      Prostate Cancer No family hx of      Other Cancer No family hx of      No Known Problems Mother      No Known Problems Father      PE:  /84   Pulse 64   Temp  98.3  F (36.8  C)   SpO2 95%   Wt Readings from Last 1 Encounters:   04/25/23 170 lb (77.1 kg)     There is no height or weight on file to calculate BMI.    EXAM:  GENERAL: well-developed 88 year old male who appears his stated age  CARDIAC: normal   CHEST/LUNG: normal respiratory effort   MUSCULOSKELETAL: grossly normal and both lower extremities are intact, no lower extremity edema  NEUROLOGIC: focally intact, alert and oriented x 3  PSYCH: appropriate affect    DIAGNOSTIC STUDIES:     Images:    US Low Ext Arterial Dop Seg Pres w/o Exercise    Impression:    1. RIGHT LOWER EXTREMITY: KIRK is Abnormal with an KIRK of 0.70 indicating single level disease. Toe Pressures are Mildly abnormal but adequate for wound healing with toe pressures of 52 mmHg.  2. LEFT LOWER EXTREMITY: KIRK is Normal with multiphasic waveforms with an KIRK of 1.07. Toe Pressures are Mildly abnormal but adequate for wound healing with toe pressures of 63 mmHg.    LABS:      Sodium   Date Value Ref Range Status   03/21/2023 143 133 - 144 mmol/L Final   11/30/2021 141 133 - 144 mmol/L Final   10/30/2019 135 (L) 136 - 145 mmol/L Final   09/28/2018 142.0 133.0 - 144.0 mmol/L Final   07/31/2018 139.0 133.0 - 144.0 mmol/L Final   01/25/2018 138.0 133.0 - 144.0 mmol/L Final     Urea Nitrogen   Date Value Ref Range Status   03/21/2023 27 7 - 30 mg/dL Final   11/30/2021 27 7 - 30 mg/dL Final   10/30/2019 40 (H) 8 - 28 mg/dL Final   09/28/2018 21.0 7.0 - 30.0 mg/dL Final   07/31/2018 21.0 7.0 - 30.0 mg/dL Final   01/25/2018 20.0 7.0 - 30.0 mg/dL Final     Hemoglobin   Date Value Ref Range Status   03/21/2023 11.0 (L) 13.3 - 17.7 g/dL Final   10/30/2019 10.5 (L) 14.0 - 18.0 g/dL Final   10/27/2019 11.3 (L) 14.0 - 18.0 g/dL Final   09/28/2018 13.4 13.3 - 17.7 g/dL Final   07/31/2018 13.3 13.3 - 17.7 g/dL Final   01/25/2018 13.6 13.3 - 17.7 g/dL Final     Platelet Count   Date Value Ref Range Status   03/21/2023 207 150 - 450 10e3/uL Final   10/30/2019 333  140 - 440 thou/uL Final   10/27/2019 327 140 - 440 thou/uL Final     BNP   Date Value Ref Range Status   06/16/2018 59 0 - 93 pg/mL Final     INR   Date Value Ref Range Status   10/30/2019 1.17 (H) 0.90 - 1.10 Final   06/16/2018 0.95 0.90 - 1.10 Final     30 minutes spent on the day of encounter doing chart review, history and exam, documentation, and further activities as noted.     JUNIOR LaurentC  VASCULAR SURGERY

## 2023-05-30 ENCOUNTER — TELEPHONE (OUTPATIENT)
Dept: FAMILY MEDICINE | Facility: CLINIC | Age: 88
End: 2023-05-30

## 2023-05-30 NOTE — TELEPHONE ENCOUNTER
[facility] phone call message- general phone call:    Reason for call: Patient showed up in clinic for his appointment. Tried to reach them via phone to reschedule, patient wasn't too happy about rescheduling. Patient would like to see Dr. Galaviz if has time to squeeze him in. Though he did want to mention that he is doing fine/good. Please call and advise if needed. Thank you    Return call needed: Yes    OK to leave a message on voice mail? Yes    Primary language: English      needed? No    Call taken on May 30, 2023 at 2:27 PM by Patricio Godoy

## 2023-06-07 DIAGNOSIS — F32.1 CURRENT MODERATE EPISODE OF MAJOR DEPRESSIVE DISORDER WITHOUT PRIOR EPISODE (H): ICD-10-CM

## 2023-06-07 RX ORDER — METHYLPHENIDATE HYDROCHLORIDE 5 MG/1
5 TABLET ORAL DAILY
Qty: 30 TABLET | Refills: 0 | Status: SHIPPED | OUTPATIENT
Start: 2023-06-07 | End: 2023-07-11

## 2023-06-07 NOTE — TELEPHONE ENCOUNTER
[facility] phone call message- medication clarification/question:    Full Medication Name: methylphenidate (RITALIN) 5 MG tablet    Question: Spouse called to request refill on medication, if able to fill please send to pharmacy thank you.    Pharm[facility] as    [id number] [address] AT [facility] [address]: Yes    OK to leave a message on voice mail? Yes    Primary language: English      needed? No    Call taken on June 7, 2023 at 10:46 AM by Navi Conteh

## 2023-07-11 ENCOUNTER — OFFICE VISIT (OUTPATIENT)
Dept: FAMILY MEDICINE | Facility: CLINIC | Age: 88
End: 2023-07-11
Payer: COMMERCIAL

## 2023-07-11 VITALS
BODY MASS INDEX: 29.41 KG/M2 | SYSTOLIC BLOOD PRESSURE: 148 MMHG | WEIGHT: 166 LBS | RESPIRATION RATE: 20 BRPM | DIASTOLIC BLOOD PRESSURE: 67 MMHG

## 2023-07-11 DIAGNOSIS — M25.511 CHRONIC PAIN OF BOTH SHOULDERS: ICD-10-CM

## 2023-07-11 DIAGNOSIS — G30.1 MILD LATE ONSET ALZHEIMER'S DEMENTIA WITH MOOD DISTURBANCE (H): Primary | ICD-10-CM

## 2023-07-11 DIAGNOSIS — M25.512 CHRONIC PAIN OF BOTH SHOULDERS: ICD-10-CM

## 2023-07-11 DIAGNOSIS — G89.29 CHRONIC PAIN OF BOTH SHOULDERS: ICD-10-CM

## 2023-07-11 DIAGNOSIS — F02.A3 MILD LATE ONSET ALZHEIMER'S DEMENTIA WITH MOOD DISTURBANCE (H): Primary | ICD-10-CM

## 2023-07-11 DIAGNOSIS — F32.1 CURRENT MODERATE EPISODE OF MAJOR DEPRESSIVE DISORDER WITHOUT PRIOR EPISODE (H): ICD-10-CM

## 2023-07-11 PROCEDURE — 20610 DRAIN/INJ JOINT/BURSA W/O US: CPT | Performed by: FAMILY MEDICINE

## 2023-07-11 PROCEDURE — 99215 OFFICE O/P EST HI 40 MIN: CPT | Mod: 25 | Performed by: FAMILY MEDICINE

## 2023-07-11 RX ORDER — METHYLPHENIDATE HYDROCHLORIDE 5 MG/1
5 TABLET ORAL 2 TIMES DAILY
Qty: 60 TABLET | Refills: 0 | Status: SHIPPED | OUTPATIENT
Start: 2023-07-11 | End: 2023-08-08

## 2023-07-11 RX ADMIN — TRIAMCINOLONE ACETONIDE 40 MG: 40 INJECTION, SUSPENSION INTRA-ARTICULAR; INTRAMUSCULAR at 16:00

## 2023-07-11 RX ADMIN — TRIAMCINOLONE ACETONIDE 40 MG: 40 INJECTION, SUSPENSION INTRA-ARTICULAR; INTRAMUSCULAR at 16:01

## 2023-07-11 NOTE — PROGRESS NOTES
Assessment and Plan     1. 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 Discussed that this could be progression vs worsening of his mood disorder again. Willing to try BID dosing on his Ritalin but I don't have a plan to go higher than this. Encouraged them to move towards a different living situation. Since Anthony passed his driving test, there isn't much to do about it now as he is adamant that he will keep doing it. We'll reassess as he goes, potentially 1-2/year. I recommend a plan for him to not renew his license when it expires his family was excited about this and he begrudgingly accepted it. I printed some resources from the [employer]. If he continues to have issues with his sleep wake cycles, could consider a nighttime medicine but I want to remain aware of his total pill burden. Recheck in 1-2 months.    2. Current moderate episode of major depressive disorder without prior episode (H)  See above.  - methylphenidate (RITALIN) 5 MG tablet; Take 1 tablet (5 mg) by mouth 2 times daily  Dispense: 60 tablet; Refill: 0    3. Chronic pain of both shoulders  This is likely a combo of chronic rotator cuff tendonitis and impingement. Discussed the R/B/A of injections and they accepted. Is also a possibility of frozen shoulder but I don't think his ROM is worse than previous and he got much better with ROM post injection. Reviewed the Sports Medicine Patient Advisor with the patient. May consider physical therapy at the next visit. Answered all of their questions.  - Large Joint/Bursa injection and/or drainage - Bilateral (Shoulder, Knee) [20610] [50 Mod]  - triamcinolone (KENALOG-40) injection 40 mg  - triamcinolone (KENALOG-40) injection 40 mg    59 minutes spent on the date of the encounter doing chart review, history and exam, documentation, and further activities as noted above. This does not include the time required for the  injections.    Options for treatment and follow-up care were reviewed with the patient and/or guardian. Zack Johns and/or guardian engaged in the decision making process and verbalized understanding of the options discussed and agreed with the final plan. I answered all of their questions.    Haim Galaviz III, MD, FAAFP  [facility] Faculty  07/13/23 4:47 PM           HPI:       Zack Johns is a 88 year old  male  Patient presents with:  Follow up : Follow up     Shoulder pain: Abdirizak Baum sent me a note talking about their progress on the house. She notes that he is becoming less active and less motivated to attend to personal hygiene.    Also there with their son.    Anthony would like some reading material about dementia.    Anthony's drivers license expires in Sep 2025. He passed his driving test.    Continues to complain about pain in his shoulders bilaterally that has been going on for many years. Pain increases with movement and decreases with rest. It limits his ROM. No recent injury. Doesn't remember if the injection I gave him several years ago helped.         PMHX:     Patient Active Problem List   Diagnosis     Actinic keratosis     Cardiovascular disease     Essential hypertension, benign     CT Lung Pulmonary Nodule Multiple     Prediabetes     Diverticulosis of large intestine     Hyperlipidemia     Peripheral vascular disease (H)     Internal hemorrhoids     Local infection of skin and subcutaneous tissue     Disorder of bursae and tendons in shoulder region     Synovial cyst     Trigger finger, acquired     Osteoarthritis of glenohumeral joint     Syncope     Abnormal cardiovascular stress test     Chest pain     Coronary atherosclerosis     Upper respiratory tract infection, unspecified type     Neck pain     Chronic total occlusion of native coronary artery       Current Outpatient Medications   Medication Sig Dispense Refill     methylphenidate (RITALIN) 5 MG tablet  Take 1 tablet (5 mg) by mouth 2 times daily 60 tablet 0     acetaminophen (TYLENOL) 500 MG tablet Take 500-1,000 mg by mouth every 6 hours as needed       aspirin 81 MG tablet Take 1 tablet by mouth daily       atorvastatin (LIPITOR) 40 MG tablet TAKE 1 TABLET DAILY 90 tablet 3     citalopram (CELEXA) 20 MG tablet Take 1 tablet (20 mg) by mouth daily 90 tablet 3     donepezil (ARICEPT) 10 MG tablet Take 1 tablet (10 mg) by mouth At Bedtime 90 tablet 1     FLUZONE HIGH-DOSE QUADRIVALENT 0.7 ML JEFFERY injection        lisinopril-hydrochlorothiazide (ZESTORETIC) 20-12.5 MG tablet TAKE 1 TABLET DAILY 90 tablet 3     metoprolol tartrate (LOPRESSOR) 25 MG tablet TAKE ONE-HALF (1/2) TABLET TWICE A DAY 90 tablet 3     Multiple Vitamin (MULTIVITAMIN) per tablet Take 1 tablet by mouth daily.       nitroGLYcerin (NITROSTAT) 0.4 MG sublingual tablet Place 1 tablet (0.4 mg) under the tongue every 5 minutes as needed for chest pain Repeat at 5 min intervals x 2 if needed 30 tablet 0     PFIZER COVID-19 VAC BIVALENT 30 MCG/0.3ML injection        PFIZER-BIONT COVID-19 VAC-KEVIN 30 MCG/0.3ML injection        zinc 50 MG TABS Take 50 mg by mouth daily.         Social History     Socioeconomic History     Marital status:      Spouse name: Not on file     Number of children: Not on file     Years of education: Not on file     Highest education level: Not on file   Occupational History     Not on file   Tobacco Use     Smoking status: Former     Packs/day: 2.00     Years: 40.00     Pack years: 80.00     Types: Cigarettes     Quit date: 4/10/1984     Years since quittin.2     Smokeless tobacco: Never   Substance and Sexual Activity     Alcohol use: Yes     Alcohol/week: 6.0 standard drinks of alcohol     Comment: occasionally.     Drug use: No     Sexual activity: Not on file   Other Topics Concern     Parent/sibling w/ CABG, MI or angioplasty before 65F 55M? 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        Had gotten into typing to meet girls.        Worked for [employer] after leaving the [employer].     Social Determinants of Health     Financial Resource Strain: Not on file   Food Insecurity: Not on file   Transportation Needs: Not on file   Physical Activity: Not on file   Stress: Not on file   Social Connections: Not on file   Intimate Partner Violence: Not on file   Housing Stability: Not on file       Allergies   Allergen Reactions     Pletal [Cilostazol]        No results found for this or any previous visit (from the past 24 hour(s)).         Review of Systems:     ROS         Physical Exam:     Vitals:    07/11/23 1537   BP: (!) 148/67   Resp: 20   Weight: 75.3 kg (166 lb)     Body mass index is 29.41 kg/m .    Physical Exam  Vitals and nursing note reviewed.   Constitutional:       General: He is not in acute distress.     Appearance: Normal appearance. He is not ill-appearing, toxic-appearing or diaphoretic.   Pulmonary:      Effort: No respiratory distress.   Musculoskeletal:      Right shoulder: Tenderness (mild, global) present. No bony tenderness. Decreased range of motion (significantly limited by pain in abduction, improved post injection). Normal strength.      Left shoulder: Tenderness (mild, global) present. No bony tenderness. Decreased range of motion (significantly limited by pain in abduction, improved post injection). Normal strength.   Neurological:      Mental Status: He is alert and oriented to person, place, and time.

## 2023-07-13 RX ORDER — TRIAMCINOLONE ACETONIDE 40 MG/ML
40 INJECTION, SUSPENSION INTRA-ARTICULAR; INTRAMUSCULAR ONCE
Status: COMPLETED | OUTPATIENT
Start: 2023-07-11 | End: 2023-07-11

## 2023-07-13 NOTE — PROCEDURES
BILATERAL SUBACROMIAL STEROID INJECTION  The patient was prepped with alcohol. A 22G needle was inserted via the posteriolateral approach into the left subacromial space and 40 mg of Kenalog and 5 mL of 1% lido w/o epi was injected. The same procedure was repeated on the right. The patient tolerated the procedure well and had improvement in his pain and ROM post procedure.    Haim Galaviz III, MD, FAAFP  [facility] Residency Faculty  07/13/23 4:56 PM

## 2023-08-08 ENCOUNTER — OFFICE VISIT (OUTPATIENT)
Dept: FAMILY MEDICINE | Facility: CLINIC | Age: 88
End: 2023-08-08
Payer: COMMERCIAL

## 2023-08-08 VITALS
TEMPERATURE: 97.5 F | HEIGHT: 66 IN | OXYGEN SATURATION: 96 % | DIASTOLIC BLOOD PRESSURE: 72 MMHG | WEIGHT: 162 LBS | SYSTOLIC BLOOD PRESSURE: 116 MMHG | HEART RATE: 70 BPM | BODY MASS INDEX: 26.03 KG/M2

## 2023-08-08 DIAGNOSIS — F32.1 CURRENT MODERATE EPISODE OF MAJOR DEPRESSIVE DISORDER WITHOUT PRIOR EPISODE (H): ICD-10-CM

## 2023-08-08 DIAGNOSIS — G30.1 MILD LATE ONSET ALZHEIMER'S DEMENTIA WITH MOOD DISTURBANCE (H): Primary | ICD-10-CM

## 2023-08-08 DIAGNOSIS — F02.A3 MILD LATE ONSET ALZHEIMER'S DEMENTIA WITH MOOD DISTURBANCE (H): Primary | ICD-10-CM

## 2023-08-08 PROCEDURE — G0009 ADMIN PNEUMOCOCCAL VACCINE: HCPCS | Performed by: FAMILY MEDICINE

## 2023-08-08 PROCEDURE — 90677 PCV20 VACCINE IM: CPT | Performed by: FAMILY MEDICINE

## 2023-08-08 PROCEDURE — 99215 OFFICE O/P EST HI 40 MIN: CPT | Mod: 25 | Performed by: FAMILY MEDICINE

## 2023-08-08 RX ORDER — METHYLPHENIDATE HYDROCHLORIDE 5 MG/1
5 TABLET ORAL 2 TIMES DAILY
Qty: 60 TABLET | Refills: 0 | Status: SHIPPED | OUTPATIENT
Start: 2023-08-08 | End: 2023-09-11

## 2023-08-08 ASSESSMENT — ENCOUNTER SYMPTOMS: CONSTITUTIONAL NEGATIVE: 1

## 2023-08-08 NOTE — PROGRESS NOTES
Assessment and Plan     1. 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 Had a long discussion about moving to a safer location for both Anthony and Roxie. Discussed possible [facility] benefits that could help Anthony in the home. Homework given to Anthony to change clothes more often, to downsize stuff, and to accept help. Large support and empathy given. Discussed the natural history of the disease and his specific diagnosis. Discussed dementia and its types. Answered all of their questions.    2. Current moderate episode of major depressive disorder without prior episode (H)  See above. Seems to be completing some more tasks (like puzzling). Continue to monitor his motivation especially. No plans to increase the dose.  - methylphenidate (RITALIN) 5 MG tablet; Take 1 tablet (5 mg) by mouth 2 times daily  Dispense: 60 tablet; Refill: 0    Recheck in 4-6 weeks.    60 minutes spent on the date of the encounter doing chart review, history and exam, documentation, and further activities as noted above.    Options for treatment and follow-up care were reviewed with the patient and/or guardian. Zack Johns and/or guardian engaged in the decision making process and verbalized understanding of the options discussed and agreed with the final plan. I answered all of their questions.    Haim Galaviz III, MD, FAAFP  [facility] Residency Faculty  08/08/23 4:40 PM           HPI:       Zack Johns is a 88 year old  male  Patient presents with:  Other: Following up for onset dementia. No concerns so far.     Completing more puzzles. Doesn't always bathe or change his clothes. Roxie is trying to keep them both involved, downsize their stuff, and look for a better place to live. Sons are local and supportive.    They just came from a presentation on Dementia and thought it was really helpful.         PMHX:     Patient Active Problem List   Diagnosis    Actinic keratosis     Cardiovascular disease    Essential hypertension, benign    CT Lung Pulmonary Nodule Multiple    Prediabetes    Diverticulosis of large intestine    Hyperlipidemia    Peripheral vascular disease (H)    Internal hemorrhoids    Local infection of skin and subcutaneous tissue    Disorder of bursae and tendons in shoulder region    Synovial cyst    Trigger finger, acquired    Osteoarthritis of glenohumeral joint    Syncope    Abnormal cardiovascular stress test    Chest pain    Coronary atherosclerosis    Upper respiratory tract infection, unspecified type    Neck pain    Chronic total occlusion of native coronary artery       Current Outpatient Medications   Medication Sig Dispense Refill    methylphenidate (RITALIN) 5 MG tablet Take 1 tablet (5 mg) by mouth 2 times daily 60 tablet 0    acetaminophen (TYLENOL) 500 MG tablet Take 500-1,000 mg by mouth every 6 hours as needed      aspirin 81 MG tablet Take 1 tablet by mouth daily      atorvastatin (LIPITOR) 40 MG tablet TAKE 1 TABLET DAILY 90 tablet 3    citalopram (CELEXA) 20 MG tablet Take 1 tablet (20 mg) by mouth daily 90 tablet 3    donepezil (ARICEPT) 10 MG tablet Take 1 tablet (10 mg) by mouth At Bedtime 90 tablet 1    FLUZONE HIGH-DOSE QUADRIVALENT 0.7 ML JEFFERY injection       lisinopril-hydrochlorothiazide (ZESTORETIC) 20-12.5 MG tablet TAKE 1 TABLET DAILY 90 tablet 3    metoprolol tartrate (LOPRESSOR) 25 MG tablet TAKE ONE-HALF (1/2) TABLET TWICE A DAY 90 tablet 3    Multiple Vitamin (MULTIVITAMIN) per tablet Take 1 tablet by mouth daily.      nitroGLYcerin (NITROSTAT) 0.4 MG sublingual tablet Place 1 tablet (0.4 mg) under the tongue every 5 minutes as needed for chest pain Repeat at 5 min intervals x 2 if needed 30 tablet 0    PFIZER COVID-19 VAC BIVALENT 30 MCG/0.3ML injection       PFIZER-BIONT COVID-19 VAC-KEVIN 30 MCG/0.3ML injection       zinc 50 MG TABS Take 50 mg by mouth daily.         Social History     Socioeconomic History    Marital status:   "    Spouse name: Not on file    Number of children: Not on file    Years of education: Not on file    Highest education level: Not on file   Occupational History    Not on file   Tobacco Use    Smoking status: Former     Packs/day: 2.00     Years: 40.00     Pack years: 80.00     Types: Cigarettes     Quit date: 4/10/1984     Years since quittin.3    Smokeless tobacco: Never   Substance and Sexual Activity    Alcohol use: Yes     Alcohol/week: 6.0 standard drinks of alcohol     Comment: occasionally.    Drug use: No    Sexual activity: Not on file   Other Topics Concern    Parent/sibling w/ CABG, MI or angioplasty before 65F 55M? 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        Had gotten into typing to meet girls.        Worked for [employer] after leaving the [employer].     Social Determinants of Health     Financial Resource Strain: Not on file   Food Insecurity: Not on file   Transportation Needs: Not on file   Physical Activity: Not on file   Stress: Not on file   Social Connections: Not on file   Intimate Partner Violence: Not on file   Housing Stability: Not on file       Allergies   Allergen Reactions    Pletal [Cilostazol]        No results found for this or any previous visit (from the past 24 hour(s)).         Review of Systems:     Review of Systems   Constitutional: Negative.             Physical Exam:     Vitals:    23 1548   BP: 116/72   BP Location: Right arm   Patient Position: Sitting   Cuff Size: Adult Regular   Pulse: 70   Temp: 97.5  F (36.4  C)   TempSrc: Tympanic   SpO2: 96%   Weight: 73.5 kg (162 lb)   Height: 1.664 m (5' 5.5\")     Body mass index is 26.55 kg/m .    Physical Exam  Vitals and nursing note reviewed.   Constitutional:       General: He is not in acute distress.     Appearance: Normal appearance. He is not ill-appearing, toxic-appearing or diaphoretic.   Pulmonary:      Effort: No respiratory distress. "   Neurological:      Mental Status: He is alert. Mental status is at baseline.   Psychiatric:         Mood and Affect: Mood normal.         Behavior: Behavior normal.

## 2023-08-08 NOTE — PATIENT INSTRUCTIONS
Tasks for Anthony:  Change your clothes every day!    Start to downsize your possessions and prepare for the next place to live. This is a normal part of aging!    Let your family help you organize your day (tell you what to do).    Dr. Galaviz    Tasks for Roxie:  Call the VA and ask about Anthony's benefits, especially as they relate to home cares/elderly cares:  https://www.benefits.va.gov/persona/-elderly.asp

## 2023-09-11 DIAGNOSIS — F32.1 CURRENT MODERATE EPISODE OF MAJOR DEPRESSIVE DISORDER WITHOUT PRIOR EPISODE (H): ICD-10-CM

## 2023-09-11 RX ORDER — METHYLPHENIDATE HYDROCHLORIDE 5 MG/1
5 TABLET ORAL 2 TIMES DAILY
Qty: 60 TABLET | Refills: 0 | Status: SHIPPED | OUTPATIENT
Start: 2023-09-11 | End: 2023-10-09

## 2023-09-29 ENCOUNTER — OFFICE VISIT (OUTPATIENT)
Dept: FAMILY MEDICINE | Facility: CLINIC | Age: 88
End: 2023-09-29
Payer: COMMERCIAL

## 2023-09-29 VITALS
WEIGHT: 167 LBS | RESPIRATION RATE: 20 BRPM | BODY MASS INDEX: 27.37 KG/M2 | DIASTOLIC BLOOD PRESSURE: 61 MMHG | SYSTOLIC BLOOD PRESSURE: 135 MMHG

## 2023-09-29 DIAGNOSIS — Z23 NEED FOR PROPHYLACTIC VACCINATION AND INOCULATION AGAINST INFLUENZA: ICD-10-CM

## 2023-09-29 DIAGNOSIS — F02.A3 MILD LATE ONSET ALZHEIMER'S DEMENTIA WITH MOOD DISTURBANCE (H): Primary | ICD-10-CM

## 2023-09-29 DIAGNOSIS — G30.1 MILD LATE ONSET ALZHEIMER'S DEMENTIA WITH MOOD DISTURBANCE (H): Primary | ICD-10-CM

## 2023-09-29 PROCEDURE — 90662 IIV NO PRSV INCREASED AG IM: CPT | Performed by: FAMILY MEDICINE

## 2023-09-29 PROCEDURE — G0008 ADMIN INFLUENZA VIRUS VAC: HCPCS | Performed by: FAMILY MEDICINE

## 2023-09-29 PROCEDURE — 99215 OFFICE O/P EST HI 40 MIN: CPT | Mod: 25 | Performed by: FAMILY MEDICINE

## 2023-09-29 ASSESSMENT — ENCOUNTER SYMPTOMS: CONSTITUTIONAL NEGATIVE: 1

## 2023-09-29 NOTE — PROGRESS NOTES
Assessment and Plan     1. 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 Spent time reviewing this. Encouraged planning for a safe place to live. Reviewed the natural history of the disease. Workout calendar drawn for Anthony to exercise 10 minutes x 5 days/week. Report back at the next visit in 5-6 weeks.    2. Need for prophylactic vaccination and inoculation against influenza  Given.    61 minutes spent on the date of the encounter doing chart review, history and exam, documentation, and further activities as noted above.    Options for treatment and follow-up care were reviewed with the patient and/or guardian. Zack Johns and/or guardian engaged in the decision making process and verbalized understanding of the options discussed and agreed with the final plan. I answered all of their questions.    Haim Galaviz III, MD, FAAFP  [facility] Faculty  09/29/23 5:02 PM           HPI:       Zack Johns is a 89 year old  male  Patient presents with:  Follow up: Follow up   Imm/Inj: Flu Shot    Is getting hooked up with the [facility]. Sarika is really hoping this gets them the help that they need. They are taking small steps to get rid of all of the stuff they have collected and she is aiming to move in the Spring of 2024. Anthony is not interested in this at all. He continues to acknowledge that he needs to exercise more.    Shoulders seem to be doing better after getting them injected.    They have all of his medicines here.         PMHX:     Patient Active Problem List   Diagnosis    Actinic keratosis    Cardiovascular disease    Essential hypertension, benign    CT Lung Pulmonary Nodule Multiple    Prediabetes    Diverticulosis of large intestine    Hyperlipidemia    Peripheral vascular disease (H)    Internal hemorrhoids    Local infection of skin and subcutaneous tissue    Disorder of bursae and  tendons in shoulder region    Synovial cyst    Trigger finger, acquired    Osteoarthritis of glenohumeral joint    Syncope    Abnormal cardiovascular stress test    Chest pain    Coronary atherosclerosis    Upper respiratory tract infection, unspecified type    Neck pain    Chronic total occlusion of native coronary artery       Current Outpatient Medications   Medication Sig Dispense Refill    acetaminophen (TYLENOL) 500 MG tablet Take 500-1,000 mg by mouth every 6 hours as needed      aspirin 81 MG tablet Take 1 tablet by mouth daily      atorvastatin (LIPITOR) 40 MG tablet TAKE 1 TABLET DAILY 90 tablet 3    citalopram (CELEXA) 20 MG tablet Take 1 tablet (20 mg) by mouth daily 90 tablet 3    donepezil (ARICEPT) 10 MG tablet Take 1 tablet (10 mg) by mouth At Bedtime 90 tablet 1    FLUZONE HIGH-DOSE QUADRIVALENT 0.7 ML JEFFERY injection       lisinopril-hydrochlorothiazide (ZESTORETIC) 20-12.5 MG tablet TAKE 1 TABLET DAILY 90 tablet 3    methylphenidate (RITALIN) 5 MG tablet Take 1 tablet (5 mg) by mouth 2 times daily 60 tablet 0    metoprolol tartrate (LOPRESSOR) 25 MG tablet TAKE ONE-HALF (1/2) TABLET TWICE A DAY 90 tablet 3    Multiple Vitamin (MULTIVITAMIN) per tablet Take 1 tablet by mouth daily.      nitroGLYcerin (NITROSTAT) 0.4 MG sublingual tablet Place 1 tablet (0.4 mg) under the tongue every 5 minutes as needed for chest pain Repeat at 5 min intervals x 2 if needed 30 tablet 0    PFIZER COVID-19 VAC BIVALENT 30 MCG/0.3ML injection       PFIZER-BIONT COVID-19 VAC-KEVIN 30 MCG/0.3ML injection       zinc 50 MG TABS Take 50 mg by mouth daily.         Social History     Socioeconomic History    Marital status:      Spouse name: Not on file    Number of children: Not on file    Years of education: Not on file    Highest education level: Not on file   Occupational History    Not on file   Tobacco Use    Smoking status: Former     Packs/day: 2.00     Years: 40.00     Pack years: 80.00     Types: Cigarettes      Quit date: 4/10/1984     Years since quittin.4    Smokeless tobacco: Never   Substance and Sexual Activity    Alcohol use: Yes     Alcohol/week: 6.0 standard drinks of alcohol     Comment: occasionally.    Drug use: No    Sexual activity: Not on file   Other Topics Concern    Parent/sibling w/ CABG, MI or angioplasty before 65F 55M? 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        Had gotten into typing to meet girls.        Worked for [employer] after leaving the [employer].     Social Determinants of Health     Financial Resource Strain: Low Risk  (2023)    Financial Resource Strain     Within the past 12 months, have you or your family members you live with been unable to get utilities (heat, electricity) when it was really needed?: No   Food Insecurity: Low Risk  (2023)    Food Insecurity     Within the past 12 months, did you worry that your food would run out before you got money to buy more?: No     Within the past 12 months, did the food you bought just not last and you didn t have money to get more?: No   Transportation Needs: Low Risk  (2023)    Transportation Needs     Within the past 12 months, has lack of transportation kept you from medical appointments, getting your medicines, non-medical meetings or appointments, work, or from getting things that you need?: No   Physical Activity: Not on file   Stress: Not on file   Social Connections: Not on file   Interpersonal Safety: Not on file   Housing Stability: Low Risk  (2023)    Housing Stability     Do you have housing? : Yes     Are you worried about losing your housing?: No       Allergies   Allergen Reactions    Pletal [Cilostazol]        No results found for this or any previous visit (from the past 24 hour(s)).         Review of Systems:     Review of Systems   Constitutional: Negative.             Physical Exam:     Vitals:    23 1538   BP: 135/61   Resp: 20    Weight: 75.8 kg (167 lb)     Body mass index is 27.37 kg/m .    Physical Exam  Vitals and nursing note reviewed.   Constitutional:       General: He is not in acute distress.     Appearance: Normal appearance. He is not ill-appearing, toxic-appearing or diaphoretic.   Pulmonary:      Effort: No respiratory distress.   Neurological:      Mental Status: He is alert. Mental status is at baseline.

## 2023-10-08 DIAGNOSIS — I25.2 CORONARY ARTERY DISEASE WITH HX OF MYOCARDIAL INFARCT W/O HX OF CABG: ICD-10-CM

## 2023-10-08 DIAGNOSIS — I25.10 CORONARY ARTERY DISEASE WITH HX OF MYOCARDIAL INFARCT W/O HX OF CABG: ICD-10-CM

## 2023-10-08 DIAGNOSIS — I10 ESSENTIAL HYPERTENSION, BENIGN: ICD-10-CM

## 2023-10-09 DIAGNOSIS — F32.1 CURRENT MODERATE EPISODE OF MAJOR DEPRESSIVE DISORDER WITHOUT PRIOR EPISODE (H): ICD-10-CM

## 2023-10-09 RX ORDER — LISINOPRIL AND HYDROCHLOROTHIAZIDE 12.5; 2 MG/1; MG/1
TABLET ORAL
Qty: 90 TABLET | Refills: 1 | Status: SHIPPED | OUTPATIENT
Start: 2023-10-09 | End: 2024-03-12

## 2023-10-09 RX ORDER — METOPROLOL TARTRATE 25 MG/1
TABLET, FILM COATED ORAL
Qty: 90 TABLET | Refills: 1 | Status: SHIPPED | OUTPATIENT
Start: 2023-10-09 | End: 2024-02-23

## 2023-10-09 NOTE — TELEPHONE ENCOUNTER
[facility] phone call message- medication clarification/question:    Full Medication Name:   methylphenidate (RITALIN) 5 MG tablet     Sig - Route: Take 1 tablet (5 mg) by mouth 2 times daily - Oral     Question: Patient requesting for refill for medication. If needed please call and advise.      Pharmacy confirmed as    [id number] - [facility] [address]  AT [facility] [address]: Yes    OK to leave a message on voice mail? Yes    Primary language: English      needed? No    Call taken on October 9, 2023 at 2:07 PM by Sarika Hurd

## 2023-10-10 RX ORDER — METHYLPHENIDATE HYDROCHLORIDE 5 MG/1
5 TABLET ORAL 2 TIMES DAILY
Qty: 60 TABLET | Refills: 0 | Status: SHIPPED | OUTPATIENT
Start: 2023-10-10 | End: 2023-11-07

## 2023-10-31 DIAGNOSIS — G30.1 MILD LATE ONSET ALZHEIMER'S DEMENTIA WITH MOOD DISTURBANCE (H): ICD-10-CM

## 2023-10-31 DIAGNOSIS — F02.A3 MILD LATE ONSET ALZHEIMER'S DEMENTIA WITH MOOD DISTURBANCE (H): ICD-10-CM

## 2023-10-31 RX ORDER — DONEPEZIL HYDROCHLORIDE 10 MG/1
10 TABLET, FILM COATED ORAL AT BEDTIME
Qty: 90 TABLET | Refills: 3 | Status: SHIPPED | OUTPATIENT
Start: 2023-10-31 | End: 2024-09-16

## 2023-10-31 NOTE — TELEPHONE ENCOUNTER
Message to physician: none    Date of last visit: 9/29/2023    Date of next visit if scheduled: 11/7/23    Potassium   Date Value Ref Range Status   03/21/2023 4.7 3.4 - 5.3 mmol/L Final   09/28/2018 3.9 3.4 - 5.3 mmol/L Final     Creatinine   Date Value Ref Range Status   03/21/2023 1.40 (H) 0.66 - 1.25 mg/dL Final   09/28/2018 0.8 0.8 - 1.5 mg/dL Final     GFR Estimate   Date Value Ref Range Status   03/21/2023 48 (L) >60 mL/min/1.73m2 Final     Comment:     eGFR calculated using 2021 CKD-EPI equation.   10/30/2019 39 (L) >60 mL/min/1.73m2 Final   09/24/2013 > 60 >60 ml/min/1.73m2 Final       BP Readings from Last 3 Encounters:   09/29/23 135/61   08/08/23 116/72   07/11/23 (!) 148/67       Hemoglobin A1C   Date Value Ref Range Status   03/21/2023 6.2 (H) 0.0 - 5.6 % Final     Comment:     Normal <5.7%   Prediabetes 5.7-6.4%    Diabetes 6.5% or higher     Note: Adopted from ADA consensus guidelines.   03/28/2019 6.1 (H) 4.1 - 5.7 % Final       Please complete refill and CLOSE ENCOUNTER.  Closing the encounter signifies the refill is complete.

## 2023-11-07 ENCOUNTER — OFFICE VISIT (OUTPATIENT)
Dept: FAMILY MEDICINE | Facility: CLINIC | Age: 88
End: 2023-11-07
Payer: COMMERCIAL

## 2023-11-07 VITALS
BODY MASS INDEX: 26.21 KG/M2 | DIASTOLIC BLOOD PRESSURE: 54 MMHG | HEIGHT: 67 IN | SYSTOLIC BLOOD PRESSURE: 163 MMHG | WEIGHT: 167 LBS | HEART RATE: 53 BPM | OXYGEN SATURATION: 95 %

## 2023-11-07 DIAGNOSIS — F32.1 CURRENT MODERATE EPISODE OF MAJOR DEPRESSIVE DISORDER WITHOUT PRIOR EPISODE (H): ICD-10-CM

## 2023-11-07 DIAGNOSIS — M67.919 DISORDER OF BURSAE AND TENDONS IN SHOULDER REGION: ICD-10-CM

## 2023-11-07 DIAGNOSIS — F02.A3 MILD LATE ONSET ALZHEIMER'S DEMENTIA WITH MOOD DISTURBANCE (H): Primary | ICD-10-CM

## 2023-11-07 DIAGNOSIS — G30.1 MILD LATE ONSET ALZHEIMER'S DEMENTIA WITH MOOD DISTURBANCE (H): Primary | ICD-10-CM

## 2023-11-07 DIAGNOSIS — M71.9 DISORDER OF BURSAE AND TENDONS IN SHOULDER REGION: ICD-10-CM

## 2023-11-07 DIAGNOSIS — I73.9 PERIPHERAL VASCULAR DISEASE (H): ICD-10-CM

## 2023-11-07 PROCEDURE — 20610 DRAIN/INJ JOINT/BURSA W/O US: CPT | Performed by: FAMILY MEDICINE

## 2023-11-07 PROCEDURE — 99214 OFFICE O/P EST MOD 30 MIN: CPT | Mod: 25 | Performed by: FAMILY MEDICINE

## 2023-11-07 RX ORDER — TRIAMCINOLONE ACETONIDE 40 MG/ML
40 INJECTION, SUSPENSION INTRA-ARTICULAR; INTRAMUSCULAR ONCE
Status: COMPLETED | OUTPATIENT
Start: 2023-11-07 | End: 2023-11-07

## 2023-11-07 RX ORDER — METHYLPHENIDATE HYDROCHLORIDE 5 MG/1
5 TABLET ORAL 2 TIMES DAILY
Qty: 60 TABLET | Refills: 0 | Status: SHIPPED | OUTPATIENT
Start: 2023-11-07 | End: 2023-12-12

## 2023-11-07 RX ADMIN — TRIAMCINOLONE ACETONIDE 40 MG: 40 INJECTION, SUSPENSION INTRA-ARTICULAR; INTRAMUSCULAR at 16:09

## 2023-11-07 RX ADMIN — TRIAMCINOLONE ACETONIDE 40 MG: 40 INJECTION, SUSPENSION INTRA-ARTICULAR; INTRAMUSCULAR at 16:08

## 2023-11-07 ASSESSMENT — ENCOUNTER SYMPTOMS: CONSTITUTIONAL NEGATIVE: 1

## 2023-11-07 NOTE — PROCEDURES
BILATERAL SUBACROMIAL STEROID INJECTION  The patient was prepped with alcohol. A 22G needle was inserted via the posteriolateral approach into the right subacromial space and 40 mg of Kenalog and 5 mL of 1% lido w/o epi was injected. The patient tolerated the procedure well and had improvement in their pain post procedure. This was repeated on the left with the same effect.    Haim Galaviz III, MD, FAAFP  [facility] Residency Faculty  11/07/23 5:10 PM

## 2023-11-07 NOTE — PATIENT INSTRUCTIONS
Put on clean clothes every day.    Shower on the afternoons of:  Tuesday, Thursday, and Saturday    Brush your teeth twice a day    Continue to accept help from the people that love you.    Dr. Galaviz

## 2023-11-07 NOTE — PROGRESS NOTES
Assessment and Plan     1. 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 Support given to the wife and son. Encouraged and developed goals for self care for the patient and he agreed (see AVS, 3 times weekly showering, twice daily teeth brushing, daily change of clothes).  Patient congratulated on his exercise across this past month where he was averaging 10 to 12 minutes/day 5 days a week.  New exercise calendar created with a goal of 15 minutes 5 days a week on the treadmill.  Overall, he looks much better with this visit compared to last.  Encouraged him to continue to help his family downsize their things and accept help.  Discussed and encouraged [facility] resources with both how he and his wife.  Sadie especially is very excited at the prospect of these benefits.  Recheck in 4 to 6 weeks.    2. Current moderate episode of major depressive disorder without prior episode (H)  Continue.  See above.  - methylphenidate (RITALIN) 5 MG tablet; Take 1 tablet (5 mg) by mouth 2 times daily  Dispense: 60 tablet; Refill: 0    3. Disorder of bursae and tendons in shoulder region  Family notices how his increased complaints around his shoulders and difficulty moving compared to previous.  Last injection was in July.  Would like to repeat.  As noted in the procedure note, patient had significantly improved range of motion with less pain compared to preprocedure.  Verbal consent completed as this is a repeat procedure for Anthony.  - Large Joint/Bursa injection and/or drainage - Bilateral (Shoulder, Knee) [20610] [50 Mod]  - triamcinolone (KENALOG-40) injection 40 mg  - triamcinolone (KENALOG-40) injection 40 mg    4. Peripheral vascular disease (H24)  See above exercise goals.  Encouraged continued use of the treadmill.    35 minutes spent on the date of the encounter doing chart review, history and exam, documentation, and further activities as noted above. This does not include time for the  procedure.    Options for treatment and follow-up care were reviewed with the patient and/or guardian. Zack Johns and/or guardian engaged in the decision making process and verbalized understanding of the options discussed and agreed with the final plan. I answered all of their questions.    This note was created with the aid of dictation software. Any mistakes are unintentional.    Haim Galaviz III, MD, FAAFP  [facility]  11/07/23 5:12 PM           HPI:       Zack Johns is a 89 year old  male  Patient presents with:  Dementia  Recheck Medication  Shoulder Pain      Sadie is awaiting his rescheduled [facility] appointment for the end of this month.  He has been more active on the treadmill at home and this has been nice.  They continue to fight over hygiene including changing his clothes and brushing his teeth.    Needs a refill of Ritalin.    His shoulders have more restricted motion than previous. Is wondering about a repeat injection.           PMHX:     Patient Active Problem List   Diagnosis    Actinic keratosis    Cardiovascular disease    Essential hypertension, benign    CT Lung Pulmonary Nodule Multiple    Prediabetes    Diverticulosis of large intestine    Hyperlipidemia    Peripheral vascular disease (H24)    Internal hemorrhoids    Local infection of skin and subcutaneous tissue    Disorder of bursae and tendons in shoulder region    Synovial cyst    Trigger finger, acquired    Osteoarthritis of glenohumeral joint    Syncope    Abnormal cardiovascular stress test    Chest pain    Coronary atherosclerosis    Upper respiratory tract infection, unspecified type    Neck pain    Chronic total occlusion of native coronary artery       Current Outpatient Medications   Medication Sig Dispense Refill    methylphenidate (RITALIN) 5 MG tablet Take 1 tablet (5 mg) by mouth 2 times daily 60 tablet 0    acetaminophen (TYLENOL) 500 MG tablet Take 500-1,000 mg by mouth every 6 hours as  needed      aspirin 81 MG tablet Take 1 tablet by mouth daily      atorvastatin (LIPITOR) 40 MG tablet TAKE 1 TABLET DAILY 90 tablet 3    citalopram (CELEXA) 20 MG tablet Take 1 tablet (20 mg) by mouth daily 90 tablet 3    donepezil (ARICEPT) 10 MG tablet TAKE 1 TABLET(10 MG) BY MOUTH AT BEDTIME 90 tablet 3    FLUZONE HIGH-DOSE QUADRIVALENT 0.7 ML JEFFERY injection       lisinopril-hydrochlorothiazide (ZESTORETIC) 20-12.5 MG tablet TAKE 1 TABLET DAILY 90 tablet 1    metoprolol tartrate (LOPRESSOR) 25 MG tablet TAKE ONE-HALF (1/2) TABLET TWICE A DAY 90 tablet 1    Multiple Vitamin (MULTIVITAMIN) per tablet Take 1 tablet by mouth daily.      nitroGLYcerin (NITROSTAT) 0.4 MG sublingual tablet Place 1 tablet (0.4 mg) under the tongue every 5 minutes as needed for chest pain Repeat at 5 min intervals x 2 if needed 30 tablet 0    PFIZER COVID-19 VAC BIVALENT 30 MCG/0.3ML injection       PFIZER-BIONT COVID-19 VAC-KEVIN 30 MCG/0.3ML injection       zinc 50 MG TABS Take 50 mg by mouth daily.         Social History     Socioeconomic History    Marital status:      Spouse name: Not on file    Number of children: Not on file    Years of education: Not on file    Highest education level: Not on file   Occupational History    Not on file   Tobacco Use    Smoking status: Former     Packs/day: 2.00     Years: 40.00     Additional pack years: 0.00     Total pack years: 80.00     Types: Cigarettes     Quit date: 4/10/1984     Years since quittin.6    Smokeless tobacco: Never   Substance and Sexual Activity    Alcohol use: Yes     Alcohol/week: 6.0 standard drinks of alcohol     Comment: occasionally.    Drug use: No    Sexual activity: Not on file   Other Topics Concern    Parent/sibling w/ CABG, MI or angioplasty before 65F 55M? 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        Had gotten into typing to meet girls.        Worked for [employer] after leaving the [employer].  "    Social Determinants of Health     Financial Resource Strain: Low Risk  (9/29/2023)    Financial Resource Strain     Within the past 12 months, have you or your family members you live with been unable to get utilities (heat, electricity) when it was really needed?: No   Food Insecurity: Low Risk  (9/29/2023)    Food Insecurity     Within the past 12 months, did you worry that your food would run out before you got money to buy more?: No     Within the past 12 months, did the food you bought just not last and you didn t have money to get more?: No   Transportation Needs: Low Risk  (9/29/2023)    Transportation Needs     Within the past 12 months, has lack of transportation kept you from medical appointments, getting your medicines, non-medical meetings or appointments, work, or from getting things that you need?: No   Physical Activity: Not on file   Stress: Not on file   Social Connections: Not on file   Interpersonal Safety: Not on file   Housing Stability: Low Risk  (9/29/2023)    Housing Stability     Do you have housing? : Yes     Are you worried about losing your housing?: No       Allergies   Allergen Reactions    Pletal [Cilostazol]        No results found for this or any previous visit (from the past 24 hour(s)).         Review of Systems:     Review of Systems   Constitutional: Negative.             Physical Exam:     Vitals:    11/07/23 1547   BP: (!) 163/54   BP Location: Right arm   Patient Position: Sitting   Pulse: 53   SpO2: 95%   Weight: 75.8 kg (167 lb)   Height: 1.702 m (5' 7\")     Body mass index is 26.16 kg/m .    Physical Exam  Vitals and nursing note reviewed.   Constitutional:       General: He is not in acute distress.     Appearance: Normal appearance. He is not ill-appearing, toxic-appearing or diaphoretic.   Pulmonary:      Effort: No respiratory distress.   Neurological:      Mental Status: He is alert and oriented to person, place, and time.           "

## 2023-12-12 ENCOUNTER — OFFICE VISIT (OUTPATIENT)
Dept: FAMILY MEDICINE | Facility: CLINIC | Age: 88
End: 2023-12-12
Payer: COMMERCIAL

## 2023-12-12 VITALS
OXYGEN SATURATION: 97 % | TEMPERATURE: 97.3 F | RESPIRATION RATE: 20 BRPM | WEIGHT: 163 LBS | SYSTOLIC BLOOD PRESSURE: 123 MMHG | DIASTOLIC BLOOD PRESSURE: 53 MMHG | BODY MASS INDEX: 27.16 KG/M2 | HEIGHT: 65 IN | HEART RATE: 56 BPM

## 2023-12-12 DIAGNOSIS — F32.1 CURRENT MODERATE EPISODE OF MAJOR DEPRESSIVE DISORDER WITHOUT PRIOR EPISODE (H): ICD-10-CM

## 2023-12-12 DIAGNOSIS — G30.1 MILD LATE ONSET ALZHEIMER'S DEMENTIA WITH MOOD DISTURBANCE (H): Primary | ICD-10-CM

## 2023-12-12 DIAGNOSIS — F02.A3 MILD LATE ONSET ALZHEIMER'S DEMENTIA WITH MOOD DISTURBANCE (H): Primary | ICD-10-CM

## 2023-12-12 PROCEDURE — 99215 OFFICE O/P EST HI 40 MIN: CPT | Performed by: FAMILY MEDICINE

## 2023-12-12 RX ORDER — METHYLPHENIDATE HYDROCHLORIDE 5 MG/1
5 TABLET ORAL 2 TIMES DAILY
Qty: 60 TABLET | Refills: 0 | Status: SHIPPED | OUTPATIENT
Start: 2023-12-12 | End: 2024-01-08

## 2023-12-12 NOTE — PATIENT INSTRUCTIONS
Your assingments:    Put on clean clothes every day.    Shower on the afternoons of:  Tuesday, Thursday, and Saturday    Brush your teeth twice a day    Exercise on the treadmill per your workout calendar.    Continue to accept help from the people that love you.    Have a Dr. Guillaume Ramachandran

## 2023-12-13 ASSESSMENT — ENCOUNTER SYMPTOMS: CONSTITUTIONAL NEGATIVE: 1

## 2023-12-13 NOTE — PROGRESS NOTES
Assessment and Plan     1. 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  Additionally, consider volunteering to accompany him on his errands to keep him from driving.  With the patient, I reviewed his basic hygiene assignments and his exercise calendar.  Recheck in 4-6 weeks.  Answered all of their questions.    2. Current moderate episode of major depressive disorder without prior episode (H)  Continue.  - methylphenidate (RITALIN) 5 MG tablet; Take 1 tablet (5 mg) by mouth 2 times daily  Dispense: 60 tablet; Refill: 0    43 minutes spent on the date of the encounter doing chart review, history and exam, documentation, and further activities as noted above.    Options for treatment and follow-up care were reviewed with the patient and/or guardian. Zack Johns and/or guardian engaged in the decision making process and verbalized understanding of the options discussed and agreed with the final plan. I answered all of their questions.    This note was created with the aid of dictation software. Any mistakes are unintentional.    Haim Galaviz III, MD, FAAFP  [facility]  12/13/23 12:55 PM           HPI:       Zack Johns is a 89 year old  male  Patient presents with:  Dementia    Anthony is getting on the treadmill 3-4 times a week now.  However, his family has concerns that he is not keeping up on his hygiene still, despite the assignments I gave him last time.  Sadie is actively trying to take over paying the bills but Anthony is resistant to this.  The patient is also adamant that he is still safe to drive.  The patient's family is also slightly concerned that he is having pain in his hip.  The patient has no pain at rest.  During walking around the clinic, he complains of small amounts of pain in his left lower back.    [facility] and hope to get a home care visit soon. Sadie is optimistic about the help she is going to get.         PMHX:     Patient Active Problem List   Diagnosis    Actinic keratosis    Cardiovascular disease    Essential hypertension, benign    CT Lung Pulmonary Nodule Multiple    Prediabetes    Diverticulosis of large intestine    Hyperlipidemia    Peripheral vascular disease (H24)    Internal hemorrhoids    Local infection of skin and subcutaneous tissue    Disorder of bursae and tendons in shoulder region    Synovial cyst    Trigger finger, acquired    Osteoarthritis of glenohumeral joint    Syncope    Abnormal cardiovascular stress test    Chest pain    Coronary atherosclerosis    Upper respiratory tract infection, unspecified type    Neck pain    Chronic total occlusion of native coronary artery       Current Outpatient Medications   Medication Sig Dispense Refill    aspirin 81 MG tablet Take 1 tablet by mouth daily      atorvastatin (LIPITOR) 40 MG tablet TAKE 1 TABLET DAILY 90 tablet 3    donepezil (ARICEPT) 10 MG tablet TAKE 1 TABLET(10 MG) BY MOUTH AT BEDTIME 90 tablet 3    lisinopril-hydrochlorothiazide (ZESTORETIC) 20-12.5 MG tablet TAKE 1 TABLET DAILY 90 tablet 1    methylphenidate (RITALIN) 5 MG tablet Take 1 tablet (5 mg) by mouth 2 times daily 60 tablet 0    metoprolol tartrate (LOPRESSOR) 25 MG tablet TAKE ONE-HALF (1/2) TABLET TWICE A DAY 90 tablet 1    Multiple Vitamin (MULTIVITAMIN) per tablet Take 1 tablet by mouth daily.      zinc 50 MG TABS Take 50 mg by mouth daily.      acetaminophen (TYLENOL) 500 MG tablet Take 500-1,000 mg by mouth every 6 hours as needed      citalopram (CELEXA) 20 MG tablet Take 1 tablet (20 mg) by mouth daily (Patient not taking: Reported on 12/12/2023) 90 tablet 3    FLUZONE HIGH-DOSE QUADRIVALENT 0.7 ML JEFFERY injection  (Patient not taking: Reported on 12/12/2023)      nitroGLYcerin (NITROSTAT) 0.4 MG sublingual tablet Place 1 tablet (0.4 mg) under the  tongue every 5 minutes as needed for chest pain Repeat at 5 min intervals x 2 if needed (Patient not taking: Reported on 2023) 30 tablet 0    PFIZER COVID-19 VAC BIVALENT 30 MCG/0.3ML injection  (Patient not taking: Reported on 2023)      PFIZER-BIONT COVID-19 VAC-KEVIN 30 MCG/0.3ML injection  (Patient not taking: Reported on 2023)         Social History     Socioeconomic History    Marital status:      Spouse name: Not on file    Number of children: Not on file    Years of education: Not on file    Highest education level: Not on file   Occupational History    Not on file   Tobacco Use    Smoking status: Former     Packs/day: 2.00     Years: 40.00     Additional pack years: 0.00     Total pack years: 80.00     Types: Cigarettes     Quit date: 4/10/1984     Years since quittin.7    Smokeless tobacco: Never   Substance and Sexual Activity    Alcohol use: Yes     Alcohol/week: 6.0 standard drinks of alcohol     Comment: occasionally.    Drug use: No    Sexual activity: Not on file   Other Topics Concern    Parent/sibling w/ CABG, MI or angioplasty before 65F 55M? 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        Had gotten into typing to meet girls.        Worked for [employer] after leaving the [employer].     Social Determinants of Health     Financial Resource Strain: Low Risk  (2023)    Financial Resource Strain     Within the past 12 months, have you or your family members you live with been unable to get utilities (heat, electricity) when it was really needed?: No   Food Insecurity: Low Risk  (2023)    Food Insecurity     Within the past 12 months, did you worry that your food would run out before you got money to buy more?: No     Within the past 12 months, did the food you bought just not last and you didn t have money to get more?: No   Transportation Needs: Low Risk  (2023)    Transportation Needs     Within the  "past 12 months, has lack of transportation kept you from medical appointments, getting your medicines, non-medical meetings or appointments, work, or from getting things that you need?: No   Physical Activity: Not on file   Stress: Not on file   Social Connections: Not on file   Interpersonal Safety: Not on file   Housing Stability: Low Risk  (9/29/2023)    Housing Stability     Do you have housing? : Yes     Are you worried about losing your housing?: No       Allergies   Allergen Reactions    Pletal [Cilostazol]        No results found for this or any previous visit (from the past 24 hour(s)).         Review of Systems:     Review of Systems   Constitutional: Negative.             Physical Exam:     Vitals:    12/12/23 1545   BP: 123/53   BP Location: Left arm   Patient Position: Sitting   Cuff Size: Adult Regular   Pulse: 56   Resp: 20   Temp: 97.3  F (36.3  C)   TempSrc: Tympanic   SpO2: 97%   Weight: 73.9 kg (163 lb)   Height: 1.645 m (5' 4.76\")     Body mass index is 27.32 kg/m .    Physical Exam  Vitals and nursing note reviewed.   Constitutional:       General: He is not in acute distress.     Appearance: Normal appearance. He is not ill-appearing, toxic-appearing or diaphoretic.   Pulmonary:      Effort: No respiratory distress.   Neurological:      Mental Status: He is alert.      Gait: Gait (gait is pretty good, even, and does not have issues maintaining balance.  Slight shuffle) normal.   Psychiatric:         Cognition and Memory: Memory is impaired. He exhibits impaired recent memory.           "

## 2024-01-08 DIAGNOSIS — F32.1 CURRENT MODERATE EPISODE OF MAJOR DEPRESSIVE DISORDER WITHOUT PRIOR EPISODE (H): ICD-10-CM

## 2024-01-08 NOTE — TELEPHONE ENCOUNTER
[facility] phone call message- medication clarification/question:    Full Medication Name: methylphenidate (RITALIN) 5 MG tablet     Question: Spouse called to request for refill on medication, if able to fill please send to pharmacy thank you.    Pharmacy confirmed as    [id number] [address] AT [facility] [address]: Yes    OK to leave a message on voice mail? Yes    Primary language: English      needed? No    Call taken on January 8, 2024 at 9:04 AM by Navi Conteh

## 2024-01-09 RX ORDER — METHYLPHENIDATE HYDROCHLORIDE 5 MG/1
5 TABLET ORAL 2 TIMES DAILY
Qty: 60 TABLET | Refills: 0 | Status: SHIPPED | OUTPATIENT
Start: 2024-01-09 | End: 2024-02-08

## 2024-01-26 ENCOUNTER — OFFICE VISIT (OUTPATIENT)
Dept: FAMILY MEDICINE | Facility: CLINIC | Age: 89
End: 2024-01-26
Payer: COMMERCIAL

## 2024-01-26 VITALS
TEMPERATURE: 98.3 F | WEIGHT: 164.08 LBS | OXYGEN SATURATION: 94 % | HEART RATE: 56 BPM | HEIGHT: 65 IN | BODY MASS INDEX: 27.34 KG/M2 | RESPIRATION RATE: 26 BRPM | DIASTOLIC BLOOD PRESSURE: 54 MMHG | SYSTOLIC BLOOD PRESSURE: 143 MMHG

## 2024-01-26 DIAGNOSIS — F02.A3 MILD LATE ONSET ALZHEIMER'S DEMENTIA WITH MOOD DISTURBANCE (H): Primary | ICD-10-CM

## 2024-01-26 DIAGNOSIS — I73.9 PERIPHERAL VASCULAR DISEASE (H): ICD-10-CM

## 2024-01-26 DIAGNOSIS — G30.1 MILD LATE ONSET ALZHEIMER'S DEMENTIA WITH MOOD DISTURBANCE (H): Primary | ICD-10-CM

## 2024-01-26 DIAGNOSIS — F32.1 CURRENT MODERATE EPISODE OF MAJOR DEPRESSIVE DISORDER WITHOUT PRIOR EPISODE (H): ICD-10-CM

## 2024-01-26 PROCEDURE — 99215 OFFICE O/P EST HI 40 MIN: CPT | Performed by: FAMILY MEDICINE

## 2024-01-26 RX ORDER — CHOLECALCIFEROL (VITAMIN D3) 50 MCG
1 TABLET ORAL DAILY
COMMUNITY

## 2024-01-26 NOTE — PATIENT INSTRUCTIONS
Your assingments:    Put on clean clothes every day (unless OK'ed by Sadie)    Shower on the afternoons of:  Tuesday and Friday    Brush your teeth twice a day    Exercise on the treadmill per your workout calendar.    Continue to accept help from the people that love you.    Start getting rid of papers that are older than your Doctor (40 years, 1983).    It was great to see you,    Dr. Galaviz

## 2024-01-27 RX ORDER — CITALOPRAM HYDROBROMIDE 20 MG/1
20 TABLET ORAL DAILY
Qty: 90 TABLET | Refills: 3 | Status: SHIPPED | OUTPATIENT
Start: 2024-01-27 | End: 2024-06-05

## 2024-01-27 ASSESSMENT — ENCOUNTER SYMPTOMS: CONSTITUTIONAL NEGATIVE: 1

## 2024-01-27 NOTE — PROGRESS NOTES
Assessment and Plan     1. 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 With the family, we discussed ways to support Anthony, keep him safe, and get needed tasks done. Support given. Recheck in 4-6 weeks. Answered all of their questions.     2. Current moderate episode of major depressive disorder without prior episode (H)  Continue current management with SSRI and Ritalin. They will continue to send over refill requests because they had a hard time figuring out the 3 month fills.    3. Peripheral vascular disease (H24)  Congratulated Anthony on his exercise. Keep up the treadmill work and ASA.    43 minutes spent on the date of the encounter doing chart review, history and exam, documentation, and further activities as noted above.    Options for treatment and follow-up care were reviewed with the patient and/or guardian. Zack Johns and/or guardian engaged in the decision making process and verbalized understanding of the options discussed and agreed with the final plan. I answered all of their questions.    This note was created with the aid of dictation software. Any mistakes are unintentional.    Haim Galaviz III, MD, FAAFP  [facility]  01/27/24 11:21 AM           HPI:       Zack Johns is a 89 year old  male  Patient presents with:  Dementia      Roxie notes that the past month has gone OK. The home health aide has been coming twice a week and this is getting the showering done. She and Anthony feel that this is adequate. They could have the aide three times per week but Anthony only wants two. He is not consistently brushing his teeth but is changing his clothing far more regularly. He has pushed his own exercise to 17 min ~ 5/week (his really likes the idea of 17 min) and is glad to be doing so. The aide also does puzzles with him and Roxie is looking forward to  being able to leave the house by herself.    Roxie has started to get rid of some of her things but Anthony has not.           PMHX:     Patient Active Problem List   Diagnosis    Actinic keratosis    Cardiovascular disease    Essential hypertension, benign    CT Lung Pulmonary Nodule Multiple    Prediabetes    Diverticulosis of large intestine    Hyperlipidemia    Peripheral vascular disease (H24)    Internal hemorrhoids    Local infection of skin and subcutaneous tissue    Disorder of bursae and tendons in shoulder region    Synovial cyst    Trigger finger, acquired    Osteoarthritis of glenohumeral joint    Syncope    Abnormal cardiovascular stress test    Chest pain    Coronary atherosclerosis    Upper respiratory tract infection, unspecified type    Neck pain    Chronic total occlusion of native coronary artery       Current Outpatient Medications   Medication Sig Dispense Refill    atorvastatin (LIPITOR) 40 MG tablet TAKE 1 TABLET DAILY 90 tablet 3    donepezil (ARICEPT) 10 MG tablet TAKE 1 TABLET(10 MG) BY MOUTH AT BEDTIME 90 tablet 3    lisinopril-hydrochlorothiazide (ZESTORETIC) 20-12.5 MG tablet TAKE 1 TABLET DAILY 90 tablet 1    methylphenidate (RITALIN) 5 MG tablet Take 1 tablet (5 mg) by mouth 2 times daily 60 tablet 0    metoprolol tartrate (LOPRESSOR) 25 MG tablet TAKE ONE-HALF (1/2) TABLET TWICE A DAY 90 tablet 1    Multiple Vitamin (MULTIVITAMIN) per tablet Take 1 tablet by mouth daily.      vitamin D3 (CHOLECALCIFEROL) 50 mcg (2000 units) tablet Take 1 tablet by mouth daily      zinc 50 MG TABS Take 50 mg by mouth daily.      acetaminophen (TYLENOL) 500 MG tablet Take 500-1,000 mg by mouth every 6 hours as needed      aspirin 81 MG tablet Take 1 tablet by mouth daily      citalopram (CELEXA) 20 MG tablet Take 1 tablet (20 mg) by mouth daily (Patient not taking: Reported on 12/12/2023) 90 tablet 3    FLUZONE HIGH-DOSE QUADRIVALENT 0.7 ML JEFFERY injection  (Patient not taking: Reported on 12/12/2023)       nitroGLYcerin (NITROSTAT) 0.4 MG sublingual tablet Place 1 tablet (0.4 mg) under the tongue every 5 minutes as needed for chest pain Repeat at 5 min intervals x 2 if needed (Patient not taking: Reported on 2023) 30 tablet 0    PFIZER COVID-19 VAC BIVALENT 30 MCG/0.3ML injection  (Patient not taking: Reported on 2023)      PFIZER-BIONT COVID-19 VAC-KEVIN 30 MCG/0.3ML injection  (Patient not taking: Reported on 2023)         Social History     Socioeconomic History    Marital status:      Spouse name: Not on file    Number of children: Not on file    Years of education: Not on file    Highest education level: Not on file   Occupational History    Not on file   Tobacco Use    Smoking status: Former     Packs/day: 2.00     Years: 40.00     Additional pack years: 0.00     Total pack years: 80.00     Types: Cigarettes     Quit date: 4/10/1984     Years since quittin.8    Smokeless tobacco: Never   Substance and Sexual Activity    Alcohol use: Yes     Alcohol/week: 6.0 standard drinks of alcohol     Comment: occasionally.    Drug use: No    Sexual activity: Not on file   Other Topics Concern    Parent/sibling w/ CABG, MI or angioplasty before 65F 55M? 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        Had gotten into typing to meet girls.        Worked for [employer] after leaving the [employer].     Social Determinants of Health     Financial Resource Strain: Low Risk  (2023)    Financial Resource Strain     Within the past 12 months, have you or your family members you live with been unable to get utilities (heat, electricity) when it was really needed?: No   Food Insecurity: Low Risk  (2023)    Food Insecurity     Within the past 12 months, did you worry that your food would run out before you got money to buy more?: No     Within the past 12 months, did the food you bought just not last and you didn t have money to get more?: No  "  Transportation Needs: Low Risk  (9/29/2023)    Transportation Needs     Within the past 12 months, has lack of transportation kept you from medical appointments, getting your medicines, non-medical meetings or appointments, work, or from getting things that you need?: No   Physical Activity: Not on file   Stress: Not on file   Social Connections: Not on file   Interpersonal Safety: Not on file   Housing Stability: Low Risk  (9/29/2023)    Housing Stability     Do you have housing? : Yes     Are you worried about losing your housing?: No       Allergies   Allergen Reactions    Pletal [Cilostazol]        No results found for this or any previous visit (from the past 24 hour(s)).         Review of Systems:     Review of Systems   Constitutional: Negative.             Physical Exam:     Vitals:    01/26/24 1537 01/26/24 1540   BP: (!) 156/65 (!) 143/54   BP Location: Right arm Right arm   Patient Position: Sitting Sitting   Cuff Size: Adult Regular Adult Regular   Pulse: 56    Resp:  26   Temp: 98.3  F (36.8  C)    TempSrc: Oral    SpO2: 94%    Weight: 74.4 kg (164 lb) 74.4 kg (164 lb 1.3 oz)   Height: 1.651 m (5' 5\") 1.651 m (5' 5\")     Body mass index is 27.3 kg/m .    Physical Exam  Vitals and nursing note reviewed.   Constitutional:       General: He is not in acute distress.     Appearance: Normal appearance. He is not ill-appearing, toxic-appearing or diaphoretic.   Pulmonary:      Effort: No respiratory distress.   Neurological:      Mental Status: He is alert and oriented to person, place, and time.           "

## 2024-02-07 ENCOUNTER — TELEPHONE (OUTPATIENT)
Dept: FAMILY MEDICINE | Facility: CLINIC | Age: 89
End: 2024-02-07
Payer: COMMERCIAL

## 2024-02-07 DIAGNOSIS — F32.1 CURRENT MODERATE EPISODE OF MAJOR DEPRESSIVE DISORDER WITHOUT PRIOR EPISODE (H): ICD-10-CM

## 2024-02-07 NOTE — TELEPHONE ENCOUNTER
[facility] phone call message- medication clarification/question:    Full Medication Name: methylphenidate (RITALIN) 5 MG tablet        Question: wife called in requesting refill on above medication -- please fill if able to below pharmacy -- than you     Pharmacy confirmed as    [facility] #[id number] - [facility] [address]  AT [facility] [address]   Yes    OK to leave a message on voice mail? Yes    Primary language: English      needed? No    Call taken on February 7, 2024 at 10:34 AM by Kalina Diaz

## 2024-02-08 RX ORDER — METHYLPHENIDATE HYDROCHLORIDE 5 MG/1
5 TABLET ORAL 2 TIMES DAILY
Qty: 60 TABLET | Refills: 0 | Status: SHIPPED | OUTPATIENT
Start: 2024-02-08 | End: 2024-03-12

## 2024-02-08 NOTE — TELEPHONE ENCOUNTER
1. Current moderate episode of major depressive disorder without prior episode (H)  - methylphenidate (RITALIN) 5 MG tablet; Take 1 tablet (5 mg) by mouth 2 times daily  Dispense: 60 tablet; Refill: 0      Haim Galaviz III, MD, FAAFP  [facility] Faculty  02/08/24 9:29 AM

## 2024-02-23 DIAGNOSIS — I25.2 CORONARY ARTERY DISEASE WITH HX OF MYOCARDIAL INFARCT W/O HX OF CABG: ICD-10-CM

## 2024-02-23 DIAGNOSIS — I25.10 CORONARY ARTERY DISEASE WITH HX OF MYOCARDIAL INFARCT W/O HX OF CABG: ICD-10-CM

## 2024-02-23 DIAGNOSIS — I10 ESSENTIAL HYPERTENSION, BENIGN: ICD-10-CM

## 2024-02-23 RX ORDER — METOPROLOL TARTRATE 25 MG/1
12.5 TABLET, FILM COATED ORAL 2 TIMES DAILY
Qty: 90 TABLET | Refills: 3 | Status: SHIPPED | OUTPATIENT
Start: 2024-02-23

## 2024-02-23 NOTE — TELEPHONE ENCOUNTER
[facility] phone call message- medication clarification/question:    Full Medication Name: metoprolol tartrate (LOPRESSOR) 25 MG tablet        Question: patients wife called in requesting refill on above medication --please fill if able to below pharmacy-- thank you     Pharmacy confirmed as    [facility] #[id number] - [facility] [address] AT [facility] [address]   Yes    OK to leave a message on voice mail? Yes    Primary language: English      needed? No    Call taken on February 23, 2024 at 10:53 AM by Kalina Diaz

## 2024-03-12 ENCOUNTER — OFFICE VISIT (OUTPATIENT)
Dept: FAMILY MEDICINE | Facility: CLINIC | Age: 89
End: 2024-03-12
Payer: COMMERCIAL

## 2024-03-12 VITALS
TEMPERATURE: 97.5 F | SYSTOLIC BLOOD PRESSURE: 159 MMHG | BODY MASS INDEX: 27.66 KG/M2 | OXYGEN SATURATION: 96 % | RESPIRATION RATE: 33 BRPM | HEIGHT: 65 IN | DIASTOLIC BLOOD PRESSURE: 66 MMHG | HEART RATE: 64 BPM | WEIGHT: 166 LBS

## 2024-03-12 DIAGNOSIS — I25.2 CORONARY ARTERY DISEASE WITH HX OF MYOCARDIAL INFARCT W/O HX OF CABG: ICD-10-CM

## 2024-03-12 DIAGNOSIS — I25.10 CORONARY ARTERY DISEASE WITH HX OF MYOCARDIAL INFARCT W/O HX OF CABG: ICD-10-CM

## 2024-03-12 DIAGNOSIS — H61.23 BILATERAL IMPACTED CERUMEN: ICD-10-CM

## 2024-03-12 DIAGNOSIS — I25.10 CHRONIC TOTAL OCCLUSION OF NATIVE CORONARY ARTERY: ICD-10-CM

## 2024-03-12 DIAGNOSIS — F02.A3 MILD LATE ONSET ALZHEIMER'S DEMENTIA WITH MOOD DISTURBANCE (H): Primary | ICD-10-CM

## 2024-03-12 DIAGNOSIS — I25.82 CHRONIC TOTAL OCCLUSION OF NATIVE CORONARY ARTERY: ICD-10-CM

## 2024-03-12 DIAGNOSIS — G30.1 MILD LATE ONSET ALZHEIMER'S DEMENTIA WITH MOOD DISTURBANCE (H): Primary | ICD-10-CM

## 2024-03-12 DIAGNOSIS — I10 ESSENTIAL HYPERTENSION, BENIGN: ICD-10-CM

## 2024-03-12 DIAGNOSIS — F32.1 CURRENT MODERATE EPISODE OF MAJOR DEPRESSIVE DISORDER WITHOUT PRIOR EPISODE (H): ICD-10-CM

## 2024-03-12 PROCEDURE — 69210 REMOVE IMPACTED EAR WAX UNI: CPT | Mod: 50 | Performed by: FAMILY MEDICINE

## 2024-03-12 PROCEDURE — 99215 OFFICE O/P EST HI 40 MIN: CPT | Mod: 25 | Performed by: FAMILY MEDICINE

## 2024-03-12 RX ORDER — METHYLPHENIDATE HYDROCHLORIDE 5 MG/1
5 TABLET ORAL 2 TIMES DAILY
Qty: 60 TABLET | Refills: 0 | Status: SHIPPED | OUTPATIENT
Start: 2024-03-12 | End: 2024-04-16

## 2024-03-12 RX ORDER — LISINOPRIL AND HYDROCHLOROTHIAZIDE 12.5; 2 MG/1; MG/1
1 TABLET ORAL DAILY
Qty: 90 TABLET | Refills: 1 | Status: SHIPPED | OUTPATIENT
Start: 2024-03-12 | End: 2024-09-16

## 2024-03-12 RX ORDER — ATORVASTATIN CALCIUM 40 MG/1
40 TABLET, FILM COATED ORAL DAILY
Qty: 90 TABLET | Refills: 3 | Status: SHIPPED | OUTPATIENT
Start: 2024-03-12

## 2024-03-12 NOTE — PATIENT INSTRUCTIONS
Your assingments:    Put on clean clothes every day (unless OK'ed by Sadie)    Shower on the afternoons of:  Tuesday and Friday    Brush your teeth twice a day with toothpaste    Exercise on the treadmill per your workout calendar.    Continue to accept help from the people that love you.    Start getting rid of papers that are older than your Doctor (40 years, 1983).    It was great to see you,    Dr. Galaviz

## 2024-03-12 NOTE — PROGRESS NOTES
Assessment and Plan     1. 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  He even self started his exercise calendar and is keeping up on it well.  Continue previous goal of 17 minutes on the treadmill at least 5 days a week.  Reviewed other goals for his personal hygiene and preparing the house for downsizing.  These were placed on his AVS.    2. Current moderate episode of major depressive disorder without prior episode (H)  Controlled.  Continue.  - methylphenidate (RITALIN) 5 MG tablet; Take 1 tablet (5 mg) by mouth 2 times daily  Dispense: 60 tablet; Refill: 0    3. 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  Answered all of their questions.  Continue statin therapy.  Discussed seeking acute care should he have significant shortness of breath or chest pain since they would still likely pursue curative therapy given his reasonable quality of life at this moment.    4. Coronary artery disease with hx of myocardial infarct w/o hx of CABG  See above.  - atorvastatin (LIPITOR) 40 MG tablet; Take 1 tablet (40 mg) by mouth daily  Dispense: 90 tablet; Refill: 3  - lisinopril-hydrochlorothiazide (ZESTORETIC) 20-12.5 MG tablet; Take 1 tablet by mouth daily  Dispense: 90 tablet; Refill: 1    5. Essential hypertension, benign  Uncontrolled today but not sure if he is taking the medicine.  Sadie will take some blood pressures at home and let me know if they are still really high.  - lisinopril-hydrochlorothiazide (ZESTORETIC) 20-12.5 MG tablet; Take 1 tablet by mouth daily  Dispense: 90 tablet; Refill: 1    6. Bilateral impacted cerumen  I removed a significant amount with a curette bilaterally and my MA was able to do the rest with a lavage.  He tolerated the procedure well.  - REMOVE IMPACTED  ELIZ    53 minutes spent on the date of the encounter doing chart review, history and exam, documentation, and further activities as noted above.  This does not include time for the procedure.    Options for treatment and follow-up care were reviewed with the patient and/or guardian. Zack Johns and/or guardian engaged in the decision making process and verbalized understanding of the options discussed and agreed with the final plan. I answered all of their questions.    This note was created with the aid of dictation software. Any mistakes are unintentional.    Haim Galaviz III, MD, FAAFP  [facility] Residency Faculty  03/12/24 5:14 PM           HPI:       Zack Johns is a 89 year old  male  Patient presents with:  Recheck Medication: Having some issues using [facility]  Wants to make sure he's taking the right medications and has them all  Follow Up: dementia  Ear Problem: Hard to hear out of left ear, feels like it's clogged with wax    Complains of decreased hearing especially out of the left for several weeks.  Anthony thinks he can feel something in there.  Does not wear hearing aids like he supposed to.  [facility]    Made his own exercise calendar and likes being on the treadmill for 17 minutes.    Had a bout of shortness of breath and weakness a couple days ago but this is resolved.  Patient and his family state that they would still want him to receive treatment for most things.    Has not done a very good job throwing papers out since last meeting.    They have been having issues with [facility] and are [facility] to [facility]  The blood pressure pill is out.  Not sure how many days he is missed.         PMHX:     Patient Active Problem List   Diagnosis    Actinic keratosis    Cardiovascular disease    Essential hypertension, benign    CT Lung Pulmonary Nodule  Multiple    Prediabetes    Diverticulosis of large intestine    Hyperlipidemia    Peripheral vascular disease (H24)    Internal hemorrhoids    Local infection of skin and subcutaneous tissue    Disorder of bursae and tendons in shoulder region    Synovial cyst    Trigger finger, acquired    Osteoarthritis of glenohumeral joint    Syncope    Abnormal cardiovascular stress test    Chest pain    Coronary atherosclerosis    Upper respiratory tract infection, unspecified type    Neck pain    Chronic total occlusion of native coronary artery       Current Outpatient Medications   Medication Sig Dispense Refill    atorvastatin (LIPITOR) 40 MG tablet Take 1 tablet (40 mg) by mouth daily 90 tablet 3    citalopram (CELEXA) 20 MG tablet Take 1 tablet (20 mg) by mouth daily 90 tablet 3    donepezil (ARICEPT) 10 MG tablet TAKE 1 TABLET(10 MG) BY MOUTH AT BEDTIME 90 tablet 3    lisinopril-hydrochlorothiazide (ZESTORETIC) 20-12.5 MG tablet Take 1 tablet by mouth daily 90 tablet 1    methylphenidate (RITALIN) 5 MG tablet Take 1 tablet (5 mg) by mouth 2 times daily 60 tablet 0    metoprolol tartrate (LOPRESSOR) 25 MG tablet Take 0.5 tablets (12.5 mg) by mouth 2 times daily 90 tablet 3    Multiple Vitamin (MULTIVITAMIN) per tablet Take 1 tablet by mouth daily.      vitamin D3 (CHOLECALCIFEROL) 50 mcg (2000 units) tablet Take 1 tablet by mouth daily      zinc 50 MG TABS Take 50 mg by mouth daily.      acetaminophen (TYLENOL) 500 MG tablet Take 500-1,000 mg by mouth every 6 hours as needed      aspirin 81 MG tablet Take 1 tablet by mouth daily      FLUZONE HIGH-DOSE QUADRIVALENT 0.7 ML JEFFERY injection  (Patient not taking: Reported on 12/12/2023)      nitroGLYcerin (NITROSTAT) 0.4 MG sublingual tablet Place 1 tablet (0.4 mg) under the tongue every 5 minutes as needed for chest pain Repeat at 5 min intervals x 2 if needed (Patient not taking: Reported on 12/12/2023) 30 tablet 0    PFIZER COVID-19 VAC BIVALENT 30 MCG/0.3ML injection   (Patient not taking: Reported on 2023)      PFIZER-BIONT COVID-19 VAC-KEVIN 30 MCG/0.3ML injection  (Patient not taking: Reported on 2023)         Social History     Socioeconomic History    Marital status:      Spouse name: Not on file    Number of children: Not on file    Years of education: Not on file    Highest education level: Not on file   Occupational History    Not on file   Tobacco Use    Smoking status: Former     Packs/day: 2.00     Years: 40.00     Additional pack years: 0.00     Total pack years: 80.00     Types: Cigarettes     Quit date: 4/10/1984     Years since quittin.9    Smokeless tobacco: Never   Substance and Sexual Activity    Alcohol use: Yes     Alcohol/week: 6.0 standard drinks of alcohol     Comment: occasionally.    Drug use: No    Sexual activity: Not on file   Other Topics Concern    Parent/sibling w/ CABG, MI or angioplasty before 65F 55M? 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        Had gotten into typing to meet girls.        Worked for [employer] after leaving the [employer].     Social Determinants of Health     Financial Resource Strain: Low Risk  (2023)    Financial Resource Strain     Within the past 12 months, have you or your family members you live with been unable to get utilities (heat, electricity) when it was really needed?: No   Food Insecurity: Low Risk  (2023)    Food Insecurity     Within the past 12 months, did you worry that your food would run out before you got money to buy more?: No     Within the past 12 months, did the food you bought just not last and you didn t have money to get more?: No   Transportation Needs: Low Risk  (2023)    Transportation Needs     Within the past 12 months, has lack of transportation kept you from medical appointments, getting your medicines, non-medical meetings or appointments, work, or from getting things that you need?: No   Physical  "Activity: Not on file   Stress: Not on file   Social Connections: Not on file   Interpersonal Safety: Not on file   Housing Stability: Low Risk  (9/29/2023)    Housing Stability     Do you have housing? : Yes     Are you worried about losing your housing?: No       Allergies   Allergen Reactions    Pletal [Cilostazol]        No results found for this or any previous visit (from the past 24 hour(s)).         Review of Systems:     ROS         Physical Exam:     Vitals:    03/12/24 1546 03/12/24 1552 03/12/24 1645   BP: (!) 176/75 (!) 172/77 (!) 159/66   Pulse: 64     Resp: (!) 33     Temp: 97.5  F (36.4  C)     TempSrc: Oral     SpO2: 96%     Weight: 75.3 kg (166 lb)     Height: 1.655 m (5' 5.16\")       Body mass index is 27.49 kg/m .    Physical Exam  Vitals and nursing note reviewed.   Constitutional:       General: He is not in acute distress.     Appearance: Normal appearance. He is not ill-appearing, toxic-appearing or diaphoretic.   HENT:      Right Ear: There is impacted cerumen.      Left Ear: There is impacted cerumen (hearing significantly improved after removal).   Cardiovascular:      Rate and Rhythm: Normal rate and regular rhythm.      Pulses: Normal pulses.      Heart sounds: No murmur heard.     No friction rub. No gallop.   Pulmonary:      Effort: Pulmonary effort is normal. No respiratory distress.      Breath sounds: Normal breath sounds. No stridor. No wheezing, rhonchi or rales.   Neurological:      Mental Status: He is alert and oriented to person, place, and time.           "

## 2024-04-16 ENCOUNTER — OFFICE VISIT (OUTPATIENT)
Dept: FAMILY MEDICINE | Facility: CLINIC | Age: 89
End: 2024-04-16
Payer: COMMERCIAL

## 2024-04-16 VITALS
DIASTOLIC BLOOD PRESSURE: 71 MMHG | WEIGHT: 158 LBS | RESPIRATION RATE: 24 BRPM | TEMPERATURE: 97.9 F | HEIGHT: 65 IN | OXYGEN SATURATION: 95 % | BODY MASS INDEX: 26.33 KG/M2 | SYSTOLIC BLOOD PRESSURE: 148 MMHG | HEART RATE: 52 BPM

## 2024-04-16 DIAGNOSIS — G89.29 CHRONIC PAIN OF BOTH SHOULDERS: ICD-10-CM

## 2024-04-16 DIAGNOSIS — G30.1 MILD LATE ONSET ALZHEIMER'S DEMENTIA WITH MOOD DISTURBANCE (H): Primary | ICD-10-CM

## 2024-04-16 DIAGNOSIS — F32.1 CURRENT MODERATE EPISODE OF MAJOR DEPRESSIVE DISORDER WITHOUT PRIOR EPISODE (H): ICD-10-CM

## 2024-04-16 DIAGNOSIS — F02.A3 MILD LATE ONSET ALZHEIMER'S DEMENTIA WITH MOOD DISTURBANCE (H): Primary | ICD-10-CM

## 2024-04-16 DIAGNOSIS — M25.511 CHRONIC PAIN OF BOTH SHOULDERS: ICD-10-CM

## 2024-04-16 DIAGNOSIS — M25.512 CHRONIC PAIN OF BOTH SHOULDERS: ICD-10-CM

## 2024-04-16 PROCEDURE — 99215 OFFICE O/P EST HI 40 MIN: CPT | Mod: 25 | Performed by: FAMILY MEDICINE

## 2024-04-16 PROCEDURE — 99417 PROLNG OP E/M EACH 15 MIN: CPT | Performed by: FAMILY MEDICINE

## 2024-04-16 PROCEDURE — 20610 DRAIN/INJ JOINT/BURSA W/O US: CPT | Performed by: FAMILY MEDICINE

## 2024-04-16 RX ORDER — METHYLPHENIDATE HYDROCHLORIDE 5 MG/1
5 TABLET ORAL 2 TIMES DAILY
Qty: 60 TABLET | Refills: 0 | Status: SHIPPED | OUTPATIENT
Start: 2024-04-16 | End: 2024-05-13

## 2024-04-16 RX ORDER — TRIAMCINOLONE ACETONIDE 40 MG/ML
40 INJECTION, SUSPENSION INTRA-ARTICULAR; INTRAMUSCULAR ONCE
Status: COMPLETED | OUTPATIENT
Start: 2024-04-16 | End: 2024-04-16

## 2024-04-16 RX ADMIN — TRIAMCINOLONE ACETONIDE 40 MG: 40 INJECTION, SUSPENSION INTRA-ARTICULAR; INTRAMUSCULAR at 17:43

## 2024-04-16 RX ADMIN — TRIAMCINOLONE ACETONIDE 40 MG: 40 INJECTION, SUSPENSION INTRA-ARTICULAR; INTRAMUSCULAR at 17:42

## 2024-04-16 ASSESSMENT — ENCOUNTER SYMPTOMS: CONSTITUTIONAL NEGATIVE: 1

## 2024-04-16 ASSESSMENT — PATIENT HEALTH QUESTIONNAIRE - PHQ9
SUM OF ALL RESPONSES TO PHQ QUESTIONS 1-9: 5
10. IF YOU CHECKED OFF ANY PROBLEMS, HOW DIFFICULT HAVE THESE PROBLEMS MADE IT FOR YOU TO DO YOUR WORK, TAKE CARE OF THINGS AT HOME, OR GET ALONG WITH OTHER PEOPLE: SOMEWHAT DIFFICULT
SUM OF ALL RESPONSES TO PHQ QUESTIONS 1-9: 5

## 2024-04-16 NOTE — PROGRESS NOTES
Assessment and Plan     1. 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  Discussed potentially switching a lot of their finances to electronic means to allow tracking, consistent depositing, and ability for others to help more easily.  Son is very interested in helping them with this.  Discussed common scams toward the elderly and ways to avoid.  Answered their questions. With his continued downturn in mentation, plan to repeat driving assessment. Wife is very excited about this.  - Occupational Therapy  Referral; Future    2. Chronic pain of both shoulders  Interested in repeat injection.  Tolerated the procedure well.  - Occupational Therapy  Referral; Future  - Large Joint/Bursa injection and/or drainage - Bilateral (Shoulder, Knee) [20610] [50 Mod]  - triamcinolone (KENALOG-40) injection 40 mg  - triamcinolone (KENALOG-40) injection 40 mg    3. Current moderate episode of major depressive disorder without prior episode (H)  Continue current therapy.  Likely is stable as were going to get on an SSRI and stimulants.  - methylphenidate (RITALIN) 5 MG tablet; Take 1 tablet (5 mg) by mouth 2 times daily  Dispense: 60 tablet; Refill: 0    74 minutes spent on the date of the encounter doing chart review, history and exam, documentation, and further activities as noted above.  This does not include time required for the procedure.  Options for treatment and follow-up care were reviewed with the patient and/or guardian. Zack Johns and/or guardian engaged in the decision making process and verbalized understanding of the options discussed and agreed with the final plan. I answered all of their questions.    This note was created with the aid of dictation software. Any mistakes are unintentional.    Haim Galaviz III, MD, FAAFP  [facility] Residency Faculty  04/16/24 5:44 PM           HPI:       Zack Johns is a 89 year old   male  Patient presents with:  Dementia: Follow up  Hearing Problem  Refill Request: Would like methylphenidate sent to [facility] in chart    Continues to do puzzles.  Seems like his shoulders are probably hurting more.  [facility]  Often had goals with the health aides to avoid getting his teeth brushed.  Is otherwise still excepting of the health aides coming into the house.  There continue to be struggles over finances and him signing checks and paying bills.  They already have waller of  set up for their children.  He has not started throwing out documents.         PMHX:     Patient Active Problem List   Diagnosis    Actinic keratosis    Cardiovascular disease    Essential hypertension, benign    CT Lung Pulmonary Nodule Multiple    Prediabetes    Diverticulosis of large intestine    Hyperlipidemia    Peripheral vascular disease (H24)    Internal hemorrhoids    Local infection of skin and subcutaneous tissue    Disorder of bursae and tendons in shoulder region    Synovial cyst    Trigger finger, acquired    Osteoarthritis of glenohumeral joint    Syncope    Abnormal cardiovascular stress test    Chest pain    Coronary atherosclerosis    Upper respiratory tract infection, unspecified type    Neck pain    Chronic total occlusion of native coronary artery       Current Outpatient Medications   Medication Sig Dispense Refill    atorvastatin (LIPITOR) 40 MG tablet Take 1 tablet (40 mg) by mouth daily 90 tablet 3    donepezil (ARICEPT) 10 MG tablet TAKE 1 TABLET(10 MG) BY MOUTH AT BEDTIME 90 tablet 3    lisinopril-hydrochlorothiazide (ZESTORETIC) 20-12.5 MG tablet Take 1 tablet by mouth daily 90 tablet 1    methylphenidate (RITALIN) 5 MG tablet Take 1 tablet (5 mg) by mouth 2 times daily 60 tablet 0    metoprolol tartrate (LOPRESSOR) 25 MG tablet Take 0.5 tablets (12.5 mg) by mouth 2 times daily 90 tablet 3    Multiple Vitamin (MULTIVITAMIN) per tablet Take 1 tablet by mouth daily.       vitamin D3 (CHOLECALCIFEROL) 50 mcg (2000 units) tablet Take 1 tablet by mouth daily      zinc 50 MG TABS Take 50 mg by mouth daily.      acetaminophen (TYLENOL) 500 MG tablet Take 500-1,000 mg by mouth every 6 hours as needed (Patient not taking: Reported on 2024)      aspirin 81 MG tablet Take 1 tablet by mouth daily (Patient not taking: Reported on 2024)      citalopram (CELEXA) 20 MG tablet Take 1 tablet (20 mg) by mouth daily (Patient not taking: Reported on 2024) 90 tablet 3    FLUZONE HIGH-DOSE QUADRIVALENT 0.7 ML JEFFERY injection  (Patient not taking: Reported on 2023)      nitroGLYcerin (NITROSTAT) 0.4 MG sublingual tablet Place 1 tablet (0.4 mg) under the tongue every 5 minutes as needed for chest pain Repeat at 5 min intervals x 2 if needed (Patient not taking: Reported on 2023) 30 tablet 0    PFIZER COVID-19 VAC BIVALENT 30 MCG/0.3ML injection  (Patient not taking: Reported on 2023)      PFIZER-BIONT COVID-19 VAC-KEVIN 30 MCG/0.3ML injection  (Patient not taking: Reported on 2023)         Social History     Socioeconomic History    Marital status:      Spouse name: Not on file    Number of children: Not on file    Years of education: Not on file    Highest education level: Not on file   Occupational History    Not on file   Tobacco Use    Smoking status: Former     Current packs/day: 0.00     Average packs/day: 2.0 packs/day for 40.0 years (80.0 ttl pk-yrs)     Types: Cigarettes     Start date: 4/10/1944     Quit date: 4/10/1984     Years since quittin.0    Smokeless tobacco: Never   Substance and Sexual Activity    Alcohol use: Yes     Alcohol/week: 6.0 standard drinks of alcohol     Comment: occasionally.    Drug use: No    Sexual activity: Not on file   Other Topics Concern    Parent/sibling w/ CABG, MI or angioplasty before 65F 55M? 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         Had gotten into typing to meet girls.        Worked for [employer] after leaving the [employer].     Social Determinants of Health     Financial Resource Strain: Low Risk  (9/29/2023)    Financial Resource Strain     Within the past 12 months, have you or your family members you live with been unable to get utilities (heat, electricity) when it was really needed?: No   Food Insecurity: Low Risk  (9/29/2023)    Food Insecurity     Within the past 12 months, did you worry that your food would run out before you got money to buy more?: No     Within the past 12 months, did the food you bought just not last and you didn t have money to get more?: No   Transportation Needs: Low Risk  (9/29/2023)    Transportation Needs     Within the past 12 months, has lack of transportation kept you from medical appointments, getting your medicines, non-medical meetings or appointments, work, or from getting things that you need?: No   Physical Activity: Not on file   Stress: Not on file   Social Connections: Not on file   Interpersonal Safety: Unknown (4/16/2024)    Interpersonal Safety     Do you feel physically and emotionally safe where you currently live?: Patient unable to answer     Within the past 12 months, have you been hit, slapped, kicked or otherwise physically hurt by someone?: Patient unable to answer     Within the past 12 months, have you been humiliated or emotionally abused in other ways by your partner or ex-partner?: Patient unable to answer   Housing Stability: Low Risk  (9/29/2023)    Housing Stability     Do you have housing? : Yes     Are you worried about losing your housing?: No       Allergies   Allergen Reactions    Pletal [Cilostazol]        No results found for this or any previous visit (from the past 24 hour(s)).         Review of Systems:     Review of Systems   Constitutional: Negative.             Physical Exam:     Vitals:    04/16/24 1602 04/16/24 1605   BP: (!) 144/56 (!) 148/71   Pulse: 52    Resp: 24    Temp:  "97.9  F (36.6  C)    TempSrc: Oral    SpO2: 95%    Weight: 71.7 kg (158 lb)    Height: 1.65 m (5' 4.96\")      Body mass index is 26.32 kg/m .    Physical Exam  Vitals and nursing note reviewed.   Constitutional:       General: He is not in acute distress.     Appearance: Normal appearance. He is not ill-appearing, toxic-appearing or diaphoretic.   Pulmonary:      Effort: No respiratory distress.   Neurological:      Mental Status: He is alert and oriented to person, place, and time.           "

## 2024-04-16 NOTE — PROCEDURES
BILATERAL SUBACROMIAL INJECTION  The patient was prepped with alcohol. A 25G needle was inserted via the posteriolateral approach into the right subacromial space and 40 mg of Kenalog and 5 mL of 1% lido w/o epi was injected. The patient tolerated the procedure well and had improvement in their pain post procedure. The same procedure was repeated on the left side with the same outcome.    Haim Galaviz III, MD, FAAFP  [facility] Residency Faculty  04/16/24 5:43 PM

## 2024-04-16 NOTE — PATIENT INSTRUCTIONS
Your assingments:     Put on clean clothes every day (unless OK'ed by Sadie)     Shower on the afternoons of:  Tuesday and Friday     Brush your teeth twice a day with toothpaste (don't disagree with the health aides).     Exercise on the treadmill per your workout calendar.     Continue to accept help from the people that love you.     Start getting rid of papers that are older than your Doctor (40 years, 1983).     It was great to see you,     Dr. Galaviz

## 2024-04-19 ENCOUNTER — TELEPHONE (OUTPATIENT)
Dept: OCCUPATIONAL THERAPY | Facility: CLINIC | Age: 89
End: 2024-04-19
Payer: COMMERCIAL

## 2024-04-22 ENCOUNTER — THERAPY VISIT (OUTPATIENT)
Dept: OCCUPATIONAL THERAPY | Facility: CLINIC | Age: 89
End: 2024-04-22
Attending: FAMILY MEDICINE
Payer: COMMERCIAL

## 2024-04-22 DIAGNOSIS — M25.512 CHRONIC PAIN OF BOTH SHOULDERS: ICD-10-CM

## 2024-04-22 DIAGNOSIS — F02.A3 MILD LATE ONSET ALZHEIMER'S DEMENTIA WITH MOOD DISTURBANCE (H): ICD-10-CM

## 2024-04-22 DIAGNOSIS — M25.511 CHRONIC PAIN OF BOTH SHOULDERS: ICD-10-CM

## 2024-04-22 DIAGNOSIS — G30.1 MILD LATE ONSET ALZHEIMER'S DEMENTIA WITH MOOD DISTURBANCE (H): ICD-10-CM

## 2024-04-22 DIAGNOSIS — G89.29 CHRONIC PAIN OF BOTH SHOULDERS: ICD-10-CM

## 2024-04-22 DIAGNOSIS — Z78.9 ALTERATION IN INSTRUMENTAL ACTIVITIES OF DAILY LIVING (IADL): Primary | ICD-10-CM

## 2024-04-22 PROCEDURE — 97166 OT EVAL MOD COMPLEX 45 MIN: CPT | Mod: GO | Performed by: OCCUPATIONAL THERAPIST

## 2024-04-22 PROCEDURE — 97535 SELF CARE MNGMENT TRAINING: CPT | Mod: GO | Performed by: OCCUPATIONAL THERAPIST

## 2024-04-22 NOTE — PROGRESS NOTES
OCCUPATIONAL THERAPY EVALUATION  Type of Visit: Evaluation    See electronic medical record for Abuse and Falls Screening details.    Subjective        Zack Johns is a 89 y.o. male with referral for OP occupational therapy from Dr. Galaviz. Pt mild/moderate alzheimer's dementia and significant shoulder arthritis. Pt with previous OT on 5/9/23 with CPT 4.9/5.6. Previous MoCA 20/30 on 3/21/23.     Pt arrives on time with son, Wojciech. Pt reports mainly driving to [employer], known places. Pt reports spouse continues to drive. Pt has weekly routines to see friends and drives to see them. Pt reports not concerns about driving. Evaluation completed as ordered.     Presenting condition or subjective complaint:  see above  Date of onset: 04/16/24    Relevant medical history:   see chart  Dates & types of surgery:  see chart    Prior diagnostic imaging/testing results:     see chart  Prior therapy history for the same diagnosis, illness or injury:    OT 5/9/23, see above.     Prior Level of Function  Transfers: Independent  Ambulation: Independent  ADL: Independent, [facility]  IADL: Driving, Finances, Housekeeping, Meal preparation, Medication management    Living Environment  Social support:   with spouse  Type of home:   split level house with basement  Stairs to enter the home:       3   Ramp:   n/a  Stairs inside the home:       7 up/down  Help at home:  2x/wk for [facility]   Equipment owned:   n/a    Employment:    retired - in engineering   Hobbies/Interests:  reading, TV, puzzles    Patient goals for therapy:  Pt reports their doctor wants to see if they can still drive.     Pain assessment: Pain present  Location: B shoulders/Rating: 3  - only with activity      Objective   Cognitive Status Examination  Orientation: Oriented to person, place and time   Level of Consciousness: Alert  Follows Commands and Answers Questions: 100% of the time  Personal Safety and  "Judgement: Intact  Memory:  pt reports forgetting things  Attention: No deficits identified - pt demonstrates need for instruction and increased time to establish task, then able to manage alternating attention   Organization/Problem Solving:  pt demonstrates need for instruction and increased time to establish task, then able to manage and problem solve.   Executive Function:  pt demonstrates need for instruction and increased time to establish task, then able to manage executive functions.     MoCA  The Nick Cognitive Assessment (MOCA) is a 30 point cognitive screening assessment.  Version 8.1  Visuospatial/Executive 4/5 - only placed one hand on clock  Naming 3/3  Attention 6/6  Language 2/3 - unable to get \"F\" words  Abstraction 1/2  Delayed Recall 0/5  Orientation 6/6  MIS: 4/15  Total: 22/30  Normal score is considered = or > 26/30.  The following ranges may be used to grade severity: 18-26 = mild cognitive impairment, 10-17= moderate cognitive impairment (MCI) and less than 10= severe cognitive impairment. These have not been researched. Average score for MCI is 22 and for mild Alzheimer's disease is 16.    TRAILMAKING:   Part A: 47 sec, 75-90th percentile  Part B: 147 sec, 50-75th percentile     VISUAL SKILLS  Visual Acuity: Wears glasses, recent eye doc appt  Visual Field: Appears normal  Visual Attention: Appears normal  Oculomotor: intact    DYNAVISION:  Mode A (Goal 52 hits): 47 hits, 1.26 sec reaction speed (completed while sitting due to shoulder pain and ROM limitations).   Mode A with distractor (Goal 35 hits): 27 hits with 12/12 (completed while sitting due to shoulder pain and ROM limitations).   Mode B: Pt unable to complete due to shoulder pain with continued activity.     SENSATION: UE Sensation WNL    POSTURE: Standing Posture: Rounded shoulders  RANGE OF MOTION:  B shoulder flexion approx. 90 degrees  STRENGTH:  grossly 4+/5 with limitations due to bilateral shoulder pain  MUSCLE TONE: " WNL  COORDINATION: WFL  BALANCE: WFL    FUNCTIONAL MOBILITY  Assistive Device(s): None  Ambulation: independent  Wheelchair: n/a    BED MOBILITY: Independent    TRANSFERS: Independent    BATHING:  [facility]    UPPER BODY DRESSING: Independent    LOWER BODY DRESSING: Independent    TOILETING: Independent    GROOMING: Independent    EATING/SELF FEEDING: Independent     ACTIVITY TOLERANCE: Pt with no concerns.     INSTRUMENTAL ACTIVITIES OF DAILY LIVING (IADL):   Meal Planning/Prep: independent  Home/Financial Management: independent with oversight by adult kids  Communication/Computer Use: n/a  Community Mobility: driving, going to local restaurants daily to weekly, rarely going out of ordinary routes, does not drive at night.   Care of Others:- n/a    Alternating Foot Tap Test: 4.8 sec average on 3 trials. (Goal being below 6 sec for driving)    Assessment & Plan   CLINICAL IMPRESSIONS  Medical Diagnosis: Mild late onset Alzheimer's dementia with mood disturbance (H) (G30.1, F02.A3)   Chronic pain of both shoulders (M25.511, G89.29, M25.512)    Treatment Diagnosis: Alteration in instrumental activities of daily living    Impression/Assessment: Pt is a 89 year old male presenting to Occupational Therapy due to evaluation of IADL independence in the setting of Alzheimer's dementia and chronic bilateral shoulder pain.  The following significant findings have been identified: Impaired cognition, Impaired ROM, and Impaired strength.  These identified deficits interfere with their ability to perform self care tasks, driving , medication management, and financial management as compared to previous level of function.     Pt with excellent participation in all tasks for evaluation. Pt with MoCA score of 22/30, increased two points from one year ago. Pt demonstrates within 75-90th percentile with trailmaking part A, within 50-75th percentile for trailmaking part B. Pt demonstrates adequate alternating  foot tap test on all three trials. Pt completes dynavision assessment while seated due to shoulder ROM limitations and pain levels with activity. Pt demonstrates below goal measures on reaction speed testing, however is limited in repeatability and increased rate due to shoulder limitations. Throughout all testing measures, pt requires increased time and instruction at beginning of task, then demonstrates ability to increase rate of completion and problem solve throughout tasks. Recommend pt to have oversight with higher level finances. Pt recommended to be able to continue driving with restrictions including: no driving at night, no rush hour traffic, no highways, only to known areas, no inclement weather. Pt's son provided handout on behind-the-wheel information if family continues to want further inquiry or if there is any change in performance with IADL tasks.     Clinical Decision Making (Complexity):  Assessment of Occupational Performance: 3-5 Performance Deficits  Occupational Performance Limitations: bathing/showering, driving and community mobility, and health management and maintenance  Clinical Decision Making (Complexity): Moderate complexity    PLAN OF CARE  Treatment Interventions:  Interventions: Self-Care/Home Management, Therapeutic Activity, Therapeutic Exercise    Long Term Goals   OT Goal 1  Goal Identifier: processing speed  Goal Description: Pt will demonstrate adequate performance with Dynavision to demonstrate appropriate reaction time and processing speed for IADL s.  Rationale: In order to maximize safety and independence with ADL/IADLs  Target Date: 07/21/24  OT Goal 2  Goal Identifier: IADL  Goal Description: Pt will demonstrate understanding of recommendation for level of assistance required for IADL tasks, including med mgmt, finances, and driving tasks.  Rationale: In order to maximize safety and independence with ADL/IADLs  Target Date: 07/21/24      Frequency of Treatment: follow up  as needed  Duration of Treatment: up to 3 visits     Recommended Referrals to Other Professionals:  none  Education Assessment:       Risks and benefits of evaluation/treatment have been explained.   Patient/Family/caregiver agrees with Plan of Care.     Evaluation Time:    OT Eval, Moderate Complexity Minutes (67454): 30  Signing Clinician: VU Kline [facility]                                                                                   OUTPATIENT OCCUPATIONAL THERAPY      PLAN OF TREATMENT FOR OUTPATIENT REHABILITATION   Patient's Last Name, First Name, Zack Ken YOB: 1934   Provider's Name   [facility]   Medical Record No.  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 (H) (G30.1, F02.A3)   Chronic pain of both shoulders (M25.511, G89.29, M25.512)      OT Treatment Diagnosis:  Alteration in instrumental activities of daily living Plan of Treatment  Frequency/Duration:follow up as needed/up to 3 visits    Certification date from 04/22/24   To 07/21/24        See note for plan of treatment details and functional goals     Alirio Morris OT                         I CERTIFY THE NEED FOR THESE SERVICES FURNISHED UNDER        THIS PLAN OF TREATMENT AND WHILE UNDER MY CARE     (Physician attestation of this document indicates review and certification of the therapy plan).              Referring Provider:  Hami Galaviz    Initial Assessment  See Epic Evaluation- 04/22/24

## 2024-05-13 DIAGNOSIS — F32.1 CURRENT MODERATE EPISODE OF MAJOR DEPRESSIVE DISORDER WITHOUT PRIOR EPISODE (H): ICD-10-CM

## 2024-05-13 NOTE — TELEPHONE ENCOUNTER
[facility] phone call message- medication clarification/question:    Full Medication Name: methylphenidate (RITALIN) 5 MG tablet        Question: patient's wife called in for refill on above medication --please fill to below pharmacy if able -- thank you      Pharmacy confirmed as    [id number] - [facility] [address] AT [facility] [address]   Yes    OK to leave a message on voice mail? Yes    Primary language: English      needed? No    Call taken on May 13, 2024 at 9:44 AM by Kalina Diaz

## 2024-05-14 RX ORDER — METHYLPHENIDATE HYDROCHLORIDE 5 MG/1
5 TABLET ORAL 2 TIMES DAILY
Qty: 60 TABLET | Refills: 0 | Status: SHIPPED | OUTPATIENT
Start: 2024-05-14 | End: 2024-06-17

## 2024-05-15 NOTE — PROGRESS NOTES
[facility]        Patient is here for a 1 year follow up  to discuss Peripheral artery disease (PAD). Symptoms include claudicaton: Walks on a treadmill 5-6 times per week for 17 minutes. Does not feel there is a significant change in the last year with leg pain.    Pt is currently taking Statin.    BP (!) 155/55   Pulse 51   Temp 97.2  F (36.2  C)   Resp 16     The provider has been notified that the patient has no concerns.     Questions patient would like addressed today are: N/A.    Refills are needed: No    Has homecare services and agency name:  No

## 2024-05-15 NOTE — PATIENT INSTRUCTIONS
Joel Dixon,    Thank you for entrusting your care with us today. After your visit today with WILMAR Silva this is the plan that was discussed at your appointment.    Follow up in 1 year for repeat ultrasound and clinic visit.    We will call you closer to that date to get you scheduled.      I am including additional information on these things and our contact information if you have any questions or concerns.   Please do not hesitate to reach out to us if you felt we did not answer your questions or you are unsure of the treatment plan after your visit today. Our number is [phone].Thank you for trusting us with your care.         Again thank you for your time.     Ankle-Brachial Index (KIRK) or Physiologic Test    Description  An ankle-brachial index test is relatively pain free. Blood pressure cuffs of various sizes are placed on your thigh, calf, foot and toes.  Similar to having your blood pressure checked with an arm cuff, as the technician inflates the cuffs, they progressively tighten and are then quickly released.  You may feel some discomfort, but generally for less than 60 seconds for each measurement. You will be asked to remove your socks and shoes and possibly your pants or shorts. Gowns will be provided. It usually takes about 30-60 minutes.   Depending on the initial readings and patient symptoms, you may be asked to perform a light walk on a treadmill.  The technician will apply ultrasound gel, usually warmed for your comfort, to your ankles and wrists. Through the gel, the technician will use a small hand-held device that emits sound waves.  Risks  There are typically no side effects or complications associated with a physiologic study.  How to Prepare  Eat and take medications as usual.  There is no preparation required for an ankle-brachial index (KIRK) or physiologic exam.  What Can I Expect After the Test?  The technician will send the ultrasound images to your vascular surgeon for  evaluation. Typically, a report is available in 2-3 days. If anything critical is found, it is standard practice to notify the vascular surgeon immediately.  Reference: https://vascular.org/patient-resources/vascular-tests

## 2024-05-20 ENCOUNTER — ANCILLARY PROCEDURE (OUTPATIENT)
Dept: VASCULAR ULTRASOUND | Facility: CLINIC | Age: 89
End: 2024-05-20
Attending: PHYSICIAN ASSISTANT
Payer: COMMERCIAL

## 2024-05-20 ENCOUNTER — OFFICE VISIT (OUTPATIENT)
Dept: VASCULAR SURGERY | Facility: CLINIC | Age: 89
End: 2024-05-20
Attending: PHYSICIAN ASSISTANT
Payer: COMMERCIAL

## 2024-05-20 VITALS
SYSTOLIC BLOOD PRESSURE: 155 MMHG | HEART RATE: 51 BPM | RESPIRATION RATE: 16 BRPM | TEMPERATURE: 97.2 F | DIASTOLIC BLOOD PRESSURE: 55 MMHG

## 2024-05-20 DIAGNOSIS — I73.9 PAD (PERIPHERAL ARTERY DISEASE) (H): ICD-10-CM

## 2024-05-20 DIAGNOSIS — I73.9 PAD (PERIPHERAL ARTERY DISEASE) (H): Primary | ICD-10-CM

## 2024-05-20 PROCEDURE — 99214 OFFICE O/P EST MOD 30 MIN: CPT | Performed by: PHYSICIAN ASSISTANT

## 2024-05-20 PROCEDURE — 93923 UPR/LXTR ART STDY 3+ LVLS: CPT

## 2024-05-20 PROCEDURE — 93923 UPR/LXTR ART STDY 3+ LVLS: CPT | Mod: 26 | Performed by: SURGERY

## 2024-05-20 PROCEDURE — G0463 HOSPITAL OUTPT CLINIC VISIT: HCPCS | Mod: 25 | Performed by: PHYSICIAN ASSISTANT

## 2024-05-20 ASSESSMENT — PAIN SCALES - GENERAL: PAINLEVEL: MILD PAIN (2)

## 2024-05-20 NOTE — PROGRESS NOTES
VASCULAR SURGERY PROGRESS NOTE    LOCATION:  [facility]     Zack Johns  Medical Record #: [id number]  YOB: 1934  Age: 89 year old     Date of Service: 5/20/2024    PRIMARY CARE PROVIDER: Haim Galaviz    Reason for visit: Surveillance of PAD     IMPRESSION: 89-year-old male presenting for follow-up of peripheral arterial disease. ABIs stable at 0.89 on the right and 0.93 on the left with toe pressures adequate for wound healing bilaterally.  Patient denies any lifestyle-limiting claudication, rest pain, or nonhealing wounds. Has not been on aspirin but compliant with his statin medication.     RECOMMENDATION/RISKS: Continue best medical management with aspirin and statin therapy.  Follow-up in 1 year with repeat studies.     HPI:  Zack Johns is an 89 year old male with past medical history significant for hypertension, hyperlipidemia, coronary artery disease, type 2 diabetes mellitus, and peripheral arterial disease.  Patient was last seen in the vascular clinic 1 year ago and was noted to have stable ABIs and no lower extremity symptoms.    Today, Mr. Johns presents for follow-up.  He is accompanied by his wife.  Patient states how grateful he is to be getting around and overall feeling well at 89 years old.  Denies any significant lower extremity pain with ambulation or at rest.  Denies any lower extremity wounds.  He does admit that he is not as active as he once was and has been slowing down with age.  He is compliant with his statin medication but notes he has not been taking a baby aspirin.  He does take Tylenol daily for general aches and pains.    Ultrasound results were discussed and all questions answered.  No other concerns.     REVIEW OF SYSTEMS:    A 12 point ROS was reviewed and is negative except for what is listed above in HPI.    PHH:    Past Medical History:   Diagnosis Date    Actinic keratitis     Arthritis     bilat shoulders    CAD (coronary  "artery disease)     Cardiac arrest (H) 09/04/89    Claudication (H24)     Claudication (H24)     Coronary artery disease     s/p MI    Diabetes mellitus type 2, controlled (H)     Diverticulosis     HTN (hypertension)     Hyperlipidemia     Hypertension     Malignant neoplasm of prostate (H) 8/7/2006-10/5/2006    DENIED BY PATIENT (see note from 12/08/2014).  RADIOTHERAPY BY DR.CHO LEGGETT, old     Multiple pulmonary nodules     Osteoarthritis of glenohumeral joint     Left. Injected at [facility] 02/10/2015.    Peripheral vascular disease, unspecified (H24)     Syncope     Trigger finger, acquired     Type 2 diabetes mellitus (H) 11/6/2012      Past Surgical History:   Procedure Laterality Date    APPENDECTOMY      ARTHROSCOPY KNEE      ARTHROSCOPY KNEE      CARDIAC CATHETERIZATION  08/22/2018     of rca    CARDIAC SURGERY  08/22/2018    two stents placed 8/22/18    CV CORONARY ANGIOGRAM N/A 08/06/2018    Procedure: Coronary Angiogram;  Surgeon: Jeane Garcia MD;  Location: [facility];  Service:     CV CORONARY ANGIOGRAM N/A 08/22/2018    Procedure: Coronary Angiogram;  Surgeon: Jeane Garcia MD;  Location: [facility];  Service:     FEMORAL ENDARTERECTOMY Bilateral 05/03/2017    IR EXTREMITY ANGIOGRAM BILATERAL  03/10/2017    NOSE SURGERY      PROSTATE BIOPSY, NEEDLE, SATURATION SAMPLING  01/13/2003    \"Biopsy fo the Prostate Needle\"    RELEASE TRIGGER FINGER       ALLERGIES:  Pletal [cilostazol]    MEDS:    Current Outpatient Medications:     acetaminophen (TYLENOL) 500 MG tablet, Take 500-1,000 mg by mouth every 6 hours as needed (Patient not taking: Reported on 4/16/2024), Disp: , Rfl:     aspirin 81 MG tablet, Take 1 tablet by mouth daily (Patient not taking: Reported on 4/16/2024), Disp: , Rfl:     atorvastatin (LIPITOR) 40 MG tablet, Take 1 tablet (40 mg) by mouth daily, Disp: 90 tablet, Rfl: 3    citalopram (CELEXA) 20 MG tablet, Take 1 tablet (20 mg) by mouth daily (Patient not " taking: Reported on 4/16/2024), Disp: 90 tablet, Rfl: 3    donepezil (ARICEPT) 10 MG tablet, TAKE 1 TABLET(10 MG) BY MOUTH AT BEDTIME, Disp: 90 tablet, Rfl: 3    FLUZONE HIGH-DOSE QUADRIVALENT 0.7 ML JEFFERY injection, , Disp: , Rfl:     lisinopril-hydrochlorothiazide (ZESTORETIC) 20-12.5 MG tablet, Take 1 tablet by mouth daily, Disp: 90 tablet, Rfl: 1    methylphenidate (RITALIN) 5 MG tablet, Take 1 tablet (5 mg) by mouth 2 times daily, Disp: 60 tablet, Rfl: 0    metoprolol tartrate (LOPRESSOR) 25 MG tablet, Take 0.5 tablets (12.5 mg) by mouth 2 times daily, Disp: 90 tablet, Rfl: 3    Multiple Vitamin (MULTIVITAMIN) per tablet, Take 1 tablet by mouth daily., Disp: , Rfl:     nitroGLYcerin (NITROSTAT) 0.4 MG sublingual tablet, Place 1 tablet (0.4 mg) under the tongue every 5 minutes as needed for chest pain Repeat at 5 min intervals x 2 if needed (Patient not taking: Reported on 12/12/2023), Disp: 30 tablet, Rfl: 0    PFIZER COVID-19 VAC BIVALENT 30 MCG/0.3ML injection, , Disp: , Rfl:     PFIZER-BIONT COVID-19 VAC-KEVIN 30 MCG/0.3ML injection, , Disp: , Rfl:     vitamin D3 (CHOLECALCIFEROL) 50 mcg (2000 units) tablet, Take 1 tablet by mouth daily, Disp: , Rfl:     zinc 50 MG TABS, Take 50 mg by mouth daily., Disp: , Rfl:     SOCIAL HABITS:    History   Smoking Status    Former    Packs/day: 2.00    Years: 40.00    Types: Cigarettes    Quit date: 4/10/1984   Smokeless Tobacco    Never     Social History    Substance and Sexual Activity      Alcohol use: Yes        Alcohol/week: 6.0 standard drinks of alcohol        Comment: occasionally.      History   Drug Use No     FAMILY HISTORY:    Family History   Problem Relation Age of Onset    Diabetes No family hx of     Breast Cancer No family hx of     Colon Cancer No family hx of     Prostate Cancer No family hx of     Other Cancer No family hx of     No Known Problems Mother     No Known Problems Father      PE:  There were no vitals taken for this visit.  Wt Readings from  Last 1 Encounters:   04/16/24 71.7 kg (158 lb)     There is no height or weight on file to calculate BMI.    EXAM:  GENERAL: well-developed 89 year old male who appears his stated age  CARDIAC: normal   CHEST/LUNG: normal respiratory effort   MUSCULOSKELETAL: grossly normal and both lower extremities are intact, no lower extremity edema  NEUROLOGIC: focally intact, alert and oriented x 3  PSYCH: appropriate affect    DIAGNOSTIC STUDIES:     Images:    US Low Ext Arterial Dop Seg Pres w/o Exercise     I personally reviewed the images and my interpretation is stable ABIs at 0.89 on the right and 0.93 on the left with toe pressures adequate for wound healing bilaterally.    LABS:      Sodium   Date Value Ref Range Status   03/21/2023 143 133 - 144 mmol/L Final   11/30/2021 141 133 - 144 mmol/L Final   10/30/2019 135 (L) 136 - 145 mmol/L Final   09/28/2018 142.0 133.0 - 144.0 mmol/L Final   07/31/2018 139.0 133.0 - 144.0 mmol/L Final   01/25/2018 138.0 133.0 - 144.0 mmol/L Final     Urea Nitrogen   Date Value Ref Range Status   03/21/2023 27 7 - 30 mg/dL Final   11/30/2021 27 7 - 30 mg/dL Final   10/30/2019 40 (H) 8 - 28 mg/dL Final   09/28/2018 21.0 7.0 - 30.0 mg/dL Final   07/31/2018 21.0 7.0 - 30.0 mg/dL Final   01/25/2018 20.0 7.0 - 30.0 mg/dL Final     Hemoglobin   Date Value Ref Range Status   03/21/2023 11.0 (L) 13.3 - 17.7 g/dL Final   10/30/2019 10.5 (L) 14.0 - 18.0 g/dL Final   10/27/2019 11.3 (L) 14.0 - 18.0 g/dL Final   09/28/2018 13.4 13.3 - 17.7 g/dL Final   07/31/2018 13.3 13.3 - 17.7 g/dL Final   01/25/2018 13.6 13.3 - 17.7 g/dL Final     Platelet Count   Date Value Ref Range Status   03/21/2023 207 150 - 450 10e3/uL Final   10/30/2019 333 140 - 440 thou/uL Final   10/27/2019 327 140 - 440 thou/uL Final     BNP   Date Value Ref Range Status   06/16/2018 59 0 - 93 pg/mL Final     INR   Date Value Ref Range Status   10/30/2019 1.17 (H) 0.90 - 1.10 Final   06/16/2018 0.95 0.90 - 1.10 Final     30 minutes  spent on the day of encounter doing chart review, history and exam, documentation, and further activities as noted.     JUNIOR LaurentC  VASCULAR SURGERY

## 2024-06-05 ENCOUNTER — OFFICE VISIT (OUTPATIENT)
Dept: FAMILY MEDICINE | Facility: CLINIC | Age: 89
End: 2024-06-05
Payer: COMMERCIAL

## 2024-06-05 VITALS
TEMPERATURE: 98 F | HEART RATE: 65 BPM | DIASTOLIC BLOOD PRESSURE: 77 MMHG | HEIGHT: 65 IN | OXYGEN SATURATION: 95 % | BODY MASS INDEX: 26.33 KG/M2 | SYSTOLIC BLOOD PRESSURE: 170 MMHG | WEIGHT: 158 LBS | RESPIRATION RATE: 16 BRPM

## 2024-06-05 DIAGNOSIS — F02.A3 MILD LATE ONSET ALZHEIMER'S DEMENTIA WITH MOOD DISTURBANCE (H): Primary | ICD-10-CM

## 2024-06-05 DIAGNOSIS — I73.9 PERIPHERAL VASCULAR DISEASE (H): ICD-10-CM

## 2024-06-05 DIAGNOSIS — G30.1 MILD LATE ONSET ALZHEIMER'S DEMENTIA WITH MOOD DISTURBANCE (H): Primary | ICD-10-CM

## 2024-06-05 DIAGNOSIS — F32.1 CURRENT MODERATE EPISODE OF MAJOR DEPRESSIVE DISORDER WITHOUT PRIOR EPISODE (H): ICD-10-CM

## 2024-06-05 PROCEDURE — G2211 COMPLEX E/M VISIT ADD ON: HCPCS | Performed by: FAMILY MEDICINE

## 2024-06-05 PROCEDURE — 99215 OFFICE O/P EST HI 40 MIN: CPT | Performed by: FAMILY MEDICINE

## 2024-06-05 PROCEDURE — 99417 PROLNG OP E/M EACH 15 MIN: CPT | Performed by: FAMILY MEDICINE

## 2024-06-05 RX ORDER — CITALOPRAM HYDROBROMIDE 20 MG/1
20 TABLET ORAL DAILY
Qty: 90 TABLET | Refills: 3 | Status: SHIPPED | OUTPATIENT
Start: 2024-06-05 | End: 2024-08-06

## 2024-06-05 ASSESSMENT — ENCOUNTER SYMPTOMS: CONSTITUTIONAL NEGATIVE: 1

## 2024-06-05 NOTE — PROGRESS NOTES
Assessment and Plan     1. 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  His wife and family is navigating the move and so is the patient even with his disagreements.  I encouraged the family to take the opportunity to reduce to 1 car instead of 2.  Discussed methods to accomplish this.  Reviewed recommendations from occupational therapy about his driving.  Family and I agree that he should likely finish driving by age 89.  Patient is not currently on board with this.  Recheck in 4 to 6 weeks.  Discussed travel opportunities with the patient and his wife.    2. Current moderate episode of major depressive disorder without prior episode (H)  It looks like his citalopram dropped off across the past few months and not sure why.  Restart today.  Continue the methylphenidate and they may contact me for refills as needed.  Family has not been able to figure out the 3-month prescriptions.  - citalopram (CELEXA) 20 MG tablet; Take 1 tablet (20 mg) by mouth daily  Dispense: 90 tablet; Refill: 3    3. Peripheral vascular disease (H24)  Somewhere along the way his aspirin was forgotten or discontinued.  Continue for his peripheral arterial disease and history of coronary artery disease.  Discussed with patient and family that this is safe to take with acetaminophen.  - Aspirin 81 MG CAPS    Plan to likely repeat his bilateral shoulder injections in the coming months.  Last injection was April.    55 minutes spent on the date of the encounter doing chart review, history and exam, documentation, and further activities as noted above.  The longitudinal plan of care for the diagnosis(es)/condition(s) as documented were addressed during this visit. Due to the added complexity in care, I will continue to support Zack in the subsequent management and with ongoing continuity of care.  Options for treatment and follow-up care were reviewed with the patient and/or guardian. Zack Johns  and/or guardian engaged in the decision making process and verbalized understanding of the options discussed and agreed with the final plan. I answered all of their questions.    This note was created with the aid of dictation software. Any mistakes are unintentional.    Haim Galaviz III, MD, FAAFP  [facility] Faculty  06/05/24 12:11 PM           HPI:       Zack Johns is a 89 year old  male  Patient presents with:  Follow Up: Follow up Dorie        Continues to bathe twice weekly with the health aides come.  They have started their move to their apartment and he is doing okay with this.  They do not currently have a parking spot reserved for them at the new apartment building.  Continues to be very forgetful.  Is currently resistant to wearing his hearing aids regularly but [facility].  Did complete the driving evaluation at the [place] again.  Continues on the treadmill for 17 minutes about 5 times a week.    Mood is doing okay.    Attended vascular surgery appointment and they noted that he was no longer taking his aspirin.  They want to make sure that this is safe with me.         PMHX:     Patient Active Problem List   Diagnosis    Actinic keratosis    Cardiovascular disease    Essential hypertension, benign    CT Lung Pulmonary Nodule Multiple    Prediabetes    Diverticulosis of large intestine    Hyperlipidemia    Peripheral vascular disease (H24)    Internal hemorrhoids    Local infection of skin and subcutaneous tissue    Disorder of bursae and tendons in shoulder region    Synovial cyst    Trigger finger, acquired    Osteoarthritis of glenohumeral joint    Syncope    Abnormal cardiovascular stress test    Chest pain    Coronary atherosclerosis    Upper respiratory tract infection, unspecified type    Neck pain    Chronic total occlusion of native coronary artery       Current Outpatient Medications   Medication Sig Dispense  Refill    acetaminophen (TYLENOL) 500 MG tablet Take 500-1,000 mg by mouth every 6 hours as needed      Aspirin 81 MG CAPS       atorvastatin (LIPITOR) 40 MG tablet Take 1 tablet (40 mg) by mouth daily 90 tablet 3    citalopram (CELEXA) 20 MG tablet Take 1 tablet (20 mg) by mouth daily 90 tablet 3    donepezil (ARICEPT) 10 MG tablet TAKE 1 TABLET(10 MG) BY MOUTH AT BEDTIME 90 tablet 3    lisinopril-hydrochlorothiazide (ZESTORETIC) 20-12.5 MG tablet Take 1 tablet by mouth daily 90 tablet 1    methylphenidate (RITALIN) 5 MG tablet Take 1 tablet (5 mg) by mouth 2 times daily 60 tablet 0    metoprolol tartrate (LOPRESSOR) 25 MG tablet Take 0.5 tablets (12.5 mg) by mouth 2 times daily 90 tablet 3    vitamin D3 (CHOLECALCIFEROL) 50 mcg (2000 units) tablet Take 1 tablet by mouth daily      zinc 50 MG TABS Take 50 mg by mouth daily.      FLUZONE HIGH-DOSE QUADRIVALENT 0.7 ML JEFFERY injection  (Patient not taking: Reported on 12/12/2023)      Multiple Vitamin (MULTIVITAMIN) per tablet Take 1 tablet by mouth daily.      nitroGLYcerin (NITROSTAT) 0.4 MG sublingual tablet Place 1 tablet (0.4 mg) under the tongue every 5 minutes as needed for chest pain Repeat at 5 min intervals x 2 if needed (Patient not taking: Reported on 12/12/2023) 30 tablet 0    PFIZER COVID-19 VAC BIVALENT 30 MCG/0.3ML injection  (Patient not taking: Reported on 12/12/2023)      PFIZER-BIONT COVID-19 VAC-KEVIN 30 MCG/0.3ML injection  (Patient not taking: Reported on 12/12/2023)         Social History     Socioeconomic History    Marital status:      Spouse name: Not on file    Number of children: Not on file    Years of education: Not on file    Highest education level: Not on file   Occupational History    Not on file   Tobacco Use    Smoking status: Former     Current packs/day: 0.00     Average packs/day: 2.0 packs/day for 40.0 years (80.0 ttl pk-yrs)     Types: Cigarettes     Start date: 4/10/1944     Quit date: 4/10/1984     Years since quitting:  40.1    Smokeless tobacco: Never   Substance and Sexual Activity    Alcohol use: Yes     Alcohol/week: 6.0 standard drinks of alcohol     Comment: occasionally.    Drug use: No    Sexual activity: Not on file   Other Topics Concern    Parent/sibling w/ CABG, MI or angioplasty before 65F 55M? 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        Had gotten into typing to meet girls.        Worked for [employer] after leaving the [employer].     Social Determinants of Health     Financial Resource Strain: Low Risk  (6/5/2024)    Financial Resource Strain     Within the past 12 months, have you or your family members you live with been unable to get utilities (heat, electricity) when it was really needed?: No   Food Insecurity: Low Risk  (6/5/2024)    Food Insecurity     Within the past 12 months, did you worry that your food would run out before you got money to buy more?: No     Within the past 12 months, did the food you bought just not last and you didn t have money to get more?: No   Transportation Needs: Low Risk  (6/5/2024)    Transportation Needs     Within the past 12 months, has lack of transportation kept you from medical appointments, getting your medicines, non-medical meetings or appointments, work, or from getting things that you need?: No   Physical Activity: Not on file   Stress: Not on file   Social Connections: Not on file   Interpersonal Safety: Unknown (4/16/2024)    Interpersonal Safety     Do you feel physically and emotionally safe where you currently live?: Patient unable to answer     Within the past 12 months, have you been hit, slapped, kicked or otherwise physically hurt by someone?: Patient unable to answer     Within the past 12 months, have you been humiliated or emotionally abused in other ways by your partner or ex-partner?: Patient unable to answer   Housing Stability: Low Risk  (6/5/2024)    Housing Stability     Do you have housing? :  "Yes     Are you worried about losing your housing?: No       Allergies   Allergen Reactions    Pletal [Cilostazol]        No results found for this or any previous visit (from the past 24 hour(s)).         Review of Systems:     Review of Systems   Constitutional: Negative.             Physical Exam:     Vitals:    06/05/24 0952   BP: (!) 170/77   Pulse: 65   Resp: 16   Temp: 98  F (36.7  C)   TempSrc: Oral   SpO2: 95%   Weight: 71.7 kg (158 lb)   Height: 1.651 m (5' 5\")     Body mass index is 26.29 kg/m .    Physical Exam  Vitals and nursing note reviewed.   Constitutional:       General: He is not in acute distress.     Appearance: Normal appearance. He is not ill-appearing, toxic-appearing or diaphoretic.   HENT:      Right Ear: There is no impacted cerumen.      Left Ear: There is no impacted cerumen.   Pulmonary:      Effort: No respiratory distress.   Neurological:      Mental Status: He is alert and oriented to person, place, and time.           "

## 2024-06-17 DIAGNOSIS — F32.1 CURRENT MODERATE EPISODE OF MAJOR DEPRESSIVE DISORDER WITHOUT PRIOR EPISODE (H): ICD-10-CM

## 2024-06-17 RX ORDER — METHYLPHENIDATE HYDROCHLORIDE 5 MG/1
5 TABLET ORAL 2 TIMES DAILY
Qty: 60 TABLET | Refills: 0 | Status: SHIPPED | OUTPATIENT
Start: 2024-06-17 | End: 2024-07-22

## 2024-06-17 NOTE — TELEPHONE ENCOUNTER
[facility] phone call message- medication clarification/question:    Full Medication Name: methylphenidate (RITALIN) 5 MG tablet     Question: Spouse called requesting refill on medication, if able to fill please send to pharmacy thank you.    Pharmacy confirmed as    [id number] [address] AT [facility] [address]: Yes    OK to leave a message on voice mail? Yes    Primary language: English      needed? No    Call taken on June 17, 2024 at 9:44 AM by Navi Conteh

## 2024-07-22 DIAGNOSIS — F32.1 CURRENT MODERATE EPISODE OF MAJOR DEPRESSIVE DISORDER WITHOUT PRIOR EPISODE (H): ICD-10-CM

## 2024-07-22 RX ORDER — METHYLPHENIDATE HYDROCHLORIDE 5 MG/1
5 TABLET ORAL 2 TIMES DAILY
Qty: 60 TABLET | Refills: 0 | Status: SHIPPED | OUTPATIENT
Start: 2024-07-22 | End: 2024-08-28

## 2024-07-22 NOTE — TELEPHONE ENCOUNTER
1. 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  - methylphenidate (RITALIN) 5 MG tablet; Take 1 tablet (5 mg) by mouth 2 times daily  Dispense: 60 tablet; Refill: 0      Haim Galaviz III, MD, FAAFP  [facility] Faculty  07/22/24 10:16 AM

## 2024-07-22 NOTE — TELEPHONE ENCOUNTER
Outside RN standing orders due to type of medication, no protocols for RN. Routing to PCP to review and fill if appropriate. Shayy BRAN

## 2024-08-06 ENCOUNTER — OFFICE VISIT (OUTPATIENT)
Dept: FAMILY MEDICINE | Facility: CLINIC | Age: 89
End: 2024-08-06
Payer: COMMERCIAL

## 2024-08-06 VITALS
HEIGHT: 65 IN | WEIGHT: 152 LBS | OXYGEN SATURATION: 97 % | SYSTOLIC BLOOD PRESSURE: 179 MMHG | HEART RATE: 70 BPM | RESPIRATION RATE: 18 BRPM | TEMPERATURE: 97.6 F | DIASTOLIC BLOOD PRESSURE: 64 MMHG | BODY MASS INDEX: 25.33 KG/M2

## 2024-08-06 DIAGNOSIS — G30.1 MILD LATE ONSET ALZHEIMER'S DEMENTIA WITH MOOD DISTURBANCE (H): Primary | ICD-10-CM

## 2024-08-06 DIAGNOSIS — M25.511 CHRONIC PAIN OF BOTH SHOULDERS: ICD-10-CM

## 2024-08-06 DIAGNOSIS — F02.A3 MILD LATE ONSET ALZHEIMER'S DEMENTIA WITH MOOD DISTURBANCE (H): Primary | ICD-10-CM

## 2024-08-06 DIAGNOSIS — F32.1 CURRENT MODERATE EPISODE OF MAJOR DEPRESSIVE DISORDER WITHOUT PRIOR EPISODE (H): ICD-10-CM

## 2024-08-06 DIAGNOSIS — M25.512 CHRONIC PAIN OF BOTH SHOULDERS: ICD-10-CM

## 2024-08-06 DIAGNOSIS — G89.29 CHRONIC PAIN OF BOTH SHOULDERS: ICD-10-CM

## 2024-08-06 PROCEDURE — 99215 OFFICE O/P EST HI 40 MIN: CPT | Mod: 25 | Performed by: FAMILY MEDICINE

## 2024-08-06 PROCEDURE — 99417 PROLNG OP E/M EACH 15 MIN: CPT | Performed by: FAMILY MEDICINE

## 2024-08-06 PROCEDURE — 20610 DRAIN/INJ JOINT/BURSA W/O US: CPT | Performed by: FAMILY MEDICINE

## 2024-08-06 RX ORDER — CITALOPRAM HYDROBROMIDE 20 MG/1
20 TABLET ORAL DAILY
Qty: 90 TABLET | Refills: 3 | Status: SHIPPED | OUTPATIENT
Start: 2024-08-06

## 2024-08-06 RX ORDER — TRIAMCINOLONE ACETONIDE 40 MG/ML
40 INJECTION, SUSPENSION INTRA-ARTICULAR; INTRAMUSCULAR ONCE
Status: COMPLETED | OUTPATIENT
Start: 2024-08-06 | End: 2024-08-06

## 2024-08-06 RX ADMIN — TRIAMCINOLONE ACETONIDE 40 MG: 40 INJECTION, SUSPENSION INTRA-ARTICULAR; INTRAMUSCULAR at 16:48

## 2024-08-06 RX ADMIN — TRIAMCINOLONE ACETONIDE 40 MG: 40 INJECTION, SUSPENSION INTRA-ARTICULAR; INTRAMUSCULAR at 16:47

## 2024-08-06 ASSESSMENT — ENCOUNTER SYMPTOMS: CONSTITUTIONAL NEGATIVE: 1

## 2024-08-06 NOTE — PATIENT INSTRUCTIONS
Restart your citalopram (Celexa). It is once daily and can help with your mood and anxiety.    Put on clean clothes every day (unless OK'ed by Sadie)    Start wearing your hearing aides every day. It can actually help your memory.    Shower on the afternoons of:  Tuesday and Friday    Brush your teeth twice a day with toothpaste (don't disagree with the health aides).    Exercise on the treadmill per your workout calendar.    Continue to accept help from the people that love you. Listen to your hari and Sadie.    It was great to see you,    Dr. Galaviz

## 2024-08-06 NOTE — PROCEDURES
BILATERAL SUBACROMIAL INJECTION  The patient was prepped with alcohol. A 22G needle was inserted via the posteriolateral approach into the left subacromial space and 40 mg of Kenalog and 5 mL of 2% lido w/o epi was injected. The patient tolerated the procedure well and had improvement in their pain post procedure. The same procedure was repeated on the right side.    Haim Galaviz III, MD, FAAFP  [facility] Faculty  08/06/24 5:23 PM

## 2024-08-06 NOTE — PROGRESS NOTES
Assessment and Plan     1. 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  We reviewed his diagnosis, therapies to this point, and the natural history of the disease.  I encouraged them to blame any of these decisions, like cessation of driving, on my recommendations even though they agree with me.  We discussed caregiver burnout and steps required to keep the patient safe.  I also discussed with the patient the reasons behind my recommendation for him to quit driving and we agreed to disagree.  By the end of the visit, the patient was able to verbalize that he is glad on taking care of him even though we disagree.  I did also discussed with the patient and the family that he if he wanted a second opinion with our geriatrician he is welcome to that.  They did not take me up on that today.  Recheck in 4 to 6 weeks.    2. Current moderate episode of major depressive disorder without prior episode (H)  Family brought in all of his medicines today and he still does not have the Celexa even though I sent it last time.  It is possible that some of his increased agitation is related to not being on the Celexa.  If it was lost in the move and his pharmacy benefits are unable to restart, plan on switching him to escitalopram at 10 mg.  If his agitation remains after getting more used to the move, I did propose a 1 to 2-week pause of his methylphenidate to see if that is causing increased outbursts.  At this time, we will only restart the SSRI.  In my discussions with the son's only, I did briefly review that atypical antipsychotics can be used when patients are having difficulties with behaviors and that they do slightly increase the risk of mortality.  - citalopram (CELEXA) 20 MG tablet; Take 1 tablet (20 mg) by mouth daily  Dispense: 90  tablet; Refill: 3    3. Chronic pain of both shoulders  Patient and family wished for repeat injections of his shoulders.  Had decreased pain and improved range of motion postprocedure.  - Large Joint/Bursa injection and/or drainage - Bilateral (Shoulder, Knee) [20610] [50 Mod]  - triamcinolone (KENALOG-40) injection 40 mg  - triamcinolone (KENALOG-40) injection 40 mg    57 minutes spent on the date of the encounter doing chart review, history and exam, documentation, and further activities as noted above. This does not include time required for the procedure.    This is the first time in a long time that the patient's blood pressure has been elevated, recheck at the next visit.  I suspect this is related to his agitation towards my recommendation of him no longer driving.  I did verify that he still has all of his antihypertensives.    Options for treatment and follow-up care were reviewed with the patient and/or guardian. Zack Johns and/or guardian engaged in the decision making process and verbalized understanding of the options discussed and agreed with the final plan. I answered all of their questions.    This note was created with the aid of dictation software. Any mistakes are unintentional.    Haim Galaviz III, MD, FAAFP  [facility] Residency Faculty  08/06/24 4:45 PM           HPI:       Zack Johns is a 89 year old  male  Patient presents with:  RECHECK: Follow up  Imm/Inj: Possible Shoulder injections?    They have successfully moved to the [facility] that they wanted to be in.  They took the same opportunity to take away the patient's car.  The patient is very angry with me and his family about this.  He does not remember his driving assessments x 2.    They brought all of his pills and they do not have the Celexa.    Information from the wife (via letter): The patient got very angry over the move, not being able to access the bank as easily and his general decreased  independence from not being able to drive, that he told his wife he wanted a divorce.  She is pretty sure he forgot about it but she is not been sleeping in the same room as him.  Otherwise, the new living situation is working out really well.  As a part of the move, they also found a large stashes of cash that the patient had hidden envelopes.  She is not sure what to do about his agitation.    Information from the sons (separately interviewed outside of the room): The patient is definitely had more anger towards everyone especially around the loss of the driving abilities.  They are in full agreement and note that he even got lost in the neighborhood.  He does not know where he lives currently.  They do not think he would find his way back to their old house.  They are not sure what to do about his agitation and would like some help.         PMHX:     Patient Active Problem List   Diagnosis    Actinic keratosis    Cardiovascular disease    Essential hypertension, benign    CT Lung Pulmonary Nodule Multiple    Prediabetes    Diverticulosis of large intestine    Hyperlipidemia    Peripheral vascular disease (H24)    Internal hemorrhoids    Local infection of skin and subcutaneous tissue    Disorder of bursae and tendons in shoulder region    Synovial cyst    Trigger finger, acquired    Osteoarthritis of glenohumeral joint    Syncope    Abnormal cardiovascular stress test    Chest pain    Coronary atherosclerosis    Upper respiratory tract infection, unspecified type    Neck pain    Chronic total occlusion of native coronary artery       Current Outpatient Medications   Medication Sig Dispense Refill    citalopram (CELEXA) 20 MG tablet Take 1 tablet (20 mg) by mouth daily 90 tablet 3    acetaminophen (TYLENOL) 500 MG tablet Take 500-1,000 mg by mouth every 6 hours as needed      Aspirin 81 MG CAPS       atorvastatin (LIPITOR) 40 MG tablet Take 1 tablet (40 mg) by mouth daily 90 tablet 3    donepezil (ARICEPT) 10 MG  tablet TAKE 1 TABLET(10 MG) BY MOUTH AT BEDTIME 90 tablet 3    FLUZONE HIGH-DOSE QUADRIVALENT 0.7 ML JEFFERY injection  (Patient not taking: Reported on 2023)      lisinopril-hydrochlorothiazide (ZESTORETIC) 20-12.5 MG tablet Take 1 tablet by mouth daily 90 tablet 1    methylphenidate (RITALIN) 5 MG tablet Take 1 tablet (5 mg) by mouth 2 times daily 60 tablet 0    metoprolol tartrate (LOPRESSOR) 25 MG tablet Take 0.5 tablets (12.5 mg) by mouth 2 times daily 90 tablet 3    Multiple Vitamin (MULTIVITAMIN) per tablet Take 1 tablet by mouth daily.      nitroGLYcerin (NITROSTAT) 0.4 MG sublingual tablet Place 1 tablet (0.4 mg) under the tongue every 5 minutes as needed for chest pain Repeat at 5 min intervals x 2 if needed (Patient not taking: Reported on 2023) 30 tablet 0    PFIZER COVID-19 VAC BIVALENT 30 MCG/0.3ML injection  (Patient not taking: Reported on 2023)      PFIZER-BIONT COVID-19 VAC-KEVIN 30 MCG/0.3ML injection  (Patient not taking: Reported on 2023)      vitamin D3 (CHOLECALCIFEROL) 50 mcg (2000 units) tablet Take 1 tablet by mouth daily      zinc 50 MG TABS Take 50 mg by mouth daily.         Social History     Socioeconomic History    Marital status:      Spouse name: Not on file    Number of children: Not on file    Years of education: Not on file    Highest education level: Not on file   Occupational History    Not on file   Tobacco Use    Smoking status: Former     Current packs/day: 0.00     Average packs/day: 2.0 packs/day for 40.0 years (80.0 ttl pk-yrs)     Types: Cigarettes     Start date: 4/10/1944     Quit date: 4/10/1984     Years since quittin.3     Passive exposure: Never    Smokeless tobacco: Never   Substance and Sexual Activity    Alcohol use: Yes     Alcohol/week: 6.0 standard drinks of alcohol     Comment: occasionally.    Drug use: No    Sexual activity: Not on file   Other Topics Concern    Parent/sibling w/ CABG, MI or angioplasty before 65F 55M? 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        Had gotten into typing to meet girls.        Worked for [employer] after leaving the [employer].     Social Determinants of Health     Financial Resource Strain: Low Risk  (6/5/2024)    Financial Resource Strain     Within the past 12 months, have you or your family members you live with been unable to get utilities (heat, electricity) when it was really needed?: No   Food Insecurity: Low Risk  (6/5/2024)    Food Insecurity     Within the past 12 months, did you worry that your food would run out before you got money to buy more?: No     Within the past 12 months, did the food you bought just not last and you didn t have money to get more?: No   Transportation Needs: Low Risk  (6/5/2024)    Transportation Needs     Within the past 12 months, has lack of transportation kept you from medical appointments, getting your medicines, non-medical meetings or appointments, work, or from getting things that you need?: No   Physical Activity: Not on file   Stress: Not on file   Social Connections: Not on file   Interpersonal Safety: Unknown (4/16/2024)    Interpersonal Safety     Do you feel physically and emotionally safe where you currently live?: Patient unable to answer     Within the past 12 months, have you been hit, slapped, kicked or otherwise physically hurt by someone?: Patient unable to answer     Within the past 12 months, have you been humiliated or emotionally abused in other ways by your partner or ex-partner?: Patient unable to answer   Housing Stability: Low Risk  (6/5/2024)    Housing Stability     Do you have housing? : Yes     Are you worried about losing your housing?: No       Allergies   Allergen Reactions    Pletal [Cilostazol]        No results found for this or any previous visit (from the past 24 hour(s)).         Review of Systems:     Review of Systems   Constitutional: Negative.             Physical  "Exam:     Vitals:    08/06/24 1447 08/06/24 1451   BP: (!) 170/71 (!) 179/64   BP Location: Left arm Left arm   Patient Position: Sitting Sitting   Pulse: 70    Resp: 18    Temp: 97.6  F (36.4  C)    TempSrc: Oral    SpO2: 97%    Weight: 68.9 kg (152 lb)    Height: 1.65 m (5' 4.96\")      Body mass index is 25.32 kg/m .    Physical Exam  Vitals and nursing note reviewed.   Constitutional:       General: He is not in acute distress.     Appearance: Normal appearance. He is not ill-appearing, toxic-appearing or diaphoretic.   Pulmonary:      Effort: No respiratory distress.   Neurological:      Mental Status: He is alert and oriented to person, place, and time.           "

## 2024-08-28 DIAGNOSIS — F32.1 CURRENT MODERATE EPISODE OF MAJOR DEPRESSIVE DISORDER WITHOUT PRIOR EPISODE (H): ICD-10-CM

## 2024-08-30 RX ORDER — METHYLPHENIDATE HYDROCHLORIDE 5 MG/1
5 TABLET ORAL 2 TIMES DAILY
Qty: 60 TABLET | Refills: 0 | Status: SHIPPED | OUTPATIENT
Start: 2024-08-30 | End: 2024-09-30

## 2024-09-16 ENCOUNTER — TELEPHONE (OUTPATIENT)
Dept: FAMILY MEDICINE | Facility: CLINIC | Age: 89
End: 2024-09-16

## 2024-09-16 ENCOUNTER — OFFICE VISIT (OUTPATIENT)
Dept: FAMILY MEDICINE | Facility: CLINIC | Age: 89
End: 2024-09-16
Payer: COMMERCIAL

## 2024-09-16 VITALS
WEIGHT: 146 LBS | OXYGEN SATURATION: 96 % | HEIGHT: 65 IN | BODY MASS INDEX: 24.32 KG/M2 | HEART RATE: 67 BPM | TEMPERATURE: 97.7 F | DIASTOLIC BLOOD PRESSURE: 75 MMHG | RESPIRATION RATE: 18 BRPM | SYSTOLIC BLOOD PRESSURE: 146 MMHG

## 2024-09-16 DIAGNOSIS — F02.A3 MILD LATE ONSET ALZHEIMER'S DEMENTIA WITH MOOD DISTURBANCE (H): Primary | ICD-10-CM

## 2024-09-16 DIAGNOSIS — I10 ESSENTIAL HYPERTENSION, BENIGN: ICD-10-CM

## 2024-09-16 DIAGNOSIS — F32.1 CURRENT MODERATE EPISODE OF MAJOR DEPRESSIVE DISORDER WITHOUT PRIOR EPISODE (H): ICD-10-CM

## 2024-09-16 DIAGNOSIS — G30.1 MILD LATE ONSET ALZHEIMER'S DEMENTIA WITH MOOD DISTURBANCE (H): Primary | ICD-10-CM

## 2024-09-16 PROCEDURE — G0008 ADMIN INFLUENZA VIRUS VAC: HCPCS | Performed by: FAMILY MEDICINE

## 2024-09-16 PROCEDURE — 90662 IIV NO PRSV INCREASED AG IM: CPT | Performed by: FAMILY MEDICINE

## 2024-09-16 PROCEDURE — 99214 OFFICE O/P EST MOD 30 MIN: CPT | Mod: 25 | Performed by: FAMILY MEDICINE

## 2024-09-16 RX ORDER — LISINOPRIL AND HYDROCHLOROTHIAZIDE 12.5; 2 MG/1; MG/1
1 TABLET ORAL DAILY
Qty: 90 TABLET | Refills: 1 | Status: SHIPPED | OUTPATIENT
Start: 2024-09-16

## 2024-09-16 RX ORDER — DONEPEZIL HYDROCHLORIDE 10 MG/1
10 TABLET, FILM COATED ORAL AT BEDTIME
Qty: 90 TABLET | Refills: 3 | Status: SHIPPED | OUTPATIENT
Start: 2024-09-16

## 2024-09-16 RX ORDER — LISINOPRIL AND HYDROCHLOROTHIAZIDE 12.5; 2 MG/1; MG/1
1 TABLET ORAL DAILY
Qty: 90 TABLET | Refills: 1 | Status: CANCELLED | OUTPATIENT
Start: 2024-09-16

## 2024-09-16 ASSESSMENT — ENCOUNTER SYMPTOMS: CONSTITUTIONAL NEGATIVE: 1

## 2024-09-16 NOTE — PROGRESS NOTES
Assessment and Plan     1. 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  Reviewed its indications and possible benefits.  Answered all their questions.  Discussed worsening of Alzheimer's when hearing aids are needed but not warm.  Encouraged him to wear them.  New prescription for the Aricept sent.  - donepezil (ARICEPT) 10 MG tablet; Take 1 tablet (10 mg) by mouth at bedtime.  Dispense: 90 tablet; Refill: 3    2. Current moderate episode of major depressive disorder without prior episode (H)  Significantly improved now that he is back on the Celexa.  Additionally, continue the meds.  They will continue to request refills as needed.    3. Essential hypertension, benign  Controlled (when on his medicines). Continue current management. Recheck in 6 months.  - lisinopril-hydrochlorothiazide (ZESTORETIC) 20-12.5 MG tablet; Take 1 tablet by mouth daily.  Dispense: 90 tablet; Refill: 1    34 minutes spent on the date of the encounter doing chart review, history and exam, documentation, and further activities as noted above.    Options for treatment and follow-up care were reviewed with the patient and/or guardian. Zack Johns and/or guardian engaged in the decision making process and verbalized understanding of the options discussed and agreed with the final plan. I answered all of their questions.    This note was created with the aid of dictation software. Any mistakes are unintentional.    Haim Galaviz III, MD, FAAFP  [facility]  09/16/24 2:22 PM           HPI:       Zack Johns is a 89 year old  male  Patient presents with:  RECHECK  Medication Request: Linsinopril new prescription    Medications have been brought for review.  There is no Aricept bottle in the bunch.    Willing to get flu shot.    The patient denies signs and symptoms of orthostatic hypotension.    Mood  is so much better since restarting the Celexa.  Additionally, he is adapting well to the fact that they are selling their house and that there is a new apartment.  They have not had as many fights about his driving recently.  Overall, things are going much better.    Still is not wearing his hearing aids.         PMHX:     Patient Active Problem List   Diagnosis    Actinic keratosis    Cardiovascular disease    Essential hypertension, benign    CT Lung Pulmonary Nodule Multiple    Prediabetes    Diverticulosis of large intestine    Hyperlipidemia    Peripheral vascular disease (H24)    Internal hemorrhoids    Local infection of skin and subcutaneous tissue    Disorder of bursae and tendons in shoulder region    Synovial cyst    Trigger finger, acquired    Osteoarthritis of glenohumeral joint    Syncope    Abnormal cardiovascular stress test    Chest pain    Coronary atherosclerosis    Upper respiratory tract infection, unspecified type    Neck pain    Chronic total occlusion of native coronary artery       Current Outpatient Medications   Medication Sig Dispense Refill    donepezil (ARICEPT) 10 MG tablet Take 1 tablet (10 mg) by mouth at bedtime. 90 tablet 3    lisinopril-hydrochlorothiazide (ZESTORETIC) 20-12.5 MG tablet Take 1 tablet by mouth daily. 90 tablet 1    acetaminophen (TYLENOL) 500 MG tablet Take 500-1,000 mg by mouth every 6 hours as needed      Aspirin 81 MG CAPS       atorvastatin (LIPITOR) 40 MG tablet Take 1 tablet (40 mg) by mouth daily 90 tablet 3    citalopram (CELEXA) 20 MG tablet Take 1 tablet (20 mg) by mouth daily 90 tablet 3    FLUZONE HIGH-DOSE QUADRIVALENT 0.7 ML JEFFERY injection  (Patient not taking: Reported on 12/12/2023)      methylphenidate (RITALIN) 5 MG tablet Take 1 tablet (5 mg) by mouth 2 times daily. 60 tablet 0    metoprolol tartrate (LOPRESSOR) 25 MG tablet Take 0.5 tablets (12.5 mg) by mouth 2 times daily 90 tablet 3    Multiple Vitamin (MULTIVITAMIN) per tablet Take 1 tablet  by mouth daily.      nitroGLYcerin (NITROSTAT) 0.4 MG sublingual tablet Place 1 tablet (0.4 mg) under the tongue every 5 minutes as needed for chest pain Repeat at 5 min intervals x 2 if needed (Patient not taking: Reported on 2023) 30 tablet 0    PFIZER COVID-19 VAC BIVALENT 30 MCG/0.3ML injection  (Patient not taking: Reported on 2023)      PFIZER-BIONT COVID-19 VAC-KEVIN 30 MCG/0.3ML injection  (Patient not taking: Reported on 2023)      vitamin D3 (CHOLECALCIFEROL) 50 mcg (2000 units) tablet Take 1 tablet by mouth daily      zinc 50 MG TABS Take 50 mg by mouth daily.         Social History     Socioeconomic History    Marital status:      Spouse name: Not on file    Number of children: Not on file    Years of education: Not on file    Highest education level: Not on file   Occupational History    Not on file   Tobacco Use    Smoking status: Former     Current packs/day: 0.00     Average packs/day: 2.0 packs/day for 40.0 years (80.0 ttl pk-yrs)     Types: Cigarettes     Start date: 4/10/1944     Quit date: 4/10/1984     Years since quittin.4     Passive exposure: Never    Smokeless tobacco: Never   Substance and Sexual Activity    Alcohol use: Yes     Alcohol/week: 6.0 standard drinks of alcohol     Comment: occasionally.    Drug use: No    Sexual activity: Not on file   Other Topics Concern    Parent/sibling w/ CABG, MI or angioplasty before 65F 55M? 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        Had gotten into typing to meet girls.        Worked for [employer] after leaving the [employer].     Social Determinants of Health     Financial Resource Strain: Low Risk  (2024)    Financial Resource Strain     Within the past 12 months, have you or your family members you live with been unable to get utilities (heat, electricity) when it was really needed?: No   Food Insecurity: Low Risk  (2024)    Food Insecurity     Within  "the past 12 months, did you worry that your food would run out before you got money to buy more?: No     Within the past 12 months, did the food you bought just not last and you didn t have money to get more?: No   Transportation Needs: Low Risk  (6/5/2024)    Transportation Needs     Within the past 12 months, has lack of transportation kept you from medical appointments, getting your medicines, non-medical meetings or appointments, work, or from getting things that you need?: No   Physical Activity: Not on file   Stress: Not on file   Social Connections: Not on file   Interpersonal Safety: Unknown (4/16/2024)    Interpersonal Safety     Do you feel physically and emotionally safe where you currently live?: Patient unable to answer     Within the past 12 months, have you been hit, slapped, kicked or otherwise physically hurt by someone?: Patient unable to answer     Within the past 12 months, have you been humiliated or emotionally abused in other ways by your partner or ex-partner?: Patient unable to answer   Housing Stability: Low Risk  (6/5/2024)    Housing Stability     Do you have housing? : Yes     Are you worried about losing your housing?: No       Allergies   Allergen Reactions    Pletal [Cilostazol]        No results found for this or any previous visit (from the past 24 hour(s)).         Review of Systems:     Review of Systems   Constitutional: Negative.             Physical Exam:     Vitals:    09/16/24 1339 09/16/24 1341   BP: (!) 155/69 (!) 146/75   BP Location: Right arm Right arm   Patient Position: Sitting Sitting   Pulse: 67    Resp: 18    Temp: 97.7  F (36.5  C)    TempSrc: Oral    SpO2: 96%    Weight: 66.2 kg (146 lb)    Height: 1.65 m (5' 4.96\")      Body mass index is 24.32 kg/m .    Physical Exam  Vitals and nursing note reviewed.   Constitutional:       General: He is not in acute distress.     Appearance: Normal appearance. He is not ill-appearing, toxic-appearing or diaphoretic. "   Pulmonary:      Effort: No respiratory distress.   Neurological:      Mental Status: He is alert and oriented to person, place, and time.

## 2024-09-16 NOTE — TELEPHONE ENCOUNTER
Patient's wife called to notify PCP that patient no longer needs medication refill for donepezil (ARICEPT) 10 MG tablet and asked that the refill be canceled. Patient's wife stated that the medication was found in the drawer at home. Please cancel the prescription order. Thank you!

## 2024-09-16 NOTE — PROGRESS NOTES
RX requested for lisinopril-hydrochlorothiazide, pended in current encounter since medication outside RN standing orders due to elevated BP and abnormal GFR within the last 6 months. PCP to review and fill if appropriate. Shayy BRAN

## 2024-09-16 NOTE — PROGRESS NOTES
Prior to immunization administration, verified patients identity using patient s name and date of birth. Please see Immunization Activity for additional information.     Screening Questionnaire for Adult Immunization    Are you sick today?   No   Do you have allergies to medications, food, a vaccine component or latex?   No   Have you ever had a serious reaction after receiving a vaccination?   No   Do you have a long-term health problem with heart, lung, kidney, or metabolic disease (e.g., diabetes), asthma, a blood disorder, no spleen, complement component deficiency, a cochlear implant, or a spinal fluid leak?  Are you on long-term aspirin therapy?   No   Do you have cancer, leukemia, HIV/AIDS, or any other immune system problem?   No   Do you have a parent, brother, or sister with an immune system problem?   No   In the past 3 months, have you taken medications that affect  your immune system, such as prednisone, other steroids, or anticancer drugs; drugs for the treatment of rheumatoid arthritis, Crohn s disease, or psoriasis; or have you had radiation treatments?   No   Have you had a seizure, or a brain or other nervous system problem?   No   During the past year, have you received a transfusion of blood or blood    products, or been given immune (gamma) globulin or antiviral drug?   No   For women: Are you pregnant or is there a chance you could become       pregnant during the next month?   No   Have you received any vaccinations in the past 4 weeks?   No     Immunization questionnaire answers were all negative.      Patient instructed to remain in clinic for 15 minutes afterwards, and to report any adverse reactions.     Screening performed by Tank Higginbotham MA on 9/16/2024 at 2:32 PM.

## 2024-09-16 NOTE — PATIENT INSTRUCTIONS
Restart your donepezil (Aricept). It is once daily and can help your memory not get worse as quickly.    Put on clean clothes every day (unless OK'ed by Sadie).    Start wearing your hearing aides every day. This is important for your memory.    Shower on the afternoons of:  Tuesday and Friday    Brush your teeth twice a day with toothpaste (don't disagree with the health aides).    Exercise on the treadmill per your workout calendar.    Continue to accept help from the people that love you. Listen to your hari and  Sadie.    It was great to see you,    Dr. Galaviz

## 2024-09-30 ENCOUNTER — APPOINTMENT (OUTPATIENT)
Dept: CT IMAGING | Facility: HOSPITAL | Age: 89
End: 2024-09-30
Payer: COMMERCIAL

## 2024-09-30 ENCOUNTER — ANCILLARY PROCEDURE (OUTPATIENT)
Dept: ULTRASOUND IMAGING | Facility: HOSPITAL | Age: 89
End: 2024-09-30
Payer: COMMERCIAL

## 2024-09-30 ENCOUNTER — APPOINTMENT (OUTPATIENT)
Dept: RADIOLOGY | Facility: HOSPITAL | Age: 89
End: 2024-09-30
Payer: COMMERCIAL

## 2024-09-30 ENCOUNTER — HOSPITAL ENCOUNTER (EMERGENCY)
Facility: HOSPITAL | Age: 89
Discharge: HOME | End: 2024-09-30
Payer: COMMERCIAL

## 2024-09-30 VITALS
SYSTOLIC BLOOD PRESSURE: 139 MMHG | BODY MASS INDEX: 24.32 KG/M2 | DIASTOLIC BLOOD PRESSURE: 64 MMHG | RESPIRATION RATE: 20 BRPM | TEMPERATURE: 98.3 F | HEART RATE: 59 BPM | WEIGHT: 146 LBS | OXYGEN SATURATION: 97 %

## 2024-09-30 DIAGNOSIS — M25.521 RIGHT ELBOW PAIN: ICD-10-CM

## 2024-09-30 DIAGNOSIS — S01.81XA FACIAL LACERATION, INITIAL ENCOUNTER: ICD-10-CM
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
## 2024-09-30 DIAGNOSIS — S51.011A SKIN TEAR OF RIGHT ELBOW WITHOUT COMPLICATION, INITIAL ENCOUNTER: ICD-10-CM

## 2024-09-30 DIAGNOSIS — S09.90XA CLOSED HEAD INJURY, INITIAL ENCOUNTER: ICD-10-CM

## 2024-09-30 DIAGNOSIS — F32.1 CURRENT MODERATE EPISODE OF MAJOR DEPRESSIVE DISORDER WITHOUT PRIOR EPISODE (H): ICD-10-CM

## 2024-09-30 PROCEDURE — 72125 CT NECK SPINE W/O DYE: CPT

## 2024-09-30 PROCEDURE — 90471 IMMUNIZATION ADMIN: CPT

## 2024-09-30 PROCEDURE — 12002 RPR S/N/AX/GEN/TRNK2.6-7.5CM: CPT

## 2024-09-30 PROCEDURE — 73080 X-RAY EXAM OF ELBOW: CPT | Mod: RT

## 2024-09-30 PROCEDURE — 99285 EMERGENCY DEPT VISIT HI MDM: CPT | Mod: 25

## 2024-09-30 PROCEDURE — 93005 ELECTROCARDIOGRAM TRACING: CPT

## 2024-09-30 PROCEDURE — 250N000011 HC RX IP 250 OP 636

## 2024-09-30 PROCEDURE — 70450 CT HEAD/BRAIN W/O DYE: CPT

## 2024-09-30 PROCEDURE — 12011 RPR F/E/E/N/L/M 2.5 CM/<: CPT

## 2024-09-30 PROCEDURE — 90715 TDAP VACCINE 7 YRS/> IM: CPT

## 2024-09-30 RX ADMIN — CLOSTRIDIUM TETANI TOXOID ANTIGEN (FORMALDEHYDE INACTIVATED), CORYNEBACTERIUM DIPHTHERIAE TOXOID ANTIGEN (FORMALDEHYDE INACTIVATED), BORDETELLA PERTUSSIS TOXOID ANTIGEN (GLUTARALDEHYDE INACTIVATED), BORDETELLA PERTUSSIS FILAMENTOUS HEMAGGLUTININ ANTIGEN (FORMALDEHYDE INACTIVATED), BORDETELLA PERTUSSIS PERTACTIN ANTIGEN, AND BORDETELLA PERTUSSIS FIMBRIAE 2/3 ANTIGEN 0.5 ML: 5; 2; 2.5; 5; 3; 5 INJECTION, SUSPENSION INTRAMUSCULAR at 18:01

## 2024-09-30 ASSESSMENT — ACTIVITIES OF DAILY LIVING (ADL)
ADLS_ACUITY_SCORE: 35

## 2024-09-30 ASSESSMENT — COLUMBIA-SUICIDE SEVERITY RATING SCALE - C-SSRS
6. HAVE YOU EVER DONE ANYTHING, STARTED TO DO ANYTHING, OR PREPARED TO DO ANYTHING TO END YOUR LIFE?: NO
1. IN THE PAST MONTH, HAVE YOU WISHED YOU WERE DEAD OR WISHED YOU COULD GO TO SLEEP AND NOT WAKE UP?: NO
2. HAVE YOU ACTUALLY HAD ANY THOUGHTS OF KILLING YOURSELF IN THE PAST MONTH?: NO

## 2024-09-30 NOTE — ED NOTES
Bed: JNED-05  Expected date: 9/30/24  Expected time: 4:47 PM  Means of arrival:   Comments:  [facility] 89 yo M Syncope, head lac, on thinners

## 2024-09-30 NOTE — ED PROVIDER NOTES
EMERGENCY DEPARTMENT ENCOUNTER      NAME: Zack Johns  AGE: 89 year old male  YOB: 1934  MRN: [id number]  EVALUATION DATE & TIME: 9/30/2024  5:01 PM    PCP: Haim Galaviz    ED PROVIDER: Charly Schaeffer MD      Chief Complaint   Patient presents with    Fall         FINAL IMPRESSION:  1. Fall, initial encounter    2. Facial laceration, initial encounter    3. Closed head injury, initial encounter    4. Right elbow pain    5. Skin tear of right elbow without complication, initial encounter          ED COURSE & MEDICAL DECISION MAKING:    Pertinent Labs & Imaging studies reviewed. (See chart for details)  89 year old male presents to the Emergency Department for evaluation of fall and laceration.  Differential diagnosis considered Skull fracture, subdural hematoma, epidural hematoma, ICH, basilar skull fracture, septal hematoma, facial fracture, cervical spine fracture, spinal cord injury, pneumothorax, hemothorax, rib fracture, AAA, intra-abdominal bleeding, perforated viscus, intra-abdominal hematoma, fracture, dislocation, nerve injury, arterial injury, compartment, laceration.     ED Course as of 09/30/24 1854   Mon Sep 30, 2024   1703 I met with the patient for initial interview and encounter. We discussed a plan for treatment and diagnostic interventions.  89-year-old male with a history of CAD, hypertension, dementia is presenting after a fall.  Patient is in the bathroom was standing up and then had a fall.  Patient is unclear if she had a syncopal episode however denies loss of consciousness however cannot exactly tell me the mechanism in which he fell.  He was able to get up off the ground without up with his wife.  He had no seizure-like activity, postictal.  Currently does not have symptoms.  He has right elbow skin tear and a laceration to his right lateral eyebrow.  Trauma exam is otherwise unremarkable.  Will update his tetanus is been greater than 10 years.  Laceration of the right  elbow will be repaired with Steri-Strips.  The right eyebrow laceration may require sutures.  Will obtain head CT, neck CT and right elbow x-ray.  Will also obtain an EKG.   1710 Offered blood work to find possible causes of the patient's fall however patient defers blood work.    1717 EKG shows sinus rhythm with sinus arrhythmia, low QRS voltage which is new from prior EKG from 2019.  Will obtain a bedside echocardiogram to evaluate for pericardial effusion.   1806 I rechecked and updated the patient on results.     Bedside ultrasound did not reveal pericardial effusion.  His EKG low QRS likely secondary to body habitus.    Trauma imaging was unremarkable.   1833 Lacerations were repaired as below.  Will attempt to ambulate the patient as traumatic imaging is unremarkable.  Tetanus was updated.   1853 Patient was able to ambulate independently. No syncope. Will be discharged home.        Adult Minor Head Trauma:Age 65 years or older    I discussed the plan for discharge with the patient, and patient is agreeable. We discussed supportive cares at home and reasons for return to the ER including new or worsening symptoms - all questions and concerns addressed to the best of my ability. Strict return precautions discussed. Patient to be discharged by RN.    At the conclusion of the encounter I discussed the results of all of the tests and the disposition. The questions were answered. The patient or family acknowledged understanding and was agreeable with the care plan.     MEDICATIONS GIVEN IN THE EMERGENCY:  Medications   Tdap (tetanus-diphtheria-acell pertussis) (ADACEL) injection 0.5 mL (0.5 mLs Intramuscular $Given 9/30/24 1801)       NEW PRESCRIPTIONS STARTED AT TODAY'S ER VISIT  New Prescriptions    No medications on file     Modified Medications    No medications on file       =================================================================    HPI    Patient information was obtained from: EMS, the patient      Use of : N/A         Zack Johns is a 89 year old male with a pertinent history of hypertension, chronic total occlusion of native coronary artery, cardiac arrest, CAD, type 2 diabetes, and arthritis who presents to this ED for evaluation of a fall.     Per EMS, the patient arrives from home for evaluation of an unwitnessed fall, maybe syncopal episode. He hit his head and right elbow. He takes baby aspirin. Unsure if the patient is on blood thinners. Blood sugar 119. Normal vitals. The patient lives with his wife.     Per patient, he cannot recall the fall. Denies loss of consciousness. States after the fall, his wife had to help him stand as he used the toilet to pull himself up. Denies chest pain and shortness of breath prior to the fall.     No history of atrial fibrillation. The patient states is not on blood thinners.     He denies nausea, vomiting, neck pain, headache, weakness to extremities, and any other complaints at this time.         PHYSICAL EXAM    /64   Pulse 59   Temp 98.3  F (36.8  C) (Oral)   Resp 20   Wt 66.2 kg (146 lb)   SpO2 97%   BMI 24.32 kg/m      /64   Pulse 59   Temp 98.3  F (36.8  C) (Oral)   Resp 20   Wt 66.2 kg (146 lb)   SpO2 97%   BMI 24.32 kg/m      General Appearance: Alert, cooperative, normal speech and facial symmetry,  appears stated age.     Primary survey:     Airway: patent  Breath sounds: bilateral breath sounds  Cardiovascular: 2+ radial pulses and DP pulses  Disability: GCS 15    Secondary survey    Head:  Normocephalic, without obvious abnormality. 2 cm laceration to right lateral eyebrow.   Eyes:  PERRL, pupils midsized, conjunctiva/corneas clear, EOM's intact, no orbital injury  ENT:  No obvious facial deformity.  No tenderness to palpation.  No epistaxis.  Extraocular movements are intact.  No evidence of orbital injury.    Neck:  No midline cervical spine tenderness.  No paraspinal tenderness.  Chest:  No tenderness or  deformity, no crepitus  Cardio:  Regular rate and rhythm, S1 and S2 normal, no murmur, rub or gallop, 2+ pulses symmetric in all extremities  Pulm:  Clear to auscultation bilaterally, respirations unlabored   Back:   no CVA tenderness, no spinal tenderness  Abdomen:  Soft, non-tender, no rebound or guarding, no pelvic pain to compression  Extremities:  No obvious deformity, all joints palpated in place with full range of motion.  No tenderness or instability.  Patient is able to bear full weight, no tenderness over joints or extremities, no cyanosis or edema, full function and range of motion, pulses equal in all extremities, normal cap refill. 4 cm skin tear to right lateral elbow.   Skin:  Skin color, texture, turgor are normal, no rashes or lesions  Neuro:  Awake, alert, responsive to voice, follows commands, normal speech, No gross motor weakness or sensory loss, moves all extremities spontaneously        LAB:  All pertinent labs reviewed and interpreted.  Results for orders placed or performed during the hospital encounter of 09/30/24   CT Head w/o Contrast    Impression    IMPRESSION:  1.  No CT evidence for acute intracranial process.  2.  Brain atrophy and presumed chronic microvascular ischemic changes as above.   CT Cervical Spine w/o Contrast    Impression    IMPRESSION: Mild degenerative anterolisthesis of C3 upon C4 and minimal degenerative anterolisthesis of C4 upon C5 again noted. Alignment is otherwise normal; however, there is straightening of normal cervical lordosis. Vertebral body heights normal. No   fractures. There is loss of disc space height and degenerative endplate spurring at C3-C4, C5-C6 and C6-C7. Facet arthropathy throughout the cervical spine. No high-grade spinal canal stenosis.   Elbow XR, G/E 3 views, right    Impression    IMPRESSION: No fracture. Normal alignment. No effusion. Mild elbow degenerative change       RADIOLOGY:  Reviewed all pertinent imaging. Please see official  radiology report.  Elbow XR, G/E 3 views, right   Final Result   IMPRESSION: No fracture. Normal alignment. No effusion. Mild elbow degenerative change      CT Cervical Spine w/o Contrast   Final Result   IMPRESSION: Mild degenerative anterolisthesis of C3 upon C4 and minimal degenerative anterolisthesis of C4 upon C5 again noted. Alignment is otherwise normal; however, there is straightening of normal cervical lordosis. Vertebral body heights normal. No    fractures. There is loss of disc space height and degenerative endplate spurring at C3-C4, C5-C6 and C6-C7. Facet arthropathy throughout the cervical spine. No high-grade spinal canal stenosis.      CT Head w/o Contrast   Final Result   IMPRESSION:   1.  No CT evidence for acute intracranial process.   2.  Brain atrophy and presumed chronic microvascular ischemic changes as above.      POC US ECHO LIMITED    (Results Pending)       EKG:    Performed at: 17:10    Impression: Sinus rhythm with sinus arrhythmia. Low voltage QRS. 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 Abnormal ECG.     Rate: 60 BPM  Rhythm: Sinus rhythm  PA Interval: 184 ms  QRS Interval: 112 ms  QTc Interval: 450/450 ms  ST Changes: None  Comparison: When compared with ECG of 27-Oct-2019 05:13, QRS duration has increased. Nonspecific T wave abnormality now evident in Inferior leads.    I have independently reviewed and interpreted the EKG(s) documented above.    PROCEDURES:   PROCEDURE: Laceration Repair   INDICATIONS: Laceration   PROCEDURE PROVIDER: Dr Charly Schaeffer   SITE: Right lateral eyebrow   TYPE/SIZE: simple, clean, and no foreign body visualized  2 cm (total length)   FUNCTIONAL ASSESSMENT: Distal sensation, circulation, and motor intact   MEDICATION: 5 mLs of 1% Lidocaine with epinephrine   PREPARATION: soaking, scrubbing, and irrigation with Normal saline   DEBRIDEMENT: no debridement   CLOSURE:  Superficial layer closed with 3 stitches of 6-0 Ethilon simple  interrupted    Total number of sutures/staples placed: 3       PROCEDURE: Laceration Repair   INDICATIONS: Laceration   PROCEDURE PROVIDER: Dr Charly Schaeffer   SITE: Right lateral eyebrow   TYPE/SIZE: simple, clean, and no foreign body visualized  4 cm (total length)   FUNCTIONAL ASSESSMENT: Distal sensation, circulation, and motor intact   MEDICATION: 3 mLs of 1% Lidocaine with epinephrine   PREPARATION: soaking, scrubbing, and irrigation with Normal saline   DEBRIDEMENT: no debridement   CLOSURE:  Superficial layer closed with 2 stitches of 6-0 Ethilon simple interrupted    Total number of sutures/staples placed: 2             Charly Schaeffer MD  [facility] EMERGENCY DEPARTMENT  [facility] [facility] [id number] [phone]   =================================================================    BILLING:  Data  Category 1  Non-ED record review, if applicable. External record reviewed: N/A     Clinical information was obtained from an independent historian. History was obtained from: Patient and EMS provided additional information in the HPI     The following testing was considered but ultimately not selected after discussion with patient/family:  Considered CBC, CMP, troponin to evaluate for other causes of patient's fall.  However believe syncope is less likely based on history and exam.  Deferred due to patient's preference     Category 2  My independent interpretation of EKG, rhythm strip, radiology study: Head CT did not reveal large intracranial hemorrhage     Category 3  Discussion of management with other physician/healthcare provider/other source: N/A       Risk  Prescription medication was considered, but ultimately not given after discussion with patient/family: I considered ordering a prescription for narcotic pain medicine.  However, I feel patient's condition can be adequately treated with non-narcotic medications and that the risk of a narcotic pain medicine  prescription outweighs the benefits.     Chronic conditions affecting care: Hypertension, Diabetes, and CAD     Care significantly affected by Social Determinants of Health: N/A     Consideration of Admission/Observation: Escalation of care including admission/observation was considered given the complexity and risk of the patient's presenting complaint, exam findings, and/or their underlying comorbidities. However, ultimately I feel the patient is safe for outpatient management with close follow up. Reasoning: Work-up reassuring, does not reveal any acute life/organ threatening processes, patient's symptoms well controlled upon reevaluation, reexamination is reassuring, vitals are stable, patient agreeable with discharge, reliable for follow-up.   Considered admission for prolonged observation however patient remained asymptomatic was able to ambulate without becoming presyncopal or have a syncopal episode.  Trauma imaging was negative and his lacerations were repaired as outlined above.  Believe patient can be followed up in the outpatient setting.           I, John Godoy, am serving as a scribe to document services personally performed byCharly Schaeffer MD based on my observation and the provider's statements to me. I, Charly Schaeffer MD, attest that John Godoy is acting in a scribe capacity, has observed my performance of the services and has documented them in accordance with my direction.     Charly Schaeffer MD  10/02/24 [id number]

## 2024-09-30 NOTE — DISCHARGE INSTRUCTIONS
You have been evaluated in the Emergency Department today for a laceration. Your laceration was repaired in the ED with sutures. Please keep the area surrounding the laceration clean and dry. Please keep the area out of the sunlight for the next 6 months to help prevent scarring. You should have the sutures removed in 7 days by your primary care physician, or at your local urgent care or ER.     Return to the Emergency Department if you experience discharge from your laceration, redness around your laceration, warmth around your laceration, fever, vomiting, numbness, tingling, or any other concerning symptoms.    Thank you for choosing us for your care.    You have been evaluated in the Emergency Department today for your head injury. Your CT scan did not show signs of bleeds or fractures in your head.  However, bleeding may be delayed and you may need to return to the emergency department for further work-up if developing new or worsening symptoms.    Please schedule an appointment for follow up with your primary care physician as directed.    Return to the Emergency Department if you experience worsening or uncontrolled pain, vision changes, recurrent vomiting, difficulty with normal activities, abnormal behavior, difficulty walking, numbness, weakness, or any other concerning symptoms.    Thank you for choosing us for your care.

## 2024-09-30 NOTE — ED NOTES
Wounds irrigated. Right elbow has 3 steri-strips that enclose wound. Laceration above right eye will need some stitches, MD aware and at bedside.

## 2024-10-01 ENCOUNTER — PATIENT OUTREACH (OUTPATIENT)
Dept: CARE COORDINATION | Facility: CLINIC | Age: 89
End: 2024-10-01
Payer: COMMERCIAL

## 2024-10-01 RX ORDER — METHYLPHENIDATE HYDROCHLORIDE 5 MG/1
5 TABLET ORAL 2 TIMES DAILY
Qty: 60 TABLET | Refills: 0 | Status: SHIPPED | OUTPATIENT
Start: 2024-10-01

## 2024-10-01 NOTE — PROGRESS NOTES
Clinic Care Coordination Contact  Follow Up Progress Note      Assessment:  The pt was recently in the ED, I called to check up on the pt and help setup a ED follow up. The pt was at [facility] for a fall. I called the pt, but he did not answer, his vm was not setup.    Care Gaps:    Health Maintenance Due   Topic Date Due    DEPRESSION ACTION PLAN  Never done    ZOSTER IMMUNIZATION (2 of 3) 04/02/2009    RSV VACCINE (1 - 1-dose 75+ series) Never done    LIPID  11/30/2022    MEDICARE ANNUAL WELLNESS VISIT  03/21/2024    BMP  03/21/2024    COVID-19 Vaccine (7 - 2024-25 season) 09/01/2024    PHQ-9  10/16/2024

## 2024-10-01 NOTE — TELEPHONE ENCOUNTER
1. 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  - methylphenidate (RITALIN) 5 MG tablet; Take 1 tablet (5 mg) by mouth 2 times daily.  Dispense: 60 tablet; Refill: 0      Haim Galaviz III, MD, FAAFP  [facility]  10/01/24 5:34 PM

## 2024-10-02 ENCOUNTER — PATIENT OUTREACH (OUTPATIENT)
Dept: CARE COORDINATION | Facility: CLINIC | Age: 89
End: 2024-10-02
Payer: COMMERCIAL

## 2024-10-02 LAB
ATRIAL RATE - MUSE: 60 BPM
DIASTOLIC BLOOD PRESSURE - MUSE: 64 MMHG
INTERPRETATION ECG - MUSE: NORMAL
P AXIS - MUSE: 82 DEGREES
PR INTERVAL - MUSE: 184 MS
QRS DURATION - MUSE: 112 MS
QT - MUSE: 450 MS
QTC - MUSE: 450 MS
R AXIS - MUSE: -2 DEGREES
SYSTOLIC BLOOD PRESSURE - MUSE: 139 MMHG
T AXIS - MUSE: 65 DEGREES
VENTRICULAR RATE- MUSE: 60 BPM

## 2024-10-02 NOTE — PROGRESS NOTES
Clinic Care Coordination Contact  Follow Up Progress Note      Assessment:   The pt was recently in the ED, I called to check up on the pt and help setup a ED follow up. The pt was at [facility] for a fall. I called and talked to the pt wife, she stated that the pt fell and cut his eye, pt is doing better. She has scheduled a follow up for the pt to get his stitches out on 10/09/2024 at 11:00am with . she does not have any other questions or concerns.     Care Gaps:    Health Maintenance Due   Topic Date Due    DEPRESSION ACTION PLAN  Never done    ZOSTER IMMUNIZATION (2 of 3) 04/02/2009    RSV VACCINE (1 - 1-dose 75+ series) Never done    LIPID  11/30/2022    MEDICARE ANNUAL WELLNESS VISIT  03/21/2024    BMP  03/21/2024    COVID-19 Vaccine (7 - 2024-25 season) 09/01/2024    PHQ-9  10/16/2024

## 2024-10-09 ENCOUNTER — TELEPHONE (OUTPATIENT)
Dept: FAMILY MEDICINE | Facility: CLINIC | Age: 89
End: 2024-10-09

## 2024-10-09 ENCOUNTER — OFFICE VISIT (OUTPATIENT)
Dept: FAMILY MEDICINE | Facility: CLINIC | Age: 89
End: 2024-10-09
Payer: COMMERCIAL

## 2024-10-09 VITALS
OXYGEN SATURATION: 97 % | SYSTOLIC BLOOD PRESSURE: 138 MMHG | TEMPERATURE: 97.9 F | HEART RATE: 51 BPM | DIASTOLIC BLOOD PRESSURE: 56 MMHG | RESPIRATION RATE: 20 BRPM

## 2024-10-09 DIAGNOSIS — Z23 NEED FOR COVID-19 VACCINE: ICD-10-CM

## 2024-10-09 DIAGNOSIS — S01.111D LACERATION OF RIGHT EYEBROW, SUBSEQUENT ENCOUNTER: ICD-10-CM

## 2024-10-09 DIAGNOSIS — F32.1 CURRENT MODERATE EPISODE OF MAJOR DEPRESSIVE DISORDER WITHOUT PRIOR EPISODE (H): Primary | ICD-10-CM

## 2024-10-09 DIAGNOSIS — S50.311D ABRASION OF RIGHT ELBOW, SUBSEQUENT ENCOUNTER: ICD-10-CM

## 2024-10-09 PROCEDURE — 90480 ADMN SARSCOV2 VAC 1/ONLY CMP: CPT | Performed by: FAMILY MEDICINE

## 2024-10-09 PROCEDURE — 91320 SARSCV2 VAC 30MCG TRS-SUC IM: CPT | Performed by: FAMILY MEDICINE

## 2024-10-09 PROCEDURE — 99417 PROLNG OP E/M EACH 15 MIN: CPT | Performed by: FAMILY MEDICINE

## 2024-10-09 PROCEDURE — 99215 OFFICE O/P EST HI 40 MIN: CPT | Mod: 25 | Performed by: FAMILY MEDICINE

## 2024-10-09 RX ORDER — METHYLPHENIDATE HYDROCHLORIDE 10 MG/1
10 TABLET ORAL EVERY MORNING
Qty: 30 TABLET | Refills: 0 | Status: SHIPPED | OUTPATIENT
Start: 2024-10-09

## 2024-10-09 ASSESSMENT — ENCOUNTER SYMPTOMS: CONSTITUTIONAL NEGATIVE: 1

## 2024-10-09 NOTE — PROGRESS NOTES
Assessment and Plan     1. Current moderate episode of major depressive disorder without prior episode (H)  Uncontrolled.  Increase morning methylphenidate to see if we get improvement in activity.  They already have a recheck scheduled for me in a couple weeks.  - methylphenidate (RITALIN) 10 MG tablet; Take 1 tablet (10 mg) by mouth every morning.  Dispense: 30 tablet; Refill: 0    2. Laceration of right eyebrow, subsequent encounter  Stitches were removed without incident.    3. Abrasion of right elbow, subsequent encounter  Discussed wound cares.  Healing well.  No evidence of infection.    4. Need for COVID-19 vaccine  - COVID-19 12+ (PFIZER)    58 minutes spent on the date of the encounter doing chart review, history and exam, documentation, and further activities as noted above. This does not include the time required for the stitch removal.    Options for treatment and follow-up care were reviewed with the patient and/or guardian. Zack Johns and/or guardian engaged in the decision making process and verbalized understanding of the options discussed and agreed with the final plan. I answered all of their questions.    This note was created with the aid of dictation software. Any mistakes are unintentional.    Haim Galaviz III, MD, FAAFP  [facility]  10/09/24 2:42 PM           HPI:       Zack Johns is a 89 year old  male  Patient presents with:  RECHECK: Stitches removed, right arm in pain. Open wound in right arm     Patient has been sleeping more throughout the day than usual.  Otherwise, his mood seems to be better.  Wife has extreme difficulty getting him off the couch to get out of their apartment to do activities.    Was in the ER slightly more than a week ago after a fall.  This fall happened immediately after going to the restroom and moving to a stand.  Patient is unaware of what happened in and around the fall.  Scans were performed and were unchanged from  previous.  Stitches were placed on the right face just lateral to the eyebrow.  Right elbow abrasion has been covered with bandages and Vaseline ointment.    Interested in his COVID shot today.         PMHX:     Patient Active Problem List   Diagnosis    Actinic keratosis    Cardiovascular disease    Essential hypertension, benign    CT Lung Pulmonary Nodule Multiple    Prediabetes    Diverticulosis of large intestine    Hyperlipidemia    Peripheral vascular disease (H)    Internal hemorrhoids    Local infection of skin and subcutaneous tissue    Disorder of bursae and tendons in shoulder region    Synovial cyst    Trigger finger, acquired    Osteoarthritis of glenohumeral joint    Syncope    Abnormal cardiovascular stress test    Chest pain    Coronary atherosclerosis    Upper respiratory tract infection, unspecified type    Neck pain    Chronic total occlusion of native coronary artery       Current Outpatient Medications   Medication Sig Dispense Refill    methylphenidate (RITALIN) 10 MG tablet Take 1 tablet (10 mg) by mouth every morning. 30 tablet 0    acetaminophen (TYLENOL) 500 MG tablet Take 500-1,000 mg by mouth every 6 hours as needed      Aspirin 81 MG CAPS       atorvastatin (LIPITOR) 40 MG tablet Take 1 tablet (40 mg) by mouth daily 90 tablet 3    citalopram (CELEXA) 20 MG tablet Take 1 tablet (20 mg) by mouth daily 90 tablet 3    donepezil (ARICEPT) 10 MG tablet Take 1 tablet (10 mg) by mouth at bedtime. 90 tablet 3    FLUZONE HIGH-DOSE QUADRIVALENT 0.7 ML JEFFERY injection  (Patient not taking: Reported on 12/12/2023)      lisinopril-hydrochlorothiazide (ZESTORETIC) 20-12.5 MG tablet Take 1 tablet by mouth daily. 90 tablet 1    methylphenidate (RITALIN) 5 MG tablet Take 1 tablet (5 mg) by mouth 2 times daily. 60 tablet 0    metoprolol tartrate (LOPRESSOR) 25 MG tablet Take 0.5 tablets (12.5 mg) by mouth 2 times daily 90 tablet 3    Multiple Vitamin (MULTIVITAMIN) per tablet Take 1 tablet by mouth daily.       nitroGLYcerin (NITROSTAT) 0.4 MG sublingual tablet Place 1 tablet (0.4 mg) under the tongue every 5 minutes as needed for chest pain Repeat at 5 min intervals x 2 if needed (Patient not taking: Reported on 2023) 30 tablet 0    PFIZER COVID-19 VAC BIVALENT 30 MCG/0.3ML injection  (Patient not taking: Reported on 2023)      PFIZER-BIONT COVID-19 VAC-KEVIN 30 MCG/0.3ML injection  (Patient not taking: Reported on 2023)      vitamin D3 (CHOLECALCIFEROL) 50 mcg (2000 units) tablet Take 1 tablet by mouth daily      zinc 50 MG TABS Take 50 mg by mouth daily.         Social History     Socioeconomic History    Marital status:      Spouse name: Not on file    Number of children: Not on file    Years of education: Not on file    Highest education level: Not on file   Occupational History    Not on file   Tobacco Use    Smoking status: Former     Current packs/day: 0.00     Average packs/day: 2.0 packs/day for 40.0 years (80.0 ttl pk-yrs)     Types: Cigarettes     Start date: 4/10/1944     Quit date: 4/10/1984     Years since quittin.5     Passive exposure: Never    Smokeless tobacco: Never   Substance and Sexual Activity    Alcohol use: Yes     Alcohol/week: 6.0 standard drinks of alcohol     Comment: occasionally.    Drug use: No    Sexual activity: Not on file   Other Topics Concern    Parent/sibling w/ CABG, MI or angioplasty before 65F 55M? 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        Had gotten into typing to meet girls.        Worked for [employer] after leaving the [employer].     Social Determinants of Health     Financial Resource Strain: Low Risk  (2024)    Financial Resource Strain     Within the past 12 months, have you or your family members you live with been unable to get utilities (heat, electricity) when it was really needed?: No   Food Insecurity: Low Risk  (2024)    Food Insecurity     Within the past 12  months, did you worry that your food would run out before you got money to buy more?: No     Within the past 12 months, did the food you bought just not last and you didn t have money to get more?: No   Transportation Needs: Low Risk  (6/5/2024)    Transportation Needs     Within the past 12 months, has lack of transportation kept you from medical appointments, getting your medicines, non-medical meetings or appointments, work, or from getting things that you need?: No   Physical Activity: Not on file   Stress: Not on file   Social Connections: Not on file   Interpersonal Safety: Unknown (4/16/2024)    Interpersonal Safety     Do you feel physically and emotionally safe where you currently live?: Patient unable to answer     Within the past 12 months, have you been hit, slapped, kicked or otherwise physically hurt by someone?: Patient unable to answer     Within the past 12 months, have you been humiliated or emotionally abused in other ways by your partner or ex-partner?: Patient unable to answer   Housing Stability: Low Risk  (6/5/2024)    Housing Stability     Do you have housing? : Yes     Are you worried about losing your housing?: No       Allergies   Allergen Reactions    Pletal [Cilostazol]        No results found for this or any previous visit (from the past 24 hour(s)).         Review of Systems:     Review of Systems   Constitutional: Negative.             Physical Exam:     Vitals:    10/09/24 1055 10/09/24 1059   BP: (!) 161/52 138/56   BP Location: Right arm Left arm   Patient Position: Sitting Sitting   Pulse: 51    Resp: 20    Temp: 97.9  F (36.6  C)    TempSrc: Oral    SpO2: 97%      There is no height or weight on file to calculate BMI.    Physical Exam  Vitals and nursing note reviewed.   Constitutional:       General: He is not in acute distress.     Appearance: Normal appearance. He is not ill-appearing, toxic-appearing or diaphoretic.   HENT:      Head:     Pulmonary:      Effort: No  respiratory distress.   Musculoskeletal:        Arms:    Neurological:      Mental Status: He is alert and oriented to person, place, and time.

## 2024-10-09 NOTE — PATIENT INSTRUCTIONS
Drink more water!!!!    Change in the methylphenidate dosing:  10 mg in the AM and 5 mg in the afternoon    It was great to see you,    Dr. Galaviz

## 2024-10-09 NOTE — PROGRESS NOTES
Prior to immunization administration, verified patients identity using patient s name and date of birth. Please see Immunization Activity for additional information.     Screening Questionnaire for Adult Immunization    Are you sick today?   No   Do you have allergies to medications, food, a vaccine component or latex?   No   Have you ever had a serious reaction after receiving a vaccination?   No   Do you have a long-term health problem with heart, lung, kidney, or metabolic disease (e.g., diabetes), asthma, a blood disorder, no spleen, complement component deficiency, a cochlear implant, or a spinal fluid leak?  Are you on long-term aspirin therapy?   No   Do you have cancer, leukemia, HIV/AIDS, or any other immune system problem?   No   Do you have a parent, brother, or sister with an immune system problem?   No   In the past 3 months, have you taken medications that affect  your immune system, such as prednisone, other steroids, or anticancer drugs; drugs for the treatment of rheumatoid arthritis, Crohn s disease, or psoriasis; or have you had radiation treatments?   No   Have you had a seizure, or a brain or other nervous system problem?   No   During the past year, have you received a transfusion of blood or blood    products, or been given immune (gamma) globulin or antiviral drug?   No   For women: Are you pregnant or is there a chance you could become       pregnant during the next month?   No   Have you received any vaccinations in the past 4 weeks?   No     Immunization questionnaire answers were all negative.      Patient instructed to remain in clinic for 15 minutes afterwards, and to report any adverse reactions.     Screening performed by Tank Higginbotham MA on 10/9/2024 at 11:32 AM.

## 2024-10-09 NOTE — TELEPHONE ENCOUNTER
10mg dose is for AM and 5mg will be afternoon.    Haim Galaviz III, MD, FAAFP  [facility]  10/09/24 5:04 PM

## 2024-10-09 NOTE — CONFIDENTIAL NOTE
"Pharmacy send in a fax today with a message    Message stating   \" Please clarify if this is in addition to the 5mg BID or in place of. \"      This is for Methylphenidate 10mg tablets     Please advise   "

## 2024-10-23 ENCOUNTER — OFFICE VISIT (OUTPATIENT)
Dept: FAMILY MEDICINE | Facility: CLINIC | Age: 89
End: 2024-10-23
Payer: COMMERCIAL

## 2024-10-23 ENCOUNTER — ANCILLARY PROCEDURE (OUTPATIENT)
Dept: GENERAL RADIOLOGY | Facility: CLINIC | Age: 89
End: 2024-10-23
Attending: FAMILY MEDICINE
Payer: COMMERCIAL

## 2024-10-23 VITALS
HEIGHT: 64 IN | BODY MASS INDEX: 24.41 KG/M2 | DIASTOLIC BLOOD PRESSURE: 61 MMHG | RESPIRATION RATE: 20 BRPM | OXYGEN SATURATION: 96 % | SYSTOLIC BLOOD PRESSURE: 173 MMHG | TEMPERATURE: 97.8 F | HEART RATE: 63 BPM | WEIGHT: 143 LBS

## 2024-10-23 DIAGNOSIS — R15.9 INCONTINENCE OF FECES WITH FECAL URGENCY: ICD-10-CM

## 2024-10-23 DIAGNOSIS — R15.2 INCONTINENCE OF FECES WITH FECAL URGENCY: ICD-10-CM

## 2024-10-23 DIAGNOSIS — R19.7 DIARRHEA, UNSPECIFIED TYPE: ICD-10-CM

## 2024-10-23 DIAGNOSIS — F02.A3 MILD LATE ONSET ALZHEIMER'S DEMENTIA WITH MOOD DISTURBANCE (H): Primary | ICD-10-CM

## 2024-10-23 DIAGNOSIS — G30.1 MILD LATE ONSET ALZHEIMER'S DEMENTIA WITH MOOD DISTURBANCE (H): Primary | ICD-10-CM

## 2024-10-23 PROCEDURE — 99215 OFFICE O/P EST HI 40 MIN: CPT | Performed by: FAMILY MEDICINE

## 2024-10-23 PROCEDURE — 74018 RADEX ABDOMEN 1 VIEW: CPT | Mod: TC | Performed by: RADIOLOGY

## 2024-10-23 ASSESSMENT — ENCOUNTER SYMPTOMS: CONSTITUTIONAL NEGATIVE: 1

## 2024-10-23 NOTE — PATIENT INSTRUCTIONS
Pause the methylphenidate for now. We will see if that improves the diarrhea.    I got my pen at [facility] in [place] but you can find them online.    Put on clean clothes every day (unless OK'ed by Sadie).    Start wearing your hearing aides every day. This is important for your memory.    Shower on the afternoons of:  Tuesday and Friday    Brush your teeth twice a day with toothpaste (don't disagree with the health aides).    Start the treadmill again. We all agreed to do the treadmill on Monday, Wednesday, Saturday. You will do this before you get to rest on the couch and watch TV. Plan for after breakfast.    Continue to accept help from the people that love you. Listen to your sons and Sadie.    It was great to see you,    Dr. Galaviz

## 2024-10-23 NOTE — PROGRESS NOTES
Assessment and Plan     1. 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  There was no change in mentation or mood or motivation with the increased dose of morning methylphenidate.  With the possible increase and/or worsening of diarrhea, we will pause the methylphenidate temporarily.  If it completely resolves, may still attempt a restart as it was helping him previously.  May consider morning dose is up to 15 mg.  Reviewed this with the wife and son who are present in the room.  Answered all their questions.  Recheck in 1 month.    2. Diarrhea, unspecified type  Acute infectious versus medication.  See above.    3. Incontinence of feces with fecal urgency  No signs of encopresis  - XR Abdomen 1 View; Future    Can likely get repeat shoulder injections at next visit.    41 minutes spent on the date of the encounter doing chart review, history and exam, documentation, and further activities as noted above. This does not include time required for the XR.    Options for treatment and follow-up care were reviewed with the patient and/or guardian. Zack Johns and/or guardian engaged in the decision making process and verbalized understanding of the options discussed and agreed with the final plan. I answered all of their questions.    This note was created with the aid of dictation software. Any mistakes are unintentional.    Haim Galaviz III, MD, FAAFP  [facility]  10/23/24 5:05 PM           HPI:       Zack Johns is a 89 year old  male  Patient presents with:  Follow Up: memory  Diarrhea: Going on for few months now. Patient states maybe due to his medication       Anthony has transitioned to the living community well.  Is not emoting frustrations with not being able to drive or the move in any unconstructive way.  They have not started using the exercise room yet.  He still spends a large portion of the day on the  couch.  Says his mood is good.  The increase of the methylphenidate has not changed his movement off the couch.    Complains of diarrhea for the past 1 to 2 weeks.  This may also include looser stools for months to years but this is new information to the patient's wife and he is uncertain on the exact length of time.  However, across the past couple days, he has had new urgency and even in an accident or 2.  Denies nausea, vomiting, abdominal pain.  Only recent change has been the increase of his methylphenidate.  They do eat meals at the living community but do not know of any other illnesses.         PMHX:     Patient Active Problem List   Diagnosis    Actinic keratosis    Cardiovascular disease    Essential hypertension, benign    CT Lung Pulmonary Nodule Multiple    Prediabetes    Diverticulosis of large intestine    Hyperlipidemia    Peripheral vascular disease (H)    Internal hemorrhoids    Local infection of skin and subcutaneous tissue    Disorder of bursae and tendons in shoulder region    Synovial cyst    Trigger finger, acquired    Osteoarthritis of glenohumeral joint    Syncope    Abnormal cardiovascular stress test    Chest pain    Coronary atherosclerosis    Upper respiratory tract infection, unspecified type    Neck pain    Chronic total occlusion of native coronary artery       Current Outpatient Medications   Medication Sig Dispense Refill    acetaminophen (TYLENOL) 500 MG tablet Take 500-1,000 mg by mouth every 6 hours as needed      Aspirin 81 MG CAPS       atorvastatin (LIPITOR) 40 MG tablet Take 1 tablet (40 mg) by mouth daily 90 tablet 3    citalopram (CELEXA) 20 MG tablet Take 1 tablet (20 mg) by mouth daily 90 tablet 3    donepezil (ARICEPT) 10 MG tablet Take 1 tablet (10 mg) by mouth at bedtime. 90 tablet 3    FLUZONE HIGH-DOSE QUADRIVALENT 0.7 ML JEFFERY injection  (Patient not taking: Reported on 12/12/2023)      lisinopril-hydrochlorothiazide (ZESTORETIC) 20-12.5 MG tablet Take 1 tablet by  mouth daily. 90 tablet 1    methylphenidate (RITALIN) 10 MG tablet Take 1 tablet (10 mg) by mouth every morning. 30 tablet 0    methylphenidate (RITALIN) 5 MG tablet Take 1 tablet (5 mg) by mouth 2 times daily. 60 tablet 0    metoprolol tartrate (LOPRESSOR) 25 MG tablet Take 0.5 tablets (12.5 mg) by mouth 2 times daily 90 tablet 3    Multiple Vitamin (MULTIVITAMIN) per tablet Take 1 tablet by mouth daily.      nitroGLYcerin (NITROSTAT) 0.4 MG sublingual tablet Place 1 tablet (0.4 mg) under the tongue every 5 minutes as needed for chest pain Repeat at 5 min intervals x 2 if needed (Patient not taking: Reported on 2023) 30 tablet 0    PFIZER COVID-19 VAC BIVALENT 30 MCG/0.3ML injection  (Patient not taking: Reported on 2023)      PFIZER-BIONT COVID-19 VAC-KEVIN 30 MCG/0.3ML injection  (Patient not taking: Reported on 2023)      vitamin D3 (CHOLECALCIFEROL) 50 mcg (2000 units) tablet Take 1 tablet by mouth daily      zinc 50 MG TABS Take 50 mg by mouth daily.         Social History     Socioeconomic History    Marital status:      Spouse name: Not on file    Number of children: Not on file    Years of education: Not on file    Highest education level: Not on file   Occupational History    Not on file   Tobacco Use    Smoking status: Former     Current packs/day: 0.00     Average packs/day: 2.0 packs/day for 40.0 years (80.0 ttl pk-yrs)     Types: Cigarettes     Start date: 4/10/1944     Quit date: 4/10/1984     Years since quittin.5     Passive exposure: Never    Smokeless tobacco: Never   Substance and Sexual Activity    Alcohol use: Yes     Alcohol/week: 6.0 standard drinks of alcohol     Comment: occasionally.    Drug use: No    Sexual activity: Not on file   Other Topics Concern    Parent/sibling w/ CABG, MI or angioplasty before 65F 55M? 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        Had gotten into typing to meet  girls.        Worked for [employer] after leaving the [employer].     Social Drivers of Health     Financial Resource Strain: Low Risk  (6/5/2024)    Financial Resource Strain     Within the past 12 months, have you or your family members you live with been unable to get utilities (heat, electricity) when it was really needed?: No   Food Insecurity: Low Risk  (6/5/2024)    Food Insecurity     Within the past 12 months, did you worry that your food would run out before you got money to buy more?: No     Within the past 12 months, did the food you bought just not last and you didn t have money to get more?: No   Transportation Needs: Low Risk  (6/5/2024)    Transportation Needs     Within the past 12 months, has lack of transportation kept you from medical appointments, getting your medicines, non-medical meetings or appointments, work, or from getting things that you need?: No   Physical Activity: Not on file   Stress: Not on file   Social Connections: Not on file   Interpersonal Safety: Unknown (4/16/2024)    Interpersonal Safety     Do you feel physically and emotionally safe where you currently live?: Patient unable to answer     Within the past 12 months, have you been hit, slapped, kicked or otherwise physically hurt by someone?: Patient unable to answer     Within the past 12 months, have you been humiliated or emotionally abused in other ways by your partner or ex-partner?: Patient unable to answer   Housing Stability: Low Risk  (6/5/2024)    Housing Stability     Do you have housing? : Yes     Are you worried about losing your housing?: No       Allergies   Allergen Reactions    Pletal [Cilostazol]        No results found for this or any previous visit (from the past 24 hours).         Review of Systems:     Review of Systems   Constitutional: Negative.             Physical Exam:     Vitals:    10/23/24 1525 10/23/24 1530   BP: (!) 153/74 (!) 173/61   Pulse: 63    Resp: 20    Temp: 97.8  F (36.6  C)    TempSrc: Oral   "  SpO2: 96%    Weight: 64.9 kg (143 lb)    Height: 1.626 m (5' 4\")      Body mass index is 24.55 kg/m .    Physical Exam  Vitals and nursing note reviewed.   Constitutional:       General: He is not in acute distress.     Appearance: Normal appearance. He is not ill-appearing, toxic-appearing or diaphoretic.   Pulmonary:      Effort: No respiratory distress.   Neurological:      Mental Status: He is alert and oriented to person, place, and time.              X-Ray Interpretation:      Study: Abdominal XR    My Read: Minimal stool burden, increased bowel gas  "

## 2024-11-11 DIAGNOSIS — F02.A3 MILD LATE ONSET ALZHEIMER'S DEMENTIA WITH MOOD DISTURBANCE (H): ICD-10-CM

## 2024-11-11 DIAGNOSIS — G30.1 MILD LATE ONSET ALZHEIMER'S DEMENTIA WITH MOOD DISTURBANCE (H): ICD-10-CM

## 2024-11-11 RX ORDER — DONEPEZIL HYDROCHLORIDE 10 MG/1
10 TABLET, FILM COATED ORAL AT BEDTIME
Qty: 90 TABLET | Refills: 0 | Status: SHIPPED | OUTPATIENT
Start: 2024-11-11

## 2024-11-20 ENCOUNTER — OFFICE VISIT (OUTPATIENT)
Dept: FAMILY MEDICINE | Facility: CLINIC | Age: 89
End: 2024-11-20
Payer: COMMERCIAL

## 2024-11-20 VITALS
RESPIRATION RATE: 20 BRPM | WEIGHT: 144 LBS | BODY MASS INDEX: 23.99 KG/M2 | TEMPERATURE: 98 F | HEART RATE: 69 BPM | HEIGHT: 65 IN | SYSTOLIC BLOOD PRESSURE: 146 MMHG | DIASTOLIC BLOOD PRESSURE: 73 MMHG | OXYGEN SATURATION: 95 %

## 2024-11-20 DIAGNOSIS — M25.512 CHRONIC PAIN OF BOTH SHOULDERS: ICD-10-CM

## 2024-11-20 DIAGNOSIS — R29.6 FALLS FREQUENTLY: ICD-10-CM

## 2024-11-20 DIAGNOSIS — G89.29 CHRONIC PAIN OF BOTH SHOULDERS: ICD-10-CM

## 2024-11-20 DIAGNOSIS — M25.511 CHRONIC PAIN OF BOTH SHOULDERS: ICD-10-CM

## 2024-11-20 DIAGNOSIS — I10 ESSENTIAL HYPERTENSION, BENIGN: ICD-10-CM

## 2024-11-20 DIAGNOSIS — R54 FRAIL ELDERLY: ICD-10-CM

## 2024-11-20 DIAGNOSIS — F02.B3 MODERATE LATE ONSET ALZHEIMER'S DEMENTIA WITH MOOD DISTURBANCE (H): Primary | ICD-10-CM

## 2024-11-20 DIAGNOSIS — G30.1 MODERATE LATE ONSET ALZHEIMER'S DEMENTIA WITH MOOD DISTURBANCE (H): Primary | ICD-10-CM

## 2024-11-20 RX ORDER — MEMANTINE HYDROCHLORIDE 5 MG/1
5 TABLET ORAL 2 TIMES DAILY
Status: CANCELLED | OUTPATIENT
Start: 2024-11-20

## 2024-11-20 RX ORDER — TRIAMCINOLONE ACETONIDE 40 MG/ML
40 INJECTION, SUSPENSION INTRA-ARTICULAR; INTRAMUSCULAR ONCE
Status: COMPLETED | OUTPATIENT
Start: 2024-11-20 | End: 2024-11-20

## 2024-11-20 RX ADMIN — TRIAMCINOLONE ACETONIDE 40 MG: 40 INJECTION, SUSPENSION INTRA-ARTICULAR; INTRAMUSCULAR at 11:12

## 2024-11-20 RX ADMIN — TRIAMCINOLONE ACETONIDE 40 MG: 40 INJECTION, SUSPENSION INTRA-ARTICULAR; INTRAMUSCULAR at 11:11

## 2024-11-20 ASSESSMENT — ENCOUNTER SYMPTOMS: CONSTITUTIONAL NEGATIVE: 1

## 2024-11-20 NOTE — PATIENT INSTRUCTIONS
Put on clean clothes every day (unless OK'ed by Sarika) with suspenders.    Start physical therapy for your balance and falls. This will help you stay out of a wheelchair.    Start wearing your hearing aides every day. This is important for your memory.    Shower on the afternoons of:  Tuesday and Friday    Brush your teeth twice a day with toothpaste (don't disagree with the health aides).    Start the treadmill again. We all agreed to do the treadmill on Monday, Wednesday, Saturday. You will do this before you get to rest on the couch and watch TV. Plan for after breakfast. This will help you stay out of a wheelchair.    Continue to accept help from the people that love you. Listen to your sons and Sarika (salutes are OK).    It was great to see you,    Dr. Galaviz

## 2024-11-20 NOTE — PROGRESS NOTES
Assessment and Plan     1. 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  I will try to repeat a MoCA next time but I suspect that he is at the moderate dementia level now.  Will consider initiation of Namenda at next visit if confirmed.  Continue off of the methylphenidate for now.  May reassess in the future.  Tips and tricks for family support discussed with the son separately outside of the room.  Tips and tricks for working through the patient's behaviors was discussed with the wife.  Motivational posters were created for the patient to help him use his walker and suspenders.  These were given to the patient's family.  Recheck in 4 to 6 weeks.    2. Chronic pain of both shoulders  Repeat procedures completed with the patient and family's request.  - Large Joint/Bursa injection and/or drainage - Bilateral (Shoulder, Knee) [20610] [50 Mod]  - triamcinolone (KENALOG-40) injection 40 mg  - triamcinolone (KENALOG-40) injection 40 mg    3. Falls frequently  Discussed physical therapy to improve his balance and strength.  Patient is very interested in staying out of the wheelchair but does not have enough insight due to his memory impairment.  Reviewed this at length with both him and his family.  I encouraged them to start physical therapy and framed it inside of avoiding wheelchair use and decreasing falls.  - Physical Therapy  Referral; Future    4. Essential hypertension, benign  5. Frail elderly  At goal for age.  Continue to monitor.    90 minutes spent on the date of the encounter doing chart review, history and exam, documentation, and further activities as noted above.  This does not include time required for the procedure.    Options for treatment and follow-up care were reviewed with the patient and/or guardian. Zack Johns and/or guardian engaged in the decision making process and verbalized understanding of the  options discussed and agreed with the final plan. I answered all of their questions.    This note was created with the aid of dictation software. Any mistakes are unintentional.    Haim Galaviz III, MD, FAAFP  [facility]  11/20/24 2:13 PM           HPI:       Zack Johns is a 89 year old  male  Patient presents with:  RECHECK  Imm/Inj: Shoulder injection      Anthony presents today with his wife and son, as usual.  He has no specific complaints beyond his chronic shoulder pain and he would like a repeat injection today.    His wife notes that he is fallen now 4 times at their new apartment and that additional fall could cause them to have to rehome him.  She is also having significant battles with him over using his walker, finances - specifically paying bills and monitoring bank accounts, and wearing suspenders so that he does not trip over his pants.  She is asking for tips and tricks for this.    The son is looking for ways to support his mother and father.  He and I talked separately.    [facility]  [facility] did [facility] him onto the treadmill and this seemed to help.  He still is not wanting to get off the couch much.  This is unchanged after stopping his methylphenidate.    They are not having the same diarrhea problems that they were before.    They have not been fighting about driving as much.         PMHX:     Patient Active Problem List   Diagnosis    Actinic keratosis    Cardiovascular disease    Essential hypertension, benign    CT Lung Pulmonary Nodule Multiple    Prediabetes    Diverticulosis of large intestine    Hyperlipidemia    Peripheral vascular disease (H)    Internal hemorrhoids    Local infection of skin and subcutaneous tissue    Disorder of bursae and tendons in shoulder region    Synovial cyst    Trigger finger, acquired    Osteoarthritis of glenohumeral joint     Syncope    Abnormal cardiovascular stress test    Chest pain    Coronary atherosclerosis    Upper respiratory tract infection, unspecified type    Neck pain    Chronic total occlusion of native coronary artery       Current Outpatient Medications   Medication Sig Dispense Refill    acetaminophen (TYLENOL) 500 MG tablet Take 500-1,000 mg by mouth every 6 hours as needed      Aspirin 81 MG CAPS       atorvastatin (LIPITOR) 40 MG tablet Take 1 tablet (40 mg) by mouth daily 90 tablet 3    citalopram (CELEXA) 20 MG tablet Take 1 tablet (20 mg) by mouth daily 90 tablet 3    donepezil (ARICEPT) 10 MG tablet Take 1 tablet (10 mg) by mouth at bedtime. 90 tablet 0    FLUZONE HIGH-DOSE QUADRIVALENT 0.7 ML JEFFERY injection  (Patient not taking: Reported on 12/12/2023)      lisinopril-hydrochlorothiazide (ZESTORETIC) 20-12.5 MG tablet Take 1 tablet by mouth daily. 90 tablet 1    methylphenidate (RITALIN) 10 MG tablet Take 1 tablet (10 mg) by mouth every morning. 30 tablet 0    methylphenidate (RITALIN) 5 MG tablet Take 1 tablet (5 mg) by mouth 2 times daily. 60 tablet 0    metoprolol tartrate (LOPRESSOR) 25 MG tablet Take 0.5 tablets (12.5 mg) by mouth 2 times daily 90 tablet 3    Multiple Vitamin (MULTIVITAMIN) per tablet Take 1 tablet by mouth daily.      nitroGLYcerin (NITROSTAT) 0.4 MG sublingual tablet Place 1 tablet (0.4 mg) under the tongue every 5 minutes as needed for chest pain Repeat at 5 min intervals x 2 if needed (Patient not taking: Reported on 12/12/2023) 30 tablet 0    PFIZER COVID-19 VAC BIVALENT 30 MCG/0.3ML injection  (Patient not taking: Reported on 12/12/2023)      PFIZER-BIONT COVID-19 VAC-KEVIN 30 MCG/0.3ML injection  (Patient not taking: Reported on 12/12/2023)      vitamin D3 (CHOLECALCIFEROL) 50 mcg (2000 units) tablet Take 1 tablet by mouth daily      zinc 50 MG TABS Take 50 mg by mouth daily.         Social History     Socioeconomic History    Marital status:      Spouse name: Not on file     Number of children: Not on file    Years of education: Not on file    Highest education level: Not on file   Occupational History    Not on file   Tobacco Use    Smoking status: Former     Current packs/day: 0.00     Average packs/day: 2.0 packs/day for 40.0 years (80.0 ttl pk-yrs)     Types: Cigarettes     Start date: 4/10/1944     Quit date: 4/10/1984     Years since quittin.6     Passive exposure: Never    Smokeless tobacco: Never   Substance and Sexual Activity    Alcohol use: Yes     Alcohol/week: 6.0 standard drinks of alcohol     Comment: occasionally.    Drug use: No    Sexual activity: Not on file   Other Topics Concern    Parent/sibling w/ CABG, MI or angioplasty before 65F 55M? 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        Had gotten into typing to meet girls.        Worked for [employer] after leaving the [employer].     Social Drivers of Health     Financial Resource Strain: Low Risk  (2024)    Financial Resource Strain     Within the past 12 months, have you or your family members you live with been unable to get utilities (heat, electricity) when it was really needed?: No   Food Insecurity: Low Risk  (2024)    Food Insecurity     Within the past 12 months, did you worry that your food would run out before you got money to buy more?: No     Within the past 12 months, did the food you bought just not last and you didn t have money to get more?: No   Transportation Needs: Low Risk  (2024)    Transportation Needs     Within the past 12 months, has lack of transportation kept you from medical appointments, getting your medicines, non-medical meetings or appointments, work, or from getting things that you need?: No   Physical Activity: Not on file   Stress: Not on file   Social Connections: Not on file   Interpersonal Safety: Unknown (2024)    Interpersonal Safety     Do you feel physically and emotionally safe where you  "currently live?: Patient unable to answer     Within the past 12 months, have you been hit, slapped, kicked or otherwise physically hurt by someone?: Patient unable to answer     Within the past 12 months, have you been humiliated or emotionally abused in other ways by your partner or ex-partner?: Patient unable to answer   Housing Stability: Low Risk  (6/5/2024)    Housing Stability     Do you have housing? : Yes     Are you worried about losing your housing?: No       Allergies   Allergen Reactions    Pletal [Cilostazol]        No results found for this or any previous visit (from the past 24 hours).         Review of Systems:     Review of Systems   Constitutional: Negative.             Physical Exam:     Vitals:    11/20/24 1050 11/20/24 1100   BP: (!) 147/78 (!) 146/73   BP Location: Left arm Right arm   Patient Position: Sitting Sitting   Pulse: 69    Resp: 20    Temp: 98  F (36.7  C)    TempSrc: Oral    SpO2: 95%    Weight: 65.3 kg (144 lb)    Height: 1.647 m (5' 4.86\")      Body mass index is 24.06 kg/m .    Physical Exam  Vitals and nursing note reviewed.   Constitutional:       General: He is not in acute distress.     Appearance: Normal appearance. He is not ill-appearing, toxic-appearing or diaphoretic.   Pulmonary:      Effort: No respiratory distress.   Neurological:      Mental Status: He is alert and oriented to person, place, and time.   Psychiatric:         Cognition and Memory: Cognition is impaired. Memory is impaired. He exhibits impaired recent memory and impaired remote memory.      Comments: MMSE score was 23 out of 30.  Points lost for delayed recall, repetition, and orientation.           "

## 2024-11-20 NOTE — PROCEDURES
Bilateral subacromial STEROID INJECTION  The patient was prepped with alcohol. A 22 G needle was inserted via the posteriolateral approach into the right subacromial space and 40 mg of Kenalog and 5 mL of 1% lido w/o epi was injected. The patient tolerated the procedure well and had improvement in their pain post procedure.  The same exact procedure was then performed on the left subacromial space.    Haim Galaviz III, MD, FAAFP  [facility]  11/20/24 2:12 PM

## 2024-12-02 ENCOUNTER — OFFICE VISIT (OUTPATIENT)
Dept: FAMILY MEDICINE | Facility: CLINIC | Age: 89
End: 2024-12-02
Payer: COMMERCIAL

## 2024-12-02 VITALS
HEART RATE: 52 BPM | TEMPERATURE: 97.3 F | OXYGEN SATURATION: 96 % | DIASTOLIC BLOOD PRESSURE: 63 MMHG | SYSTOLIC BLOOD PRESSURE: 136 MMHG | RESPIRATION RATE: 18 BRPM

## 2024-12-02 DIAGNOSIS — R19.5 LOOSE STOOLS: Primary | ICD-10-CM

## 2024-12-02 DIAGNOSIS — R15.9 INCONTINENCE OF FECES WITH FECAL URGENCY: ICD-10-CM

## 2024-12-02 DIAGNOSIS — R15.2 INCONTINENCE OF FECES WITH FECAL URGENCY: ICD-10-CM

## 2024-12-02 RX ORDER — MAGNESIUM CARB/ALUMINUM HYDROX 105-160MG
148 TABLET,CHEWABLE ORAL ONCE
Qty: 148 ML | Refills: 0 | Status: SHIPPED | OUTPATIENT
Start: 2024-12-02 | End: 2024-12-02

## 2024-12-02 RX ORDER — WHEAT DEXTRIN/ASPARTAME 3 G/6 G
1 POWDER IN PACKET (EA) ORAL DAILY
Qty: 28 EACH | Refills: 2 | Status: SHIPPED | OUTPATIENT
Start: 2024-12-02

## 2024-12-02 NOTE — PROGRESS NOTES
Assessment & Plan   Loose stools  Incontinence of feces with fecal urgency  Here for loose, watery stool and fecal incontinence worse over the past week with no associated N/V or abdominal pain. There is history of moderate stool burden noted on an abdominal X-ray few months ago. Given patient's dementia more sedentary behavior and diet changes, there is concern for fecal impaction. Abdominal xray not available today due to timing. Plan is to start taking fiber supplement daily and use magnesium citrate to help clear out hard stool. Return precautions discussed.   - start daily fiber  - mag citrate to clear impacted stool    Subjective   Zack is a 89 year old, presenting for the following health issues:  Diarrhea    HPI   Zack is here with his wife Sarika today.  She reports that he had few episodes of loose stools and fecal incontinence x2 that has gotten worse over the past week. He continues to be off ritalin. Otherwise no nausea vomiting or abdominal pain.  No bloody or dark stool.  His wife notes significant changes in his diet/eating habits lately, he is eating more soups and avoiding salads and starchy foods. He has been mostly inactive but his wife has been trying to motivate him to walk more.      Review of Systems  Constitutional, HEENT, cardiovascular, pulmonary, gi and gu systems are negative, except as otherwise noted.      Objective    There were no vitals taken for this visit.  There is no height or weight on file to calculate BMI.  Physical Exam   GENERAL: alert and no distress  RESP: lungs clear to auscultation - no rales, rhonchi or wheezes  CV: regular rate and rhythm, normal S1 S2, no S3 or S4, no murmur, click or rub, no peripheral edema  ABDOMEN: soft, nontender, no hepatosplenomegaly, no masses and bowel sounds normal        Signed Electronically by: PATSY Rhodes

## 2024-12-05 NOTE — PROGRESS NOTES
Preceptor Attestation:   Patient seen, evaluated and discussed with the resident. I have verified the content of the note, which accurately reflects my assessment of the patient and the plan of care.    Likely overflow/encopresis.    Supervising Physician:Haim Galaviz MD    [facility]

## 2024-12-10 DIAGNOSIS — I25.10 ATHEROSCLEROSIS OF NATIVE CORONARY ARTERY OF NATIVE HEART WITHOUT ANGINA PECTORIS: ICD-10-CM

## 2024-12-10 RX ORDER — NITROGLYCERIN 0.4 MG/1
0.4 TABLET SUBLINGUAL EVERY 5 MIN PRN
Qty: 30 TABLET | Refills: 0 | Status: SHIPPED | OUTPATIENT
Start: 2024-12-10

## 2024-12-10 NOTE — TELEPHONE ENCOUNTER
Medication Question or Refill        What medication are you calling about (include dose and sig)?:     nitroGLYcerin (NITROSTAT) 0.4 MG sublingual tablet     Preferred Pharmacy:     [id number] [address] AT [place] [address] [address] [place] [address]  Phone: [phone] Fax: [phone]      Controlled Substance Agreement on file:   CSA -- Patient Level:    CSA: None found at the patient level.       Who prescribed the medication?: Haim Galaviz     Do you need a refill? Yes

## 2024-12-23 ENCOUNTER — THERAPY VISIT (OUTPATIENT)
Dept: PHYSICAL THERAPY | Facility: REHABILITATION | Age: 89
End: 2024-12-23
Attending: FAMILY MEDICINE
Payer: COMMERCIAL

## 2024-12-23 DIAGNOSIS — R29.6 FALLS FREQUENTLY: ICD-10-CM

## 2024-12-23 PROCEDURE — 97110 THERAPEUTIC EXERCISES: CPT | Mod: GP

## 2024-12-23 PROCEDURE — 97162 PT EVAL MOD COMPLEX 30 MIN: CPT | Mod: GP

## 2024-12-23 NOTE — PROGRESS NOTES
PHYSICAL THERAPY EVALUATION  Type of Visit: Evaluation        Fall Risk Screen:  Fall screen completed by: PT  Have you fallen 2 or more times in the past year?: Yes  Have you fallen and had an injury in the past year?: Yes  Is patient a fall risk?: Yes; Department fall risk interventions implemented    Subjective   Per chart review, Zack (Also goes by Anthony) has mild/moderate alzheimer's dementia with recent falls. His wife is present for evaluation, sharing that he has had 4 falls, one 6 months ago when they first moved into their apartment (in a senior living building but living independently) where they had to call EMS due to him hitting his head and not being able to get up. He had 3 other falls in the last 6 weeks that his wife has had to call family to help get him up. They both share that there does not seem to be a pattern with how he is falling, just seems to happen.   His wife shares that he has not had falls prior to this and since moving in to the new apartment, he has been less motivated to move- wanting to lay down all the time, issues with putting on his suspenders/wanting them on, and trouble with bowel and bladder control.      Presenting condition or subjective complaint: (Patient-Rptd) therapy  Date of onset: 11/20/24    Relevant medical history: (Patient-Rptd) Bladder or bowel problems   Dates & types of surgery:      Prior diagnostic imaging/testing results:       Prior therapy history for the same diagnosis, illness or injury:        Living Environment  Social support: (Patient-Rptd) With a significant other or spouse   Type of home: (Patient-Rptd) Apartment/condo; 1 level   Stairs to enter the home:         Ramp: (Patient-Rptd) No   Stairs inside the home: (Patient-Rptd) No       Help at home: (Patient-Rptd) Self Cares (home health aide/personal care attendant, family, etc)  Equipment owned: (Patient-Rptd) Walker     Employment:      Hobbies/Interests:      Patient goals for therapy:       Pain assessment: No pain reported      Objective   Cognitive Status Examination  Level of Consciousness: Alert  Follows Commands and Answers Questions: 100% of the time, Follows single step instructions   Personal Safety and Judgement: Requires close SBA  Memory: Per OT: mild/moderate alzheimer's dementia    STRENGTH: Demonstrates decreased functional strength     TRANSFERS: Independent    GAIT: Ambulates with 2WW at decreased gait speed    BALANCE: Decreased static balance on testing today    SPECIAL TESTS  10 Meter Walk Test (Comfortable)  0.97m/s with 2WW   5 Times Sit-to-Stand (5TSTS)  14.36s        Assessment & Plan   CLINICAL IMPRESSIONS  Medical Diagnosis: Falls frequently (R29.6)    Treatment Diagnosis: Force production deficit, sensory detection deficit   Impression/Assessment: Patient and wife report decreased balance leading to multiple falls.  The following significant findings have been identified: Decreased strength, Impaired balance, Impaired gait, Decreased activity tolerance, and Instability. These impairments interfere with their ability to perform self care tasks, recreational activities, household mobility, and community mobility as compared to previous level of function. Testing as reported above indicate decreased safety and balance with functional mobility. This patient will benefit from skilled physical therapy services to address these concerns.      Clinical Decision Making (Complexity):  Clinical Presentation: Evolving/Changing  Clinical Presentation Rationale: based on medical and personal factors listed in PT evaluation  Clinical Decision Making (Complexity): Moderate complexity    PLAN OF CARE  Treatment Interventions:  Interventions: Gait Training, Manual Therapy, Neuromuscular Re-education, Therapeutic Activity, Therapeutic Exercise, Self-Care/Home Management, Standardized Testing    Long Term Goals     PT Goal 1  Goal Identifier: HEP  Goal Description: Pt will be able to  demonstrate HEP activities without need for cues from provider to demonstrate understanding and independence with HEP.  Rationale: to maximize safety and independence with performance of ADLs and functional tasks  Target Date: 03/10/25  PT Goal 2  Goal Identifier: Functional mobility  Goal Description: Pt will demonstrate a 3 point improvement on the BBS to demonstrate minimum clinically significant difference in score to demonstrate improved safety with functional mobility and decrease risk of falls.  Rationale: to maximize safety and independence with performance of ADLs and functional tasks  Target Date: 03/10/25  PT Goal 3  Goal Identifier: Gait speed  Goal Description: Pt demonstrate a 0.12 second improvement in gait speed to demonstrate minimum clinically significant difference in score to demonstrate improved gait velocity.  Rationale: to maximize safety and independence with performance of ADLs and functional tasks  Target Date: 03/10/25  PT Goal 4  Goal Identifier: LE strength  Goal Description: Pt will demonstrate a 2.3 seconds improvement on 5xSTS to demonstrate minimum clinically significant difference in score to demonstrate improved LE strength.  Rationale: to maximize safety and independence with performance of ADLs and functional tasks  Target Date: 03/10/25      Frequency of Treatment: 1x/week  Duration of Treatment: 12 weeks    Recommended Referrals to Other Professionals:  None at this time  Education Assessment:   Learner/Method: Patient;Demonstration;Pictures/Video;Listening    Risks and benefits of evaluation/treatment have been explained.   Patient/Family/caregiver agrees with Plan of Care.     Evaluation Time:     PT Eval, Moderate Complexity Minutes (06645): 32     Signing Clinician: Ryanne Wing PT        [facility]                                                                                   OUTPATIENT PHYSICAL THERAPY      PLAN OF TREATMENT FOR  OUTPATIENT REHABILITATION   Patient's Last Name, First Name, Zack Ken YOB: 1934   Provider's Name   [facility]   Medical Record No.  [id number]     Onset Date: 11/20/24  Start of Care Date: 12/23/24     Medical Diagnosis:  Falls frequently (R29.6)    PT Treatment Diagnosis:  Force production deficit, sensory detection deficit Plan of Treatment  Frequency/Duration: 1x/week/ 12 weeks    Certification date from 12/23/24 to 03/10/25         See note for plan of treatment details and functional goals     Ryanne Wing, PT                         I CERTIFY THE NEED FOR THESE SERVICES FURNISHED UNDER        THIS PLAN OF TREATMENT AND WHILE UNDER MY CARE     (Physician attestation of this document indicates review and certification of the therapy plan).              Referring Provider:  Haim Galaviz    Initial Assessment  See Epic Evaluation- Start of Care Date: 12/23/24

## 2024-12-30 ENCOUNTER — THERAPY VISIT (OUTPATIENT)
Dept: PHYSICAL THERAPY | Facility: REHABILITATION | Age: 89
End: 2024-12-30
Payer: COMMERCIAL

## 2024-12-30 DIAGNOSIS — M62.81 GENERALIZED MUSCLE WEAKNESS: Primary | ICD-10-CM

## 2024-12-30 DIAGNOSIS — R29.6 FALLS FREQUENTLY: ICD-10-CM

## 2024-12-30 PROCEDURE — 97110 THERAPEUTIC EXERCISES: CPT | Mod: CQ | Performed by: PHYSICAL THERAPY ASSISTANT

## 2024-12-30 PROCEDURE — 97112 NEUROMUSCULAR REEDUCATION: CPT | Mod: CQ | Performed by: PHYSICAL THERAPY ASSISTANT

## 2025-01-06 ENCOUNTER — THERAPY VISIT (OUTPATIENT)
Dept: PHYSICAL THERAPY | Facility: REHABILITATION | Age: OVER 89
End: 2025-01-06
Payer: COMMERCIAL

## 2025-01-06 DIAGNOSIS — M62.81 GENERALIZED MUSCLE WEAKNESS: Primary | ICD-10-CM

## 2025-01-06 DIAGNOSIS — R29.6 FALLS FREQUENTLY: ICD-10-CM

## 2025-01-06 PROCEDURE — 97110 THERAPEUTIC EXERCISES: CPT | Mod: CQ | Performed by: PHYSICAL THERAPY ASSISTANT

## 2025-01-06 PROCEDURE — 97112 NEUROMUSCULAR REEDUCATION: CPT | Mod: CQ | Performed by: PHYSICAL THERAPY ASSISTANT

## 2025-01-08 ENCOUNTER — OFFICE VISIT (OUTPATIENT)
Dept: FAMILY MEDICINE | Facility: CLINIC | Age: OVER 89
End: 2025-01-08
Payer: COMMERCIAL

## 2025-01-08 VITALS
RESPIRATION RATE: 18 BRPM | WEIGHT: 143 LBS | HEART RATE: 52 BPM | DIASTOLIC BLOOD PRESSURE: 63 MMHG | OXYGEN SATURATION: 98 % | SYSTOLIC BLOOD PRESSURE: 156 MMHG | BODY MASS INDEX: 23.82 KG/M2 | HEIGHT: 65 IN | TEMPERATURE: 98 F

## 2025-01-08 DIAGNOSIS — R15.9 INCONTINENCE OF FECES WITH FECAL URGENCY: ICD-10-CM

## 2025-01-08 DIAGNOSIS — F32.1 CURRENT MODERATE EPISODE OF MAJOR DEPRESSIVE DISORDER WITHOUT PRIOR EPISODE (H): ICD-10-CM

## 2025-01-08 DIAGNOSIS — F02.B3 MODERATE LATE ONSET ALZHEIMER'S DEMENTIA WITH MOOD DISTURBANCE (H): Primary | ICD-10-CM

## 2025-01-08 DIAGNOSIS — G30.1 MODERATE LATE ONSET ALZHEIMER'S DEMENTIA WITH MOOD DISTURBANCE (H): Primary | ICD-10-CM

## 2025-01-08 DIAGNOSIS — R54 FRAIL ELDERLY: ICD-10-CM

## 2025-01-08 DIAGNOSIS — R29.6 FALLS FREQUENTLY: ICD-10-CM

## 2025-01-08 DIAGNOSIS — R15.2 INCONTINENCE OF FECES WITH FECAL URGENCY: ICD-10-CM

## 2025-01-08 RX ORDER — METHYLPHENIDATE HYDROCHLORIDE 5 MG/1
5 TABLET ORAL 2 TIMES DAILY
Qty: 60 TABLET | Refills: 0 | Status: SHIPPED | OUTPATIENT
Start: 2025-01-08

## 2025-01-08 NOTE — PATIENT INSTRUCTIONS
Put on clean clothes every day (unless OK'ed by Sarika) with suspenders. This includes wearing your new underwear.    Continue physical therapy for your balance and falls. This will help you stay out of a wheelchair.    Start wearing your hearing aides every day. This is important for your memory.    Shower on the afternoons of:  Tuesday and Friday    Brush your teeth twice a day with toothpaste (don't disagree with the health aides).    Plan for leaving your room:  Sit on the toilet for 10-20 minutes, 1-2 times per day, before you leave  If leaving the building, OK to try loperamide (Imodium) 1-2 tabs as you are getting ready to leave. As above, still try using the bathroom first.    We are restarting your methylphenidate for your energy.    Continue to accept help from the people that love you. Listen to your sons and Sarika (salutes are OK).    It was great to see you,    Dr. Galaviz

## 2025-01-09 ASSESSMENT — ENCOUNTER SYMPTOMS: CONSTITUTIONAL NEGATIVE: 1

## 2025-01-09 NOTE — PROGRESS NOTES
Assessment and Plan     1. 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 Current moderate episode of major depressive disorder without prior episode (H)  3. Incontinence of feces with fecal urgency  From a MoCA perspective, patient is maintaining.  No indication to start Namenda today.  I believe that his incontinence is likely functional in origin.  To this end, we will attempt behavioral modification centered around scheduling trips to the toilet and wearing depends as a backup.  Signs created to help remind the patient and to aid in caregiver burnout.  Additionally, if they are leaving their building, they may attempt, once or twice a week, 2 to 4 mg of loperamide to decrease accidents out in public.  This will still be coupled with trying to go to the bathroom before they leave.  All of this was well outlined in their AVS.  Since there did not seem to be any change in his ability to hold his bowels after discontinuation of the methylphenidate, we will restart and see if that improves both his motivation during the day and his sleep-wake cycle.  Recheck in 1 to 2 months.  - methylphenidate (RITALIN) 5 MG tablet; Take 1 tablet (5 mg) by mouth 2 times daily.  Dispense: 60 tablet; Refill: 0    4. Frail elderly  5. Falls frequently  Continue physical therapy and home-based therapeutic exercises.  Patient has been reasonably successful now that he is regularly using his walker.  Continue to monitor.    Largest family support, empathy, and active listening performed.  His wife continues to do an excellent job keeping the patient safe and well.    81 minutes spent on the date of the encounter doing chart review, history and exam, documentation, and further activities as noted above.    The longitudinal plan of care for the diagnosis(es)/condition(s) as documented were addressed during this visit. Due to the added complexity in care, I will continue to support Zack in the subsequent  management and with ongoing continuity of care.    Options for treatment and follow-up care were reviewed with the patient and/or guardian. Zack Johns and/or guardian engaged in the decision making process and verbalized understanding of the options discussed and agreed with the final plan. I answered all of their questions.    This note was created with the aid of dictation software. Any mistakes are unintentional.    Haim Galaviz III, MD, FAAFP  [facility]  01/09/25 5:07 PM           HPI:       Zack Johns is a 89 year old  male  Patient presents with:  RECHECK: Follow-up, Wife concerned with not wanting to do things anymore      The patient presents to clinic, as usual, with his wife and son.  He does not have much in the way of details of recent events.  He has no complaints today and continues to be thankful at his overall health.    Wife and son are very concerned about multiple episodes of fecal incontinence.  They often happen at similar times during the day.  They are preceded by the patient knowing that he has to get to the bathroom but cannot get there quickly enough.  After the last visit to our clinic, the prescribed magnesium citrate caused a large stool even though he had had a lot of diarrhea ahead of time.  Every time he has had an accident, he is attempted to get to the bathroom but was unable to.    Continues to not get off the couch very much during the day.  Seems to be napping and then does not sleep well at night.    Has been doing some physical therapy and routinely uses his walker.  Wife and son note that the signs that I made him are not been very helpful.  He has not fallen since starting physical therapy.    MOCA - 21/30         PMHX:     Patient Active Problem List   Diagnosis    Actinic keratosis    Cardiovascular disease    Essential hypertension, benign    CT Lung Pulmonary Nodule Multiple    Prediabetes    Diverticulosis of large intestine     Hyperlipidemia    Peripheral vascular disease    Internal hemorrhoids    Local infection of skin and subcutaneous tissue    Disorder of bursae and tendons in shoulder region    Synovial cyst    Trigger finger, acquired    Osteoarthritis of glenohumeral joint    Syncope    Abnormal cardiovascular stress test    Chest pain    Coronary atherosclerosis    Upper respiratory tract infection, unspecified type    Neck pain    Chronic total occlusion of native coronary artery       Current Outpatient Medications   Medication Sig Dispense Refill    methylphenidate (RITALIN) 5 MG tablet Take 1 tablet (5 mg) by mouth 2 times daily. 60 tablet 0    acetaminophen (TYLENOL) 500 MG tablet Take 500-1,000 mg by mouth every 6 hours as needed      Aspirin 81 MG CAPS       atorvastatin (LIPITOR) 40 MG tablet Take 1 tablet (40 mg) by mouth daily 90 tablet 3    bulk laxative (BENEFIBER DRINK MIX) packet Take 1 packet by mouth daily. 28 each 2    citalopram (CELEXA) 20 MG tablet Take 1 tablet (20 mg) by mouth daily 90 tablet 3    donepezil (ARICEPT) 10 MG tablet Take 1 tablet (10 mg) by mouth at bedtime. 90 tablet 0    FLUZONE HIGH-DOSE QUADRIVALENT 0.7 ML JEFFERY injection  (Patient not taking: Reported on 12/12/2023)      lisinopril-hydrochlorothiazide (ZESTORETIC) 20-12.5 MG tablet Take 1 tablet by mouth daily. 90 tablet 1    metoprolol tartrate (LOPRESSOR) 25 MG tablet Take 0.5 tablets (12.5 mg) by mouth 2 times daily 90 tablet 3    Multiple Vitamin (MULTIVITAMIN) per tablet Take 1 tablet by mouth daily.      nitroGLYcerin (NITROSTAT) 0.4 MG sublingual tablet Place 1 tablet (0.4 mg) under the tongue every 5 minutes as needed for chest pain. Repeat at 5 min intervals x 2 if needed 30 tablet 0    PFIZER COVID-19 VAC BIVALENT 30 MCG/0.3ML injection  (Patient not taking: Reported on 12/12/2023)      PFIZER-BIONT COVID-19 VAC-KEVIN 30 MCG/0.3ML injection  (Patient not taking: Reported on 12/12/2023)      vitamin D3 (CHOLECALCIFEROL) 50 mcg (2000  units) tablet Take 1 tablet by mouth daily      zinc 50 MG TABS Take 50 mg by mouth daily.         Social History     Socioeconomic History    Marital status:      Spouse name: Not on file    Number of children: Not on file    Years of education: Not on file    Highest education level: Not on file   Occupational History    Not on file   Tobacco Use    Smoking status: Former     Current packs/day: 0.00     Average packs/day: 2.0 packs/day for 40.0 years (80.0 ttl pk-yrs)     Types: Cigarettes     Start date: 4/10/1944     Quit date: 4/10/1984     Years since quittin.7     Passive exposure: Never    Smokeless tobacco: Never   Substance and Sexual Activity    Alcohol use: Yes     Alcohol/week: 6.0 standard drinks of alcohol     Comment: occasionally.    Drug use: No    Sexual activity: Not on file   Other Topics Concern    Parent/sibling w/ CABG, MI or angioplasty before 65F 55M? 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        Had gotten into typing to meet girls.        Worked for [employer] after leaving the [employer].     Social Drivers of Health     Financial Resource Strain: Low Risk  (2024)    Financial Resource Strain     Within the past 12 months, have you or your family members you live with been unable to get utilities (heat, electricity) when it was really needed?: No   Food Insecurity: Low Risk  (2024)    Food Insecurity     Within the past 12 months, did you worry that your food would run out before you got money to buy more?: No     Within the past 12 months, did the food you bought just not last and you didn t have money to get more?: No   Transportation Needs: Low Risk  (2024)    Transportation Needs     Within the past 12 months, has lack of transportation kept you from medical appointments, getting your medicines, non-medical meetings or appointments, work, or from getting things that you need?: No   Physical Activity: Not  "on file   Stress: Not on file   Social Connections: Not on file   Interpersonal Safety: Low Risk  (12/2/2024)    Interpersonal Safety     Do you feel physically and emotionally safe where you currently live?: Yes     Within the past 12 months, have you been hit, slapped, kicked or otherwise physically hurt by someone?: No     Within the past 12 months, have you been humiliated or emotionally abused in other ways by your partner or ex-partner?: No   Housing Stability: Low Risk  (6/5/2024)    Housing Stability     Do you have housing? : Yes     Are you worried about losing your housing?: No       Allergies   Allergen Reactions    Pletal [Cilostazol]        No results found for this or any previous visit (from the past 24 hours).         Review of Systems:     Review of Systems   Constitutional: Negative.             Physical Exam:     Vitals:    01/08/25 1104 01/08/25 1110   BP: (!) 181/70 (!) 156/63   BP Location: Left arm Right arm   Patient Position: Sitting Sitting   Pulse: 52    Resp: 18    Temp:  98  F (36.7  C)   TempSrc:  Oral   SpO2: 98%    Weight: 64.9 kg (143 lb)    Height: 1.655 m (5' 5.16\")      Body mass index is 23.68 kg/m .    Physical Exam  Vitals and nursing note reviewed.   Constitutional:       General: He is not in acute distress.     Appearance: Normal appearance. He is not ill-appearing, toxic-appearing or diaphoretic.   Pulmonary:      Effort: No respiratory distress.   Neurological:      Mental Status: He is alert and oriented to person, place, and time. Mental status is at baseline.           "

## 2025-01-13 ENCOUNTER — THERAPY VISIT (OUTPATIENT)
Dept: PHYSICAL THERAPY | Facility: REHABILITATION | Age: OVER 89
End: 2025-01-13
Payer: COMMERCIAL

## 2025-01-13 DIAGNOSIS — M62.81 GENERALIZED MUSCLE WEAKNESS: Primary | ICD-10-CM

## 2025-01-13 DIAGNOSIS — R29.6 FALLS FREQUENTLY: ICD-10-CM

## 2025-01-13 PROCEDURE — 97110 THERAPEUTIC EXERCISES: CPT | Mod: CQ | Performed by: PHYSICAL THERAPY ASSISTANT

## 2025-01-13 PROCEDURE — 97112 NEUROMUSCULAR REEDUCATION: CPT | Mod: CQ | Performed by: PHYSICAL THERAPY ASSISTANT

## 2025-01-20 ENCOUNTER — THERAPY VISIT (OUTPATIENT)
Dept: PHYSICAL THERAPY | Facility: REHABILITATION | Age: OVER 89
End: 2025-01-20
Payer: COMMERCIAL

## 2025-01-20 DIAGNOSIS — M62.81 GENERALIZED MUSCLE WEAKNESS: Primary | ICD-10-CM

## 2025-01-20 DIAGNOSIS — R29.6 FALLS FREQUENTLY: ICD-10-CM

## 2025-01-20 PROCEDURE — 97112 NEUROMUSCULAR REEDUCATION: CPT | Mod: CQ | Performed by: PHYSICAL THERAPY ASSISTANT

## 2025-01-20 PROCEDURE — 97110 THERAPEUTIC EXERCISES: CPT | Mod: CQ | Performed by: PHYSICAL THERAPY ASSISTANT

## 2025-01-28 ENCOUNTER — TRANSFERRED RECORDS (OUTPATIENT)
Dept: HEALTH INFORMATION MANAGEMENT | Facility: CLINIC | Age: OVER 89
End: 2025-01-28
Payer: COMMERCIAL

## 2025-02-10 ENCOUNTER — TELEPHONE (OUTPATIENT)
Dept: PHYSICAL THERAPY | Facility: REHABILITATION | Age: OVER 89
End: 2025-02-10

## 2025-02-10 NOTE — TELEPHONE ENCOUNTER
I attempted 4 times to reach Zack regarding today's no show. The line was busy all 4 times. Pt does have another appointment scheduled on Monday 2/17 @ 2:15.   Yanira Smith PTA,CLT

## 2025-02-11 ENCOUNTER — TELEPHONE (OUTPATIENT)
Dept: FAMILY MEDICINE | Facility: CLINIC | Age: OVER 89
End: 2025-02-11
Payer: COMMERCIAL

## 2025-02-11 DIAGNOSIS — F02.A3 MILD LATE ONSET ALZHEIMER'S DEMENTIA WITH MOOD DISTURBANCE (H): ICD-10-CM

## 2025-02-11 DIAGNOSIS — G30.1 MILD LATE ONSET ALZHEIMER'S DEMENTIA WITH MOOD DISTURBANCE (H): ICD-10-CM

## 2025-02-11 RX ORDER — DONEPEZIL HYDROCHLORIDE 10 MG/1
10 TABLET, FILM COATED ORAL AT BEDTIME
Qty: 90 TABLET | Refills: 0 | Status: SHIPPED | OUTPATIENT
Start: 2025-02-11

## 2025-02-17 ENCOUNTER — THERAPY VISIT (OUTPATIENT)
Dept: PHYSICAL THERAPY | Facility: REHABILITATION | Age: OVER 89
End: 2025-02-17
Payer: COMMERCIAL

## 2025-02-17 DIAGNOSIS — M62.81 GENERALIZED MUSCLE WEAKNESS: ICD-10-CM

## 2025-02-17 DIAGNOSIS — R29.6 FALLS FREQUENTLY: Primary | ICD-10-CM

## 2025-02-17 PROCEDURE — 97110 THERAPEUTIC EXERCISES: CPT | Mod: GP | Performed by: PHYSICAL THERAPY ASSISTANT

## 2025-02-17 PROCEDURE — 97112 NEUROMUSCULAR REEDUCATION: CPT | Mod: GP | Performed by: PHYSICAL THERAPY ASSISTANT

## 2025-02-18 ENCOUNTER — APPOINTMENT (OUTPATIENT)
Dept: RADIOLOGY | Facility: HOSPITAL | Age: OVER 89
DRG: 280 | End: 2025-02-18
Attending: STUDENT IN AN ORGANIZED HEALTH CARE EDUCATION/TRAINING PROGRAM
Payer: COMMERCIAL

## 2025-02-18 ENCOUNTER — HOSPITAL ENCOUNTER (INPATIENT)
Facility: HOSPITAL | Age: OVER 89
End: 2025-02-18
Attending: STUDENT IN AN ORGANIZED HEALTH CARE EDUCATION/TRAINING PROGRAM | Admitting: STUDENT IN AN ORGANIZED HEALTH CARE EDUCATION/TRAINING PROGRAM
Payer: COMMERCIAL

## 2025-02-18 ENCOUNTER — APPOINTMENT (OUTPATIENT)
Dept: RADIOLOGY | Facility: HOSPITAL | Age: OVER 89
End: 2025-02-18
Attending: STUDENT IN AN ORGANIZED HEALTH CARE EDUCATION/TRAINING PROGRAM
Payer: COMMERCIAL

## 2025-02-18 DIAGNOSIS — I10 ESSENTIAL HYPERTENSION, BENIGN: ICD-10-CM

## 2025-02-18 DIAGNOSIS — I21.4 NSTEMI (NON-ST ELEVATED MYOCARDIAL INFARCTION) (H): ICD-10-CM

## 2025-02-18 DIAGNOSIS — R19.7 DIARRHEA, UNSPECIFIED TYPE: Primary | ICD-10-CM

## 2025-02-18 DIAGNOSIS — G47.00 INSOMNIA, UNSPECIFIED TYPE: ICD-10-CM

## 2025-02-18 DIAGNOSIS — I50.9 ACUTE DECOMPENSATED HEART FAILURE (H): ICD-10-CM

## 2025-02-18 LAB
ALBUMIN SERPL BCG-MCNC: 3.8 G/DL (ref 3.5–5.2)
ALP SERPL-CCNC: 77 U/L (ref 40–150)
ALT SERPL W P-5'-P-CCNC: 10 U/L (ref 0–70)
ANION GAP SERPL CALCULATED.3IONS-SCNC: 14 MMOL/L (ref 7–15)
AST SERPL W P-5'-P-CCNC: 16 U/L (ref 0–45)
BASE EXCESS BLDV CALC-SCNC: -2.7 MMOL/L (ref -3–3)
BASOPHILS # BLD AUTO: 0.1 10E3/UL (ref 0–0.2)
BASOPHILS NFR BLD AUTO: 1 %
BILIRUB DIRECT SERPL-MCNC: 0.16 MG/DL (ref 0–0.3)
BILIRUB SERPL-MCNC: 0.5 MG/DL
BUN SERPL-MCNC: 28.6 MG/DL (ref 8–23)
CALCIUM SERPL-MCNC: 8.5 MG/DL (ref 8.8–10.4)
CHLORIDE SERPL-SCNC: 104 MMOL/L (ref 98–107)
CREAT SERPL-MCNC: 1.17 MG/DL (ref 0.67–1.17)
EGFRCR SERPLBLD CKD-EPI 2021: 59 ML/MIN/1.73M2
EOSINOPHIL # BLD AUTO: 0.3 10E3/UL (ref 0–0.7)
EOSINOPHIL NFR BLD AUTO: 4 %
ERYTHROCYTE [DISTWIDTH] IN BLOOD BY AUTOMATED COUNT: 14.2 % (ref 10–15)
FLUAV RNA SPEC QL NAA+PROBE: NEGATIVE
FLUBV RNA RESP QL NAA+PROBE: NEGATIVE
GLUCOSE SERPL-MCNC: 267 MG/DL (ref 70–99)
HCO3 BLDV-SCNC: 24 MMOL/L (ref 21–28)
HCO3 SERPL-SCNC: 22 MMOL/L (ref 22–29)
HCT VFR BLD AUTO: 31.7 % (ref 40–53)
HGB BLD-MCNC: 10.2 G/DL (ref 13.3–17.7)
IMM GRANULOCYTES # BLD: 0 10E3/UL
IMM GRANULOCYTES NFR BLD: 0 %
LYMPHOCYTES # BLD AUTO: 1.2 10E3/UL (ref 0.8–5.3)
LYMPHOCYTES NFR BLD AUTO: 17 %
MCH RBC QN AUTO: 30.6 PG (ref 26.5–33)
MCHC RBC AUTO-ENTMCNC: 32.2 G/DL (ref 31.5–36.5)
MCV RBC AUTO: 95 FL (ref 78–100)
MONOCYTES # BLD AUTO: 1.2 10E3/UL (ref 0–1.3)
MONOCYTES NFR BLD AUTO: 16 %
NEUTROPHILS # BLD AUTO: 4.6 10E3/UL (ref 1.6–8.3)
NEUTROPHILS NFR BLD AUTO: 63 %
NRBC # BLD AUTO: 0 10E3/UL
NRBC BLD AUTO-RTO: 0 /100
NT-PROBNP SERPL-MCNC: 2740 PG/ML (ref 0–1800)
O2/TOTAL GAS SETTING VFR VENT: 36 %
OXYHGB MFR BLDV: 84 % (ref 70–75)
PCO2 BLDV: 46 MM HG (ref 40–50)
PH BLDV: 7.32 [PH] (ref 7.32–7.43)
PLATELET # BLD AUTO: 214 10E3/UL (ref 150–450)
PO2 BLDV: 55 MM HG (ref 25–47)
POTASSIUM SERPL-SCNC: 4 MMOL/L (ref 3.4–5.3)
PROT SERPL-MCNC: 5.9 G/DL (ref 6.4–8.3)
RBC # BLD AUTO: 3.33 10E6/UL (ref 4.4–5.9)
RSV RNA SPEC NAA+PROBE: NEGATIVE
SAO2 % BLDV: 85.3 % (ref 70–75)
SARS-COV-2 RNA RESP QL NAA+PROBE: NEGATIVE
SODIUM SERPL-SCNC: 140 MMOL/L (ref 135–145)
TROPONIN T SERPL HS-MCNC: 43 NG/L
WBC # BLD AUTO: 7.4 10E3/UL (ref 4–11)

## 2025-02-18 PROCEDURE — 84155 ASSAY OF PROTEIN SERUM: CPT | Performed by: STUDENT IN AN ORGANIZED HEALTH CARE EDUCATION/TRAINING PROGRAM

## 2025-02-18 PROCEDURE — 87637 SARSCOV2&INF A&B&RSV AMP PRB: CPT | Performed by: STUDENT IN AN ORGANIZED HEALTH CARE EDUCATION/TRAINING PROGRAM

## 2025-02-18 PROCEDURE — 71045 X-RAY EXAM CHEST 1 VIEW: CPT

## 2025-02-18 PROCEDURE — 85025 COMPLETE CBC W/AUTO DIFF WBC: CPT | Performed by: STUDENT IN AN ORGANIZED HEALTH CARE EDUCATION/TRAINING PROGRAM

## 2025-02-18 PROCEDURE — 36415 COLL VENOUS BLD VENIPUNCTURE: CPT | Performed by: STUDENT IN AN ORGANIZED HEALTH CARE EDUCATION/TRAINING PROGRAM

## 2025-02-18 PROCEDURE — 84484 ASSAY OF TROPONIN QUANT: CPT | Performed by: STUDENT IN AN ORGANIZED HEALTH CARE EDUCATION/TRAINING PROGRAM

## 2025-02-18 PROCEDURE — 82565 ASSAY OF CREATININE: CPT | Performed by: STUDENT IN AN ORGANIZED HEALTH CARE EDUCATION/TRAINING PROGRAM

## 2025-02-18 PROCEDURE — 83036 HEMOGLOBIN GLYCOSYLATED A1C: CPT

## 2025-02-18 PROCEDURE — 82805 BLOOD GASES W/O2 SATURATION: CPT | Performed by: STUDENT IN AN ORGANIZED HEALTH CARE EDUCATION/TRAINING PROGRAM

## 2025-02-18 PROCEDURE — 99285 EMERGENCY DEPT VISIT HI MDM: CPT | Mod: 25

## 2025-02-18 PROCEDURE — 80048 BASIC METABOLIC PNL TOTAL CA: CPT | Performed by: STUDENT IN AN ORGANIZED HEALTH CARE EDUCATION/TRAINING PROGRAM

## 2025-02-18 PROCEDURE — 250N000009 HC RX 250: Performed by: STUDENT IN AN ORGANIZED HEALTH CARE EDUCATION/TRAINING PROGRAM

## 2025-02-18 PROCEDURE — 93005 ELECTROCARDIOGRAM TRACING: CPT | Performed by: STUDENT IN AN ORGANIZED HEALTH CARE EDUCATION/TRAINING PROGRAM

## 2025-02-18 PROCEDURE — 83880 ASSAY OF NATRIURETIC PEPTIDE: CPT | Performed by: STUDENT IN AN ORGANIZED HEALTH CARE EDUCATION/TRAINING PROGRAM

## 2025-02-18 PROCEDURE — 71046 X-RAY EXAM CHEST 2 VIEWS: CPT

## 2025-02-18 PROCEDURE — 94640 AIRWAY INHALATION TREATMENT: CPT

## 2025-02-18 RX ORDER — IPRATROPIUM BROMIDE AND ALBUTEROL SULFATE 2.5; .5 MG/3ML; MG/3ML
3 SOLUTION RESPIRATORY (INHALATION) ONCE
Status: COMPLETED | OUTPATIENT
Start: 2025-02-18 | End: 2025-02-18

## 2025-02-18 RX ORDER — FUROSEMIDE 10 MG/ML
60 INJECTION INTRAMUSCULAR; INTRAVENOUS ONCE
Status: COMPLETED | OUTPATIENT
Start: 2025-02-19 | End: 2025-02-19

## 2025-02-18 RX ADMIN — IPRATROPIUM BROMIDE AND ALBUTEROL SULFATE 3 ML: .5; 3 SOLUTION RESPIRATORY (INHALATION) at 23:06

## 2025-02-18 ASSESSMENT — COLUMBIA-SUICIDE SEVERITY RATING SCALE - C-SSRS
1. IN THE PAST MONTH, HAVE YOU WISHED YOU WERE DEAD OR WISHED YOU COULD GO TO SLEEP AND NOT WAKE UP?: NO
2. HAVE YOU ACTUALLY HAD ANY THOUGHTS OF KILLING YOURSELF IN THE PAST MONTH?: NO
6. HAVE YOU EVER DONE ANYTHING, STARTED TO DO ANYTHING, OR PREPARED TO DO ANYTHING TO END YOUR LIFE?: NO

## 2025-02-18 ASSESSMENT — ACTIVITIES OF DAILY LIVING (ADL): ADLS_ACUITY_SCORE: 41

## 2025-02-18 NOTE — PATIENT INSTRUCTIONS
Procedure: DCCV    Date Of Procedure:  02/18/25    Time Of Arrival: 12PM    Procedure Time: 1PM       Called and spoke to patient and he is agreeable to the date and time. Reviewed the Pre-Procedure instructions and they verbalized understanding. Encouraged to call with any questions or concerns.       Published on [employer] / emailed to Cath lab / note in chart / scheduled in Epic/Cupid/Carto   You can try chondroitin for arthritis pain    You can try Naprosyn/ Naproxen (Aleve) for headaches.    We will call you when blood work result is back.      Your medication list is printed, please keep this with you, it is helpful to bring this current list to any other medical appointments, the emergency room or hospital.    If you had lab testing today and your results are reassuring or normal they will be be mailed to you within 7 days.     If the lab tests need quick action we will call you with the results.   The phone number we will call with results is # [phone] (home) . If this is not the best number please call our clinic and change the number.    If you need any refills please call your pharmacy and they will contact us.    If you have any further concerns or wish to schedule another appointment you must call our office during normal business hours  [phone] (8-5:00 M-F)  If you have urgent medical questions that cannot wait  you may also call [phone] at any time of day.  If you have a medical emergency please call [phone].    Thank you for coming to [facility].

## 2025-02-19 ENCOUNTER — APPOINTMENT (OUTPATIENT)
Dept: CARDIOLOGY | Facility: HOSPITAL | Age: OVER 89
DRG: 280 | End: 2025-02-19
Payer: COMMERCIAL

## 2025-02-19 PROBLEM — I21.4 NSTEMI (NON-ST ELEVATED MYOCARDIAL INFARCTION) (H): Status: ACTIVE | Noted: 2025-02-19

## 2025-02-19 PROBLEM — I50.9 ACUTE DECOMPENSATED HEART FAILURE (H): Status: ACTIVE | Noted: 2025-02-19

## 2025-02-19 LAB
ANION GAP SERPL CALCULATED.3IONS-SCNC: 13 MMOL/L (ref 7–15)
BUN SERPL-MCNC: 25.6 MG/DL (ref 8–23)
CALCIUM SERPL-MCNC: 9.5 MG/DL (ref 8.8–10.4)
CHLORIDE SERPL-SCNC: 103 MMOL/L (ref 98–107)
CREAT SERPL-MCNC: 1.16 MG/DL (ref 0.67–1.17)
EGFRCR SERPLBLD CKD-EPI 2021: 60 ML/MIN/1.73M2
ERYTHROCYTE [DISTWIDTH] IN BLOOD BY AUTOMATED COUNT: 14.3 % (ref 10–15)
EST. AVERAGE GLUCOSE BLD GHB EST-MCNC: 105 MG/DL
GLUCOSE BLDC GLUCOMTR-MCNC: 103 MG/DL (ref 70–99)
GLUCOSE BLDC GLUCOMTR-MCNC: 108 MG/DL (ref 70–99)
GLUCOSE BLDC GLUCOMTR-MCNC: 142 MG/DL (ref 70–99)
GLUCOSE BLDC GLUCOMTR-MCNC: 186 MG/DL (ref 70–99)
GLUCOSE BLDC GLUCOMTR-MCNC: 93 MG/DL (ref 70–99)
GLUCOSE SERPL-MCNC: 108 MG/DL (ref 70–99)
HBA1C MFR BLD: 5.3 %
HCO3 SERPL-SCNC: 27 MMOL/L (ref 22–29)
HCT VFR BLD AUTO: 32.4 % (ref 40–53)
HGB BLD-MCNC: 10.4 G/DL (ref 13.3–17.7)
HOLD SPECIMEN: NORMAL
LVEF ECHO: NORMAL
MCH RBC QN AUTO: 29.7 PG (ref 26.5–33)
MCHC RBC AUTO-ENTMCNC: 32.1 G/DL (ref 31.5–36.5)
MCV RBC AUTO: 93 FL (ref 78–100)
PLATELET # BLD AUTO: 194 10E3/UL (ref 150–450)
POTASSIUM SERPL-SCNC: 4.1 MMOL/L (ref 3.4–5.3)
RBC # BLD AUTO: 3.5 10E6/UL (ref 4.4–5.9)
SODIUM SERPL-SCNC: 143 MMOL/L (ref 135–145)
TROPONIN T SERPL HS-MCNC: 103 NG/L
UFH PPP CHRO-ACNC: 0.16 IU/ML
UFH PPP CHRO-ACNC: 0.4 IU/ML
UFH PPP CHRO-ACNC: 0.59 IU/ML
WBC # BLD AUTO: 8.3 10E3/UL (ref 4–11)

## 2025-02-19 PROCEDURE — 99223 1ST HOSP IP/OBS HIGH 75: CPT | Mod: AI

## 2025-02-19 PROCEDURE — 96376 TX/PRO/DX INJ SAME DRUG ADON: CPT

## 2025-02-19 PROCEDURE — 82962 GLUCOSE BLOOD TEST: CPT

## 2025-02-19 PROCEDURE — 250N000011 HC RX IP 250 OP 636: Mod: JW

## 2025-02-19 PROCEDURE — 99223 1ST HOSP IP/OBS HIGH 75: CPT | Performed by: INTERNAL MEDICINE

## 2025-02-19 PROCEDURE — 85018 HEMOGLOBIN: CPT | Performed by: FAMILY MEDICINE

## 2025-02-19 PROCEDURE — 250N000013 HC RX MED GY IP 250 OP 250 PS 637

## 2025-02-19 PROCEDURE — 250N000011 HC RX IP 250 OP 636: Performed by: STUDENT IN AN ORGANIZED HEALTH CARE EDUCATION/TRAINING PROGRAM

## 2025-02-19 PROCEDURE — 255N000002 HC RX 255 OP 636

## 2025-02-19 PROCEDURE — 96375 TX/PRO/DX INJ NEW DRUG ADDON: CPT

## 2025-02-19 PROCEDURE — 85520 HEPARIN ASSAY: CPT | Performed by: FAMILY MEDICINE

## 2025-02-19 PROCEDURE — 84484 ASSAY OF TROPONIN QUANT: CPT | Performed by: STUDENT IN AN ORGANIZED HEALTH CARE EDUCATION/TRAINING PROGRAM

## 2025-02-19 PROCEDURE — 36415 COLL VENOUS BLD VENIPUNCTURE: CPT

## 2025-02-19 PROCEDURE — 85520 HEPARIN ASSAY: CPT

## 2025-02-19 PROCEDURE — 80048 BASIC METABOLIC PNL TOTAL CA: CPT | Performed by: FAMILY MEDICINE

## 2025-02-19 PROCEDURE — 250N000012 HC RX MED GY IP 250 OP 636 PS 637

## 2025-02-19 PROCEDURE — 96365 THER/PROPH/DIAG IV INF INIT: CPT

## 2025-02-19 PROCEDURE — 250N000011 HC RX IP 250 OP 636

## 2025-02-19 PROCEDURE — 93005 ELECTROCARDIOGRAM TRACING: CPT | Performed by: STUDENT IN AN ORGANIZED HEALTH CARE EDUCATION/TRAINING PROGRAM

## 2025-02-19 PROCEDURE — 250N000013 HC RX MED GY IP 250 OP 250 PS 637: Performed by: FAMILY MEDICINE

## 2025-02-19 PROCEDURE — 120N000004 HC R&B MS OVERFLOW

## 2025-02-19 PROCEDURE — 36415 COLL VENOUS BLD VENIPUNCTURE: CPT | Performed by: STUDENT IN AN ORGANIZED HEALTH CARE EDUCATION/TRAINING PROGRAM

## 2025-02-19 PROCEDURE — 36415 COLL VENOUS BLD VENIPUNCTURE: CPT | Performed by: FAMILY MEDICINE

## 2025-02-19 PROCEDURE — 93306 TTE W/DOPPLER COMPLETE: CPT | Mod: 26 | Performed by: INTERNAL MEDICINE

## 2025-02-19 PROCEDURE — 85520 HEPARIN ASSAY: CPT | Performed by: STUDENT IN AN ORGANIZED HEALTH CARE EDUCATION/TRAINING PROGRAM

## 2025-02-19 RX ORDER — PIPERACILLIN SODIUM, TAZOBACTAM SODIUM 3; .375 G/15ML; G/15ML
3.38 INJECTION, POWDER, LYOPHILIZED, FOR SOLUTION INTRAVENOUS ONCE
Status: DISCONTINUED | OUTPATIENT
Start: 2025-02-19 | End: 2025-02-19

## 2025-02-19 RX ORDER — FUROSEMIDE 10 MG/ML
40 INJECTION INTRAMUSCULAR; INTRAVENOUS DAILY
Status: DISCONTINUED | OUTPATIENT
Start: 2025-02-19 | End: 2025-02-20

## 2025-02-19 RX ORDER — DEXTROSE MONOHYDRATE 25 G/50ML
25-50 INJECTION, SOLUTION INTRAVENOUS
Status: DISCONTINUED | OUTPATIENT
Start: 2025-02-19 | End: 2025-02-27 | Stop reason: HOSPADM

## 2025-02-19 RX ORDER — METHYLPHENIDATE HYDROCHLORIDE 5 MG/1
5 TABLET ORAL 2 TIMES DAILY
Status: DISCONTINUED | OUTPATIENT
Start: 2025-02-19 | End: 2025-02-22

## 2025-02-19 RX ORDER — ACETAMINOPHEN 325 MG/1
650 TABLET ORAL EVERY 4 HOURS PRN
Status: DISCONTINUED | OUTPATIENT
Start: 2025-02-19 | End: 2025-02-27 | Stop reason: HOSPADM

## 2025-02-19 RX ORDER — ONDANSETRON 4 MG/1
4 TABLET, ORALLY DISINTEGRATING ORAL EVERY 6 HOURS PRN
Status: DISCONTINUED | OUTPATIENT
Start: 2025-02-19 | End: 2025-02-27 | Stop reason: HOSPADM

## 2025-02-19 RX ORDER — ATORVASTATIN CALCIUM 40 MG/1
40 TABLET, FILM COATED ORAL DAILY
Status: DISCONTINUED | OUTPATIENT
Start: 2025-02-19 | End: 2025-02-27 | Stop reason: HOSPADM

## 2025-02-19 RX ORDER — DONEPEZIL HYDROCHLORIDE 5 MG/1
10 TABLET, FILM COATED ORAL AT BEDTIME
Status: DISCONTINUED | OUTPATIENT
Start: 2025-02-19 | End: 2025-02-27 | Stop reason: HOSPADM

## 2025-02-19 RX ORDER — PROCHLORPERAZINE MALEATE 5 MG/1
5 TABLET ORAL EVERY 6 HOURS PRN
Status: DISCONTINUED | OUTPATIENT
Start: 2025-02-19 | End: 2025-02-27 | Stop reason: HOSPADM

## 2025-02-19 RX ORDER — AMOXICILLIN 250 MG
2 CAPSULE ORAL 2 TIMES DAILY PRN
Status: DISCONTINUED | OUTPATIENT
Start: 2025-02-19 | End: 2025-02-27 | Stop reason: HOSPADM

## 2025-02-19 RX ORDER — LIDOCAINE 40 MG/G
CREAM TOPICAL
Status: DISCONTINUED | OUTPATIENT
Start: 2025-02-19 | End: 2025-02-27 | Stop reason: HOSPADM

## 2025-02-19 RX ORDER — ASPIRIN 81 MG/1
81 TABLET ORAL DAILY
Status: DISCONTINUED | OUTPATIENT
Start: 2025-02-19 | End: 2025-02-27 | Stop reason: HOSPADM

## 2025-02-19 RX ORDER — OLANZAPINE 10 MG/2ML
5 INJECTION, POWDER, FOR SOLUTION INTRAMUSCULAR
Status: COMPLETED | OUTPATIENT
Start: 2025-02-19 | End: 2025-02-19

## 2025-02-19 RX ORDER — ACETAMINOPHEN 650 MG/1
650 SUPPOSITORY RECTAL EVERY 4 HOURS PRN
Status: DISCONTINUED | OUTPATIENT
Start: 2025-02-19 | End: 2025-02-27 | Stop reason: HOSPADM

## 2025-02-19 RX ORDER — LISINOPRIL AND HYDROCHLOROTHIAZIDE 12.5; 2 MG/1; MG/1
1 TABLET ORAL DAILY
Status: DISCONTINUED | OUTPATIENT
Start: 2025-02-19 | End: 2025-02-22

## 2025-02-19 RX ORDER — CITALOPRAM HYDROBROMIDE 20 MG/1
20 TABLET ORAL DAILY
Status: DISCONTINUED | OUTPATIENT
Start: 2025-02-19 | End: 2025-02-27 | Stop reason: HOSPADM

## 2025-02-19 RX ORDER — ONDANSETRON 2 MG/ML
4 INJECTION INTRAMUSCULAR; INTRAVENOUS EVERY 6 HOURS PRN
Status: DISCONTINUED | OUTPATIENT
Start: 2025-02-19 | End: 2025-02-27 | Stop reason: HOSPADM

## 2025-02-19 RX ORDER — NICOTINE POLACRILEX 4 MG
15-30 LOZENGE BUCCAL
Status: DISCONTINUED | OUTPATIENT
Start: 2025-02-19 | End: 2025-02-27 | Stop reason: HOSPADM

## 2025-02-19 RX ORDER — HEPARIN SODIUM 10000 [USP'U]/100ML
0-5000 INJECTION, SOLUTION INTRAVENOUS CONTINUOUS
Status: DISCONTINUED | OUTPATIENT
Start: 2025-02-19 | End: 2025-02-21

## 2025-02-19 RX ORDER — AMOXICILLIN 250 MG
1 CAPSULE ORAL 2 TIMES DAILY PRN
Status: DISCONTINUED | OUTPATIENT
Start: 2025-02-19 | End: 2025-02-27 | Stop reason: HOSPADM

## 2025-02-19 RX ORDER — POLYETHYLENE GLYCOL 3350 17 G/17G
17 POWDER, FOR SOLUTION ORAL 2 TIMES DAILY PRN
Status: DISCONTINUED | OUTPATIENT
Start: 2025-02-19 | End: 2025-02-27 | Stop reason: HOSPADM

## 2025-02-19 RX ORDER — QUETIAPINE FUMARATE 25 MG/1
50 TABLET, FILM COATED ORAL ONCE
Status: COMPLETED | OUTPATIENT
Start: 2025-02-19 | End: 2025-02-19

## 2025-02-19 RX ADMIN — QUETIAPINE FUMARATE 50 MG: 25 TABLET ORAL at 21:38

## 2025-02-19 RX ADMIN — FUROSEMIDE 60 MG: 10 INJECTION, SOLUTION INTRAVENOUS at 00:57

## 2025-02-19 RX ADMIN — METOPROLOL TARTRATE 12.5 MG: 25 TABLET, FILM COATED ORAL at 08:20

## 2025-02-19 RX ADMIN — METOPROLOL TARTRATE 12.5 MG: 25 TABLET, FILM COATED ORAL at 21:07

## 2025-02-19 RX ADMIN — METHYLPHENIDATE HYDROCHLORIDE 5 MG: 5 TABLET ORAL at 08:53

## 2025-02-19 RX ADMIN — HEPARIN SODIUM 800 UNITS/HR: 10000 INJECTION, SOLUTION INTRAVENOUS at 02:42

## 2025-02-19 RX ADMIN — INSULIN ASPART 1 UNITS: 100 INJECTION, SOLUTION INTRAVENOUS; SUBCUTANEOUS at 18:14

## 2025-02-19 RX ADMIN — DONEPEZIL HYDROCHLORIDE 10 MG: 5 TABLET ORAL at 21:07

## 2025-02-19 RX ADMIN — LISINOPRIL AND HYDROCHLOROTHIAZIDE 1 TABLET: 12.5; 2 TABLET ORAL at 08:20

## 2025-02-19 RX ADMIN — PERFLUTREN 2 ML: 6.52 INJECTION, SUSPENSION INTRAVENOUS at 10:35

## 2025-02-19 RX ADMIN — CITALOPRAM HYDROBROMIDE 20 MG: 20 TABLET ORAL at 08:20

## 2025-02-19 RX ADMIN — ATORVASTATIN CALCIUM 40 MG: 40 TABLET, FILM COATED ORAL at 08:19

## 2025-02-19 RX ADMIN — ASPIRIN 81 MG: 81 TABLET, COATED ORAL at 13:41

## 2025-02-19 RX ADMIN — OLANZAPINE 5 MG: 10 INJECTION, POWDER, FOR SOLUTION INTRAMUSCULAR at 17:59

## 2025-02-19 RX ADMIN — FUROSEMIDE 40 MG: 10 INJECTION, SOLUTION INTRAVENOUS at 13:01

## 2025-02-19 ASSESSMENT — ACTIVITIES OF DAILY LIVING (ADL)
ADLS_ACUITY_SCORE: 44
ADLS_ACUITY_SCORE: 41
ADLS_ACUITY_SCORE: 41
ADLS_ACUITY_SCORE: 44
ADLS_ACUITY_SCORE: 44
ADLS_ACUITY_SCORE: 41
ADLS_ACUITY_SCORE: 44
ADLS_ACUITY_SCORE: 44
ADLS_ACUITY_SCORE: 41
ADLS_ACUITY_SCORE: 44
ADLS_ACUITY_SCORE: 44
ADLS_ACUITY_SCORE: 50
DEPENDENT_IADLS:: CLEANING;COOKING;LAUNDRY;SHOPPING;MEAL PREPARATION;TRANSPORTATION;MEDICATION MANAGEMENT;MONEY MANAGEMENT
ADLS_ACUITY_SCORE: 50
ADLS_ACUITY_SCORE: 50
ADLS_ACUITY_SCORE: 41

## 2025-02-19 NOTE — H&P
[facility]    History and Physical - Hospitalist Service       Date of Admission:  2/18/2025    Assessment & Plan      Zack Johns is a 89 year old male with PMHx of CAD s/p stent (2018), PAD, T2DM, HTN, HLD admitted on 2/18/2025 for NSTEMI and acute hypoxic respiratory failure, likely secondary to new CHF.    NSTEMI  CAD s/p RCA stent (2018)  PAD s/p BL iliac stents (2018)  Initial tropes trended from 43 > 103. EKG showed sinus bradycardia w/PVC, without acute ischemic changes when compared to previous in 2022.  ED provider discussed with on-call cardiologist, who recommended starting a heparin gtt and consult for further workup. Patient asymptomatic on my evaluation.  Denied chest pain.  Of note, appears that they tried to give him nitroglycerin at his [facility] which did not improve symptoms.  -Cards consult, recs appreciated   -NPO in case of procedure   -heparin gtt  -hold PTA ASA 81mg d     Acute hypoxic respiratory failure  Concerns for new CHF  Patient presents via EMS from [facility] for hypoxia down to SpO2 70s after dinner. He improved to the 90s with 6L NC O2 en route. Initial workup notable for BNP 2740 and CXR notable for increased interstitial markings concerning for pulmonary edema, whic hraise concerns for possible new CHF. Triggers could very well be NSTEMI discussed above. No si/sx to suggest arrhythmia or acute illness. Does not have known pulmonary conditions that would be concerning for COPD/asthma exacerbation. Shortness of breath improved after Lasix and was weaned to room air on my evaluation, satting comfortably in the low 90s. Apparently had significant UOP, but was not accurately charted unfortunately. He appears euvolemic on exam, however.   - s/p lasix 60mg IV once in ED, continue w/lasix 40mg IV d   -Continue PTA metoprolol 12.5 mg twice daily  -Continue PTA Zestoretic 1 tablet daily  - Complete echo  - Strict I/O  - Daily weights  -  Cardiac tele  - Cards consult per above     Goals of care  Patient reports that he is eager to return to his custodial facility as soon as possible.  What is important to him is being with his wife, being able to enjoy good meals, conversations, and daily activities with his new neighbors. He recently moved to [place] in [place] and really likes it there. He does desire full cares, however, and wants to be in his best health to spend more time with his community.   -Does have [employer]'s naming wife and son as his HCA's  -Unable to confirm code status w/patient or family. Full Code for now.     Chronic conditions:  >Dementia - PTA donepazil, celexa, ritalin. Delirium precautions placed.   >T2DM - Last A1c 6.2 in Mar 2023. Repeat A1c. LDSS.   >HTN - zestoretic per above   >HLD - PTA atorvastatin 40mg d           Diet: Combination Diet 2 gm NA Diet; No Caffeine Diet (and additional linked orders)  Fluid restriction 2000 ML FLUID (and additional linked orders)    DVT Prophylaxis: VTE Prophylaxis contraindicated due to heparin gtt for NSTEMI  Castellano Catheter: Not present  Lines: None     Cardiac Monitoring: ACTIVE order. Indication: Acute decompensated heart failure (48 hours)  Code Status: Full Code      Clinically Significant Risk Factors Present on Admission           # Hypocalcemia: Lowest Ca = 8.5 mg/dL in last 2 days, will monitor and replace as appropriate       # Drug Induced Platelet Defect: home medication list includes an antiplatelet medication   # Hypertension: Noted on problem list      # Anemia: based on hgb <11                  Disposition Plan     Medically Ready for Discharge: Anticipated in 2-4 Days         Patient's care to be discussed w/primary medicine team later this morning.     Danae Rojas MD  Hospitalist Service  [facility]  Securely message with [employer] (more info)  Text page via [employer] Paging/Directory      ______________________________________________________________________    Chief Complaint   SOB    History is obtained from the patient and chart rvw     History of Present Illness   Zack Johns is a 89 year old male who presents for acute onset of shortness of breath.  Per chart review, he became short of breath after dinner and EMS was called.  Sats were 70% on room air on their arrival.  He improved to the low 90s after 6 L of NC O2.    On my evaluation, patient was complaining about being unable to sleep comfortably down in the ED.  He reports just overall aching from the bed and being bothered by the noise outside his room.  Denied having chest pain, shortness of breath, abdominal pain, N/V, edema, orthopnea.  He denies any trouble swallowing. No known hx of CHF.     Recently moved to [place] in [place]. Has an apartment with his wife. He uses a walker to get around - no recent falls. He is fairly independent at baseline he reports. He has a happy life there and really enjoys it. Has 4 sons - 2 of whom are nearby.       Past Medical History    Past Medical History:   Diagnosis Date    Actinic keratitis     Arthritis     bilat shoulders    CAD (coronary artery disease)     Cardiac arrest (H) 09/04/89    Claudication     Claudication     Coronary artery disease     s/p MI    Diabetes mellitus type 2, controlled (H)     Diverticulosis     HTN (hypertension)     Hyperlipidemia     Hypertension     Malignant neoplasm of prostate (H) 8/7/2006-10/5/2006    DENIED BY PATIENT (see note from 12/08/2014).  RADIOTHERAPY BY DR.CHO LEGGETT, old     Multiple pulmonary nodules     Osteoarthritis of glenohumeral joint     Left. Injected at [facility] 02/10/2015.    Peripheral vascular disease, unspecified     Syncope     Trigger finger, acquired     Type 2 diabetes mellitus (H) 11/6/2012       Past Surgical History   Past Surgical History:   Procedure Laterality Date    APPENDECTOMY      ARTHROSCOPY KNEE       "ARTHROSCOPY KNEE      CARDIAC CATHETERIZATION  08/22/2018     of rca    CARDIAC SURGERY  08/22/2018    two stents placed 8/22/18    CV CORONARY ANGIOGRAM N/A 08/06/2018    Procedure: Coronary Angiogram;  Surgeon: Jeane Garcia MD;  Location: [facility] Cath Lab;  Service:     CV CORONARY ANGIOGRAM N/A 08/22/2018    Procedure: Coronary Angiogram;  Surgeon: Jeane Garcia MD;  Location: [facility] Cath Lab;  Service:     FEMORAL ENDARTERECTOMY Bilateral 05/03/2017    IR EXTREMITY ANGIOGRAM BILATERAL  03/10/2017    NOSE SURGERY      PROSTATE BIOPSY, NEEDLE, SATURATION SAMPLING  01/13/2003    \"Biopsy fo the Prostate Needle\"    RELEASE TRIGGER FINGER         Prior to Admission Medications   Prior to Admission Medications   Prescriptions Last Dose Informant Patient Reported? Taking?   Multiple Vitamin (MULTIVITAMIN) per tablet 2/18/2025 Morning Self, Spouse/Significant Other Yes Yes   Sig: Take 1 tablet by mouth daily.   acetaminophen (TYLENOL) 500 MG tablet  Self, Spouse/Significant Other Yes Yes   Sig: Take 500-1,000 mg by mouth every 6 hours as needed   aspirin 81 MG EC tablet 2/17/2025 Bedtime Self, Spouse/Significant Other Yes Yes   Sig: Take 1 tablet by mouth at bedtime.   atorvastatin (LIPITOR) 40 MG tablet 2/18/2025 Morning Self, Spouse/Significant Other No Yes   Sig: Take 1 tablet (40 mg) by mouth daily   bulk laxative (BENEFIBER DRINK MIX) packet 2/18/2025 Morning Self, Spouse/Significant Other No Yes   Sig: Take 1 packet by mouth daily.   citalopram (CELEXA) 20 MG tablet 2/18/2025 Morning Self, Spouse/Significant Other No Yes   Sig: Take 1 tablet (20 mg) by mouth daily   donepezil (ARICEPT) 10 MG tablet 2/17/2025 Bedtime Self, Spouse/Significant Other No Yes   Sig: Take 1 tablet (10 mg) by mouth at bedtime.   lisinopril-hydrochlorothiazide (ZESTORETIC) 20-12.5 MG tablet 2/18/2025 Morning Self, Spouse/Significant Other No Yes   Sig: Take 1 tablet by mouth daily.   methylphenidate (RITALIN) 5 MG tablet " 2/18/2025 Morning Self, Spouse/Significant Other No Yes   Sig: Take 1 tablet (5 mg) by mouth 2 times daily.   metoprolol tartrate (LOPRESSOR) 25 MG tablet 2/18/2025 Morning Self, Spouse/Significant Other No Yes   Sig: Take 0.5 tablets (12.5 mg) by mouth 2 times daily   nitroGLYcerin (NITROSTAT) 0.4 MG sublingual tablet  Self, Spouse/Significant Other No Yes   Sig: Place 1 tablet (0.4 mg) under the tongue every 5 minutes as needed for chest pain. Repeat at 5 min intervals x 2 if needed   vitamin D3 (CHOLECALCIFEROL) 50 mcg (2000 units) tablet 2/18/2025 Morning Self, Spouse/Significant Other Yes Yes   Sig: Take 1 tablet by mouth daily   zinc 50 MG TABS 2/18/2025 Morning Self, Spouse/Significant Other Yes Yes   Sig: Take 50 mg by mouth daily.      Facility-Administered Medications: None        Review of Systems    The 10 point Review of Systems is negative other than noted in the HPI or here.      Physical Exam   Vital Signs: Temp: 98  F (36.7  C) Temp src: Oral BP: (!) 166/71 Pulse: 67   Resp: 29 SpO2: 92 % O2 Device: Nasal cannula Oxygen Delivery: 2 LPM  Weight: 146 lbs 1.6 oz    Physical Exam  Constitutional:       Appearance: Normal appearance. He is not ill-appearing.      Comments: Conversational. Forgetful .   HENT:      Head: Normocephalic and atraumatic.   Cardiovascular:      Rate and Rhythm: Normal rate and regular rhythm.      Heart sounds: No murmur heard.     No friction rub. No gallop.   Pulmonary:      Effort: Pulmonary effort is normal.      Breath sounds: Normal breath sounds. No wheezing, rhonchi or rales.   Abdominal:      General: Abdomen is flat. There is no distension.      Palpations: Abdomen is soft.      Tenderness: There is no abdominal tenderness.   Musculoskeletal:      Right lower leg: No edema.      Left lower leg: No edema.   Skin:     General: Skin is warm and dry.   Neurological:      General: No focal deficit present.      Mental Status: He is alert.      Comments: Oriented to self  and situation, not time or place. No facial droop. Speech legible. Following simple commands - wiggling toes.    Psychiatric:         Mood and Affect: Mood normal.         Behavior: Behavior normal.      Comments: Forgetful, loose thought process, but redirectable.            Medical Decision Making       ------------------ MEDICAL DECISION MAKING ------------------------------------------------------------------------------------------------------      Data   ------------------------- PAST 24 HR DATA REVIEWED -----------------------------------------------

## 2025-02-19 NOTE — PLAN OF CARE
Problem: Heart Failure  Goal: Optimal Coping  Outcome: Progressing  Goal: Optimal Cardiac Output  Outcome: Progressing  Goal: Stable Heart Rate and Rhythm  Outcome: Progressing  Goal: Fluid and Electrolyte Balance  Outcome: Progressing  Goal: Optimal Functional Ability  Outcome: Progressing  Goal: Improved Oral Intake  Outcome: Progressing  Goal: Effective Oxygenation and Ventilation  Outcome: Progressing  Goal: Effective Breathing Pattern During Sleep  Outcome: Progressing   Goal Outcome Evaluation:       Patient denies chest pain.  Sats are in the mid 90's on room air.  Alert to self, but forgetful.  Up to chair this afternoon for lunch.  Assist of 2 for transfer.  Primofit in place and patent.

## 2025-02-19 NOTE — SIGNIFICANT EVENT
Significant Event Note    Time of event: 5:59 PM February 19, 2025    Description of event:  Received page from bedside RN, asking me to contact her.     Discussed over the phone, patient having increased agitation, aggression verbally, pulling at lines. No change in vitals, no other reported symptoms. His wife had left not long ago, has history of dementia.     Upon my arrival, patient laying in bed with bedside 1-to-1 present. No yelling, however was rude and weinstein with me. Threatening to leave, that we are holding him against his will, denying that he has a heart condition and needs a procedure tomorrow. Attempted to redirect, reason with him but was unsuccessful. Vitals within normal limits. Denies pain or new symptoms. Not agreeable to additional conversation. Repeatedly stating that the care he is receiving is not legal.    Plan:  Most likely hospital delirium/sundowning. With delirium/denial of heart condition/need for procedure, patient is not able to be his own decision maker. Due to concern for his safety and lack of ability to stay safe on his own, pulling at lines, threatening staff and not redirectable or able to be reasoned with, a one time dose of zyprexa 5 mg IM was ordered to be used PRN tonight. EKG reviewed, QTC not elevated. Can consider restraints as next step. Continue to redirect as able, as we would like to limit the amount of delirium medications.     Addendum to include: Recommend routine delirium precautions, bladder scan if persistently irritable and hasn't urinated recently.     Discussed with: bedside nurse    Ravinder Bonilla MD, PGY1  [facility]

## 2025-02-19 NOTE — PLAN OF CARE
Goal Outcome Evaluation:      Plan of Care Reviewed With: patient, spouse          Outcome Evaluation: Likely home at discharge.

## 2025-02-19 NOTE — MEDICATION SCRIBE - ADMISSION MEDICATION HISTORY
Medication Scribe Admission Medication History    Admission medication history is complete. The information provided in this note is only as accurate as the sources available at the time of the update.    Information Source(s): Patient and Family member via in-person    Pertinent Information: PTA med list updated per patient and spouse    Changes made to PTA medication list:  Added: None  Deleted: None  Changed: None    Allergies reviewed with patient and updates made in EHR: yes    Medication History Completed By: Mayi Forrester 2/19/2025 12:45 AM    PTA Med List   Medication Sig Last Dose/Taking    acetaminophen (TYLENOL) 500 MG tablet Take 500-1,000 mg by mouth every 6 hours as needed Taking As Needed    aspirin 81 MG EC tablet Take 1 tablet by mouth at bedtime. 2/17/2025 Bedtime    atorvastatin (LIPITOR) 40 MG tablet Take 1 tablet (40 mg) by mouth daily 2/18/2025 Morning    bulk laxative (BENEFIBER DRINK MIX) packet Take 1 packet by mouth daily. 2/18/2025 Morning    citalopram (CELEXA) 20 MG tablet Take 1 tablet (20 mg) by mouth daily 2/18/2025 Morning    donepezil (ARICEPT) 10 MG tablet Take 1 tablet (10 mg) by mouth at bedtime. 2/17/2025 Bedtime    lisinopril-hydrochlorothiazide (ZESTORETIC) 20-12.5 MG tablet Take 1 tablet by mouth daily. 2/18/2025 Morning    methylphenidate (RITALIN) 5 MG tablet Take 1 tablet (5 mg) by mouth 2 times daily. 2/18/2025 Morning    metoprolol tartrate (LOPRESSOR) 25 MG tablet Take 0.5 tablets (12.5 mg) by mouth 2 times daily 2/18/2025 Morning    Multiple Vitamin (MULTIVITAMIN) per tablet Take 1 tablet by mouth daily. 2/18/2025 Morning    nitroGLYcerin (NITROSTAT) 0.4 MG sublingual tablet Place 1 tablet (0.4 mg) under the tongue every 5 minutes as needed for chest pain. Repeat at 5 min intervals x 2 if needed Taking As Needed    vitamin D3 (CHOLECALCIFEROL) 50 mcg (2000 units) tablet Take 1 tablet by mouth daily 2/18/2025 Morning    zinc 50 MG TABS Take 50 mg by mouth daily.  2/18/2025 Morning

## 2025-02-19 NOTE — PHARMACY-VANCOMYCIN DOSING SERVICE
Pharmacy Vancomycin Initial Note  Date of Service 2025  Patient's  1934  89 year old, male    Indication: Sepsis    Current estimated CrCl = Estimated Creatinine Clearance: 39.7 mL/min (based on SCr of 1.16 mg/dL).    Creatinine for last 3 days  2025: 10:52 PM Creatinine 1.17 mg/dL  2025:  8:29 AM Creatinine 1.16 mg/dL    Recent Vancomycin Level(s) for last 3 days  No results found for requested labs within last 3 days.      Vancomycin IV Administrations (past 72 hours)        No vancomycin orders with administrations in past 72 hours.                    Nephrotoxins and other renal medications (From now, onward)      Start     Dose/Rate Route Frequency Ordered Stop    25 1200  furosemide (LASIX) injection 40 mg         40 mg  over 1-3 Minutes Intravenous DAILY 25 0517      25 1000  vancomycin (VANCOCIN) 1,500 mg in 0.9% NaCl 265 mL intermittent infusion         1,500 mg  over 90 Minutes Intravenous ONCE 25 0941      25 0930  piperacillin-tazobactam (ZOSYN) 3.375 g vial to attach to  mL bag         3.375 g  over 30 Minutes Intravenous ONCE 25 0927      25 0800  lisinopril-hydrochlorothiazide (ZESTORETIC) 20-12.5 MG per tablet 1 tablet        Note to Pharmacy: PTA Sig:Take 1 tablet by mouth daily.      1 tablet Oral DAILY 25 0500              Contrast Orders - past 72 hours (72h ago, onward)      None                  Plan:  Start vancomycin  1500 mg IV (22 mg/kg) x 1 ED   Please re-consult for continued dosing.    Alirio Cordova, [facility]

## 2025-02-19 NOTE — ED TRIAGE NOTES
Pt brought in by EMS from home, independent living, for shortness of breath after dinner. Per EMS, pt denies fever or coughing, o2 sat on room air was mid 70s to low 80s, put on n/c 6L then weaned to 2L n/c 94%, SBP 190s, nitroglycerin 2 tab 0.4 mg with no help, EKG NSR with PVCs, . Hx of MI and smoking.     Triage Assessment (Adult)       Row Name 02/18/25 9006          Triage Assessment    Airway WDL WDL        Respiratory WDL    Respiratory WDL X        Cardiac WDL    Cardiac WDL WDL

## 2025-02-19 NOTE — ED NOTES
He is alert. He is confused about why he is staying here. He felt he was leaving this morning to go home. I explained he has a sick heart and that he will not be going home today. He was not happy to hear that. He denies pain at this time. Denies SOB at this time.

## 2025-02-19 NOTE — ED PROVIDER NOTES
EMERGENCY DEPARTMENT ENCOUNTER      NAME: Zack Johns  AGE: 89 year old male  YOB: 1934  MRN: [id number]  EVALUATION DATE & TIME: 2/18/2025 10:39 PM    PCP: Haim Galaviz    ED PROVIDER: Ru Field MD      Chief Complaint   Patient presents with    Shortness of Breath         FINAL IMPRESSION:  1. NSTEMI (non-ST elevated myocardial infarction) (H)    2. Acute decompensated heart failure (H)          ED COURSE & MEDICAL DECISION MAKING:    Pertinent Labs & Imaging studies reviewed. (See chart for details)  89 year old male presents to the Emergency Department for evaluation of SOB    ED Course as of 02/19/25 0404   Tue Feb 18, 2025 2251 EKG is sinus rhythm with occasional PVCs at a rate of 77, right bundle branch block, no ST segment changes indicative of emergent ischemia.   2256 Patient is a 89-year-old male with a past medical history significant for CAD who presents to the emergency department with difficulty breathing that began after eating dinner.  On arrival with EMS, the patient was in the mid 70s on room air for oxygenation.  They provided him with 2 tabs of nitro and titrated his oxygen down.  They reported no benefit, but the patient at this time is feeling better.  He has a prolonged expiratory phase with bilateral inspiratory and expiratory wheeze.  He does have a remote smoking history, but denies a prior history of asthma or COPD.  He appears euvolemic otherwise.  Differential diagnosis includes exacerbation of undiagnosed COPD, ACS, flash pulmonary edema, pneumonia, viral illness.   2345 Patient is a troponin elevation at 43, will trend at the 2-hour.  His VBG does not show evidence of CO2 retention or acidosis, but his BNP is elevated at 2740 without a prior.  Will provide 60 mg IV Lasix and monitor intake and output as his portable chest x-ray also showed interstitial edema, but incompletely visualized lower lung fields, so we will also obtain formal chest x-ray at  this time.  No transaminitis.  Baseline CKD is seen without electrolyte abnormalities.  He is hyperglycemic without acidosis or anion gap elevation.  Glucose at 267.  No leukocytosis.  Unchanged anemia.   Wed Feb 19, 2025   0022 Viral swabs negative.   0043 Formal x-ray of the chest shows mild bibasilar infiltrates suggestive of pulmonary edema.   0143 Lab called to report troponin of 103.  Will repeat EKG and discussed with cardiology.   0209 Repeat EKG shows sinus rhythm with occasional PVCs, relatively unchanged from prior, although the interpretation does say Mobitz 2, it appears that his dropped beats is surrounding the PVCs seen on the tracing.   0209 Discussed these results with the cardiologist on-call, who agreed with starting heparin and admission for NSTEMI   0354 No beds in system. Will admit here.       10:44 PM I introduced myself to the patient, obtained patient history, performed a physical exam, and discussed plan for ED workup including potential diagnostic laboratory/imaging studies and interventions.     Medical Decision Making  Obtained supplemental history:Supplemental history obtained?: EMS  Reviewed external records: External records reviewed?: No  Care impacted by chronic illness:Heart Disease, Hyperlipidemia, Hypertension, and Peripheral Vascular Disease  Care significantly affected by social determinants of health:Access to Medical Care  Did you consider but not order tests?: Work up considered but not performed and documented in chart, if applicable  Did you interpret images independently?: Independent interpretation of ECG and images noted in documentation, when applicable.  Consultation discussion with other provider:Did you involve another provider (consultant, MH, pharmacy, etc.)?: I discussed the care with another health care provider, see documentation for details.  Admit.  Not Applicable      At the conclusion of the encounter I discussed the results of all of the tests and the  "disposition. The questions were answered. The patient or family acknowledged understanding and was agreeable with the care plan.     0 minutes of critical care time     MEDICATIONS GIVEN IN THE EMERGENCY:  Medications   heparin 25,000 units in 0.45% NaCl 250 mL ANTICOAGULANT infusion (800 Units/hr Intravenous $New Bag 2/19/25 0242)   ipratropium - albuterol 0.5 mg/2.5 mg/3 mL (DUONEB) neb solution 3 mL (3 mLs Nebulization $Given 2/18/25 9577)   furosemide (LASIX) injection 60 mg (60 mg Intravenous $Given 2/19/25 0057)   heparin loading dose for LOW INTENSITY TREATMENT * Give BEFORE starting heparin infusion (4,000 Units Intravenous $Given 2/19/25 0239)       NEW PRESCRIPTIONS STARTED AT TODAY'S ER VISIT  New Prescriptions    No medications on file          =================================================================    HPI    Patient information was obtained from: EMS and patient    Use of : N/A      Zack Johns is a 89 year old male with a pertinent history of MI, CAD, PVD, hyperlipidemia, and hypertension who presents to this ED via EMS for evaluation of shortness of breath.    Per EMS, patient became short of breath after dinner. He presents from [facility] living in [place]. He was given two nitroglycerin en route with no relief. Blood glucose 346, patient is not diabetic. EKG significant for frequent premature ventricular contractions. Systolic blood pressure of 190. Patient was saturating as low as 70% on room air. Initially started on six liters of oxygen, and got titrated down to two liters with oxygen sats in the mid 90s. Faint rhonchi in the right lung. Denied fever or cough for EMS.    Per patient, patient became suddenly short of breath after dinner. He notes his throat and breathing feel \"funny\" but cannot elaborate to specific qualities. Patient quit smoking twenty years ago. Reports a remote history of cardiac arrest. Denies known lung disease or inhaler use.    PAST MEDICAL " "HISTORY:  Past Medical History:   Diagnosis Date    Actinic keratitis     Arthritis     bilat shoulders    CAD (coronary artery disease)     Cardiac arrest (H) 09/04/89    Claudication     Claudication     Coronary artery disease     s/p MI    Diabetes mellitus type 2, controlled (H)     Diverticulosis     HTN (hypertension)     Hyperlipidemia     Hypertension     Malignant neoplasm of prostate (H) 8/7/2006-10/5/2006    DENIED BY PATIENT (see note from 12/08/2014).  RADIOTHERAPY BY DR.CHO LEGGETT, old     Multiple pulmonary nodules     Osteoarthritis of glenohumeral joint     Left. Injected at [facility] 02/10/2015.    Peripheral vascular disease, unspecified     Syncope     Trigger finger, acquired     Type 2 diabetes mellitus (H) 11/6/2012       PAST SURGICAL HISTORY:  Past Surgical History:   Procedure Laterality Date    APPENDECTOMY      ARTHROSCOPY KNEE      ARTHROSCOPY KNEE      CARDIAC CATHETERIZATION  08/22/2018     of rca    CARDIAC SURGERY  08/22/2018    two stents placed 8/22/18    CV CORONARY ANGIOGRAM N/A 08/06/2018    Procedure: Coronary Angiogram;  Surgeon: Jeane Garcia MD;  Location: [facility] Cath Lab;  Service:     CV CORONARY ANGIOGRAM N/A 08/22/2018    Procedure: Coronary Angiogram;  Surgeon: Jeane Garcia MD;  Location: [facility] Cath Lab;  Service:     FEMORAL ENDARTERECTOMY Bilateral 05/03/2017    IR EXTREMITY ANGIOGRAM BILATERAL  03/10/2017    NOSE SURGERY      PROSTATE BIOPSY, NEEDLE, SATURATION SAMPLING  01/13/2003    \"Biopsy fo the Prostate Needle\"    RELEASE TRIGGER FINGER             CURRENT MEDICATIONS:    acetaminophen (TYLENOL) 500 MG tablet  aspirin 81 MG EC tablet  atorvastatin (LIPITOR) 40 MG tablet  bulk laxative (BENEFIBER DRINK MIX) packet  citalopram (CELEXA) 20 MG tablet  donepezil (ARICEPT) 10 MG tablet  lisinopril-hydrochlorothiazide (ZESTORETIC) 20-12.5 MG tablet  methylphenidate (RITALIN) 5 MG tablet  metoprolol tartrate (LOPRESSOR) 25 MG tablet  Multiple " Vitamin (MULTIVITAMIN) per tablet  nitroGLYcerin (NITROSTAT) 0.4 MG sublingual tablet  vitamin D3 (CHOLECALCIFEROL) 50 mcg (2000 units) tablet  zinc 50 MG TABS        ALLERGIES:  Allergies   Allergen Reactions    Pletal [Cilostazol]        FAMILY HISTORY:  Family History   Problem Relation Age of Onset    Diabetes No family hx of     Breast Cancer No family hx of     Colon Cancer No family hx of     Prostate Cancer No family hx of     Other Cancer No family hx of     No Known Problems Mother     No Known Problems Father        SOCIAL HISTORY:   Social History     Socioeconomic History    Marital status:    Tobacco Use    Smoking status: Former     Current packs/day: 0.00     Average packs/day: 2.0 packs/day for 40.0 years (80.0 ttl pk-yrs)     Types: Cigarettes     Start date: [birth year]/10/1944     Quit date: 4/10/1984     Years since quittin.8     Passive exposure: Never    Smokeless tobacco: Never   Substance and Sexual Activity    Alcohol use: Yes     Alcohol/week: 6.0 standard drinks of alcohol     Comment: occasionally.    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 Worked in admin.        Had gotten into typing to meet girls.        Worked for [employer] after leaving the [employer].     Social Drivers of Health     Financial Resource Strain: Low Risk  (2024)    Financial Resource Strain     Within the past 12 months, have you or your family members you live with been unable to get utilities (heat, electricity) when it was really needed?: No   Food Insecurity: Low Risk  (2024)    Food Insecurity     Within the past 12 months, did you worry that your food would run out before you got money to buy more?: No     Within the past 12 months, did the food you bought just not last and you didn t have money to get more?: No   Transportation Needs: Low Risk  (2024)    Transportation Needs     Within the past 12 months, has lack of transportation kept you from medical  "appointments, getting your medicines, non-medical meetings or appointments, work, or from getting things that you need?: No   Interpersonal Safety: Low Risk  (12/2/2024)    Interpersonal Safety     Do you feel physically and emotionally safe where you currently live?: Yes     Within the past 12 months, have you been hit, slapped, kicked or otherwise physically hurt by someone?: No     Within the past 12 months, have you been humiliated or emotionally abused in other ways by your partner or ex-partner?: No   Housing Stability: Low Risk  (6/5/2024)    Housing Stability     Do you have housing? : Yes     Are you worried about losing your housing?: No       VITALS:  BP (!) 150/80   Pulse 73   Temp 98  F (36.7  C) (Oral)   Resp 29   Ht 1.702 m (5' 7\")   Wt 66.3 kg (146 lb 1.6 oz)   SpO2 92%   BMI 22.88 kg/m      PHYSICAL EXAM    Physical Exam  Vitals and nursing note reviewed.   Constitutional:       General: He is not in acute distress.     Appearance: Normal appearance. He is normal weight. He is not ill-appearing.   HENT:      Head: Normocephalic and atraumatic.      Nose: Nose normal.      Mouth/Throat:      Mouth: Mucous membranes are moist.      Pharynx: Oropharynx is clear.   Eyes:      Extraocular Movements: Extraocular movements intact.      Conjunctiva/sclera: Conjunctivae normal.      Pupils: Pupils are equal, round, and reactive to light.   Cardiovascular:      Rate and Rhythm: Normal rate and regular rhythm.      Pulses: Normal pulses.      Heart sounds: Normal heart sounds. No murmur heard.  Pulmonary:      Effort: No respiratory distress.      Breath sounds: Wheezing present.      Comments: Prolonged expiratory phase, accessory muscle use  Abdominal:      General: Abdomen is flat. There is no distension.      Palpations: Abdomen is soft.      Tenderness: There is no abdominal tenderness.   Musculoskeletal:         General: Normal range of motion.      Cervical back: Normal range of motion.      " Right lower leg: No edema.      Left lower leg: No edema.   Skin:     General: Skin is warm and dry.      Capillary Refill: Capillary refill takes less than 2 seconds.   Neurological:      General: No focal deficit present.      Mental Status: He is alert and oriented to person, place, and time. Mental status is at baseline.   Psychiatric:         Mood and Affect: Mood normal.         Behavior: Behavior normal.         Thought Content: Thought content normal.         Judgment: Judgment normal.            LAB:  All pertinent labs reviewed and interpreted.  Results for orders placed or performed during the hospital encounter of 02/18/25   XR Chest Port 1 View    Impression    IMPRESSION:   1.  Incompletely imaged lung bases, consider repeating chest x-ray.  2.  Possible small right pleural effusion.  3.  Mild increased interstitial markings in the lower lungs, likely reflecting pulmonary edema.  4.  Normal heart size.  5.  No pneumothorax.   XR Chest 2 Views    Impression    IMPRESSION:   1.  Hyperinflation versus deep inspiration changes. Bibasilar atelectasis. Small posterior infiltrate on lateral view.  2.  Mild increased interstitial markings in the lower lungs, likely reflecting pulmonary edema.  3.  Normal heart size.  4.  No pneumothorax.     Basic metabolic panel   Result Value Ref Range    Sodium 140 135 - 145 mmol/L    Potassium 4.0 3.4 - 5.3 mmol/L    Chloride 104 98 - 107 mmol/L    Carbon Dioxide (CO2) 22 22 - 29 mmol/L    Anion Gap 14 7 - 15 mmol/L    Urea Nitrogen 28.6 (H) 8.0 - 23.0 mg/dL    Creatinine 1.17 0.67 - 1.17 mg/dL    GFR Estimate 59 (L) >60 mL/min/1.73m2    Calcium 8.5 (L) 8.8 - 10.4 mg/dL    Glucose 267 (H) 70 - 99 mg/dL   Result Value Ref Range    Troponin T, High Sensitivity 43 (H) <=22 ng/L   Blood gas venous   Result Value Ref Range    pH Venous 7.32 7.32 - 7.43    pCO2 Venous 46 40 - 50 mm Hg    pO2 Venous 55 (H) 25 - 47 mm Hg    Bicarbonate Venous 24 21 - 28 mmol/L    Base  Excess/Deficit Venous -2.7 -3.0 - 3.0 mmol/L    FIO2 36     Oxyhemoglobin Venous 84 (H) 70 - 75 %    O2 Sat, Venous 85.3 (H) 70.0 - 75.0 %   Nt probnp inpatient (BNP)   Result Value Ref Range    N terminal Pro BNP Inpatient 2,740 (H) 0 - 1,800 pg/mL   Influenza A/B, RSV and SARS-CoV2 PCR (COVID-19) Nose    Specimen: Nose; Swab   Result Value Ref Range    Influenza A PCR Negative Negative    Influenza B PCR Negative Negative    RSV PCR Negative Negative    SARS CoV2 PCR Negative Negative   Hepatic function panel   Result Value Ref Range    Protein Total 5.9 (L) 6.4 - 8.3 g/dL    Albumin 3.8 3.5 - 5.2 g/dL    Bilirubin Total 0.5 <=1.2 mg/dL    Alkaline Phosphatase 77 40 - 150 U/L    AST 16 0 - 45 U/L    ALT 10 0 - 70 U/L    Bilirubin Direct 0.16 0.00 - 0.30 mg/dL   CBC with platelets and differential   Result Value Ref Range    WBC Count 7.4 4.0 - 11.0 10e3/uL    RBC Count 3.33 (L) 4.40 - 5.90 10e6/uL    Hemoglobin 10.2 (L) 13.3 - 17.7 g/dL    Hematocrit 31.7 (L) 40.0 - 53.0 %    MCV 95 78 - 100 fL    MCH 30.6 26.5 - 33.0 pg    MCHC 32.2 31.5 - 36.5 g/dL    RDW 14.2 10.0 - 15.0 %    Platelet Count 214 150 - 450 10e3/uL    % Neutrophils 63 %    % Lymphocytes 17 %    % Monocytes 16 %    % Eosinophils 4 %    % Basophils 1 %    % Immature Granulocytes 0 %    NRBCs per 100 WBC 0 <1 /100    Absolute Neutrophils 4.6 1.6 - 8.3 10e3/uL    Absolute Lymphocytes 1.2 0.8 - 5.3 10e3/uL    Absolute Monocytes 1.2 0.0 - 1.3 10e3/uL    Absolute Eosinophils 0.3 0.0 - 0.7 10e3/uL    Absolute Basophils 0.1 0.0 - 0.2 10e3/uL    Absolute Immature Granulocytes 0.0 <=0.4 10e3/uL    Absolute NRBCs 0.0 10e3/uL   Extra Blue Top Tube   Result Value Ref Range    Hold Specimen JIC    Extra Red Top Tube   Result Value Ref Range    Hold Specimen JIC    Extra Green Top (Lithium Heparin) Tube   Result Value Ref Range    Hold Specimen JIC    Result Value Ref Range    Troponin T, High Sensitivity 103 (HH) <=22 ng/L       RADIOLOGY:  Reviewed all  pertinent imaging. Please see official radiology report.  XR Chest 2 Views   Final Result   IMPRESSION:    1.  Hyperinflation versus deep inspiration changes. Bibasilar atelectasis. Small posterior infiltrate on lateral view.   2.  Mild increased interstitial markings in the lower lungs, likely reflecting pulmonary edema.   3.  Normal heart size.   4.  No pneumothorax.         XR Chest Port 1 View   Final Result   IMPRESSION:    1.  Incompletely imaged lung bases, consider repeating chest x-ray.   2.  Possible small right pleural effusion.   3.  Mild increased interstitial markings in the lower lungs, likely reflecting pulmonary edema.   4.  Normal heart size.   5.  No pneumothorax.          PROCEDURES:   None      [facility] Documentation:   CMS Diagnoses:              I, Maria Victoria Norman, am serving as a scribe to document services personally performed by Ru Field MD based on my observation and the provider's statements to me. I, Ru Field MD, attest that Maria Victoria Norman is acting in a scribe capacity, has observed my performance of the services and has documented them in accordance with my direction.    Ru Field MD  [facility] EMERGENCY DEPARTMENT  [address] [id number]  [phone]       Ru Field MD  02/19/25 0408

## 2025-02-19 NOTE — CONSULTS
"Care Management Initial Consult    General Information  Assessment completed with: Patient, Spouse or significant other, Zack and wife  Type of CM/SW Visit: Initial Assessment    Primary Care Provider verified and updated as needed: Yes   Readmission within the last 30 days: no previous admission in last 30 days      Reason for Consult: discharge planning  Advance Care Planning: Advance Care Planning Reviewed: present on chart          Communication Assessment  Patient's communication style: spoken language (English or Bilingual)         Cognitive  Cognitive/Neuro/Behavioral: Per wife, \"he has a dementia diagnosis\"                     Living Environment:   People in home: spouse  Zack and wife Sarika  Current living Arrangements: apartment, independent living facility ([facility] apartment)      Able to return to prior arrangements: yes       Family/Social Support:  Care provided by: self, spouse/significant other  Provides care for: no one, unable/limited ability to care for self  Marital Status:   Support system: Wife, Children  Sarika       Description of Support System: Supportive, Involved    Support Assessment: Adequate family and caregiver support, Adequate social supports, Patient communicates needs well met    Current Resources:   Patient receiving home care services: Yes  Skilled Home Care Services: Home Health Aid (\"[facility] on Tue and Fri [id number] for bathing assist, exercises, and other needs. Just started coming also on Wed for 4 hr so wife can leave and do errands.\")     Community Resources: Home Care, Housekeeping/Chore Agency, Other (see comment) (\"PT at [facility] [place] weekly\")  Equipment currently used at home: walker, rolling, grab bar, toilet, grab bar, tub/shower (\"FWW. Has a shower chair, but doesn't use it.\")  Supplies currently used at home: Other (\"partial dentures, " "glasses\")    Employment/Financial:  Employment Status: retired, , previous service (\"retired from  working as a \".)     Employment/ Comments: \"He uses his  benefits for Home Care help and [facility]. No other benefits at this time from them.\"  Financial Concerns: none   Referral to Financial Worker: No       Does the patient's insurance plan have a 3 day qualifying hospital stay waiver?  Yes     Which insurance plan 3 day waiver is available? Alternative insurance waiver    Will the waiver be used for post-acute placement? Undetermined at this time    Lifestyle & Psychosocial Needs:  Social Drivers of Health     Food Insecurity: Low Risk  (6/5/2024)    Food Insecurity     Within the past 12 months, did you worry that your food would run out before you got money to buy more?: No     Within the past 12 months, did the food you bought just not last and you didn t have money to get more?: No   Depression: Not at risk (6/5/2024)    PHQ-2     PHQ-2 Score: 0   Housing Stability: Low Risk  (6/5/2024)    Housing Stability     Do you have housing? : Yes     Are you worried about losing your housing?: No   Tobacco Use: Medium Risk (2/18/2025)    Patient History     Smoking Tobacco Use: Former     Smokeless Tobacco Use: Never     Passive Exposure: Never   Financial Resource Strain: Low Risk  (6/5/2024)    Financial Resource Strain     Within the past 12 months, have you or your family members you live with been unable to get utilities (heat, electricity) when it was really needed?: No   Alcohol Use: Not on file   Transportation Needs: Low Risk  (6/5/2024)    Transportation Needs     Within the past 12 months, has lack of transportation kept you from medical appointments, getting your medicines, non-medical meetings or appointments, work, or from getting things that you need?: No   Physical Activity: Not on file   Interpersonal Safety: Low Risk  (12/2/2024)    " "Interpersonal Safety     Do you feel physically and emotionally safe where you currently live?: Yes     Within the past 12 months, have you been hit, slapped, kicked or otherwise physically hurt by someone?: No     Within the past 12 months, have you been humiliated or emotionally abused in other ways by your partner or ex-partner?: No   Stress: Not on file   Social Connections: Not on file   Health Literacy: Not on file       Functional Status:  Prior to admission patient needed assistance:   Dependent ADLs:: Ambulation-walker, Bathing  Dependent IADLs:: Cleaning, Cooking, Laundry, Shopping, Meal Preparation, Transportation, Medication Management, Money Management (\"[employer] provides us with housekeeping help twice a week on Thursday for light housekeeping. Wife does the rest of the housekeeping needs. Also they get breakfast and dinner provided by the facility. Wife does all laundry, shopping, transportation\".)  Assesssment of Functional Status:  (unknown at this time)    Mental Health Status:  Mental Health Status: Past Concern  Mental Health Management: Medication    Chemical Dependency Status:  Chemical Dependency Status: No Current Concerns             Values/Beliefs:  Spiritual, Cultural Beliefs, Anabaptist Practices, Values that affect care: no       Cultural/Anabaptist Practices Patient Routinely Participates In: ceremony, prayer  Values/Beliefs Comment: Humberto    Discussed  Partnership in Safe Discharge Planning  document with patient/family: No    Additional Information:  Zack lives at [facility] apartment with his wife Sarika.     His wife helps with his IADLs and he has [facility] help 3 times a week. The Aide helps on Tue and Fri [id number] for bathing assist, exercises, and other needs. Just started coming also on Wed for 4 hr so wife can leave and do errands. [facility].\" Wife states, \"I never leave him alone. Someone or I am " "with him 24/7. He has a dementia diagnosis\".    He also goes to PT at [facility] weekly. He uses a FWW for mobility.    Wife to transport at discharge.    CM to follow for medical progression of care, discharge recommendations, and final discharge plan. Writer verified patient demographics and updated any changes needed in the patient chart.    Next Steps:   Plan: Likely home at discharge with current help and services.    Needs: PT/OT recommendations, but should be able to continue going to the PT office and Home Care not needed.    Ranjana Mac RN    "

## 2025-02-19 NOTE — PROGRESS NOTES
[facility]    Medicine Progress Note - Hospitalist Service    Date of Admission:  2/18/2025    Assessment & Plan      Zack Johns is a 89 year old male with PMHx of CAD s/p stent (2018), PAD, T2DM, HTN, HLD admitted on 2/18/2025 for NSTEMI and acute hypoxic respiratory failure, likely secondary to new CHF. Plan for coronary angiogram +/- stent 2/20 per cardiology, near euvolemic.    NSTEMI  CAD s/p RCA stent (2018)  PAD s/p BL iliac stents (2018)  Initial tropes trended from 43 > 103. EKG showed sinus bradycardia w/PVC, without acute ischemic changes when compared to previous in 2022.  Heparin gtt started and slowly discontinued 2/19. Patient cody c/p or palpitations, and has been borderline bradycardic during this admission. Of note, appears that they tried to give him nitroglycerin at his senior care which did not improve symptoms. Cardiology consulted with ECHO completed 2/19 with EF 20-25% with global hypokinesis of LV. Plan for coronary angiogram +/- PCI when near euvolemic and will continue atorvastatin, lisinopril, metoprolol, ASA 81 mg, per Cards.  -Cards consult, recs appreciated    -Fluid restriction   -NPO at midnight 2/20 for anticipated angiogram   -ECHO completed 2/19, as above   -Per cardiology, plan for coronary angiogram  -Heparin gtt  -restart PTA ASA 81mg     Acute hypoxic respiratory failure  Concerns for new CHF- EF 20-25%  Patient [facility] EMS from [facility] for hypoxia down to SpO2 70s after dinner. He improved to the 90s with 6L NC O2 en route. Initial workup notable for BNP 2740 and CXR notable for increased interstitial markings concerning for pulmonary edema, which raise concerns for possible new CHF. Triggers could very well be NSTEMI discussed above. No si/sx to suggest arrhythmia or acute illness. Does not have known pulmonary conditions that would be concerning for COPD/asthma exacerbation. Shortness of breath improved after Lasix  and was weaned to room air, satting comfortably in the low 90s. Has produced 1.5 L of urine.   - s/p lasix 60mg IV once in ED  - continue w/lasix 40mg IV d   -Continue PTA metoprolol 12.5 mg twice daily  -Continue PTA Zestoretic 1 tablet daily  - ECHO completed  - Strict I/O  - Daily weights  - Cardiac tele  - Cards consult per above   - Fluid restriction    Goals of care  Patient reports that he is eager to return to his care home facility as soon as possible.  What is important to him is being with his wife, being able to enjoy good meals, conversations, and daily activities with his new neighbors. He recently moved to [place] in [place] and really likes it there. He does desire full cares, however, and wants to be in his best health to spend more time with his community.   -Does have [employer] naming wife and son as his HCA's  -Wife states they had discussed being DNR/DNI    Chronic conditions:  >Dementia - PTA donepazil, celexa, ritalin. Delirium precautions placed.   >T2DM - Last A1c 6.2 in Mar 2023. Repeat A1c. LDSS.   >HTN - zestoretic per above   >HLD - PTA atorvastatin 40mg d           Diet: NPO per Anesthesia Guidelines for Procedure/Surgery Except for: Meds, Ice Chips    DVT Prophylaxis: Heparin gtt  Castellano Catheter: Not present  Lines: None     Cardiac Monitoring: ACTIVE order. Indication: Acute decompensated heart failure (48 hours)  Code Status: Full Code      Clinically Significant Risk Factors Present on Admission           # Hypocalcemia: Lowest Ca = 8.5 mg/dL in last 2 days, will monitor and replace as appropriate       # Drug Induced Platelet Defect: home medication list includes an antiplatelet medication   # Hypertension: Noted on problem list      # Anemia: based on hgb <11                  Social Drivers of Health    Tobacco Use: Medium Risk (2/18/2025)    Patient History     Smoking Tobacco Use: Former     Smokeless Tobacco Use: Never     Passive Exposure: Never           Disposition Plan     Medically Ready for Discharge: Anticipated Tomorrow           The patient's care was discussed with the Attending Physician, Dr. Avila .    [facility]  [facility]  Securely message with [employer] (more info)  Text page via [employer] Paging/Directory   ______________________________________________________________________    Interval History   Patient resting comfortably in bed denying SOB, palpitations, c/p, or lightheadedness. Wife in room confirms that pt is still DNR/DNI and states pt was increasingly uncomfortable and [facility]. Patient repeats that he doesn't know if it is morning or night. States that he likes doing physical therapy outside the hospital. No other concerns at this time.    Physical Exam   Vital Signs: Temp: 98  F (36.7  C) Temp src: Oral BP: (!) 180/78 Pulse: 57   Resp: 29 SpO2: 94 % O2 Device: None (Room air) Oxygen Delivery: 2 LPM  Weight: 146 lbs 1.6 oz    Constitutional: awake, alert, cooperative, no apparent distress, and appears stated age  Eyes: Lids and lashes normal, pupils equal, extra ocular muscles intact, sclera clear, conjunctiva normal  Respiratory: No increased work of breathing, good air exchange, clear to auscultation bilaterally, no crackles or wheezing  Cardiovascular: Borderline bradycardic, regular rhythm with occasional PVC's, normal S1 and S2, and no murmur noted  GI: Normal bowel sounds, soft, non-distended, non-tender, no masses palpated, no hepatosplenomegally  Skin: No LE edema  Neuropsychiatric: General: normal, calm, and normal eye contact  Orientation: oriented only to self  Memory and insight: deficient memory for past and recent events, and thought process normal    Data     I have personally reviewed the following data over the past 24 hrs:    8.3  \   10.4 (L)   / 194     143 103 25.6 (H) /  103 (H)   4.1 27 1.16 \     ALT: 10 AST: 16 AP: 77 TBILI: 0.5   ALB: 3.8 TOT PROTEIN: 5.9  (L) LIPASE: N/A     Trop: 103 (HH) BNP: 2,740 (H)     TSH: N/A T4: N/A A1C: 5.3       Imaging results reviewed over the past 24 hrs:   Recent Results (from the past 24 hours)   XR Chest Port 1 View    Narrative    EXAM: XR CHEST PORT 1 VIEW  LOCATION: [facility]  DATE: 2/18/2025    INDICATION: SOB  COMPARISON: None.      Impression    IMPRESSION:   1.  Incompletely imaged lung bases, consider repeating chest x-ray.  2.  Possible small right pleural effusion.  3.  Mild increased interstitial markings in the lower lungs, likely reflecting pulmonary edema.  4.  Normal heart size.  5.  No pneumothorax.   XR Chest 2 Views    Narrative    EXAM: XR CHEST 2 VIEWS  LOCATION: [facility]  DATE: 2/19/2025    INDICATION: SOB, incomplete imaging of lower chest seen on portable XR  COMPARISON: None.      Impression    IMPRESSION:   1.  Hyperinflation versus deep inspiration changes. Bibasilar atelectasis. Small posterior infiltrate on lateral view.  2.  Mild increased interstitial markings in the lower lungs, likely reflecting pulmonary edema.  3.  Normal heart size.  4.  No pneumothorax.         I have reviewed the assessment and plan provided by the medical student ERROL Chopra and agree with the documentation as above. I have personally examined the patient and staffed with the attending physician, Dr. Austin Davis MD  PGY-2  [facility]  [facility]  February 19, 2025

## 2025-02-19 NOTE — CONSULTS
HEART CARE NOTE        Thank you, Dr. Avila, for asking the [facility] Heart Care team to see Zack Johns to evaluate ADHF.      Assessment/Recommendations     1. Severe HFrEF c/b severe ADHF  Assessment / Plan  New decline in LVSF; will plan for coronary angiogram +/- PCI when near euvolemia and able to lay flat provided this is in line with goals of care  Hypervolemic on physical exam; diuresing well on current regimen - no changes at this time;  continue to monitor UOP and renal function closely  Patient is high risk for adverse cardiac events 2/2 advanced age, frailty, systolic dysfunction, HTN, CAD, elevated NTproBNP, elevated troponin, DM2, HLP    Current Pharmacotherapy AHA Guideline-Directed Medical Therapy   Lisinopril 20 mg twice daily Lisinopril 20 mg twice daily   Metoprolol succinate 25 mg daily Carvedilol 25 mg twice daily   Spironolactone - not started Spironolactone 25 mg once daily   Hydralazine NA Hydralazine 100 mg three times daily   Isosorbide dinitrate NA Isosorbide dinitrate 40 mg three times daily   SGLT2 inhibitor:Dapagliflozin/Empagliflozin  - not started Dapagliflozin or Empagliflozin 10 mg daily     2. CAD c/b NSTEMI  Assessment / Plan  Hx of PCI to RCA/ with known mod LM, LAD dz  Denies chest pain or anginal equivalents; new decline in LVSF; will plan for coronary angiogram +/- PCI when near euvolemia and able to lay flat provided this is in line with goals of care  Continue atorvastatin, lisinopril, metoprolol; recommend ASA 81 mg unless otherwise contraindicated    3. PAD  Assessment / Plan  Hx of b/l iliac stents    4. HTN  Assessment / Plan  Titrate oral afterload reduction as tolerated    5. DM2  Assessment / Plan  Management per primary team    6. HLP  Assessment / Plan  Continue atorvastatin    7. Acute hypoxic respiratory failure  Assessment / Plan  2/2 cardiogenic pulmonary edema; diuresis as above  Supportive care per primary team    Clinically Significant Risk  Factors Present on Admission           # Hypocalcemia: Lowest Ca = 8.5 mg/dL in last 2 days, will monitor and replace as appropriate       # Drug Induced Platelet Defect: home medication list includes an antiplatelet medication   # Hypertension: Noted on problem list      # Anemia: based on hgb <11                 Native vessel CAD  Cardiomyopathy  Systolic acute    Fluid overload, unspecified, Other fluid overload, and Other disorders of electrolyte and fluid balance, not elsewhere classified    85 minutes spent reviewing prior records (including documentation, laboratory studies, cardiac testing/imaging), history and physical exam, planning, and subsequent documentation.      History of Present Illness/Subjective    Mr. Zack Johns is a 89 year old male with a PMHx significant for (per Epic notation) CAD s/p stent (2018), PAD, T2DM, HTN, HLD admitted on 2/18/2025 for NSTEMI and acute hypoxic respiratory failure, likely secondary to new CHF.     Today, Mr. Johns has baseline dementia and unable to provide ROS/HPI; details leading up to current admission provided by family at bedside; patient was in NAD and denies acute cardiac complaints; Management plan as detailed above    ECG: Personally reviewed. normal sinus rhythm, nonspecific ST and T waves changes, occasional PVC noted, unifocal.    ECHO (personally reviewed 2/19/24):   1. The left ventricle is mildly dilated with normal left ventricular wall  thickness.  - Left ventricular function is decreased. The ejection fraction is 20-25%  (severely reduced).  - There is severe global hypokinesia of the left ventricle.  2. The right ventricle is normal size with mildly decreased right ventricular  systolic function  3. No hemodynamically significant valvular abnormalities on 2D or color flow  imaging.  4. There is no comparison study available.    Chest X-Ray (personally reviewed 2/19/25):   IMPRESSION:   1.  Hyperinflation versus deep inspiration changes.  "Bibasilar atelectasis. Small posterior infiltrate on lateral view.  2.  Mild increased interstitial markings in the lower lungs, likely reflecting pulmonary edema.  3.  Normal heart size.  4.  No pneumothorax.    Telemetry: personally reviewed February 19, 2025; notable for sinus rhythm     Lab results: personally reviewed February 19, 2025; notable for renal function wnl    Medical history and pertinent documents reviewed in Care Everywhere please where applicable see details above          Physical Examination Review of Systems   BP (!) 171/72   Pulse 61   Temp 98  F (36.7  C) (Oral)   Resp 29   Ht 1.702 m (5' 7\")   Wt 66.3 kg (146 lb 1.6 oz)   SpO2 93%   BMI 22.88 kg/m    Body mass index is 22.88 kg/m .  Wt Readings from Last 3 Encounters:   02/18/25 66.3 kg (146 lb 1.6 oz)   01/08/25 64.9 kg (143 lb)   11/20/24 65.3 kg (144 lb)     General Appearance:   no distress   ENT/Mouth: membranes moist, no oral lesions or bleeding gums.      EYES:  no scleral icterus, normal conjunctivae   Neck: no carotid bruits or thyromegaly   Chest/Lungs:   lungs are clear to auscultation, no rales or wheezing, equal chest wall expansion    Cardiovascular:   Regular. Normal first and second heart sounds with no murmurs, rubs, or gallops; the carotid, radial and posterior tibial pulses are intact, + JVD and LE edema bilaterally    Abdomen:  no organomegaly, masses, bruits, or tenderness; bowel sounds are present   Extremities: no cyanosis or clubbing   Skin: no xanthelasma, warm.    Neurologic: NAD     Psychiatric: alert and calm     A complete 10 systems ROS was reviewed  And is negative except what is listed in the HPI.          Medical History  Surgical History Family History Social History   Past Medical History:   Diagnosis Date    Actinic keratitis     Arthritis     bilat shoulders    CAD (coronary artery disease)     Cardiac arrest (H) 09/04/89    Claudication     Claudication     Coronary artery disease     s/p MI    " "Diabetes mellitus type 2, controlled (H)     Diverticulosis     HTN (hypertension)     Hyperlipidemia     Hypertension     Malignant neoplasm of prostate (H) 2006-10/5/2006    DENIED BY PATIENT (see note from 2014).  RADIOTHERAPY BY DR.CHO LEGGETT, old     Multiple pulmonary nodules     Osteoarthritis of glenohumeral joint     Left. Injected at [facility] 02/10/2015.    Peripheral vascular disease, unspecified     Syncope     Trigger finger, acquired     Type 2 diabetes mellitus (H) 2012    Past Surgical History:   Procedure Laterality Date    APPENDECTOMY      ARTHROSCOPY KNEE      ARTHROSCOPY KNEE      CARDIAC CATHETERIZATION  2018     of rca    CARDIAC SURGERY  2018    two stents placed 18    CV CORONARY ANGIOGRAM N/A 2018    Procedure: Coronary Angiogram;  Surgeon: Jeane Garcia MD;  Location: [facility] Cath Lab;  Service:     CV CORONARY ANGIOGRAM N/A 2018    Procedure: Coronary Angiogram;  Surgeon: Jeane Garcia MD;  Location: [facility];  Service:     FEMORAL ENDARTERECTOMY Bilateral 2017    IR EXTREMITY ANGIOGRAM BILATERAL  03/10/2017    NOSE SURGERY      PROSTATE BIOPSY, NEEDLE, SATURATION SAMPLING  2003    \"Biopsy fo the Prostate Needle\"    RELEASE TRIGGER FINGER      no family history of premature coronary artery disease Social History     Socioeconomic History    Marital status:      Spouse name: Not on file    Number of children: Not on file    Years of education: Not on file    Highest education level: Not on file   Occupational History    Not on file   Tobacco Use    Smoking status: Former     Current packs/day: 0.00     Average packs/day: 2.0 packs/day for 40.0 years (80.0 ttl pk-yrs)     Types: Cigarettes     Start date: 4/10/1944     Quit date: 4/10/1984     Years since quittin.8     Passive exposure: Never    Smokeless tobacco: Never   Substance and Sexual Activity    Alcohol use: Yes     Alcohol/week: 6.0 " standard drinks of alcohol     Comment: occasionally.    Drug use: No    Sexual activity: Not on file   Other Topics Concern    Parent/sibling w/ CABG, MI or angioplasty before 65F 55M? 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        Had gotten into typing to meet girls.        Worked for [employer] after leaving the [employer].     Social Drivers of Health     Financial Resource Strain: Low Risk  (6/5/2024)    Financial Resource Strain     Within the past 12 months, have you or your family members you live with been unable to get utilities (heat, electricity) when it was really needed?: No   Food Insecurity: Low Risk  (6/5/2024)    Food Insecurity     Within the past 12 months, did you worry that your food would run out before you got money to buy more?: No     Within the past 12 months, did the food you bought just not last and you didn t have money to get more?: No   Transportation Needs: Low Risk  (6/5/2024)    Transportation Needs     Within the past 12 months, has lack of transportation kept you from medical appointments, getting your medicines, non-medical meetings or appointments, work, or from getting things that you need?: No   Physical Activity: Not on file   Stress: Not on file   Social Connections: Not on file   Interpersonal Safety: Low Risk  (12/2/2024)    Interpersonal Safety     Do you feel physically and emotionally safe where you currently live?: Yes     Within the past 12 months, have you been hit, slapped, kicked or otherwise physically hurt by someone?: No     Within the past 12 months, have you been humiliated or emotionally abused in other ways by your partner or ex-partner?: No   Housing Stability: Low Risk  (6/5/2024)    Housing Stability     Do you have housing? : Yes     Are you worried about losing your housing?: No           Lab Results    Chemistry/lipid CBC Cardiac Enzymes/BNP/TSH/INR   Lab Results   Component Value Date    CHOL  "124 11/30/2021    HDL 41 11/30/2021    TRIG 181 (H) 11/30/2021    BUN 28.6 (H) 02/18/2025     02/18/2025    CO2 22 02/18/2025    Lab Results   Component Value Date    WBC 7.4 02/18/2025    HGB 10.2 (L) 02/18/2025    HCT 31.7 (L) 02/18/2025    MCV 95 02/18/2025     02/18/2025    Lab Results   Component Value Date    TROPONINI 0.03 10/27/2019    BNP 59 06/16/2018    TSH 1.45 03/21/2023    INR 1.17 (H) 10/30/2019     Lab Results   Component Value Date    TROPONINI 0.03 10/27/2019          Weight:    Wt Readings from Last 3 Encounters:   02/18/25 66.3 kg (146 lb 1.6 oz)   01/08/25 64.9 kg (143 lb)   11/20/24 65.3 kg (144 lb)       Allergies  Allergies   Allergen Reactions    Pletal [Cilostazol]          Surgical History  Past Surgical History:   Procedure Laterality Date    APPENDECTOMY      ARTHROSCOPY KNEE      ARTHROSCOPY KNEE      CARDIAC CATHETERIZATION  08/22/2018     of rca    CARDIAC SURGERY  08/22/2018    two stents placed 8/22/18    CV CORONARY ANGIOGRAM N/A 08/06/2018    Procedure: Coronary Angiogram;  Surgeon: Jeane Garcia MD;  Location: [facility] Cath Lab;  Service:     CV CORONARY ANGIOGRAM N/A 08/22/2018    Procedure: Coronary Angiogram;  Surgeon: Jeane Garcia MD;  Location: [facility] Cath Lab;  Service:     FEMORAL ENDARTERECTOMY Bilateral 05/03/2017    IR EXTREMITY ANGIOGRAM BILATERAL  03/10/2017    NOSE SURGERY      PROSTATE BIOPSY, NEEDLE, SATURATION SAMPLING  01/13/2003    \"Biopsy fo the Prostate Needle\"    RELEASE TRIGGER FINGER         Social History  Tobacco:   History   Smoking Status    Former    Types: Cigarettes   Smokeless Tobacco    Never    Alcohol:   Social History    Substance and Sexual Activity      Alcohol use: Yes        Alcohol/week: 6.0 standard drinks of alcohol        Comment: occasionally.   Illicit Drugs:   History   Drug Use No       Family History  Family History   Problem Relation Age of Onset    Diabetes No family hx of     Breast Cancer No family " hx of     Colon Cancer No family hx of     Prostate Cancer No family hx of     Other Cancer No family hx of     No Known Problems Mother     No Known Problems Father           Taryn Taylor MD on 2/19/2025      cc: Haim Galaviz,

## 2025-02-20 VITALS
WEIGHT: 146.1 LBS | OXYGEN SATURATION: 96 % | HEART RATE: 65 BPM | RESPIRATION RATE: 20 BRPM | DIASTOLIC BLOOD PRESSURE: 70 MMHG | BODY MASS INDEX: 22.93 KG/M2 | SYSTOLIC BLOOD PRESSURE: 160 MMHG | TEMPERATURE: 98.4 F | HEIGHT: 67 IN

## 2025-02-20 LAB
ANION GAP SERPL CALCULATED.3IONS-SCNC: 13 MMOL/L (ref 7–15)
ATRIAL RATE - MUSE: 67 BPM
ATRIAL RATE - MUSE: 77 BPM
BUN SERPL-MCNC: 31.4 MG/DL (ref 8–23)
CALCIUM SERPL-MCNC: 9.1 MG/DL (ref 8.8–10.4)
CHLORIDE SERPL-SCNC: 101 MMOL/L (ref 98–107)
CREAT SERPL-MCNC: 1.27 MG/DL (ref 0.67–1.17)
DIASTOLIC BLOOD PRESSURE - MUSE: 74 MMHG
DIASTOLIC BLOOD PRESSURE - MUSE: 78 MMHG
EGFRCR SERPLBLD CKD-EPI 2021: 54 ML/MIN/1.73M2
ERYTHROCYTE [DISTWIDTH] IN BLOOD BY AUTOMATED COUNT: 14.3 % (ref 10–15)
GLUCOSE BLDC GLUCOMTR-MCNC: 110 MG/DL (ref 70–99)
GLUCOSE BLDC GLUCOMTR-MCNC: 118 MG/DL (ref 70–99)
GLUCOSE BLDC GLUCOMTR-MCNC: 134 MG/DL (ref 70–99)
GLUCOSE BLDC GLUCOMTR-MCNC: 89 MG/DL (ref 70–99)
GLUCOSE BLDC GLUCOMTR-MCNC: 96 MG/DL (ref 70–99)
GLUCOSE BLDC GLUCOMTR-MCNC: 98 MG/DL (ref 70–99)
GLUCOSE SERPL-MCNC: 124 MG/DL (ref 70–99)
HCO3 SERPL-SCNC: 27 MMOL/L (ref 22–29)
HCT VFR BLD AUTO: 34.1 % (ref 40–53)
HGB BLD-MCNC: 10.8 G/DL (ref 13.3–17.7)
INTERPRETATION ECG - MUSE: NORMAL
INTERPRETATION ECG - MUSE: NORMAL
MCH RBC QN AUTO: 29 PG (ref 26.5–33)
MCHC RBC AUTO-ENTMCNC: 31.7 G/DL (ref 31.5–36.5)
MCV RBC AUTO: 92 FL (ref 78–100)
P AXIS - MUSE: 86 DEGREES
P AXIS - MUSE: 86 DEGREES
PLATELET # BLD AUTO: 185 10E3/UL (ref 150–450)
POTASSIUM SERPL-SCNC: 3.2 MMOL/L (ref 3.4–5.3)
POTASSIUM SERPL-SCNC: 4.1 MMOL/L (ref 3.4–5.3)
PR INTERVAL - MUSE: 168 MS
PR INTERVAL - MUSE: 170 MS
QRS DURATION - MUSE: 116 MS
QRS DURATION - MUSE: 120 MS
QT - MUSE: 434 MS
QT - MUSE: 470 MS
QTC - MUSE: 454 MS
QTC - MUSE: 531 MS
R AXIS - MUSE: -41 DEGREES
R AXIS - MUSE: -43 DEGREES
RBC # BLD AUTO: 3.72 10E6/UL (ref 4.4–5.9)
SODIUM SERPL-SCNC: 141 MMOL/L (ref 135–145)
SYSTOLIC BLOOD PRESSURE - MUSE: 165 MMHG
SYSTOLIC BLOOD PRESSURE - MUSE: 181 MMHG
T AXIS - MUSE: 101 DEGREES
T AXIS - MUSE: 113 DEGREES
UFH PPP CHRO-ACNC: 0.27 IU/ML
VENTRICULAR RATE- MUSE: 66 BPM
VENTRICULAR RATE- MUSE: 77 BPM
WBC # BLD AUTO: 8.2 10E3/UL (ref 4–11)

## 2025-02-20 PROCEDURE — 82962 GLUCOSE BLOOD TEST: CPT

## 2025-02-20 PROCEDURE — 250N000013 HC RX MED GY IP 250 OP 250 PS 637: Performed by: FAMILY MEDICINE

## 2025-02-20 PROCEDURE — 250N000011 HC RX IP 250 OP 636: Performed by: FAMILY MEDICINE

## 2025-02-20 PROCEDURE — 36415 COLL VENOUS BLD VENIPUNCTURE: CPT

## 2025-02-20 PROCEDURE — 250N000011 HC RX IP 250 OP 636: Performed by: INTERNAL MEDICINE

## 2025-02-20 PROCEDURE — 80048 BASIC METABOLIC PNL TOTAL CA: CPT | Performed by: FAMILY MEDICINE

## 2025-02-20 PROCEDURE — 84132 ASSAY OF SERUM POTASSIUM: CPT

## 2025-02-20 PROCEDURE — 99233 SBSQ HOSP IP/OBS HIGH 50: CPT | Performed by: INTERNAL MEDICINE

## 2025-02-20 PROCEDURE — 85018 HEMOGLOBIN: CPT | Performed by: FAMILY MEDICINE

## 2025-02-20 PROCEDURE — 36415 COLL VENOUS BLD VENIPUNCTURE: CPT | Performed by: FAMILY MEDICINE

## 2025-02-20 PROCEDURE — 82374 ASSAY BLOOD CARBON DIOXIDE: CPT | Performed by: FAMILY MEDICINE

## 2025-02-20 PROCEDURE — 250N000013 HC RX MED GY IP 250 OP 250 PS 637

## 2025-02-20 PROCEDURE — 85048 AUTOMATED LEUKOCYTE COUNT: CPT | Performed by: FAMILY MEDICINE

## 2025-02-20 PROCEDURE — 85520 HEPARIN ASSAY: CPT | Performed by: STUDENT IN AN ORGANIZED HEALTH CARE EDUCATION/TRAINING PROGRAM

## 2025-02-20 PROCEDURE — 99232 SBSQ HOSP IP/OBS MODERATE 35: CPT | Mod: GC

## 2025-02-20 PROCEDURE — 120N000004 HC R&B MS OVERFLOW

## 2025-02-20 PROCEDURE — P9047 ALBUMIN (HUMAN), 25%, 50ML: HCPCS | Performed by: INTERNAL MEDICINE

## 2025-02-20 RX ORDER — POTASSIUM CHLORIDE 1500 MG/1
40 TABLET, EXTENDED RELEASE ORAL ONCE
Status: DISCONTINUED | OUTPATIENT
Start: 2025-02-20 | End: 2025-02-20

## 2025-02-20 RX ORDER — POTASSIUM CHLORIDE 7.45 MG/ML
10 INJECTION INTRAVENOUS
Status: DISCONTINUED | OUTPATIENT
Start: 2025-02-20 | End: 2025-02-20

## 2025-02-20 RX ORDER — TRAZODONE HYDROCHLORIDE 50 MG/1
50 TABLET ORAL AT BEDTIME
Status: COMPLETED | OUTPATIENT
Start: 2025-02-20 | End: 2025-02-20

## 2025-02-20 RX ORDER — TRAZODONE HYDROCHLORIDE 50 MG/1
50 TABLET ORAL AT BEDTIME
Status: DISCONTINUED | OUTPATIENT
Start: 2025-02-20 | End: 2025-02-27 | Stop reason: HOSPADM

## 2025-02-20 RX ORDER — POTASSIUM CHLORIDE 1500 MG/1
40 TABLET, EXTENDED RELEASE ORAL ONCE
Status: COMPLETED | OUTPATIENT
Start: 2025-02-20 | End: 2025-02-20

## 2025-02-20 RX ORDER — ALBUMIN (HUMAN) 12.5 G/50ML
50 SOLUTION INTRAVENOUS ONCE
Status: COMPLETED | OUTPATIENT
Start: 2025-02-20 | End: 2025-02-20

## 2025-02-20 RX ADMIN — METOPROLOL TARTRATE 12.5 MG: 25 TABLET, FILM COATED ORAL at 21:53

## 2025-02-20 RX ADMIN — ACETAMINOPHEN 650 MG: 325 TABLET ORAL at 22:44

## 2025-02-20 RX ADMIN — CITALOPRAM HYDROBROMIDE 20 MG: 20 TABLET ORAL at 09:10

## 2025-02-20 RX ADMIN — POTASSIUM CHLORIDE 40 MEQ: 1500 TABLET, EXTENDED RELEASE ORAL at 09:11

## 2025-02-20 RX ADMIN — HEPARIN SODIUM 750 UNITS/HR: 10000 INJECTION, SOLUTION INTRAVENOUS at 06:32

## 2025-02-20 RX ADMIN — ALBUMIN HUMAN 50 G: 0.25 SOLUTION INTRAVENOUS at 07:12

## 2025-02-20 RX ADMIN — TRAZODONE HYDROCHLORIDE 50 MG: 50 TABLET ORAL at 02:09

## 2025-02-20 RX ADMIN — ASPIRIN 81 MG: 81 TABLET, COATED ORAL at 09:03

## 2025-02-20 RX ADMIN — DONEPEZIL HYDROCHLORIDE 10 MG: 5 TABLET ORAL at 21:53

## 2025-02-20 RX ADMIN — ATORVASTATIN CALCIUM 40 MG: 40 TABLET, FILM COATED ORAL at 09:10

## 2025-02-20 RX ADMIN — TRAZODONE HYDROCHLORIDE 50 MG: 50 TABLET ORAL at 21:53

## 2025-02-20 ASSESSMENT — ACTIVITIES OF DAILY LIVING (ADL)
ADLS_ACUITY_SCORE: 55
ADLS_ACUITY_SCORE: 57
ADLS_ACUITY_SCORE: 55
ADLS_ACUITY_SCORE: 55
ADLS_ACUITY_SCORE: 50
ADLS_ACUITY_SCORE: 55
ADLS_ACUITY_SCORE: 50
ADLS_ACUITY_SCORE: 57
ADLS_ACUITY_SCORE: 57
ADLS_ACUITY_SCORE: 55
ADLS_ACUITY_SCORE: 57
ADLS_ACUITY_SCORE: 55
ADLS_ACUITY_SCORE: 57
ADLS_ACUITY_SCORE: 55

## 2025-02-20 NOTE — PLAN OF CARE
Problem: Heart Failure  Goal: Fluid and Electrolyte Balance  Outcome: Progressing  Goal: Effective Oxygenation and Ventilation  Outcome: Progressing  Intervention: Promote Airway Secretion Clearance  Recent Flowsheet Documentation  Taken 2/20/2025 1227 by Jalil Buchanan RN  Cough And Deep Breathing: done with encouragement  Activity Management: activity adjusted per tolerance  Taken 2/20/2025 0900 by Jalil Buchanan RN  Cough And Deep Breathing: done with encouragement  Activity Management: activity adjusted per tolerance  Intervention: Optimize Oxygenation and Ventilation  Recent Flowsheet Documentation  Taken 2/20/2025 1227 by Jalil Buchanan RN  Head of Bed (HOB) Positioning: HOB at 20-30 degrees  Taken 2/20/2025 0900 by Jalil Buchanan RN  Head of Bed (HOB) Positioning: HOB at 20-30 degrees     Problem: Dementia Signs/Symptoms  Goal: Improved Behavioral Control (Dementia Signs/Symptoms)  Outcome: Progressing     Problem: Comorbidity Management  Goal: Blood Glucose Levels Within Targeted Range  Outcome: Progressing   Goal Outcome Evaluation:       Confused patient only alert to self. Drowsy from sedation meds overnight, sleeping throughout most of the shift. No behavioral issues this shift. Denies pain. Heparin gtt infusing at 750 unit(s)/hr, recheck tomorrow morning. Primofit in place, no UOP as pt has been drowsy and sleeping only waking up for a few seconds before going back to sleep, bladder scanned for 500 ml, pt encouraged to wake up and try to void multiple times by both wife and writer. NPO at midnight for possible angio tomorrow.     Update: Pt ambulated to bathroom with assist of 2, gait belt, and walker and had an incontinent episode of bowel and BM. Pt unsteady but able to void. Loose BM x 1. K replaced, recheck tomorrow morning with labs.

## 2025-02-20 NOTE — PROGRESS NOTES
[facility]    Medicine Progress Note - Hospitalist Service    Date of Admission:  2/18/2025    Assessment & Plan      Zack Johns is a 89 year old male with PMHx of CAD s/p stent (2018), PAD, T2DM, HTN, HLD admitted on 2/18/2025 for NSTEMI and acute hypoxic respiratory failure, likely secondary to new CHF. Plan for coronary angiogram +/- stent 2/20 per cardiology, near euvolemic.    NSTEMI  CAD s/p RCA stent (2018)  PAD s/p BL iliac stents (2018)  Initial tropes trended from 43 > 103. EKG showed sinus bradycardia w/PVC, without acute ischemic changes when compared to previous in 2022.  Heparin gtt started and slowly discontinued 2/19. Patient cody c/p or palpitations, and has been borderline bradycardic during this admission. Of note, appears that they [facility] nitroglycerin [facility] which did not improve symptoms. Cardiology consulted with ECHO completed 2/19 with EF 20-25% with global hypokinesis of LV. Plan for coronary angiogram +/- PCI on 2/21 when near euvolemic and will continue atorvastatin, lisinopril (now holding 2/20), metoprolol, ASA 81 mg, per Cards.  -Cards consult, recs appreciated    -Fluid restriction   -Combination diet for daytime 2/20 but NPO at midnight for anticipated angiogram on 2/21   -ECHO completed 2/19, as above   -Per cardiology, plan for coronary angiogram  -Heparin gtt  -PTA ASA 81mg     Acute hypoxic respiratory failure  Concerns for new CHF- EF 20-25%  Patient presents via EMS from [facility] for hypoxia down to SpO2 70s after dinner. He improved to the 90s with 6L NC O2 en route. Initial workup notable for BNP 2740 and CXR notable for increased interstitial markings concerning for pulmonary edema, which raise concerns for possible new CHF. Triggers could very well be NSTEMI discussed above. No si/sx to suggest arrhythmia or acute illness. Does not have known pulmonary conditions that would be concerning for  COPD/asthma exacerbation. Shortness of breath improved after Lasix and was weaned to room air, satting comfortably in the low 90s. Has produced 2 L of urine since admission.  - s/p lasix 60mg IV once in ED  - continue w/lasix 40mg IV d   -Continue PTA metoprolol 12.5 mg twice daily  -Holding PTA Zestoretic 1 tablet daily  - ECHO completed  - Strict I/O  - Daily weights  - Cardiac tele  - Cards consult per above   - Fluid restriction    Goals of care  Patient reports that he is eager to return to his care home facility as soon as possible. What is important to him is being with his wife, being able to enjoy good meals, conversations, and daily activities with his new neighbors. He recently moved to [place] in [place] and really likes it there. He does desire full cares, however, and wants to be in his best health to spend more time with his community.   -Does have [employer] naming wife and son as his HCA's  -Wife states they had discussed being DNR/DNI  -CM recs to home at independent living apartment    Chronic conditions:  >Dementia - PTA donepazil, celexa, ritalin. Delirium precautions placed.   >T2DM - Last A1c 6.2 in Mar 2023. Repeat A1c. LDSS.   >HTN - zestoretic per above   >HLD - PTA atorvastatin 40mg d     Delirium  End stage dementia   Pt increasingly disoriented and agitated during the early morning of 2/20 and given Seroquel and Trazodone. Pt very lethargic in the AM and falling asleep mid conversation. Will avoid excess sedation by avoiding antipsychotics.  -Avoid antipsychotics for sedation  -Starting trazodone 25/50 mg at night     HAI  Patient has elevated Cr 1.17 --> 1.16 --> 1.27. Pt is s/p once dose of 60 mg lasix and on fluid restriction. Possible that this is a prerenal HAI and pt is volume down. Given one dose of IV albumin.  -s/p 1x dose of 50 mg albumin     Hypokalemia  Pt has decreased potassium levels 4 --> 4.1 --> 3.2. Possibly due to single dose of lasix during this  admission. Potassium bolus administered.   -Hypokalemia replacement protocol  -s/p 1x 40 mEq potassium chloride on 2/20  -Follow potassium          Diet: Fluid restriction 1800 ML FLUID  NPO per Anesthesia Guidelines for Procedure/Surgery Except for: Meds  Combination Diet 2 gm NA Diet; Low Saturated Fat Na <2400mg Diet    DVT Prophylaxis: Heparin gtt  Castellano Catheter: Not present  Lines: None     Cardiac Monitoring: ACTIVE order. Indication: Acute decompensated heart failure (48 hours)  Code Status: Full Code      Clinically Significant Risk Factors        # Hypokalemia: Lowest K = 3.2 mmol/L in last 2 days, will replace as needed    # Hypocalcemia: Lowest Ca = 8.5 mg/dL in last 2 days, will monitor and replace as appropriate         # Hypertension: Noted on problem list  # Acute heart failure with reduced ejection fraction: last echo with EF <40% and receiving IV diuretics                 # Financial/Environmental Concerns: none         Social Drivers of Health    Tobacco Use: Medium Risk (2/18/2025)    Patient History     Smoking Tobacco Use: Former     Smokeless Tobacco Use: Never     Passive Exposure: Never          Disposition Plan     Medically Ready for Discharge: Anticipated Tomorrow           The patient's care was discussed with the Attending Physician, Dr. Avila .    [facility]  [facility]  Securely message with [employer] (more info)  Text page via [employer] Paging/Directory   ______________________________________________________________________    Interval History   Overnight, patient agitated and verbal with staff threatening to leave. Received Seroquel and Trazodone which improved the pt's agitation.    Patient resting comfortably in bed drowsy and unable to keep his eyes open. Falling asleep during the discussion. Team informed him of his upcoming angiogram tomorrow.    Physical Exam   Vital Signs: Temp: 98  F (36.7  C) Temp src: Oral BP: 118/54 Pulse: 50    Resp: (!) 32 SpO2: 95 % O2 Device: None (Room air)    Weight: 146 lbs 1.6 oz    Constitutional: Lethargic, drowsy, falls asleep mid conversation, no apparent distress, and appears stated age  Eyes: Lids and lashes normal, pupils equal, extra ocular muscles intact, sclera clear, conjunctiva normal  Respiratory: No increased work of breathing, good air exchange, clear to auscultation bilaterally, no crackles or wheezing  Cardiovascular: Borderline bradycardic, normal S1 and S2, and no murmur noted  GI: Normal bowel sounds, soft, non-distended, non-tender, no masses palpated, no hepatosplenomegally  Skin: No LE edema  Neuropsychiatric: Sleepy, lethargic, and falling asleep mid conversation    Data     I have personally reviewed the following data over the past 24 hrs:    8.2  \   10.8 (L)   / 185     141 101 31.4 (H) /  118 (H)   3.2 (L) 27 1.27 (H) \       Imaging results reviewed over the past 24 hrs:   No results found for this or any previous visit (from the past 24 hours).      I have reviewed the assessment and plan provided by the medical student ERROL Chopra and agree with the documentation as above. I have personally examined the patient and staffed with the attending physician, Dr. Guillaume Davis MD  PGY-2  [facility]  [facility]  February 19, 2025

## 2025-02-20 NOTE — PLAN OF CARE
Problem: Dementia Signs/Symptoms  Goal: Improved Sleep (Dementia Signs/Symptoms)  Outcome: Not Progressing  Flowsheets (Taken 2/20/2025 7608)  Mutually Determined Action Steps (Improved Sleep):   develops sleep plan   sleeps 4-6 hours at night     Problem: Heart Failure  Goal: Stable Heart Rate and Rhythm  Outcome: Progressing     Problem: Heart Failure  Goal: Effective Oxygenation and Ventilation  Outcome: Progressing     Problem: Dementia Signs/Symptoms  Goal: Improved Mood Symptoms (Dementia Signs/Symptoms)  Outcome: Progressing     Problem: Acute Coronary Syndrome  Goal: Normalized Cardiac Rhythm  Outcome: Progressing     Problem: Comorbidity Management  Goal: Maintenance of Behavioral Health Symptom Control  Outcome: Progressing     Problem: Comorbidity Management  Goal: Blood Glucose Levels Within Targeted Range  Outcome: Progressing     Problem: Comorbidity Management  Goal: Maintenance of Heart Failure Symptom Control  Outcome: Progressing     Problem: Comorbidity Management  Goal: Blood Pressure in Desired Range  Outcome: Progressing   Goal Outcome Evaluation:    Patient is confused. Oriented to self only. Denies pain. 1:1 sitter at bedside due to pulling lines and attempting to get out of bed. Received x1 dose of seroquel at bedtime with little effect. Patient still restless overnight attempting to get up and get dressed to go home. Not redirectable. Paged provider and given x1 dose of trazodone which was effective for sleep. Lasix gtt infusing per orders. Rate/dose adjusted per RN managed protocol. Patient at goal range x2 consecutive anti Xa. Next anti-xa due tomorrow (2/21/25) AM. Primofit in place. NPO since midnight for cardiology consult/possible procedure. Sinus rhythm on cardiac monitor with occasional PACs and PVCs.

## 2025-02-20 NOTE — PROGRESS NOTES
HEART CARE NOTE          Assessment/Recommendations     1. Severe HFrEF c/b severe ADHF  Assessment / Plan  New decline in LVSF; will plan for coronary angiogram +/- PCI when near euvolemia and able to lay flat provided this is in line with goals of care  Hold IV loop diuresis; give IV albumin bolus in the setting of evolving HAI;  continue to monitor UOP and renal function closely  Patient is high risk for adverse cardiac events 2/2 advanced age, frailty, systolic dysfunction, HTN, CAD, elevated NTproBNP, elevated troponin, DM2, HLP     Current Pharmacotherapy AHA Guideline-Directed Medical Therapy   Lisinopril 20 mg twice daily Lisinopril 20 mg twice daily   Metoprolol succinate 25 mg daily Carvedilol 25 mg twice daily   Spironolactone - not started Spironolactone 25 mg once daily   Hydralazine NA Hydralazine 100 mg three times daily   Isosorbide dinitrate NA Isosorbide dinitrate 40 mg three times daily   SGLT2 inhibitor:Dapagliflozin/Empagliflozin  - not started Dapagliflozin or Empagliflozin 10 mg daily      2. CAD c/b NSTEMI  Assessment / Plan  Hx of PCI to RCA/ with known mod LM, LAD dz  Denies chest pain or anginal equivalents; new decline in LVSF; will plan for coronary angiogram +/- PCI when near euvolemia and able to lay flat provided this is in line with goals of care  Continue atorvastatin, lisinopril, metoprolol; recommend ASA 81 mg unless otherwise contraindicated     3. PAD  Assessment / Plan  Hx of b/l iliac stents     4. HTN  Assessment / Plan  Titrate oral afterload reduction as tolerated     5. DM2  Assessment / Plan  Management per primary team     6. HLP  Assessment / Plan  Continue atorvastatin     7. Acute hypoxic respiratory failure  Assessment / Plan  2/2 cardiogenic pulmonary edema; diuresis as above  Supportive care per primary team      History of Present Illness/Subjective    Mr. Zack Johns is a 89 year old male with a PMHx significant for (per Epic notation) CAD s/p stent  "(2018), PAD, T2DM, HTN, HLD admitted on 2/18/2025 for NSTEMI and acute hypoxic respiratory failure, likely secondary to new CHF.      Today, Mr. Johns has baseline dementia, is difficult to arouse; unable to provide ROS; Management plan as detailed above     ECG: Personally reviewed. normal sinus rhythm, nonspecific ST and T waves changes, occasional PVC noted, unifocal.     ECHO (personally reviewed 2/19/24):   1. The left ventricle is mildly dilated with normal left ventricular wall  thickness.  - Left ventricular function is decreased. The ejection fraction is 20-25%  (severely reduced).  - There is severe global hypokinesia of the left ventricle.  2. The right ventricle is normal size with mildly decreased right ventricular  systolic function  3. No hemodynamically significant valvular abnormalities on 2D or color flow  imaging.  4. There is no comparison study available.     Chest X-Ray (personally reviewed 2/19/25):   IMPRESSION:   1.  Hyperinflation versus deep inspiration changes. Bibasilar atelectasis. Small posterior infiltrate on lateral view.  2.  Mild increased interstitial markings in the lower lungs, likely reflecting pulmonary edema.  3.  Normal heart size.  4.  No pneumothorax.    Telemetry: personally reviewed February 20, 2025; notable for sinus     Lab results: personally reviewed February 20, 2025; notable for HAI    Medical history and pertinent documents reviewed in Care Everywhere please where applicable see details above        Physical Examination Review of Systems   /60   Pulse 69   Temp 98.2  F (36.8  C) (Oral)   Resp 23   Ht 1.702 m (5' 7\")   Wt 66.3 kg (146 lb 1.6 oz)   SpO2 96%   BMI 22.88 kg/m    Body mass index is 22.88 kg/m .  Wt Readings from Last 3 Encounters:   02/18/25 66.3 kg (146 lb 1.6 oz)   01/08/25 64.9 kg (143 lb)   11/20/24 65.3 kg (144 lb)     General Appearance:   no distress   ENT/Mouth: membranes moist, no oral lesions or bleeding gums.      EYES:  no " scleral icterus, normal conjunctivae   Neck: no carotid bruits or thyromegaly   Chest/Lungs:   lungs are clear to auscultation, no rales or wheezing, equal chest wall expansion    Cardiovascular:   Regular. Normal first and second heart sounds with no murmurs, rubs, or gallops; the carotid, radial and posterior tibial pulses are intact, no JVD and trace LE edema bilaterally    Abdomen:  no organomegaly, masses, bruits, or tenderness; bowel sounds are present   Extremities: no cyanosis or clubbing   Skin: no xanthelasma, warm.    Neurologic: NAD     Psychiatric: NAD     A complete 10 systems ROS was reviewed  And is negative except what is listed in the HPI.          Medical History  Surgical History Family History Social History   Past Medical History:   Diagnosis Date    Actinic keratitis     Arthritis     bilat shoulders    CAD (coronary artery disease)     Cardiac arrest (H) 09/04/89    Claudication     Claudication     Coronary artery disease     s/p MI    Diabetes mellitus type 2, controlled (H)     Diverticulosis     HTN (hypertension)     Hyperlipidemia     Hypertension     Malignant neoplasm of prostate (H) 8/7/2006-10/5/2006    DENIED BY PATIENT (see note from 12/08/2014).  RADIOTHERAPY BY DR.CHO LEGGETT, old     Multiple pulmonary nodules     Osteoarthritis of glenohumeral joint     Left. Injected at [facility] 02/10/2015.    Peripheral vascular disease, unspecified     Syncope     Trigger finger, acquired     Type 2 diabetes mellitus (H) 11/6/2012    Past Surgical History:   Procedure Laterality Date    APPENDECTOMY      ARTHROSCOPY KNEE      ARTHROSCOPY KNEE      CARDIAC CATHETERIZATION  08/22/2018     of rca    CARDIAC SURGERY  08/22/2018    two stents placed 8/22/18    CV CORONARY ANGIOGRAM N/A 08/06/2018    Procedure: Coronary Angiogram;  Surgeon: Jeane Garcia MD;  Location: [facility] Cath Lab;  Service:     CV CORONARY ANGIOGRAM N/A 08/22/2018    Procedure: Coronary Angiogram;  Surgeon:  "Jeane Garcia MD;  Location: [facility] Cath Lab;  Service:     FEMORAL ENDARTERECTOMY Bilateral 2017    IR EXTREMITY ANGIOGRAM BILATERAL  03/10/2017    NOSE SURGERY      PROSTATE BIOPSY, NEEDLE, SATURATION SAMPLING  2003    \"Biopsy fo the Prostate Needle\"    RELEASE TRIGGER FINGER      no family history of premature coronary artery disease Social History     Socioeconomic History    Marital status:      Spouse name: Not on file    Number of children: Not on file    Years of education: Not on file    Highest education level: Not on file   Occupational History    Not on file   Tobacco Use    Smoking status: Former     Current packs/day: 0.00     Average packs/day: 2.0 packs/day for 40.0 years (80.0 ttl pk-yrs)     Types: Cigarettes     Start date: 4/10/1944     Quit date: 4/10/1984     Years since quittin.8     Passive exposure: Never    Smokeless tobacco: Never   Substance and Sexual Activity    Alcohol use: Yes     Alcohol/week: 6.0 standard drinks of alcohol     Comment: occasionally.    Drug use: No    Sexual activity: Not on file   Other Topics Concern    Parent/sibling w/ CABG, MI or angioplasty before 65F 55M? 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        Had gotten into typing to meet girls.        Worked for [employer] after leaving the [employer].     Social Drivers of Health     Financial Resource Strain: Low Risk  (2024)    Financial Resource Strain     Within the past 12 months, have you or your family members you live with been unable to get utilities (heat, electricity) when it was really needed?: No   Food Insecurity: Low Risk  (2024)    Food Insecurity     Within the past 12 months, did you worry that your food would run out before you got money to buy more?: No     Within the past 12 months, did the food you bought just not last and you didn t have money to get more?: No   Transportation Needs: Low Risk  " (6/5/2024)    Transportation Needs     Within the past 12 months, has lack of transportation kept you from medical appointments, getting your medicines, non-medical meetings or appointments, work, or from getting things that you need?: No   Physical Activity: Not on file   Stress: Not on file   Social Connections: Not on file   Interpersonal Safety: Low Risk  (12/2/2024)    Interpersonal Safety     Do you feel physically and emotionally safe where you currently live?: Yes     Within the past 12 months, have you been hit, slapped, kicked or otherwise physically hurt by someone?: No     Within the past 12 months, have you been humiliated or emotionally abused in other ways by your partner or ex-partner?: No   Housing Stability: Low Risk  (6/5/2024)    Housing Stability     Do you have housing? : Yes     Are you worried about losing your housing?: No           Lab Results    Chemistry/lipid CBC Cardiac Enzymes/BNP/TSH/INR   Lab Results   Component Value Date    CHOL 124 11/30/2021    HDL 41 11/30/2021    TRIG 181 (H) 11/30/2021    BUN 31.4 (H) 02/20/2025     02/20/2025    CO2 27 02/20/2025    Lab Results   Component Value Date    WBC 8.2 02/20/2025    HGB 10.8 (L) 02/20/2025    HCT 34.1 (L) 02/20/2025    MCV 92 02/20/2025     02/20/2025    Lab Results   Component Value Date    TROPONINI 0.03 10/27/2019    BNP 59 06/16/2018    TSH 1.45 03/21/2023    INR 1.17 (H) 10/30/2019     Lab Results   Component Value Date    TROPONINI 0.03 10/27/2019          Weight:    Wt Readings from Last 3 Encounters:   02/18/25 66.3 kg (146 lb 1.6 oz)   01/08/25 64.9 kg (143 lb)   11/20/24 65.3 kg (144 lb)       Allergies  Allergies   Allergen Reactions    Pletal [Cilostazol]          Surgical History  Past Surgical History:   Procedure Laterality Date    APPENDECTOMY      ARTHROSCOPY KNEE      ARTHROSCOPY KNEE      CARDIAC CATHETERIZATION  08/22/2018     of rca    CARDIAC SURGERY  08/22/2018    two stents placed 8/22/18    CV  "CORONARY ANGIOGRAM N/A 08/06/2018    Procedure: Coronary Angiogram;  Surgeon: Jeane Garcia MD;  Location: [facility] Cath Lab;  Service:     CV CORONARY ANGIOGRAM N/A 08/22/2018    Procedure: Coronary Angiogram;  Surgeon: Jeane Garcia MD;  Location: [facility] Cath Lab;  Service:     FEMORAL ENDARTERECTOMY Bilateral 05/03/2017    IR EXTREMITY ANGIOGRAM BILATERAL  03/10/2017    NOSE SURGERY      PROSTATE BIOPSY, NEEDLE, SATURATION SAMPLING  01/13/2003    \"Biopsy fo the Prostate Needle\"    RELEASE TRIGGER FINGER         Social History  Tobacco:   History   Smoking Status    Former    Types: Cigarettes   Smokeless Tobacco    Never    Alcohol:   Social History    Substance and Sexual Activity      Alcohol use: Yes        Alcohol/week: 6.0 standard drinks of alcohol        Comment: occasionally.   Illicit Drugs:   History   Drug Use No       Family History  Family History   Problem Relation Age of Onset    Diabetes No family hx of     Breast Cancer No family hx of     Colon Cancer No family hx of     Prostate Cancer No family hx of     Other Cancer No family hx of     No Known Problems Mother     No Known Problems Father           Taryn Taylor MD on 2/20/2025      cc: Haim Galaviz    "

## 2025-02-20 NOTE — SIGNIFICANT EVENT
Significant Event Note  Time of event: 9:25 PM February 19, 2025    Description of event:  Patient with ongoing delirium and agitation despite trial of Zyprexa.    Plan:  - Seroquel 50mg once     Johanny Cordero MD    1:59 AM  Addendum   Patient remains agitated, difficult to redirect, threatening to leave. Will trial trazodone to encourage sleep.     Johanny Cordero MD PGY-3  [facility]

## 2025-02-21 ENCOUNTER — APPOINTMENT (OUTPATIENT)
Dept: PHYSICAL THERAPY | Facility: HOSPITAL | Age: OVER 89
End: 2025-02-21
Attending: FAMILY MEDICINE
Payer: COMMERCIAL

## 2025-02-21 LAB
ANION GAP SERPL CALCULATED.3IONS-SCNC: 10 MMOL/L (ref 7–15)
BUN SERPL-MCNC: 39.1 MG/DL (ref 8–23)
C DIFF TOX B STL QL: NEGATIVE
CALCIUM SERPL-MCNC: 9.2 MG/DL (ref 8.8–10.4)
CHLORIDE SERPL-SCNC: 103 MMOL/L (ref 98–107)
CREAT SERPL-MCNC: 1.69 MG/DL (ref 0.67–1.17)
EGFRCR SERPLBLD CKD-EPI 2021: 38 ML/MIN/1.73M2
ERYTHROCYTE [DISTWIDTH] IN BLOOD BY AUTOMATED COUNT: 14.4 % (ref 10–15)
GLUCOSE BLDC GLUCOMTR-MCNC: 100 MG/DL (ref 70–99)
GLUCOSE BLDC GLUCOMTR-MCNC: 111 MG/DL (ref 70–99)
GLUCOSE BLDC GLUCOMTR-MCNC: 117 MG/DL (ref 70–99)
GLUCOSE BLDC GLUCOMTR-MCNC: 128 MG/DL (ref 70–99)
GLUCOSE SERPL-MCNC: 108 MG/DL (ref 70–99)
HCO3 SERPL-SCNC: 27 MMOL/L (ref 22–29)
HCT VFR BLD AUTO: 28.9 % (ref 40–53)
HGB BLD-MCNC: 9.6 G/DL (ref 13.3–17.7)
MCH RBC QN AUTO: 30.6 PG (ref 26.5–33)
MCHC RBC AUTO-ENTMCNC: 33.2 G/DL (ref 31.5–36.5)
MCV RBC AUTO: 92 FL (ref 78–100)
PLATELET # BLD AUTO: 178 10E3/UL (ref 150–450)
POTASSIUM SERPL-SCNC: 3.9 MMOL/L (ref 3.4–5.3)
RBC # BLD AUTO: 3.14 10E6/UL (ref 4.4–5.9)
SODIUM SERPL-SCNC: 140 MMOL/L (ref 135–145)
UFH PPP CHRO-ACNC: 0.28 IU/ML
WBC # BLD AUTO: 7.4 10E3/UL (ref 4–11)

## 2025-02-21 PROCEDURE — 250N000013 HC RX MED GY IP 250 OP 250 PS 637

## 2025-02-21 PROCEDURE — 36415 COLL VENOUS BLD VENIPUNCTURE: CPT | Performed by: STUDENT IN AN ORGANIZED HEALTH CARE EDUCATION/TRAINING PROGRAM

## 2025-02-21 PROCEDURE — 250N000013 HC RX MED GY IP 250 OP 250 PS 637: Performed by: FAMILY MEDICINE

## 2025-02-21 PROCEDURE — 99233 SBSQ HOSP IP/OBS HIGH 50: CPT | Performed by: INTERNAL MEDICINE

## 2025-02-21 PROCEDURE — 80048 BASIC METABOLIC PNL TOTAL CA: CPT | Performed by: FAMILY MEDICINE

## 2025-02-21 PROCEDURE — 97162 PT EVAL MOD COMPLEX 30 MIN: CPT | Mod: GP

## 2025-02-21 PROCEDURE — 120N000004 HC R&B MS OVERFLOW

## 2025-02-21 PROCEDURE — 99232 SBSQ HOSP IP/OBS MODERATE 35: CPT | Mod: GC

## 2025-02-21 PROCEDURE — 85520 HEPARIN ASSAY: CPT | Performed by: STUDENT IN AN ORGANIZED HEALTH CARE EDUCATION/TRAINING PROGRAM

## 2025-02-21 PROCEDURE — 97116 GAIT TRAINING THERAPY: CPT | Mod: GP

## 2025-02-21 PROCEDURE — 87493 C DIFF AMPLIFIED PROBE: CPT | Performed by: HOSPITALIST

## 2025-02-21 PROCEDURE — 97110 THERAPEUTIC EXERCISES: CPT | Mod: GP

## 2025-02-21 PROCEDURE — 85027 COMPLETE CBC AUTOMATED: CPT | Performed by: FAMILY MEDICINE

## 2025-02-21 RX ADMIN — CITALOPRAM HYDROBROMIDE 20 MG: 20 TABLET ORAL at 08:05

## 2025-02-21 RX ADMIN — METOPROLOL TARTRATE 12.5 MG: 25 TABLET, FILM COATED ORAL at 19:19

## 2025-02-21 RX ADMIN — METOPROLOL TARTRATE 12.5 MG: 25 TABLET, FILM COATED ORAL at 08:06

## 2025-02-21 RX ADMIN — DONEPEZIL HYDROCHLORIDE 10 MG: 5 TABLET ORAL at 21:06

## 2025-02-21 RX ADMIN — TRAZODONE HYDROCHLORIDE 50 MG: 50 TABLET ORAL at 21:06

## 2025-02-21 RX ADMIN — ATORVASTATIN CALCIUM 40 MG: 40 TABLET, FILM COATED ORAL at 08:06

## 2025-02-21 RX ADMIN — ASPIRIN 81 MG: 81 TABLET, COATED ORAL at 08:05

## 2025-02-21 ASSESSMENT — ACTIVITIES OF DAILY LIVING (ADL)
ADLS_ACUITY_SCORE: 65
ADLS_ACUITY_SCORE: 65
ADLS_ACUITY_SCORE: 69
ADLS_ACUITY_SCORE: 69
ADLS_ACUITY_SCORE: 65
ADLS_ACUITY_SCORE: 65
ADLS_ACUITY_SCORE: 69
ADLS_ACUITY_SCORE: 65
ADLS_ACUITY_SCORE: 69
ADLS_ACUITY_SCORE: 69
ADLS_ACUITY_SCORE: 65
ADLS_ACUITY_SCORE: 69
ADLS_ACUITY_SCORE: 65
ADLS_ACUITY_SCORE: 65
ADLS_ACUITY_SCORE: 69
ADLS_ACUITY_SCORE: 65

## 2025-02-21 NOTE — PROGRESS NOTES
HEART CARE NOTE          Assessment/Recommendations     1. Severe HFrEF c/b severe ADHF  Assessment / Plan  New decline in LVSF; after extensive discussion wife at bedside, her decision id to hold on invasive testing/procedures at this time and to focus on quality of time particularly as renal function remains tenuous  Recommend [facility] consult to further discuss long term goals of care given poor long term prognosis   Progressive HAI noted; continue to hold ACE-I and loop diuretic; continue to monitor UOP and renal function closely  Patient is high risk for adverse cardiac events 2/2 advanced age, frailty, systolic dysfunction, HTN, CAD, elevated NTproBNP, elevated troponin, DM2, HLP     Current Pharmacotherapy AHA Guideline-Directed Medical Therapy   Lisinopril 20 mg twice daily - held Lisinopril 20 mg twice daily   Metoprolol succinate 25 mg daily Carvedilol 25 mg twice daily   Spironolactone - not started Spironolactone 25 mg once daily   Hydralazine NA Hydralazine 100 mg three times daily   Isosorbide dinitrate NA Isosorbide dinitrate 40 mg three times daily   SGLT2 inhibitor:Dapagliflozin/Empagliflozin  - not started Dapagliflozin or Empagliflozin 10 mg daily      2. CAD c/b NSTEMI  Assessment / Plan  Hx of PCI to RCA/ with known mod LM, LAD dz  Denies chest pain or anginal equivalents; new decline in LVSF; will plan for coronary angiogram +/- PCI when renal function stable to improved, provided this is in line with goals of care  Continue atorvastatin, lisinopril, metoprolol; recommend ASA 81 mg unless otherwise contraindicated     3. PAD  Assessment / Plan  Hx of b/l iliac stents     4. HTN  Assessment / Plan  Titrate oral afterload reduction as tolerated     5. DM2  Assessment / Plan  Management per primary team     6. HLP  Assessment / Plan  Continue atorvastatin     7. Acute hypoxic respiratory failure  Assessment / Plan  2/2 cardiogenic pulmonary edema; diuresis as above  Supportive care per primary  "team    Plan of care discussed on February 21, 2025 with patient at bedside, and primary team overseeing patient's care      History of Present Illness/Subjective    Mr. Zack Johns is a 89 year old male with a PMHx significant for (per Epic notation) CAD s/p stent (2018), PAD, T2DM, HTN, HLD admitted on 2/18/2025 for NSTEMI and acute hypoxic respiratory failure, likely secondary to new CHF.      Today, Mr. Johns has baseline dementia, unable to provide ROS; Management plan as detailed above and discussed with wife at bedside     ECG: Personally reviewed. normal sinus rhythm, nonspecific ST and T waves changes, occasional PVC noted, unifocal.     ECHO (personally reviewed 2/19/24):   1. The left ventricle is mildly dilated with normal left ventricular wall  thickness.  - Left ventricular function is decreased. The ejection fraction is 20-25%  (severely reduced).  - There is severe global hypokinesia of the left ventricle.  2. The right ventricle is normal size with mildly decreased right ventricular  systolic function  3. No hemodynamically significant valvular abnormalities on 2D or color flow  imaging.  4. There is no comparison study available.     Chest X-Ray (personally reviewed 2/19/25):   IMPRESSION:   1.  Hyperinflation versus deep inspiration changes. Bibasilar atelectasis. Small posterior infiltrate on lateral view.  2.  Mild increased interstitial markings in the lower lungs, likely reflecting pulmonary edema.  3.  Normal heart size.  4.  No pneumothorax.    Telemetry: personally reviewed February 21, 2025; notable for sinus     Lab results: personally reviewed February 21, 2025; notable for progressive HAI    Medical history and pertinent documents reviewed in Care Everywhere please where applicable see details above        Physical Examination Review of Systems   /63 (BP Location: Right arm)   Pulse 54   Temp 98.4  F (36.9  C) (Axillary)   Resp 20   Ht 1.702 m (5' 7\")   Wt 66.3 kg " (146 lb 1.6 oz)   SpO2 95%   BMI 22.88 kg/m    Body mass index is 22.88 kg/m .  Wt Readings from Last 3 Encounters:   02/18/25 66.3 kg (146 lb 1.6 oz)   01/08/25 64.9 kg (143 lb)   11/20/24 65.3 kg (144 lb)     General Appearance:   no distress, normal body habitus   ENT/Mouth: membranes moist, no oral lesions or bleeding gums.      EYES:  no scleral icterus, normal conjunctivae   Neck: no carotid bruits or thyromegaly   Chest/Lungs:   lungs are clear to auscultation, no rales or wheezing, equal chest wall expansion    Cardiovascular:   Regular. Normal first and second heart sounds with no murmurs, rubs, or gallops; the carotid, radial and posterior tibial pulses are intact, no JVD and trace LE edema bilaterally    Abdomen:  no organomegaly, masses, bruits, or tenderness; bowel sounds are present   Extremities: no cyanosis or clubbing   Skin: no xanthelasma, warm.    Neurologic: NAD     Psychiatric: NAD    A complete 10 systems ROS was reviewed  And is negative except what is listed in the HPI.          Medical History  Surgical History Family History Social History   Past Medical History:   Diagnosis Date    Actinic keratitis     Arthritis     bilat shoulders    CAD (coronary artery disease)     Cardiac arrest (H) 09/04/89    Claudication     Claudication     Coronary artery disease     s/p MI    Diabetes mellitus type 2, controlled (H)     Diverticulosis     HTN (hypertension)     Hyperlipidemia     Hypertension     Malignant neoplasm of prostate (H) 8/7/2006-10/5/2006    DENIED BY PATIENT (see note from 12/08/2014).  RADIOTHERAPY BY DR.CHO LEGGETT, old     Multiple pulmonary nodules     Osteoarthritis of glenohumeral joint     Left. Injected at [facility] 02/10/2015.    Peripheral vascular disease, unspecified     Syncope     Trigger finger, acquired     Type 2 diabetes mellitus (H) 11/6/2012    Past Surgical History:   Procedure Laterality Date    APPENDECTOMY      ARTHROSCOPY KNEE      ARTHROSCOPY KNEE       "CARDIAC CATHETERIZATION  2018     of rca    CARDIAC SURGERY  2018    two stents placed 18    CV CORONARY ANGIOGRAM N/A 2018    Procedure: Coronary Angiogram;  Surgeon: Jeane Garcia MD;  Location: [facility] Lab;  Service:     CV CORONARY ANGIOGRAM N/A 2018    Procedure: Coronary Angiogram;  Surgeon: Jeane Garcia MD;  Location: [facility] Lab;  Service:     FEMORAL ENDARTERECTOMY Bilateral 2017    IR EXTREMITY ANGIOGRAM BILATERAL  03/10/2017    NOSE SURGERY      PROSTATE BIOPSY, NEEDLE, SATURATION SAMPLING  2003    \"Biopsy fo the Prostate Needle\"    RELEASE TRIGGER FINGER      no family history of premature coronary artery disease Social History     Socioeconomic History    Marital status:      Spouse name: Not on file    Number of children: Not on file    Years of education: Not on file    Highest education level: Not on file   Occupational History    Not on file   Tobacco Use    Smoking status: Former     Current packs/day: 0.00     Average packs/day: 2.0 packs/day for 40.0 years (80.0 ttl pk-yrs)     Types: Cigarettes     Start date: 4/10/1944     Quit date: 4/10/1984     Years since quittin.8     Passive exposure: Never    Smokeless tobacco: Never   Substance and Sexual Activity    Alcohol use: Yes     Alcohol/week: 6.0 standard drinks of alcohol     Comment: occasionally.    Drug use: No    Sexual activity: Not on file   Other Topics Concern    Parent/sibling w/ CABG, MI or angioplasty before 65F 55M? 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        Had gotten into typing to meet girls.        Worked for [employer] after leaving the [employer].     Social Drivers of Health     Financial Resource Strain: Low Risk  (2024)    Financial Resource Strain     Within the past 12 months, have you or your family members you live with been unable to get utilities (heat, electricity) when it " was really needed?: No   Food Insecurity: Low Risk  (6/5/2024)    Food Insecurity     Within the past 12 months, did you worry that your food would run out before you got money to buy more?: No     Within the past 12 months, did the food you bought just not last and you didn t have money to get more?: No   Transportation Needs: Low Risk  (6/5/2024)    Transportation Needs     Within the past 12 months, has lack of transportation kept you from medical appointments, getting your medicines, non-medical meetings or appointments, work, or from getting things that you need?: No   Physical Activity: Not on file   Stress: Not on file   Social Connections: Not on file   Interpersonal Safety: Low Risk  (12/2/2024)    Interpersonal Safety     Do you feel physically and emotionally safe where you currently live?: Yes     Within the past 12 months, have you been hit, slapped, kicked or otherwise physically hurt by someone?: No     Within the past 12 months, have you been humiliated or emotionally abused in other ways by your partner or ex-partner?: No   Housing Stability: Low Risk  (6/5/2024)    Housing Stability     Do you have housing? : Yes     Are you worried about losing your housing?: No           Lab Results    Chemistry/lipid CBC Cardiac Enzymes/BNP/TSH/INR   Lab Results   Component Value Date    CHOL 124 11/30/2021    HDL 41 11/30/2021    TRIG 181 (H) 11/30/2021    BUN 39.1 (H) 02/21/2025     02/21/2025    CO2 27 02/21/2025    Lab Results   Component Value Date    WBC 7.4 02/21/2025    HGB 9.6 (L) 02/21/2025    HCT 28.9 (L) 02/21/2025    MCV 92 02/21/2025     02/21/2025    Lab Results   Component Value Date    TROPONINI 0.03 10/27/2019    BNP 59 06/16/2018    TSH 1.45 03/21/2023    INR 1.17 (H) 10/30/2019     Lab Results   Component Value Date    TROPONINI 0.03 10/27/2019          Weight:    Wt Readings from Last 3 Encounters:   02/18/25 66.3 kg (146 lb 1.6 oz)   01/08/25 64.9 kg (143 lb)   11/20/24 65.3 kg  "(144 lb)       Allergies  Allergies   Allergen Reactions    Pletal [Cilostazol]          Surgical History  Past Surgical History:   Procedure Laterality Date    APPENDECTOMY      ARTHROSCOPY KNEE      ARTHROSCOPY KNEE      CARDIAC CATHETERIZATION  08/22/2018     of rca    CARDIAC SURGERY  08/22/2018    two stents placed 8/22/18    CV CORONARY ANGIOGRAM N/A 08/06/2018    Procedure: Coronary Angiogram;  Surgeon: Jeane Garcia MD;  Location: [facility] Cath Lab;  Service:     CV CORONARY ANGIOGRAM N/A 08/22/2018    Procedure: Coronary Angiogram;  Surgeon: Jeane Garcia MD;  Location: [facility] Cath Lab;  Service:     FEMORAL ENDARTERECTOMY Bilateral 05/03/2017    IR EXTREMITY ANGIOGRAM BILATERAL  03/10/2017    NOSE SURGERY      PROSTATE BIOPSY, NEEDLE, SATURATION SAMPLING  01/13/2003    \"Biopsy fo the Prostate Needle\"    RELEASE TRIGGER FINGER         Social History  Tobacco:   History   Smoking Status    Former    Types: Cigarettes   Smokeless Tobacco    Never    Alcohol:   Social History    Substance and Sexual Activity      Alcohol use: Yes        Alcohol/week: 6.0 standard drinks of alcohol        Comment: occasionally.   Illicit Drugs:   History   Drug Use No       Family History  Family History   Problem Relation Age of Onset    Diabetes No family hx of     Breast Cancer No family hx of     Colon Cancer No family hx of     Prostate Cancer No family hx of     Other Cancer No family hx of     No Known Problems Mother     No Known Problems Father           Taryn Taylor MD on 2/21/2025      cc: Haim Galaviz    "

## 2025-02-21 NOTE — CONSULTS
"Care Management Follow Up    Length of Stay (days): 2    Expected Discharge Date: 02/22/2025    Anticipated Discharge Plan:  Home, Home Care, Outpatient Rehab (PT, OT, SLP, Cardiac or Pulmonary)    Transportation: Anticipate Family/friend    PT Recommendations:    OT Recommendations:        Barriers to Discharge: medical stability    Prior Living Situation:   \"Zack lives at [facility] apartment with his wife Sarika.     His wife helps with his IADLs and he has [facility] Aide help 3 times a week. The Aide helps on Tue and Fri [phone] for bathing assist, exercises, and other needs. Just started coming also on Wed for 4 hr so wife can leave and do errands. [facility] Wife states, \"I never leave him alone. Someone or I am with him 24/7. He has a dementia diagnosis\".     He also goes to PT at [facility] weekly. He uses a FWW for mobility.     Wife to transport at discharge.\"    Discussed  Partnership in Safe Discharge Planning  document with patient/family: No     Handoff Completed: Yes, [facility] PCP: Internal handoff referral completed    Patient/Spokesperson Updated: No    Additional Information:  CM consulted for discharge planning. Therapy consults are pending at this time.     Next Steps: CM to follow for therapy recommendations to assist with discharge planning.       JENIFFER Waite      "

## 2025-02-21 NOTE — PROGRESS NOTES
[facility] Progress Note - Hospitalist Service    Date of Admission:  2/18/2025    Assessment & Plan      Zack Johns is a 89 year old male with PMHx of CAD s/p stent (2018), PAD, T2DM, HTN, HLD admitted on 2/18/2025 for NSTEMI and acute hypoxic respiratory failure, likely secondary to new CHF. Discontinue coronary angiogram +/- stent, ongoing goals of care discussion w/wife at bedside in setting of Anthony's delirium/dementia.    Needs updated POLST prior to discharge    NSTEMI  CAD s/p RCA stent (2018)  PAD s/p BL iliac stents (2018)  Initial tropes trended from 43 > 103. EKG showed sinus bradycardia w/PVC, without acute ischemic changes when compared to previous in 2022.  Heparin gtt started and slowly discontinued 2/19. Patient cody c/p or palpitations, and has been borderline bradycardic during this admission. Of note, appears that they tried to give him nitroglycerin at his [facility] which did not improve symptoms. Cardiology consulted with ECHO completed 2/19 with EF 20-25% with global hypokinesis of LV. Discontinuing angiogram and will continue atorvastatin, lisinopril (holding 2/20), metoprolol, ASA 81 mg with outpatient management.  At this point unlikely to pursue angiogram and stenting and with worsening renal function would be delayed. Favor medical management  -Cards consult, recs appreciated    -Fluid restriction   -Resume cardiac diet   -ECHO completed 2/19, as above   -Per cardiology, cancel angiogram  -Heparin gtt  -PTA ASA 81mg     Acute hypoxic respiratory failure, improving  Concerns for new CHF- EF 20-25%  Patient presents via EMS from [facility] for hypoxia down to SpO2 70s after dinner. He improved to the 90s with 6L NC O2 en route. Initial workup notable for BNP 2740 and CXR notable for increased interstitial markings concerning for pulmonary edema, which raise concerns for possible new CHF. Triggers could very well be NSTEMI  discussed above. No si/sx to suggest arrhythmia or acute illness. Does not have known pulmonary conditions that would be concerning for COPD/asthma exacerbation. Shortness of breath improved after Lasix and was weaned to room air, satting comfortably in the low 90s. Has produced about 2 L of urine since admission.  - s/p lasix 60mg IV once in ED  - Continue PTA metoprolol 12.5 mg twice daily  - Holding PTA Zestoretic 1 tablet daily  -Holding lasix due to HAI  - ECHO completed  - Strict I/O  - Daily weights  - Cardiac tele  - Cards consult per above   - Fluid restriction    Goals of care  Patient reports that he is eager to return to his [facility] facility as soon as possible. What is important to him is being with his wife, being able to enjoy good meals, conversations, and daily activities with his new neighbors. He recently moved to [facility] and really likes it there. He does desire full cares, however, and wants to be in his best health to spend more time with his community. Had conversation with pt and wife about DNR/DNI, Wife states that they discussed this at [facility] and has said they would want Anthony to be DNR/DNI. Have had this conversation twice during this admission.  -Does have [employer] naming wife and son as his HCA's  -DNR/DNI status  -CM recs to home at independent living apartment    Chronic conditions:  >Dementia - PTA donepazil, celexa, ritalin. Delirium precautions placed.   >T2DM - Last A1c 6.2 in Mar 2023. Repeat A1c. LDSS.   >HTN - zestoretic per above - on hold   >HLD - PTA atorvastatin 40mg d     Delirium, improving  End stage dementia   Pt increasingly disoriented and agitated during the early morning of 2/20 and given Seroquel and Trazodone. Pt very lethargic in the AM and falling asleep mid conversation. Will avoid excess sedation by avoiding antipsychotics. Improving on trazodone.  -Delirium precautions  -Avoid antipsychotics for sedation  -Starting  trazodone 25/50 mg at night    -Please start with 25 mg  -Frequent reorientation, blinds up in the day and closed at night    HAI  Patient has elevated Cr 1.17 --> 1.16 --> 1.27 --> 1.69. Pt is s/p once dose of 60 mg lasix and on fluid restriction. Possible that this is a prerenal HAI and pt is volume down. Given one dose of IV albumin. Continuing to hold ACE and loop diuretics. Continue ot monitor UOP and renal function closely, per cardiology.  -s/p 1x dose of 50 mg albumin   -Continue to monitor    Hypokalemia, improved  Pt has decreased potassium levels 4 --> 4.1 --> 3.2 --> 3.9. Possibly due to single dose of lasix during this admission. Potassium bolus administered. Improving.  -Hypokalemia replacement protocol  -s/p 1x 40 mEq potassium chloride on 2/20  -Follow potassium          Diet: Fluid restriction 1800 ML FLUID  NPO per Anesthesia Guidelines for Procedure/Surgery Except for: Meds    DVT Prophylaxis: Heparin gtt  Castellano Catheter: Not present  Lines: None     Cardiac Monitoring: ACTIVE order. Indication: AMI (NSTEMI/ STEMI) (48 hours)  Code Status: No CPR- Do NOT Intubate      Clinically Significant Risk Factors        # Hypokalemia: Lowest K = 3.2 mmol/L in last 2 days, will replace as needed           # Acute Kidney Injury, unspecified: based on a >150% or 0.3 mg/dL increase in last creatinine compared to past 90 day average, will monitor renal function  # Hypertension: Noted on problem list  # Acute heart failure with reduced ejection fraction: last echo with EF <40% and receiving IV diuretics                 # Financial/Environmental Concerns: none         Social Drivers of Health    Tobacco Use: Medium Risk (2/18/2025)    Patient History     Smoking Tobacco Use: Former     Smokeless Tobacco Use: Never     Passive Exposure: Never          Disposition Plan     Medically Ready for Discharge: Anticipated Tomorrow           The patient's care was discussed with the Attending Physician, Dr. Avila  ".    ABI CHRIS  Hospitalist Service  [facility]  Securely message with [employer] (more info)  Text page via [employer] Paging/Directory   ______________________________________________________________________    Interval History   Pt sleeping in bed, denies c/p an SOB. States that he is having a hard time trying to pass a BM. Per wife, states they had an accident down in the ED. Pt endorses that he would like to go home and is confused and not well informed about the angiogram, and would not like to proceed. Wife in the room also states that she is scared about the possibility of him not waking up from the procedure or making his dementia worse. Talked about DNR/DNI and wife endorses this. Day one admission, patient also endorsed wanting to be DNR/DNI. Pt orientated to place but not to time saying it's \"1925.\"    Physical Exam   Vital Signs: Temp: 98.6  F (37  C) Temp src: Oral BP: (!) 142/66 Pulse: 67   Resp: 20 SpO2: 94 % O2 Device: None (Room air)    Weight: 131 lbs 6.31 oz    Constitutional: no apparent distress, oriented to place not time, and appears stated age  Eyes: Lids and lashes normal, pupils equal, extra ocular muscles intact, sclera clear, conjunctiva normal  Respiratory: No increased work of breathing, good air exchange, clear to auscultation bilaterally, no crackles or wheezing  Cardiovascular: Distant sounding/muted sounding heart rates, no murmur noted  GI: Normal bowel sounds, soft, non-distended, non-tender, no masses palpated, no hepatosplenomegally  Skin: No LE edema  Neuropsychiatric: Calm, oriented to place, and resting in bed    Data     I have personally reviewed the following data over the past 24 hrs:    7.4  \   9.6 (L)   / 178     140 103 39.1 (H) /  111 (H)   3.9 27 1.69 (H) \       Imaging results reviewed over the past 24 hrs:   No results found for this or any previous visit (from the past 24 hours).      I have reviewed the assessment and plan provided by the " medical student Brenna Berry, MS4 and agree with the documentation as above. I have personally examined the patient and staffed with the attending physician, Dr. Guillaume Davis MD  PGY-2  [facility]  [facility]  February 21, 2025

## 2025-02-21 NOTE — PLAN OF CARE
Goal Outcome Evaluation:      Plan of Care Reviewed With: patient    Overall Patient Progress: no change    Problem: Adult Inpatient Plan of Care  Goal: Optimal Comfort and Wellbeing  Outcome: Progressing  Intervention: Monitor Pain and Promote Comfort  Recent Flowsheet Documentation  Taken 2/20/2025 2244 by Laure Roque RN  Pain Management Interventions: medication (see MAR)  Intervention: Provide Person-Centered Care  Recent Flowsheet Documentation  Taken 2/21/2025 0047 by Laure Roque RN  Trust Relationship/Rapport:   care explained   choices provided   reassurance provided  Taken 2/20/2025 2140 by Laure Roque RN  Trust Relationship/Rapport:   care explained   choices provided   reassurance provided     Problem: Dementia Signs/Symptoms  Goal: Improved Behavioral Control (Dementia Signs/Symptoms)  Outcome: Progressing  Goal: Improved Mood Symptoms (Dementia Signs/Symptoms)  Outcome: Progressing  Goal: Optimized Cognitive Function (Dementia Signs/Symptoms)  Outcome: Progressing  Goal: Optimized Oral Intake (Dementia Signs/Symptoms)  Outcome: Progressing  Goal: Improved Sleep (Dementia Signs/Symptoms)  Outcome: Progressing  Goal: Enhanced Social or Functional Skills and Ability (Dementia Signs/Symptoms)  Outcome: Progressing  Intervention: Promote Social and Functional Ability  Recent Flowsheet Documentation  Taken 2/21/2025 0047 by Laure Roque RN  Trust Relationship/Rapport:   care explained   choices provided   reassurance provided  Taken 2/20/2025 2140 by Laure Roque RN  Trust Relationship/Rapport:   care explained   choices provided   reassurance provided     Problem: Heart Failure  Goal: Effective Oxygenation and Ventilation  Intervention: Optimize Oxygenation and Ventilation  Recent Flowsheet Documentation  Taken 2/21/2025 0047 by Laure Roque RN  Head of Bed (HOB) Positioning: HOB at 20-30 degrees  Taken 2/20/2025 2140 by Laure Roque RN  Head of Bed (HOB) Positioning:  HOB at 20-30 degrees   Pt alert but confused, oriented to self. Reported bilateral shoulder pain, got PRN tylenol,  drowsy overnight, sleeping throughout most of the shift.  incontinent for bowel with loose/liquid stool, hospitalist notified and ordered C-diff test to be taken. Enteric precaution in place until rule out , Heparin gtt infusing at 750 unit(s)/hr, recheck this morning. Primofit in place, no output as of midnight, bladder scanned for 400 ml, pt encouraged to wake up and try to void multiple times but refused, encouraged to use primo fit but said he doesn't have the urge and would like to be left alone for the moment, charge nurse aware, following nurse made aware too,  NPO at midnight for possible angiogram, able to make needs known, care is ongoing.

## 2025-02-21 NOTE — PLAN OF CARE
Problem: Dementia Signs/Symptoms  Goal: Improved Behavioral Control (Dementia Signs/Symptoms)  Outcome: Adequate for Care Transition   Goal Outcome Evaluation:             Alert/ orient to person only, has been pleasant and cooperative with staff, ambulated to restroom with assist, family has been at bedside

## 2025-02-21 NOTE — PROGRESS NOTES
"   02/21/25 1500   Appointment Info   Signing Clinician's Name / Credentials (PT) Haylee Danielle, RENATO   Living Environment   People in Home spouse   Current Living Arrangements apartment  (sr indep living apt.)   Living Environment Comments Pt does walk down to meals with his walker 2x/day.  Per the chart, kar lives at [facility] apartment with his wife Sarika.      His wife helps with his IADLs and he has [facility] Aide help 3 times a week. The Aide helps on Tue and Fri [phone] for bathing assist, exercises, and other needs. Just started coming also on Wed for 4 hr so wife can leave and do errands. [facility].\" Wife states, \"I never leave him alone. Someone or I am with him 24/7. He has a dementia diagnosis\".   Self-Care   Equipment Currently Used at Home walker, rolling  (FWW)   Activity/Exercise/Self-Care Comment Indep with walking with the FWW, indep with bed mobility, transfers and gait.  Indep with ADLs and the pt is assisted with home ADLs.   General Information   Onset of Illness/Injury or Date of Surgery 02/18/25   Referring Physician Salvador Lee MD   Patient/Family Therapy Goals Statement (PT) none stated.   Pertinent History of Current Problem (include personal factors and/or comorbidities that impact the POC) Per the chart, Zack Johns is a 89 year old male with PMHx of CAD s/p stent (2018), PAD, T2DM, HTN, HLD admitted on 2/18/2025 for NSTEMI and acute hypoxic respiratory failure, likely secondary to new CHF. Discontinue coronary angiogram +/- stent, ongoing goals of care discussion w/wife at bedside in setting of Anthony's delirium/dementia.   Existing Precautions/Restrictions fall  (hand off to nursing)   Weight-Bearing Status - LLE weight-bearing as tolerated   Weight-Bearing Status - RLE weight-bearing as tolerated   Cognition   Orientation Status (Cognition) oriented to;person;place  (feb)   Pain Assessment   Patient " Currently in Pain No   Range of Motion (ROM)   Range of Motion ROM is WNL   ROM Comment bilat LEs   Strength (Manual Muscle Testing)   Strength (Manual Muscle Testing) Deficits observed during functional mobility   Strength Comments MMT grossly 4+/5   Bed Mobility   Comment, (Bed Mobility) not tested   Transfers   Comment, (Transfers) Sit>stand with mod A x 2 with FWW with cues for hand placement.   Gait/Stairs (Locomotion)   [place] Level (Gait) minimum assist (75% patient effort);2 person assist   Assistive Device (Gait) walker, front-wheeled  (FWW)   Distance in Feet (Gait) 10'   Pattern (Gait) step-through;swing-through   Deviations/Abnormal Patterns (Gait) gait speed decreased   Balance   Balance Comments min A x 2 with FWW   Sensory Examination   Sensory Perception WNL   Sensory Perception Comments bilat LEs   Clinical Impression   Criteria for Skilled Therapeutic Intervention Yes, treatment indicated   PT Diagnosis (PT) Impaired functional mobility.   Influenced by the following impairments dec bal, weakness, pt is not at his PLOF.   Functional limitations due to impairments bed mobility, transfers , gait.   Clinical Presentation (PT Evaluation Complexity) stable   Clinical Presentation Rationale Pt presents medically diagnosed.   Clinical Decision Making (Complexity) moderate complexity   Planned Therapy Interventions (PT) balance training;gait training;home exercise program;strengthening;transfer training;patient/family education   Risk & Benefits of therapy have been explained evaluation/treatment results reviewed;care plan/treatment goals reviewed;risks/benefits reviewed;patient;participants voiced agreement with care plan;spouse/significant other   PT Total Evaluation Time   PT Eval, Moderate Complexity Minutes (49701) 13   Physical Therapy Goals   PT Frequency Daily   PT Predicted Duration/Target Date for Goal Attainment 02/28/25   PT Goals Bed Mobility;Transfers;Gait;PT Goal 1   PT: Bed Mobility  Minimal assist;Supine to/from sit;Rolling   PT: Transfers Sit to/from stand;Bed to/from chair;Assistive device;Supervision/stand-by assist   PT: Gait Supervision/stand-by assist;Rolling walker;150 feet   PT: Goal 1 Pt to dony bilat bilat LE ex x 20 reps to increase strength for mobility   Interventions   Interventions Quick Adds Gait Training;Therapeutic Procedure;Therapeutic Activity   Therapeutic Procedure/Exercise   Ther. Procedure: strength, endurance, ROM, flexibillity Minutes (48173) 8   Treatment Detail/Skilled Intervention Seated bilat LE ex x 10 reps with cues for techniqiue.   Gait Training   Gait Training Minutes (44352) 10   Symptoms Noted During/After Treatment (Gait Training) fatigue   Treatment Detail/Skilled Intervention Pt walked slowly with the FWW with min A x 2 with cues and assisst for walker safety and direction.  No c/o chest pain.  Pt did get tired.   Distance in Feet 130'   [place] Level (Gait Training) minimum assist (75% patient effort)   Physical Assistance Level (Gait Training) 2 person assist;verbal cues   Weight Bearing (Gait Training) weight-bearing as tolerated   Assistive Device (Gait Training) rolling walker  (FWW)   Pattern Analysis (Gait Training) swing-through gait   Gait Analysis Deviations decreased meghan;decreased step length   Impairments (Gait Analysis/Training) balance impaired;strength decreased   PT Discharge Planning   PT Plan gait w FWW, LE ex, transfers and bed mobility   PT Discharge Recommendation (DC Rec) Transitional Care Facility   PT Rationale for DC Rec The pt needed mod Ax  2 with sit>stand and he did use the walker.  Pt walked with min A x 1 to 2.  Some increased assist for walker safety and direction.  Pt could benefit from TCU at [facility].   PT Brief overview of current status PT eval, transfer w FWW with mod A x 2 with sit>stand, ambulated with fWW with min x1 to 2.  LE ex.   PT Total Distance Amb During Session (feet) 140   PT Equipment Needed at  Discharge walker, rolling  (FWW, pt has one at home.)   Physical Therapy Time and Intention   Timed Code Treatment Minutes 18   Total Session Time (sum of timed and untimed services) 31

## 2025-02-22 ENCOUNTER — APPOINTMENT (OUTPATIENT)
Dept: OCCUPATIONAL THERAPY | Facility: HOSPITAL | Age: OVER 89
DRG: 280 | End: 2025-02-22
Attending: FAMILY MEDICINE
Payer: COMMERCIAL

## 2025-02-22 LAB
ANION GAP SERPL CALCULATED.3IONS-SCNC: 15 MMOL/L (ref 7–15)
BUN SERPL-MCNC: 47.3 MG/DL (ref 8–23)
CALCIUM SERPL-MCNC: 9 MG/DL (ref 8.8–10.4)
CHLORIDE SERPL-SCNC: 102 MMOL/L (ref 98–107)
CREAT SERPL-MCNC: 1.57 MG/DL (ref 0.67–1.17)
EGFRCR SERPLBLD CKD-EPI 2021: 42 ML/MIN/1.73M2
ERYTHROCYTE [DISTWIDTH] IN BLOOD BY AUTOMATED COUNT: 14.5 % (ref 10–15)
GLUCOSE BLDC GLUCOMTR-MCNC: 113 MG/DL (ref 70–99)
GLUCOSE BLDC GLUCOMTR-MCNC: 116 MG/DL (ref 70–99)
GLUCOSE BLDC GLUCOMTR-MCNC: 120 MG/DL (ref 70–99)
GLUCOSE BLDC GLUCOMTR-MCNC: 140 MG/DL (ref 70–99)
GLUCOSE BLDC GLUCOMTR-MCNC: 146 MG/DL (ref 70–99)
GLUCOSE SERPL-MCNC: 118 MG/DL (ref 70–99)
HCO3 SERPL-SCNC: 24 MMOL/L (ref 22–29)
HCT VFR BLD AUTO: 30.5 % (ref 40–53)
HGB BLD-MCNC: 9.6 G/DL (ref 13.3–17.7)
MCH RBC QN AUTO: 29.3 PG (ref 26.5–33)
MCHC RBC AUTO-ENTMCNC: 31.5 G/DL (ref 31.5–36.5)
MCV RBC AUTO: 93 FL (ref 78–100)
PLATELET # BLD AUTO: 175 10E3/UL (ref 150–450)
POTASSIUM SERPL-SCNC: 3.9 MMOL/L (ref 3.4–5.3)
RBC # BLD AUTO: 3.28 10E6/UL (ref 4.4–5.9)
SODIUM SERPL-SCNC: 141 MMOL/L (ref 135–145)
WBC # BLD AUTO: 7.8 10E3/UL (ref 4–11)

## 2025-02-22 PROCEDURE — 80048 BASIC METABOLIC PNL TOTAL CA: CPT | Performed by: FAMILY MEDICINE

## 2025-02-22 PROCEDURE — 97535 SELF CARE MNGMENT TRAINING: CPT | Mod: GO

## 2025-02-22 PROCEDURE — 85048 AUTOMATED LEUKOCYTE COUNT: CPT | Performed by: FAMILY MEDICINE

## 2025-02-22 PROCEDURE — 85014 HEMATOCRIT: CPT | Performed by: FAMILY MEDICINE

## 2025-02-22 PROCEDURE — 120N000004 HC R&B MS OVERFLOW

## 2025-02-22 PROCEDURE — 36415 COLL VENOUS BLD VENIPUNCTURE: CPT | Performed by: FAMILY MEDICINE

## 2025-02-22 PROCEDURE — 250N000013 HC RX MED GY IP 250 OP 250 PS 637

## 2025-02-22 PROCEDURE — 250N000013 HC RX MED GY IP 250 OP 250 PS 637: Performed by: FAMILY MEDICINE

## 2025-02-22 PROCEDURE — 250N000011 HC RX IP 250 OP 636

## 2025-02-22 PROCEDURE — 99232 SBSQ HOSP IP/OBS MODERATE 35: CPT | Performed by: INTERNAL MEDICINE

## 2025-02-22 PROCEDURE — 250N000013 HC RX MED GY IP 250 OP 250 PS 637: Performed by: INTERNAL MEDICINE

## 2025-02-22 PROCEDURE — 99232 SBSQ HOSP IP/OBS MODERATE 35: CPT | Mod: GC

## 2025-02-22 PROCEDURE — 97165 OT EVAL LOW COMPLEX 30 MIN: CPT | Mod: GO

## 2025-02-22 RX ORDER — LISINOPRIL 2.5 MG/1
2.5 TABLET ORAL DAILY
Status: DISCONTINUED | OUTPATIENT
Start: 2025-02-22 | End: 2025-02-23

## 2025-02-22 RX ORDER — SPIRONOLACTONE 25 MG/1
25 TABLET ORAL DAILY
Status: DISCONTINUED | OUTPATIENT
Start: 2025-02-22 | End: 2025-02-27 | Stop reason: HOSPADM

## 2025-02-22 RX ORDER — ENOXAPARIN SODIUM 100 MG/ML
30 INJECTION SUBCUTANEOUS EVERY 24 HOURS
Status: DISCONTINUED | OUTPATIENT
Start: 2025-02-22 | End: 2025-02-27 | Stop reason: HOSPADM

## 2025-02-22 RX ADMIN — ACETAMINOPHEN 650 MG: 325 TABLET ORAL at 12:37

## 2025-02-22 RX ADMIN — DONEPEZIL HYDROCHLORIDE 10 MG: 5 TABLET ORAL at 21:35

## 2025-02-22 RX ADMIN — ATORVASTATIN CALCIUM 40 MG: 40 TABLET, FILM COATED ORAL at 07:44

## 2025-02-22 RX ADMIN — LISINOPRIL 2.5 MG: 2.5 TABLET ORAL at 16:48

## 2025-02-22 RX ADMIN — SPIRONOLACTONE 25 MG: 25 TABLET, FILM COATED ORAL at 16:48

## 2025-02-22 RX ADMIN — METOPROLOL TARTRATE 12.5 MG: 25 TABLET, FILM COATED ORAL at 19:38

## 2025-02-22 RX ADMIN — ENOXAPARIN SODIUM 30 MG: 30 INJECTION SUBCUTANEOUS at 21:35

## 2025-02-22 RX ADMIN — ASPIRIN 81 MG: 81 TABLET, COATED ORAL at 07:44

## 2025-02-22 RX ADMIN — TRAZODONE HYDROCHLORIDE 50 MG: 50 TABLET ORAL at 21:35

## 2025-02-22 RX ADMIN — CITALOPRAM HYDROBROMIDE 20 MG: 20 TABLET ORAL at 07:44

## 2025-02-22 RX ADMIN — METOPROLOL TARTRATE 12.5 MG: 25 TABLET, FILM COATED ORAL at 07:44

## 2025-02-22 ASSESSMENT — ACTIVITIES OF DAILY LIVING (ADL)
ADLS_ACUITY_SCORE: 67
ADLS_ACUITY_SCORE: 68
ADLS_ACUITY_SCORE: 67
ADLS_ACUITY_SCORE: 68
ADLS_ACUITY_SCORE: 69
ADLS_ACUITY_SCORE: 68
ADLS_ACUITY_SCORE: 67
ADLS_ACUITY_SCORE: 67
ADLS_ACUITY_SCORE: 69
ADLS_ACUITY_SCORE: 68
ADLS_ACUITY_SCORE: 69
ADLS_ACUITY_SCORE: 68
ADLS_ACUITY_SCORE: 68
ADLS_ACUITY_SCORE: 67
ADLS_ACUITY_SCORE: 68
ADLS_ACUITY_SCORE: 67
ADLS_ACUITY_SCORE: 69
ADLS_ACUITY_SCORE: 68
ADLS_ACUITY_SCORE: 69
ADLS_ACUITY_SCORE: 68
ADLS_ACUITY_SCORE: 68
ADLS_ACUITY_SCORE: 69
ADLS_ACUITY_SCORE: 69

## 2025-02-22 NOTE — PLAN OF CARE
Problem: Dementia Signs/Symptoms  Goal: Improved Mood Symptoms (Dementia Signs/Symptoms)  Outcome: Progressing  Goal: Improved Sleep (Dementia Signs/Symptoms)  Outcome: Progressing     Problem: Heart Failure  Goal: Effective Oxygenation and Ventilation  Outcome: Progressing   Goal Outcome Evaluation:         Vitals:    02/21/25 1528 02/21/25 1919 02/21/25 2330 02/22/25 0301   BP: (!) 149/65 (!) 146/66 (!) 162/68 139/65   BP Location: Right arm Right arm Right arm Right arm   Pulse: 74 72 61 58   Resp: 20 20 20 18   Temp: 98.2  F (36.8  C) 98.8  F (37.1  C) 98.6  F (37  C) 98.3  F (36.8  C)   TempSrc: Oral Oral Oral Oral   SpO2: 95% 94% 92% 92%   Weight:       Height:              Patient denied pain and was able to sleep for most of the night with 1:1 sitter for confusion at bedside. Tele NSR BBB. 0200 blood sugar 120. Patient oriented to self and place. Potential for discharge to assisted living with wife today.

## 2025-02-22 NOTE — PLAN OF CARE
Problem: Dementia Signs/Symptoms  Goal: Enhanced Social or Functional Skills and Ability (Dementia Signs/Symptoms)  Intervention: Promote Social and Functional Ability  Recent Flowsheet Documentation  Taken 2/22/2025 1216 by Martita Raymond RN  Trust Relationship/Rapport:   care explained   choices provided   reassurance provided  Taken 2/22/2025 0751 by Martita Raymond RN  Trust Relationship/Rapport:   care explained   choices provided   reassurance provided   Goal Outcome Evaluation:       Alert/ orient to person and place, has not been trying to get out of bed, easily redirectable, difficulty with transfer, gets shaky and has shuffled gait, has bed/chair alarms on, gait belt and walker with transfers

## 2025-02-22 NOTE — PROGRESS NOTES
Cardiology Progress Note  New to me.  Chart reviewed.  Assessment/Plan:    Non-ST elevation myocardial infarction in 89-year-old gentleman with prior revascularizations.  Declines repeat angiography at this time with significant dementia.  Palliative care involvement is recommended, along with continued medical management.  Discontinue telemetry.  Would not resume methylphenidate  Ischemic cardiomyopathy.  Hypertensive.  Will add back low-dose lisinopril, along with spironolactone.  Continue to monitor renal function, potassium    Active Problems:    NSTEMI (non-ST elevated myocardial infarction) (H)    Acute decompensated heart failure (H)     LOS: 3 days     Subjective:  Watching television.  Denies chest pain or shortness of breath.  Reports weakness when trying to stand.      Objective:   Vital signs in last 24 hours:  Vitals:    02/22/25 0301 02/22/25 0829 02/22/25 0831 02/22/25 1329   BP: 139/65 (!) 155/66 (!) 149/69 (!) 152/69   BP Location: Right arm Right arm  Right arm   Pulse: 58 67  66   Resp: 18 16  16   Temp: 98.3  F (36.8  C) 98.2  F (36.8  C)  98  F (36.7  C)   TempSrc: Oral Oral  Oral   SpO2: 92% 94%  98%   Weight:       Height:         Weight:   Wt Readings from Last 3 Encounters:   02/21/25 59.6 kg (131 lb 6.3 oz)   01/08/25 64.9 kg (143 lb)   11/20/24 65.3 kg (144 lb)           PHYSICAL EXAM      Respiratory:  Normal breath sounds, No respiratory distress, No wheezing, No chest tenderness.   Cardiovascular:   Normal heart rate, Normal rhythm, No murmurs, No rubs, No gallops.   GI:  Bowel sounds normal, Soft, No tenderness, No masses  Extremities: no edema       Cardiographics:   Telemetry sinus rhythm, 60 to 80 bpm with PVCs    Echocardiogram 2/19/2025:  1. The left ventricle is mildly dilated with normal left ventricular wall  thickness.  - Left ventricular function is decreased. The ejection fraction is 20-25%  (severely reduced).  - There is severe global hypokinesia of the left ventricle.  2.  The right ventricle is normal size with mildly decreased right ventricular  systolic function  3. No hemodynamically significant valvular abnormalities on 2D or color flow  imaging.  4. There is no comparison study available.      Lab Results:   Lab Results   Component Value Date    WBC 7.8 02/22/2025    HGB 9.6 (L) 02/22/2025    HCT 30.5 (L) 02/22/2025     02/22/2025    CHOL 124 11/30/2021    TRIG 181 (H) 11/30/2021    HDL 41 11/30/2021    ALT 10 02/18/2025    AST 16 02/18/2025     02/22/2025    BUN 47.3 (H) 02/22/2025    CO2 24 02/22/2025    TSH 1.45 03/21/2023    INR 1.17 (H) 10/30/2019     Lab Results   Component Value Date    TROPONINI 0.03 10/27/2019           Jayjay Cruz MD [facility]  2/22/2025

## 2025-02-22 NOTE — PLAN OF CARE
Goal Outcome Evaluation:         Problem: Dementia Signs/Symptoms  Goal: Enhanced Social or Functional Skills and Ability (Dementia Signs/Symptoms)  Intervention: Promote Social and Functional Ability  Recent Flowsheet Documentation  Taken 2/21/2025 2030 by Anthony Rodriguez RN  Trust Relationship/Rapport: care explained  Taken 2/21/2025 1530 by Anthony Rodriguez RN  Trust Relationship/Rapport:   care explained   emotional support provided   questions answered     Problem: Adult Inpatient Plan of Care  Goal: Optimal Comfort and Wellbeing  Intervention: Provide Person-Centered Care  Recent Flowsheet Documentation  Taken 2/21/2025 2030 by Anthony Rodriguez RN  Trust Relationship/Rapport: care explained  Taken 2/21/2025 1530 by Anthony Rodriguez RN  Trust Relationship/Rapport:   care explained   emotional support provided   questions answered     Patient is A/Ox2 this shift with fluctuating confusion, but pleasant and cooperative with cares. Currently on RA and assist x1 with gait belt and walker. Primofit in place. Blood sugars good with shift, 128 and 100. Plan to discharge patient back to facility with wife.     1:1 sitter at bedside.     K protocol, recheck in the am.    Anthony Rodriguez RN

## 2025-02-22 NOTE — PLAN OF CARE
Problem: Adult Inpatient Plan of Care  Goal: Absence of Hospital-Acquired Illness or Injury  Intervention: Identify and Manage Fall Risk  Recent Flowsheet Documentation  Taken 2/22/2025 1623 by Martita Raymond RN  Safety Promotion/Fall Prevention:   activity supervised   clutter free environment maintained   mobility aid in reach   safety round/check completed   nonskid shoes/slippers when out of bed   room near nurse's station   patient and family education   bedside attendant  Taken 2/22/2025 1216 by Martita Raymond RN  Safety Promotion/Fall Prevention:   activity supervised   clutter free environment maintained   mobility aid in reach   safety round/check completed   nonskid shoes/slippers when out of bed   room near nurse's station   patient and family education   bedside attendant  Taken 2/22/2025 0751 by Martita Raymond RN  Safety Promotion/Fall Prevention:   activity supervised   clutter free environment maintained   mobility aid in reach   safety round/check completed   nonskid shoes/slippers when out of bed   room near nurse's station   patient and family education   bedside attendant  Intervention: Prevent Skin Injury  Recent Flowsheet Documentation  Taken 2/22/2025 1623 by Martita Raymond RN  Body Position: position changed independently  Taken 2/22/2025 1216 by Martita Raymond RN  Body Position: position changed independently  Taken 2/22/2025 0751 by Martita Raymond RN  Body Position: position changed independently  Intervention: Prevent Infection  Recent Flowsheet Documentation  Taken 2/22/2025 1623 by Martita Raymond RN  Infection Prevention: hand hygiene promoted  Taken 2/22/2025 1216 by Martita Raymond RN  Infection Prevention: hand hygiene promoted  Taken 2/22/2025 0751 by Martita Raymond RN  Infection Prevention: hand hygiene promoted  Goal: Optimal Comfort and Wellbeing  Intervention: Provide Person-Centered Care  Recent Flowsheet Documentation  Taken 2/22/2025 1623 by Mandi  Martita LAURENT RN  Trust Relationship/Rapport:   care explained   choices provided   reassurance provided  Taken 2/22/2025 1216 by Martita Raymond RN  Trust Relationship/Rapport:   care explained   choices provided   reassurance provided  Taken 2/22/2025 0751 by Martita Raymond RN  Trust Relationship/Rapport:   care explained   choices provided   reassurance provided   Goal Outcome Evaluation:         Up ambulated in [place] with assist of 1 and walker with gait belt, walked 400ft down pend[place], tolerated well and sitting in chair for supper

## 2025-02-22 NOTE — PROGRESS NOTES
[facility]    Progress Note - Hospitalist Service       Date of Admission:  2/18/2025    Assessment & Plan   Zack Johns is a 89 year old male with PMHx of CAD s/p stent (2018), PAD, T2DM, HTN, HLD admitted on 2/18/2025 for NSTEMI and acute hypoxic respiratory failure, likely secondary to new CHF. Discontinue coronary angiogram +/- stent, ongoing goals of care discussion w/wife at bedside in setting of Anthony's delirium/dementia. DNR/DNI. Recommended TCU at discharge, he is not medically ready for discharge 2/22    Updates 2/22:  -Continue to work on discharge planning/recommended TCU at discharge  -Holding lisinopril-hydrochlorothiazide and lasix in setting of HAI, improving (Cr 1.57 2/22)  -Encourage PO  -Heparin gtt discontinued 2/22, started lovenox for VTE ppx     Needs updated POLST prior to discharge     NSTEMI  CAD s/p RCA stent (2018)  PAD s/p BL iliac stents (2018)  Initial tropes trended from 43 > 103. EKG showed sinus bradycardia w/PVC, without acute ischemic changes when compared to previous in 2022.  Heparin gtt started and slowly discontinued 2/19. Patient cody c/p or palpitations, and has been borderline bradycardic during this admission. Of note, appears that they tried to give him nitroglycerin at his long term which did not improve symptoms. Cardiology consulted with ECHO completed 2/19 with EF 20-25% with global hypokinesis of LV. Discontinuing angiogram and will continue atorvastatin, lisinopril (holding 2/20), metoprolol, ASA 81 mg with outpatient management.  At this point unlikely to pursue angiogram and stenting and with worsening renal function would be delayed. Favor medical management  -Cards consult, recs appreciated               -Fluid restriction              -Resume cardiac diet              -ECHO completed 2/19, as above              -Per cardiology, cancel angiogram  -Heparin gtt  -PTA ASA 81mg      Acute hypoxic respiratory failure, improving  Concerns  for new CHF- EF 20-25%  Patient presents via EMS from [facility] for hypoxia down to SpO2 70s after dinner. He improved to the 90s with 6L NC O2 en route. Initial workup notable for BNP 2740 and CXR notable for increased interstitial markings concerning for pulmonary edema, which raise concerns for possible new CHF. Triggers could very well be NSTEMI discussed above. No si/sx to suggest arrhythmia or acute illness. Does not have known pulmonary conditions that would be concerning for COPD/asthma exacerbation. Shortness of breath improved after Lasix and was weaned to room air, satting comfortably in the low 90s. Has produced about 2 L of urine since admission.  - s/p lasix 60mg IV once in ED  - Continue PTA metoprolol 12.5 mg twice daily  - Holding PTA Zestoretic 1 tablet daily  -Holding lasix due to HAI, will assess diuretic needs at discharge however has remained euvolemic  - ECHO completed  - Strict I/O  - Daily weights  - Cardiac tele  - Cards consult per above   - Fluid restriction     Goals of care  Patient reports that he is eager to return to his skilled nursing facility as soon as possible. What is important to him is being with his wife, being able to enjoy good meals, conversations, and daily activities with his new neighbors. He recently moved to [place] in [place] and really likes it there. He does desire full cares, however, and wants to be in his best health to spend more time with his community. Had conversation with pt and wife about DNR/DNI, Wife states that they discussed this at [facility] and has said they would want Anthony to be DNR/DNI. Have had this conversation twice during this admission.  -Does have [employer] naming wife and son as his HCA's  -DNR/DNI status  -CM recs to home at independent living apartment  -Recommended TCU at discharge     Chronic conditions:  >Dementia - PTA donepazil, celexa, ritalin is still being held. Delirium precautions  placed.   >T2DM - Last A1c 6.2 in Mar 2023. Repeat A1c. LDSS.   >HTN - zestoretic per above - on hold   >HLD - PTA atorvastatin 40mg d      Delirium, improving  End stage dementia   Pt increasingly disoriented and agitated during the early morning of 2/20 and given Seroquel and Trazodone. Pt very lethargic in the AM and falling asleep mid conversation. Will avoid excess sedation by avoiding antipsychotics. Improving on trazodone.  -Delirium precautions  -Avoid antipsychotics for sedation  -Starting trazodone 25/50 mg at night               -Please start with 25 mg  -Frequent reorientation, blinds up in the day and closed at night     HAI  Patient has elevated Cr 1.17 --> 1.16 --> 1.27 --> 1.69. Pt is s/p once dose of 60 mg lasix and on fluid restriction. Possible that this is a prerenal HAI and pt is volume down. Given one dose of IV albumin. Continuing to hold ACE and loop diuretics. Continue ot monitor UOP and renal function closely, per cardiology.  Cr improved 1.69>1.57 2/22  -s/p 1x dose of 50 mg albumin   -Continue to monitor     Hypokalemia, improved  Pt has decreased potassium levels 4 --> 4.1 --> 3.2 --> 3.9. Possibly due to single dose of lasix during this admission. Potassium bolus administered. Improving.  -Hypokalemia replacement protocol  -s/p 1x 40 mEq potassium chloride on 2/20  -Follow potassium        Diet: Fluid restriction 1800 ML FLUID  Low Saturated Fat Na <2400 mg    DVT Prophylaxis: Heparin SQ  Castellano Catheter: Not present  Fluids: PO  Lines: None     Cardiac Monitoring: ACTIVE order. Indication: AMI (NSTEMI/ STEMI) (48 hours)  Code Status: No CPR- Do NOT Intubate      Clinically Significant Risk Factors                   # Hypertension: Noted on problem list  # Acute heart failure with reduced ejection fraction: last echo with EF <40% and receiving IV diuretics               # Financial/Environmental Concerns: none         Social Drivers of Health   Tobacco Use: Medium Risk (2/18/2025)     Patient History     Smoking Tobacco Use: Former     Smokeless Tobacco Use: Never     Passive Exposure: Never         Disposition Plan     Medically Ready for Discharge: Anticipated in 2-4 Days         The patient's care was discussed with the Attending Physician, Dr. Avila .    Henry Davis MD  Hospitalist Service  [facility]  Securely message with [employer] (more info)  Text page via [employer] Paging/Directory   ______________________________________________________________________    Interval History   NAEO, slept well through the night and was much more alert albeit forgetful during our conversation this morning. I explained that we are still keeping him admitted as we are monitoring his kidney function, heparin gtt was discontinued today. No plan for coronary intervention at this time. Continue to discuss disposition planning with CM.     Physical Exam   Vital Signs: Temp: 98.2  F (36.8  C) Temp src: Oral BP: (!) 149/69 Pulse: 67   Resp: 16 SpO2: 94 % O2 Device: None (Room air)    Weight: 131 lbs 6.31 oz    Constitutional: no apparent distress, oriented to place not time, and appears stated age  Eyes: Lids and lashes normal, pupils equal, extra ocular muscles intact, sclera clear, conjunctiva normal  Respiratory: No increased work of breathing, good air exchange, clear to auscultation bilaterally, no crackles or wheezing  Cardiovascular: Distant sounding/muted sounding heart rates, no murmur noted  GI: Normal bowel sounds, soft, non-distended, non-tender, no masses palpated, no hepatosplenomegally  Skin: No LE edema  Neuropsychiatric: Calm, oriented to place, and resting in bed        Data     I have personally reviewed the following data over the past 24 hrs:    7.8  \   9.6 (L)   / 175     141 102 47.3 (H) /  113 (H)   3.9 24 1.57 (H) \       Imaging results reviewed over the past 24 hrs:   No results found for this or any previous visit (from the past 24 hours).    Henry Davis,  MD  PGY-2  [facility]  [facility]  February 22, 2025

## 2025-02-23 LAB
ANION GAP SERPL CALCULATED.3IONS-SCNC: 9 MMOL/L (ref 7–15)
BUN SERPL-MCNC: 45.3 MG/DL (ref 8–23)
CALCIUM SERPL-MCNC: 9.2 MG/DL (ref 8.8–10.4)
CHLORIDE SERPL-SCNC: 104 MMOL/L (ref 98–107)
CREAT SERPL-MCNC: 1.56 MG/DL (ref 0.67–1.17)
EGFRCR SERPLBLD CKD-EPI 2021: 42 ML/MIN/1.73M2
ERYTHROCYTE [DISTWIDTH] IN BLOOD BY AUTOMATED COUNT: 14.3 % (ref 10–15)
GLUCOSE BLDC GLUCOMTR-MCNC: 103 MG/DL (ref 70–99)
GLUCOSE BLDC GLUCOMTR-MCNC: 127 MG/DL (ref 70–99)
GLUCOSE BLDC GLUCOMTR-MCNC: 145 MG/DL (ref 70–99)
GLUCOSE BLDC GLUCOMTR-MCNC: 189 MG/DL (ref 70–99)
GLUCOSE SERPL-MCNC: 129 MG/DL (ref 70–99)
HCO3 SERPL-SCNC: 27 MMOL/L (ref 22–29)
HCT VFR BLD AUTO: 30 % (ref 40–53)
HGB BLD-MCNC: 9.5 G/DL (ref 13.3–17.7)
MCH RBC QN AUTO: 29.4 PG (ref 26.5–33)
MCHC RBC AUTO-ENTMCNC: 31.7 G/DL (ref 31.5–36.5)
MCV RBC AUTO: 93 FL (ref 78–100)
PLATELET # BLD AUTO: 166 10E3/UL (ref 150–450)
POTASSIUM SERPL-SCNC: 4.1 MMOL/L (ref 3.4–5.3)
RBC # BLD AUTO: 3.23 10E6/UL (ref 4.4–5.9)
SODIUM SERPL-SCNC: 140 MMOL/L (ref 135–145)
WBC # BLD AUTO: 8.6 10E3/UL (ref 4–11)

## 2025-02-23 PROCEDURE — 250N000013 HC RX MED GY IP 250 OP 250 PS 637

## 2025-02-23 PROCEDURE — 80048 BASIC METABOLIC PNL TOTAL CA: CPT | Performed by: FAMILY MEDICINE

## 2025-02-23 PROCEDURE — 99232 SBSQ HOSP IP/OBS MODERATE 35: CPT | Mod: GC

## 2025-02-23 PROCEDURE — 82374 ASSAY BLOOD CARBON DIOXIDE: CPT | Performed by: FAMILY MEDICINE

## 2025-02-23 PROCEDURE — 36415 COLL VENOUS BLD VENIPUNCTURE: CPT | Performed by: FAMILY MEDICINE

## 2025-02-23 PROCEDURE — 250N000013 HC RX MED GY IP 250 OP 250 PS 637: Performed by: INTERNAL MEDICINE

## 2025-02-23 PROCEDURE — 120N000004 HC R&B MS OVERFLOW

## 2025-02-23 PROCEDURE — 250N000011 HC RX IP 250 OP 636

## 2025-02-23 PROCEDURE — 99232 SBSQ HOSP IP/OBS MODERATE 35: CPT | Performed by: INTERNAL MEDICINE

## 2025-02-23 PROCEDURE — 85027 COMPLETE CBC AUTOMATED: CPT | Performed by: FAMILY MEDICINE

## 2025-02-23 PROCEDURE — 250N000013 HC RX MED GY IP 250 OP 250 PS 637: Performed by: FAMILY MEDICINE

## 2025-02-23 RX ORDER — LISINOPRIL 2.5 MG/1
2.5 TABLET ORAL ONCE
Status: COMPLETED | OUTPATIENT
Start: 2025-02-23 | End: 2025-02-23

## 2025-02-23 RX ORDER — LOPERAMIDE HYDROCHLORIDE 2 MG/1
2 CAPSULE ORAL 4 TIMES DAILY PRN
Status: DISCONTINUED | OUTPATIENT
Start: 2025-02-23 | End: 2025-02-27 | Stop reason: HOSPADM

## 2025-02-23 RX ORDER — LISINOPRIL 5 MG/1
5 TABLET ORAL DAILY
Status: DISCONTINUED | OUTPATIENT
Start: 2025-02-24 | End: 2025-02-27 | Stop reason: HOSPADM

## 2025-02-23 RX ORDER — LACTOBACILLUS RHAMNOSUS GG 10B CELL
1 CAPSULE ORAL 2 TIMES DAILY
Status: DISCONTINUED | OUTPATIENT
Start: 2025-02-23 | End: 2025-02-27 | Stop reason: HOSPADM

## 2025-02-23 RX ADMIN — ACETAMINOPHEN 650 MG: 325 TABLET ORAL at 18:14

## 2025-02-23 RX ADMIN — Medication 1 CAPSULE: at 11:36

## 2025-02-23 RX ADMIN — ASPIRIN 81 MG: 81 TABLET, COATED ORAL at 08:10

## 2025-02-23 RX ADMIN — ENOXAPARIN SODIUM 30 MG: 30 INJECTION SUBCUTANEOUS at 20:28

## 2025-02-23 RX ADMIN — DONEPEZIL HYDROCHLORIDE 10 MG: 5 TABLET ORAL at 20:28

## 2025-02-23 RX ADMIN — LOPERAMIDE HYDROCHLORIDE 2 MG: 2 CAPSULE ORAL at 11:36

## 2025-02-23 RX ADMIN — SPIRONOLACTONE 25 MG: 25 TABLET, FILM COATED ORAL at 08:09

## 2025-02-23 RX ADMIN — METOPROLOL TARTRATE 12.5 MG: 25 TABLET, FILM COATED ORAL at 08:10

## 2025-02-23 RX ADMIN — Medication 1 CAPSULE: at 20:29

## 2025-02-23 RX ADMIN — ATORVASTATIN CALCIUM 40 MG: 40 TABLET, FILM COATED ORAL at 08:09

## 2025-02-23 RX ADMIN — LISINOPRIL 2.5 MG: 2.5 TABLET ORAL at 08:09

## 2025-02-23 RX ADMIN — TRAZODONE HYDROCHLORIDE 50 MG: 50 TABLET ORAL at 20:28

## 2025-02-23 RX ADMIN — LISINOPRIL 2.5 MG: 2.5 TABLET ORAL at 11:36

## 2025-02-23 RX ADMIN — CITALOPRAM HYDROBROMIDE 20 MG: 20 TABLET ORAL at 08:09

## 2025-02-23 RX ADMIN — METOPROLOL TARTRATE 12.5 MG: 25 TABLET, FILM COATED ORAL at 20:28

## 2025-02-23 ASSESSMENT — ACTIVITIES OF DAILY LIVING (ADL)
ADLS_ACUITY_SCORE: 67
ADLS_ACUITY_SCORE: 69
ADLS_ACUITY_SCORE: 67
ADLS_ACUITY_SCORE: 69
ADLS_ACUITY_SCORE: 67
ADLS_ACUITY_SCORE: 69
ADLS_ACUITY_SCORE: 68
ADLS_ACUITY_SCORE: 69
ADLS_ACUITY_SCORE: 69
ADLS_ACUITY_SCORE: 68
ADLS_ACUITY_SCORE: 67
ADLS_ACUITY_SCORE: 68
ADLS_ACUITY_SCORE: 67
ADLS_ACUITY_SCORE: 67
ADLS_ACUITY_SCORE: 69
ADLS_ACUITY_SCORE: 67
ADLS_ACUITY_SCORE: 69
ADLS_ACUITY_SCORE: 67
ADLS_ACUITY_SCORE: 69

## 2025-02-23 NOTE — PROGRESS NOTES
Care Management Follow Up    Length of Stay (days): 4    Expected Discharge Date: 02/23/2025    Anticipated Discharge Plan:  Transitional care  Transportation: Confirmed medical transport    PT Recommendations: Transitional Care Facility  OT Recommendations:  (S) Transitional Care Facility     Barriers to Discharge: placement    Prior Living Situation: apartment, independent living facility ([facility]) with spouse    Discussed  Partnership in Safe Discharge Planning  document with patient/family: Yes:      Handoff Completed: No, handoff not indicated or clinically appropriate    Patient/Spokesperson Updated: Yes. Who? Updated patient's wife at bedside     Additional Information:  SW met with patient's wife at patient's bedside. Patient and wife now would like transitional care at discharge. Pt and wife live in independent living at [facility] (part of [facility]).   TCU choice list provided, patient's wife would like a facility close to [facility]. Referral sent to [facility] (private room fee discussed), [facility] and [facility].   Transportation was discussed with wife, she would like a medical wheelchair ride at discharge due to patient's mobility. She states understanding of the costs associated with the ride. Wife reports they have 4 sons who are all now in town and supportive.     Next Steps: TCU placement    Jennifer Gonzalez, LICSW

## 2025-02-23 NOTE — PROGRESS NOTES
Cardiology Progress Note    Assessment/Plan:    Non-ST elevation myocardial infarction in 89-year-old gentleman with prior revascularizations.  Declines repeat angiography at this time with significant dementia.  Plan continued medical management.  Discontinue telemetry.  Would not resume methylphenidate  Ischemic cardiomyopathy.  Hypertensive.  Tolerating low-dose lisinopril, along with spironolactone.  Increasing lisinopril today.  Continue to monitor renal function, potassium    Stable from cardiology standpoint.  Please call if we can be of further assistance    Active Problems:    NSTEMI (non-ST elevated myocardial infarction) (H)    Acute decompensated heart failure (H)     LOS: 3 days     Subjective:  Eating better today.  Denies shortness of breath or chest pain.  Still weak when standing, but motivated to continue his efforts.      Objective:   Vital signs in last 24 hours:  Vitals:    02/22/25 2127 02/22/25 2356 02/23/25 0515 02/23/25 0715   BP: (!) 153/69 135/63  (!) 148/50   BP Location: Right arm Right arm  Right arm   Pulse:  65  54   Resp:  18  20   Temp:  99.9  F (37.7  C)  98.5  F (36.9  C)   TempSrc:  Oral  Oral   SpO2:  93%  (!) 91%   Weight:   60 kg (132 lb 4.4 oz)    Height:         Weight:   Wt Readings from Last 3 Encounters:   02/23/25 60 kg (132 lb 4.4 oz)   01/08/25 64.9 kg (143 lb)   11/20/24 65.3 kg (144 lb)           PHYSICAL EXAM      Respiratory: Decreased breath sounds left base, otherwise clear.  Cardiovascular:   Normal heart rate, Normal rhythm, No murmurs, No rubs, No gallops.   GI:  Bowel sounds normal, Soft, No tenderness, No masses  Extremities: no edema       Cardiographics:   Telemetry off    Echocardiogram 2/19/2025:  1. The left ventricle is mildly dilated with normal left ventricular wall  thickness.  - Left ventricular function is decreased. The ejection fraction is 20-25%  (severely reduced).  - There is severe global hypokinesia of the left ventricle.  2. The right  ventricle is normal size with mildly decreased right ventricular  systolic function  3. No hemodynamically significant valvular abnormalities on 2D or color flow  imaging.  4. There is no comparison study available.      Lab Results:   Lab Results   Component Value Date    WBC 8.6 02/23/2025    HGB 9.5 (L) 02/23/2025    HCT 30.0 (L) 02/23/2025     02/23/2025    CHOL 124 11/30/2021    TRIG 181 (H) 11/30/2021    HDL 41 11/30/2021    ALT 10 02/18/2025    AST 16 02/18/2025     02/23/2025    BUN 45.3 (H) 02/23/2025    CO2 27 02/23/2025    TSH 1.45 03/21/2023    INR 1.17 (H) 10/30/2019     Lab Results   Component Value Date    TROPONINI 0.03 10/27/2019           Jayjay Cruz MD [facility]  2/22/2025

## 2025-02-23 NOTE — PLAN OF CARE
Problem: Adult Inpatient Plan of Care  Goal: Absence of Hospital-Acquired Illness or Injury  Intervention: Identify and Manage Fall Risk  Goal: Optimal Comfort and Wellbeing  Intervention: Provide Person-Centered Care  Recent Flowsheet Documentation  Taken 2/23/2025 0203 by Deja Salazar RN  Trust Relationship/Rapport:   care explained   choices provided   reassurance provided  Taken 2/22/2025 1936 by Deja Salazar RN  Trust Relationship/Rapport:   care explained   choices provided   reassurance provided     Goal: Effective Oxygenation and Ventilation  Intervention: Promote Airway Secretion Clearance  Recent Flowsheet Documentation  Taken 2/23/2025 0203 by Deja Salazar RN  Activity Management: activity adjusted per tolerance  Taken 2/22/2025 1936 by Deja Salazar RN  Activity Management: activity adjusted per tolerance  Intervention: Optimize Oxygenation and Ventilation  Recent Flowsheet Documentation  Taken 2/23/2025 0203 by Deja Salazar RN  Head of Bed (HOB) Positioning: HOB at 20-30 degrees  Taken 2/22/2025 1936 by Deja Salazar RN  Head of Bed (HOB) Positioning: HOB at 20-30 degrees  Goal: Effective Breathing Pattern During Sleep  Intervention: Monitor and Manage Obstructive Sleep Apnea  Recent Flowsheet Documentation  Taken 2/23/2025 0203 by Deja Salazar RN  Medication Review/Management: medications reviewed  Taken 2/22/2025 1936 by Deja Salazar RN  Medication Review/Management: medications reviewed    Problem: Dementia Signs/Symptoms  Goal: Enhanced Social or Functional Skills and Ability (Dementia Signs/Symptoms)  Intervention: Promote Social and Functional Ability  Recent Flowsheet Documentation  Taken 2/23/2025 0203 by Deja Salazar RN  Trust Relationship/Rapport:   care explained   choices provided   reassurance provided  Taken 2/22/2025 1936 by Deja Salazar RN  Trust Relationship/Rapport:   care explained   choices provided   reassurance  provided  Problem: Comorbidity Management  Goal: Maintenance of Behavioral Health Symptom Control  Intervention: Maintain Behavioral Health Symptom Control  Recent Flowsheet Documentation  Taken 2/23/2025 0203 by Deja Salazar RN  Medication Review/Management: medications reviewed  Taken 2/22/2025 1936 by Deja Salazar RN  Medication Review/Management: medications reviewed  Goal: Blood Glucose Levels Within Targeted Range  Intervention: Monitor and Manage Glycemia  Recent Flowsheet Documentation  Taken 2/23/2025 0203 by Deja Salazar RN  Medication Review/Management: medications reviewed  Taken 2/22/2025 1936 by Deja Salazar RN  Medication Review/Management: medications reviewed  Goal: Blood Pressure in Desired Range  Intervention: Maintain Blood Pressure Management  Recent Flowsheet Documentation  Taken 2/23/2025 0203 by Deja Salazar RN  Medication Review/Management: medications reviewed  Taken 2/22/2025 1936 by Deja Salazar RN  Medication Review/Management: medications reviewed     Problem: Dementia Signs/Symptoms  Goal: Enhanced Social or Functional Skills and Ability (Dementia Signs/Symptoms)  Intervention: Promote Social and Functional Ability  Recent Flowsheet Documentation  Taken 2/23/2025 0203 by Deja Salazar RN  Trust Relationship/Rapport:   care explained   choices provided   reassurance provided  Taken 2/22/2025 1936 by Deja Salazar RN  Trust Relationship/Rapport:   care explained   choices provided   reassurance provided    Goal Outcome Evaluation:  Pt off 1:1, had an uneventful night. Slept in bed the entire night.   Pt confused but easily redirectable, pleasant & cooperative w/ cares.   /78, scheduled metoprolol given, effective. VSS, primo fit intact and suctioning well.   Bed alarm on for safety.

## 2025-02-23 NOTE — PLAN OF CARE
Problem: Adult Inpatient Plan of Care  Goal: Absence of Hospital-Acquired Illness or Injury  Intervention: Identify and Manage Fall Risk  Recent Flowsheet Documentation  Taken 2/23/2025 1159 by Martita Raymond RN  Safety Promotion/Fall Prevention:   activity supervised   clutter free environment maintained   mobility aid in reach   safety round/check completed   nonskid shoes/slippers when out of bed   room near nurse's station   patient and family education   bedside attendant  Taken 2/23/2025 0817 by Martita Raymond RN  Safety Promotion/Fall Prevention:   activity supervised   clutter free environment maintained   mobility aid in reach   safety round/check completed   nonskid shoes/slippers when out of bed   room near nurse's station   patient and family education   bedside attendant   Goal Outcome Evaluation:         Assist of 2 to stand and then able to walk, does have unsteady gait and had to sit and rest while walking outside of room

## 2025-02-24 ENCOUNTER — TELEPHONE (OUTPATIENT)
Dept: CARDIOLOGY | Facility: CLINIC | Age: OVER 89
End: 2025-02-24

## 2025-02-24 ENCOUNTER — APPOINTMENT (OUTPATIENT)
Dept: OCCUPATIONAL THERAPY | Facility: HOSPITAL | Age: OVER 89
DRG: 280 | End: 2025-02-24
Payer: COMMERCIAL

## 2025-02-24 DIAGNOSIS — I50.9 ACUTE DECOMPENSATED HEART FAILURE (H): Primary | ICD-10-CM

## 2025-02-24 LAB
ANION GAP SERPL CALCULATED.3IONS-SCNC: 12 MMOL/L (ref 7–15)
BUN SERPL-MCNC: 55.5 MG/DL (ref 8–23)
CALCIUM SERPL-MCNC: 9.1 MG/DL (ref 8.8–10.4)
CHLORIDE SERPL-SCNC: 102 MMOL/L (ref 98–107)
CREAT SERPL-MCNC: 1.85 MG/DL (ref 0.67–1.17)
EGFRCR SERPLBLD CKD-EPI 2021: 34 ML/MIN/1.73M2
ERYTHROCYTE [DISTWIDTH] IN BLOOD BY AUTOMATED COUNT: 14.3 % (ref 10–15)
GLUCOSE BLDC GLUCOMTR-MCNC: 106 MG/DL (ref 70–99)
GLUCOSE BLDC GLUCOMTR-MCNC: 119 MG/DL (ref 70–99)
GLUCOSE BLDC GLUCOMTR-MCNC: 120 MG/DL (ref 70–99)
GLUCOSE BLDC GLUCOMTR-MCNC: 153 MG/DL (ref 70–99)
GLUCOSE SERPL-MCNC: 117 MG/DL (ref 70–99)
HCO3 SERPL-SCNC: 25 MMOL/L (ref 22–29)
HCT VFR BLD AUTO: 28.3 % (ref 40–53)
HGB BLD-MCNC: 8.7 G/DL (ref 13.3–17.7)
MCH RBC QN AUTO: 28.8 PG (ref 26.5–33)
MCHC RBC AUTO-ENTMCNC: 30.7 G/DL (ref 31.5–36.5)
MCV RBC AUTO: 94 FL (ref 78–100)
PLATELET # BLD AUTO: 170 10E3/UL (ref 150–450)
POTASSIUM SERPL-SCNC: 3.8 MMOL/L (ref 3.4–5.3)
RBC # BLD AUTO: 3.02 10E6/UL (ref 4.4–5.9)
SODIUM SERPL-SCNC: 139 MMOL/L (ref 135–145)
WBC # BLD AUTO: 8.1 10E3/UL (ref 4–11)

## 2025-02-24 PROCEDURE — 250N000011 HC RX IP 250 OP 636

## 2025-02-24 PROCEDURE — 82374 ASSAY BLOOD CARBON DIOXIDE: CPT | Performed by: FAMILY MEDICINE

## 2025-02-24 PROCEDURE — 85014 HEMATOCRIT: CPT | Performed by: FAMILY MEDICINE

## 2025-02-24 PROCEDURE — P9047 ALBUMIN (HUMAN), 25%, 50ML: HCPCS

## 2025-02-24 PROCEDURE — 250N000013 HC RX MED GY IP 250 OP 250 PS 637: Performed by: INTERNAL MEDICINE

## 2025-02-24 PROCEDURE — 99232 SBSQ HOSP IP/OBS MODERATE 35: CPT | Mod: GC

## 2025-02-24 PROCEDURE — 36415 COLL VENOUS BLD VENIPUNCTURE: CPT | Performed by: FAMILY MEDICINE

## 2025-02-24 PROCEDURE — 250N000013 HC RX MED GY IP 250 OP 250 PS 637

## 2025-02-24 PROCEDURE — 97535 SELF CARE MNGMENT TRAINING: CPT | Mod: GO

## 2025-02-24 PROCEDURE — 250N000013 HC RX MED GY IP 250 OP 250 PS 637: Performed by: FAMILY MEDICINE

## 2025-02-24 PROCEDURE — 120N000004 HC R&B MS OVERFLOW

## 2025-02-24 PROCEDURE — 80048 BASIC METABOLIC PNL TOTAL CA: CPT | Performed by: FAMILY MEDICINE

## 2025-02-24 PROCEDURE — 85048 AUTOMATED LEUKOCYTE COUNT: CPT | Performed by: FAMILY MEDICINE

## 2025-02-24 RX ORDER — ALBUMIN (HUMAN) 12.5 G/50ML
50 SOLUTION INTRAVENOUS ONCE
Status: COMPLETED | OUTPATIENT
Start: 2025-02-24 | End: 2025-02-24

## 2025-02-24 RX ADMIN — ACETAMINOPHEN 650 MG: 325 TABLET ORAL at 08:22

## 2025-02-24 RX ADMIN — ALBUMIN HUMAN 50 G: 0.25 SOLUTION INTRAVENOUS at 16:14

## 2025-02-24 RX ADMIN — ASPIRIN 81 MG: 81 TABLET, COATED ORAL at 08:17

## 2025-02-24 RX ADMIN — ATORVASTATIN CALCIUM 40 MG: 40 TABLET, FILM COATED ORAL at 08:17

## 2025-02-24 RX ADMIN — CITALOPRAM HYDROBROMIDE 20 MG: 20 TABLET ORAL at 08:17

## 2025-02-24 RX ADMIN — METOPROLOL TARTRATE 12.5 MG: 25 TABLET, FILM COATED ORAL at 20:13

## 2025-02-24 RX ADMIN — DONEPEZIL HYDROCHLORIDE 10 MG: 5 TABLET ORAL at 21:26

## 2025-02-24 RX ADMIN — SPIRONOLACTONE 25 MG: 25 TABLET, FILM COATED ORAL at 08:16

## 2025-02-24 RX ADMIN — LISINOPRIL 5 MG: 5 TABLET ORAL at 08:17

## 2025-02-24 RX ADMIN — ENOXAPARIN SODIUM 30 MG: 30 INJECTION SUBCUTANEOUS at 21:32

## 2025-02-24 RX ADMIN — METOPROLOL TARTRATE 12.5 MG: 25 TABLET, FILM COATED ORAL at 08:16

## 2025-02-24 RX ADMIN — TRAZODONE HYDROCHLORIDE 50 MG: 50 TABLET ORAL at 21:28

## 2025-02-24 RX ADMIN — Medication 1 CAPSULE: at 21:28

## 2025-02-24 RX ADMIN — Medication 1 CAPSULE: at 08:17

## 2025-02-24 ASSESSMENT — ACTIVITIES OF DAILY LIVING (ADL)
ADLS_ACUITY_SCORE: 69
ADLS_ACUITY_SCORE: 69
ADLS_ACUITY_SCORE: 64
ADLS_ACUITY_SCORE: 69
ADLS_ACUITY_SCORE: 69
ADLS_ACUITY_SCORE: 64
ADLS_ACUITY_SCORE: 69
ADLS_ACUITY_SCORE: 69
ADLS_ACUITY_SCORE: 64
ADLS_ACUITY_SCORE: 69
ADLS_ACUITY_SCORE: 69
ADLS_ACUITY_SCORE: 64
ADLS_ACUITY_SCORE: 64
ADLS_ACUITY_SCORE: 69
ADLS_ACUITY_SCORE: 64
ADLS_ACUITY_SCORE: 69
ADLS_ACUITY_SCORE: 64
ADLS_ACUITY_SCORE: 69
ADLS_ACUITY_SCORE: 64

## 2025-02-24 NOTE — PROGRESS NOTES
[facility]    Progress Note - Hospitalist Service       Date of Admission:  2/18/2025    Assessment & Plan   Zack Johns is a 89 year old male with PMHx of CAD s/p stent (2018), PAD, T2DM, HTN, HLD admitted on 2/18/2025 for NSTEMI and acute hypoxic respiratory failure, likely secondary to new CHF. Declined coronary angiogram +/- stent, DNR/DNI. Recommended TCU at discharge, hoping to have placement available soon, referrals pending. Continue medical management per cardiology in setting of evolving HAI.     Updates 2/24:  -Continue to work on discharge, referrals pending planning/recommended TCU at discharge  -Lisinopril 5 mg daily, cont. Metoprolol 12.5 mg BID  -spironolactone 25 mg daily   -HAI worsening, Cr. 1.5>1.85 2/24  -Cont. imodium, probiotic 2/23 for loose stools, C Diff negative        NSTEMI  CAD s/p RCA stent (2018)  PAD s/p BL iliac stents (2018)  Initial tropes trended from 43 > 103. EKG showed sinus bradycardia w/PVC, without acute ischemic changes when compared to previous in 2022.  Heparin gtt started and slowly discontinued 2/19. Patient cody c/p or palpitations, and has been borderline bradycardic during this admission. Of note, appears that they tried to give him nitroglycerin at his long-term which did not improve symptoms. Cardiology consulted with ECHO completed 2/19 with EF 20-25% with global hypokinesis of LV. Discontinuing angiogram and will continue atorvastatin, lisinopril (holding 2/20), metoprolol, ASA 81 mg with outpatient management.  At this point unlikely to pursue angiogram and stenting and with worsening renal function would be delayed. Favor medical management  -Cards consult, recs appreciated               -Fluid restriction              -Resume cardiac diet              -ECHO completed 2/19, as above              -Per cardiology, cancel angiogram  -Lovenox VTE ppx  -PTA ASA 81mg      Acute hypoxic respiratory failure, improving  Concerns for new  CHF- EF 20-25%  Patient presents via EMS from [facility] for hypoxia down to SpO2 70s after dinner. He improved to the 90s with 6L NC O2 en route. Initial workup notable for BNP 2740 and CXR notable for increased interstitial markings concerning for pulmonary edema, which raise concerns for possible new CHF. Triggers could very well be NSTEMI discussed above. No si/sx to suggest arrhythmia or acute illness. Does not have known pulmonary conditions that would be concerning for COPD/asthma exacerbation. Shortness of breath improved after Lasix and was weaned to room air, satting comfortably in the low 90s. Has produced about 2 L of urine since admission.  - s/p lasix 60mg IV once in ED  - Continue PTA metoprolol 12.5 mg twice daily  - Lisinopril 5 mg daily  - Spironolactone 25 mg daily  -Holding lasix due to HAI, will assess diuretic needs at discharge however has remained euvolemic  - ECHO completed  - Strict I/O  - Daily weights  - Cardiac tele  - Cards consult per above   - Fluid restriction     Goals of care  Patient reports that he is eager to [facility] as soon as possible. What is important to him is being with his wife, being able to enjoy good meals, conversations, and daily activities with his new neighbors. He recently moved to [place] in [place] and really likes it there. He does desire full cares, however, and wants to be in his best health to spend more time with his community. Had conversation with pt and wife about DNR/DNI, Wife states that they discussed this at [facility] and has said they would want Anthony to be DNR/DNI. Have had this conversation twice during this admission.  -Does have [employer] naming wife and son as his HCA's  -DNR/DNI status  -CM recs to home at independent living apartment  -Recommended TCU at discharge     Chronic conditions:  >Dementia - PTA donepazil, celexa, ritalin is still being held. Delirium precautions  placed.   >T2DM - Last A1c 6.2 in Mar 2023. Repeat A1c. LDSS.   >HTN - zestoretic per above - on hold   >HLD - PTA atorvastatin 40mg d      Delirium, improving  End stage dementia   Pt increasingly disoriented and agitated during the early morning of 2/20 and given Seroquel and Trazodone. Pt very lethargic in the AM and falling asleep mid conversation. Will avoid excess sedation by avoiding antipsychotics. Improving on trazodone.  -Delirium precautions  -Avoid antipsychotics for sedation  -Starting trazodone 25/50 mg at night               -Please start with 25 mg  -Frequent reorientation, blinds up in the day and closed at night     HAI  Patient has elevated Cr 1.17 --> 1.16 --> 1.27 --> 1.69. Pt is s/p once dose of 60 mg lasix and on fluid restriction. Possible that this is a prerenal HAI and pt is volume down. Given one dose of IV albumin. Continuing to hold ACE and loop diuretics. Continue ot monitor UOP and renal function closely, per cardiology.  Cr improved 1.69>1.57>1.56>1.85 2/24 after resuming ACEI and starting spironolactone 2/23  -s/p 1x dose of 50 mg albumin   -Defer further fluid management to cardiology  -Continue to monitor     Hypokalemia, improved  Pt has decreased potassium levels 4 --> 4.1 --> 3.2 --> 3.9. Possibly due to single dose of lasix during this admission. Potassium bolus administered. Improving.  -Hypokalemia replacement protocol  -s/p 1x 40 mEq potassium chloride on 2/20  -Follow potassium           Diet: Fluid restriction 1800 ML FLUID  Low Saturated Fat Na <2400 mg    DVT Prophylaxis: Enoxaparin (Lovenox) SQ  Castellano Catheter: Not present  Fluids: PO  Lines: None     Cardiac Monitoring: None  Code Status: No CPR- Do NOT Intubate      Clinically Significant Risk Factors                  # Acute Kidney Injury, unspecified: based on a >150% or 0.3 mg/dL increase in last creatinine compared to past 90 day average, will monitor renal function  # Hypertension: Noted on problem list  #  Chronic heart failure with reduced ejection fraction: last echo with EF <40%               # Financial/Environmental Concerns: none         Social Drivers of Health   Tobacco Use: Medium Risk (2/18/2025)    Patient History     Smoking Tobacco Use: Former     Smokeless Tobacco Use: Never     Passive Exposure: Never         Disposition Plan     Medically Ready for Discharge: Anticipated Tomorrow         The patient's care was discussed with the Attending Physician, Dr. Lee .    Henry Davis MD  Hospitalist Service  [facility]  Securely message with [employer] (more info)  Text page via [employer] Paging/Directory   ______________________________________________________________________    Interval History   NAEO. Anthony and wife, Roxie, at bedside and he was very pleasant in conversation this AM. Anxious to get out of hospital, I explained that as we are adjusting his heart medications it has put some stress on his kidneys which we are monitoring with cardiology. Continuing to pursue TCU placement, referrals pending.     Physical Exam   Vital Signs: Temp: 98.8  F (37.1  C) Temp src: Oral BP: 124/60 Pulse: 73   Resp: 20 SpO2: (!) 90 % O2 Device: None (Room air)    Weight: 130 lbs 15.25 oz    Constitutional: no apparent distress, oriented to place not time, and appears stated age  Eyes: Lids and lashes normal, pupils equal, extra ocular muscles intact, sclera clear, conjunctiva normal  Respiratory: No increased work of breathing, good air exchange, clear to auscultation bilaterally, no crackles or wheezing  Cardiovascular: Distant sounding/muted sounding heart rates, no murmur noted  GI: Normal bowel sounds, soft, non-distended, non-tender, no masses palpated, no hepatosplenomegally  Skin: No LE edema  Neuropsychiatric: Calm, oriented to place, and resting in bed        Data     I have personally reviewed the following data over the past 24 hrs:    8.1  \   8.7 (L)   / 170     139 102 55.5 (H) /   120 (H)   3.8 25 1.85 (H) \       Imaging results reviewed over the past 24 hrs:   No results found for this or any previous visit (from the past 24 hours).    Henry Davis MD  PGY-2  [facility]  [facility]  February 24, 2025

## 2025-02-24 NOTE — PLAN OF CARE
Problem: Dementia Signs/Symptoms  Goal: Improved Behavioral Control (Dementia Signs/Symptoms)  Outcome: Progressing   Goal Outcome Evaluation:    Calm and pleasant. No negative behaviors. Took his pills whole with water. Fell asleep around 2030.

## 2025-02-24 NOTE — PROGRESS NOTES
Care Management Follow Up    Length of Stay (days): 5    Expected Discharge Date: 02/25/2025     Concerns to be Addressed: discharge planning     Patient plan of care discussed at interdisciplinary rounds: Yes    Anticipated Discharge Disposition: Transitional Care              Anticipated Discharge Services: None  Anticipated Discharge DME: None    Patient/family educated on Medicare website which has current facility and service quality ratings: yes  Education Provided on the Discharge Plan: Yes  Patient/Family in Agreement with the Plan: yes    Referrals Placed by CM/SW: Post Acute Facilities  Private pay costs discussed: transportation costs    Discussed  Partnership in Safe Discharge Planning  document with patient/family: No     Handoff Completed: Yes, [facility] PCP: Internal handoff referral completed    Additional Information:  [facility] is working on TCU placement for discharge. Referrals are pending at this time.    12:47 PM  [facility] can accept Pt and offer a TCU bed for tomorrow afternoon. [facility] also accepted Pt's referral for admission tomorrow. FREDERICK met with Pt and his wife Sarika to discuss discharge placement. Sarika wanted to accept the bed at [facility] and was aware that CM will plan for a discharge tomorrow afternoon.     [facility] wheelchair transport pick-up window: [phone]. Facility updated regarding transportation timing. CM colleague assisted with completing the PAS.      Next Steps: CM to confirm final plans for Pt's discharge to [facility] tomorrow.       JENIFFER Waite

## 2025-02-24 NOTE — PROGRESS NOTES
Brief Progress Note:    Discussed case with Dr. Taylor, cardiology, in setting of patient's evolving HAI (Cr 1.5>1.85) after starting spironolactone and increasing lisinopril to 5 mg 2/23.    Recommended to hold lisinopril and spironolactone and repeat IV albumin bolus. Plan to recheck BMP AM.    Henry Davis MD  PGY-2  [facility]  [facility]  February 24, 2025

## 2025-02-24 NOTE — PLAN OF CARE
Problem: Dementia Signs/Symptoms  Goal: Improved Sleep (Dementia Signs/Symptoms)  Outcome: Progressing     Problem: Heart Failure  Goal: Effective Oxygenation and Ventilation  Outcome: Progressing  Goal: Effective Breathing Pattern During Sleep  Outcome: Progressing  Intervention: Monitor and Manage Obstructive Sleep Apnea  Recent Flowsheet Documentation  Taken 2/24/2025 0018 by Albania Verde RN  Medication Review/Management: medications reviewed   Goal Outcome Evaluation:         Vitals:    02/23/25 0715 02/23/25 1900 02/23/25 2214 02/24/25 0018   BP: (!) 148/50 126/58  128/57   BP Location: Right arm Right arm  Right arm   Pulse: 54 60  57   Resp: 20 20  19   Temp: 98.5  F (36.9  C) 99.4  F (37.4  C)  98.8  F (37.1  C)   TempSrc: Oral Axillary  Oral   SpO2: (!) 91% (!) 90% 92% (!) 91%   Weight:       Height:              Patient denied pain when asked and was able to sleep for majority of the night. Temperature was better this shift. Potential to go to TCU depending on placement. Staff attempted to check his brief, no output overnight patient refused staff checking his brief for dryness and refused to get up for his weight he reports feeling very tired.

## 2025-02-25 ENCOUNTER — TELEPHONE (OUTPATIENT)
Dept: FAMILY MEDICINE | Facility: CLINIC | Age: OVER 89
End: 2025-02-25

## 2025-02-25 ENCOUNTER — APPOINTMENT (OUTPATIENT)
Dept: PHYSICAL THERAPY | Facility: HOSPITAL | Age: OVER 89
DRG: 280 | End: 2025-02-25
Payer: COMMERCIAL

## 2025-02-25 LAB
ANION GAP SERPL CALCULATED.3IONS-SCNC: 14 MMOL/L (ref 7–15)
BUN SERPL-MCNC: 65.8 MG/DL (ref 8–23)
CALCIUM SERPL-MCNC: 9.3 MG/DL (ref 8.8–10.4)
CHLORIDE SERPL-SCNC: 103 MMOL/L (ref 98–107)
CREAT SERPL-MCNC: 1.81 MG/DL (ref 0.67–1.17)
EGFRCR SERPLBLD CKD-EPI 2021: 35 ML/MIN/1.73M2
ERYTHROCYTE [DISTWIDTH] IN BLOOD BY AUTOMATED COUNT: 14.4 % (ref 10–15)
GLUCOSE BLDC GLUCOMTR-MCNC: 107 MG/DL (ref 70–99)
GLUCOSE BLDC GLUCOMTR-MCNC: 110 MG/DL (ref 70–99)
GLUCOSE BLDC GLUCOMTR-MCNC: 119 MG/DL (ref 70–99)
GLUCOSE BLDC GLUCOMTR-MCNC: 131 MG/DL (ref 70–99)
GLUCOSE SERPL-MCNC: 104 MG/DL (ref 70–99)
HCO3 SERPL-SCNC: 24 MMOL/L (ref 22–29)
HCT VFR BLD AUTO: 27.1 % (ref 40–53)
HGB BLD-MCNC: 8.4 G/DL (ref 13.3–17.7)
MCH RBC QN AUTO: 29.2 PG (ref 26.5–33)
MCHC RBC AUTO-ENTMCNC: 31 G/DL (ref 31.5–36.5)
MCV RBC AUTO: 94 FL (ref 78–100)
PLATELET # BLD AUTO: 169 10E3/UL (ref 150–450)
POTASSIUM SERPL-SCNC: 3.8 MMOL/L (ref 3.4–5.3)
RBC # BLD AUTO: 2.88 10E6/UL (ref 4.4–5.9)
SODIUM SERPL-SCNC: 141 MMOL/L (ref 135–145)
WBC # BLD AUTO: 6.5 10E3/UL (ref 4–11)

## 2025-02-25 PROCEDURE — 97110 THERAPEUTIC EXERCISES: CPT | Mod: GP

## 2025-02-25 PROCEDURE — 250N000013 HC RX MED GY IP 250 OP 250 PS 637: Performed by: FAMILY MEDICINE

## 2025-02-25 PROCEDURE — 250N000013 HC RX MED GY IP 250 OP 250 PS 637

## 2025-02-25 PROCEDURE — 82374 ASSAY BLOOD CARBON DIOXIDE: CPT | Performed by: FAMILY MEDICINE

## 2025-02-25 PROCEDURE — 80048 BASIC METABOLIC PNL TOTAL CA: CPT | Performed by: FAMILY MEDICINE

## 2025-02-25 PROCEDURE — 85014 HEMATOCRIT: CPT | Performed by: FAMILY MEDICINE

## 2025-02-25 PROCEDURE — 99232 SBSQ HOSP IP/OBS MODERATE 35: CPT | Mod: GC

## 2025-02-25 PROCEDURE — 97530 THERAPEUTIC ACTIVITIES: CPT | Mod: GP

## 2025-02-25 PROCEDURE — 250N000011 HC RX IP 250 OP 636

## 2025-02-25 PROCEDURE — 120N000001 HC R&B MED SURG/OB

## 2025-02-25 PROCEDURE — 36415 COLL VENOUS BLD VENIPUNCTURE: CPT | Performed by: FAMILY MEDICINE

## 2025-02-25 PROCEDURE — 85048 AUTOMATED LEUKOCYTE COUNT: CPT | Performed by: FAMILY MEDICINE

## 2025-02-25 RX ADMIN — ACETAMINOPHEN 650 MG: 325 TABLET ORAL at 17:34

## 2025-02-25 RX ADMIN — METOPROLOL TARTRATE 12.5 MG: 25 TABLET, FILM COATED ORAL at 21:01

## 2025-02-25 RX ADMIN — Medication 1 CAPSULE: at 21:02

## 2025-02-25 RX ADMIN — ACETAMINOPHEN 650 MG: 325 TABLET ORAL at 13:19

## 2025-02-25 RX ADMIN — DONEPEZIL HYDROCHLORIDE 10 MG: 5 TABLET ORAL at 21:02

## 2025-02-25 RX ADMIN — TRAZODONE HYDROCHLORIDE 25 MG: 50 TABLET ORAL at 21:01

## 2025-02-25 RX ADMIN — ENOXAPARIN SODIUM 30 MG: 30 INJECTION SUBCUTANEOUS at 21:01

## 2025-02-25 RX ADMIN — ATORVASTATIN CALCIUM 40 MG: 40 TABLET, FILM COATED ORAL at 09:03

## 2025-02-25 RX ADMIN — METOPROLOL TARTRATE 12.5 MG: 25 TABLET, FILM COATED ORAL at 09:03

## 2025-02-25 RX ADMIN — Medication 1 CAPSULE: at 09:03

## 2025-02-25 RX ADMIN — CITALOPRAM HYDROBROMIDE 20 MG: 20 TABLET ORAL at 09:03

## 2025-02-25 RX ADMIN — ASPIRIN 81 MG: 81 TABLET, COATED ORAL at 09:03

## 2025-02-25 ASSESSMENT — ACTIVITIES OF DAILY LIVING (ADL)
ADLS_ACUITY_SCORE: 64
ADLS_ACUITY_SCORE: 64
ADLS_ACUITY_SCORE: 70
ADLS_ACUITY_SCORE: 74
ADLS_ACUITY_SCORE: 70
ADLS_ACUITY_SCORE: 66
ADLS_ACUITY_SCORE: 70
ADLS_ACUITY_SCORE: 64
ADLS_ACUITY_SCORE: 74
ADLS_ACUITY_SCORE: 64
ADLS_ACUITY_SCORE: 70
ADLS_ACUITY_SCORE: 70
ADLS_ACUITY_SCORE: 64
ADLS_ACUITY_SCORE: 70
ADLS_ACUITY_SCORE: 64
ADLS_ACUITY_SCORE: 74
ADLS_ACUITY_SCORE: 64
ADLS_ACUITY_SCORE: 74
ADLS_ACUITY_SCORE: 74
ADLS_ACUITY_SCORE: 64
ADLS_ACUITY_SCORE: 70

## 2025-02-25 NOTE — TELEPHONE ENCOUNTER
Sarika and Ramon called in stating Zack is in hosp- wanted to see if they could come to his appt on 3/5 to talk with you about his care going forward.  I put notes on the appt.

## 2025-02-25 NOTE — PROGRESS NOTES
[facility]    Progress Note - Hospitalist Service       Date of Admission:  2/18/2025    Assessment & Plan   Zack Johns is a 89 year old male with PMHx of CAD s/p stent (2018), PAD, T2DM, HTN, HLD admitted on 2/18/2025 for NSTEMI and acute hypoxic respiratory failure, likely secondary to new CHF. Declined coronary angiogram +/- stent, DNR/DNI. Recommended TCU at discharge, hoping to have placement available soon, referrals pending. Continue medical management per cardiology in setting of evolving HAI. Medically ready once we are able to restart HF meds with stable kidney function. Anticipate hopefully 2/27 more likely     Updates 2/25:  -Has been accepted to TCU, awaiting medically ready  -Per cardiology (Dr. Taylor) cont. To hold lisinopril and spironolactone 2/25, got IV albumin 2/24 with stable/slightly improving HAI (Cr. 1.85>1.81)  -Cont. Metoprolol 12.5 mg BID  -HAI worsening, Cr. 1.5>1.85 2/24   -Will recheck 2/26, if improving restart lisinopril and spironolactone, then will repeat BMP 2/27, If stable then discharge  -Cont. imodium, probiotic 2/23 for loose stools, C Diff negative        NSTEMI  CAD s/p RCA stent (2018)  PAD s/p BL iliac stents (2018)  Initial tropes trended from 43 > 103. EKG showed sinus bradycardia w/PVC, without acute ischemic changes when compared to previous in 2022.  Heparin gtt started and slowly discontinued 2/19. Patient cody c/p or palpitations, and has been borderline bradycardic during this admission. Of note, appears that they tried to give him nitroglycerin at his [facility] which did not improve symptoms. Cardiology consulted with ECHO completed 2/19 with EF 20-25% with global hypokinesis of LV. Discontinuing angiogram and will continue atorvastatin, lisinopril (holding 2/20), metoprolol, ASA 81 mg with outpatient management.  At this point unlikely to pursue angiogram and stenting and with worsening renal function would be delayed. Favor  medical management  -Cards consult, recs appreciated               -Fluid restriction              -Cont. cardiac diet              -ECHO completed 2/19, as above              -Per cardiology, cancel angiogram  -Lovenox VTE ppx  -PTA ASA 81mg      Acute hypoxic respiratory failure, improving  Concerns for new CHF- EF 20-25%  Patient presents via EMS from [facility] for hypoxia down to SpO2 70s after dinner. He improved to the 90s with 6L NC O2 en route. Initial workup notable for BNP 2740 and CXR notable for increased interstitial markings concerning for pulmonary edema, which raise concerns for possible new CHF. Triggers could very well be NSTEMI discussed above. No si/sx to suggest arrhythmia or acute illness. Does not have known pulmonary conditions that would be concerning for COPD/asthma exacerbation. Shortness of breath improved after Lasix and was weaned to room air, satting comfortably in the low 90s. Has produced about 2 L of urine since admission.  - s/p lasix 60mg IV once in ED  - Continue PTA metoprolol 12.5 mg twice daily  - Lisinopril 5 mg daily - holding 2/25  - Spironolactone 25 mg daily - holding 2/25   -resume 2/26 if Cr improving  -Holding meds due to HAI, will assess diuretic needs at discharge however has remained euvolemic  - ECHO completed  - Strict I/O  - Daily weights  - Cardiac tele  - Cards consult per above   - Fluid restriction     Goals of care  Patient reports that he is eager to return to his assisted facility as soon as possible. What is important to him is being with his wife, being able to enjoy good meals, conversations, and daily activities with his new neighbors. He recently moved to [place] in [place] and really likes it there. He does desire full cares, however, and wants to be in his best health to spend more time with his community. Had conversation with pt and wife about DNR/DNI, Wife states that they discussed this at [facility] and  has said they would want Anthony to be DNR/DNI. Have had this conversation twice during this admission.  -Does have [employer]'s naming wife and son as his HCA's  -DNR/DNI status  -CM recs to home at independent living apartment  -Recommended TCU at discharge     Chronic conditions:  >Dementia - PTA donepazil, celexa, ritalin is still being held. Delirium precautions placed.   >T2DM - Last A1c 6.2 in Mar 2023. Repeat A1c. LDSS.   >HTN - zestoretic per above - on hold   >HLD - PTA atorvastatin 40mg d      Delirium, improving  End stage dementia   Pt increasingly disoriented and agitated during the early morning of 2/20 and given Seroquel and Trazodone. Pt very lethargic in the AM and falling asleep mid conversation. Will avoid excess sedation by avoiding antipsychotics. Improving on trazodone.  -Delirium precautions  -Avoid antipsychotics for sedation  -Starting trazodone 25/50 mg at night               -Please start with 25 mg  -Frequent reorientation, blinds up in the day and closed at night     HAI  CKD 3b  Patient has elevated Cr 1.17 --> 1.16 --> 1.27 --> 1.69. Pt is s/p once dose of 60 mg lasix and on fluid restriction. Possible that this is a prerenal HAI and pt is volume down. Given one dose of IV albumin. Continuing to hold ACE and loop diuretics. Continue ot monitor UOP and renal function closely, per cardiology. Unclear if has CKD at baseline as his previous labs were not consistent, however does appear to meet criteria for CKD 3b based on GFR  Cr improved 1.69>1.57>1.56>1.85>1.81 after holding ACEI and spironolactone 2/23 and 2/24  -s/p 2x dose of 50 mg albumin   -Defer further fluid management to cardiology  -Continue to monitor     Hypokalemia, improved  Pt has decreased potassium levels 4 --> 4.1 --> 3.2 --> 3.9. Possibly due to single dose of lasix during this admission. Potassium bolus administered. Improving.  -Hypokalemia replacement protocol  -s/p 1x 40 mEq potassium chloride on 2/20  -Follow  potassium    Anemia, chronic, unspecified  Hgb has been downtrending 2/23-2/25 (9.5>8.7>8.4), denies active bleeding or blood/black stools. Decrease in other lines indicates may be hemodilutional as he did get multiple albumin boluses, will cont. To monitor. Appears baseline is ~10 from chart review  -Daily CBC        Diet: Fluid restriction 1800 ML FLUID  Low Saturated Fat Na <2400 mg    DVT Prophylaxis: Enoxaparin (Lovenox) SQ  Castellano Catheter: Not present  Fluids: PO  Lines: None     Cardiac Monitoring: None  Code Status: No CPR- Do NOT Intubate      Clinically Significant Risk Factors                   # Hypertension: Noted on problem list  # Chronic heart failure with reduced ejection fraction: last echo with EF <40%               # Financial/Environmental Concerns: none         Social Drivers of Health   Tobacco Use: Medium Risk (2/18/2025)    Patient History     Smoking Tobacco Use: Former     Smokeless Tobacco Use: Never     Passive Exposure: Never         Disposition Plan     Medically Ready for Discharge: Anticipated in 2-4 Days         The patient's care was discussed with the Attending Physician, Dr. Lee .    Henry Davis MD  Hospitalist Service  [facility]  Securely message with [employer] (more info)  Text page via [employer] Paging/Directory   ______________________________________________________________________    Interval History   NAEO, doing well with therapy. Stools have been more regular. Denies chest pain, SOB, states feels fatigued but otherwise doing much better. We explained to wife and patient at bedside that we are holding meds today and trending kidney function, his discharge to TCU is pending resolution of HAI on HF medicines.     Physical Exam   Vital Signs: Temp: 98.4  F (36.9  C) Temp src: Oral BP: (!) 153/51 Pulse: 61   Resp: 16 SpO2: 94 % O2 Device: None (Room air)    Weight: 130 lbs 15.25 oz      Constitutional: no apparent distress, oriented to place not  time, and appears stated age  Eyes: Lids and lashes normal, pupils equal, extra ocular muscles intact, sclera clear, conjunctiva normal  Respiratory: No increased work of breathing, good air exchange, clear to auscultation bilaterally, no crackles or wheezing  Cardiovascular: Distant sounding/muted sounding heart rates, no murmur noted  GI: Normal bowel sounds, soft, non-distended, non-tender, no masses palpated, no hepatosplenomegally  Skin: No LE edema  Neuropsychiatric: Calm, oriented to place, and resting in bed      Data     I have personally reviewed the following data over the past 24 hrs:    6.5  \   8.4 (L)   / 169     141 103 65.8 (H) /  110 (H)   3.8 24 1.81 (H) \       Imaging results reviewed over the past 24 hrs:   No results found for this or any previous visit (from the past 24 hours).    Henry Davis MD  PGY-2  [facility]  [facility]  February 25, 2025

## 2025-02-25 NOTE — PROGRESS NOTES
Care Management Follow Up    Length of Stay (days): 6    Expected Discharge Date: 02/26/2025     Concerns to be Addressed: discharge planning     Patient plan of care discussed at interdisciplinary rounds: Yes    Anticipated Discharge Disposition: Transitional Care  Anticipated Discharge Services: None  Anticipated Discharge DME: None    Patient/family educated on Medicare website which has current facility and service quality ratings: yes  Education Provided on the Discharge Plan: Yes  Patient/Family in Agreement with the Plan: yes    Referrals Placed by CM/SW: Post Acute Facilities  Private pay costs discussed: transportation costs    Discussed  Partnership in Safe Discharge Planning  document with patient/family: No     Handoff Completed: Yes, [facility] PCP: Internal handoff referral completed    Additional Information:  Plan is to discharge to [facility] later today. [id number].  Health wheelchair transport pick-up window: [phone].     10:02 AM  MD updated CM that Pt is not medically ready for discharge today. SW confirmed with Angle at [facility] that they can still accept Pt tomorrow afternoon.  Health wheelchair transport rescheduled for tomorrow with a pick-up window of [phone]. Care Team and Pt's wife Sarika updated regarding changes with discharge plans.     Next Steps: CM to confirm final plans for Pt's discharge to [facility] tomorrow.       JENIFFER Waite

## 2025-02-25 NOTE — PLAN OF CARE
Problem: Comorbidity Management  Goal: Blood Pressure in Desired Range  Outcome: Progressing     Problem: Dementia Signs/Symptoms  Goal: Improved Behavioral Control (Dementia Signs/Symptoms)  Outcome: Progressing   Goal Outcome Evaluation:       Pt disoriented to situation and place, denies pain. Pt SBP elevated at 175, scheduled metoprolol administered, SBP decreased to 143. Albumin  infusion administered. Pt's wife and sons at bedside. Awaiting TCU placement.    Magalis León RN

## 2025-02-26 ENCOUNTER — APPOINTMENT (OUTPATIENT)
Dept: PHYSICAL THERAPY | Facility: HOSPITAL | Age: OVER 89
DRG: 280 | End: 2025-02-26
Payer: COMMERCIAL

## 2025-02-26 LAB
ANION GAP SERPL CALCULATED.3IONS-SCNC: 11 MMOL/L (ref 7–15)
BUN SERPL-MCNC: 55.9 MG/DL (ref 8–23)
CALCIUM SERPL-MCNC: 9.8 MG/DL (ref 8.8–10.4)
CHLORIDE SERPL-SCNC: 106 MMOL/L (ref 98–107)
CREAT SERPL-MCNC: 1.48 MG/DL (ref 0.67–1.17)
EGFRCR SERPLBLD CKD-EPI 2021: 45 ML/MIN/1.73M2
ERYTHROCYTE [DISTWIDTH] IN BLOOD BY AUTOMATED COUNT: 14 % (ref 10–15)
GLUCOSE BLDC GLUCOMTR-MCNC: 110 MG/DL (ref 70–99)
GLUCOSE BLDC GLUCOMTR-MCNC: 137 MG/DL (ref 70–99)
GLUCOSE BLDC GLUCOMTR-MCNC: 139 MG/DL (ref 70–99)
GLUCOSE BLDC GLUCOMTR-MCNC: 203 MG/DL (ref 70–99)
GLUCOSE SERPL-MCNC: 107 MG/DL (ref 70–99)
HCO3 SERPL-SCNC: 26 MMOL/L (ref 22–29)
HCT VFR BLD AUTO: 30.2 % (ref 40–53)
HGB BLD-MCNC: 9.2 G/DL (ref 13.3–17.7)
MCH RBC QN AUTO: 29.3 PG (ref 26.5–33)
MCHC RBC AUTO-ENTMCNC: 30.5 G/DL (ref 31.5–36.5)
MCV RBC AUTO: 96 FL (ref 78–100)
PLATELET # BLD AUTO: 221 10E3/UL (ref 150–450)
POTASSIUM SERPL-SCNC: 4.5 MMOL/L (ref 3.4–5.3)
RBC # BLD AUTO: 3.14 10E6/UL (ref 4.4–5.9)
SODIUM SERPL-SCNC: 143 MMOL/L (ref 135–145)
WBC # BLD AUTO: 6.2 10E3/UL (ref 4–11)

## 2025-02-26 PROCEDURE — 250N000011 HC RX IP 250 OP 636

## 2025-02-26 PROCEDURE — 97530 THERAPEUTIC ACTIVITIES: CPT | Mod: GP

## 2025-02-26 PROCEDURE — 120N000001 HC R&B MED SURG/OB

## 2025-02-26 PROCEDURE — 250N000013 HC RX MED GY IP 250 OP 250 PS 637

## 2025-02-26 PROCEDURE — 80048 BASIC METABOLIC PNL TOTAL CA: CPT | Performed by: FAMILY MEDICINE

## 2025-02-26 PROCEDURE — 85048 AUTOMATED LEUKOCYTE COUNT: CPT | Performed by: FAMILY MEDICINE

## 2025-02-26 PROCEDURE — 85014 HEMATOCRIT: CPT | Performed by: FAMILY MEDICINE

## 2025-02-26 PROCEDURE — 250N000013 HC RX MED GY IP 250 OP 250 PS 637: Performed by: FAMILY MEDICINE

## 2025-02-26 PROCEDURE — 99232 SBSQ HOSP IP/OBS MODERATE 35: CPT | Mod: GC

## 2025-02-26 PROCEDURE — 36415 COLL VENOUS BLD VENIPUNCTURE: CPT | Performed by: FAMILY MEDICINE

## 2025-02-26 PROCEDURE — 97116 GAIT TRAINING THERAPY: CPT | Mod: GP

## 2025-02-26 PROCEDURE — 97110 THERAPEUTIC EXERCISES: CPT | Mod: GP

## 2025-02-26 RX ADMIN — LISINOPRIL 5 MG: 5 TABLET ORAL at 11:43

## 2025-02-26 RX ADMIN — INSULIN ASPART 1 UNITS: 100 INJECTION, SOLUTION INTRAVENOUS; SUBCUTANEOUS at 16:29

## 2025-02-26 RX ADMIN — METOPROLOL TARTRATE 12.5 MG: 25 TABLET, FILM COATED ORAL at 20:10

## 2025-02-26 RX ADMIN — DONEPEZIL HYDROCHLORIDE 10 MG: 5 TABLET ORAL at 21:29

## 2025-02-26 RX ADMIN — ASPIRIN 81 MG: 81 TABLET, COATED ORAL at 07:58

## 2025-02-26 RX ADMIN — SPIRONOLACTONE 25 MG: 25 TABLET, FILM COATED ORAL at 11:43

## 2025-02-26 RX ADMIN — Medication 1 CAPSULE: at 08:00

## 2025-02-26 RX ADMIN — METOPROLOL TARTRATE 12.5 MG: 25 TABLET, FILM COATED ORAL at 07:58

## 2025-02-26 RX ADMIN — TRAZODONE HYDROCHLORIDE 50 MG: 50 TABLET ORAL at 21:29

## 2025-02-26 RX ADMIN — CITALOPRAM HYDROBROMIDE 20 MG: 20 TABLET ORAL at 07:58

## 2025-02-26 RX ADMIN — Medication 1 CAPSULE: at 20:08

## 2025-02-26 RX ADMIN — ENOXAPARIN SODIUM 30 MG: 30 INJECTION SUBCUTANEOUS at 20:14

## 2025-02-26 RX ADMIN — ATORVASTATIN CALCIUM 40 MG: 40 TABLET, FILM COATED ORAL at 07:58

## 2025-02-26 ASSESSMENT — ACTIVITIES OF DAILY LIVING (ADL)
ADLS_ACUITY_SCORE: 71
ADLS_ACUITY_SCORE: 75
ADLS_ACUITY_SCORE: 76
ADLS_ACUITY_SCORE: 76
ADLS_ACUITY_SCORE: 78
ADLS_ACUITY_SCORE: 76
ADLS_ACUITY_SCORE: 78
ADLS_ACUITY_SCORE: 78
ADLS_ACUITY_SCORE: 76
ADLS_ACUITY_SCORE: 76
ADLS_ACUITY_SCORE: 78
ADLS_ACUITY_SCORE: 76
ADLS_ACUITY_SCORE: 78
ADLS_ACUITY_SCORE: 76
ADLS_ACUITY_SCORE: 76
ADLS_ACUITY_SCORE: 78
ADLS_ACUITY_SCORE: 75
ADLS_ACUITY_SCORE: 78
ADLS_ACUITY_SCORE: 76
ADLS_ACUITY_SCORE: 75

## 2025-02-26 NOTE — CARE PLAN
Dual Skin Assessment Note:    Patient transferred from P3  to P2.     Comprehensive skin inspection completed by myself and Chelsea Edward RN.     Abnormal skin assessment findings: Rash, bruises, abrasion and redness/blanchable    LDA Initiated for skin breakdown/non-blanchable redness: Not applicable    Provider notified: Not applicable    If yes, WOC Consult order obtained: Not applicable    Shagufta Temple RN 10:00 PM

## 2025-02-26 NOTE — PROGRESS NOTES
CLINICAL NUTRITION SERVICES - ASSESSMENT NOTE    RECOMMENDATIONS FOR MDs/PROVIDERS TO ORDER:    Malnutrition Status:    Moderate malnutrition in the context of chronic illness    Registered Dietitian Interventions:  Patient to consume % of nutritionally adequate meal trays TID, or the equivalent with supplements/snacks.    Future/Additional Recommendations:  F/U favor for supplements     REASON FOR ASSESSMENT  Reason for assessment: MST 3 (reported: unsure if recent wt loss [2], decreased appetite [1])    SUBJECTIVE INFORMATION  First spoke w/spouse and son outside room: they report giving in to pt's food wishes, such as fast food, to avoid battles.  In room, pt indicated interest in food, especially something like Magic Cup which was like ice cream. Also willing to trial Ensure. Prefers chocolate things. Likes salsa.    NUTRITION HISTORY  89 year old male with PMHx of CAD s/p stent (2018), PAD, T2DM, HTN, HLD admitted on 2/18/2025 for NSTEMI and acute hypoxic respiratory failure, likely secondary to new CHF... dementia, CKD3b.     CURRENT NUTRITION ORDERS  Diet: Cardiac, 1800mL    CURRENT INTAKE/TOLERANCE  Per flowsheets, estimate 60% of meals over last few days: per HealthTouch wide range of meal sizes, intake judged low-to-fair.    LABS  Nutrition-relevant labs: Reviewed  Urea Nitrogen: 55.9 (H)  Creatinine: 1.48 (H)  GFR Estimate: 45 (L)    MEDICATIONS  Nutrition-relevant medications: Reviewed    ANTHROPOMETRICS  Height  170.2 cm  Weight  59.4 kg  IBW  66.12 kg  %IBW  90   BMI  20.51   Dosing wt 59.4 Kg    Weight History:    02/24/25 : 59.4 kg (130 lb 15.3 oz)   01/08/25 : 64.9 kg (143 lb)   11/20/24 : 65.3 kg (144 lb) = 9% loss ~3m, sig    ASSESSED NUTRITION NEEDS  Estimated Energy Needs: 1585 kcals/day ([place] Jeor*1.3)  Justification: Maintenance  Estimated Protein Needs: 59 grams protein/day ( kg*1.0 )  Justification: Maintenance CKD  Estimated Fluid Needs: 1800 mL/day  Justification: On a fluid  restriction    SYSTEM FINDINGS    Skin/wounds: reviewed  GI symptoms: diarrhea    MALNUTRITION  % Intake: Unable to assess  % Weight Loss: > 7.5% in 3 months (severe)   Subcutaneous Fat Loss:  not assessed  Muscle Loss: Shoulders (deltoids): Moderate and Interosseous muscles: Moderate  Fluid Accumulation/Edema: None noted  Malnutrition Diagnosis: Moderate malnutrition in the context of chronic illness  Malnutrition Present on Admission: Yes    NUTRITION DIAGNOSIS  Moderate malnutrition in the context of chronic illness    INTERVENTIONS  Magic Cup @EMILIA, Ensure Enlive-Ch @SUP: these items offer pt 640kcal and 29g protein /d.    Goals  Patient to consume % of nutritionally adequate meal trays TID, or the equivalent with supplements/snacks.     Monitoring/Evaluation  Progress toward goals will be monitored and evaluated per policy.

## 2025-02-26 NOTE — PLAN OF CARE
Problem: Dementia Signs/Symptoms  Goal: Enhanced Social or Functional Skills and Ability (Dementia Signs/Symptoms)  Intervention: Promote Social and Functional Ability  Recent Flowsheet Documentation  Taken 2/26/2025 1016 by Cherrie Morrison RN  Trust Relationship/Rapport: care explained     Problem: Comorbidity Management  Goal: Blood Glucose Levels Within Targeted Range  Intervention: Monitor and Manage Glycemia  Recent Flowsheet Documentation  Taken 2/26/2025 1016 by Cherrie Morrison RN  Medication Review/Management: medications reviewed     Problem: Comorbidity Management  Goal: Blood Pressure in Desired Range  Outcome: Progressing  Intervention: Maintain Blood Pressure Management  Recent Flowsheet Documentation  Taken 2/26/2025 1016 by Cherrie Morrison RN  Medication Review/Management: medications reviewed   Goal Outcome Evaluation:         Pt alert with intermittent confusion.  Decreased appetite, needing lots of encouragement with oral intake.  BP elevated this AM, improved after scheduled BP meds.  Pt up to chair this shift.  Red nonblanchable areas noted to  BL buttocks, per wife report these have been present prior to admission.  Mepilex applied and pt repositioned to side, WOCN consult requested.    Cherrie Morrison, RN

## 2025-02-26 NOTE — PROGRESS NOTES
Care Management Follow Up    Length of Stay (days): 7    Expected Discharge Date: 02/27/2025     Concerns to be Addressed: discharge planning     Patient plan of care discussed at interdisciplinary rounds: Yes    Anticipated Discharge Disposition: Transitional Care         Anticipated Discharge Services: None  Anticipated Discharge DME: None    Patient/family educated on Medicare website which has current facility and service quality ratings: yes  Education Provided on the Discharge Plan: Yes  Patient/Family in Agreement with the Plan: yes    Referrals Placed by CM/SW: Post Acute Facilities  Private pay costs discussed: Not applicable    Discussed  Partnership in Safe Discharge Planning  document with patient/family: No     Handoff Completed: No, handoff not indicated or clinically appropriate    Additional Information:  Per provider update patient will not discharge today to TCU, needs another day of labs and monitoring. Discharge set for tomorrow, 2/27/25 around same time (afternoon). SW was able to change the transportation to the same time: 1407 - 1452. [facility] admissions updated. They can hold that bed only for one more day.    Next Steps: CM will continue to follow and assist with patient's discharge needs when medically ready.    JENIFFER Tao    ADDENDUM:     Message received from [facility] Admissions that TCU has COVID case that they keeping in isolation, but wanted to be transparent and let the patient know. SW consulted with provider and patient still can discharge there. FREDERICK spoke with patient and his wife who states they both are vaccinated and they are okay with patient going to [facility] tomorrow. Informed patient that we have ride arranged for  at the same time as today, around 2:10 - 215PM. JENIFFER Tao

## 2025-02-26 NOTE — PROGRESS NOTES
[facility]    Progress Note - Hospitalist Service       Date of Admission:  2/18/2025    Assessment & Plan   Zack Johns is a 89 year old male with PMHx of CAD s/p stent (2018), PAD, T2DM, HTN, HLD admitted on 2/18/2025 for NSTEMI and acute hypoxic respiratory failure, likely secondary to new CHF. Declined coronary angiogram +/- stent, DNR/DNI. Recommended TCU at discharge, has placement available 2/27. Continue medical management per cardiology in setting of evolving HAI that is now improving. Medically ready once we are able to restart HF meds with stable kidney function. Anticipate hopefully 2/27 more likely     Updates 2/26:  -Has been accepted to TCU, awaiting medically ready  -restart lisinopril 5 mg, spironolactone 25 mg 2/26, then recheck BMP 2/27, if Cr stable (<1.8, ok for discharge    -Will have follow up next week with Dr. Galaviz outpatient  -Cont. Metoprolol 12.5 mg BID  -Cont. imodium, probiotic 2/23 for loose stools, C Diff negative        NSTEMI  CAD s/p RCA stent (2018)  PAD s/p BL iliac stents (2018)  Initial tropes trended from 43 > 103. EKG showed sinus bradycardia w/PVC, without acute ischemic changes when compared to previous in 2022.  Heparin gtt started and slowly discontinued 2/19. Patient cody c/p or palpitations, and has been borderline bradycardic during this admission. Of note, appears that they tried to give him nitroglycerin at his snf which did not improve symptoms. Cardiology consulted with ECHO completed 2/19 with EF 20-25% with global hypokinesis of LV. Discontinuing angiogram and will continue atorvastatin, lisinopril (holding 2/20), metoprolol, ASA 81 mg with outpatient management.  At this point unlikely to pursue angiogram and stenting and with worsening renal function would be delayed. Favor medical management  -Cards consult, recs appreciated               -Fluid restriction              -Cont. cardiac diet              -ECHO completed  2/19, as above              -Per cardiology, cancel angiogram  -Lovenox VTE ppx  -PTA ASA 81mg      Acute hypoxic respiratory failure, improving  Concerns for new CHF- EF 20-25%  Patient presents via EMS from [facility] for hypoxia down to SpO2 70s after dinner. He improved to the 90s with 6L NC O2 en route. Initial workup notable for BNP 2740 and CXR notable for increased interstitial markings concerning for pulmonary edema, which raise concerns for possible new CHF. Triggers could very well be NSTEMI discussed above. No si/sx to suggest arrhythmia or acute illness. Does not have known pulmonary conditions that would be concerning for COPD/asthma exacerbation. Shortness of breath improved after Lasix and was weaned to room air, satting comfortably in the low 90s. Has produced about 2 L of urine since admission.  - s/p lasix 60mg IV once in ED  - Continue PTA metoprolol 12.5 mg twice daily  - Lisinopril 5 mg daily - resume 2/26  - Spironolactone 25 mg daily - resume 2/26              -Encourage PO fluid intake 2/26  - ECHO completed  - Strict I/O  - Daily weights  - Cardiac tele  - Cards consult per above   - Fluid restriction     Goals of care  Patient reports that he is eager to return to his longterm facility as soon as possible. What is important to him is being with his wife, being able to enjoy good meals, conversations, and daily activities with his new neighbors. He recently moved to [place] in [place] and really likes it there. He does desire full cares, however, and wants to be in his best health to spend more time with his community. Had conversation with pt and wife about DNR/DNI, Wife states that they discussed this at [facility] and has said they would want Anthony to be DNR/DNI. Have had this conversation twice during this admission.  -Does have [employer] naming wife and son as his HCA's  -DNR/DNI status  -CM recs to home at independent living  apartment  -Recommended TCU at discharge     Chronic conditions:  >Dementia - PTA donepazil, celexa, ritalin is still being held. Delirium precautions placed.   >T2DM - Last A1c 6.2 in Mar 2023. Repeat A1c. LDSS.   >HTN - zestoretic per above - on hold   >HLD - PTA atorvastatin 40mg d      Delirium, improving  End stage dementia   Pt increasingly disoriented and agitated during the early morning of 2/20 and given Seroquel and Trazodone. Pt very lethargic in the AM and falling asleep mid conversation. Will avoid excess sedation by avoiding antipsychotics. Improving on trazodone.  -Delirium precautions  -Avoid antipsychotics for sedation  -Starting trazodone 25/50 mg at night               -Please start with 25 mg  -Frequent reorientation, blinds up in the day and closed at night     HAI  CKD 3b  Patient has elevated Cr now slightly improving 1.8>1.4 2/26. Pt is s/p once dose of 60 mg lasix and on fluid restriction. Possible that this is a prerenal HAI and pt is volume down. Given one dose of IV albumin. Continuing to hold ACE and loop diuretics. Continue ot monitor UOP and renal function closely, per cardiology. Unclear if has CKD at baseline as his previous labs were not consistent, however does appear to meet criteria for CKD 3b based on GFR  Cr improved 1.81>1.48 2/26, resume spironolactone and lisinopril today  -s/p 2x dose of 50 mg albumin   -Continue to monitor     Hypokalemia, improved  Pt has decreased potassium levels 4 --> 4.1 --> 3.2 --> 3.9. Possibly due to single dose of lasix during this admission. Potassium bolus administered. Improving and stable wnl.  -Hypokalemia replacement protocol  -s/p 1x 40 mEq potassium chloride on 2/20  -Follow potassium, has been wnl     Anemia, chronic, unspecified  Hgb has been downtrending 2/23-2/25 (9.5>8.7>8.4), denies active bleeding or blood/black stools. Decrease in other lines indicates may be hemodilutional as he did get multiple albumin boluses, will cont. To  monitor. Appears baseline is ~10 from chart review. Improved to 9.2 2/26  -Daily CBC        Diet: Fluid restriction 1800 ML FLUID  Low Saturated Fat Na <2400 mg    DVT Prophylaxis: Enoxaparin (Lovenox) SQ  Castellano Catheter: Not present  Fluids: PO  Lines: None     Cardiac Monitoring: None  Code Status: No CPR- Do NOT Intubate      Clinically Significant Risk Factors                   # Hypertension: Noted on problem list  # Chronic heart failure with reduced ejection fraction: last echo with EF <40%               # Financial/Environmental Concerns: none         Social Drivers of Health   Food Insecurity: Unknown (2/25/2025)    Food Insecurity     Within the past 12 months, did you worry that your food would run out before you got money to buy more?: Patient unable to answer     Within the past 12 months, did the food you bought just not last and you didn t have money to get more?: Patient unable to answer   Housing Stability: Unknown (2/25/2025)    Housing Stability     Do you have housing? : Patient unable to answer     Are you worried about losing your housing?: Patient unable to answer   Tobacco Use: Medium Risk (2/18/2025)    Patient History     Smoking Tobacco Use: Former     Smokeless Tobacco Use: Never     Passive Exposure: Never   Financial Resource Strain: Unknown (2/25/2025)    Financial Resource Strain     Within the past 12 months, have you or your family members you live with been unable to get utilities (heat, electricity) when it was really needed?: Patient unable to answer   Transportation Needs: Unknown (2/25/2025)    Transportation Needs     Within the past 12 months, has lack of transportation kept you from medical appointments, getting your medicines, non-medical meetings or appointments, work, or from getting things that you need?: Patient unable to answer         Disposition Plan     Medically Ready for Discharge: Anticipated Tomorrow         The patient's care was discussed with the Attending  Physician, Dr. Lee .    Henry Davis MD  Hospitalist Service  [facility]  Securely message with [employer] (more info)  Text page via [facility] Paging/Directory   ______________________________________________________________________    Interval History   NAEO. Patient is eager to get out of the hospital, eating well, urinating and stooling well without issue. Discussed with patient's son at bedside that plan is to pursue TCU upon discharge, he did raise some more long term concerns about ability to care for Anthony at home, I think it is worth a shot seeing if he can recover some of his conditioning at TCU first. Awaiting stabilization of renal function labs while restarting meds today. Has been working well with physical therapy.    Physical Exam   Vital Signs: Temp: 98  F (36.7  C) Temp src: Oral BP: (!) 185/77 Pulse: 64   Resp: 20 SpO2: 95 % O2 Device: None (Room air)    Weight: 130 lbs 15.25 oz    Constitutional: no apparent distress, oriented to place not time, and appears stated age  Eyes: Lids and lashes normal, pupils equal, extra ocular muscles intact, sclera clear, conjunctiva normal  Respiratory: No increased work of breathing, good air exchange, clear to auscultation bilaterally, no crackles or wheezing  Cardiovascular: Distant sounding/muted sounding heart rates, no murmur noted  GI: Normal bowel sounds, soft, non-distended, non-tender, no masses palpated, no hepatosplenomegally  Skin: No LE edema  Neuropsychiatric: Calm, oriented to place, and resting in bed        Data     I have personally reviewed the following data over the past 24 hrs:    6.2  \   9.2 (L)   / 221     143 106 55.9 (H) /  110 (H)   4.5 26 1.48 (H) \       Imaging results reviewed over the past 24 hrs:   No results found for this or any previous visit (from the past 24 hours).    Henry Davis MD  PGY-2  [facility]  [facility]  February 26, 2025

## 2025-02-26 NOTE — PLAN OF CARE
Problem: Dementia Signs/Symptoms  Goal: Optimized Cognitive Function (Dementia Signs/Symptoms)  Outcome: Progressing     Problem: Heart Failure  Goal: Effective Oxygenation and Ventilation  2/25/2025 2206 by Shagufat Temple RN  Outcome: Progressing  2/25/2025 2118 by Shagufta Temple RN  Outcome: Progressing     Problem: Comorbidity Management  Goal: Blood Glucose Levels Within Targeted Range  Outcome: Progressing     Problem: Pain Acute  Goal: Optimal Pain Control and Function  Outcome: Progressing   Goal Outcome Evaluation:       P3 transfer, arrived on unit via bed, was oriented to room, call light, bed and tv controls, oriented to self only, no behaviors, on room air, no respiratory distress, has blood sugar 131, no coverage, denies pain or discomfort, promofit in place, dual skin check completed, no attempts to transfer self, bed alarm on.

## 2025-02-26 NOTE — PLAN OF CARE
Problem: Adult Inpatient Plan of Care  Goal: Optimal Comfort and Wellbeing  Intervention: Monitor Pain and Promote Comfort  Recent Flowsheet Documentation  Taken 2/25/2025 1641 by Gloria Khan RN  Pain Management Interventions: repositioned     Problem: Acute Kidney Injury/Impairment  Goal: Improved Oral Intake  Outcome: Progressing     VSS. Generalized pain with movement, PRN tylenol given. Up in chair for dinner. 1-2 assist. Pt on external catheter. Pt needed reminders/encouragement to void. Able to void this afternoon; BM this evening.     Orientated to self and place. Sometimes orientated to time. Frequent reminders on situation and cares.     K protocol; recheck in AM    Plan: discharge to TCU tomorrow pending renal labs.     Pt to transfer to [facility], report given via phone to Shagufta WORTHINGTON RN. Family updated on room change.

## 2025-02-26 NOTE — PLAN OF CARE
Problem: Dementia Signs/Symptoms  Goal: Improved Behavioral Control (Dementia Signs/Symptoms)  Outcome: Progressing  Goal: Improved Mood Symptoms (Dementia Signs/Symptoms)  Outcome: Progressing  Goal: Optimized Cognitive Function (Dementia Signs/Symptoms)  Outcome: Progressing  Goal: Optimized Oral Intake (Dementia Signs/Symptoms)  Outcome: Progressing  Goal: Improved Sleep (Dementia Signs/Symptoms)  Outcome: Progressing  Goal: Enhanced Social or Functional Skills and Ability (Dementia Signs/Symptoms)  Outcome: Progressing  Intervention: Promote Social and Functional Ability  Recent Flowsheet Documentation  Taken 2/26/2025 0000 by Leisa Payan RN  Trust Relationship/Rapport: care explained     Problem: Heart Failure  Goal: Optimal Coping  Outcome: Progressing  Goal: Optimal Cardiac Output  Outcome: Progressing  Goal: Stable Heart Rate and Rhythm  Outcome: Progressing  Goal: Fluid and Electrolyte Balance  Outcome: Progressing  Goal: Optimal Functional Ability  Outcome: Progressing  Intervention: Optimize Functional Ability  Recent Flowsheet Documentation  Taken 2/26/2025 0000 by Leisa Payan RN  Activity Management:   bedrest   activity adjusted per tolerance  Goal: Improved Oral Intake  Outcome: Progressing  Goal: Effective Oxygenation and Ventilation  Outcome: Progressing  Intervention: Promote Airway Secretion Clearance  Recent Flowsheet Documentation  Taken 2/26/2025 0000 by Leisa Payan RN  Cough And Deep Breathing: done with encouragement  Activity Management:   bedrest   activity adjusted per tolerance  Intervention: Optimize Oxygenation and Ventilation  Recent Flowsheet Documentation  Taken 2/26/2025 0000 by Leisa Payan RN  Head of Bed (HOB) Positioning: HOB at 20-30 degrees  Goal: Effective Breathing Pattern During Sleep  Outcome: Progressing  Intervention: Monitor and Manage Obstructive Sleep Apnea  Recent Flowsheet Documentation  Taken 2/26/2025 0000 by Leisa Payna RN  Medication  Review/Management: medications reviewed     Problem: Comorbidity Management  Goal: Maintenance of Heart Failure Symptom Control  Outcome: Progressing  Intervention: Maintain Heart Failure Management  Recent Flowsheet Documentation  Taken 2/26/2025 0000 by Leisa Payan RN  Medication Review/Management: medications reviewed  Goal: Blood Pressure in Desired Range  Outcome: Progressing  Intervention: Maintain Blood Pressure Management  Recent Flowsheet Documentation  Taken 2/26/2025 0000 by Leisa Payan RN  Medication Review/Management: medications reviewed  Goal: Blood Glucose Levels Within Targeted Range  Outcome: Progressing  Intervention: Monitor and Manage Glycemia  Recent Flowsheet Documentation  Taken 2/26/2025 0000 by Leisa Payan RN  Medication Review/Management: medications reviewed     Problem: Acute Kidney Injury/Impairment  Goal: Fluid and Electrolyte Balance  Outcome: Progressing  Goal: Improved Oral Intake  Outcome: Progressing  Goal: Effective Renal Function  Outcome: Progressing  Intervention: Monitor and Support Renal Function  Recent Flowsheet Documentation  Taken 2/26/2025 0000 by Leisa Payan RN  Medication Review/Management: medications reviewed     Problem: Pain Acute  Goal: Optimal Pain Control and Function  Outcome: Progressing  Intervention: Prevent or Manage Pain  Recent Flowsheet Documentation  Taken 2/26/2025 0000 by Leisa Payan RN  Medication Review/Management: medications reviewed     Problem: Delirium  Goal: Optimal Coping  Outcome: Progressing  Goal: Improved Behavioral Control  Outcome: Progressing  Intervention: Minimize Safety Risk  Recent Flowsheet Documentation  Taken 2/26/2025 0000 by Leisa Payan RN  Enhanced Safety Measures:   pain management   review medications for side effects with activity  Trust Relationship/Rapport: care explained  Goal: Improved Attention and Thought Clarity  Outcome: Progressing  Goal: Improved Sleep  Outcome: Progressing   Goal  Outcome Evaluation:  Pt is alert but confused.  Pt is alert to self most of the time, but occasionally oriented to time and situation.  Pt is up with 1-2 assist with walker and gait belt.  Pt needs 2 to help out of bed and walks with 1 assist.  Pt had external catheter in place until around 0500 but forgot where he was and took it off to get up and use the bathroom.  Pt is re-directable.  Pt will discharge to [facility] when kidney function improves.

## 2025-02-27 ENCOUNTER — LAB REQUISITION (OUTPATIENT)
Dept: LAB | Facility: CLINIC | Age: OVER 89
End: 2025-02-27
Payer: COMMERCIAL

## 2025-02-27 VITALS
HEART RATE: 48 BPM | OXYGEN SATURATION: 95 % | BODY MASS INDEX: 20.55 KG/M2 | TEMPERATURE: 98.1 F | HEIGHT: 67 IN | SYSTOLIC BLOOD PRESSURE: 129 MMHG | DIASTOLIC BLOOD PRESSURE: 61 MMHG | WEIGHT: 130.95 LBS | RESPIRATION RATE: 14 BRPM

## 2025-02-27 DIAGNOSIS — D64.9 ANEMIA, UNSPECIFIED: ICD-10-CM

## 2025-02-27 DIAGNOSIS — I10 ESSENTIAL (PRIMARY) HYPERTENSION: ICD-10-CM

## 2025-02-27 LAB
ANION GAP SERPL CALCULATED.3IONS-SCNC: 9 MMOL/L (ref 7–15)
BUN SERPL-MCNC: 57.1 MG/DL (ref 8–23)
CALCIUM SERPL-MCNC: 9.7 MG/DL (ref 8.8–10.4)
CHLORIDE SERPL-SCNC: 103 MMOL/L (ref 98–107)
CREAT SERPL-MCNC: 1.42 MG/DL (ref 0.67–1.17)
EGFRCR SERPLBLD CKD-EPI 2021: 47 ML/MIN/1.73M2
ERYTHROCYTE [DISTWIDTH] IN BLOOD BY AUTOMATED COUNT: 14 % (ref 10–15)
GLUCOSE BLDC GLUCOMTR-MCNC: 112 MG/DL (ref 70–99)
GLUCOSE BLDC GLUCOMTR-MCNC: 115 MG/DL (ref 70–99)
GLUCOSE BLDC GLUCOMTR-MCNC: 130 MG/DL (ref 70–99)
GLUCOSE SERPL-MCNC: 117 MG/DL (ref 70–99)
HCO3 SERPL-SCNC: 27 MMOL/L (ref 22–29)
HCT VFR BLD AUTO: 27.4 % (ref 40–53)
HGB BLD-MCNC: 8.9 G/DL (ref 13.3–17.7)
MCH RBC QN AUTO: 30.2 PG (ref 26.5–33)
MCHC RBC AUTO-ENTMCNC: 32.5 G/DL (ref 31.5–36.5)
MCV RBC AUTO: 93 FL (ref 78–100)
PLATELET # BLD AUTO: 221 10E3/UL (ref 150–450)
POTASSIUM SERPL-SCNC: 4.6 MMOL/L (ref 3.4–5.3)
RBC # BLD AUTO: 2.95 10E6/UL (ref 4.4–5.9)
SODIUM SERPL-SCNC: 139 MMOL/L (ref 135–145)
WBC # BLD AUTO: 6.9 10E3/UL (ref 4–11)

## 2025-02-27 PROCEDURE — 250N000013 HC RX MED GY IP 250 OP 250 PS 637

## 2025-02-27 PROCEDURE — 36415 COLL VENOUS BLD VENIPUNCTURE: CPT | Performed by: FAMILY MEDICINE

## 2025-02-27 PROCEDURE — 80048 BASIC METABOLIC PNL TOTAL CA: CPT | Performed by: FAMILY MEDICINE

## 2025-02-27 PROCEDURE — 250N000013 HC RX MED GY IP 250 OP 250 PS 637: Performed by: FAMILY MEDICINE

## 2025-02-27 PROCEDURE — 85018 HEMOGLOBIN: CPT | Performed by: FAMILY MEDICINE

## 2025-02-27 PROCEDURE — 99238 HOSP IP/OBS DSCHRG MGMT 30/<: CPT | Mod: GC

## 2025-02-27 PROCEDURE — 82374 ASSAY BLOOD CARBON DIOXIDE: CPT | Performed by: FAMILY MEDICINE

## 2025-02-27 RX ORDER — LACTOBACILLUS RHAMNOSUS GG 10B CELL
1 CAPSULE ORAL 2 TIMES DAILY
Status: SHIPPED
Start: 2025-02-27

## 2025-02-27 RX ORDER — LOPERAMIDE HYDROCHLORIDE 2 MG/1
2 CAPSULE ORAL 4 TIMES DAILY PRN
Qty: 30 CAPSULE | Refills: 0 | Status: SHIPPED
Start: 2025-02-27

## 2025-02-27 RX ORDER — SPIRONOLACTONE 25 MG/1
25 TABLET ORAL DAILY
Qty: 30 TABLET | Refills: 0 | Status: SHIPPED
Start: 2025-02-28 | End: 2025-03-05 | Stop reason: DRUGHIGH

## 2025-02-27 RX ORDER — TRAZODONE HYDROCHLORIDE 50 MG/1
25 TABLET ORAL AT BEDTIME
Qty: 30 TABLET | Refills: 0 | Status: SHIPPED | OUTPATIENT
Start: 2025-02-27

## 2025-02-27 RX ORDER — LISINOPRIL 5 MG/1
5 TABLET ORAL DAILY
Status: SHIPPED
Start: 2025-02-28

## 2025-02-27 RX ADMIN — ASPIRIN 81 MG: 81 TABLET, COATED ORAL at 07:52

## 2025-02-27 RX ADMIN — SPIRONOLACTONE 25 MG: 25 TABLET, FILM COATED ORAL at 07:57

## 2025-02-27 RX ADMIN — ATORVASTATIN CALCIUM 40 MG: 40 TABLET, FILM COATED ORAL at 07:52

## 2025-02-27 RX ADMIN — LISINOPRIL 5 MG: 5 TABLET ORAL at 07:57

## 2025-02-27 RX ADMIN — CITALOPRAM HYDROBROMIDE 20 MG: 20 TABLET ORAL at 07:52

## 2025-02-27 RX ADMIN — Medication 1 CAPSULE: at 07:56

## 2025-02-27 ASSESSMENT — ACTIVITIES OF DAILY LIVING (ADL)
ADLS_ACUITY_SCORE: 76

## 2025-02-27 NOTE — PLAN OF CARE
Problem: Pain Acute  Goal: Optimal Pain Control and Function  Intervention: Prevent or Manage Pain  Recent Flowsheet Documentation  Taken 2/27/2025 0000 by Shala Luna, RN  Medication Review/Management: medications reviewed     Problem: Delirium  Goal: Improved Sleep  2/27/2025 0424 by Shala Luna, RN  Outcome: Progressing     Problem: Acute Kidney Injury/Impairment  Goal: Effective Renal Function  Outcome: Progressing   Goal Outcome Evaluation:    Denies any pain. Still confused. Disoriented to place, time and situation.  Kerry

## 2025-02-27 NOTE — DISCHARGE SUMMARY
[facility]  Discharge Summary - Medicine & Pediatrics       Date of Admission:  2/18/2025  Date of Discharge:  2/27/2025  Discharging Provider: Dr. Davis, Dr. Lee  Discharge Service: Hospitalist Service    Discharge Diagnoses   NSTEMI  Acute Hypoxic respiratory failure, resolved  Heart Failure with Reduced ejection fraction (EF 20-25%)  Hospital acquired delirium, improving (in setting of dementia)  Acute Kidney Injury, improving      Clinically Significant Risk Factors     # Moderate Malnutrition: based on nutrition assessment      Follow-ups Needed After Discharge   BMP, CBC, update POLST    Unresulted Labs Ordered in the Past 30 Days of this Admission       No orders found from 1/19/2025 to 2/19/2025.        These results will be followed up by PCP/nursing home MD    Discharge Disposition   Discharged to short-term care facility  Condition at discharge: Stable    Hospital Course   Zack Johns is a 89 year old male with PMHx of CAD s/p stent (2018), PAD, T2DM, HTN, HLD admitted on 2/18/2025 for NSTEMI and acute hypoxic respiratory failure, likely secondary to new CHF. Declined coronary angiogram +/- stent, DNR/DNI. Recommended TCU at discharge, Continue medical management per cardiology in setting of evolving HAI that is now improving on day of discharge.         NSTEMI  CAD s/p RCA stent (2018)  PAD s/p BL iliac stents (2018)  Initial tropes trended from 43 > 103. EKG showed sinus bradycardia w/PVC, without acute ischemic changes when compared to previous in 2022.  Heparin gtt started and slowly discontinued 2/19. Patient cody c/p or palpitations, and has been borderline bradycardic during this admission. Of note, appears that they tried to give him nitroglycerin at his care home which did not improve symptoms. Cardiology consulted with ECHO completed 2/19 with EF 20-25% with global hypokinesis of LV. Discontinuing angiogram and will continue atorvastatin, lisinopril (holding  2/20), metoprolol, ASA 81 mg with outpatient management.  At this point unlikely to pursue angiogram and stenting and with worsening renal function would be delayed. Favor medical management  -Cards consult, recs appreciated               -Fluid restriction              -Cont. cardiac diet              -ECHO completed 2/19, as above  -Cont. PTA ASA 81mg      Acute hypoxic respiratory failure, improving  Concerns for new CHF- EF 20-25%  Patient presents via EMS from [facility] for hypoxia down to SpO2 70s after dinner. He improved to the 90s with 6L NC O2 en route. Initial workup notable for BNP 2740 and CXR notable for increased interstitial markings concerning for pulmonary edema, which raise concerns for possible new CHF. Triggers could very well be NSTEMI discussed above. No si/sx to suggest arrhythmia or acute illness. Does not have known pulmonary conditions that would be concerning for COPD/asthma exacerbation. Shortness of breath improved after Lasix and was weaned to room air, satting comfortably in the low 90s. Has produced about 2 L of urine since admission. Lisinopril and spironolactone held 2/25-2/26 due to HAI, Cr improved 1.8>1.4 day of discharge  - s/p lasix 60mg IV once in ED  - Continue PTA metoprolol 12.5 mg twice daily  - Lisinopril 5 mg daily   - Spironolactone 25 mg daily  - Zestoretic held on discharge due to HAI, can address w/repeat BMP at discharge follow up to see if still appropriate       Goals of care  Patient reports that he is eager to return to his long term facility as soon as possible. What is important to him is being with his wife, being able to enjoy good meals, conversations, and daily activities with his new neighbors. He recently moved to [place] in [place] and really likes it there. He does desire full cares, however, and wants to be in his best health to spend more time with his community. Had conversation with pt and wife about DNR/DNI,  Wife states that they discussed this at [facility] and has said they would want Anthony to be DNR/DNI. Have had this conversation twice during this admission.  -Does have [employer] naming wife and son as his HCA's  -DNR/DNI status  -Address [facility] outpatient, consider updating POLST     Chronic conditions:  >Dementia - PTA donepazil, celexa, discontinue ritalin at discharge  >T2DM - Last A1c 6.2 in Mar 2023. Required minimal sliding scale insulin. No indication to continue insulin on discharge  >HTN - Discontinued PTA zestoretic at discharge, cont. Lisinopril 5 mg, metoprolol 12.5 mg BID, spironolactone at discharge  >HLD - PTA atorvastatin 40mg d      Delirium, improving  End stage dementia   Pt increasingly disoriented and agitated during the early morning of 2/20 and given Seroquel and Trazodone. Had no additional episodes of delirium after starting scheduled trazodone PM 25 mg  -Cont. Trazodone 25 mg nightly at bedtime     HAI  CKD 3b  Patient has elevated Cr now slightly improving 1.8>1.4 2/26 >1.4 2/27 day of discharge after resuming lisinopril and spironolactone. Pt is s/p once dose of 60 mg lasix and was on fluid restriction . Given two doses of IV albumin. Unclear if has CKD at baseline as his previous labs were not consistent, however does appear to meet criteria for CKD 3b based on GFR  Cr improved 1.81>1.48 2/26>1.4 2/27, resumed spironolactone and lisinopril 2/26 and interval Cr. Was stable after resuming meds  -BMP at follow up  -Consider SGLT-2 if beneficial     Hypokalemia, improved  Pt has decreased potassium levels 4 --> 4.1 --> 3.2 --> 3.9. Possibly due to single dose of lasix during this admission. Potassium bolus administered. Improving and stable wnl.  -Hypokalemia replacement protocol  -s/p 1x 40 mEq potassium chloride on 2/20  -Follow potassium, has been wnl     Anemia, chronic, unspecified  Hgb has been downtrending 2/23-2/25 (9.5>8.7>8.4), denies active bleeding or blood/black stools.  Decrease in other lines indicates may be hemodilutional as he did get multiple albumin boluses, will cont. To monitor. Appears baseline is ~10 from chart review. Improved to 9.2 2/26  -CBC at discharge follow up.    Consultations This Hospital Stay   PHARMACY IP CONSULT  PHYSICAL THERAPY ADULT IP CONSULT  OCCUPATIONAL THERAPY ADULT IP CONSULT  CARE MANAGEMENT / SOCIAL WORK IP CONSULT  CARDIOLOGY IP CONSULT  PHARMACY TO DOSE VANCO  CARE MANAGEMENT / SOCIAL WORK IP CONSULT  PHYSICAL THERAPY ADULT IP CONSULT  OCCUPATIONAL THERAPY ADULT IP CONSULT    Code Status   No CPR- Do NOT Intubate       The patient was discussed with Dr. Jesus Davis MD  [facility] Team Service  [address] [address]  Phone: [phone]  Fax: [phone]  ______________________________________________________________________    Physical Exam   Vital Signs: Temp: 98.1  F (36.7  C) Temp src: Oral BP: 129/61 Pulse: (!) 48   Resp: 14 SpO2: 95 % O2 Device: None (Room air)    Weight: 130 lbs 15.25 oz    Constitutional: no apparent distress, oriented to place not time, and appears stated age  Eyes: Lids and lashes normal, pupils equal, extra ocular muscles intact, sclera clear, conjunctiva normal  Respiratory: No increased work of breathing, good air exchange, clear to auscultation bilaterally, no crackles or wheezing  Cardiovascular: Distant sounding/muted sounding heart rates, no murmur noted  GI: Normal bowel sounds, soft, non-distended, non-tender, no masses palpated, no hepatosplenomegally  Skin: No LE edema  Neuropsychiatric: Calm, oriented to place, and resting in bed      Primary Care Physician   Haim Galaviz    Discharge Orders      Primary Care - Care Coordination Referral      General info for SNF    Length of Stay Estimate: Short Term Care: Estimated # of Days <30  Condition at Discharge: Improving  Level of care:board and care  Rehabilitation Potential:  Good  Admission H&P remains valid and up-to-date: Yes  Recent Chemotherapy: N/A  Use Nursing Home Standing Orders: Yes     Mantoux instructions    Give two-step Mantoux (PPD) Per Facility Policy Yes     Follow Up and recommended labs and tests    Follow up with primary care provider in 1 week.  The following labs/tests are recommended: BMP, CBC.     Reason for your hospital stay    NSTEMI  Heart Failure with Reduced Ejection Fraction  Acute kidney injury, improving     Wound care    Site:   buttocks  Instructions:  eval and treat as indicated     Activity - Up with nursing assistance     Physical Therapy Adult Consult    Evaluate and treat as clinically indicated.    Reason:  impaired gait and mobility, deconditioning     Occupational Therapy Adult Consult    Evaluate and treat as clinically indicated.    Reason:   impaired gait and mobility, deconditioning     Diet    Follow this diet upon discharge: Current Diet:Orders Placed This Encounter      Fluid restriction 1800 ML FLUID      Snacks/Supplements Adult: Ensure Enlive; With Meals      Snacks/Supplements Adult: Magic Cup; With Meals      Low Saturated Fat Na <2400 mg       Significant Results and Procedures   Most Recent 3 CBC's:  Recent Labs   Lab Test 02/27/25  0607 02/26/25  0619 02/25/25  0437   WBC 6.9 6.2 6.5   HGB 8.9* 9.2* 8.4*   MCV 93 96 94    221 169     Most Recent 3 BMP's:  Recent Labs   Lab Test 02/27/25  0841 02/27/25  0607 02/27/25  0152 02/26/25  0741 02/26/25  0619 02/25/25  0852 02/25/25  0437   NA  --  139  --   --  143  --  141   POTASSIUM  --  4.6  --   --  4.5  --  3.8   CHLORIDE  --  103  --   --  106  --  103   CO2  --  27  --   --  26  --  24   BUN  --  57.1*  --   --  55.9*  --  65.8*   CR  --  1.42*  --   --  1.48*  --  1.81*   ANIONGAP  --  9  --   --  11  --  14   [facility]  --  9.7  --   --  9.8  --  9.3   * 117* 115*   < > 107*   < > 104*    < > = values in this interval not displayed.     Most Recent 3 Troponin's:No lab  results found.  Most Recent TSH and T4:  Recent Labs   Lab Test 23  1430   TSH 1.45     Most Recent Hemoglobin A1c:  Recent Labs   Lab Test 25  2252   A1C 5.3     Most Recent 6 glucoses:  Recent Labs   Lab Test 25  0841 25  0607 25  0152 25  2128 25  1614 25  1142   * 117* 115* 137* 203* 139*   ,   Results for orders placed or performed during the hospital encounter of 25   XR Chest Port 1 View    Narrative    EXAM: XR CHEST PORT 1 VIEW  LOCATION: [facility]  DATE: 2025    INDICATION: SOB  COMPARISON: None.      Impression    IMPRESSION:   1.  Incompletely imaged lung bases, consider repeating chest x-ray.  2.  Possible small right pleural effusion.  3.  Mild increased interstitial markings in the lower lungs, likely reflecting pulmonary edema.  4.  Normal heart size.  5.  No pneumothorax.   XR Chest 2 Views    Narrative    EXAM: XR CHEST 2 VIEWS  LOCATION: [facility]  DATE: 2025    INDICATION: SOB, incomplete imaging of lower chest seen on portable XR  COMPARISON: None.      Impression    IMPRESSION:   1.  Hyperinflation versus deep inspiration changes. Bibasilar atelectasis. Small posterior infiltrate on lateral view.  2.  Mild increased interstitial markings in the lower lungs, likely reflecting pulmonary edema.  3.  Normal heart size.  4.  No pneumothorax.     Echocardiogram Complete     Value    LVEF  20-25% (severely reduced)    Narrative    [id number]  [id number]  [id number]  [id number] [address]     Name: KAYLA LAROSE  MRN: [id number]  : 1934  Study Date: 2025 10:05 AM  Age: 89 yrs  Gender: Male  Patient Location: [facility]  Reason For Study: CHF  Ordering Physician: SONIA HAMILTON  Performed By: KS     BSA: 1.8 m2  Height: 67 in  Weight: 146 lb  HR: 49  BP: 180/78  mmHg  ______________________________________________________________________________  Procedure  Echocardiogram with two-dimensional, color and spectral Doppler. Definity (NDC  #[id number]) given intravenously. Technically difficult study. No  hemodynamically significant valvular abnormalities on 2D or color flow  imaging. There is no comparison study available.  ______________________________________________________________________________  Interpretation Summary     1. The left ventricle is mildly dilated with normal left ventricular wall  thickness.  - Left ventricular function is decreased. The ejection fraction is 20-25%  (severely reduced).  - There is severe global hypokinesia of the left ventricle.  2. The right ventricle is normal size with mildly decreased right ventricular  systolic function  3. No hemodynamically significant valvular abnormalities on 2D or color flow  imaging.  4. There is no comparison study available.  ______________________________________________________________________________  Left Ventricle  The left ventricle is mildly dilated. Left ventricular function is decreased.  The ejection fraction is 20-25% (severely reduced). There is normal left  ventricular wall thickness. Grade I or early diastolic dysfunction. There is  severe global hypokinesia of the left ventricle.     Right Ventricle  The right ventricle is normal size. Mildly decreased right ventricular  systolic function.     Atria  Normal left atrial size. Right atrial size is normal. There is no color  Doppler evidence of an atrial shunt.     Mitral Valve  Mitral valve leaflets appear normal. There is no evidence of mitral stenosis  or clinically significant mitral regurgitation.     Tricuspid Valve  Tricuspid valve leaflets appear normal. There is no evidence of tricuspid  stenosis or clinically significant tricuspid regurgitation. Right ventricular  systolic pressure could not be approximated due to inadequate  tricuspid  regurgitation.     Aortic Valve  The aortic valve is trileaflet. Aortic valve leaflets appear normal. There is  no evidence of aortic stenosis or clinically significant aortic regurgitation.     Pulmonic Valve  The pulmonic valve is not well seen, but is grossly normal. This degree of  valvular regurgitation is within normal limits. There is trace pulmonic  valvular regurgitation.     Vessels  The aorta root is normal. Normal size ascending aorta. IVC diameter <2.1 cm  collapsing >50% with sniff suggests a normal RA pressure of 3 mmHg.     Pericardium  There is no pericardial effusion.     Rhythm  Sinus rhythm was noted.  ______________________________________________________________________________  MMode/2D Measurements & Calculations     IVSd: 1.1 cm  LVIDd: 6.1 cm  LVIDs: 5.6 cm  LVPWd: 1.0 cm  FS: 8.3 %  LV mass(C)d: 270.2 grams  LV mass(C)dI: 152.8 grams/m2  Ao root diam: 3.4 cm  LVOT diam: 2.1 cm  LVOT area: 3.5 cm2  Ao root diam index Ht(cm/m): 2.0  Ao root diam index BSA (cm/m2): 1.9  RWT: 0.33  TAPSE: 1.6 cm     Doppler Measurements & Calculations  MV E max kurt: 52.7 cm/sec  MV A max kurt: 132.8 cm/sec  MV E/A: 0.40  MV dec slope: 121.7 cm/sec2  MV dec time: 0.43 sec  Ao V2 max: 105.8 cm/sec  Ao max P.0 mmHg  Ao V2 mean: 76.1 cm/sec  Ao mean P.6 mmHg  Ao V2 VTI: 26.9 cm  SUNSHINE(I,D): 2.4 cm2  SUNSHINE(V,D): 2.6 cm2  LV V1 max P.5 mmHg  LV V1 max: 78.8 cm/sec  LV V1 VTI: 18.7 cm     SV(LVOT): 65.9 ml  SI(LVOT): 37.3 ml/m2  PA V2 max: 83.8 cm/sec  PA max P.8 mmHg  PA acc time: 0.11 sec  AV Kurt Ratio (DI): 0.74  SUNSHINE Index (cm2/m2): 1.4  Medial E/e': 13.2  RV S Kurt: 8.7 cm/sec     ______________________________________________________________________________  Report approved by: Dallas Kimball MD on 2025 12:36 PM             Discharge Medications   Current Discharge Medication List        START taking these medications    Details   lactobacillus rhamnosus, GG, (CULTURELL) capsule Take  1 capsule by mouth 2 times daily.    Associated Diagnoses: Diarrhea, unspecified type      lisinopril (ZESTRIL) 5 MG tablet Take 1 tablet (5 mg) by mouth daily.    Associated Diagnoses: NSTEMI (non-ST elevated myocardial infarction) (H); Acute decompensated heart failure (H); Essential hypertension, benign      loperamide (IMODIUM) 2 MG capsule Take 1 capsule (2 mg) by mouth 4 times daily as needed for diarrhea.  Qty: 30 capsule, Refills: 0    Associated Diagnoses: Diarrhea, unspecified type      spironolactone (ALDACTONE) 25 MG tablet Take 1 tablet (25 mg) by mouth daily.  Qty: 30 tablet, Refills: 0    Associated Diagnoses: Acute decompensated heart failure (H); Essential hypertension, benign      traZODone (DESYREL) 50 MG tablet Take 0.5 tablets (25 mg) by mouth at bedtime.  Qty: 30 tablet, Refills: 0    Associated Diagnoses: Insomnia, unspecified type           CONTINUE these medications which have NOT CHANGED    Details   acetaminophen (TYLENOL) 500 MG tablet Take 500-1,000 mg by mouth every 6 hours as needed      aspirin 81 MG EC tablet Take 1 tablet by mouth at bedtime.    Associated Diagnoses: Peripheral vascular disease      atorvastatin (LIPITOR) 40 MG tablet Take 1 tablet (40 mg) by mouth daily  Qty: 90 tablet, Refills: 3    Associated Diagnoses: Coronary artery disease with hx of myocardial infarct w/o hx of CABG      bulk laxative (BENEFIBER DRINK MIX) packet Take 1 packet by mouth daily.  Qty: 28 each, Refills: 2      citalopram (CELEXA) 20 MG tablet Take 1 tablet (20 mg) by mouth daily  Qty: 90 tablet, Refills: 3    Associated Diagnoses: Current moderate episode of major depressive disorder without prior episode (H)      donepezil (ARICEPT) 10 MG tablet Take 1 tablet (10 mg) by mouth at bedtime.  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      nitroGLYcerin (NITROSTAT) 0.4 MG sublingual tablet Place 1 tablet (0.4 mg) under the tongue every 5 minutes as needed for chest pain. Repeat at 5 min intervals x 2 if needed  Qty: 30 tablet, Refills: 0    Associated Diagnoses: Atherosclerosis of native coronary artery of native heart without angina pectoris      vitamin D3 (CHOLECALCIFEROL) 50 mcg (2000 units) tablet Take 1 tablet by mouth daily      zinc 50 MG TABS Take 50 mg by mouth daily.           STOP taking these medications       lisinopril-hydrochlorothiazide (ZESTORETIC) 20-12.5 MG tablet Comments:   Reason for Stopping:         methylphenidate (RITALIN) 5 MG tablet Comments:   Reason for Stopping:             Allergies   Allergies   Allergen Reactions    Pletal [Cilostazol]        Henry Davis MD  PGY-2  [facility]  [facility]  February 27, 2025

## 2025-02-27 NOTE — PROGRESS NOTES
Addendum 2:05 PM  Acute Heart Failure with Reduced Ejection Fraction   Echo demonstrating EF 20-25% and severe global hypokinesia of L ventricle  -s/p Lasix 40 mg IV  -Discharged on lisinopril 5 mg, metoprolol 12.5 mg BID, spironolactone 25 mg for GDMT  -Consider jardiance at follow up

## 2025-02-27 NOTE — PLAN OF CARE
Physical Therapy Discharge Summary    Reason for therapy discharge:    Discharged to transitional care facility.    Progress towards therapy goal(s). See goals on Care Plan in [facility] electronic health record for goal details.  Goals not met.  Barriers to achieving goals:   Pt is making progress towards the goals.    Therapy recommendation(s):    Continued therapy is recommended.  Rationale/Recommendations:  Pt could benefit from PT to improve mobility and strength.  .

## 2025-02-27 NOTE — PLAN OF CARE
Problem: Heart Failure  Goal: Improved Oral Intake  Outcome: Progressing     Problem: Heart Failure  Goal: Effective Oxygenation and Ventilation  Outcome: Progressing     Problem: Comorbidity Management  Goal: Blood Glucose Levels Within Targeted Range  Outcome: Progressing     Problem: Pain Acute  Goal: Optimal Pain Control and Function  Outcome: Progressing   Goal Outcome Evaluation:  Patient ate 75% of super with 240 fluid intake, has been on room air, no acute respiratory distress, blood sugars were 203 and 137, had coverage at 1700, denies pain or discomfort, potassium lab 4.5, recheck tomorrow, has intermittent confusion, disoriented to time and situation, vitals stable, had loose stool x1, tried to get out of bed x1, bed alarm on, ripped promofit, has incontinent pad on.

## 2025-02-27 NOTE — PLAN OF CARE
Problem: Dementia Signs/Symptoms  Goal: Improved Behavioral Control (Dementia Signs/Symptoms)  Outcome: Progressing     Problem: Dementia Signs/Symptoms  Goal: Improved Sleep (Dementia Signs/Symptoms)  Outcome: Progressing     Problem: Acute Kidney Injury/Impairment  Goal: Effective Renal Function  2/27/2025 0434 by Shala Luna, RN  Outcome: Progressing   Goal Outcome Evaluation:    Denies pain. Calm and cooperative yet still very confused. Disoriented to time, place and situation. Reorientation done.   BS at 2am- 115  To recheck BMP this am.  Offered urinal this am- 250 ml urine.  No loose BM observed during the night.

## 2025-02-27 NOTE — PLAN OF CARE
Occupational Therapy Discharge Summary    Reason for therapy discharge:    Discharged to transitional care facility.    Progress towards therapy goal(s). See goals on Care Plan in [facility] electronic health record for goal details.  Goals partially met.  Barriers to achieving goals:   discharge from facility.    Therapy recommendation(s):    Continued therapy is recommended.  Rationale/Recommendations:  recommend continued OT services at TCU.    Ella Tyler OTR/L 2/27/25

## 2025-02-27 NOTE — PLAN OF CARE
Problem: Heart Failure  Goal: Optimal Coping  Outcome: Adequate for Care Transition  Goal: Optimal Cardiac Output  Outcome: Adequate for Care Transition  Goal: Stable Heart Rate and Rhythm  Outcome: Adequate for Care Transition  Goal: Fluid and Electrolyte Balance  Outcome: Adequate for Care Transition  Goal: Optimal Functional Ability  Outcome: Adequate for Care Transition  Goal: Improved Oral Intake  Outcome: Adequate for Care Transition  Goal: Effective Oxygenation and Ventilation  Outcome: Adequate for Care Transition  Goal: Effective Breathing Pattern During Sleep  Outcome: Adequate for Care Transition     Problem: Dementia Signs/Symptoms  Goal: Improved Behavioral Control (Dementia Signs/Symptoms)  Outcome: Adequate for Care Transition  Goal: Improved Mood Symptoms (Dementia Signs/Symptoms)  Outcome: Adequate for Care Transition  Goal: Optimized Cognitive Function (Dementia Signs/Symptoms)  Outcome: Adequate for Care Transition  Goal: Optimized Oral Intake (Dementia Signs/Symptoms)  Outcome: Adequate for Care Transition  Goal: Improved Sleep (Dementia Signs/Symptoms)  Outcome: Adequate for Care Transition  Goal: Enhanced Social or Functional Skills and Ability (Dementia Signs/Symptoms)  Outcome: Adequate for Care Transition     Problem: Comorbidity Management  Goal: Maintenance of Heart Failure Symptom Control  Outcome: Adequate for Care Transition  Goal: Blood Pressure in Desired Range  Outcome: Adequate for Care Transition  Goal: Blood Glucose Levels Within Targeted Range  Outcome: Adequate for Care Transition     Problem: Acute Kidney Injury/Impairment  Goal: Fluid and Electrolyte Balance  Outcome: Adequate for Care Transition  Goal: Improved Oral Intake  Outcome: Adequate for Care Transition  Goal: Effective Renal Function  Outcome: Adequate for Care Transition     Problem: Pain Acute  Goal: Optimal Pain Control and Function  Outcome: Adequate for Care Transition     Problem: Delirium  Goal: Optimal  Coping  Outcome: Adequate for Care Transition  Goal: Improved Behavioral Control  Outcome: Adequate for Care Transition  Goal: Improved Attention and Thought Clarity  Outcome: Adequate for Care Transition  Goal: Improved Sleep  Outcome: Adequate for Care Transition   Goal Outcome Evaluation:    Goal discussed with: Patient and Wife    Goal assessed as: Adequate for Care Transition

## 2025-02-27 NOTE — PROGRESS NOTES
Care Management Discharge Note    Discharge Date: 02/27/2025       Discharge Disposition: Transitional Care - [facility]    Discharge Services: None    Discharge DME: None    Discharge Transportation: health plan transportation    Private pay costs discussed: transportation costs    Does the patient's insurance plan have a 3 day qualifying hospital stay waiver?  No    PAS Confirmation Code: [id number]  Patient/family educated on Medicare website which has current facility and service quality ratings: yes    Education Provided on the Discharge Plan: Yes  Persons Notified of Discharge Plans: Patient, wife, accepting facility  Patient/Family in Agreement with the Plan: yes    Handoff Referral Completed: Yes, [facility] PCP: Internal handoff referral completed    Additional Information:  Patient ready for discharge today. SW notified TCU and faxed physician orders. PAS completed and transportation (wheelchair ride) confirmed with [employer] for  between 1407 - 1452. Unit updated.    Sara Zhou, LSW

## 2025-02-28 LAB
ANION GAP SERPL CALCULATED.3IONS-SCNC: 11 MMOL/L (ref 7–15)
BUN SERPL-MCNC: 51.8 MG/DL (ref 8–23)
CALCIUM SERPL-MCNC: 10.6 MG/DL (ref 8.8–10.4)
CHLORIDE SERPL-SCNC: 107 MMOL/L (ref 98–107)
CREAT SERPL-MCNC: 1.21 MG/DL (ref 0.67–1.17)
EGFRCR SERPLBLD CKD-EPI 2021: 57 ML/MIN/1.73M2
ERYTHROCYTE [DISTWIDTH] IN BLOOD BY AUTOMATED COUNT: 14.2 % (ref 10–15)
GLUCOSE SERPL-MCNC: 106 MG/DL (ref 70–99)
HCO3 SERPL-SCNC: 23 MMOL/L (ref 22–29)
HCT VFR BLD AUTO: 32.4 % (ref 40–53)
HGB BLD-MCNC: 9.5 G/DL (ref 13.3–17.7)
MCH RBC QN AUTO: 29.8 PG (ref 26.5–33)
MCHC RBC AUTO-ENTMCNC: 29.3 G/DL (ref 31.5–36.5)
MCV RBC AUTO: 102 FL (ref 78–100)
PLATELET # BLD AUTO: 254 10E3/UL (ref 150–450)
POTASSIUM SERPL-SCNC: 4.7 MMOL/L (ref 3.4–5.3)
RBC # BLD AUTO: 3.19 10E6/UL (ref 4.4–5.9)
SODIUM SERPL-SCNC: 141 MMOL/L (ref 135–145)
WBC # BLD AUTO: 5.6 10E3/UL (ref 4–11)

## 2025-02-28 PROCEDURE — 85027 COMPLETE CBC AUTOMATED: CPT | Mod: ORL | Performed by: INTERNAL MEDICINE

## 2025-02-28 PROCEDURE — 36415 COLL VENOUS BLD VENIPUNCTURE: CPT | Mod: ORL | Performed by: INTERNAL MEDICINE

## 2025-02-28 PROCEDURE — P9604 ONE-WAY ALLOW PRORATED TRIP: HCPCS | Mod: ORL | Performed by: INTERNAL MEDICINE

## 2025-02-28 PROCEDURE — 80048 BASIC METABOLIC PNL TOTAL CA: CPT | Mod: ORL | Performed by: INTERNAL MEDICINE

## 2025-03-03 ENCOUNTER — DOCUMENTATION ONLY (OUTPATIENT)
Dept: GERIATRICS | Facility: CLINIC | Age: OVER 89
End: 2025-03-03

## 2025-03-04 ENCOUNTER — TRANSITIONAL CARE UNIT VISIT (OUTPATIENT)
Dept: GERIATRICS | Facility: CLINIC | Age: OVER 89
End: 2025-03-04
Payer: COMMERCIAL

## 2025-03-04 ENCOUNTER — LAB REQUISITION (OUTPATIENT)
Dept: LAB | Facility: CLINIC | Age: OVER 89
End: 2025-03-04
Payer: COMMERCIAL

## 2025-03-04 VITALS
HEART RATE: 47 BPM | RESPIRATION RATE: 18 BRPM | BODY MASS INDEX: 21.35 KG/M2 | OXYGEN SATURATION: 95 % | DIASTOLIC BLOOD PRESSURE: 51 MMHG | HEIGHT: 67 IN | SYSTOLIC BLOOD PRESSURE: 120 MMHG | WEIGHT: 136 LBS | TEMPERATURE: 97.3 F

## 2025-03-04 DIAGNOSIS — N17.9 ACUTE KIDNEY FAILURE, UNSPECIFIED: ICD-10-CM

## 2025-03-04 DIAGNOSIS — D64.9 ANEMIA, UNSPECIFIED: ICD-10-CM

## 2025-03-04 NOTE — PROGRESS NOTES
"[facility] GERIATRICS    Chief Complaint   Patient presents with    RECHECK        HPI: Zack Johns is a 89 year old (9/19/1934), who is being seen today for an episodic care visit at: [facility] ([facility]) [[id number]]. HPI information obtained from: facility chart records, facility staff, patient report and [facility] chart review. PMH: ***.       Today:  Nursing staff reports labs ordered for today were missed.     Patient is seen today for a visit in his room. He just finished with self hygiene cares in the bathroom.  Feels \"tired\".  Regarding pain, feels \"not bad\" more so \"ache\".  Endorses working with therapies but notes \"I don't remember\" what kind of exercises during sessions. Appetite is \"okay\"; \"usually\" eats all of his meals.  Last BM was \"just now\".  \"No\" belly pain or constipation.\"No\" concerns with bladder.  Sleeping \"pretty good\".  Mood is \"okay\"; notes, \"I'll be glad to get home\".   Denies CP, palpitations, lightheadedness, dizziness, fatigue, SOB, fever, chills, nausea/vomiting concerns today.        Allergies, and PMH/PSH reviewed in EPIC today.  REVIEW OF SYSTEMS:  4 point ROS including Respiratory, CV, GI and , other than that noted in the HPI,  is negative    Objective:   /51   Pulse (!) 47   Temp 97.3  F (36.3  C)   Resp 18   Ht 1.702 m (5' 7\")   Wt 61.7 kg (136 lb)   SpO2 95%   BMI 21.30 kg/m    GENERAL APPEARANCE:  Alert, elderly, in no distress, sitting at edge of bed  HEENT:  atraumatic, [place], EOM intact, moist mucus membranes  RESP:  non-labored breathing, lungs clear on auscultation, no respiratory distress, no cough  CV:  Rate regular, S1 S2 noted, no edema  ABDOMEN:  soft, non-distended, non-tender, bowel sounds active  M/S:   wheelchair bound, moves all extremities, strength and tone equal bilaterally, no calf pain, arthritic changes noted in hands  SKIN:  warm, dry, thin, fragile, no obvious rash, lesions, ulcerations or petechiae   NEURO:   Face is " symmetric, examination of sensation by touch normal, follows and tracks, slow speech, recall difficulty  PSYCH:  calm, cooperative      Labs reviewed as per Epic and/or Care Everywhere.      Assessment/Plan:  {FGS DX2:769982}    MED REC REQUIRED{TIP  Click the link below to document or use med rec list, use list to pull in response :948258}  Post Medication Reconciliation Status: {MED REC LIST:148938}      Orders:  {fgsorders:918481}  *** Recheck BMP and CBC tomorrow    *** minutes spent on the date of this encounter doing chart review, review labs, discussion with nursing staff, patient visit, and documentation.       Electronically signed by: Dr. Mell Bravo, DNP, APRN, AGNP-BC, PHN    This note was completed with the assistance of dictation software. Typos and word substitution-errors are expected and unintended.

## 2025-03-05 ENCOUNTER — TELEPHONE (OUTPATIENT)
Dept: GERIATRICS | Facility: CLINIC | Age: OVER 89
End: 2025-03-05

## 2025-03-05 LAB
ANION GAP SERPL CALCULATED.3IONS-SCNC: 11 MMOL/L (ref 7–15)
BUN SERPL-MCNC: 42.5 MG/DL (ref 8–23)
CALCIUM SERPL-MCNC: 9.3 MG/DL (ref 8.8–10.4)
CHLORIDE SERPL-SCNC: 108 MMOL/L (ref 98–107)
CREAT SERPL-MCNC: 1.3 MG/DL (ref 0.67–1.17)
EGFRCR SERPLBLD CKD-EPI 2021: 52 ML/MIN/1.73M2
ERYTHROCYTE [DISTWIDTH] IN BLOOD BY AUTOMATED COUNT: 14.1 % (ref 10–15)
GLUCOSE SERPL-MCNC: 101 MG/DL (ref 70–99)
HCO3 SERPL-SCNC: 21 MMOL/L (ref 22–29)
HCT VFR BLD AUTO: 30.7 % (ref 40–53)
HGB BLD-MCNC: 9.2 G/DL (ref 13.3–17.7)
MCH RBC QN AUTO: 29.4 PG (ref 26.5–33)
MCHC RBC AUTO-ENTMCNC: 30 G/DL (ref 31.5–36.5)
MCV RBC AUTO: 98 FL (ref 78–100)
PLATELET # BLD AUTO: 278 10E3/UL (ref 150–450)
POTASSIUM SERPL-SCNC: 4.8 MMOL/L (ref 3.4–5.3)
RBC # BLD AUTO: 3.13 10E6/UL (ref 4.4–5.9)
SODIUM SERPL-SCNC: 140 MMOL/L (ref 135–145)
WBC # BLD AUTO: 7.6 10E3/UL (ref 4–11)

## 2025-03-05 PROCEDURE — 85027 COMPLETE CBC AUTOMATED: CPT | Mod: ORL | Performed by: INTERNAL MEDICINE

## 2025-03-05 PROCEDURE — P9603 ONE-WAY ALLOW PRORATED MILES: HCPCS | Mod: ORL | Performed by: INTERNAL MEDICINE

## 2025-03-05 PROCEDURE — 80048 BASIC METABOLIC PNL TOTAL CA: CPT | Mod: ORL | Performed by: INTERNAL MEDICINE

## 2025-03-05 PROCEDURE — 36415 COLL VENOUS BLD VENIPUNCTURE: CPT | Mod: ORL | Performed by: INTERNAL MEDICINE

## 2025-03-05 RX ORDER — SPIRONOLACTONE 25 MG/1
12.5 TABLET ORAL DAILY
COMMUNITY
Start: 2025-03-05

## 2025-03-05 NOTE — TELEPHONE ENCOUNTER
[facility] Geriatrics Lab Note     Provider: CHANDAN Ruth DNP  Facility: [facility]  Facility Type:  TCU    Allergies   Allergen Reactions    Pletal [Cilostazol]        Labs Reviewed by provider: Heme 2, BMP     Verbal Order/Direction given by Provider: Decrease spironolactone to 12.5 mg daily. Follow weights daily. Encourage PO intake. Recheck BMP on 3/11.  Recheck Hgb and Fe sats on 3/11 (dx: anemia)     Provider giving Order:  CHANDAN Ruth DNP    Verbal Order given to: Salomon Robertson RN

## 2025-03-10 NOTE — PROGRESS NOTES
[facility] GERIATRICS  INITIAL VISIT NOTE  March 11, 2025      PRIMARY CARE PROVIDER AND CLINIC:  Haim Galaviz [address] / [place] [address]    [facility] Medical Record Number:  [id number]  Place of Service where encounter took place:  [facility] ([facility]) [[id number]]    Chief Complaint   Patient presents with    Hospital F/U       HPI:    Zack Johns is a 89 year old  (9/19/1934) male seen today at [facility] TCU. Medical history is notable for CAD s/p PCI, HTN, DM2 and dementia.  He was hospitalized at [facility] from 2/18/2025 to 2/27/2025 where he presented with acute onset shortness of breath at dinner with O2 sats in the 70s with EMS.  He was admitted with an NSTEMI.  TTE with EF 20 to 25% with global hypokinesis.  Known LAD disease; however, angiogram deferred given comorbids with plan for optimizing medical management. PTA lisinopril-hydrochlorothiazide discontinued, new on lower dose lisinopril and spironolactone. Hospital course complicated by delirium - new on trazodone. He was admitted to this facility for  rehab, medical management, and nursing care.      History obtained from: facility chart records, facility staff, patient report, [facility] chart review, and [facility] chart review.      Today, Mr. Jiang is seen in his room sitting on the side of the bed. He had ambulated to the bathroom with his 2WW as he had urinated and soaked the brief. He is a limited historian (BIMS 13/15). Reports no chest pain. No dyspnea. No belly pain. No acute concerns today per discussion with nursing. He is working with therapies. Sees cardiology today.     CODE STATUS: DNR / DNI    ALLERGIES:  Allergies   Allergen Reactions    Pletal [Cilostazol]        PAST MEDICAL HISTORY:   Past Medical History:   Diagnosis Date    Actinic keratitis     Arthritis     bilat shoulders    CAD (coronary artery disease)     Cardiac arrest (H) 09/04/89    Claudication  "    Claudication     Coronary artery disease     s/p MI    Diabetes mellitus type 2, controlled (H)     Diverticulosis     HTN (hypertension)     Hyperlipidemia     Hypertension     Malignant neoplasm of prostate (H) 8/7/2006-10/5/2006    DENIED BY PATIENT (see note from 12/08/2014).  RADIOTHERAPY BY DR.CHO LEGGETT, old     Multiple pulmonary nodules     Osteoarthritis of glenohumeral joint     Left. Injected at [facility] 02/10/2015.    Peripheral vascular disease, unspecified     Syncope     Trigger finger, acquired     Type 2 diabetes mellitus (H) 11/6/2012       PAST SURGICAL HISTORY:   Past Surgical History:   Procedure Laterality Date    APPENDECTOMY      ARTHROSCOPY KNEE      ARTHROSCOPY KNEE      CARDIAC CATHETERIZATION  08/22/2018     of rca    CARDIAC SURGERY  08/22/2018    two stents placed 8/22/18    CV CORONARY ANGIOGRAM N/A 08/06/2018    Procedure: Coronary Angiogram;  Surgeon: Jeane Garcia MD;  Location: [facility] Cath Lab;  Service:     CV CORONARY ANGIOGRAM N/A 08/22/2018    Procedure: Coronary Angiogram;  Surgeon: Jeane Garcia MD;  Location: [facility] Cath Lab;  Service:     FEMORAL ENDARTERECTOMY Bilateral 05/03/2017    IR EXTREMITY ANGIOGRAM BILATERAL  03/10/2017    NOSE SURGERY      PROSTATE BIOPSY, NEEDLE, SATURATION SAMPLING  01/13/2003    \"Biopsy fo the Prostate Needle\"    RELEASE TRIGGER FINGER         FAMILY HISTORY:   Family History   Problem Relation Age of Onset    Diabetes No family hx of     Breast Cancer No family hx of     Colon Cancer No family hx of     Prostate Cancer No family hx of     Other Cancer No family hx of     No Known Problems Mother     No Known Problems Father        SOCIAL HISTORY:   Patient's living condition: lives with spouse    MEDICATIONS:  Post Discharge Medication Reconciliation Status: discharge medications reconciled and changed, per note/orders.  Current Outpatient Medications   Medication Sig Dispense Refill    acetaminophen (TYLENOL) 500 MG " "tablet Take 500 mg by mouth every 6 hours as needed.      aspirin 81 MG EC tablet Take 1 tablet by mouth at bedtime.      atorvastatin (LIPITOR) 40 MG tablet Take 1 tablet (40 mg) by mouth daily 90 tablet 3    citalopram (CELEXA) 20 MG tablet Take 1 tablet (20 mg) by mouth daily 90 tablet 3    donepezil (ARICEPT) 10 MG tablet Take 1 tablet (10 mg) by mouth at bedtime. 90 tablet 0    lactobacillus rhamnosus, GG, (CULTURELL) capsule Take 1 capsule by mouth 2 times daily.      lisinopril (ZESTRIL) 5 MG tablet Take 1 tablet (5 mg) by mouth daily.      loperamide (IMODIUM) 2 MG capsule Take 1 capsule (2 mg) by mouth 4 times daily as needed for diarrhea. 30 capsule 0    Multiple Vitamin (MULTIVITAMIN) per tablet Take 1 tablet by mouth daily.      nitroGLYcerin (NITROSTAT) 0.4 MG sublingual tablet Place 1 tablet (0.4 mg) under the tongue every 5 minutes as needed for chest pain. Repeat at 5 min intervals x 2 if needed 30 tablet 0    polyethylene glycol (MIRALAX) 17 g packet Take 1 packet by mouth daily.      spironolactone (ALDACTONE) 25 MG tablet Take 12.5 mg by mouth daily.      traZODone (DESYREL) 50 MG tablet Take 0.5 tablets (25 mg) by mouth at bedtime. 30 tablet 0    vitamin D3 (CHOLECALCIFEROL) 50 mcg (2000 units) tablet Take 1 tablet by mouth daily      zinc 50 MG TABS Take 50 mg by mouth daily.      zinc oxide (DESITIN) 40 % paste Apply topically 2 times daily. And BID PRN to sacral area      ferrous sulfate (FE TABS) 325 (65 Fe) MG EC tablet Take 325 mg by mouth every other day.      metoprolol succinate ER (TOPROL XL) 25 MG 24 hr tablet Take 0.5 tablets (12.5 mg) by mouth daily (with dinner).         ROS:  Unable to obtain due to cognitive impairment or aphasia. BIMS 13/15.     PHYSICAL EXAM:  BP (!) 158/56   Pulse 51   Temp 98  F (36.7  C)   Resp 20   Ht 1.702 m (5' 7\")   Wt 62.7 kg (138 lb 3.2 oz)   SpO2 96%   BMI 21.65 kg/m    Gen: sitting on side of the bed, alert, cooperative and in no acute " distress  Card: RRR, S1, S2, no murmurs  Resp: lungs clear to auscultation bilaterally, no crackles or wheezes; moving good air  Ext: no LE edema  Neuro: CX II-XII grossly in tact; ROM in all four extremities grossly in tact  Psych: alert and oriented to self and general situation; BIMS 13/15.      LABORATORY/IMAGING DATA:  [facility]    ASSESSMENT/PLAN:    NSTEMI  Ischemic Cardiomypathy (EF 20-25%) - NEW Diagnosis  CAD s/p PCI (2018), HTN, HLD  SBPs 120s. HR 50s-60s. Weight 132 --> 135 --> 138 lbs. Looks like hospital admit weight 146 lbs?.  Known CAD; however, angiogram deferred given age and comorbids. PTA lisinopril-hydrochlorothiazide discontinued, new on lower dose lisinopril and spironolactone.   -- ASA 81 mg daily, atorvastatin 40 mg daily, lisinopril 5 mg daily, metoprolol 12.5 mg BID and spironolactone 12.5 mg daily  -- Follow BPs, weights and clinical volume status  -- Follow-up with cardiology as scheduled    Dementia Without Behavioral Disturbance  Major Recurrent Depression   Delirium, Resolved   Lives in an AL.  PTA methylphenidate 5 mg BID discontinued during hospitalization. BIMS 13/15.   -- Donepezil 10 mg daily  -- Citalopram 20 mg daily  -- OT following for cog eval    HAI on CKD, Stage III, Resolved  Baseline Cr 1.1-1.5 with limited data. PTA lisinopril resumed at a lower dose. Cr 1.3 on 3/5 and Cr 1.22 today.   -- periodic BMP    Chronic Anemia  Baseline Hgb 10 range. Drifted to mid 8s during hospitalization.Hgb 9.2 on 3/5. No Fe labs that I can see in Epic.  Hgb 9.5 today.   -- start FeSO4 325 mg every other day    PAD s/p Bilateral Iliac Stents (2018)  -- ASA 81 mg daily, atorvastatin 40 mg daily    DM, Type II  Hgb A1c 6.2 diet controlled  -- No need to check sugars regularly    Insomnia  New on trazodone at [facility]  -- Trazodone 25 mg at bedtime    Slow Transit Constipation  -- fiber daily, ins  -- adjust bowel regimen as needed    Physical  Deconditioning  In setting of hospitalization and underlying medical conditions  -- ongoing PT/OT      Electronically signed by:  Yaritza Meza MD

## 2025-03-10 NOTE — PROGRESS NOTES
Assessment/Recommendations   Assessment:    #   Acute systolic heart failure, NYHA Class II:  Echocardiogram on 2/19/2025 showed LVEF severely reduced to 20 to 25%.    NT-proBNP was found elevated in 2000's.  Patient was treated with IV diuretic therapy and was weaned off of diuretic therapy at discharge.    Current weight is 133 to 136 pounds at TCU    Patient is well compensated on exam except reports excessive fatigue and daytime sleepiness.    On low salt diet< 2000 mg per day  On fluid restriction of 1800 mL/day.    Heart failure regimen includes:    -Beta-blocker therapy with metoprolol tartrate 12.5 mg twice a day    -ACEI  therapy with lisinopril 5 mg daily    -Aldosterone blocker/MRA therapy with spironolactone 12.5 mg daily    -Not on SGLT2 Inhibitor     -Diuretic therapy     We discussed and reviewed about heart failure, medication management, and lifestyle management including low sodium diet <2 g/day, daily weight, and staying physically active as tolerated. Patient met with Trini  HF CORE nurse clinician for heart failure education.    # Bradycardia: Heart rate in mid 40s.  Patient is reporting fatigue and daytime sleepiness.    #  Chronic Kidney Disease Stage IIIa: BMP on 3/5/2025 showed potassium 4.8 and creatinine 1.30.  Previously creatinine of 1.21.    # Coronary artery disease status post PCI/stent to RCA in 2018: Recent hospitalization with new onset of acute systolic heart failure and non-STEMI.  Cardiology was consulted.  Patient declined repeat coronary angiogram given significant dementia.  On medical therapy.  No chest pain.    # Hypertension: Blood pressure stable    # Anemia: Recently hemoglobin of 9.2    Plan/Recommendation:  -Stop metoprolol tartrate  -Start on metoprolol succinate on a lower dose 12.5 mg daily at bedtime  -Instructed to call if heart rate persistently stays less than 50 with fatigue, lightheadedness or dizziness.  We may have to stop the beta-blocker completely  " Or consider switching to carvedilol depending on blood pressure.  -BMP pending  -Continue on low-sodium diet <2000 mg per day, daily weight monitoring, and maintain fluid intake at 50 to 60 ounces per day    Follow up with general cardiology in 1 to 2 months to establish care.  Former patient of Dr. Cruz.  Follow-up with me in 3 to 4 months in heart failure clinic     The longitudinal plan of care for acute systolic heart failure was addressed during this visit.?Due to the added   complexity in care, I will continue to support Zack Johns in the subsequent management of this   condition(s) and with the ongoing continuity of care of this condition(s)\".     History of Present Illness/Subjective    Mr. Zack Johns is a 89 year old male with a past medical history of coronary disease with status post PCI/stent to RCA in 2018, peripheral artery disease with status post bilateral iliac stents in 2018, diabetes mellitus type 2, hypertension, dyslipidemia, dementia, and recent hospitalization on 2/18/2025 with non-STEMI, acute hypoxic respiratory failure likely secondary to new onset of systolic heart failure who is seen at [facility] for post hospitalization heart failure follow up.     Today, Zack is accompanied by his spouse and his son.  He is comfortably sitting in his wheelchair.  He reports stable from heart failure standpoint except he has excessive fatigue and daytime sleepiness.  He denies lightheadedness, shortness of breath, dyspnea on exertion, orthopnea, PND, palpitations, chest pain, abdominal fullness/bloating, and lower extremity edema.  He does not report any bleeding complications.    He is currently at [facility] undergoing physical therapy and Occupational Therapy.  The goal is to go back to his home once he gets stronger.    ECHO from 2/19/25-Reviewed:   Interpretation Summary   1. The left ventricle is mildly dilated with normal left " ventricular wall  thickness.  - Left ventricular function is decreased. The ejection fraction is 20-25%  (severely reduced).  - There is severe global hypokinesia of the left ventricle.  2. The right ventricle is normal size with mildly decreased right ventricular  systolic function  3. No hemodynamically significant valvular abnormalities on 2D or color flow  imaging.  4. There is no comparison study available.     Physical Examination Review of Systems   /62 (BP Location: Left arm, Patient Position: Sitting, Cuff Size: Adult Regular)   Pulse (!) 44   Resp 16   Wt 63 kg (139 lb)   BMI 21.77 kg/m    Body mass index is 21.77 kg/m .  Wt Readings from Last 3 Encounters:   03/11/25 63 kg (139 lb)   03/11/25 62.7 kg (138 lb 3.2 oz)   03/04/25 61.7 kg (136 lb)     General Appearance:   no distress, normal body habitus   ENT/Mouth: membranes moist, no oral lesions or bleeding gums.      EYES:  no scleral icterus, normal conjunctivae   Neck: no carotid bruits or thyromegaly   Chest/Lungs:   lungs are clear to auscultation, no rales or wheezing, equal chest wall expansion    Cardiovascular:   Bradycardic. Normal first and second heart sounds with no murmurs, rubs, or gallops; Jugular venous pressure flat, no edema bilaterally    Abdomen:  no organomegaly, masses, bruits, or tenderness; bowel sounds are present   Extremities   no cyanosis or clubbing    CMS intact.   Skin: no xanthelasma, warm.    Neurologic: Alert and oriented ; no tremors   Psychiatric: calm and cooperative                                                   Negative unless noted in HPI     Medical History  Surgical History Family History Social History   Past Medical History:   Diagnosis Date    Actinic keratitis     Arthritis     bilat shoulders    CAD (coronary artery disease)     Cardiac arrest (H) 09/04/89    Claudication     Claudication     Coronary artery disease     s/p MI    Diabetes mellitus type 2, controlled (H)     Diverticulosis      "HTN (hypertension)     Hyperlipidemia     Hypertension     Malignant neoplasm of prostate (H) 8/7/2006-10/5/2006    DENIED BY PATIENT (see note from 12/08/2014).  RADIOTHERAPY BY DR.CHO LEGGETT, old     Multiple pulmonary nodules     Osteoarthritis of glenohumeral joint     Left. Injected at [facility] 02/10/2015.    Peripheral vascular disease, unspecified     Syncope     Trigger finger, acquired     Type 2 diabetes mellitus (H) 11/6/2012    Past Surgical History:   Procedure Laterality Date    APPENDECTOMY      ARTHROSCOPY KNEE      ARTHROSCOPY KNEE      CARDIAC CATHETERIZATION  08/22/2018     of rca    CARDIAC SURGERY  08/22/2018    two stents placed 8/22/18    CV CORONARY ANGIOGRAM N/A 08/06/2018    Procedure: Coronary Angiogram;  Surgeon: Jeane Garcia MD;  Location: [facility];  Service:     CV CORONARY ANGIOGRAM N/A 08/22/2018    Procedure: Coronary Angiogram;  Surgeon: Jeane Garcia MD;  Location: [facility] Cath [facility];  Service:     FEMORAL ENDARTERECTOMY Bilateral 05/03/2017    IR EXTREMITY ANGIOGRAM BILATERAL  03/10/2017    NOSE SURGERY      PROSTATE BIOPSY, NEEDLE, SATURATION SAMPLING  01/13/2003    \"Biopsy fo the Prostate Needle\"    RELEASE TRIGGER FINGER      Family History   Problem Relation Age of Onset    Diabetes No family hx of     Breast Cancer No family hx of     Colon Cancer No family hx of     Prostate Cancer No family hx of     Other Cancer No family hx of     No Known Problems Mother     No Known Problems Father     Social History     Socioeconomic History    Marital status:      Spouse name: Not on file    Number of children: Not on file    Years of education: Not on file    Highest education level: Not on file   Occupational History    Not on file   Tobacco Use    Smoking status: Former     Current packs/day: 0.00     Average packs/day: 2.0 packs/day for 40.0 years (80.0 ttl pk-yrs)     Types: Cigarettes     Start date: 4/10/1944     Quit date: 4/10/1984     Years " since quittin.9     Passive exposure: Never    Smokeless tobacco: Never   Substance and Sexual Activity    Alcohol use: Yes     Alcohol/week: 6.0 standard drinks of alcohol     Comment: occasionally.    Drug use: No    Sexual activity: Not on file   Other Topics Concern    Parent/sibling w/ CABG, MI or angioplasty before 65F 55M? Not Asked   Social History Narrative    Was drafted into the [employer] between [place] and [place]. Worked in admin.        Had gotten into typing to meet girls.        Worked for [employer] after leaving the [employer].     Social Drivers of Health     Financial Resource Strain: Unknown (2025)    Financial Resource Strain     Within the past 12 months, have you or your family members you live with been unable to get utilities (heat, electricity) when it was really needed?: Patient unable to answer   Food Insecurity: Unknown (2025)    Food Insecurity     Within the past 12 months, did you worry that your food would run out before you got money to buy more?: Patient unable to answer     Within the past 12 months, did the food you bought just not last and you didn t have money to get more?: Patient unable to answer   Transportation Needs: Unknown (2025)    Transportation Needs     Within the past 12 months, has lack of transportation kept you from medical appointments, getting your medicines, non-medical meetings or appointments, work, or from getting things that you need?: Patient unable to answer   Physical Activity: Not on file   Stress: Not on file   Social Connections: Not on file   Interpersonal Safety: Low Risk  (2025)    Interpersonal Safety     Do you feel physically and emotionally safe where you currently live?: Yes     Within the past 12 months, have you been hit, slapped, kicked or otherwise physically hurt by someone?: No     Within the past 12 months, have you been humiliated or emotionally abused in other ways by your partner or ex-partner?: No   Housing Stability: Unknown  (2/25/2025)    Housing Stability     Do you have housing? : Patient unable to answer     Are you worried about losing your housing?: Patient unable to answer          Medications  Allergies   Current Outpatient Medications   Medication Sig Dispense Refill    acetaminophen (TYLENOL) 500 MG tablet Take 500 mg by mouth every 6 hours as needed.      aspirin 81 MG EC tablet Take 1 tablet by mouth at bedtime.      atorvastatin (LIPITOR) 40 MG tablet Take 1 tablet (40 mg) by mouth daily 90 tablet 3    citalopram (CELEXA) 20 MG tablet Take 1 tablet (20 mg) by mouth daily 90 tablet 3    donepezil (ARICEPT) 10 MG tablet Take 1 tablet (10 mg) by mouth at bedtime. 90 tablet 0    lactobacillus rhamnosus, GG, (CULTURELL) capsule Take 1 capsule by mouth 2 times daily.      lisinopril (ZESTRIL) 5 MG tablet Take 1 tablet (5 mg) by mouth daily.      loperamide (IMODIUM) 2 MG capsule Take 1 capsule (2 mg) by mouth 4 times daily as needed for diarrhea. 30 capsule 0    metoprolol succinate ER (TOPROL XL) 25 MG 24 hr tablet Take 0.5 tablets (12.5 mg) by mouth daily (with dinner).      Multiple Vitamin (MULTIVITAMIN) per tablet Take 1 tablet by mouth daily.      nitroGLYcerin (NITROSTAT) 0.4 MG sublingual tablet Place 1 tablet (0.4 mg) under the tongue every 5 minutes as needed for chest pain. Repeat at 5 min intervals x 2 if needed 30 tablet 0    polyethylene glycol (MIRALAX) 17 g packet Take 1 packet by mouth daily.      spironolactone (ALDACTONE) 25 MG tablet Take 12.5 mg by mouth daily.      traZODone (DESYREL) 50 MG tablet Take 0.5 tablets (25 mg) by mouth at bedtime. 30 tablet 0    vitamin D3 (CHOLECALCIFEROL) 50 mcg (2000 units) tablet Take 1 tablet by mouth daily      zinc 50 MG TABS Take 50 mg by mouth daily.      zinc oxide (DESITIN) 40 % paste Apply topically 2 times daily. And BID PRN to sacral area      Allergies   Allergen Reactions    Pletal [Cilostazol]          Lab Results    Chemistry/lipid CBC Cardiac  Enzymes/BNP/TSH/INR   Lab Results   Component Value Date    CHOL 124 11/30/2021    HDL 41 11/30/2021    TRIG 181 (H) 11/30/2021    BUN 42.5 (H) 03/05/2025     03/05/2025    CO2 21 (L) 03/05/2025    Lab Results   Component Value Date    WBC 7.6 03/05/2025    HGB 9.2 (L) 03/05/2025    HCT 30.7 (L) 03/05/2025    MCV 98 03/05/2025     03/05/2025    Lab Results   Component Value Date    TROPONINI 0.03 10/27/2019    BNP 59 06/16/2018    TSH 1.45 03/21/2023    INR 1.17 (H) 10/30/2019        40 minutes spent on the date of encounter doing chart review, review of test results, interpretation with above tests, patient visit, documentation, and discussion with family.      This note has been dictated using voice recognition software. Any grammatical, typographical, or context distortions are unintentional and inherent to the software

## 2025-03-11 ENCOUNTER — APPOINTMENT (OUTPATIENT)
Dept: CARDIOLOGY | Facility: CLINIC | Age: OVER 89
End: 2025-03-11
Payer: COMMERCIAL

## 2025-03-11 ENCOUNTER — TELEPHONE (OUTPATIENT)
Dept: GERIATRICS | Facility: CLINIC | Age: OVER 89
End: 2025-03-11

## 2025-03-11 ENCOUNTER — TRANSITIONAL CARE UNIT VISIT (OUTPATIENT)
Dept: GERIATRICS | Facility: CLINIC | Age: OVER 89
End: 2025-03-11
Payer: COMMERCIAL

## 2025-03-11 ENCOUNTER — OFFICE VISIT (OUTPATIENT)
Dept: CARDIOLOGY | Facility: CLINIC | Age: OVER 89
End: 2025-03-11
Payer: COMMERCIAL

## 2025-03-11 VITALS
BODY MASS INDEX: 21.77 KG/M2 | RESPIRATION RATE: 16 BRPM | WEIGHT: 139 LBS | DIASTOLIC BLOOD PRESSURE: 62 MMHG | SYSTOLIC BLOOD PRESSURE: 124 MMHG | HEART RATE: 44 BPM

## 2025-03-11 VITALS
DIASTOLIC BLOOD PRESSURE: 56 MMHG | HEART RATE: 51 BPM | OXYGEN SATURATION: 96 % | WEIGHT: 138.2 LBS | BODY MASS INDEX: 21.69 KG/M2 | HEIGHT: 67 IN | TEMPERATURE: 98 F | SYSTOLIC BLOOD PRESSURE: 158 MMHG | RESPIRATION RATE: 20 BRPM

## 2025-03-11 DIAGNOSIS — F03.90 DEMENTIA WITHOUT BEHAVIORAL DISTURBANCE, PSYCHOTIC DISTURBANCE, MOOD DISTURBANCE, OR ANXIETY, UNSPECIFIED DEMENTIA SEVERITY, UNSPECIFIED DEMENTIA TYPE (H): ICD-10-CM

## 2025-03-11 DIAGNOSIS — I50.9 ACUTE DECOMPENSATED HEART FAILURE (H): Primary | ICD-10-CM

## 2025-03-11 DIAGNOSIS — N18.31 STAGE 3A CHRONIC KIDNEY DISEASE (CKD) (H): ICD-10-CM

## 2025-03-11 DIAGNOSIS — I25.10 CORONARY ARTERY DISEASE INVOLVING NATIVE CORONARY ARTERY OF NATIVE HEART WITHOUT ANGINA PECTORIS: ICD-10-CM

## 2025-03-11 DIAGNOSIS — N17.9 ACUTE KIDNEY INJURY SUPERIMPOSED ON CKD: ICD-10-CM

## 2025-03-11 DIAGNOSIS — R53.81 PHYSICAL DECONDITIONING: ICD-10-CM

## 2025-03-11 DIAGNOSIS — I50.21 ACUTE SYSTOLIC CONGESTIVE HEART FAILURE (H): ICD-10-CM

## 2025-03-11 DIAGNOSIS — D64.9 ANEMIA, UNSPECIFIED TYPE: Primary | ICD-10-CM

## 2025-03-11 DIAGNOSIS — N18.9 ACUTE KIDNEY INJURY SUPERIMPOSED ON CKD: ICD-10-CM

## 2025-03-11 DIAGNOSIS — I25.5 ISCHEMIC CARDIOMYOPATHY: ICD-10-CM

## 2025-03-11 DIAGNOSIS — E78.5 HYPERLIPIDEMIA, UNSPECIFIED HYPERLIPIDEMIA TYPE: ICD-10-CM

## 2025-03-11 DIAGNOSIS — F33.9 RECURRENT MAJOR DEPRESSIVE DISORDER, REMISSION STATUS UNSPECIFIED: ICD-10-CM

## 2025-03-11 DIAGNOSIS — I10 ESSENTIAL HYPERTENSION, BENIGN: ICD-10-CM

## 2025-03-11 DIAGNOSIS — I21.4 NSTEMI (NON-ST ELEVATED MYOCARDIAL INFARCTION) (H): ICD-10-CM

## 2025-03-11 DIAGNOSIS — D64.9 ANEMIA, UNSPECIFIED TYPE: ICD-10-CM

## 2025-03-11 DIAGNOSIS — E11.9 TYPE 2 DIABETES MELLITUS WITHOUT COMPLICATION, WITHOUT LONG-TERM CURRENT USE OF INSULIN (H): ICD-10-CM

## 2025-03-11 DIAGNOSIS — I73.9 PAD (PERIPHERAL ARTERY DISEASE): ICD-10-CM

## 2025-03-11 DIAGNOSIS — R00.1 BRADYCARDIA: ICD-10-CM

## 2025-03-11 DIAGNOSIS — G47.01 INSOMNIA DUE TO MEDICAL CONDITION: ICD-10-CM

## 2025-03-11 DIAGNOSIS — I25.10 ATHEROSCLEROSIS OF NATIVE CORONARY ARTERY OF NATIVE HEART WITHOUT ANGINA PECTORIS: ICD-10-CM

## 2025-03-11 DIAGNOSIS — K59.01 SLOW TRANSIT CONSTIPATION: ICD-10-CM

## 2025-03-11 DIAGNOSIS — I12.9 BENIGN HYPERTENSIVE KIDNEY DISEASE WITH CHRONIC KIDNEY DISEASE STAGE I THROUGH STAGE IV, OR UNSPECIFIED: ICD-10-CM

## 2025-03-11 LAB
ANION GAP SERPL CALCULATED.3IONS-SCNC: 9 MMOL/L (ref 7–15)
BUN SERPL-MCNC: 33.5 MG/DL (ref 8–23)
CALCIUM SERPL-MCNC: 9.8 MG/DL (ref 8.8–10.4)
CHLORIDE SERPL-SCNC: 104 MMOL/L (ref 98–107)
CREAT SERPL-MCNC: 1.22 MG/DL (ref 0.67–1.17)
EGFRCR SERPLBLD CKD-EPI 2021: 56 ML/MIN/1.73M2
GLUCOSE SERPL-MCNC: 106 MG/DL (ref 70–99)
HCO3 SERPL-SCNC: 25 MMOL/L (ref 22–29)
HGB BLD-MCNC: 9.5 G/DL (ref 13.3–17.7)
IRON BINDING CAPACITY (ROCHE): 254 UG/DL (ref 240–430)
IRON SATN MFR SERPL: 20 % (ref 15–46)
IRON SERPL-MCNC: 50 UG/DL (ref 61–157)
POTASSIUM SERPL-SCNC: 5.2 MMOL/L (ref 3.4–5.3)
SODIUM SERPL-SCNC: 138 MMOL/L (ref 135–145)

## 2025-03-11 PROCEDURE — 36415 COLL VENOUS BLD VENIPUNCTURE: CPT | Performed by: NURSE PRACTITIONER

## 2025-03-11 PROCEDURE — G2211 COMPLEX E/M VISIT ADD ON: HCPCS | Performed by: NURSE PRACTITIONER

## 2025-03-11 PROCEDURE — 80048 BASIC METABOLIC PNL TOTAL CA: CPT | Performed by: NURSE PRACTITIONER

## 2025-03-11 PROCEDURE — 3078F DIAST BP <80 MM HG: CPT | Performed by: NURSE PRACTITIONER

## 2025-03-11 PROCEDURE — 99215 OFFICE O/P EST HI 40 MIN: CPT | Performed by: NURSE PRACTITIONER

## 2025-03-11 PROCEDURE — 3074F SYST BP LT 130 MM HG: CPT | Performed by: NURSE PRACTITIONER

## 2025-03-11 RX ORDER — METOPROLOL SUCCINATE 25 MG/1
12.5 TABLET, EXTENDED RELEASE ORAL
Status: SHIPPED
Start: 2025-03-11

## 2025-03-11 RX ORDER — FERROUS SULFATE 325(65) MG
325 TABLET, DELAYED RELEASE (ENTERIC COATED) ORAL EVERY OTHER DAY
COMMUNITY

## 2025-03-11 RX ORDER — POLYETHYLENE GLYCOL 3350 17 G/17G
1 POWDER, FOR SOLUTION ORAL DAILY
COMMUNITY

## 2025-03-11 NOTE — PATIENT INSTRUCTIONS
Zack Johns,    It was a pleasure to see you today at the [facility].     My recommendations after this visit include:    -Stop metoprolol  tartrate today    -Start metoprolol succinate 12.5 mg daily at suppertime    -Please call if  your heart rate persistently stays <50 with fatigue, lightheadedness or dizziness    - We will follow up with your lab result    - Follow up with general cardiology in 1-2 months to establish care    - Follow up with Janine Corbin CNP in 3-4 months in Heart Failure clinic     - Please call Heart Failure Nurse Line at [phone], if you have any questions or concerns    Janine Corbin CNP       What is the Health system Heart Failure Program?     The [facility] Program is a[facility] failure specialty clinic within [facility].  You will work with your cardiologist, nurse practitioner, and nurses to carefully adjust medications and learn how to live with heart failure.  The Heart FailureProgram will help you:    Better understand your chronic heart condition  Feel better and avoid hospital stays    Monitoring for Symptoms      Call the Heart Failure Phone Line ([phone]) if you have any of these symptoms:   Increased shortness of breath/shortness ofbreath at rest  Waking up at night with difficulty breathing  Unable to lie down for sleep due to symptoms or needing to sit uprightfor sleep  Weight gain of 2 pounds a day for 2 days in a row OR 5 pounds in 1 week  Increased swelling in your ankles or legs  Dizziness or lightheadedness    Medications     Take your medications as prescribed  Bring all your medications in their original bottles to every appointment  Avoid non-steroidal anti-inflammatory medications (Advil,Aleve, Ibuprofen, Naprosyn, Naproxen, Celebrex)  Do not stop taking your medications or begin taking over-the-counter or herbal medications without first talking to your doctor ornurse practitioner    Diet and Lifestyle      Limit sodium/salt to 2000 mg daily   Read food labels for sodium content  Do not add salt when cooking or add salt at the table  Weigh yourself every day and record in your daily weight log   Call if you gain 2 pounds a day for 2 days in a row OR 5 pounds in 1 week  Bring daily weight log to every appointment  Stay active, pace yourself, listen to yourbody, and rest when tired  Elevate your legs if they are swollen. Ask about using compression/support stockings  Stop smoking  Lose weight if you are overweight  Avoid drinking alcohol or limit amount  Stay updated on your immunizations including flu and pneumonia vaccines

## 2025-03-11 NOTE — NURSING NOTE
"[facility]: Heart Failure Care Coordination   Heart Failure Education    Situation/Background:      RN CC provided heart failure education to patient, wife and son during clinic visit.    Assessment:      Living situation: [place] in [facility]    Barriers to Heart Failure follow-up: Cognitive impairment history of dementia    Medication management: facility-dispensed    CHF assessment:  No assessment indicated.     Goals Addressed    None         Intervention/Plan:      CM/HF education topics reviewed:  Low sodium: 2000 mg or less daily, meal choices and label reading   Fluid Restriction: 50-60 oz of fluids daily, reports this will be a challenge, currently has a \"cup of coffee and some juice daily\" family supportive encouraging pt to increase his fluid intake.   Daily weight monitoring and logging   Medication review and importance of compliance   Home blood pressure monitoring and logging   Overview of C.O.R.E. clinic   Overview of heart failure appointments and testing   Importance of exercise   Symptoms of HF to be reported to Core Team      Education materials provided:  Low sodium food and drink handout  Low sodium food product examples  HF stoplight tool  Guide to HF booklet  Cardiac medication handout  C.O.R.E. information brochure     HF resources reviewed:  Heart Failure support group      RN CC reviewed and reinforced fluid/dietary sodium restrictions; patient stated understanding. Wife reports prior to hospital admission pt was eating \"Go Breakfast at least 4 times a week\" she was very expressive to pt these habits will change when he goes home, encouraged to maintain the 2000 mg low sodium diet, alternatives discussed.     Patient expressed understanding of above education/instructions and denied further questions at this time.     Trini Clements RN  "

## 2025-03-11 NOTE — LETTER
3/11/2025    Haim Galaviz MD  [address] [place] [address]    RE: Zack JEAN Andreas       Dear Colleague,     I had the pleasure of seeing Zack JEAN Andreas in the [facility].          Assessment/Recommendations   Assessment:    #   Acute systolic heart failure, NYHA Class II:  Echocardiogram on 2/19/2025 showed LVEF severely reduced to 20 to 25%.    NT-proBNP was found elevated in 2000's.  Patient was treated with IV diuretic therapy and was weaned off of diuretic therapy at discharge.    Current weight is 133 to 136 pounds at TCU    Patient is well compensated on exam except reports excessive fatigue and daytime sleepiness.    On low salt diet< 2000 mg per day  On fluid restriction of 1800 mL/day.    Heart failure regimen includes:    -Beta-blocker therapy with metoprolol tartrate 12.5 mg twice a day    -ACEI  therapy with lisinopril 5 mg daily    -Aldosterone blocker/MRA therapy with spironolactone 12.5 mg daily    -Not on SGLT2 Inhibitor     -Diuretic therapy     We discussed and reviewed about heart failure, medication management, and lifestyle management including low sodium diet <2 g/day, daily weight, and staying physically active as tolerated. Patient met with Trini  [facility] CORE nurse clinician for heart failure education.    # Bradycardia: Heart rate in mid 40s.  Patient is reporting fatigue and daytime sleepiness.    #  Chronic Kidney Disease Stage IIIa: BMP on 3/5/2025 showed potassium 4.8 and creatinine 1.30.  Previously creatinine of 1.21.    # Coronary artery disease status post PCI/stent to RCA in 2018: Recent hospitalization with new onset of acute systolic heart failure and non-STEMI.  Cardiology was consulted.  Patient declined repeat coronary angiogram given significant dementia.  On medical therapy.  No chest pain.    # Hypertension: Blood pressure stable    # Anemia: Recently hemoglobin of 9.2    Plan/Recommendation:  -Stop metoprolol tartrate  -Start on  "metoprolol succinate on a lower dose 12.5 mg daily at bedtime  -Instructed to call if heart rate persistently stays less than 50 with fatigue, lightheadedness or dizziness.  We may have to stop the beta-blocker completely  Or consider switching to carvedilol depending on blood pressure.  -BMP pending  -Continue on low-sodium diet <2000 mg per day, daily weight monitoring, and maintain fluid intake at 50 to 60 ounces per day    Follow up with general cardiology in 1 to 2 months to establish care.  Former patient of Dr. Cruz.  Follow-up with me in 3 to 4 months in heart failure clinic     The longitudinal plan of care for acute systolic heart failure was addressed during this visit.?Due to the added   complexity in care, I will continue to support Zack Johns in the subsequent management of this   condition(s) and with the ongoing continuity of care of this condition(s)\".     History of Present Illness/Subjective    Mr. Zack Johns is a 89 year old male with a past medical history of coronary disease with status post PCI/stent to RCA in 2018, peripheral artery disease with status post bilateral iliac stents in 2018, diabetes mellitus type 2, hypertension, dyslipidemia, dementia, and recent hospitalization on 2/18/2025 with non-STEMI, acute hypoxic respiratory failure likely secondary to new onset of systolic heart failure who is seen at [facility] for post hospitalization heart failure follow up.     Today, Zack is accompanied by his spouse and his son.  He is comfortably sitting in his wheelchair.  He reports stable from heart failure standpoint except he has excessive fatigue and daytime sleepiness.  He denies lightheadedness, shortness of breath, dyspnea on exertion, orthopnea, PND, palpitations, chest pain, abdominal fullness/bloating, and lower extremity edema.  He does not report any bleeding complications.    He is currently at [facility] undergoing " physical therapy and Occupational Therapy.  The goal is to go back to his home once he gets stronger.    ECHO from 2/19/25-Reviewed:   Interpretation Summary   1. The left ventricle is mildly dilated with normal left ventricular wall  thickness.  - Left ventricular function is decreased. The ejection fraction is 20-25%  (severely reduced).  - There is severe global hypokinesia of the left ventricle.  2. The right ventricle is normal size with mildly decreased right ventricular  systolic function  3. No hemodynamically significant valvular abnormalities on 2D or color flow  imaging.  4. There is no comparison study available.     Physical Examination Review of Systems   /62 (BP Location: Left arm, Patient Position: Sitting, Cuff Size: Adult Regular)   Pulse (!) 44   Resp 16   Wt 63 kg (139 lb)   BMI 21.77 kg/m    Body mass index is 21.77 kg/m .  Wt Readings from Last 3 Encounters:   03/11/25 63 kg (139 lb)   03/11/25 62.7 kg (138 lb 3.2 oz)   03/04/25 61.7 kg (136 lb)     General Appearance:   no distress, normal body habitus   ENT/Mouth: membranes moist, no oral lesions or bleeding gums.      EYES:  no scleral icterus, normal conjunctivae   Neck: no carotid bruits or thyromegaly   Chest/Lungs:   lungs are clear to auscultation, no rales or wheezing, equal chest wall expansion    Cardiovascular:   Bradycardic. Normal first and second heart sounds with no murmurs, rubs, or gallops; Jugular venous pressure flat, no edema bilaterally    Abdomen:  no organomegaly, masses, bruits, or tenderness; bowel sounds are present   Extremities   no cyanosis or clubbing    CMS intact.   Skin: no xanthelasma, warm.    Neurologic: Alert and oriented ; no tremors   Psychiatric: calm and cooperative                                                   Negative unless noted in HPI     Medical History  Surgical History Family History Social History   Past Medical History:   Diagnosis Date     Actinic keratitis      Arthritis      "bilat shoulders     CAD (coronary artery disease)      Cardiac arrest (H) 09/04/89     Claudication      Claudication      Coronary artery disease     s/p MI     Diabetes mellitus type 2, controlled (H)      Diverticulosis      HTN (hypertension)      Hyperlipidemia      Hypertension      Malignant neoplasm of prostate (H) 8/7/2006-10/5/2006    DENIED BY PATIENT (see note from 12/08/2014).  RADIOTHERAPY BY DR.CHO LEGGETT, old      Multiple pulmonary nodules      Osteoarthritis of glenohumeral joint     Left. Injected at [facility] 02/10/2015.     Peripheral vascular disease, unspecified      Syncope      Trigger finger, acquired      Type 2 diabetes mellitus (H) 11/6/2012    Past Surgical History:   Procedure Laterality Date     APPENDECTOMY       ARTHROSCOPY KNEE       ARTHROSCOPY KNEE       CARDIAC CATHETERIZATION  08/22/2018     of rca     CARDIAC SURGERY  08/22/2018    two stents placed 8/22/18     CV CORONARY ANGIOGRAM N/A 08/06/2018    Procedure: Coronary Angiogram;  Surgeon: Jeane Garcia MD;  Location: [facility];  Service:      CV CORONARY ANGIOGRAM N/A 08/22/2018    Procedure: Coronary Angiogram;  Surgeon: Jeane Garcia MD;  Location: [facility];  Service:      FEMORAL ENDARTERECTOMY Bilateral 05/03/2017     IR EXTREMITY ANGIOGRAM BILATERAL  03/10/2017     NOSE SURGERY       PROSTATE BIOPSY, NEEDLE, SATURATION SAMPLING  01/13/2003    \"Biopsy fo the Prostate Needle\"     RELEASE TRIGGER FINGER      Family History   Problem Relation Age of Onset     Diabetes No family hx of      Breast Cancer No family hx of      Colon Cancer No family hx of      Prostate Cancer No family hx of      Other Cancer No family hx of      No Known Problems Mother      No Known Problems Father     Social History     Socioeconomic History     Marital status:      Spouse name: Not on file     Number of children: Not on file     Years of education: Not on file     Highest education level: Not on file "   Occupational History     Not on file   Tobacco Use     Smoking status: Former     Current packs/day: 0.00     Average packs/day: 2.0 packs/day for 40.0 years (80.0 ttl pk-yrs)     Types: Cigarettes     Start date: 4/10/1944     Quit date: 4/10/1984     Years since quittin.9     Passive exposure: Never     Smokeless tobacco: Never   Substance and Sexual Activity     Alcohol use: Yes     Alcohol/week: 6.0 standard drinks of alcohol     Comment: occasionally.     Drug use: No     Sexual activity: Not on file   Other Topics Concern     Parent/sibling w/ CABG, MI or angioplasty before 65F 55M? 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        Had gotten into typing to meet girls.        Worked for [employer] after leaving the [employer].     Social Drivers of Health     Financial Resource Strain: Unknown (2025)    Financial Resource Strain      Within the past 12 months, have you or your family members you live with been unable to get utilities (heat, electricity) when it was really needed?: Patient unable to answer   Food Insecurity: Unknown (2025)    Food Insecurity      Within the past 12 months, did you worry that your food would run out before you got money to buy more?: Patient unable to answer      Within the past 12 months, did the food you bought just not last and you didn t have money to get more?: Patient unable to answer   Transportation Needs: Unknown (2025)    Transportation Needs      Within the past 12 months, has lack of transportation kept you from medical appointments, getting your medicines, non-medical meetings or appointments, work, or from getting things that you need?: Patient unable to answer   Physical Activity: Not on file   Stress: Not on file   Social Connections: Not on file   Interpersonal Safety: Low Risk  (2025)    Interpersonal Safety      Do you feel physically and emotionally safe where you currently live?:  Yes      Within the past 12 months, have you been hit, slapped, kicked or otherwise physically hurt by someone?: No      Within the past 12 months, have you been humiliated or emotionally abused in other ways by your partner or ex-partner?: No   Housing Stability: Unknown (2/25/2025)    Housing Stability      Do you have housing? : Patient unable to answer      Are you worried about losing your housing?: Patient unable to answer          Medications  Allergies   Current Outpatient Medications   Medication Sig Dispense Refill     acetaminophen (TYLENOL) 500 MG tablet Take 500 mg by mouth every 6 hours as needed.       aspirin 81 MG EC tablet Take 1 tablet by mouth at bedtime.       atorvastatin (LIPITOR) 40 MG tablet Take 1 tablet (40 mg) by mouth daily 90 tablet 3     citalopram (CELEXA) 20 MG tablet Take 1 tablet (20 mg) by mouth daily 90 tablet 3     donepezil (ARICEPT) 10 MG tablet Take 1 tablet (10 mg) by mouth at bedtime. 90 tablet 0     lactobacillus rhamnosus, GG, (CULTURELL) capsule Take 1 capsule by mouth 2 times daily.       lisinopril (ZESTRIL) 5 MG tablet Take 1 tablet (5 mg) by mouth daily.       loperamide (IMODIUM) 2 MG capsule Take 1 capsule (2 mg) by mouth 4 times daily as needed for diarrhea. 30 capsule 0     metoprolol succinate ER (TOPROL XL) 25 MG 24 hr tablet Take 0.5 tablets (12.5 mg) by mouth daily (with dinner).       Multiple Vitamin (MULTIVITAMIN) per tablet Take 1 tablet by mouth daily.       nitroGLYcerin (NITROSTAT) 0.4 MG sublingual tablet Place 1 tablet (0.4 mg) under the tongue every 5 minutes as needed for chest pain. Repeat at 5 min intervals x 2 if needed 30 tablet 0     polyethylene glycol (MIRALAX) 17 g packet Take 1 packet by mouth daily.       spironolactone (ALDACTONE) 25 MG tablet Take 12.5 mg by mouth daily.       traZODone (DESYREL) 50 MG tablet Take 0.5 tablets (25 mg) by mouth at bedtime. 30 tablet 0     vitamin D3 (CHOLECALCIFEROL) 50 mcg (2000 units) tablet Take 1  tablet by mouth daily       zinc 50 MG TABS Take 50 mg by mouth daily.       zinc oxide (DESITIN) 40 % paste Apply topically 2 times daily. And BID PRN to sacral area      Allergies   Allergen Reactions     Pletal [Cilostazol]          Lab Results    Chemistry/lipid CBC Cardiac Enzymes/BNP/TSH/INR   Lab Results   Component Value Date    CHOL 124 11/30/2021    HDL 41 11/30/2021    TRIG 181 (H) 11/30/2021    BUN 42.5 (H) 03/05/2025     03/05/2025    CO2 21 (L) 03/05/2025    Lab Results   Component Value Date    WBC 7.6 03/05/2025    HGB 9.2 (L) 03/05/2025    HCT 30.7 (L) 03/05/2025    MCV 98 03/05/2025     03/05/2025    Lab Results   Component Value Date    TROPONINI 0.03 10/27/2019    BNP 59 06/16/2018    TSH 1.45 03/21/2023    INR 1.17 (H) 10/30/2019        40 minutes spent on the date of encounter doing chart review, review of test results, interpretation with above tests, patient visit, documentation, and discussion with family.      This note has been dictated using voice recognition software. Any grammatical, typographical, or context distortions are unintentional and inherent to the software          Thank you for allowing me to participate in the care of your patient.      Sincerely,     CHANDAN Albert [facility]  cc:   Taryn Taylor MD  [address] [facility]   [place] [phone]

## 2025-03-11 NOTE — TELEPHONE ENCOUNTER
[facility] Lab Note     Provider: CHANDAN Ruth DNP  Facility: [facility]  Facility Type:  TCU    Allergies   Allergen Reactions    Pletal [Cilostazol]        Labs Reviewed by provider: BMP, Iron, Hgb     Verbal Order/Direction given by Provider: Cr improved; push fluids qshift. Hgb not worsening. Start Fe sulfate 325 mg every other day (dx: iron deficiency).     Provider giving Order:  CHANDAN Ruth DNP    Verbal Order given to: Chandra Patterson

## 2025-03-14 ENCOUNTER — DISCHARGE SUMMARY NURSING HOME (OUTPATIENT)
Dept: GERIATRICS | Facility: CLINIC | Age: OVER 89
End: 2025-03-14
Payer: COMMERCIAL

## 2025-03-14 VITALS
OXYGEN SATURATION: 96 % | WEIGHT: 138.5 LBS | BODY MASS INDEX: 21.74 KG/M2 | TEMPERATURE: 98.2 F | RESPIRATION RATE: 18 BRPM | DIASTOLIC BLOOD PRESSURE: 76 MMHG | HEART RATE: 18 BPM | HEIGHT: 67 IN | SYSTOLIC BLOOD PRESSURE: 132 MMHG

## 2025-03-14 DIAGNOSIS — N17.9 ACUTE KIDNEY INJURY: ICD-10-CM

## 2025-03-14 DIAGNOSIS — E78.5 HYPERLIPIDEMIA, UNSPECIFIED HYPERLIPIDEMIA TYPE: ICD-10-CM

## 2025-03-14 DIAGNOSIS — N18.31 STAGE 3A CHRONIC KIDNEY DISEASE (CKD) (H): ICD-10-CM

## 2025-03-14 DIAGNOSIS — F01.B18 MODERATE VASCULAR DEMENTIA WITH OTHER BEHAVIORAL DISTURBANCE (H): ICD-10-CM

## 2025-03-14 DIAGNOSIS — I73.9 PAD (PERIPHERAL ARTERY DISEASE): ICD-10-CM

## 2025-03-14 DIAGNOSIS — D64.9 ANEMIA, UNSPECIFIED TYPE: ICD-10-CM

## 2025-03-14 DIAGNOSIS — F33.9 RECURRENT MAJOR DEPRESSIVE DISORDER, REMISSION STATUS UNSPECIFIED: ICD-10-CM

## 2025-03-14 DIAGNOSIS — E87.6 HYPOKALEMIA: ICD-10-CM

## 2025-03-14 DIAGNOSIS — I25.5 ISCHEMIC CARDIOMYOPATHY: ICD-10-CM

## 2025-03-14 DIAGNOSIS — Z74.09 IMPAIRED MOBILITY AND ACTIVITIES OF DAILY LIVING: ICD-10-CM

## 2025-03-14 DIAGNOSIS — I25.10 CORONARY ARTERY DISEASE INVOLVING NATIVE CORONARY ARTERY OF NATIVE HEART WITHOUT ANGINA PECTORIS: ICD-10-CM

## 2025-03-14 DIAGNOSIS — I50.20 SYSTOLIC CONGESTIVE HEART FAILURE, UNSPECIFIED HF CHRONICITY (H): ICD-10-CM

## 2025-03-14 DIAGNOSIS — R53.81 PHYSICAL DECONDITIONING: ICD-10-CM

## 2025-03-14 DIAGNOSIS — K59.01 SLOW TRANSIT CONSTIPATION: ICD-10-CM

## 2025-03-14 DIAGNOSIS — Z78.9 IMPAIRED MOBILITY AND ACTIVITIES OF DAILY LIVING: ICD-10-CM

## 2025-03-14 DIAGNOSIS — I21.4 NSTEMI (NON-ST ELEVATED MYOCARDIAL INFARCTION) (H): Primary | ICD-10-CM

## 2025-03-14 DIAGNOSIS — I10 ESSENTIAL HYPERTENSION: ICD-10-CM

## 2025-03-14 PROCEDURE — 99316 NF DSCHRG MGMT 30 MIN+: CPT

## 2025-03-14 NOTE — PROGRESS NOTES
[facility] GERIATRICS DISCHARGE SUMMARY  PATIENT'S NAME: Zack Johns  YOB: 1934  MEDICAL RECORD NUMBER:  [id number]  Place of Service where encounter took place:  [facility] ([facility]) [[id number]]    PRIMARY CARE PROVIDER AND CLINIC RESPONSIBLE AFTER TRANSFER:   Haim Galaviz MD, [address] [facility] / [place] [address]      Transferring providers: Mell Bravo DNP, Yaritza Meza MD  Recent Hospitalization/ED:  [facility] stay 2/18/25 to 2/27/25.  Date of SNF Admission: 2/27/25  Date of [facility] (anticipated) Discharge:  3/19/25  Discharged to: previous assisted living, with spouse  Cognitive Scores: BIMS: 13/15  Physical Function: Ambulating 300 ft with 2ww  DME: No DME needed    CODE STATUS/ADVANCE DIRECTIVES DISCUSSION:  No CPR- Do NOT Intubate   ALLERGIES: Pletal [cilostazol]    NURSING FACILITY COURSE   Medication Changes/Rationale:   See below    Summary of nursing facility stay:   Zack Johns  is a 89 year old  (9/19/1934), with PMH: CAD s/p stent (2018), PAD, T2DM, HTN, HLD, and dementia (MoCA 20/30 in March 2023). Presented to ED for evaluation of hypoxia. Workup noted BNP 2740 and CXR with increased interstitial markings concerning for pulmonary edema. Troponin 43>103; EKG showed sinus bradycardia with PVC without acute ischemic changes compared to prior. Cardiology consulted; recommended herpain gtt. Echo (2/19) with EF 20-25% with global hypokinesis of LV. Patient and wife not interested in another coronary angiogram +/- stent; plan medical management with ASA, statin, lisinopril, and metoprolol. During hospital stay, patient received IV lasix x1 and IV albumin x2 for HAI. Hypokalemia noted and replaced. Discharged to TCU for medical management, rehab, and nursing cares.     Today:  Nursing staff reports no acute medical concerns.    Patient is seen today for a visit in his room accompanied by his wife, Sadie.  He is napping in the  "recliner.  Feels \"tired\".  Notes \"not too much\" pain.  Therapies have been going \"okay\"; reportedly walking and doing exercises with no concerns.  Denies shortness of breath.  Appetite is \"pretty good\"; depending on certain foods.  Last BM was \"yesterday\"; wife reports patient's BMs are more loose; \"explosive\" causing her to clean the toilet. Patient has \"no\" belly pain. Sleeping \"not too bad\"; wife notes patient \"sleeps a lot\".  Mood is \"okay\"; only feels depression \"in here\".  Denies SI.  Denies CP, palpitations, lightheadedness, dizziness, fatigue, SOB, fever, chills, nausea/vomiting, bladder or bowel concerns today.      Assessment/Plan:  NSTEMI (non-ST elevated myocardial infarction) (H)  Coronary artery disease involving native coronary artery of native heart without angina pectoris  History of right coronary artery stent placement  PAD (peripheral artery disease)  Hx of RCA stent and bilateral iliac stents in 2018. Cardiology consulted; patient declined angiogram +/- stent. Plan for medical management with statin, ASA, and antihypertensives. Had follow-up with cardiology on metoprolol tartrate changed to ER.   -continue atorvastatin, metoprolol ER, and lisinopril listed below  -continue fluid restriction  -continue cardiac diet  -continue ASA 81 mg daily   -follow up with cardiology as recommended in 3-4 months     Systolic congestive heart failure, unspecified HF chronicity (H)  Essential hypertension  Hyperlipidemia, unspecified hyperlipidemia type  New CHF diagnosis during hospital stay.  Echo with EF 20-25%. Treated with IV lasix x1. PTA Zestoretic discontinued at hospital discharge. New on spironolactone. In TCU, Jardiance started on 2/28. Spironolactone  decreased to 12.5 mg daily on 3/6. Today, //56 mmHg. HR 51-62 bpm. Weight now 138# (3/13); was 136# (3/3). No peripheral edema.   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 New on trazodone for delirium during hospital stay. PTA ritalin discontinued at hospital discharge. Today, no concerns reported.  -continue Aricept 10 mg at bedtime  -continue Celexa 20 mg daily  -continue trazodone 25 mg at bedtime  -Monitor mood and behaviors     Acute kidney injury  Stage 3a chronic kidney disease (CKD) (H)  Discharged from hospital with Cr 1.42. In TCU, last Cr 1.22 (3/11).  -avoid nephrotoxins  -trend labs periodically     Hypokalemia  Replaced during hospitalization; resolved. Discharged with K 4.6. In TCU, recheck K 5.2 (3/11).  -follow clinically     Anemia, unspecified type  Noted during hospital stay; discharged with Hgb 8.9. In TCU, recheck 9.5 (2/28/25); Fe 50 (3/11), started on Fe supplement. Today, no signs of active bleeding  -continue Fe supplement every other day  -monitor bleeding risks  -trend labs periodically    Slow transit constipation   Wife reports more loose stools lately.  -change Miralax 17 g daily to daily as needed  -adjust bowel regimen as needed     Physical deconditioning  Impaired mobility and activities of daily living  Secondary to diagnoses above and recent hospitalization. Resides in detention. In TCU for rehabilitation.   -Plan to discharge with home health    Discharge Medications:  MED REC REQUIRED  Post Medication Reconciliation Status: discharge medications reconciled and changed, per note/orders       Current Outpatient Medications   Medication Sig Dispense Refill    acetaminophen (TYLENOL) 500 MG tablet Take 500 mg by mouth every 6 hours as needed.      aspirin 81 MG EC tablet Take 1 tablet by mouth at bedtime.       atorvastatin (LIPITOR) 40 MG tablet Take 1 tablet (40 mg) by mouth daily 90 tablet 3    citalopram (CELEXA) 20 MG tablet Take 1 tablet (20 mg) by mouth daily 90 tablet 3    donepezil (ARICEPT) 10 MG tablet Take 1 tablet (10 mg) by mouth at bedtime. 90 tablet 0    ferrous sulfate (FE TABS) 325 (65 Fe) MG EC tablet Take 325 mg by mouth every other day.      lactobacillus rhamnosus, GG, (CULTURELL) capsule Take 1 capsule by mouth 2 times daily.      lisinopril (ZESTRIL) 5 MG tablet Take 1 tablet (5 mg) by mouth daily.      loperamide (IMODIUM) 2 MG capsule Take 1 capsule (2 mg) by mouth 4 times daily as needed for diarrhea. 30 capsule 0    metoprolol succinate ER (TOPROL XL) 25 MG 24 hr tablet Take 0.5 tablets (12.5 mg) by mouth daily (with dinner).      Multiple Vitamin (MULTIVITAMIN) per tablet Take 1 tablet by mouth daily.      nitroGLYcerin (NITROSTAT) 0.4 MG sublingual tablet Place 1 tablet (0.4 mg) under the tongue every 5 minutes as needed for chest pain. Repeat at 5 min intervals x 2 if needed 30 tablet 0    polyethylene glycol (MIRALAX) 17 g packet Take 1 packet by mouth daily.      spironolactone (ALDACTONE) 25 MG tablet Take 12.5 mg by mouth daily.      traZODone (DESYREL) 50 MG tablet Take 0.5 tablets (25 mg) by mouth at bedtime. 30 tablet 0    vitamin D3 (CHOLECALCIFEROL) 50 mcg (2000 units) tablet Take 1 tablet by mouth daily      zinc 50 MG TABS Take 50 mg by mouth daily.      zinc oxide (DESITIN) 40 % paste Apply topically 2 times daily. And BID PRN to sacral area         Controlled medications:   not applicable/none     Past Medical History:   Past Medical History:   Diagnosis Date    Actinic keratitis     Arthritis     bilat shoulders    CAD (coronary artery disease)     Cardiac arrest (H) 09/04/89    Claudication     Claudication     Coronary artery disease     s/p MI    Diabetes mellitus type 2, controlled (H)     Diverticulosis     HTN (hypertension)     Hyperlipidemia     Hypertension      "Malignant neoplasm of prostate (H) 8/7/2006-10/5/2006    DENIED BY PATIENT (see note from 12/08/2014).  RADIOTHERAPY BY DR.CHO LEGGETT, old     Multiple pulmonary nodules     Osteoarthritis of glenohumeral joint     Left. Injected at [facility] 02/10/2015.    Peripheral vascular disease, unspecified     Syncope     Trigger finger, acquired     Type 2 diabetes mellitus (H) 11/6/2012     Physical Exam:   Vitals: /76   Pulse (!) 18   Temp 98.2  F (36.8  C)   Resp 18   Ht 1.702 m (5' 7\")   Wt 62.8 kg (138 lb 8 oz)   SpO2 96%   BMI 21.69 kg/m    BMI: Body mass index is 21.69 kg/m .  GENERAL APPEARANCE:  Alert, elderly, in no distress, reclined in chair  HEENT:  atraumatic, [place], EOM intact, moist mucus membranes  RESP:  non-labored breathing, lungs clear on auscultation, no respiratory distress, no cough  CV:  Rate regular, S1 S2 noted, no edema  ABDOMEN:  soft, non-distended, non-tender, bowel sounds active  M/S: moves all extremities, strength and tone equal bilaterally, no calf pain, arthritic changes noted in hands  SKIN:  warm, dry, thin, fragile, no obvious rash, lesions, ulcerations or petechiae   NEURO:   Face is symmetric, examination of sensation by touch normal, follows and tracks, slow speech, recall difficulty  PSYCH:  calm, cooperative       SNF labs: Labs reviewed as per Epic and/or Care Everywhere.      DISCHARGE PLAN:  Follow up labs: No labs orders/due  Medical Follow Up:      Follow up with primary care provider in 5-7 days  [facility] scheduled appointments:  Appointments in Next Year        Mar 19, 2025 2:15 PM  PT Neuro Treatment with Meche Smith PTA  [facility] [place] ([facility]) [phone]     Apr 09, 2025 9:40 AM  Return Visit with Haim Galaviz MD  [facility] ([facility]) [phone]     Apr 11, 2025 2:50 PM  (Arrive by 2:35 PM)  Return Provider " Change with Teddy Rocha  [facility] ([facility] ) [phone]     Jun 17, 2025 2:50 PM  (Arrive by 2:35 PM)  RETURN HEART FAILURE with CHANDAN Albert CNP  [facility] ([facility] ) [phone]           Discharge Services: Home Care:  Occupational Therapy, Physical Therapy, Registered Nurse, and Home Health Aide      TOTAL DISCHARGE TIME:   Greater than 30 minutes  Electronically signed by:  Dr. Mell Bravo, DNP, APRN, AGNP-BC, PHN      This note was completed with the assistance of dictation software. Typos and word substitution-errors are expected and unintended.

## 2025-03-19 DIAGNOSIS — R19.7 DIARRHEA, UNSPECIFIED TYPE: ICD-10-CM

## 2025-03-19 DIAGNOSIS — G47.00 INSOMNIA, UNSPECIFIED TYPE: ICD-10-CM

## 2025-03-19 DIAGNOSIS — E55.9 VITAMIN D DEFICIENCY: ICD-10-CM

## 2025-03-19 DIAGNOSIS — I50.9 ACUTE DECOMPENSATED HEART FAILURE (H): ICD-10-CM

## 2025-03-19 DIAGNOSIS — I50.21 ACUTE SYSTOLIC CONGESTIVE HEART FAILURE (H): ICD-10-CM

## 2025-03-19 DIAGNOSIS — I25.2 CORONARY ARTERY DISEASE WITH HX OF MYOCARDIAL INFARCT W/O HX OF CABG: ICD-10-CM

## 2025-03-19 DIAGNOSIS — I73.9 PERIPHERAL VASCULAR DISEASE: ICD-10-CM

## 2025-03-19 DIAGNOSIS — F02.A3 MILD LATE ONSET ALZHEIMER'S DEMENTIA WITH MOOD DISTURBANCE (H): ICD-10-CM

## 2025-03-19 DIAGNOSIS — I10 ESSENTIAL HYPERTENSION, BENIGN: ICD-10-CM

## 2025-03-19 DIAGNOSIS — F32.1 CURRENT MODERATE EPISODE OF MAJOR DEPRESSIVE DISORDER WITHOUT PRIOR EPISODE (H): ICD-10-CM

## 2025-03-19 DIAGNOSIS — D64.9 ANEMIA, UNSPECIFIED TYPE: Primary | ICD-10-CM

## 2025-03-19 DIAGNOSIS — I25.10 CORONARY ARTERY DISEASE WITH HX OF MYOCARDIAL INFARCT W/O HX OF CABG: ICD-10-CM

## 2025-03-19 DIAGNOSIS — I21.4 NSTEMI (NON-ST ELEVATED MYOCARDIAL INFARCTION) (H): ICD-10-CM

## 2025-03-19 DIAGNOSIS — G30.1 MILD LATE ONSET ALZHEIMER'S DEMENTIA WITH MOOD DISTURBANCE (H): ICD-10-CM

## 2025-03-19 RX ORDER — ATORVASTATIN CALCIUM 40 MG/1
40 TABLET, FILM COATED ORAL DAILY
Qty: 90 TABLET | Refills: 3 | Status: SHIPPED | OUTPATIENT
Start: 2025-03-19

## 2025-03-19 RX ORDER — CITALOPRAM HYDROBROMIDE 20 MG/1
20 TABLET ORAL DAILY
Qty: 90 TABLET | Refills: 3 | Status: SHIPPED | OUTPATIENT
Start: 2025-03-19

## 2025-03-19 RX ORDER — CHOLECALCIFEROL (VITAMIN D3) 50 MCG
1 TABLET ORAL DAILY
Qty: 30 TABLET | Refills: 0 | Status: SHIPPED | OUTPATIENT
Start: 2025-03-19 | End: 2025-04-18

## 2025-03-19 RX ORDER — SPIRONOLACTONE 25 MG/1
12.5 TABLET ORAL DAILY
Qty: 15 TABLET | Refills: 0 | Status: SHIPPED | OUTPATIENT
Start: 2025-03-19 | End: 2025-04-18

## 2025-03-19 RX ORDER — TRAZODONE HYDROCHLORIDE 50 MG/1
25 TABLET ORAL AT BEDTIME
Qty: 30 TABLET | Refills: 0 | Status: SHIPPED | OUTPATIENT
Start: 2025-03-19

## 2025-03-19 RX ORDER — METOPROLOL SUCCINATE 25 MG/1
12.5 TABLET, EXTENDED RELEASE ORAL
Qty: 15 TABLET | Refills: 0 | Status: SHIPPED | OUTPATIENT
Start: 2025-03-19 | End: 2025-04-18

## 2025-03-19 RX ORDER — LISINOPRIL 5 MG/1
5 TABLET ORAL DAILY
Qty: 30 TABLET | Refills: 0 | Status: SHIPPED | OUTPATIENT
Start: 2025-03-19 | End: 2025-04-18

## 2025-03-19 RX ORDER — FERROUS SULFATE 325(65) MG
325 TABLET, DELAYED RELEASE (ENTERIC COATED) ORAL EVERY OTHER DAY
Qty: 15 TABLET | Refills: 0 | Status: SHIPPED | OUTPATIENT
Start: 2025-03-19 | End: 2025-04-18

## 2025-03-19 RX ORDER — DONEPEZIL HYDROCHLORIDE 10 MG/1
10 TABLET, FILM COATED ORAL AT BEDTIME
Qty: 90 TABLET | Refills: 0 | Status: SHIPPED | OUTPATIENT
Start: 2025-03-19

## 2025-03-20 ENCOUNTER — TELEPHONE (OUTPATIENT)
Dept: FAMILY MEDICINE | Facility: CLINIC | Age: OVER 89
End: 2025-03-20
Payer: COMMERCIAL

## 2025-03-20 ENCOUNTER — TELEPHONE (OUTPATIENT)
Dept: CARDIOLOGY | Facility: CLINIC | Age: OVER 89
End: 2025-03-20
Payer: COMMERCIAL

## 2025-03-20 NOTE — TELEPHONE ENCOUNTER
Updated pt's wife Sadie on results and recommendations to stop Metoprolol Succinate for persistent low heart rate, to call [facility] if b/p is >160/90 and HR <50 with fatigue, lightheadedness and dizziness, reviewed scheduled future appointments with her for pt to see Dr. Rocha in April and Janine Corbin in June, advised if new, worsening or if above parameters are not met to call [facility], wife Sadie is agreeable to this plan.    Trini Clements RN

## 2025-03-20 NOTE — TELEPHONE ENCOUNTER
Xi called in orders for:    SKILLED NURSING    1 x week for 1 week  2 x week for 1 week  1 x week for 3 weeks  Cardiac and medication management     Reporting    /70  Pulse range 47-52    Plans to call Cardiologist as well

## 2025-03-20 NOTE — TELEPHONE ENCOUNTER
Home care nurse Xi called today to inform LTAC, [facility] that pt was discharged from the hospital yesterday to home with wife, nurse reports VS B/P 160/70 HR 47 O2 sats 96% RA weight stable @ 136 lbs, increase fatigue and his Metoprolol was decreased to 12.5 mg daily instead of twice daily, nurse doesn't feel he needs to be evaluated @ this point, advised her to have pt be seen if new of worsening HF develop, she is agreeable to this plan.    KALYANI Corbin do you have any new recommendations @ this point?    Trini Clements RN

## 2025-03-20 NOTE — TELEPHONE ENCOUNTER
Home Care is calling regarding an established patient with [facility].  Requesting orders from: Haim Galaviz: RN able to provide verbal orders.  Sending as FYI only.  Is this a request for a temporary pause in the home care episode?  No    Orders Requested    Skilled Nursing  Request for initial certification (first set of orders)   Frequency: 1x week for 1 week, 2 x week for 1 week and 1 x week for 3 weeks.   RN gave verbal order: Yes    Routing to pcp for review of reported vitals for patient.       Contacts       Contact Date/Time Type Contact Phone/Fax    03/20/2025 11:50 AM CDT Phone (Incoming) Xi [phone]     Jose Pan RN

## 2025-03-24 PROBLEM — I50.22 CHRONIC SYSTOLIC HEART FAILURE (H): Status: ACTIVE | Noted: 2025-03-24

## 2025-03-27 ENCOUNTER — TELEPHONE (OUTPATIENT)
Dept: VASCULAR SURGERY | Facility: CLINIC | Age: OVER 89
End: 2025-03-27
Payer: COMMERCIAL

## 2025-03-27 NOTE — TELEPHONE ENCOUNTER
1yr follow up due around May with MICHELLE. Patient currently has follow up with PCP on 4/9 with notes stating he may start hospice? Follow up after this appt to see if vasc f/u needed.  __________________________________  Josette Blanco RN  [facility]  Please call to get scheduled for ultrasound and clinic visit in 1 year. Thanks

## 2025-04-08 ENCOUNTER — TELEPHONE (OUTPATIENT)
Dept: FAMILY MEDICINE | Facility: CLINIC | Age: OVER 89
End: 2025-04-08
Payer: COMMERCIAL

## 2025-04-08 NOTE — TELEPHONE ENCOUNTER
Forms/Letter Request    Type of form/letter: Home Health Certification order# [id number] 3/20/25 to 5/18/2025      Do we have the form/letter: Yes:     Who is the form from? Home care [facility]    Where did/will the form come from? form was faxed in    When is form/letter needed by:     How would you like the form/letter returned: Fax : [phone]    Patient Notified form requests are processed in 5-7 business days:    Okay to leave a detailed message?:

## 2025-04-09 ENCOUNTER — OFFICE VISIT (OUTPATIENT)
Dept: FAMILY MEDICINE | Facility: CLINIC | Age: OVER 89
End: 2025-04-09
Payer: COMMERCIAL

## 2025-04-09 VITALS
SYSTOLIC BLOOD PRESSURE: 148 MMHG | DIASTOLIC BLOOD PRESSURE: 69 MMHG | BODY MASS INDEX: 23.16 KG/M2 | RESPIRATION RATE: 20 BRPM | TEMPERATURE: 97.9 F | HEART RATE: 72 BPM | OXYGEN SATURATION: 96 % | HEIGHT: 65 IN | WEIGHT: 139 LBS

## 2025-04-09 DIAGNOSIS — I50.22 CHRONIC SYSTOLIC HEART FAILURE (H): ICD-10-CM

## 2025-04-09 DIAGNOSIS — F32.1 CURRENT MODERATE EPISODE OF MAJOR DEPRESSIVE DISORDER WITHOUT PRIOR EPISODE (H): ICD-10-CM

## 2025-04-09 DIAGNOSIS — G30.1 MODERATE LATE ONSET ALZHEIMER'S DEMENTIA WITH MOOD DISTURBANCE (H): ICD-10-CM

## 2025-04-09 DIAGNOSIS — K59.1 FUNCTIONAL DIARRHEA: ICD-10-CM

## 2025-04-09 DIAGNOSIS — Z71.89 ADVANCED DIRECTIVES, COUNSELING/DISCUSSION: ICD-10-CM

## 2025-04-09 DIAGNOSIS — M25.512 CHRONIC PAIN OF BOTH SHOULDERS: ICD-10-CM

## 2025-04-09 DIAGNOSIS — F02.B3 MODERATE LATE ONSET ALZHEIMER'S DEMENTIA WITH MOOD DISTURBANCE (H): ICD-10-CM

## 2025-04-09 DIAGNOSIS — I25.82 CHRONIC TOTAL OCCLUSION OF NATIVE CORONARY ARTERY: Primary | ICD-10-CM

## 2025-04-09 DIAGNOSIS — R54 FRAIL ELDERLY: ICD-10-CM

## 2025-04-09 DIAGNOSIS — G89.29 CHRONIC PAIN OF BOTH SHOULDERS: ICD-10-CM

## 2025-04-09 DIAGNOSIS — M25.511 CHRONIC PAIN OF BOTH SHOULDERS: ICD-10-CM

## 2025-04-09 DIAGNOSIS — I25.10 CHRONIC TOTAL OCCLUSION OF NATIVE CORONARY ARTERY: Primary | ICD-10-CM

## 2025-04-09 DIAGNOSIS — I10 ESSENTIAL HYPERTENSION, BENIGN: ICD-10-CM

## 2025-04-09 PROCEDURE — 20610 DRAIN/INJ JOINT/BURSA W/O US: CPT | Mod: 50 | Performed by: FAMILY MEDICINE

## 2025-04-09 PROCEDURE — 99215 OFFICE O/P EST HI 40 MIN: CPT | Mod: 25 | Performed by: FAMILY MEDICINE

## 2025-04-09 PROCEDURE — 3077F SYST BP >= 140 MM HG: CPT | Performed by: FAMILY MEDICINE

## 2025-04-09 PROCEDURE — 3078F DIAST BP <80 MM HG: CPT | Performed by: FAMILY MEDICINE

## 2025-04-09 PROCEDURE — 99417 PROLNG OP E/M EACH 15 MIN: CPT | Performed by: FAMILY MEDICINE

## 2025-04-09 RX ORDER — TRIAMCINOLONE ACETONIDE 40 MG/ML
40 INJECTION, SUSPENSION INTRA-ARTICULAR; INTRAMUSCULAR ONCE
Status: COMPLETED | OUTPATIENT
Start: 2025-04-09 | End: 2025-04-09

## 2025-04-09 RX ADMIN — TRIAMCINOLONE ACETONIDE 40 MG: 40 INJECTION, SUSPENSION INTRA-ARTICULAR; INTRAMUSCULAR at 11:31

## 2025-04-09 RX ADMIN — TRIAMCINOLONE ACETONIDE 40 MG: 40 INJECTION, SUSPENSION INTRA-ARTICULAR; INTRAMUSCULAR at 10:32

## 2025-04-09 ASSESSMENT — ENCOUNTER SYMPTOMS: CONSTITUTIONAL NEGATIVE: 1

## 2025-04-09 NOTE — PROCEDURES
SUBACROMIAL INJECTION  The patient was prepped with alcohol. A 22G needle was inserted via the posteriolateral approach into the right subacromial space and 40 mg of Kenalog and 5 mL of 1% lido w/o epi was injected. The patient tolerated the procedure well and had improvement in their pain post procedure. The same procedure was repeated on the left with the same result.    Haim Galaviz III, MD, FAAFP  [facility]  04/09/25 11:33 AM

## 2025-04-09 NOTE — PROGRESS NOTES
Assessment and Plan     1. Chronic total occlusion of native coronary artery (Primary)  2. Chronic systolic heart failure (H)  3. Essential hypertension, benign  4. Current moderate episode of major depressive disorder without prior episode (H)  5. 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 Frail elderly  7. Advanced directives, counseling/discussion  Anthony and his wife and son have a lot of really good questions and concerns.  As expected, the patient has no memory of his hospitalization nor the decisions that were made there.  He really is not his own decision maker at this point.  Health care directive on file.  Considering that they opted for medical management of his NSTEMI, we had also made him DNR/DNI in the hospital last time.  I saw him during this hospitalization and had these discussions with him, his wife, and his sister.  POLST had not yet been completed and that was done today.  Discussed with them extensively how this helps them with his wishes.  Continue with noninvasive ventilation measures and antibiotics as needed.  Do not place a feeding tube.  All of this is in line with my years of experience with the patient and his family.  Overall, we are moving in a more comfort based direction.  I do not yet think the patient qualifies for hospice.  The family agrees.  Reviewed e-prognosis calculator from [facility].  Reviewed his medications that they brought with him.  They were given verbal directions from cardiology to not restart the methylphenidate.  I think he is having a pretty significant relapse of his geriatric depression given his lack of desire to participate in many activities and therapy.  We reviewed that there might be some heart risks with stimulants but I do not believe that there is any hard data for this.  With shared decision making, it might be reasonable to restart.  We would want to see if it actually conferred any benefit and monitor him.  Previously, he  significantly benefited from the addition of methylphenidate but there has also been a year or so where maybe it has not been as helpful.  We reviewed the increased risk of depression after heart event.  Also consider the switch from citalopram to escitalopram given some data in this space.  I will send a note to cardiology to see if they think it is safe or reasonable to restart the methylphenidate.  Additionally, I spent some time talking to the family about balancing the need for therapies for longevity versus the fight and discomfort that they cause and risk versus benefit.  May consider decreasing cares in all realms to avoid fights and preserve the family structure.  Blood pressure is okay given his frailty.  Answered all of their questions.  Offered follow-up in 3 weeks but this does not work for them so we will catch them when they can.    8. Chronic pain of both shoulders  Patient and family requesting previously performed procedure.  See procedure note.  Patient tolerated the procedure well with improvement in mobility.  - Large Joint/Bursa injection and/or drainage - Bilateral (Shoulder, Knee) [51535] [50 Mod]  - triamcinolone (KENALOG-40) injection 40 mg  - triamcinolone (KENALOG-40) injection 40 mg    9. Functional diarrhea  Reinforced points about regular bathroom breaks.  Continue to monitor.    71 minutes spent on the date of the encounter doing chart review, history and exam, documentation, and further activities as noted above.  This does not include time required for the procedure.    Options for treatment and follow-up care were reviewed with the patient and/or guardian. Zack Johns and/or guardian engaged in the decision making process and verbalized understanding of the options discussed and agreed with the final plan. I answered all of their questions.    This note was created with the aid of dictation software. Any mistakes are unintentional.    Haim Galaviz III, MD, FAAFP  [facility]  [facility]  04/09/25 11:37 AM           HPI:       Zack Johns is a 89 year old  male  Patient presents with:  Follow Up: Wife wants to know how bad are things, services are exhausting and if they are helping him  Medication Problem: Med list is innaccurate from hospital   Diarrhea: Bm are loose  Imm/Inj: Shoulder       The patient, his wife, and his son are here to follow-up on his hospital and TCU stay.  They have some questions about his medications.  Additionally, he is really not excited to comply with home-based physical therapies.  They also want to review his hospitalization and the decision that they made to forego coronary angiogram and stenting (which I also help them with the decisions in the hospital).    They have a follow-up with cardiology this week.    He is sleeping more than previous and has very low motivation to get off the couch.    He continues to have occasional troubles with his bowels and accidents.         PMHX:     Patient Active Problem List   Diagnosis    Actinic keratosis    Essential hypertension, benign    CT Lung Pulmonary Nodule Multiple    Prediabetes    Diverticulosis of large intestine    Hyperlipidemia    Peripheral vascular disease    Internal hemorrhoids    Local infection of skin and subcutaneous tissue    Disorder of bursae and tendons in shoulder region    Synovial cyst    Trigger finger, acquired    Osteoarthritis of glenohumeral joint    Syncope    Abnormal cardiovascular stress test    Chest pain    Coronary atherosclerosis    Upper respiratory tract infection, unspecified type    Neck pain    Chronic total occlusion of native coronary artery    NSTEMI (non-ST elevated myocardial infarction) (H)    Acute decompensated heart failure (H)    Chronic systolic heart failure (H)    Functional diarrhea       Current Outpatient Medications   Medication Sig Dispense Refill    acetaminophen (TYLENOL) 500 MG tablet Take 500 mg by mouth every 6 hours as needed.       aspirin 81 MG EC tablet Take 1 tablet by mouth at bedtime.      atorvastatin (LIPITOR) 40 MG tablet Take 1 tablet (40 mg) by mouth daily. 90 tablet 3    citalopram (CELEXA) 20 MG tablet Take 1 tablet (20 mg) by mouth daily. 90 tablet 3    donepezil (ARICEPT) 10 MG tablet Take 1 tablet (10 mg) by mouth at bedtime. 90 tablet 0    ferrous sulfate (FE TABS) 325 (65 Fe) MG EC tablet Take 1 tablet (325 mg) by mouth every other day. 15 tablet 0    lactobacillus rhamnosus, GG, (CULTURELL) capsule Take 1 capsule by mouth 2 times daily.      lisinopril (ZESTRIL) 5 MG tablet Take 1 tablet (5 mg) by mouth daily. 30 tablet 0    loperamide (IMODIUM) 2 MG capsule Take 1 capsule (2 mg) by mouth 4 times daily as needed for diarrhea. 30 capsule 0    metoprolol succinate ER (TOPROL XL) 25 MG 24 hr tablet Take 0.5 tablets (12.5 mg) by mouth daily (with dinner). 15 tablet 0    Multiple Vitamin (MULTIVITAMIN) per tablet Take 1 tablet by mouth daily.      nitroGLYcerin (NITROSTAT) 0.4 MG sublingual tablet Place 1 tablet (0.4 mg) under the tongue every 5 minutes as needed for chest pain. Repeat at 5 min intervals x 2 if needed 30 tablet 0    polyethylene glycol (MIRALAX) 17 g packet Take 1 packet by mouth daily as needed for constipation.      spironolactone (ALDACTONE) 25 MG tablet Take 0.5 tablets (12.5 mg) by mouth daily. 15 tablet 0    traZODone (DESYREL) 50 MG tablet Take 0.5 tablets (25 mg) by mouth at bedtime. 30 tablet 0    vitamin D3 (CHOLECALCIFEROL) 50 mcg (2000 units) tablet Take 1 tablet (50 mcg) by mouth daily. 30 tablet 0    zinc 50 MG TABS Take 50 mg by mouth daily.      zinc oxide (DESITIN) 40 % paste Apply topically 2 times daily. And BID PRN to sacral area         Social History     Socioeconomic History    Marital status:      Spouse name: Not on file    Number of children: Not on file    Years of education: Not on file    Highest education level: Not on file   Occupational History    Not on file   Tobacco  Use    Smoking status: Former     Current packs/day: 0.00     Average packs/day: 2.0 packs/day for 40.0 years (80.0 ttl pk-yrs)     Types: Cigarettes     Start date: 4/10/1944     Quit date: 4/10/1984     Years since quittin.0     Passive exposure: Never    Smokeless tobacco: Never   Substance and Sexual Activity    Alcohol use: Yes     Alcohol/week: 6.0 standard drinks of alcohol     Comment: occasionally.    Drug use: No    Sexual activity: Not on file   Other Topics Concern    Parent/sibling w/ CABG, MI or angioplasty before 65F 55M? Not Asked   Social History Narrative    Was drafted into the [employer] between [place] and [place]. Worked in admin.        Had gotten into typing to meet girls.        Worked for [employer] after leaving the [employer].     Social Drivers of Health     Financial Resource Strain: Unknown (2025)    Financial Resource Strain     Within the past 12 months, have you or your family members you live with been unable to get utilities (heat, electricity) when it was really needed?: Patient unable to answer   Food Insecurity: Unknown (2025)    Food Insecurity     Within the past 12 months, did you worry that your food would run out before you got money to buy more?: Patient unable to answer     Within the past 12 months, did the food you bought just not last and you didn t have money to get more?: Patient unable to answer   Transportation Needs: Unknown (2025)    Transportation Needs     Within the past 12 months, has lack of transportation kept you from medical appointments, getting your medicines, non-medical meetings or appointments, work, or from getting things that you need?: Patient unable to answer   Physical Activity: Not on file   Stress: Not on file   Social Connections: Not on file   Interpersonal Safety: Low Risk  (2025)    Interpersonal Safety     Do you feel physically and emotionally safe where you currently live?: Yes     Within the past 12 months, have you been hit,  "slapped, kicked or otherwise physically hurt by someone?: No     Within the past 12 months, have you been humiliated or emotionally abused in other ways by your partner or ex-partner?: No   Housing Stability: Unknown (2/25/2025)    Housing Stability     Do you have housing? : Patient unable to answer     Are you worried about losing your housing?: Patient unable to answer       Allergies   Allergen Reactions    Pletal [Cilostazol]        No results found for this or any previous visit (from the past 24 hours).         Review of Systems:     Review of Systems   Constitutional: Negative.             Physical Exam:     Vitals:    04/09/25 0939 04/09/25 0944   BP: (!) 189/62 (!) 148/69   BP Location: Right arm Right arm   Patient Position: Sitting    Pulse: 72    Resp: 20    Temp: 97.9  F (36.6  C)    TempSrc: Oral    SpO2: 96%    Weight: 63 kg (139 lb)    Height: 1.65 m (5' 4.96\")      Body mass index is 23.16 kg/m .    Physical Exam  Vitals and nursing note reviewed.   Constitutional:       General: He is not in acute distress.     Appearance: Normal appearance. He is not ill-appearing, toxic-appearing or diaphoretic.   Pulmonary:      Effort: No respiratory distress.   Musculoskeletal:      Right lower leg: No edema.      Left lower leg: No edema.   Neurological:      Mental Status: He is alert and oriented to person, place, and time.           "

## 2025-04-09 NOTE — Clinical Note
Dr. Espinal, I have been taking care of this patient and family for a long time.  I appreciate your help with that.  They will be seeing you in a couple days.  Patient generally does not have the ability to make his own decisions and will usually not remember the majority of required details.  The family is really moving more in a comfort direction and are trying to manage some of his behaviors while keeping him in the home.  He would likely tolerate modifications to his GDMT.  Additionally, I had previously had him on methylphenidate to try to manage some of his geriatric depression.  I do think that this is worsened since his NSTEMI and diagnosis of HFrEF.  I know there are some theoretical risks of stimulants in this patient group but I wonder if the risks outweigh the benefits.  I asked the family to bring this question up to you. Again, thanks for helping me take care of him,  Zach Galaviz

## 2025-04-14 ENCOUNTER — DOCUMENTATION ONLY (OUTPATIENT)
Dept: OTHER | Facility: CLINIC | Age: OVER 89
End: 2025-04-14
Payer: COMMERCIAL

## 2025-04-14 ENCOUNTER — TELEPHONE (OUTPATIENT)
Dept: FAMILY MEDICINE | Facility: CLINIC | Age: OVER 89
End: 2025-04-14
Payer: COMMERCIAL

## 2025-04-14 DIAGNOSIS — F02.B3 MODERATE LATE ONSET ALZHEIMER'S DEMENTIA WITH MOOD DISTURBANCE (H): ICD-10-CM

## 2025-04-14 DIAGNOSIS — G30.1 MODERATE LATE ONSET ALZHEIMER'S DEMENTIA WITH MOOD DISTURBANCE (H): ICD-10-CM

## 2025-04-14 DIAGNOSIS — E55.9 VITAMIN D DEFICIENCY: ICD-10-CM

## 2025-04-14 DIAGNOSIS — F32.1 CURRENT MODERATE EPISODE OF MAJOR DEPRESSIVE DISORDER WITHOUT PRIOR EPISODE (H): Primary | ICD-10-CM

## 2025-04-14 DIAGNOSIS — I50.21 ACUTE SYSTOLIC CONGESTIVE HEART FAILURE (H): ICD-10-CM

## 2025-04-14 RX ORDER — CHOLECALCIFEROL (VITAMIN D3) 50 MCG
1 TABLET ORAL DAILY
Qty: 30 TABLET | Status: CANCELLED | OUTPATIENT
Start: 2025-04-14

## 2025-04-14 NOTE — TELEPHONE ENCOUNTER
Please fill if able, Patient is out of transitional care.    Medication Question or Refill    Contacts       Contact Date/Time Type Contact Phone/Fax    04/14/2025 01:54 PM CDT Phone (Incoming) Sarika Johns (Emergency Contact) [phone] (H)            What medication are you calling about (include dose and sig)?:     spironolactone (ALDACTONE) 25 MG tablet   vitamin D3 (CHOLECALCIFEROL) 50 mcg (2000 units) tablet     Preferred Pharmacy:       [id number] [place] [address] [place] [address] [place] [address]  Phone: [phone] Fax: [phone]      Do you need a refill? Yes

## 2025-04-14 NOTE — TELEPHONE ENCOUNTER
Outside RN standing orders due to ordering physician a TCU physician. Routing to PCP to review and fill if appropriate. Shayy BRAN

## 2025-04-15 ENCOUNTER — TELEPHONE (OUTPATIENT)
Dept: CARDIOLOGY | Facility: CLINIC | Age: OVER 89
End: 2025-04-15
Payer: COMMERCIAL

## 2025-04-15 RX ORDER — METHYLPHENIDATE HYDROCHLORIDE 5 MG/1
5 TABLET ORAL 2 TIMES DAILY
Qty: 60 TABLET | Refills: 0 | Status: SHIPPED | OUTPATIENT
Start: 2025-04-15

## 2025-04-15 RX ORDER — SPIRONOLACTONE 25 MG/1
12.5 TABLET ORAL DAILY
Qty: 45 TABLET | Refills: 1 | Status: SHIPPED | OUTPATIENT
Start: 2025-04-15

## 2025-04-15 NOTE — TELEPHONE ENCOUNTER
Pt son dropped of POLST form to be scanned, but this POLST is not valid the MD did not sign and date section D on page one per [facility].

## 2025-04-15 NOTE — TELEPHONE ENCOUNTER
1. Acute systolic congestive heart failure (H)  Continue. Just seen by cardiology.  - spironolactone (ALDACTONE) 25 MG tablet; Take 0.5 tablets (12.5 mg) by mouth daily.  Dispense: 45 tablet; Refill: 1    2. Vitamin D deficiency  Discontinue.    3. Current moderate episode of major depressive disorder without prior episode (H) (Primary)  OK to restart.  - methylphenidate (RITALIN) 5 MG tablet; Take 1 tablet (5 mg) by mouth 2 times daily.  Dispense: 60 tablet; Refill: 0    4. 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  Dispense: 60 tablet; Refill: 0      Haim Galaviz III, MD, FAAFP  [facility]  04/15/25 1:02 PM

## 2025-04-15 NOTE — TELEPHONE ENCOUNTER
Forms have been completed and placed in the bin.    Haim Galaviz III, MD, FAAFP  [facility]  04/15/25 10:31 AM

## 2025-04-18 ENCOUNTER — ANCILLARY PROCEDURE (OUTPATIENT)
Dept: ULTRASOUND IMAGING | Facility: HOSPITAL | Age: OVER 89
DRG: 871 | End: 2025-04-18
Attending: EMERGENCY MEDICINE
Payer: COMMERCIAL

## 2025-04-18 ENCOUNTER — APPOINTMENT (OUTPATIENT)
Dept: CT IMAGING | Facility: HOSPITAL | Age: OVER 89
DRG: 871 | End: 2025-04-18
Payer: COMMERCIAL

## 2025-04-18 ENCOUNTER — HOSPITAL ENCOUNTER (INPATIENT)
Facility: HOSPITAL | Age: OVER 89
LOS: 3 days | Discharge: HOSPICE | DRG: 871 | End: 2025-04-21
Attending: EMERGENCY MEDICINE | Admitting: STUDENT IN AN ORGANIZED HEALTH CARE EDUCATION/TRAINING PROGRAM
Payer: COMMERCIAL

## 2025-04-18 DIAGNOSIS — L02.31 ABSCESS OF GLUTEAL REGION: ICD-10-CM

## 2025-04-18 DIAGNOSIS — J18.9 PNEUMONIA OF RIGHT UPPER LOBE DUE TO INFECTIOUS ORGANISM: ICD-10-CM

## 2025-04-18 DIAGNOSIS — A41.9 SEVERE SEPSIS (H): ICD-10-CM

## 2025-04-18 DIAGNOSIS — J90 BILATERAL PLEURAL EFFUSION: ICD-10-CM

## 2025-04-18 DIAGNOSIS — I21.4 NSTEMI (NON-ST ELEVATED MYOCARDIAL INFARCTION) (H): ICD-10-CM

## 2025-04-18 DIAGNOSIS — E55.9 VITAMIN D DEFICIENCY: ICD-10-CM

## 2025-04-18 DIAGNOSIS — I50.22 CHRONIC SYSTOLIC HEART FAILURE (H): Primary | ICD-10-CM

## 2025-04-18 DIAGNOSIS — I50.1: ICD-10-CM

## 2025-04-18 DIAGNOSIS — I50.9 ACUTE DECOMPENSATED HEART FAILURE (H): ICD-10-CM

## 2025-04-18 DIAGNOSIS — D64.9 ANEMIA, UNSPECIFIED TYPE: ICD-10-CM

## 2025-04-18 DIAGNOSIS — R65.20 SEVERE SEPSIS (H): ICD-10-CM

## 2025-04-18 LAB
ANION GAP SERPL CALCULATED.3IONS-SCNC: 9 MMOL/L (ref 7–15)
BASE EXCESS BLDV CALC-SCNC: -1.1 MMOL/L (ref -3–3)
BASOPHILS # BLD AUTO: 0 10E3/UL (ref 0–0.2)
BASOPHILS NFR BLD AUTO: 0 %
BUN SERPL-MCNC: 26.5 MG/DL (ref 8–23)
CALCIUM SERPL-MCNC: 8.8 MG/DL (ref 8.8–10.4)
CHLORIDE SERPL-SCNC: 106 MMOL/L (ref 98–107)
CREAT SERPL-MCNC: 1.21 MG/DL (ref 0.67–1.17)
EGFRCR SERPLBLD CKD-EPI 2021: 57 ML/MIN/1.73M2
EOSINOPHIL # BLD AUTO: 0 10E3/UL (ref 0–0.7)
EOSINOPHIL NFR BLD AUTO: 0 %
ERYTHROCYTE [DISTWIDTH] IN BLOOD BY AUTOMATED COUNT: 15.2 % (ref 10–15)
FLUAV RNA SPEC QL NAA+PROBE: NEGATIVE
FLUBV RNA RESP QL NAA+PROBE: NEGATIVE
GLUCOSE SERPL-MCNC: 215 MG/DL (ref 70–99)
HCO3 BLDV-SCNC: 24 MMOL/L (ref 21–28)
HCO3 SERPL-SCNC: 27 MMOL/L (ref 22–29)
HCT VFR BLD AUTO: 29.6 % (ref 40–53)
HGB BLD-MCNC: 9.2 G/DL (ref 13.3–17.7)
IMM GRANULOCYTES # BLD: 0.1 10E3/UL
IMM GRANULOCYTES NFR BLD: 1 %
LACTATE SERPL-SCNC: 1.4 MMOL/L (ref 0.7–2)
LACTATE SERPL-SCNC: 2.9 MMOL/L (ref 0.7–2)
LYMPHOCYTES # BLD AUTO: 0.7 10E3/UL (ref 0.8–5.3)
LYMPHOCYTES NFR BLD AUTO: 7 %
MAGNESIUM SERPL-MCNC: 1.7 MG/DL (ref 1.7–2.3)
MCH RBC QN AUTO: 29.8 PG (ref 26.5–33)
MCHC RBC AUTO-ENTMCNC: 31.1 G/DL (ref 31.5–36.5)
MCV RBC AUTO: 96 FL (ref 78–100)
MONOCYTES # BLD AUTO: 1.2 10E3/UL (ref 0–1.3)
MONOCYTES NFR BLD AUTO: 13 %
NEUTROPHILS # BLD AUTO: 7.2 10E3/UL (ref 1.6–8.3)
NEUTROPHILS NFR BLD AUTO: 78 %
NRBC # BLD AUTO: 0 10E3/UL
NRBC BLD AUTO-RTO: 0 /100
NT-PROBNP SERPL-MCNC: ABNORMAL PG/ML (ref 0–1800)
O2/TOTAL GAS SETTING VFR VENT: 21 %
OXYHGB MFR BLDV: 64 % (ref 70–75)
PCO2 BLDV: 44 MM HG (ref 40–50)
PH BLDV: 7.36 [PH] (ref 7.32–7.43)
PHOSPHATE SERPL-MCNC: 3.9 MG/DL (ref 2.5–4.5)
PLATELET # BLD AUTO: 248 10E3/UL (ref 150–450)
PO2 BLDV: 37 MM HG (ref 25–47)
POTASSIUM SERPL-SCNC: 4.7 MMOL/L (ref 3.4–5.3)
RBC # BLD AUTO: 3.09 10E6/UL (ref 4.4–5.9)
RSV RNA SPEC NAA+PROBE: NEGATIVE
SAO2 % BLDV: 65.1 % (ref 70–75)
SARS-COV-2 RNA RESP QL NAA+PROBE: NEGATIVE
SODIUM SERPL-SCNC: 142 MMOL/L (ref 135–145)
TROPONIN T SERPL HS-MCNC: 131 NG/L
TROPONIN T SERPL HS-MCNC: 159 NG/L
UFH PPP CHRO-ACNC: 0.56 IU/ML
UFH PPP CHRO-ACNC: <0.1 IU/ML
WBC # BLD AUTO: 9.2 10E3/UL (ref 4–11)

## 2025-04-18 PROCEDURE — 99285 EMERGENCY DEPT VISIT HI MDM: CPT | Mod: 25

## 2025-04-18 PROCEDURE — P9047 ALBUMIN (HUMAN), 25%, 50ML: HCPCS | Performed by: INTERNAL MEDICINE

## 2025-04-18 PROCEDURE — 87040 BLOOD CULTURE FOR BACTERIA: CPT

## 2025-04-18 PROCEDURE — 82805 BLOOD GASES W/O2 SATURATION: CPT

## 2025-04-18 PROCEDURE — 85025 COMPLETE CBC W/AUTO DIFF WBC: CPT

## 2025-04-18 PROCEDURE — 96368 THER/DIAG CONCURRENT INF: CPT

## 2025-04-18 PROCEDURE — 99223 1ST HOSP IP/OBS HIGH 75: CPT | Performed by: INTERNAL MEDICINE

## 2025-04-18 PROCEDURE — 258N000003 HC RX IP 258 OP 636

## 2025-04-18 PROCEDURE — 71275 CT ANGIOGRAPHY CHEST: CPT

## 2025-04-18 PROCEDURE — 120N000004 HC R&B MS OVERFLOW

## 2025-04-18 PROCEDURE — 999N000157 HC STATISTIC RCP TIME EA 10 MIN

## 2025-04-18 PROCEDURE — 83735 ASSAY OF MAGNESIUM: CPT

## 2025-04-18 PROCEDURE — 96365 THER/PROPH/DIAG IV INF INIT: CPT | Mod: 59

## 2025-04-18 PROCEDURE — 84100 ASSAY OF PHOSPHORUS: CPT | Performed by: STUDENT IN AN ORGANIZED HEALTH CARE EDUCATION/TRAINING PROGRAM

## 2025-04-18 PROCEDURE — 96366 THER/PROPH/DIAG IV INF ADDON: CPT

## 2025-04-18 PROCEDURE — 96375 TX/PRO/DX INJ NEW DRUG ADDON: CPT

## 2025-04-18 PROCEDURE — 250N000011 HC RX IP 250 OP 636

## 2025-04-18 PROCEDURE — 94660 CPAP INITIATION&MGMT: CPT

## 2025-04-18 PROCEDURE — 85520 HEPARIN ASSAY: CPT

## 2025-04-18 PROCEDURE — 93308 TTE F-UP OR LMTD: CPT

## 2025-04-18 PROCEDURE — 74177 CT ABD & PELVIS W/CONTRAST: CPT

## 2025-04-18 PROCEDURE — 250N000013 HC RX MED GY IP 250 OP 250 PS 637

## 2025-04-18 PROCEDURE — 87637 SARSCOV2&INF A&B&RSV AMP PRB: CPT

## 2025-04-18 PROCEDURE — 93005 ELECTROCARDIOGRAM TRACING: CPT

## 2025-04-18 PROCEDURE — 96376 TX/PRO/DX INJ SAME DRUG ADON: CPT

## 2025-04-18 PROCEDURE — 84484 ASSAY OF TROPONIN QUANT: CPT

## 2025-04-18 PROCEDURE — 36415 COLL VENOUS BLD VENIPUNCTURE: CPT

## 2025-04-18 PROCEDURE — 83880 ASSAY OF NATRIURETIC PEPTIDE: CPT

## 2025-04-18 PROCEDURE — 82310 ASSAY OF CALCIUM: CPT

## 2025-04-18 PROCEDURE — 83605 ASSAY OF LACTIC ACID: CPT

## 2025-04-18 PROCEDURE — 250N000011 HC RX IP 250 OP 636: Performed by: INTERNAL MEDICINE

## 2025-04-18 PROCEDURE — 80048 BASIC METABOLIC PNL TOTAL CA: CPT

## 2025-04-18 RX ORDER — ACETAMINOPHEN 650 MG/1
650 SUPPOSITORY RECTAL EVERY 4 HOURS PRN
Status: DISCONTINUED | OUTPATIENT
Start: 2025-04-18 | End: 2025-04-21 | Stop reason: HOSPADM

## 2025-04-18 RX ORDER — ATORVASTATIN CALCIUM 40 MG/1
40 TABLET, FILM COATED ORAL EVERY EVENING
Status: DISCONTINUED | OUTPATIENT
Start: 2025-04-18 | End: 2025-04-21 | Stop reason: HOSPADM

## 2025-04-18 RX ORDER — HYDRALAZINE HYDROCHLORIDE 20 MG/ML
10 INJECTION INTRAMUSCULAR; INTRAVENOUS ONCE
Status: COMPLETED | OUTPATIENT
Start: 2025-04-18 | End: 2025-04-18

## 2025-04-18 RX ORDER — CITALOPRAM HYDROBROMIDE 20 MG/1
20 TABLET ORAL DAILY
Status: DISCONTINUED | OUTPATIENT
Start: 2025-04-18 | End: 2025-04-21 | Stop reason: HOSPADM

## 2025-04-18 RX ORDER — PIPERACILLIN SODIUM, TAZOBACTAM SODIUM 4; .5 G/20ML; G/20ML
4.5 INJECTION, POWDER, LYOPHILIZED, FOR SOLUTION INTRAVENOUS ONCE
Status: COMPLETED | OUTPATIENT
Start: 2025-04-18 | End: 2025-04-18

## 2025-04-18 RX ORDER — LIDOCAINE 40 MG/G
CREAM TOPICAL
Status: DISCONTINUED | OUTPATIENT
Start: 2025-04-18 | End: 2025-04-21 | Stop reason: HOSPADM

## 2025-04-18 RX ORDER — IOPAMIDOL 755 MG/ML
75 INJECTION, SOLUTION INTRAVASCULAR ONCE
Status: COMPLETED | OUTPATIENT
Start: 2025-04-18 | End: 2025-04-18

## 2025-04-18 RX ORDER — FUROSEMIDE 10 MG/ML
40 INJECTION INTRAMUSCULAR; INTRAVENOUS EVERY 12 HOURS
Status: DISCONTINUED | OUTPATIENT
Start: 2025-04-18 | End: 2025-04-19 | Stop reason: DRUGHIGH

## 2025-04-18 RX ORDER — FUROSEMIDE 10 MG/ML
60 INJECTION INTRAMUSCULAR; INTRAVENOUS ONCE
Status: COMPLETED | OUTPATIENT
Start: 2025-04-18 | End: 2025-04-18

## 2025-04-18 RX ORDER — DONEPEZIL HYDROCHLORIDE 5 MG/1
10 TABLET, FILM COATED ORAL AT BEDTIME
Status: DISCONTINUED | OUTPATIENT
Start: 2025-04-18 | End: 2025-04-21 | Stop reason: HOSPADM

## 2025-04-18 RX ORDER — PROCHLORPERAZINE MALEATE 5 MG/1
5 TABLET ORAL EVERY 6 HOURS PRN
Status: DISCONTINUED | OUTPATIENT
Start: 2025-04-18 | End: 2025-04-21 | Stop reason: HOSPADM

## 2025-04-18 RX ORDER — METHYLPHENIDATE HYDROCHLORIDE 5 MG/1
5 TABLET ORAL 2 TIMES DAILY
Status: DISCONTINUED | OUTPATIENT
Start: 2025-04-19 | End: 2025-04-21 | Stop reason: HOSPADM

## 2025-04-18 RX ORDER — LOPERAMIDE HYDROCHLORIDE 2 MG/1
2 CAPSULE ORAL 4 TIMES DAILY PRN
Status: DISCONTINUED | OUTPATIENT
Start: 2025-04-18 | End: 2025-04-21 | Stop reason: HOSPADM

## 2025-04-18 RX ORDER — HEPARIN SODIUM 10000 [USP'U]/100ML
0-5000 INJECTION, SOLUTION INTRAVENOUS CONTINUOUS
Status: DISCONTINUED | OUTPATIENT
Start: 2025-04-18 | End: 2025-04-20

## 2025-04-18 RX ORDER — ASPIRIN 81 MG/1
81 TABLET ORAL AT BEDTIME
Status: DISCONTINUED | OUTPATIENT
Start: 2025-04-18 | End: 2025-04-21 | Stop reason: HOSPADM

## 2025-04-18 RX ORDER — FERROUS SULFATE 325(65) MG
325 TABLET ORAL EVERY OTHER DAY
Status: DISCONTINUED | OUTPATIENT
Start: 2025-04-19 | End: 2025-04-21 | Stop reason: HOSPADM

## 2025-04-18 RX ORDER — PIPERACILLIN SODIUM, TAZOBACTAM SODIUM 3; .375 G/15ML; G/15ML
3.38 INJECTION, POWDER, LYOPHILIZED, FOR SOLUTION INTRAVENOUS EVERY 6 HOURS
Status: DISCONTINUED | OUTPATIENT
Start: 2025-04-18 | End: 2025-04-20

## 2025-04-18 RX ORDER — ONDANSETRON 4 MG/1
4 TABLET, ORALLY DISINTEGRATING ORAL EVERY 6 HOURS PRN
Status: DISCONTINUED | OUTPATIENT
Start: 2025-04-18 | End: 2025-04-21 | Stop reason: HOSPADM

## 2025-04-18 RX ORDER — CALCIUM CARBONATE 500 MG/1
1000 TABLET, CHEWABLE ORAL 4 TIMES DAILY PRN
Status: DISCONTINUED | OUTPATIENT
Start: 2025-04-18 | End: 2025-04-21 | Stop reason: HOSPADM

## 2025-04-18 RX ORDER — AMOXICILLIN 250 MG
2 CAPSULE ORAL 2 TIMES DAILY PRN
Status: DISCONTINUED | OUTPATIENT
Start: 2025-04-18 | End: 2025-04-21 | Stop reason: HOSPADM

## 2025-04-18 RX ORDER — LISINOPRIL 5 MG/1
10 TABLET ORAL DAILY
Status: DISCONTINUED | OUTPATIENT
Start: 2025-04-18 | End: 2025-04-21 | Stop reason: HOSPADM

## 2025-04-18 RX ORDER — ACETAMINOPHEN 325 MG/1
650 TABLET ORAL EVERY 4 HOURS PRN
Status: DISCONTINUED | OUTPATIENT
Start: 2025-04-18 | End: 2025-04-21 | Stop reason: HOSPADM

## 2025-04-18 RX ORDER — POLYETHYLENE GLYCOL 3350 17 G/17G
17 POWDER, FOR SOLUTION ORAL 2 TIMES DAILY PRN
Status: DISCONTINUED | OUTPATIENT
Start: 2025-04-18 | End: 2025-04-21 | Stop reason: HOSPADM

## 2025-04-18 RX ORDER — ALBUMIN (HUMAN) 12.5 G/50ML
50 SOLUTION INTRAVENOUS ONCE
Status: COMPLETED | OUTPATIENT
Start: 2025-04-18 | End: 2025-04-18

## 2025-04-18 RX ORDER — SPIRONOLACTONE 25 MG
12.5 TABLET ORAL DAILY
Status: DISCONTINUED | OUTPATIENT
Start: 2025-04-18 | End: 2025-04-21 | Stop reason: HOSPADM

## 2025-04-18 RX ORDER — ONDANSETRON 2 MG/ML
4 INJECTION INTRAMUSCULAR; INTRAVENOUS EVERY 6 HOURS PRN
Status: DISCONTINUED | OUTPATIENT
Start: 2025-04-18 | End: 2025-04-21 | Stop reason: HOSPADM

## 2025-04-18 RX ORDER — AMOXICILLIN 250 MG
1 CAPSULE ORAL 2 TIMES DAILY PRN
Status: DISCONTINUED | OUTPATIENT
Start: 2025-04-18 | End: 2025-04-21 | Stop reason: HOSPADM

## 2025-04-18 RX ORDER — NITROGLYCERIN 0.4 MG/1
0.4 TABLET SUBLINGUAL EVERY 5 MIN PRN
Status: DISCONTINUED | OUTPATIENT
Start: 2025-04-18 | End: 2025-04-21 | Stop reason: HOSPADM

## 2025-04-18 RX ADMIN — PIPERACILLIN AND TAZOBACTAM 3.38 G: 3; .375 INJECTION, POWDER, FOR SOLUTION INTRAVENOUS at 18:27

## 2025-04-18 RX ADMIN — HEPARIN SODIUM 750 UNITS/HR: 10000 INJECTION, SOLUTION INTRAVENOUS at 11:11

## 2025-04-18 RX ADMIN — DONEPEZIL HYDROCHLORIDE 10 MG: 5 TABLET ORAL at 21:03

## 2025-04-18 RX ADMIN — SODIUM CHLORIDE 1250 MG: 0.9 INJECTION, SOLUTION INTRAVENOUS at 11:45

## 2025-04-18 RX ADMIN — IOPAMIDOL 75 ML: 755 INJECTION, SOLUTION INTRAVENOUS at 10:46

## 2025-04-18 RX ADMIN — ALBUMIN HUMAN 50 G: 0.25 SOLUTION INTRAVENOUS at 17:47

## 2025-04-18 RX ADMIN — SPIRONOLACTONE 12.5 MG: 25 TABLET, FILM COATED ORAL at 17:40

## 2025-04-18 RX ADMIN — PIPERACILLIN AND TAZOBACTAM 4.5 G: 4; .5 INJECTION, POWDER, FOR SOLUTION INTRAVENOUS at 11:03

## 2025-04-18 RX ADMIN — FUROSEMIDE 60 MG: 10 INJECTION, SOLUTION INTRAMUSCULAR; INTRAVENOUS at 11:04

## 2025-04-18 RX ADMIN — CITALOPRAM HYDROBROMIDE 20 MG: 20 TABLET ORAL at 17:40

## 2025-04-18 RX ADMIN — FUROSEMIDE 40 MG: 10 INJECTION, SOLUTION INTRAMUSCULAR; INTRAVENOUS at 17:42

## 2025-04-18 RX ADMIN — ATORVASTATIN CALCIUM 40 MG: 40 TABLET, FILM COATED ORAL at 21:02

## 2025-04-18 RX ADMIN — ASPIRIN 81 MG: 81 TABLET, COATED ORAL at 21:02

## 2025-04-18 RX ADMIN — TRAZODONE HYDROCHLORIDE 25 MG: 50 TABLET ORAL at 21:02

## 2025-04-18 ASSESSMENT — ACTIVITIES OF DAILY LIVING (ADL)
ADLS_ACUITY_SCORE: 65
ADLS_ACUITY_SCORE: 66
ADLS_ACUITY_SCORE: 67
ADLS_ACUITY_SCORE: 61
ADLS_ACUITY_SCORE: 67
ADLS_ACUITY_SCORE: 61
ADLS_ACUITY_SCORE: 67
ADLS_ACUITY_SCORE: 72
ADLS_ACUITY_SCORE: 61
DEPENDENT_IADLS:: CLEANING;COOKING;LAUNDRY;SHOPPING;MEAL PREPARATION;TRANSPORTATION;MEDICATION MANAGEMENT;MONEY MANAGEMENT;INCONTINENCE
ADLS_ACUITY_SCORE: 61

## 2025-04-18 ASSESSMENT — COLUMBIA-SUICIDE SEVERITY RATING SCALE - C-SSRS
1. IN THE PAST MONTH, HAVE YOU WISHED YOU WERE DEAD OR WISHED YOU COULD GO TO SLEEP AND NOT WAKE UP?: NO
6. HAVE YOU EVER DONE ANYTHING, STARTED TO DO ANYTHING, OR PREPARED TO DO ANYTHING TO END YOUR LIFE?: NO
2. HAVE YOU ACTUALLY HAD ANY THOUGHTS OF KILLING YOURSELF IN THE PAST MONTH?: NO

## 2025-04-18 ASSESSMENT — ENCOUNTER SYMPTOMS: SHORTNESS OF BREATH: 1

## 2025-04-18 NOTE — ED PROVIDER NOTES
Emergency Department Midlevel Supervisory Note     I had a face to face encounter with this patient seen by the Advanced Practice Provider (MICHELLE). I personally made/approved the management plan and take responsibility for the patient management. I personally saw patient and performed a substantive portion of the visit including all aspects of the medical decision making.     ED Course:  8:58 AM Sylvia Ochoa PA-C staffed patient with me. I agree with their assessment and plan of management, and I will see the patient.  9:40 AM I met with the patient to introduce myself, gather additional history, perform my initial exam, and discuss the plan.     Brief HPI:     Zack Johns is a 89 year old male who presents for evaluation of shortness of breath.     The patient reports shortness of breath that started last night. He feels an increased work of breathing. Patient noted feeling better this morning but his wife states he has increased labored breathing. Patient taking his medications as prescribed.     He denies cough, chest pain.     I, Katharine Mendoza, am serving as a scribe to document services personally performed by Ethan Lloyd MD, based on my observations and the provider's statements to me.   I, Ethan Lloyd MD attest that Katharine Mendoza was acting in a scribe capacity, has observed my performance of the services and has documented them in accordance with my direction.    Brief Physical Exam: BP (!) 164/80   Pulse 108   Temp 98.2  F (36.8  C) (Oral)   Resp 27   Wt 64 kg (141 lb)   SpO2 98%   BMI 23.49 kg/m      Vitals: BP (!) 164/80   Pulse 108   Temp 98.2  F (36.8  C) (Oral)   Resp 27   Wt 64 kg (141 lb)   SpO2 98%   BMI 23.49 kg/m    General: Appears in no acute distress, awake, alert, interactive.  Eyes: Conjunctivae non-injected. Sclera anicteric.  HENT: Atraumatic.  Neck: Supple.  Respiratory/Chest: Mild increased work of breathing, tachypnea, 1 L  oxygen  Abdomen: Diffuse abdominal tenderness but no guarding or rigidity.  I did not palpate or visualize abscess to right gluteal fold  Musculoskeletal: Normal extremities. No edema or erythema.  Skin: Normal color. No rash or diaphoresis.  Neurologic: Face symmetric, moves all extremities spontaneously. Speech clear.  Psychiatric: Oriented to person, place, and time. Affect appropriate.         MDM:        ED Course as of 04/18/25 1258   Fri Apr 18, 2025   0844 I met the patient and gathered initial history and performed my physical exam.  Vitals reviewed and hemodynamically stable although initially mildly hypoxic at 91% on room air in triage.  Upon my evaluation, patient at 97% on 1 L nasal cannula and able to maintain oxygen saturation of 97% when weaned off nasal cannula.  Increased work of breathing noted with coarse lung sounds in the right lung fields, no wheezing.  No lower extremity pitting edema.  Peripheral pulses 2+ and equal bilaterally.  Plan for cardiopulmonary workup including EKG, BNP, troponin, and routine laboratory evaluation.  Also considering infectious causes so proceed with viral testing and lactic acid given tachycardia.  Will proceed with CT PE study.   0909 ECG 12-LEAD WITH MUSE (L[facility])  Independently interpreted with sinus tachycardia with frequent PVCs. No evidence of acute ischemia or lethal dysrhythmia.   0924 Resp: 24  Reassessed patient. Work of breathing improved. Maintaining SPO2 without supplemental oxygen.   0932 Hemoglobin(!): 9.2  Baseline.  9.5 on 3/11/25  8.9 on 2/27/25   0932 WBC: 9.2   0935 Ph Venous: 7.36   0941 I have staffed the patient with Dr. Ethan Lloyd ED MD, who has evaluated the patient and agrees with all aspects of today's care.    0941 Lactic Acid(!): 2.9  Will proceed with blood cultures. Given setting of CHF, will hold off on fluid boluses. Blood cultures and Zosyn ordered.   1000 POC US ECHO LIMITED  Bedside US w/ wet lungs and pleural effusion. Will hold  off on IV fluids for now.    1001 Examined patient.  He does a high lactate but unfortunately he is also volume overloaded as demonstrated by his work of breathing, bedside ultrasound showing diffuse B-lines and pleural effusions and markedly elevated proBNP.  Suspect high lactate possibly secondary to colitis CT imaging pending will give Zosyn however   1001 Ruled in for NSTEMI will initiate heparin   1003 Troponin T, High Sensitivity(!!): 131  NSTEMI, heparin initiaited   1006 N-Terminal Pro BNP Inpatient(!): 11,663   1023 Reassessed patient and explained results and treatment plan.   1044 Influenza A: Negative   1044 Influenza B: Negative   1044 Resp Syncytial Virus: Negative   1044 SARS CoV2 PCR: Negative   1125 BP: 121/69  Hydralazine held.   1134 Lactic Acid: 1.4   1159 Troponin T, High Sensitivity(!!): 159  Increasing. Consult placed to cardiology. Discussed coronary angiogram with family. They would like to discuss it with cardiology team.    1212 CT Abdomen Pelvis w Contrast  Notable for a right gluteal intramuscular rim-enhancing fluid collection, suspicious for abscess.  Incidental findings of new 4 mm left lung nodule, 0.8 cm hepatic lesion, 6 mm pancreatic body cyst, and left-sided bladder wall thickening suspicious for bladder tumor.   1213 CT Chest Pulmonary Embolism w Contrast  Mild right upper lobar opacities suspicious for infiltrate.  No acute pulmonary embolism.  Mild emphysema.  Pulmonary edema with small right sided and trace left-sided pleural effusion.   1216 Shows possible right gluteal muscle abscess.  I examined patient and I do not see an obvious abscess to his right gluteus   1235 Cardiology and hospital medicine consulted.   1243 Spoke to Kaya from cardiology. Agrees with admission. Emergent coronary angiogram not indicated at this time.      Patient presents with signs of volume overload, high lactic acid but shows volume overload.  Will hold off on diuresis and treat volume  overload.  Fortunately lactic acid ovation resolved with these treatments.  Uptrending troponin started on heparin.  Known heart disease.    Given Zosyn for possible pneumonia as well as vancomycin for possible abscess    Plan admit to the hospital    Evgeny ordered.  Will place on BiPAP since he is DNR and I want to be aggressive with his respiratory management    The patient is critically ill and has required 35 minutes of critical care time exclusive of procedures. This includes time spent interviewing the patient, ordering tests and medications, monitoring vital signs, reviewing results, patient updates, discussing the case with family and consultants, and admission.      1. NSTEMI (non-ST elevated myocardial infarction) (H)    2. Pulmonary edema with congestive heart failure with reduced left ventricular function (H)    3. Bilateral pleural effusion    4. Abscess of gluteal region    5. Pneumonia of right upper lobe due to infectious organism    6. Severe sepsis (H)        Labs and Imaging:  Results for orders placed or performed during the hospital encounter of 04/18/25   CT Chest Pulmonary Embolism w Contrast    Impression    IMPRESSION:   1.  No pulmonary artery embolism.  2.  Pulmonary edema with small right-sided and trace left-sided pleural effusions.  3.  Mild right upper lobar airspace opacities suspicious for pneumonic infiltrates.  4.  Mild emphysema.  5.  Right gluteal 6.1 cm intramuscular rim-enhancing fluid collection. Differential consideration includes an abscess.  6.  No bowel obstruction, hydronephrosis or intraperitoneal inflammatory process.  7.  Newly visualized 4 mm left lung nodules. These can be followed per the guidelines below.  8.  Newly visualized indeterminate 0.8 cm segment 4A hepatic lesion. Recommend attention on follow-up.  9.  Newly visualized 6 mm pancreatic body cyst. This is likely benign and can be followed per the guidelines below.  10.  Irregular asymmetric left-sided  bladder wall thickening suspicious for a bladder tumor. Recommend nonemergent urologic consultation.      REFERENCE:  Guidelines for Management of Incidental Pulmonary Nodules Detected on CT Images: From the Fleischner Society 2017.   Guidelines apply to incidental nodules in patients who are 35 years or older.  Guidelines do not apply to lung cancer screening, patients with immunosuppression, or patients with known primary cancer.    MULTIPLE NODULES  Nodule size less than or equal to 6 mm  Low-risk patients: No follow-up needed.  High-risk patients: Optional follow-up at 12 months.        REFERENCE:  Revisions of international consensus [place] guidelines for the management of IPMN of the pancreas. Pancreatology 2017;17(5):738-753.    Largest cyst less than 10 mm: CT or MRI/MRCP in 6 months and then every 2 years if no change.     CT Abdomen Pelvis w Contrast    Impression    IMPRESSION:   1.  No pulmonary artery embolism.  2.  Pulmonary edema with small right-sided and trace left-sided pleural effusions.  3.  Mild right upper lobar airspace opacities suspicious for pneumonic infiltrates.  4.  Mild emphysema.  5.  Right gluteal 6.1 cm intramuscular rim-enhancing fluid collection. Differential consideration includes an abscess.  6.  No bowel obstruction, hydronephrosis or intraperitoneal inflammatory process.  7.  Newly visualized 4 mm left lung nodules. These can be followed per the guidelines below.  8.  Newly visualized indeterminate 0.8 cm segment 4A hepatic lesion. Recommend attention on follow-up.  9.  Newly visualized 6 mm pancreatic body cyst. This is likely benign and can be followed per the guidelines below.  10.  Irregular asymmetric left-sided bladder wall thickening suspicious for a bladder tumor. Recommend nonemergent urologic consultation.      REFERENCE:  Guidelines for Management of Incidental Pulmonary Nodules Detected on CT Images: From the Fleischner Society 2017.   Guidelines apply to  incidental nodules in patients who are 35 years or older.  Guidelines do not apply to lung cancer screening, patients with immunosuppression, or patients with known primary cancer.    MULTIPLE NODULES  Nodule size less than or equal to 6 mm  Low-risk patients: No follow-up needed.  High-risk patients: Optional follow-up at 12 months.        REFERENCE:  Revisions of international consensus [place] guidelines for the management of IPMN of the pancreas. Pancreatology 2017;17(5):738-753.    Largest cyst less than 10 mm: CT or MRI/MRCP in 6 months and then every 2 years if no change.     Basic metabolic panel   Result Value Ref Range    Sodium 142 135 - 145 mmol/L    Potassium 4.7 3.4 - 5.3 mmol/L    Chloride 106 98 - 107 mmol/L    Carbon Dioxide (CO2) 27 22 - 29 mmol/L    Anion Gap 9 7 - 15 mmol/L    Urea Nitrogen 26.5 (H) 8.0 - 23.0 mg/dL    Creatinine 1.21 (H) 0.67 - 1.17 mg/dL    GFR Estimate 57 (L) >60 mL/min/1.73m2    Calcium 8.8 8.8 - 10.4 mg/dL    Glucose 215 (H) 70 - 99 mg/dL   Result Value Ref Range    Magnesium 1.7 1.7 - 2.3 mg/dL   Result Value Ref Range    Troponin T, High Sensitivity 131 (HH) <=22 ng/L   Nt probnp inpatient   Result Value Ref Range    N terminal Pro BNP Inpatient 11,663 (H) 0 - 1,800 pg/mL   Influenza A/B, RSV and SARS-CoV2 PCR (COVID-19) Nasopharyngeal    Specimen: Nasopharyngeal; Swab   Result Value Ref Range    Influenza A PCR Negative Negative    Influenza B PCR Negative Negative    RSV PCR Negative Negative    SARS CoV2 PCR Negative Negative   Lactic Acid Whole Blood with 1X Repeat in 2 HR when >2   Result Value Ref Range    Lactic Acid, Initial 2.9 (H) 0.7 - 2.0 mmol/L   CBC with platelets and differential   Result Value Ref Range    WBC Count 9.2 4.0 - 11.0 10e3/uL    RBC Count 3.09 (L) 4.40 - 5.90 10e6/uL    Hemoglobin 9.2 (L) 13.3 - 17.7 g/dL    Hematocrit 29.6 (L) 40.0 - 53.0 %    MCV 96 78 - 100 fL    MCH 29.8 26.5 - 33.0 pg    MCHC 31.1 (L) 31.5 - 36.5 g/dL    RDW 15.2 (H) 10.0  - 15.0 %    Platelet Count 248 150 - 450 10e3/uL    % Neutrophils 78 %    % Lymphocytes 7 %    % Monocytes 13 %    % Eosinophils 0 %    % Basophils 0 %    % Immature Granulocytes 1 %    NRBCs per 100 WBC 0 <1 /100    Absolute Neutrophils 7.2 1.6 - 8.3 10e3/uL    Absolute Lymphocytes 0.7 (L) 0.8 - 5.3 10e3/uL    Absolute Monocytes 1.2 0.0 - 1.3 10e3/uL    Absolute Eosinophils 0.0 0.0 - 0.7 10e3/uL    Absolute Basophils 0.0 0.0 - 0.2 10e3/uL    Absolute Immature Granulocytes 0.1 <=0.4 10e3/uL    Absolute NRBCs 0.0 10e3/uL   Blood gas venous   Result Value Ref Range    pH Venous 7.36 7.32 - 7.43    pCO2 Venous 44 40 - 50 mm Hg    pO2 Venous 37 25 - 47 mm Hg    Bicarbonate Venous 24 21 - 28 mmol/L    Base Excess/Deficit Venous -1.1 -3.0 - 3.0 mmol/L    FIO2 21     Oxyhemoglobin Venous 64 (L) 70 - 75 %    O2 Sat, Venous 65.1 (L) 70.0 - 75.0 %   Lactic acid whole blood   Result Value Ref Range    Lactic Acid 1.4 0.7 - 2.0 mmol/L   Result Value Ref Range    Troponin T, High Sensitivity 159 (HH) <=22 ng/L   Result Value Ref Range    Anti Xa Unfractionated Heparin <0.10 For Reference Range, See Comment IU/mL       I have reviewed the relevant laboratory studies above.    I independently interpreted the following imaging study(s):   CT Abdomen Pelvis w Contrast   Final Result   IMPRESSION:    1.  No pulmonary artery embolism.   2.  Pulmonary edema with small right-sided and trace left-sided pleural effusions.   3.  Mild right upper lobar airspace opacities suspicious for pneumonic infiltrates.   4.  Mild emphysema.   5.  Right gluteal 6.1 cm intramuscular rim-enhancing fluid collection. Differential consideration includes an abscess.   6.  No bowel obstruction, hydronephrosis or intraperitoneal inflammatory process.   7.  Newly visualized 4 mm left lung nodules. These can be followed per the guidelines below.   8.  Newly visualized indeterminate 0.8 cm segment 4A hepatic lesion. Recommend attention on follow-up.   9.  Newly  visualized 6 mm pancreatic body cyst. This is likely benign and can be followed per the guidelines below.   10.  Irregular asymmetric left-sided bladder wall thickening suspicious for a bladder tumor. Recommend nonemergent urologic consultation.         REFERENCE:   Guidelines for Management of Incidental Pulmonary Nodules Detected on CT Images: From the Fleischner Society 2017.    Guidelines apply to incidental nodules in patients who are 35 years or older.   Guidelines do not apply to lung cancer screening, patients with immunosuppression, or patients with known primary cancer.      MULTIPLE NODULES   Nodule size less than or equal to 6 mm   Low-risk patients: No follow-up needed.   High-risk patients: Optional follow-up at 12 months.            REFERENCE:   Revisions of international consensus [place] guidelines for the management of IPMN of the pancreas. Pancreatology 2017;17(5):738-753.      Largest cyst less than 10 mm: CT or MRI/MRCP in 6 months and then every 2 years if no change.         CT Chest Pulmonary Embolism w Contrast   Final Result   IMPRESSION:    1.  No pulmonary artery embolism.   2.  Pulmonary edema with small right-sided and trace left-sided pleural effusions.   3.  Mild right upper lobar airspace opacities suspicious for pneumonic infiltrates.   4.  Mild emphysema.   5.  Right gluteal 6.1 cm intramuscular rim-enhancing fluid collection. Differential consideration includes an abscess.   6.  No bowel obstruction, hydronephrosis or intraperitoneal inflammatory process.   7.  Newly visualized 4 mm left lung nodules. These can be followed per the guidelines below.   8.  Newly visualized indeterminate 0.8 cm segment 4A hepatic lesion. Recommend attention on follow-up.   9.  Newly visualized 6 mm pancreatic body cyst. This is likely benign and can be followed per the guidelines below.   10.  Irregular asymmetric left-sided bladder wall thickening suspicious for a bladder tumor. Recommend nonemergent  urologic consultation.         REFERENCE:   Guidelines for Management of Incidental Pulmonary Nodules Detected on CT Images: From the Fleischner Society 2017.    Guidelines apply to incidental nodules in patients who are 35 years or older.   Guidelines do not apply to lung cancer screening, patients with immunosuppression, or patients with known primary cancer.      MULTIPLE NODULES   Nodule size less than or equal to 6 mm   Low-risk patients: No follow-up needed.   High-risk patients: Optional follow-up at 12 months.            REFERENCE:   Revisions of international consensus [place] guidelines for the management of IPMN of the pancreas. Pancreatology 2017;17(5):738-753.      Largest cyst less than 10 mm: CT or MRI/MRCP in 6 months and then every 2 years if no change.         POC US ECHO LIMITED    (Results Pending)        EKG: I reviewed and independently interpreted the patient's EKG, with comments made as listed below. Please see scanned EKG for full report.   Performed at: 08:53:34    Impression: Sinus tachycardia with frequent Premature ventricular complexes. Left axis deviation. Inferior infarct (cited on or before 21-Feb-2025). Anterolateral infarct (cited on or before 18-Feb-2025).     Rate: 105  Rhythm: Sinus  Axis: -66  MT Interval: 168  QRS Interval: 120  QTc Interval: 504  ST Changes: None.   Comparison: 19-Feb-2025 01:58; Sinus rhythm is no longer with 2nd degree A-vomiting block (Mobitz II). Vent. Rate has increased by 39 bpm. Incomplete right bundle branch block is no longer Present. Questionable change in initial forces of Anterolateral leads.     I have independently reviewed and interpreted the EKG(s) documented above.     Procedures:    POC US ECHO LIMITED    Date/Time: 4/18/2025 12:59 PM    Performed by: Ethan Lloyd MD  Authorized by: Ethan Lloyd MD    Procedure details:     Indications: shortness of breath    Comments:      Diffuse B-lines, bilateral pleural effusions, IVC plump, no  significant pericardial effusion.  Technically difficult imaging.  Images saved and uploaded        Ethan Lloyd MD  [facility] EMERGENCY DEPARTMENT  [facility] [facility] [address] [phone]     Ethan Lloyd MD  04/18/25 2718

## 2025-04-18 NOTE — H&P
[facility]    History and Physical - Hospitalist Service       Date of Admission:  4/18/2025    Assessment & Plan      Zack Johns is a 89 year old male admitted on 4/18/2025. He has a history of dementia, OD44-48KG, CAD s/p stents in 2018, HTN, HLD, T2DM, prostate cancer, and PVD and is admitted for heart failure exacerbation, pneumonia, and gluteal abscess.     Acute decompensated heart failure   Acute hypoxic respiratory failure  HFrEF with EF of 20-25%   Bilateral pleural effusions   Zack is a 89 y.o. male who presents with one day of worsening dyspnea at rest. He was recently hospitalized in February of 2025 for NSTEMI and diagnosed with new HFrEF with an EF of 20-25% at the time. The patient was noted to be mildly hypoxic on arrival to the ED with oxygen sats around 90-91% on RA. Improved with oxygen supplementation. Bedside echo concerning for pleural effusions. CTPE negative for pulmonary embolism but did demonstrate bilateral pleural effusions R>L. Also noted to have infiltrate in the RUL concerning for pneumonia. Initial lactic acid was 2.9 and improved to 1.4 without intervention. WBC reassuring at 9.2. BNP significantly elevated at >11,000. Initial troponin 131 with delta of 159. ECG demonstrating sinus tachycardia without ST elevations. Viral respiratory panel negative for flu/covid/rsv. VBG reassuring without signs of metabolic/respiratory derangements. Lung sounds diminished at the bilateral bases but patient has no other physical exam findings of hypervolemia. The presence of pleural effusion and increased respiratory effort are consistent with volume overload in the setting of heart failure exacerbation. Patient requires admission for diuresis and respiratory support.   - Cardiology consulted, appreciate recommendations    - Hold off on invasive testing/procedures    - Hold lisinopril   - Lasix 40 mg BID    - Monitor UPO and renal function closely   - Continue  BiPAP as tolerated, discontinue this evening for comfort if needed   - Continue PTA spironolactone   - Strict I+Os   - Daily weights  - Fluid restriction of 2L   - 2g sodium diet     Non ST elevation myocardial infarction (NSTEMI)  Elevated troponin  History of CAD   History of stent placement in 2018  Patient found to have elevated cardiac enzymes with initial trop of 131 and subsequent increase to 159. He does have history of cardiac disease with prior angiogram and stent placement. Patient and his family have decided, through shared decision making with cardiology, that they do not wish to pursue angiogram at this time as patient does not have symptomatic ischemia/chest pain.   - Cardiology consulted and will follow   - No diagnostic/therapeutic procedure at this time   - Heparin gtt started, will continue   - Continue PTA aspirin and statin     Community acquired pneumonia   Patient hypoxic in the ED with sats at 91% on RA. CTPE negative for pulmonary embolism but notable for right upper lobe infiltrate consistent with pneumonia. No history or risk factors suggestive of aspiration and thus this is more likely community acquired pneumonia. Patient received vanc and Zosyn in the ED due to concern for infection.   - Continue IV Vanc and Zosyn    - Continue oxygen prn     Gluteal abscess   6.1cm gluteal abscess incidentally noted on CT imaging of the abdomen and pelvis. Patient had an initial lactic acid of 2.9 with subsequent improvement to 1.4 without intervention. WB 9.2 and vitally stable other than hypoxia as above. Minimal abdominal tenderness on the bilateral lower quadrants. The patient received vancomycin and Zosyn in the ED.   - Continue IV Vanc and Zosyn    - Consider IR vs. Surgery consult for I&D     Pulmonary nodules   Hepatic lesion   Pancreatic cysts   Bladder lesion   Incidental imaging findings noted at the time of admission on CTPE/CTAP notable for pancreatic cyst, left bladder wall thickening  concerning for malignancy, left lung nodule, and hepatic lesion.   - Recommended follow up per radiology     Chronic diarrhea  Patient complains of chronic diarrhea of several months. No significant worsening recently but does feel that this is bothersome for him. Taking loperamide prn for diarrhea with some improvement.   - PTA loperamide QID    Chronic conditions:  CAD: PTA aspirin, nitroglycerin atorvastatin   HTN: PTA lisinopril held due to HAI   Depression: PTA citalopram and trazodone  Anemia: PTA ferrous sulfate   Dementia: PTA donepezil and methylphenidate         Diet: Combination Diet 2 gm NA Diet; No Caffeine Diet (and additional linked orders)  Fluid restriction 2000 ML FLUID (and additional linked orders)  Fluid restriction 1800 ML FLUID    DVT Prophylaxis: Enoxaparin (Lovenox) SQ  Castellano Catheter: Not present  Fluids: PO, 2L fluid restriction   Lines: None     Cardiac Monitoring: ACTIVE order. Indication: Acute decompensated heart failure (48 hours)  Code Status: No CPR- Do NOT Intubate      Clinically Significant Risk Factors Present on Admission                 # Drug Induced Platelet Defect: home medication list includes an antiplatelet medication   # Hypertension: Noted on problem list  # Acute heart failure with reduced ejection fraction: last echo with EF <40% and receiving IV diuretics     # Anemia: based on hgb <11           # Financial/Environmental Concerns:           Disposition Plan      Expected Discharge Date: 04/20/2025                The patient's care was discussed with the Attending Physician, Dr. Nelson .      Jewels Bunn DO  Hospitalist Service  [facility]  Securely message with [employer] (more info)  Text page via [employer] Paging/Directory   ______________________________________________________________________    Chief Complaint   Dyspnea    History is obtained from the patient and his wife at bedside     History of Present Illness   Zack Johns is a 89  year old male who presents with one day of worsening dyspnea. His wife reports that he felt short of breath later last night as they were going to sleep but that this seemed to improve once he fell asleep. This morning she noted that he was using accessory muscles to breathe and was very uncomfortable and trying to catch his breath. The patient denies any significant or worsening cough or chest pain. No recent illness or exposure. He has had some medication changes recently but just picked these up yesterday and have not had a chance to start the new regimen. He denies any new or worsening lower extremity edema.     Complains of diarrhea which is chronic for him and sometimes improves with Loperamide. Mild abdominal pain at times with palpation. Denies fever, chills, or nausea.       Past Medical History    Past Medical History:   Diagnosis Date    Actinic keratitis     Arthritis     bilat shoulders    CAD (coronary artery disease)     Cardiac arrest (H) 09/04/89    Claudication     Claudication     Coronary artery disease     s/p MI    Diabetes mellitus type 2, controlled (H)     Diverticulosis     HTN (hypertension)     Hyperlipidemia     Hypertension     Malignant neoplasm of prostate (H) 8/7/2006-10/5/2006    DENIED BY PATIENT (see note from 12/08/2014).  RADIOTHERAPY BY DR.CHO LEGGETT, old     Multiple pulmonary nodules     Osteoarthritis of glenohumeral joint     Left. Injected at [facility] 02/10/2015.    Peripheral vascular disease, unspecified     Syncope     Trigger finger, acquired     Type 2 diabetes mellitus (H) 11/6/2012       Past Surgical History   Past Surgical History:   Procedure Laterality Date    APPENDECTOMY      ARTHROSCOPY KNEE      ARTHROSCOPY KNEE      CARDIAC CATHETERIZATION  08/22/2018     of rca    CARDIAC SURGERY  08/22/2018    two stents placed 8/22/18    CV CORONARY ANGIOGRAM N/A 08/06/2018    Procedure: Coronary Angiogram;  Surgeon: Jeane Garcia MD;  Location: [facility]  "Lab;  Service:     CV CORONARY ANGIOGRAM N/A 08/22/2018    Procedure: Coronary Angiogram;  Surgeon: Jeane Garcia MD;  Location: [facility] Cath Lab;  Service:     FEMORAL ENDARTERECTOMY Bilateral 05/03/2017    IR EXTREMITY ANGIOGRAM BILATERAL  03/10/2017    NOSE SURGERY      PROSTATE BIOPSY, NEEDLE, SATURATION SAMPLING  01/13/2003    \"Biopsy fo the Prostate Needle\"    RELEASE TRIGGER FINGER         Prior to Admission Medications   Prior to Admission Medications   Prescriptions Last Dose Informant Patient Reported? Taking?   Multiple Vitamin (MULTIVITAMIN) per tablet 4/17/2025 Morning Self, Spouse/Significant Other Yes Yes   Sig: Take 1 tablet by mouth daily.   acetaminophen (TYLENOL) 500 MG tablet Unknown Self, Spouse/Significant Other Yes Yes   Sig: Take 500 mg by mouth every 6 hours as needed.   aspirin 81 MG EC tablet 4/17/2025 Bedtime Self, Spouse/Significant Other Yes Yes   Sig: Take 1 tablet by mouth at bedtime.   atorvastatin (LIPITOR) 40 MG tablet 4/17/2025 Bedtime  No Yes   Sig: Take 1 tablet (40 mg) by mouth daily.   citalopram (CELEXA) 20 MG tablet 4/17/2025 Morning  No Yes   Sig: Take 1 tablet (20 mg) by mouth daily.   donepezil (ARICEPT) 10 MG tablet 4/17/2025 Bedtime  No Yes   Sig: Take 1 tablet (10 mg) by mouth at bedtime.   ferrous sulfate (FE TABS) 325 (65 Fe) MG EC tablet 4/17/2025 Morning  No Yes   Sig: Take 1 tablet (325 mg) by mouth every other day.   lisinopril (ZESTRIL) 10 MG tablet 4/17/2025 Morning  No Yes   Sig: Take 1 tablet (10 mg) by mouth daily.   loperamide (IMODIUM) 2 MG capsule Unknown  No Yes   Sig: Take 1 capsule (2 mg) by mouth 4 times daily as needed for diarrhea.   methylphenidate (RITALIN) 5 MG tablet 4/17/2025 Noon  No Yes   Sig: Take 1 tablet (5 mg) by mouth 2 times daily.   nitroGLYcerin (NITROSTAT) 0.4 MG sublingual tablet Unknown Self, Spouse/Significant Other No Yes   Sig: Place 1 tablet (0.4 mg) under the tongue every 5 minutes as needed for chest pain. Repeat at 5 " min intervals x 2 if needed   spironolactone (ALDACTONE) 25 MG tablet 4/17/2025 Morning  No Yes   Sig: Take 0.5 tablets (12.5 mg) by mouth daily.   traZODone (DESYREL) 50 MG tablet 4/17/2025 Bedtime  No Yes   Sig: Take 0.5 tablets (25 mg) by mouth at bedtime.   vitamin D3 (CHOLECALCIFEROL) 50 mcg (2000 units) tablet 4/17/2025 Morning  No Yes   Sig: Take 1 tablet (50 mcg) by mouth daily.   zinc 50 MG TABS 4/17/2025 Morning Self, Spouse/Significant Other Yes Yes   Sig: Take 50 mg by mouth daily.   zinc oxide (DESITIN) 40 % paste 4/17/2025 Evening  Yes Yes   Sig: Apply topically 2 times daily. And BID PRN to sacral area      Facility-Administered Medications: None           Physical Exam   Vital Signs: Temp: 98.2  F (36.8  C) Temp src: Oral BP: 132/76 Pulse: 94   Resp: 20 SpO2: 99 % O2 Device: None (Room air) Oxygen Delivery: 1 LPM  Weight: 141 lbs 0 oz    Physical Exam  Constitutional:       General: He is not in acute distress.     Appearance: He is ill-appearing.   HENT:      Head: Normocephalic.      Mouth/Throat:      Comments: BiPAP in place    Cardiovascular:      Rate and Rhythm: Normal rate and regular rhythm.      Pulses: Normal pulses.      Heart sounds: Normal heart sounds. No murmur heard.     Comments: Difficult to appreciate presence/absence of murmur over BiPAP    Pulmonary:      Effort: Pulmonary effort is normal.      Comments: BiPAP in place. Breath sounds diminished at the bilateral bases. No rales or wheezing.     Abdominal:      General: Abdomen is flat. Bowel sounds are normal.      Palpations: Abdomen is soft.      Comments: Mild tenderness to palpation in the lower quadrants     Musculoskeletal:      Right lower leg: No edema.      Left lower leg: No edema.   Skin:     General: Skin is warm and dry.      Coloration: Skin is pale.   Neurological:      Mental Status: He is alert.   Psychiatric:         Mood and Affect: Mood normal.     Medical Decision Making             Data     I have  personally reviewed the following data over the past 24 hrs:    9.2  \   9.2 (L)   / 248     142 106 26.5 (H) /  215 (H)   4.7 27 1.21 (H) \     Trop: 159 (HH) BNP: 11,663 (H)     Procal: N/A CRP: N/A Lactic Acid: 1.4         Imaging results reviewed over the past 24 hrs:   Recent Results (from the past 24 hours)   POC US ECHO LIMITED    Narrative    Ethan Lloyd MD     4/18/2025  1:01 PM  POC US ECHO LIMITED    Date/Time: 4/18/2025 12:59 PM    Performed by: Ethan Lloyd MD  Authorized by: Ethan Lloyd MD    Procedure details:     Indications: shortness of breath    Comments:      Diffuse B-lines, bilateral pleural effusions, IVC plump, no significant   pericardial effusion.  Technically difficult imaging.  Images saved and   uploaded   CT Chest Pulmonary Embolism w Contrast    Narrative    EXAM: CT ABDOMEN PELVIS W CONTRAST, CT CHEST PULMONARY EMBOLISM W CONTRAST  LOCATION: [facility]  DATE: 4/18/2025    INDICATION: abdominal tenderness on exam, upper abdomen worse than lower abdomen  COMPARISON: CT 6/16/2018 and 5/23/2018  TECHNIQUE:   CT chest pulmonary angiogram during arterial phase injection of IV contrast. Multiplanar reformats and MIP reconstructions were performed. Dose reduction techniques were used.   CT scan of the abdomen and pelvis was performed following injection of IV contrast. Multiplanar reformats were obtained. Dose reduction techniques were used.  CONTRAST: [id number] 75ml     FINDINGS:     ANGIOGRAM CHEST: Pulmonary arteries are normal caliber and negative for pulmonary emboli. Normal caliber thoracic aorta. The exam is nondiagnostic for a thoracic aortic dissection due to timing of the contrast bolus. No CT evidence of right heart strain.    LUNGS AND PLEURA: Small right-sided and trace left-sided pleural effusions with adjacent atelectasis. Pulmonary edema with interlobular septal thickening and peribronchial cuffing. Mild emphysema. Mild patchy right upper  lobar airspace opacities.   Scattered calcified granulomas. There is a 4 mm left lower lobe subpleural nodular opacity image 166 series 2 and 4 mm left lower lobe nodule image 154. These are new since the prior CT exam.    MEDIASTINUM/AXILLAE: Mild chest wall edema. No thoracic adenopathy. Normal heart size and no pericardial effusion. Small hiatal hernia.    CORONARY ARTERY CALCIFICATION: Moderate.    HEPATOBILIARY: Cholelithiasis. Newly visualized 0.8 cm indeterminate hypodense segment 4A lesion image 20 series 3.    PANCREAS: 6 mm pancreatic body cyst image 66 series 3. This is not definitely seen on the previous exam. No main pancreatic ductal dilatation.    SPLEEN: Normal.    ADRENAL GLANDS: Normal.    KIDNEYS/BLADDER: Multifocal renal cortical scarring. Upper right renal cyst. No follow-up is indicated. Renal vascular calcifications. Irregular asymmetric left-sided bladder wall thickening measuring up to 1.6 cm in thickness.    BOWEL: Small hiatal hernia. No bowel obstruction or inflammatory process. Normal appendix. Colonic diverticulosis without evidence of acute diverticulitis. No free air or free fluid.    LYMPH NODES: Normal.    VASCULATURE: Severe aortoiliac atherosclerosis. 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 Patent central SMA and SMV.    PELVIC ORGANS: Normal.    MUSCULOSKELETAL: Right gluteal 3.4 x 1.4 x 6.1 cm rim-enhancing fluid collection with surrounding muscular edema.      Impression    IMPRESSION:   1.  No pulmonary artery embolism.  2.  Pulmonary edema with small right-sided and trace left-sided pleural effusions.  3.  Mild right upper lobar airspace opacities suspicious for pneumonic infiltrates.  4.  Mild emphysema.  5.  Right gluteal 6.1 cm intramuscular rim-enhancing fluid collection. Differential consideration includes an abscess.  6.  No bowel obstruction, hydronephrosis or intraperitoneal inflammatory process.  7.  Newly visualized 4 mm left lung nodules. These can be  followed per the guidelines below.  8.  Newly visualized indeterminate 0.8 cm segment 4A hepatic lesion. Recommend attention on follow-up.  9.  Newly visualized 6 mm pancreatic body cyst. This is likely benign and can be followed per the guidelines below.  10.  Irregular asymmetric left-sided bladder wall thickening suspicious for a bladder tumor. Recommend nonemergent urologic consultation.      REFERENCE:  Guidelines for Management of Incidental Pulmonary Nodules Detected on CT Images: From the Fleischner Society 2017.   Guidelines apply to incidental nodules in patients who are 35 years or older.  Guidelines do not apply to lung cancer screening, patients with immunosuppression, or patients with known primary cancer.    MULTIPLE NODULES  Nodule size less than or equal to 6 mm  Low-risk patients: No follow-up needed.  High-risk patients: Optional follow-up at 12 months.        REFERENCE:  Revisions of international consensus Fuk[place] guidelines for the management of IPMN of the pancreas. Pancreatology 2017;17(5):738-753.    Largest cyst less than 10 mm: CT or MRI/MRCP in 6 months and then every 2 years if no change.     CT Abdomen Pelvis w Contrast    Narrative    EXAM: CT ABDOMEN PELVIS W CONTRAST, CT CHEST PULMONARY EMBOLISM W CONTRAST  LOCATION: [facility]  DATE: 4/18/2025    INDICATION: abdominal tenderness on exam, upper abdomen worse than lower abdomen  COMPARISON: CT 6/16/2018 and 5/23/2018  TECHNIQUE:   CT chest pulmonary angiogram during arterial phase injection of IV contrast. Multiplanar reformats and MIP reconstructions were performed. Dose reduction techniques were used.   CT scan of the abdomen and pelvis was performed following injection of IV contrast. Multiplanar reformats were obtained. Dose reduction techniques were used.  CONTRAST: [id number] 75ml     FINDINGS:     ANGIOGRAM CHEST: Pulmonary arteries are normal caliber and negative for pulmonary emboli. Normal caliber  thoracic aorta. The exam is nondiagnostic for a thoracic aortic dissection due to timing of the contrast bolus. No CT evidence of right heart strain.    LUNGS AND PLEURA: Small right-sided and trace left-sided pleural effusions with adjacent atelectasis. Pulmonary edema with interlobular septal thickening and peribronchial cuffing. Mild emphysema. Mild patchy right upper lobar airspace opacities.   Scattered calcified granulomas. There is a 4 mm left lower lobe subpleural nodular opacity image 166 series 2 and 4 mm left lower lobe nodule image 154. These are new since the prior CT exam.    MEDIASTINUM/AXILLAE: Mild chest wall edema. No thoracic adenopathy. Normal heart size and no pericardial effusion. Small hiatal hernia.    CORONARY ARTERY CALCIFICATION: Moderate.    HEPATOBILIARY: Cholelithiasis. Newly visualized 0.8 cm indeterminate hypodense segment 4A lesion image 20 series 3.    PANCREAS: 6 mm pancreatic body cyst image 66 series 3. This is not definitely seen on the previous exam. No main pancreatic ductal dilatation.    SPLEEN: Normal.    ADRENAL GLANDS: Normal.    KIDNEYS/BLADDER: Multifocal renal cortical scarring. Upper right renal cyst. No follow-up is indicated. Renal vascular calcifications. Irregular asymmetric left-sided bladder wall thickening measuring up to 1.6 cm in thickness.    BOWEL: Small hiatal hernia. No bowel obstruction or inflammatory process. Normal appendix. Colonic diverticulosis without evidence of acute diverticulitis. No free air or free fluid.    LYMPH NODES: Normal.    VASCULATURE: Severe aortoiliac atherosclerosis. 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 Patent central SMA and SMV.    PELVIC ORGANS: Normal.    MUSCULOSKELETAL: Right gluteal 3.4 x 1.4 x 6.1 cm rim-enhancing fluid collection with surrounding muscular edema.      Impression    IMPRESSION:   1.  No pulmonary artery embolism.  2.  Pulmonary edema with small right-sided and trace left-sided pleural  effusions.  3.  Mild right upper lobar airspace opacities suspicious for pneumonic infiltrates.  4.  Mild emphysema.  5.  Right gluteal 6.1 cm intramuscular rim-enhancing fluid collection. Differential consideration includes an abscess.  6.  No bowel obstruction, hydronephrosis or intraperitoneal inflammatory process.  7.  Newly visualized 4 mm left lung nodules. These can be followed per the guidelines below.  8.  Newly visualized indeterminate 0.8 cm segment 4A hepatic lesion. Recommend attention on follow-up.  9.  Newly visualized 6 mm pancreatic body cyst. This is likely benign and can be followed per the guidelines below.  10.  Irregular asymmetric left-sided bladder wall thickening suspicious for a bladder tumor. Recommend nonemergent urologic consultation.      REFERENCE:  Guidelines for Management of Incidental Pulmonary Nodules Detected on CT Images: From the Fleischner Society 2017.   Guidelines apply to incidental nodules in patients who are 35 years or older.  Guidelines do not apply to lung cancer screening, patients with immunosuppression, or patients with known primary cancer.    MULTIPLE NODULES  Nodule size less than or equal to 6 mm  Low-risk patients: No follow-up needed.  High-risk patients: Optional follow-up at 12 months.        REFERENCE:  Revisions of international consensus Fukuoka guidelines for the management of IPMN of the pancreas. Pancreatology 2017;17(5):738-753.    Largest cyst less than 10 mm: CT or MRI/MRCP in 6 months and then every 2 years if no change.

## 2025-04-18 NOTE — PLAN OF CARE
Goal Outcome Evaluation:      Plan of Care Reviewed With: spouse          Outcome Evaluation: Anticipated discharge is home with hospice.

## 2025-04-18 NOTE — CONSULTS
HEART CARE NOTE        Thank you, Dr. Lloyd, for asking the [facility] Care team to see Zack Johns to evaluate ADHF.      Assessment/Recommendations   1. Severe HFrEF c/b severe ADHF  Assessment / Plan  Recent decline in LVSF; after extensive discussion wife at bedside (once again this admission), her decision is to continue to hold on invasive testing/procedures at this time and to focus on quality of time   Edy was consulted during most recent admission - please see detailed note in chart re: plan of care  Hypervolemic on physical exam; start IV diuresis and continue to monitor UOP and renal function closely  Patient is high risk for adverse cardiac events 2/2 advanced age, frailty, systolic dysfunction, HTN, CAD, elevated NTproBNP, elevated troponin, DM2, HLP     Current Pharmacotherapy AHA Guideline-Directed Medical Therapy   Lisinopril 20 mg twice daily - held Lisinopril 20 mg twice daily   Metoprolol succinate 25 mg daily Carvedilol 25 mg twice daily   Spironolactone - not started Spironolactone 25 mg once daily   Hydralazine NA Hydralazine 100 mg three times daily   Isosorbide dinitrate NA Isosorbide dinitrate 40 mg three times daily   SGLT2 inhibitor:Dapagliflozin/Empagliflozin  - not started Dapagliflozin or Empagliflozin 10 mg daily      2. CAD c/b NSTEMI  Assessment / Plan  Hx of PCI to RCA/ with known mod LM, LAD dz  Denies chest pain or anginal equivalents; recent decline in LVSF; per review and repeat discussion with wife and family, coronary angiogram +/- PCI is not in line with his goals of care  Continue aggressive risk factor modifications; Continue atorvastatin, lisinopril, metoprolol, ASA 81 mg   Start heparin gtt for a duration of 48 hrs     3. PAD  Assessment / Plan  Hx of b/l iliac stents     4. HTN  Assessment / Plan  Titrate oral afterload reduction as tolerated     5. DM2  Assessment / Plan  Management per primary team     6. HLP  Assessment / Plan  Resume  atorvastatin     7. Acute hypoxic respiratory failure  Assessment / Plan  2/2 cardiogenic pulmonary edema; diuresis as above  Supportive care per primary team     8. CKD stage 3  Assessment / Plan  Diuresis as above; Continue to monitor UOP and renal function closely     Clinically Significant Risk Factors Present on Admission                 # Drug Induced Platelet Defect: home medication list includes an antiplatelet medication   # Hypertension: Noted on problem list  # Acute heart failure with reduced ejection fraction: last echo with EF <40% and receiving IV diuretics     # Anemia: based on hgb <11           # Financial/Environmental Concerns:          Native vessel CAD  Cardiomyopathy  Systolic acute    Fluid overload, unspecified, Other fluid overload, and Other disorders of electrolyte and fluid balance, not elsewhere classified    CKD POA List: Stage 3a (GFR 45-59)    85 minutes spent reviewing prior records (including documentation, laboratory studies, cardiac testing/imaging), history and physical exam, planning, and subsequent documentation.      History of Present Illness/Subjective    Mr. Zack Johns is a 89 year old male with a PMHx significant for (per Epic notation) CAD s/p stent (2018), PAD, T2DM, HTN, HLD admitted on 2/18/2025 for NSTEMI and acute hypoxic respiratory failure, likely secondary to new CHF.      Today, Mr. Johns has baseline dementia, unable to provide much of ROS; Management plan as detailed above and discussed with wife and family at bedside     ECG: Personally reviewed. normal sinus rhythm, nonspecific ST and T waves changes, occasional PVC noted, unifocal.     ECHO (personally reviewed 4/18/25):repeat study pending  1. The left ventricle is mildly dilated with normal left ventricular wall  thickness.  - Left ventricular function is decreased. The ejection fraction is 20-25%  (severely reduced).  - There is severe global hypokinesia of the left ventricle.  2. The right  ventricle is normal size with mildly decreased right ventricular  systolic function  3. No hemodynamically significant valvular abnormalities on 2D or color flow  imaging.  4. There is no comparison study available.    Lab results: personally reviewed April 18, 2025; notable for CKD stage 3    Medical history and pertinent documents reviewed in Care Everywhere please where applicable see details above          Physical Examination Review of Systems   /65   Pulse 90   Temp 98.2  F (36.8  C) (Oral)   Resp 20   Wt 64 kg (141 lb)   SpO2 99%   BMI 23.49 kg/m    Body mass index is 23.49 kg/m .  Wt Readings from Last 3 Encounters:   04/18/25 64 kg (141 lb)   04/11/25 64.4 kg (142 lb)   04/09/25 63 kg (139 lb)     General Appearance:   no distress   ENT/Mouth: membranes moist, no oral lesions or bleeding gums.      EYES:  no scleral icterus, normal conjunctivae   Neck: no carotid bruits or thyromegaly   Chest/Lungs:   lungs are clear to auscultation, no rales or wheezing, equal chest wall expansion    Cardiovascular:   Regular. Normal first and second heart sounds with no murmurs, rubs, or gallops; the carotid, radial and posterior tibial pulses are intact, + JVD, no LE edema bilaterally    Abdomen:  no organomegaly, masses, bruits, or tenderness; bowel sounds are present  + abdominal distension   Extremities: no cyanosis or clubbing   Skin: no xanthelasma, warm.    Neurologic: NAD     Psychiatric: alert and calm     A complete 10 systems ROS was reviewed  And is negative except what is listed in the HPI.          Medical History  Surgical History Family History Social History   Past Medical History:   Diagnosis Date    Actinic keratitis     Arthritis     bilat shoulders    CAD (coronary artery disease)     Cardiac arrest (H) 09/04/89    Claudication     Claudication     Coronary artery disease     s/p MI    Diabetes mellitus type 2, controlled (H)     Diverticulosis     HTN (hypertension)     Hyperlipidemia      "Hypertension     Malignant neoplasm of prostate (H) 2006-10/5/2006    DENIED BY PATIENT (see note from 2014).  RADIOTHERAPY BY DR.CHO LEGGETT, old     Multiple pulmonary nodules     Osteoarthritis of glenohumeral joint     Left. Injected at [facility] 02/10/2015.    Peripheral vascular disease, unspecified     Syncope     Trigger finger, acquired     Type 2 diabetes mellitus (H) 2012    Past Surgical History:   Procedure Laterality Date    APPENDECTOMY      ARTHROSCOPY KNEE      ARTHROSCOPY KNEE      CARDIAC CATHETERIZATION  2018     of rca    CARDIAC SURGERY  2018    two stents placed 18    CV CORONARY ANGIOGRAM N/A 2018    Procedure: Coronary Angiogram;  Surgeon: Jeane Garcia MD;  Location: [facility] Cath Lab;  Service:     CV CORONARY ANGIOGRAM N/A 2018    Procedure: Coronary Angiogram;  Surgeon: Jeane Garcia MD;  Location: [facility] Cath Lab;  Service:     FEMORAL ENDARTERECTOMY Bilateral 2017    IR EXTREMITY ANGIOGRAM BILATERAL  03/10/2017    NOSE SURGERY      PROSTATE BIOPSY, NEEDLE, SATURATION SAMPLING  2003    \"Biopsy fo the Prostate Needle\"    RELEASE TRIGGER FINGER      no family history of premature coronary artery disease Social History     Socioeconomic History    Marital status:      Spouse name: Not on file    Number of children: Not on file    Years of education: Not on file    Highest education level: Not on file   Occupational History    Not on file   Tobacco Use    Smoking status: Former     Current packs/day: 0.00     Average packs/day: 2.0 packs/day for 40.0 years (80.0 ttl pk-yrs)     Types: Cigarettes     Start date: 4/10/1944     Quit date: 4/10/1984     Years since quittin.0     Passive exposure: Never    Smokeless tobacco: Never   Substance and Sexual Activity    Alcohol use: Yes     Alcohol/week: 6.0 standard drinks of alcohol     Comment: occasionally.    Drug use: No    Sexual activity: Not on file   Other " Topics Concern    Parent/sibling w/ CABG, MI or angioplasty before 65F 55M? 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        Had gotten into typing to meet girls.        Worked for [employer] after leaving the [employer].     Social Drivers of Health     Financial Resource Strain: Unknown (2/25/2025)    Financial Resource Strain     Within the past 12 months, have you or your family members you live with been unable to get utilities (heat, electricity) when it was really needed?: Patient unable to answer   Food Insecurity: Unknown (2/25/2025)    Food Insecurity     Within the past 12 months, did you worry that your food would run out before you got money to buy more?: Patient unable to answer     Within the past 12 months, did the food you bought just not last and you didn t have money to get more?: Patient unable to answer   Transportation Needs: Unknown (2/25/2025)    Transportation Needs     Within the past 12 months, has lack of transportation kept you from medical appointments, getting your medicines, non-medical meetings or appointments, work, or from getting things that you need?: Patient unable to answer   Physical Activity: Not on file   Stress: Not on file   Social Connections: Not on file   Interpersonal Safety: Low Risk  (2/26/2025)    Interpersonal Safety     Do you feel physically and emotionally safe where you currently live?: Yes     Within the past 12 months, have you been hit, slapped, kicked or otherwise physically hurt by someone?: No     Within the past 12 months, have you been humiliated or emotionally abused in other ways by your partner or ex-partner?: No   Housing Stability: Unknown (2/25/2025)    Housing Stability     Do you have housing? : Patient unable to answer     Are you worried about losing your housing?: Patient unable to answer           Lab Results    Chemistry/lipid CBC Cardiac Enzymes/BNP/TSH/INR   Lab Results   Component  "Value Date    CHOL 124 11/30/2021    HDL 41 11/30/2021    TRIG 181 (H) 11/30/2021    BUN 26.5 (H) 04/18/2025     04/18/2025    CO2 27 04/18/2025    Lab Results   Component Value Date    WBC 9.2 04/18/2025    HGB 9.2 (L) 04/18/2025    HCT 29.6 (L) 04/18/2025    MCV 96 04/18/2025     04/18/2025    Lab Results   Component Value Date    TROPONINI 0.03 10/27/2019    BNP 59 06/16/2018    TSH 1.45 03/21/2023    INR 1.17 (H) 10/30/2019     Lab Results   Component Value Date    TROPONINI 0.03 10/27/2019          Weight:    Wt Readings from Last 3 Encounters:   04/18/25 64 kg (141 lb)   04/11/25 64.4 kg (142 lb)   04/09/25 63 kg (139 lb)       Allergies  Allergies   Allergen Reactions    Pletal [Cilostazol]          Surgical History  Past Surgical History:   Procedure Laterality Date    APPENDECTOMY      ARTHROSCOPY KNEE      ARTHROSCOPY KNEE      CARDIAC CATHETERIZATION  08/22/2018     of rca    CARDIAC SURGERY  08/22/2018    two stents placed 8/22/18    CV CORONARY ANGIOGRAM N/A 08/06/2018    Procedure: Coronary Angiogram;  Surgeon: Jeane Garcia MD;  Location: [facility] Lab;  Service:     CV CORONARY ANGIOGRAM N/A 08/22/2018    Procedure: Coronary Angiogram;  Surgeon: Jeane Garcia MD;  Location: [facility] Cath Lab;  Service:     FEMORAL ENDARTERECTOMY Bilateral 05/03/2017    IR EXTREMITY ANGIOGRAM BILATERAL  03/10/2017    NOSE SURGERY      PROSTATE BIOPSY, NEEDLE, SATURATION SAMPLING  01/13/2003    \"Biopsy fo the Prostate Needle\"    RELEASE TRIGGER FINGER         Social History  Tobacco:   History   Smoking Status    Former    Types: Cigarettes   Smokeless Tobacco    Never    Alcohol:   Social History    Substance and Sexual Activity      Alcohol use: Yes        Alcohol/week: 6.0 standard drinks of alcohol        Comment: occasionally.   Illicit Drugs:   History   Drug Use No       Family History  Family History   Problem Relation Age of Onset    Diabetes No family hx of     Breast Cancer No " family hx of     Colon Cancer No family hx of     Prostate Cancer No family hx of     Other Cancer No family hx of     No Known Problems Mother     No Known Problems Father           Taryn Taylor MD on 4/18/2025      cc: Haim Galaviz,

## 2025-04-18 NOTE — PROGRESS NOTES
Pt unavailable to start BiPAP, pt going to CT. RN to called RT when pt returns.     Eun Muhammad, RT

## 2025-04-18 NOTE — PHARMACY-ADMISSION MEDICATION HISTORY
Pharmacist Admission Medication History    Admission medication history is complete. The information provided in this note is only as accurate as the sources available at the time of the update.    Information Source(s): Family member and Clinic records via in-person    Pertinent Information: wife manages medications at home, patient is intentionally not on a beta-blocker due to bradycardia per cardiologist notes    Changes made to PTA medication list:  Added: None  Deleted: culturelle, Miralax  Changed: None    Allergies reviewed with patient and updates made in EHR: yes    Medication History Completed By: Rupa Porter RPH 4/18/2025 10:30 AM    PTA Med List   Medication Sig Last Dose/Taking    acetaminophen (TYLENOL) 500 MG tablet Take 500 mg by mouth every 6 hours as needed. Unknown    aspirin 81 MG EC tablet Take 1 tablet by mouth at bedtime. 4/17/2025 Bedtime    atorvastatin (LIPITOR) 40 MG tablet Take 1 tablet (40 mg) by mouth daily. 4/17/2025 Bedtime    citalopram (CELEXA) 20 MG tablet Take 1 tablet (20 mg) by mouth daily. 4/17/2025 Morning    donepezil (ARICEPT) 10 MG tablet Take 1 tablet (10 mg) by mouth at bedtime. 4/17/2025 Bedtime    ferrous sulfate (FE TABS) 325 (65 Fe) MG EC tablet Take 1 tablet (325 mg) by mouth every other day. 4/17/2025 Morning    lisinopril (ZESTRIL) 10 MG tablet Take 1 tablet (10 mg) by mouth daily. 4/17/2025 Morning    loperamide (IMODIUM) 2 MG capsule Take 1 capsule (2 mg) by mouth 4 times daily as needed for diarrhea. Unknown    methylphenidate (RITALIN) 5 MG tablet Take 1 tablet (5 mg) by mouth 2 times daily. 4/17/2025 Noon    Multiple Vitamin (MULTIVITAMIN) per tablet Take 1 tablet by mouth daily. 4/17/2025 Morning    nitroGLYcerin (NITROSTAT) 0.4 MG sublingual tablet Place 1 tablet (0.4 mg) under the tongue every 5 minutes as needed for chest pain. Repeat at 5 min intervals x 2 if needed Unknown    spironolactone (ALDACTONE) 25 MG tablet Take 0.5 tablets (12.5 mg) by mouth  daily. 4/17/2025 Morning    traZODone (DESYREL) 50 MG tablet Take 0.5 tablets (25 mg) by mouth at bedtime. 4/17/2025 Bedtime    vitamin D3 (CHOLECALCIFEROL) 50 mcg (2000 units) tablet Take 1 tablet (50 mcg) by mouth daily. 4/17/2025 Morning    zinc 50 MG TABS Take 50 mg by mouth daily. 4/17/2025 Morning    zinc oxide (DESITIN) 40 % paste Apply topically 2 times daily. And BID PRN to sacral area 4/17/2025 Evening

## 2025-04-18 NOTE — PHARMACY-VANCOMYCIN DOSING SERVICE
Pharmacy Vancomycin Initial Note  Date of Service 2025  Patient's  1934  89 year old, male    Indication: Abscess and Sepsis    Current estimated CrCl = Estimated Creatinine Clearance: 36.7 mL/min (A) (based on SCr of 1.21 mg/dL (H)).    Creatinine for last 3 days  2025:  9:15 AM Creatinine 1.21 mg/dL    Recent Vancomycin Level(s) for last 3 days  No results found for requested labs within last 3 days.      Vancomycin IV Administrations (past 72 hours)                     vancomycin (VANCOCIN) 1,250 mg in 0.9% NaCl 262.5 mL intermittent infusion (mg) 1,250 mg New Bag 25 1145                    Nephrotoxins and other renal medications (From now, onward)      Start     Dose/Rate Route Frequency Ordered Stop    25 1200  vancomycin (VANCOCIN) 750 mg in 0.9% NaCl 257.5 mL intermittent infusion         750 mg  over 60 Minutes Intravenous EVERY 24 HOURS 25 1728      25 1800  piperacillin-tazobactam (ZOSYN) 3.375 g vial to attach to  mL bag         3.375 g  over 30 Minutes Intravenous EVERY 6 HOURS 25 1726      25 1700  furosemide (LASIX) injection 40 mg         40 mg  over 1-3 Minutes Intravenous EVERY 12 HOURS 25 1607      25 1600  [Held by provider]  lisinopril (ZESTRIL) tablet 10 mg        (On hold since today at 1559 until manually unheld; held by Jewels Bunn DOHold Reason: Acute Kidney Injury)   Note to Pharmacy: PTA Sig:Take 1 tablet (10 mg) by mouth daily.      10 mg Oral DAILY 25 1457              Contrast Orders - past 72 hours (72h ago, onward)      Start     Dose/Rate Route Frequency Stop    25 1100  iopamidol (ISOVUE-370) solution 75 mL         75 mL Intravenous ONCE 25 1046            [employer] Prediction of Planned Initial Vancomycin Regimen    Loading dose: N/A  Regimen: 1000 mg IV every 24 hours.  Start time: 11:45 on 2025  Exposure target: AUC24 (range)400-600 mg/L.hr   AUC24,ss: 549  mg/L.hr  Probability of AUC24 > 400: 83 %  Ctrough,ss: 17.5 mg/L  Probability of Ctrough,ss > 20: 37 %  Probability of nephrotoxicity (Lodise ALBERTO 2009): 13 %          Plan:  Start vancomycin  1000 mg IV q24h.   Vancomycin monitoring method: AUC  Vancomycin therapeutic monitoring goal: 400-600 mg*h/L  Pharmacy will check vancomycin levels as appropriate in 1-3 Days.    Serum creatinine levels will be ordered daily for the first week of therapy and at least twice weekly for subsequent weeks.      Eleonora Aguilar, [facility]

## 2025-04-18 NOTE — ED PROVIDER NOTES
Emergency Department Encounter  [facility] EMERGENCY DEPARTMENT  Zack Johns   AGE: 89 year old SEX: male  YOB: 1934  PCP: Haim Galaviz  MRN: [id number]      EVALUATION DATE & TIME: No admission date for patient encounter. ; PCP: Haim Galaviz   ED PROVIDER: Sylvia Ochoa PA-C    CHIEF COMPLAINT: Shortness of Breath      FINAL IMPRESSION       ICD-10-CM    1. NSTEMI (non-ST elevated myocardial infarction) (H)  I21.4       2. Pulmonary edema with congestive heart failure with reduced left ventricular function (H)  I50.1       3. Bilateral pleural effusion  J90       4. Abscess of gluteal region  L02.31     Incidental finding of  6.1 cm intramuscular rim-enhancing fluid collection of right gluteal muscle.      5. Pneumonia of right upper lobe due to infectious organism  J18.9       6. Severe sepsis (H)  A41.9     R65.20           MEDICAL DECISION MAKING   89 year old male with a pertinent history of DNI/DNR, Alzheimer's dementia, type 2 diabetes (A1c 9.5%), congestive heart failure (EF 20-25%), CKD stage III, hypertension, CAD s/p stent (2018), PAD (iliac stents 2018), and HLD who presents to the ED for evaluation of acutely worsening shortness of breath starting this morning.  Denies chest pain, cough, hemoptysis, fever, or known sick contacts. Recent hospitalization for new onset CHF and NSTEMI (02/18/2025-02/27/2025).  Taking medications as directed, no missed doses.   Previous cigarette smoker, has not used in 40+ years.  No history of COPD, asthma, or other chronic lung disease.     ED Course as of 04/18/25 1328   Fri Apr 18, 2025   0844 I met the patient and gathered initial history and performed my physical exam.  Vitals reviewed and hemodynamically stable although initially mildly hypoxic at 91% on room air in triage.  Upon my evaluation, patient at 97% on 1 L nasal cannula and able to maintain oxygen saturation of 97% when weaned off nasal cannula.   Increased work of breathing noted with coarse lung sounds in the right lung fields, no wheezing.  No lower extremity pitting edema.  Peripheral pulses 2+ and equal bilaterally.  Plan for cardiopulmonary workup including EKG, BNP, troponin, and routine laboratory evaluation.  Also considering infectious causes so proceed with viral testing and lactic acid given tachycardia.  Will proceed with CT PE study.   0909 ECG 12-LEAD WITH MUSE (LHE)  Independently interpreted with sinus tachycardia with frequent PVCs. No evidence of acute ischemia or lethal dysrhythmia.   0924 Resp: 24  Reassessed patient. Work of breathing improved. Maintaining SPO2 without supplemental oxygen.   0932 Hemoglobin(!): 9.2  Baseline.  9.5 on 3/11/25  8.9 on 2/27/25   0932 WBC: 9.2   0935 Ph Venous: 7.36   0941 I have staffed the patient with Dr. Ethan Lloyd, ED MD, who has evaluated the patient and agrees with all aspects of today's care.    0941 Lactic Acid(!): 2.9  Will proceed with blood cultures. Given setting of CHF, will hold off on fluid boluses. Blood cultures and Zosyn ordered.   1000 POC US ECHO LIMITED  Bedside US w/ wet lungs and pleural effusion. Will hold off on IV fluids for now.    1001 Examined patient.  He does a high lactate but unfortunately he is also volume overloaded as demonstrated by his work of breathing, bedside ultrasound showing diffuse B-lines and pleural effusions and markedly elevated proBNP.  Suspect high lactate possibly secondary to colitis CT imaging pending will give Zosyn however   1001 Ruled in for NSTEMI will initiate heparin   1003 Troponin T, High Sensitivity(!!): 131  NSTEMI, heparin initiaited   1006 N-Terminal Pro BNP Inpatient(!): 11,663   1023 Reassessed patient and explained results and treatment plan.   1044 Influenza A: Negative   1044 Influenza B: Negative   1044 Resp Syncytial Virus: Negative   1044 SARS CoV2 PCR: Negative   1125 BP: 121/69  Hydralazine held.   1134 Lactic Acid: 1.4   1159  Troponin T, High Sensitivity(!!): 159  Increasing. Consult placed to cardiology. Discussed coronary angiogram with family. They would like to discuss it with cardiology team.    1212 CT Abdomen Pelvis w Contrast  Notable for a right gluteal intramuscular rim-enhancing fluid collection, suspicious for abscess.  Incidental findings of new 4 mm left lung nodule, 0.8 cm hepatic lesion, 6 mm pancreatic body cyst, and left-sided bladder wall thickening suspicious for bladder tumor.   1213 CT Chest Pulmonary Embolism w Contrast  Mild right upper lobar opacities suspicious for infiltrate.  No acute pulmonary embolism.  Mild emphysema.  Pulmonary edema with small right sided and trace left-sided pleural effusion.   1216 Shows possible right gluteal muscle abscess.  I examined patient and I do not see an obvious abscess to his right gluteus   1235 Cardiology and hospital medicine consulted.   1243 Spoke to Kaya from cardiology. Agrees with admission. Emergent coronary angiogram not indicated at this time.    1327 Patient accepted to [facility] by [facility] team.        Medications given today in the emergency department:  Medications   heparin 25,000 units in 0.45% NaCl 250 mL ANTICOAGULANT infusion ( Intravenous Not Given 4/18/25 1150)   vancomycin (VANCOCIN) 1,250 mg in 0.9% NaCl 262.5 mL intermittent infusion (1,250 mg Intravenous $New Bag 4/18/25 1145)   heparin loading dose for LOW INTENSITY TREATMENT * Give BEFORE starting heparin infusion (3,850 Units Intravenous $Given 4/18/25 1112)   furosemide (LASIX) injection 60 mg (60 mg Intravenous $Given 4/18/25 1104)   hydrALAZINE (APRESOLINE) injection 10 mg (10 mg Intravenous Not Given 4/18/25 1137)   piperacillin-tazobactam (ZOSYN) 4.5 g vial to attach to  mL bag (4.5 g Intravenous $New Bag 4/18/25 1103)   iopamidol (ISOVUE-370) solution 75 mL (75 mLs Intravenous $Given 4/18/25 1046)     Consults: Cardiology (MD Kaya).    Assessment/Plan: Emergency department  evaluation significant for findings of pulmonary edema in setting of congestive heart failure, severe sepsis with right upper lobe pneumonia and bilateral pleural effusions, and escalating troponins concerning for NSTEMI.  Incidental finding of right gluteal intramuscular fluid collection but no drainable abscess or abnormality noted on exam.  Plan to admit to the hospital for further management.  Patient's emergency department course otherwise uncomplicated with improved clinical picture following ED interventions including heparin, Lasix, vancomycin, and Zosyn.  Acutely serious/life threatening considerations: flash pulmonary edema, ACS, pulmonary embolism, pneumonia, pneumothorax/hemothorax, pulmonary hypertension (no JVD, ascites, edema), dysrhythmia, cardiac tamponade, pericarditis (no pleuritic chest pain or friction rub), myocarditis, anemia, DKA, anaphylaxis, toxidomes    New prescriptions started at today's ED visit:   New Prescriptions    No medications on file       Medical Decision Making      Admit.    MIPS (CTPE, Dental pain, Castellano, Sinusitis, Asthma/COPD, Head Trauma): CT Pulmonary Angiogram:Patient is moderate to high risk for PE. and CT PA was ordered for reason(s) other than PE.    SEPSIS: The patient has signs of sepsis   Sepsis ED evaluation   The patient has signs of sepsis as evidenced by:  1. Presence of 2 SIRS criteria, suspected infection, AND  2. Organ dysfunction: Lactic Acidosis with value >2.0 due to sepsis    Sepsis Care Initiation: Starting at 9:37 AM  on 04/18/25, until specified. Prior to this documentation, sepsis, severe sepsis, or septic shock was NOT thought to be a significant cause of illness. This order represents the first time infection was seriously considered to be affecting the patient.    Lactic Acid Results:  Recent Labs   Lab Test 04/18/25  1102 04/18/25  0915   LACT 1.4 2.9*       3 Hour Bundle 6 Hour Bundle (Reassessment)   Blood Cultures before IV Antibiotics:  "Yes  Antibiotics given: see below  Prehospital fluid volume (mL):                     Total fluids given (ED +Pre-hospital):  Full 30 mL/kg bolus intentionally NOT administered to this patient due to CHF and signs of pulmonary edema on US w/o hypotension. Patient is also DNI/comfort care.   Repeat Lactic Acid Level: Ordered by reflex for 2 hours after initial lactic acid collection.  Vasopressors:  MAP >65, no pressors started .  Repeat perfusion exam: I attest to having performed a repeat sepsis exam and assessment of perfusion at 9:37 AM, 10:23 AM, 1:06 PM.   .   BMI Readings from Last 1 Encounters:   04/18/25 23.49 kg/m        Anti-infectives (From admission through now)      Start     Dose/Rate Route Frequency Ordered Stop    04/18/25 1100  vancomycin (VANCOCIN) 1,250 mg in 0.9% NaCl 262.5 mL intermittent infusion         1,250 mg  over 90 Minutes Intravenous ONCE 04/18/25 1011      04/18/25 1030  piperacillin-tazobactam (ZOSYN) 4.5 g vial to attach to  mL bag         4.5 g  over 30 Minutes Intravenous ONCE 04/18/25 1005 04/18/25 1133                HISTORY OF PRESENT ILLNESS   Patient information was obtained from: Patient, wife  Use of Intrepreter: N/A     Zack Johns is a 89 year old male with a pertinent history of Alzheimer's dementia, type 2 diabetes (A1c 9.5% on 3/11/25), congestive heart failure (EF 20 to 25% on 02/18/2025), CKD stage III, hypertension, CAD s/p stent (2018), PAD (iliac stents 2018), and HLD who presents to the ED by private vehicle for evaluation of shortness of breath.  Patient reports that starting this morning he has been experiencing difficulty with breathing and feels like he cannot get enough air into his lungs.  Reports he feels generally \"unwell\" and that \"something is not right.\"   No chest pain.  No cough or hemoptysis.  No history of blood clots and not taking blood thinners.  Was recently admitted to the hospital on 02/18/2025 for NSTEMI and acute hypoxic " respiratory failure thought to be secondary to newly diagnosed congestive heart failure.  Per his wife, patient has been taking all medications as directed without any missed doses.  Weights have been stable at home.  No fevers, sore throat, or congestion.  No known sick contacts at home.  No current tobacco use, previous cigarette smoker for 20+ years.  Patient denies nausea, vomiting, or abdominal pain.    Wife present with POLST form stating patient is DNR/DNI. Wife is patient's medical decision maker.    Per chart review,  02/18/2025 - 02/27/2025: Admitted to the hospital for NSTEMI and acute hypoxic respiratory failure, new CHF diagnosis with BNP 2740.  CXR was notable for increased interstitial markings concerning for pulmonary edema raising concerns for CHF. EKG in ER showing sinus bradycardia with PVC is without acute ischemic changes.  Troponin uptrending, started on heparin drip for NSTEMI in conjunction with cardiology.  Diuresed with Lasix with improvement in respiratory status.  Did not proceed with angiogram and instead proceed with medical management.  Echocardiogram with EF of 20 to 25% with severe global hypokinesia of the left ventricle.  04/11/2025 - Patient seen by cardiology.  Advised to continue with ACE inhibitor.  No beta-blocker due to bradycardia.  No diuretics.  Lisinopril increased to 10 mg daily.    MEDICAL HISTORY     Past Medical History:   Diagnosis Date    Actinic keratitis     Arthritis     CAD (coronary artery disease)     Cardiac arrest (H) 09/04/89    Claudication     Claudication     Coronary artery disease     Diabetes mellitus type 2, controlled (H)     Diverticulosis     HTN (hypertension)     Hyperlipidemia     Hypertension     Malignant neoplasm of prostate (H) 8/7/2006-10/5/2006    MI, old     Multiple pulmonary nodules     Osteoarthritis of glenohumeral joint     Peripheral vascular disease, unspecified     Syncope     Trigger finger, acquired     Type 2 diabetes mellitus  "(H) 2012       Past Surgical History:   Procedure Laterality Date    APPENDECTOMY      ARTHROSCOPY KNEE      ARTHROSCOPY KNEE      CARDIAC CATHETERIZATION  2018     of rca    CARDIAC SURGERY  2018    two stents placed 18    CV CORONARY ANGIOGRAM N/A 2018    Procedure: Coronary Angiogram;  Surgeon: Jeane Garcia MD;  Location: [facility] Cath Lab;  Service:     CV CORONARY ANGIOGRAM N/A 2018    Procedure: Coronary Angiogram;  Surgeon: Jeane Garcia MD;  Location: [facility] Cath Lab;  Service:     FEMORAL ENDARTERECTOMY Bilateral 2017    IR EXTREMITY ANGIOGRAM BILATERAL  03/10/2017    NOSE SURGERY      PROSTATE BIOPSY, NEEDLE, SATURATION SAMPLING  2003    \"Biopsy fo the Prostate Needle\"    RELEASE TRIGGER FINGER         Family History   Problem Relation Age of Onset    Diabetes No family hx of     Breast Cancer No family hx of     Colon Cancer No family hx of     Prostate Cancer No family hx of     Other Cancer No family hx of     No Known Problems Mother     No Known Problems Father        Social History     Tobacco Use    Smoking status: Former     Current packs/day: 0.00     Average packs/day: 2.0 packs/day for 40.0 years (80.0 ttl pk-yrs)     Types: Cigarettes     Start date: 4/10/1944     Quit date: 4/10/1984     Years since quittin.0     Passive exposure: Never    Smokeless tobacco: Never   Substance Use Topics    Alcohol use: Yes     Alcohol/week: 6.0 standard drinks of alcohol     Comment: occasionally.    Drug use: No       Most Recent Immunizations   Administered Date(s) Administered    COVID-19 12+ (Pfizer) 10/09/2024    COVID-19 Bivalent 12+ (Pfizer) 10/11/2022    COVID-19 MONOVALENT 12+ (Pfizer) 10/20/2021    COVID-19 Monovalent 12+ (Pfizer ) 2022    DTAP (<7y) 2024    Flu 65+ (Fluad) 10/01/2018    Influenza (H1N1) 2010    Influenza (High Dose) Trivalent,PF (Fluzone) 2024    Influenza (IIV3) PF 10/01/2018    Influenza " (prior to 2024) 09/09/2020    Influenza Vaccine 65+ (Fluzone HD) 09/29/2023    Influenza Vaccine, 6+MO IM (QUADRIVALENT W/PRESERVATIVES) 10/01/2019    Influenza, Split Virus, Trivalent, Pf (Fluzone\Fluarix) 09/09/2020    Pneumococcal 20 valent Conjugate (Prevnar 20) 08/08/2023    Pneumococcal 23 valent 06/08/2022    TD,PF 7+ (Tenivac) 03/24/2009    TDAP (Adacel,Boostrix) 09/30/2024    Td (Adult), Adsorbed 03/24/2009    Td, Absorbed, Pf, Adult, Lf Unspecified 03/24/2009    Tdap (Adult) Unspecified Formulation 06/09/2014    Zoster vaccine, live 02/05/2009        acetaminophen (TYLENOL) 500 MG tablet  aspirin 81 MG EC tablet  atorvastatin (LIPITOR) 40 MG tablet  citalopram (CELEXA) 20 MG tablet  donepezil (ARICEPT) 10 MG tablet  ferrous sulfate (FE TABS) 325 (65 Fe) MG EC tablet  lisinopril (ZESTRIL) 10 MG tablet  loperamide (IMODIUM) 2 MG capsule  methylphenidate (RITALIN) 5 MG tablet  Multiple Vitamin (MULTIVITAMIN) per tablet  nitroGLYcerin (NITROSTAT) 0.4 MG sublingual tablet  spironolactone (ALDACTONE) 25 MG tablet  traZODone (DESYREL) 50 MG tablet  vitamin D3 (CHOLECALCIFEROL) 50 mcg (2000 units) tablet  zinc 50 MG TABS  zinc oxide (DESITIN) 40 % paste        PHYSICAL EXAM   VITALS:  Patient Vitals for the past 24 hrs:   BP Temp Temp src Pulse Resp SpO2 Weight   04/18/25 1147 (!) 164/80 -- -- 108 -- 98 % --   04/18/25 1132 (!) 157/73 -- -- 102 -- 98 % --   04/18/25 1115 (!) 144/87 -- -- 98 -- 98 % --   04/18/25 1100 139/74 -- -- 101 -- 99 % --   04/18/25 1021 121/69 -- -- 103 -- 95 % --   04/18/25 0957 136/65 -- -- 99 27 95 % --   04/18/25 0947 (!) 156/69 -- -- 101 -- 93 % --   04/18/25 0937 -- -- -- 106 29 94 % --   04/18/25 0927 (!) 154/74 -- -- 115 -- 93 % --   04/18/25 0924 -- -- -- 103 24 95 % --   04/18/25 0917 (!) 170/77 -- -- 101 -- 94 % --   04/18/25 0858 (!) 180/81 98.2  F (36.8  C) Oral 105 20 97 % --   04/18/25 0856 (!) 174/71 -- -- 107 -- 97 % --   04/18/25 0843 (!) 172/76 97.4  F (36.3  C) Temporal  82 30 (!) 91 % 64 kg (141 lb)     Body mass index is 23.49 kg/m .     Physical Exam  Vitals and nursing note reviewed.   Constitutional:       General: He is awake.      Appearance: He is cachectic. He is not toxic-appearing or diaphoretic.   HENT:      Head: Normocephalic and atraumatic.      Right Ear: Tympanic membrane, ear canal and external ear normal.      Left Ear: Tympanic membrane, ear canal and external ear normal.      Nose: Nose normal. No congestion or rhinorrhea.      Mouth/Throat:      Lips: Pink.      Pharynx: Oropharynx is clear. Uvula midline.      Tonsils: No tonsillar exudate or tonsillar abscesses.   Eyes:      General: Lids are normal.      Conjunctiva/sclera: Conjunctivae normal.      Pupils: Pupils are equal, round, and reactive to light.   Cardiovascular:      Rate and Rhythm: Regular rhythm. Tachycardia present.      Pulses:           Radial pulses are 2+ on the right side and 2+ on the left side.      Heart sounds: Heart sounds not distant.      No friction rub.   Pulmonary:      Effort: Tachypnea and accessory muscle usage present.      Breath sounds: No stridor or decreased air movement. Rhonchi present. No wheezing.   Abdominal:      General: There is no distension.      Palpations: Abdomen is soft.      Tenderness: There is generalized abdominal tenderness. There is no guarding or rebound. Negative signs include Moss's sign and McBurney's sign.   Musculoskeletal:      Cervical back: Full passive range of motion without pain.      Right lower leg: No edema.      Left lower leg: No edema.      Comments: Moving all 4 extremities intentionally and without pain. No joint swelling, redness, or obvious deformity.   Skin:     General: Skin is warm.      Capillary Refill: Capillary refill takes 2 to 3 seconds.      Coloration: Skin is not cyanotic, jaundiced, mottled or pale.      Findings: No rash.   Neurological:      General: No focal deficit present.      Mental Status: He is alert.  Mental status is at baseline.   Psychiatric:         Behavior: Behavior is cooperative.      Comments:  Thoughts linear and responses appropriate. Cooperative. Appropriate mood and affect.        LABS & IMAGING   All pertinent labs and imaging reviewed and interpreted.  Results for orders placed or performed during the hospital encounter of 04/18/25   CT Chest Pulmonary Embolism w Contrast    Impression    IMPRESSION:   1.  No pulmonary artery embolism.  2.  Pulmonary edema with small right-sided and trace left-sided pleural effusions.  3.  Mild right upper lobar airspace opacities suspicious for pneumonic infiltrates.  4.  Mild emphysema.  5.  Right gluteal 6.1 cm intramuscular rim-enhancing fluid collection. Differential consideration includes an abscess.  6.  No bowel obstruction, hydronephrosis or intraperitoneal inflammatory process.  7.  Newly visualized 4 mm left lung nodules. These can be followed per the guidelines below.  8.  Newly visualized indeterminate 0.8 cm segment 4A hepatic lesion. Recommend attention on follow-up.  9.  Newly visualized 6 mm pancreatic body cyst. This is likely benign and can be followed per the guidelines below.  10.  Irregular asymmetric left-sided bladder wall thickening suspicious for a bladder tumor. Recommend nonemergent urologic consultation.      REFERENCE:  Guidelines for Management of Incidental Pulmonary Nodules Detected on CT Images: From the Fleischner Society 2017.   Guidelines apply to incidental nodules in patients who are 35 years or older.  Guidelines do not apply to lung cancer screening, patients with immunosuppression, or patients with known primary cancer.    MULTIPLE NODULES  Nodule size less than or equal to 6 mm  Low-risk patients: No follow-up needed.  High-risk patients: Optional follow-up at 12 months.        REFERENCE:  Revisions of international consensus [place] guidelines for the management of IPMN of the pancreas. Pancreatology  2017;17(5):738-753.    Largest cyst less than 10 mm: CT or MRI/MRCP in 6 months and then every 2 years if no change.     CT Abdomen Pelvis w Contrast    Impression    IMPRESSION:   1.  No pulmonary artery embolism.  2.  Pulmonary edema with small right-sided and trace left-sided pleural effusions.  3.  Mild right upper lobar airspace opacities suspicious for pneumonic infiltrates.  4.  Mild emphysema.  5.  Right gluteal 6.1 cm intramuscular rim-enhancing fluid collection. Differential consideration includes an abscess.  6.  No bowel obstruction, hydronephrosis or intraperitoneal inflammatory process.  7.  Newly visualized 4 mm left lung nodules. These can be followed per the guidelines below.  8.  Newly visualized indeterminate 0.8 cm segment 4A hepatic lesion. Recommend attention on follow-up.  9.  Newly visualized 6 mm pancreatic body cyst. This is likely benign and can be followed per the guidelines below.  10.  Irregular asymmetric left-sided bladder wall thickening suspicious for a bladder tumor. Recommend nonemergent urologic consultation.      REFERENCE:  Guidelines for Management of Incidental Pulmonary Nodules Detected on CT Images: From the Fleischner Society 2017.   Guidelines apply to incidental nodules in patients who are 35 years or older.  Guidelines do not apply to lung cancer screening, patients with immunosuppression, or patients with known primary cancer.    MULTIPLE NODULES  Nodule size less than or equal to 6 mm  Low-risk patients: No follow-up needed.  High-risk patients: Optional follow-up at 12 months.        REFERENCE:  Revisions of international consensus [place] guidelines for the management of IPMN of the pancreas. Pancreatology 2017;17(5):738-753.    Largest cyst less than 10 mm: CT or MRI/MRCP in 6 months and then every 2 years if no change.     Basic metabolic panel   Result Value Ref Range    Sodium 142 135 - 145 mmol/L    Potassium 4.7 3.4 - 5.3 mmol/L    Chloride 106 98 - 107 mmol/L     Carbon Dioxide (CO2) 27 22 - 29 mmol/L    Anion Gap 9 7 - 15 mmol/L    Urea Nitrogen 26.5 (H) 8.0 - 23.0 mg/dL    Creatinine 1.21 (H) 0.67 - 1.17 mg/dL    GFR Estimate 57 (L) >60 mL/min/1.73m2    Calcium 8.8 8.8 - 10.4 mg/dL    Glucose 215 (H) 70 - 99 mg/dL   Result Value Ref Range    Magnesium 1.7 1.7 - 2.3 mg/dL   Result Value Ref Range    Troponin T, High Sensitivity 131 (HH) <=22 ng/L   Nt probnp inpatient   Result Value Ref Range    N terminal Pro BNP Inpatient 11,663 (H) 0 - 1,800 pg/mL   Influenza A/B, RSV and SARS-CoV2 PCR (COVID-19) Nasopharyngeal    Specimen: Nasopharyngeal; Swab   Result Value Ref Range    Influenza A PCR Negative Negative    Influenza B PCR Negative Negative    RSV PCR Negative Negative    SARS CoV2 PCR Negative Negative   Lactic Acid Whole Blood with 1X Repeat in 2 HR when >2   Result Value Ref Range    Lactic Acid, Initial 2.9 (H) 0.7 - 2.0 mmol/L   CBC with platelets and differential   Result Value Ref Range    WBC Count 9.2 4.0 - 11.0 10e3/uL    RBC Count 3.09 (L) 4.40 - 5.90 10e6/uL    Hemoglobin 9.2 (L) 13.3 - 17.7 g/dL    Hematocrit 29.6 (L) 40.0 - 53.0 %    MCV 96 78 - 100 fL    MCH 29.8 26.5 - 33.0 pg    MCHC 31.1 (L) 31.5 - 36.5 g/dL    RDW 15.2 (H) 10.0 - 15.0 %    Platelet Count 248 150 - 450 10e3/uL    % Neutrophils 78 %    % Lymphocytes 7 %    % Monocytes 13 %    % Eosinophils 0 %    % Basophils 0 %    % Immature Granulocytes 1 %    NRBCs per 100 WBC 0 <1 /100    Absolute Neutrophils 7.2 1.6 - 8.3 10e3/uL    Absolute Lymphocytes 0.7 (L) 0.8 - 5.3 10e3/uL    Absolute Monocytes 1.2 0.0 - 1.3 10e3/uL    Absolute Eosinophils 0.0 0.0 - 0.7 10e3/uL    Absolute Basophils 0.0 0.0 - 0.2 10e3/uL    Absolute Immature Granulocytes 0.1 <=0.4 10e3/uL    Absolute NRBCs 0.0 10e3/uL   Blood gas venous   Result Value Ref Range    pH Venous 7.36 7.32 - 7.43    pCO2 Venous 44 40 - 50 mm Hg    pO2 Venous 37 25 - 47 mm Hg    Bicarbonate Venous 24 21 - 28 mmol/L    Base Excess/Deficit Venous  -1.1 -3.0 - 3.0 mmol/L    FIO2 21     Oxyhemoglobin Venous 64 (L) 70 - 75 %    O2 Sat, Venous 65.1 (L) 70.0 - 75.0 %   Lactic acid whole blood   Result Value Ref Range    Lactic Acid 1.4 0.7 - 2.0 mmol/L   Result Value Ref Range    Troponin T, High Sensitivity 159 (HH) <=22 ng/L   Result Value Ref Range    Anti Xa Unfractionated Heparin <0.10 For Reference Range, See Comment IU/mL       ECG:  Performed at: [facility] EMERGENCY DEPARTMENT  Impression: Sinus tachycardia with frequent PVCs at 105 and beats per minute without ST elevation, depression, or signs of acute ischemia  Comparisons: Compared to ECG of 02/19, secondary AV block no longer present ventricular rate is increased by 39 bpm    EKG was collected and independently interpreted by myself and also confirmed by Dr. Gabino ED MD.    CRITICAL CARE TIME   Performed by: Sylvia Ochoa PA-C  Authorized by: Dr Wallace Lloyd  Total critical care time: 105 minutes  Critical care was necessary to treat or prevent imminent or life-threatening deterioration of the following conditions: sepsis  Critical care was time spent personally by me on the following activities: development of treatment plan with patient or surrogate, discussions with consultants, examination of patient, evaluation of patient's response to treatment, obtaining history from patient or surrogate, ordering and performing treatments and interventions, ordering and review of laboratory studies, ordering and review of radiographic studies, re-evaluation of patient's condition and monitoring for potential decompensation.  Critical care time was exclusive of separately billable procedures and treating other patients.     Sylvia Ochoa PA-C   Emergency Medicine   [facility] EMERGENCY DEPARTMENT  [address] [place] [id number]  [phone]  Dept: [phone]     Sylvia Ochoa PA-C  04/18/25 0313

## 2025-04-18 NOTE — PHARMACY-VANCOMYCIN DOSING SERVICE
Pharmacy Vancomycin Initial Note  Date of Service 2025  Patient's  1934  89 year old, male    Indication: Intra-abdominal infection and Sepsis    Current estimated CrCl = Estimated Creatinine Clearance: 36.7 mL/min (A) (based on SCr of 1.21 mg/dL (H)).    Creatinine for last 3 days  2025:  9:15 AM Creatinine 1.21 mg/dL    Recent Vancomycin Level(s) for last 3 days  No results found for requested labs within last 3 days.      Vancomycin IV Administrations (past 72 hours)        No vancomycin orders with administrations in past 72 hours.                    Nephrotoxins and other renal medications (From now, onward)      Start     Dose/Rate Route Frequency Ordered Stop    25 1100  vancomycin (VANCOCIN) 1,250 mg in 0.9% NaCl 262.5 mL intermittent infusion         1,250 mg  over 90 Minutes Intravenous ONCE 25 1011      25 1030  furosemide (LASIX) injection 60 mg         60 mg  over 1-3 Minutes Intravenous ONCE 25 1002      25 1030  piperacillin-tazobactam (ZOSYN) 4.5 g vial to attach to  mL bag         4.5 g  over 30 Minutes Intravenous ONCE 25 1005              Contrast Orders - past 72 hours (72h ago, onward)      None                Plan:  Start vancomycin  1250 mg (~ 20mg/kg) x 1 in ED  Please reconsult pharmacy if further vancomycin needed      Rupa Porter RPH

## 2025-04-18 NOTE — ED TRIAGE NOTES
He started to have SOB last night. He felt better but then this morning his wife felt his breathing is much worse. He denies pain at this time.

## 2025-04-18 NOTE — CONSULTS
"Care Management Initial Consult    General Information  Assessment completed with: Patient, Spouse or significant other, Spouse - Sarika  Type of CM/SW Visit: Initial Assessment    Primary Care Provider verified and updated as needed: Yes   Readmission within the last 30 days: no previous admission in last 30 days      Reason for Consult: discharge planning  Advance Care Planning: Advance Care Planning Reviewed: present on chart, verified with patient, other (see comments) (wife provided updated POLST date 4/9/2025)          Communication Assessment  Patient's communication style: spoken language (English or Bilingual)             Cognitive  Cognitive/Neuro/Behavioral:                        Living Environment:   People in home: spouse  Sadie  Current living Arrangements: apartment, independent living facility      Able to return to prior arrangements: yes       Family/Social Support:  Care provided by: self, spouse/significant other  Provides care for: no one, unable/limited ability to care for self  Marital Status:   Support system: Wife, Children, Sibling(s) (grand daughter)  Sadie       Description of Support System: Supportive, Involved    Support Assessment: Adequate family and caregiver support    Current Resources:   Patient receiving home care services: Yes  Skilled Home Care Services: Home Health Aid (\"[facility] on Tue and Fri [id number] for bathing assist, exercises, and other needs. Just started coming also on Wed for 4 hr so wife can leave and do errands.\")     Community Resources: Home Care, Housekeeping/Chore Agency, Other (see comment) (\"PT at [facility] weekly\")  Equipment currently used at home: shower chair, walker, rolling  Supplies currently used at home: Other (partial dentures; glasses)    Employment/Financial:  Employment Status: retired, , previous service        Financial Concerns: none   Referral to Financial " Worker: No       Does the patient's insurance plan have a 3 day qualifying hospital stay waiver?  Yes     Which insurance plan 3 day waiver is available? Alternative insurance waiver    Will the waiver be used for post-acute placement? Undetermined at this time    Lifestyle & Psychosocial Needs:  Social Drivers of Health     Food Insecurity: Unknown (2/25/2025)    Food Insecurity     Within the past 12 months, did you worry that your food would run out before you got money to buy more?: Patient unable to answer     Within the past 12 months, did the food you bought just not last and you didn t have money to get more?: Patient unable to answer   Depression: Not at risk (6/5/2024)    PHQ-2     PHQ-2 Score: 0   Housing Stability: Unknown (2/25/2025)    Housing Stability     Do you have housing? : Patient unable to answer     Are you worried about losing your housing?: Patient unable to answer   Tobacco Use: Medium Risk (4/11/2025)    Patient History     Smoking Tobacco Use: Former     Smokeless Tobacco Use: Never     Passive Exposure: Never   Financial Resource Strain: Unknown (2/25/2025)    Financial Resource Strain     Within the past 12 months, have you or your family members you live with been unable to get utilities (heat, electricity) when it was really needed?: Patient unable to answer   Alcohol Use: Not on file   Transportation Needs: Unknown (2/25/2025)    Transportation Needs     Within the past 12 months, has lack of transportation kept you from medical appointments, getting your medicines, non-medical meetings or appointments, work, or from getting things that you need?: Patient unable to answer   Physical Activity: Not on file   Interpersonal Safety: Low Risk  (2/26/2025)    Interpersonal Safety     Do you feel physically and emotionally safe where you currently live?: Yes     Within the past 12 months, have you been hit, slapped, kicked or otherwise physically hurt by someone?: No     Within the past 12  "months, have you been humiliated or emotionally abused in other ways by your partner or ex-partner?: No   Stress: Not on file   Social Connections: Not on file   Health Literacy: Not on file       Functional Status:  Prior to admission patient needed assistance:   Dependent ADLs:: Ambulation-walker, Bathing  Dependent IADLs:: Cleaning, Cooking, Laundry, Shopping, Meal Preparation, Transportation, Medication Management, Money Management, Incontinence (\"[facility] provides us with housekeeping help twice a week on Thursday for light housekeeping. Wife does the rest of the housekeeping needs. Also they get breakfast and dinner provided by the facility. Wife does all laundry, shopping, transportation\".)       Mental Health Status: n/a       Chemical Dependency Status: n/a         Values/Beliefs:  Spiritual, Cultural Beliefs, Religion Practices, Values that affect care:               Discussed  Partnership in Safe Discharge Planning  document with patient/family: No    Additional Information:  CM met with wife of 69 years in room to discuss discharge planning and complete initial assessment. Demographics confirmed and updated on facesheet.     Pt lives at [facility] where [facility] services are available to add as needed. Pt uses walker for ambulation. Pt is total cares at baseline. Wife provides assistance with all ADL needs. Pt (and spouse) are not currently receiving any services through [facility].  Pt rcv's home health aid services from through [facility] covered under pt's [facility] benefit. They provide HHA services 3 days per week, up to 10 hours per week for those 3 visits.  Anticipate discharge home with home hospice services . Family to transport.      Wife reports pt's needs for hospital visits have increased over the past few months. Wife reports pt previously admitted to  from 2/18/25 to 2/27/25 and discharged to [facility] 2/27/25-3/29/25. Wife shared that providers have began " discussing end of life care/ hospice services recently.  She reports  she wasn't ready to accept the need for hospice at that time but is aware it is time to start thinking about what that will look like for pt.     RNCM provided wife list of hospice agencies. She is going to discuss with family in particular pt's sister and grand daughter, who are both nurses.     Wife's goal is for pt to return home with her on hospice. Wife will work with [facility] to get pt's care services increased as part of pt's safe discharge plan. Wife will reconnect with CM team if she needs us to make referrals otherwise she is aware she can call any of the hospice agencies to request a consult directly through them.     Next Steps: Assist with discharge plan. Assist wife as needed with choosing hospice agency and confirm plan in place to meet pts ADLS needs either through [facility] or [facility] I/skilled nursing.     Spouse provided updated POLST dated 4/9/2025 - sent to [facility] Amena Marley RN

## 2025-04-18 NOTE — PROGRESS NOTES
Respiratory Care Note    Pt placed on BiPAP 10/5, R14, 30%. Tolerating well.  HR 98, SpO2 957%. BS diminished. Pt reports being comfortable.     Eun Muhammad, RT

## 2025-04-19 LAB
ANION GAP SERPL CALCULATED.3IONS-SCNC: 13 MMOL/L (ref 7–15)
BUN SERPL-MCNC: 29.5 MG/DL (ref 8–23)
CALCIUM SERPL-MCNC: 9.2 MG/DL (ref 8.8–10.4)
CHLORIDE SERPL-SCNC: 101 MMOL/L (ref 98–107)
CREAT SERPL-MCNC: 1.41 MG/DL (ref 0.67–1.17)
EGFRCR SERPLBLD CKD-EPI 2021: 47 ML/MIN/1.73M2
ERYTHROCYTE [DISTWIDTH] IN BLOOD BY AUTOMATED COUNT: 15.4 % (ref 10–15)
GLUCOSE SERPL-MCNC: 95 MG/DL (ref 70–99)
HCO3 SERPL-SCNC: 29 MMOL/L (ref 22–29)
HCT VFR BLD AUTO: 24.8 % (ref 40–53)
HGB BLD-MCNC: 7.9 G/DL (ref 13.3–17.7)
HOLD SPECIMEN: NORMAL
MAGNESIUM SERPL-MCNC: 1.8 MG/DL (ref 1.7–2.3)
MCH RBC QN AUTO: 29.4 PG (ref 26.5–33)
MCHC RBC AUTO-ENTMCNC: 31.9 G/DL (ref 31.5–36.5)
MCV RBC AUTO: 92 FL (ref 78–100)
MRSA DNA SPEC QL NAA+PROBE: NEGATIVE
PHOSPHATE SERPL-MCNC: 4.5 MG/DL (ref 2.5–4.5)
PLATELET # BLD AUTO: 201 10E3/UL (ref 150–450)
POTASSIUM SERPL-SCNC: 4 MMOL/L (ref 3.4–5.3)
RBC # BLD AUTO: 2.69 10E6/UL (ref 4.4–5.9)
SA TARGET DNA: POSITIVE
SODIUM SERPL-SCNC: 143 MMOL/L (ref 135–145)
UFH PPP CHRO-ACNC: 0.14 IU/ML
UFH PPP CHRO-ACNC: 0.18 IU/ML
UFH PPP CHRO-ACNC: 0.34 IU/ML
UFH PPP CHRO-ACNC: 0.44 IU/ML
WBC # BLD AUTO: 10.4 10E3/UL (ref 4–11)

## 2025-04-19 PROCEDURE — 99221 1ST HOSP IP/OBS SF/LOW 40: CPT | Performed by: SURGERY

## 2025-04-19 PROCEDURE — 250N000013 HC RX MED GY IP 250 OP 250 PS 637

## 2025-04-19 PROCEDURE — 85520 HEPARIN ASSAY: CPT

## 2025-04-19 PROCEDURE — 87640 STAPH A DNA AMP PROBE: CPT | Performed by: STUDENT IN AN ORGANIZED HEALTH CARE EDUCATION/TRAINING PROGRAM

## 2025-04-19 PROCEDURE — 258N000003 HC RX IP 258 OP 636

## 2025-04-19 PROCEDURE — 999N000147 HC STATISTIC PT IP EVAL DEFER

## 2025-04-19 PROCEDURE — 85520 HEPARIN ASSAY: CPT | Performed by: STUDENT IN AN ORGANIZED HEALTH CARE EDUCATION/TRAINING PROGRAM

## 2025-04-19 PROCEDURE — 85027 COMPLETE CBC AUTOMATED: CPT

## 2025-04-19 PROCEDURE — 87641 MR-STAPH DNA AMP PROBE: CPT | Performed by: STUDENT IN AN ORGANIZED HEALTH CARE EDUCATION/TRAINING PROGRAM

## 2025-04-19 PROCEDURE — 83735 ASSAY OF MAGNESIUM: CPT | Performed by: STUDENT IN AN ORGANIZED HEALTH CARE EDUCATION/TRAINING PROGRAM

## 2025-04-19 PROCEDURE — 250N000011 HC RX IP 250 OP 636

## 2025-04-19 PROCEDURE — 80048 BASIC METABOLIC PNL TOTAL CA: CPT

## 2025-04-19 PROCEDURE — 84100 ASSAY OF PHOSPHORUS: CPT | Performed by: STUDENT IN AN ORGANIZED HEALTH CARE EDUCATION/TRAINING PROGRAM

## 2025-04-19 PROCEDURE — 36415 COLL VENOUS BLD VENIPUNCTURE: CPT

## 2025-04-19 PROCEDURE — 36415 COLL VENOUS BLD VENIPUNCTURE: CPT | Performed by: STUDENT IN AN ORGANIZED HEALTH CARE EDUCATION/TRAINING PROGRAM

## 2025-04-19 PROCEDURE — 120N000004 HC R&B MS OVERFLOW

## 2025-04-19 PROCEDURE — 250N000011 HC RX IP 250 OP 636: Performed by: INTERNAL MEDICINE

## 2025-04-19 PROCEDURE — 99232 SBSQ HOSP IP/OBS MODERATE 35: CPT | Performed by: INTERNAL MEDICINE

## 2025-04-19 RX ORDER — FUROSEMIDE 40 MG/1
40 TABLET ORAL DAILY
Status: DISCONTINUED | OUTPATIENT
Start: 2025-04-20 | End: 2025-04-21

## 2025-04-19 RX ADMIN — SPIRONOLACTONE 12.5 MG: 25 TABLET, FILM COATED ORAL at 09:20

## 2025-04-19 RX ADMIN — CITALOPRAM HYDROBROMIDE 20 MG: 20 TABLET ORAL at 09:20

## 2025-04-19 RX ADMIN — SODIUM CHLORIDE 750 MG: 0.9 INJECTION, SOLUTION INTRAVENOUS at 11:56

## 2025-04-19 RX ADMIN — PIPERACILLIN AND TAZOBACTAM 3.38 G: 3; .375 INJECTION, POWDER, FOR SOLUTION INTRAVENOUS at 11:46

## 2025-04-19 RX ADMIN — ATORVASTATIN CALCIUM 40 MG: 40 TABLET, FILM COATED ORAL at 19:28

## 2025-04-19 RX ADMIN — HEPARIN SODIUM 700 UNITS/HR: 10000 INJECTION, SOLUTION INTRAVENOUS at 16:21

## 2025-04-19 RX ADMIN — PIPERACILLIN AND TAZOBACTAM 3.38 G: 3; .375 INJECTION, POWDER, FOR SOLUTION INTRAVENOUS at 17:34

## 2025-04-19 RX ADMIN — ASPIRIN 81 MG: 81 TABLET, COATED ORAL at 20:34

## 2025-04-19 RX ADMIN — METHYLPHENIDATE HYDROCHLORIDE 5 MG: 5 TABLET ORAL at 14:27

## 2025-04-19 RX ADMIN — METHYLPHENIDATE HYDROCHLORIDE 5 MG: 5 TABLET ORAL at 09:20

## 2025-04-19 RX ADMIN — FERROUS SULFATE TAB 325 MG (65 MG ELEMENTAL FE) 325 MG: 325 (65 FE) TAB at 09:20

## 2025-04-19 RX ADMIN — DONEPEZIL HYDROCHLORIDE 10 MG: 5 TABLET ORAL at 20:34

## 2025-04-19 RX ADMIN — PIPERACILLIN AND TAZOBACTAM 3.38 G: 3; .375 INJECTION, POWDER, FOR SOLUTION INTRAVENOUS at 06:12

## 2025-04-19 RX ADMIN — PIPERACILLIN AND TAZOBACTAM 3.38 G: 3; .375 INJECTION, POWDER, FOR SOLUTION INTRAVENOUS at 01:02

## 2025-04-19 RX ADMIN — ACETAMINOPHEN 650 MG: 325 TABLET ORAL at 20:33

## 2025-04-19 RX ADMIN — TRAZODONE HYDROCHLORIDE 25 MG: 50 TABLET ORAL at 20:35

## 2025-04-19 RX ADMIN — LOPERAMIDE HYDROCHLORIDE 2 MG: 2 CAPSULE ORAL at 18:30

## 2025-04-19 RX ADMIN — FUROSEMIDE 40 MG: 10 INJECTION, SOLUTION INTRAMUSCULAR; INTRAVENOUS at 06:11

## 2025-04-19 ASSESSMENT — ACTIVITIES OF DAILY LIVING (ADL)
ADLS_ACUITY_SCORE: 67
ADLS_ACUITY_SCORE: 65
ADLS_ACUITY_SCORE: 67
ADLS_ACUITY_SCORE: 63
ADLS_ACUITY_SCORE: 67
ADLS_ACUITY_SCORE: 65
ADLS_ACUITY_SCORE: 67
ADLS_ACUITY_SCORE: 63
ADLS_ACUITY_SCORE: 67
ADLS_ACUITY_SCORE: 63
ADLS_ACUITY_SCORE: 67
ADLS_ACUITY_SCORE: 63
ADLS_ACUITY_SCORE: 67

## 2025-04-19 NOTE — PROGRESS NOTES
HEART CARE NOTE        Thank you, Dr. Lloyd, for asking the [facility] team to see Zack Johns to evaluate ADHF.      Assessment/Recommendations   1. Severe HFrEF c/b severe ADHF  Assessment / Plan  Recent decline in LVSF; after extensive discussion wife at bedside (once again this admission), her decision is to continue to hold on invasive testing/procedures at this time and to focus on quality of time   Edy was consulted during most recent admission - please see detailed note in chart re: plan of care  Euvolemic on physical exam; will transition to PO diuresis and continue to monitor UOP and renal function closely  Patient is high risk for adverse cardiac events 2/2 advanced age, frailty, systolic dysfunction, HTN, CAD, elevated NTproBNP, elevated troponin, DM2, HLP     Current Pharmacotherapy AHA Guideline-Directed Medical Therapy   Lisinopril 20 mg twice daily - held Lisinopril 20 mg twice daily   Metoprolol succinate 25 mg daily Carvedilol 25 mg twice daily   Spironolactone - not started Spironolactone 25 mg once daily   Hydralazine NA Hydralazine 100 mg three times daily   Isosorbide dinitrate NA Isosorbide dinitrate 40 mg three times daily   SGLT2 inhibitor:Dapagliflozin/Empagliflozin  - not started Dapagliflozin or Empagliflozin 10 mg daily      2. CAD c/b NSTEMI  Assessment / Plan  Hx of PCI to RCA/ with known mod LM, LAD dz  Denies chest pain or anginal equivalents; recent decline in LVSF; per review and repeat discussion with wife and family, coronary angiogram +/- PCI is not in line with his goals of care  Continue aggressive risk factor modifications; Continue atorvastatin, lisinopril, metoprolol, ASA 81 mg   Stop heparin gtt after 48 hrs     3. PAD  Assessment / Plan  Hx of b/l iliac stents     4. HTN  Assessment / Plan  Titrate oral afterload reduction as tolerated     5. DM2  Assessment / Plan  Management per primary team     6. HLP  Assessment / Plan  Resume  atorvastatin     7. Acute hypoxic respiratory failure  Assessment / Plan  2/2 cardiogenic pulmonary edema; diuresis as above  Supportive care per primary team     8. CKD stage 3  Assessment / Plan  Diuresis as above; Continue to monitor UOP and renal function closely     Clinically Significant Risk Factors                   # Hypertension: Noted on problem list  # Acute heart failure with reduced ejection fraction: last echo with EF <40% and receiving IV diuretics               # Financial/Environmental Concerns: none        Native vessel CAD  Cardiomyopathy  Systolic acute    Fluid overload, unspecified, Other fluid overload, and Other disorders of electrolyte and fluid balance, not elsewhere classified    CKD POA List: Stage 3a (GFR 45-59)    85 minutes spent reviewing prior records (including documentation, laboratory studies, cardiac testing/imaging), history and physical exam, planning, and subsequent documentation.      History of Present Illness/Subjective    Mr. Zack Johns is a 89 year old male with a PMHx significant for (per Epic notation) CAD s/p stent (2018), PAD, T2DM, HTN, HLD admitted on 2/18/2025 for NSTEMI and acute hypoxic respiratory failure, likely secondary to new CHF.      Today, Mr. Johns has baseline dementia, unable to provide much of ROS; Management plan as detailed above and discussed with wife and family at bedside     ECG: Personally reviewed. normal sinus rhythm, nonspecific ST and T waves changes, occasional PVC noted, unifocal.     ECHO (personally reviewed 4/18/25):repeat study pending  1. The left ventricle is mildly dilated with normal left ventricular wall  thickness.  - Left ventricular function is decreased. The ejection fraction is 20-25%  (severely reduced).  - There is severe global hypokinesia of the left ventricle.  2. The right ventricle is normal size with mildly decreased right ventricular  systolic function  3. No hemodynamically significant valvular  "abnormalities on 2D or color flow  imaging.  4. There is no comparison study available.    Lab results: personally reviewed April 19, 2025; notable for CKD stage 3    Medical history and pertinent documents reviewed in Care Everywhere please where applicable see details above          Physical Examination Review of Systems   /56 (BP Location: Left arm)   Pulse 87   Temp 97.9  F (36.6  C) (Oral)   Resp 18   Ht 1.626 m (5' 4\")   Wt 64 kg (141 lb)   SpO2 97%   BMI 24.20 kg/m    Body mass index is 24.2 kg/m .  Wt Readings from Last 3 Encounters:   04/18/25 64 kg (141 lb)   04/11/25 64.4 kg (142 lb)   04/09/25 63 kg (139 lb)     General Appearance:   no distress   ENT/Mouth: membranes moist, no oral lesions or bleeding gums.      EYES:  no scleral icterus, normal conjunctivae   Neck: no carotid bruits or thyromegaly   Chest/Lungs:   lungs are clear to auscultation, no rales or wheezing, equal chest wall expansion    Cardiovascular:   Regular. Normal first and second heart sounds with no murmurs, rubs, or gallops; the carotid, radial and posterior tibial pulses are intact, no LE edema bilaterally    Abdomen:  no organomegaly, masses, bruits, or tenderness; bowel sounds are present  + abdominal distension   Extremities: no cyanosis or clubbing   Skin: no xanthelasma, warm.    Neurologic: NAD     Psychiatric: alert and calm     A complete 10 systems ROS was reviewed  And is negative except what is listed in the HPI.          Medical History  Surgical History Family History Social History   Past Medical History:   Diagnosis Date    Actinic keratitis     Arthritis     bilat shoulders    CAD (coronary artery disease)     Cardiac arrest (H) 09/04/89    Claudication     Claudication     Coronary artery disease     s/p MI    Diabetes mellitus type 2, controlled (H)     Diverticulosis     HTN (hypertension)     Hyperlipidemia     Hypertension     Malignant neoplasm of prostate (H) 8/7/2006-10/5/2006    DENIED BY " "PATIENT (see note from 2014).  RADIOTHERAPY BY     MI, old     Multiple pulmonary nodules     Osteoarthritis of glenohumeral joint     Left. Injected at [facility] 02/10/2015.    Peripheral vascular disease, unspecified     Syncope     Trigger finger, acquired     Type 2 diabetes mellitus (H) 2012    Past Surgical History:   Procedure Laterality Date    APPENDECTOMY      ARTHROSCOPY KNEE      ARTHROSCOPY KNEE      CARDIAC CATHETERIZATION  2018     of rca    CARDIAC SURGERY  2018    two stents placed 18    CV CORONARY ANGIOGRAM N/A 2018    Procedure: Coronary Angiogram;  Surgeon: Jeane Garcia MD;  Location: [facility] Lab;  Service:     CV CORONARY ANGIOGRAM N/A 2018    Procedure: Coronary Angiogram;  Surgeon: Jeane Garcia MD;  Location: [facility] Cath Lab;  Service:     FEMORAL ENDARTERECTOMY Bilateral 2017    IR EXTREMITY ANGIOGRAM BILATERAL  03/10/2017    NOSE SURGERY      PROSTATE BIOPSY, NEEDLE, SATURATION SAMPLING  2003    \"Biopsy fo the Prostate Needle\"    RELEASE TRIGGER FINGER      no family history of premature coronary artery disease Social History     Socioeconomic History    Marital status:      Spouse name: Not on file    Number of children: Not on file    Years of education: Not on file    Highest education level: Not on file   Occupational History    Not on file   Tobacco Use    Smoking status: Former     Current packs/day: 0.00     Average packs/day: 2.0 packs/day for 40.0 years (80.0 ttl pk-yrs)     Types: Cigarettes     Start date: 4/10/1944     Quit date: 4/10/1984     Years since quittin.0     Passive exposure: Never    Smokeless tobacco: Never   Substance and Sexual Activity    Alcohol use: Yes     Alcohol/week: 6.0 standard drinks of alcohol     Comment: occasionally.    Drug use: No    Sexual activity: Not on file   Other Topics Concern    Parent/sibling w/ CABG, MI or angioplasty before 65F 55M? 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        Had gotten into typing to meet girls.        Worked for [employer] after leaving the [employer].     Social Drivers of Health     Financial Resource Strain: Unknown (2/25/2025)    Financial Resource Strain     Within the past 12 months, have you or your family members you live with been unable to get utilities (heat, electricity) when it was really needed?: Patient unable to answer   Food Insecurity: Unknown (2/25/2025)    Food Insecurity     Within the past 12 months, did you worry that your food would run out before you got money to buy more?: Patient unable to answer     Within the past 12 months, did the food you bought just not last and you didn t have money to get more?: Patient unable to answer   Transportation Needs: Unknown (2/25/2025)    Transportation Needs     Within the past 12 months, has lack of transportation kept you from medical appointments, getting your medicines, non-medical meetings or appointments, work, or from getting things that you need?: Patient unable to answer   Physical Activity: Not on file   Stress: Not on file   Social Connections: Not on file   Interpersonal Safety: Low Risk  (4/18/2025)    Interpersonal Safety     Do you feel physically and emotionally safe where you currently live?: Yes     Within the past 12 months, have you been hit, slapped, kicked or otherwise physically hurt by someone?: No     Within the past 12 months, have you been humiliated or emotionally abused in other ways by your partner or ex-partner?: No   Housing Stability: Unknown (2/25/2025)    Housing Stability     Do you have housing? : Patient unable to answer     Are you worried about losing your housing?: Patient unable to answer           Lab Results    Chemistry/lipid CBC Cardiac Enzymes/BNP/TSH/INR   Lab Results   Component Value Date    CHOL 124 11/30/2021    HDL 41 11/30/2021    TRIG 181 (H) 11/30/2021    BUN  "29.5 (H) 04/19/2025     04/19/2025    CO2 29 04/19/2025    Lab Results   Component Value Date    WBC 10.4 04/19/2025    HGB 7.9 (L) 04/19/2025    HCT 24.8 (L) 04/19/2025    MCV 92 04/19/2025     04/19/2025    Lab Results   Component Value Date    TROPONINI 0.03 10/27/2019    BNP 59 06/16/2018    TSH 1.45 03/21/2023    INR 1.17 (H) 10/30/2019     Lab Results   Component Value Date    TROPONINI 0.03 10/27/2019          Weight:    Wt Readings from Last 3 Encounters:   04/18/25 64 kg (141 lb)   04/11/25 64.4 kg (142 lb)   04/09/25 63 kg (139 lb)       Allergies  Allergies   Allergen Reactions    Pletal [Cilostazol]          Surgical History  Past Surgical History:   Procedure Laterality Date    APPENDECTOMY      ARTHROSCOPY KNEE      ARTHROSCOPY KNEE      CARDIAC CATHETERIZATION  08/22/2018     of rca    CARDIAC SURGERY  08/22/2018    two stents placed 8/22/18    CV CORONARY ANGIOGRAM N/A 08/06/2018    Procedure: Coronary Angiogram;  Surgeon: Jeane Garcia MD;  Location: [facility] Cath Lab;  Service:     CV CORONARY ANGIOGRAM N/A 08/22/2018    Procedure: Coronary Angiogram;  Surgeon: Jeane Garcia MD;  Location: [facility] Cath Lab;  Service:     FEMORAL ENDARTERECTOMY Bilateral 05/03/2017    IR EXTREMITY ANGIOGRAM BILATERAL  03/10/2017    NOSE SURGERY      PROSTATE BIOPSY, NEEDLE, SATURATION SAMPLING  01/13/2003    \"Biopsy fo the Prostate Needle\"    RELEASE TRIGGER FINGER         Social History  Tobacco:   History   Smoking Status    Former    Types: Cigarettes   Smokeless Tobacco    Never    Alcohol:   Social History    Substance and Sexual Activity      Alcohol use: Yes        Alcohol/week: 6.0 standard drinks of alcohol        Comment: occasionally.   Illicit Drugs:   History   Drug Use No       Family History  Family History   Problem Relation Age of Onset    Diabetes No family hx of     Breast Cancer No family hx of     Colon Cancer No family hx of     Prostate Cancer No family hx of     " Other Cancer No family hx of     No Known Problems Mother     No Known Problems Father           Donovan Sanderson MD  Clinical Cardiac Electrophysiology

## 2025-04-19 NOTE — PROGRESS NOTES
Physical Therapy: Orders received. Chart reviewed and discussed with care team.? Physical Therapy not indicated due to patient will be discharged home on hospice.? Defer discharge recommendations to treatment team.? Will complete orders.

## 2025-04-19 NOTE — CONSULTS
History:  89 year old year old male who I have been consulted by Dr. Parkinson for evaluation of gluteal abscess.  The patient is currently admitted for decompensated heart failure.  He had presented with shortness of breath.  He is being treated for pneumonia.  While he was being worked up in the ED a CT of the abdomen pelvis was obtained.  It demonstrated and incidental fluid collection within the musculature of the right gluteus.  The patient and wife deny any buttock symptoms such as pain, pain with movement, any wounds, or any history of buttock trauma.    Allergies:  Pletal [cilostazol]    Past medical history:  Diabetes, dementia, peripheral arterial disease, CHF, CAD, hypertension, dyslipidemia, chronic kidney disease, history of prostate cancer    Past surgical history:  Femoral endarterectomy  Cardiac stenting  Appendectomy  Arthroscopy    Current Facility-Administered Medications:     - MEDICATION INSTRUCTIONS -, , Does not apply, DOES NOT GO TO [facility] CARCAMO Kathryn, DO    acetaminophen (TYLENOL) tablet 650 mg, 650 mg, Oral, Q4H PRN **OR** acetaminophen (TYLENOL) Suppository 650 mg, 650 mg, Rectal, Q4H PRN, Jewels Bunn DO    aspirin EC tablet 81 mg, 81 mg, Oral, At Bedtime, Jewels Bunn DO, 81 mg at 04/18/25 2102    atorvastatin (LIPITOR) tablet 40 mg, 40 mg, Oral, QPM, Jewels Bunn DO, 40 mg at 04/18/25 2102    calcium carbonate (TUMS) chewable tablet 1,000 mg, 1,000 mg, Oral, 4x Daily PRN, Jewels Bunn DO    citalopram (celeXA) tablet 20 mg, 20 mg, Oral, Daily, Jewels Bunn DO, 20 mg at 04/19/25 0920    Continuing ACE inhibitor/ARB/ARNI from home medication list OR ACE inhibitor/ARB/ARNI order already placed during this visit, , Does not apply, DOES NOT GO TO [facility] CARCAMO Kathryn, DO    donepezil (ARICEPT) tablet 10 mg, 10 mg, Oral, At Bedtime, Jewels Bunn DO, 10 mg at 04/18/25 2103    ferrous sulfate (FEROSUL) tablet 325 mg, 325 mg, Oral, Every Other Day, Sepideh  DO Jewels, 325 mg at 04/19/25 0920    furosemide (LASIX) injection 40 mg, 40 mg, Intravenous, Q12H, Taryn Taylor MD, 40 mg at 04/19/25 0611    heparin 25,000 units in 0.45% NaCl 250 mL ANTICOAGULANT infusion, 0-5,000 Units/hr, Intravenous, Continuous, Jewels Bunn DO, Last Rate: 7 mL/hr at 04/19/25 0927, 700 Units/hr at 04/19/25 0927    lidocaine (LMX4) cream, , Topical, Q1H PRN, Jewels Bunn DO    lidocaine 1 % 0.1-1 mL, 0.1-1 mL, Other, Q1H PRN, Jewels Bunn DO    [Held by provider] lisinopril (ZESTRIL) tablet 10 mg, 10 mg, Oral, Daily, Jewels Bunn DO    loperamide (IMODIUM) capsule 2 mg, 2 mg, Oral, 4x Daily PRN, Jewels Bunn DO    methylphenidate (RITALIN) tablet 5 mg, 5 mg, Oral, BID, Jewels Bunn DO, 5 mg at 04/19/25 0920    nitroGLYcerin (NITROSTAT) sublingual tablet 0.4 mg, 0.4 mg, Sublingual, Q5 Min PRN, Jewels Bunn DO    ondansetron (ZOFRAN ODT) ODT tab 4 mg, 4 mg, Oral, Q6H PRN **OR** ondansetron (ZOFRAN) injection 4 mg, 4 mg, Intravenous, Q6H PRN, Jewels Bunn DO    Patient is already receiving anticoagulation with heparin, enoxaparin (LOVENOX), warfarin (COUMADIN)  or other anticoagulant medication, , Does not apply, Continuous PRN, Jewels Bunn DO    piperacillin-tazobactam (ZOSYN) 3.375 g vial to attach to  mL bag, 3.375 g, Intravenous, Q6H, Jewels Bunn DO, 3.375 g at 04/19/25 1146    polyethylene glycol (MIRALAX) Packet 17 g, 17 g, Oral, BID PRN, Jewels Bunn DO    prochlorperazine (COMPAZINE) injection 5 mg, 5 mg, Intravenous, Q6H PRN **OR** prochlorperazine (COMPAZINE) tablet 5 mg, 5 mg, Oral, Q6H PRN, Jewels Bunn, DO    Reason beta blocker not prescribed, , Other, DOES NOT GO TO MAR, Jewels Bunn,     senna-docusate (SENOKOT-S/PERICOLACE) 8.6-50 MG per tablet 1 tablet, 1 tablet, Oral, BID PRN **OR** senna-docusate (SENOKOT-S/PERICOLACE) 8.6-50 MG per tablet 2 tablet, 2 tablet, Oral, BID PRN, Jewels Bunn,  "DO    sodium chloride (PF) 0.9% PF flush 3 mL, 3 mL, Intracatheter, Q8H TONY, Jewels Bunn,     sodium chloride (PF) 0.9% PF flush 3 mL, 3 mL, Intracatheter, q1 min prn, Bianka Bunnhryn, DO    spironolactone (ALDACTONE) half-tab 12.5 mg, 12.5 mg, Oral, Daily, Gibsonelt, Jewels, DO, 12.5 mg at 04/19/25 0920    traZODone (DESYREL) half-tab 25 mg, 25 mg, Oral, At Bedtime, Gibsonelt, Jewels, DO, 25 mg at 04/18/25 2102    vancomycin (VANCOCIN) 750 mg in 0.9% NaCl 257.5 mL intermittent infusion, 750 mg, Intravenous, Q24H, Gibsonelt, Jewels, DO, 750 mg at 04/19/25 1156    Family history:  No family history of prostate, colon, or breast cancer    Social history:  Reports that he quit smoking about 41 years ago. His smoking use included cigarettes. He started smoking about 81 years ago. He has a 80 pack-year smoking history. He has never been exposed to tobacco smoke. He has never used smokeless tobacco. He reports current alcohol use of about 6.0 standard drinks of alcohol per week. He reports that he does not use drugs.    Exam:  /56 (BP Location: Left arm)   Pulse 87   Temp 97.9  F (36.6  C) (Oral)   Resp 18   Ht 1.626 m (5' 4\")   Wt 64 kg (141 lb)   SpO2 97%   BMI 24.20 kg/m    Body mass index is 24.2 kg/m .  General: Alert, cooperative, appears stated age   Skin: On visual exam of the backside the patient has no unusual skin lesions.  With palpation of bilateral buttock there is no tenderness.    Labs:  Lab Results   Component Value Date    WBC 10.4 04/19/2025    HGB 7.9 04/19/2025    HGB 13.4 09/28/2018    HCT 24.8 04/19/2025    HCT 41.5 09/28/2018    MCV 92 04/19/2025    MCV 95.4 09/28/2018     04/19/2025     Recent Labs   Lab 04/19/25  0219      CO2 29   BUN 29.5*     Lab Results   Component Value Date    ALT 10 02/18/2025    AST 16 02/18/2025    ALKPHOS 77 02/18/2025       Imaging:   Pertinent images personally reviewed by myself and discussed with the patient.    Radiology " reports:  EXAM: CT ABDOMEN PELVIS W CONTRAST, CT CHEST PULMONARY EMBOLISM W CONTRAST  LOCATION: [facility]  DATE: 4/18/2025     INDICATION: abdominal tenderness on exam, upper abdomen worse than lower abdomen  COMPARISON: CT 6/16/2018 and 5/23/2018  TECHNIQUE:   CT chest pulmonary angiogram during arterial phase injection of IV contrast. Multiplanar reformats and MIP reconstructions were performed. Dose reduction techniques were used.   CT scan of the abdomen and pelvis was performed following injection of IV contrast. Multiplanar reformats were obtained. Dose reduction techniques were used.  CONTRAST: [id number] 75ml      FINDINGS:   ANGIOGRAM CHEST: Pulmonary arteries are normal caliber and negative for pulmonary emboli. Normal caliber thoracic aorta. The exam is nondiagnostic for a thoracic aortic dissection due to timing of the contrast bolus. No CT evidence of right heart strain.  LUNGS AND PLEURA: Small right-sided and trace left-sided pleural effusions with adjacent atelectasis. Pulmonary edema with interlobular septal thickening and peribronchial cuffing. Mild emphysema. Mild patchy right upper lobar airspace opacities.   Scattered calcified granulomas. There is a 4 mm left lower lobe subpleural nodular opacity image 166 series 2 and 4 mm left lower lobe nodule image 154. These are new since the prior CT exam.  MEDIASTINUM/AXILLAE: Mild chest wall edema. No thoracic adenopathy. Normal heart size and no pericardial effusion. Small hiatal hernia.  CORONARY ARTERY CALCIFICATION: Moderate.  HEPATOBILIARY: Cholelithiasis. Newly visualized 0.8 cm indeterminate hypodense segment 4A lesion image 20 series 3.  PANCREAS: 6 mm pancreatic body cyst image 66 series 3. This is not definitely seen on the previous exam. No main pancreatic ductal dilatation.  SPLEEN: Normal.  ADRENAL GLANDS: Normal.  KIDNEYS/BLADDER: Multifocal renal cortical scarring. Upper right renal cyst. No follow-up is  indicated. Renal vascular calcifications. Irregular asymmetric left-sided bladder wall thickening measuring up to 1.6 cm in thickness.  BOWEL: Small hiatal hernia. No bowel obstruction or inflammatory process. Normal appendix. Colonic diverticulosis without evidence of acute diverticulitis. No free air or free fluid.  LYMPH NODES: Normal.  VASCULATURE: Severe aortoiliac atherosclerosis. 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 Patent central SMA and SMV.  PELVIC ORGANS: Normal.  MUSCULOSKELETAL: Right gluteal 3.4 x 1.4 x 6.1 cm rim-enhancing fluid collection with surrounding muscular edema.                                                                   IMPRESSION:   1.  No pulmonary artery embolism.  2.  Pulmonary edema with small right-sided and trace left-sided pleural effusions.  3.  Mild right upper lobar airspace opacities suspicious for pneumonic infiltrates.  4.  Mild emphysema.  5.  Right gluteal 6.1 cm intramuscular rim-enhancing fluid collection. Differential consideration includes an abscess.  6.  No bowel obstruction, hydronephrosis or intraperitoneal inflammatory process.  7.  Newly visualized 4 mm left lung nodules. These can be followed per the guidelines below.  8.  Newly visualized indeterminate 0.8 cm segment 4A hepatic lesion. Recommend attention on follow-up.  9.  Newly visualized 6 mm pancreatic body cyst. This is likely benign and can be followed per the guidelines below.  10.  Irregular asymmetric left-sided bladder wall thickening suspicious for a bladder tumor. Recommend nonemergent urologic consultation.    My interpretation:  Rim-enhancing fluid collection within the musculature of the right buttock      Assessment:   Zack Johns is a 89 year old male admitted with decompensated heart failure.  He has numerous comorbidities.  He was incidentally found to have a rim-enhancing fluid collection in the right gluteus.  This could either be a small abscess or residual  hematoma from past bleed.    Plan:  CT imaging shown to the patient and his wife.  Since this is asymptomatic, I think would be reasonable to continue to monitor him.  He is currently on antibiotics for pneumonia which could cover an abscess.  If they wanted to intervene, can consider IR aspiration to further determine bacterial source or possibility of an old hematoma.  At this time they prefer to do nothing since he is asymptomatic.  I think this is very reasonable.  General surgery will reevaluate upon request.    Arpita Pinto DO  General Surgeon  [facility]  Surgery Clinic - [address] [address]  Office: [phone]  Employed by - [facility]

## 2025-04-19 NOTE — PLAN OF CARE
Goal Outcome Evaluation:      Plan of Care Reviewed With: patient, spouse    Overall Patient Progress: improving         Problem: Gas Exchange Impaired  Goal: Optimal Gas Exchange  Outcome: Progressing     Patient oriented to name, place, and partially to situation. 1:1 in room due to patient pulling at lines during previous shift. BIPAP discontinued on arrival to P3 from ED. SpO2 stable on 2 lpm O2. Lung sounds diminished/clear. IV albumin and IV lasix given this evening. PrimoFit in place with good output.

## 2025-04-19 NOTE — PLAN OF CARE
"Goal Outcome Evaluation:      Plan of Care Reviewed With: patient, spouse    Overall Patient Progress: improving      Continuing 1:1. Confused to baseline. Very forgetful and oriented to name and at times place. Patient asks on occasion \"When am I going home?\" and \"Why is this stuff on me? Can take this stuff off?\" Patient does reach at IV at times. He is redirectable.    SpO2 stable on RA (weaned off O2 this morning). IV lasix transitioned to PO. Continues to have good output.     Continuing continuous IV heparin for 48 hrs per Cardiologist. Plan to discontinue tomorrow.    Diarrhea this evening. PRN Imodium given x1.    Heart Failure Care Map  GOALS TO BE MET BEFORE DISCHARGE:    1. Decrease congestion and/or edema with diuretic therapy to achieve near optimal volume status.     Dyspnea improved: Yes, satisfactory for discharge.   Edema improved: Yes, satisfactory for discharge.        Last 24 hour I/O:   Intake/Output Summary (Last 24 hours) at 4/19/2025 1828  Last data filed at 4/19/2025 1800  Gross per 24 hour   Intake 1400 ml   Output 2200 ml   Net -800 ml           Net I/O and Weights since admission:   03/20 2300 - 04/19 2259  In: 1400 [P.O.:1400]  Out: 3400 [Urine:3400]  Net: -2000     Vitals:    04/18/25 0843 04/18/25 1443   Weight: 64 kg (141 lb) 64 kg (141 lb)       2.  O2 sats > 90% on room air, or at prior home O2 therapy level.      Able to wean O2 this shift to keep sats above 90%?: Yes, satisfactory for discharge.   Does patient use Home O2? No          Current oxygenation status:   SpO2: 93 %     O2 Device: None (Room air), Oxygen Delivery: 1 LPM    3.  Tolerates ambulation and mobility near baseline.     Ambulation: Yes, satisfactory for discharge.   Times patient ambulated with staff this shift:     Please review the Heart Failure Care Map for additional HF goal outcomes.    Ethan Vargas RN  4/19/2025       "

## 2025-04-19 NOTE — PROGRESS NOTES
[facility]    Progress Note - Hospitalist Service       Date of Admission:  4/18/2025    Assessment & Plan   Zack Johns is a 89 year old male admitted on 4/18/2025. 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      Acute decompensated heart failure   Acute hypoxic respiratory failure, resolved  HFrEF with EF of 20-25%   Bilateral pleural effusions   Zack is a 89 y.o. male who presented with one day of worsening dyspnea at rest. He was recently hospitalized in February of 2025 for NSTEMI and diagnosed with new HFrEF with an EF of 20-25% at the time. Noted to be hypoxic requiring 2L NC, now resolved.  CTPE negative for pulmonary embolism but did demonstrate bilateral pleural effusions R>L. Also noted to have infiltrate in the RUL concerning for pneumonia. Initial lactic acid was 2.9 and improved to 1.4 without intervention. WBC reassuring at 9.2. BNP significantly elevated at >11,000. Initial troponin 131 with delta of 159. ECG demonstrating sinus tachycardia without ST elevations. Viral respiratory panel negative for flu/covid/rsv. VBG reassuring without signs of metabolic/respiratory derangements. Lung sounds diminished at the bilateral bases but patient has no other physical exam findings of hypervolemia.    - Cardiology consulted, appreciate recommendations               - Hold off on invasive testing/procedures               - Hold lisinopril with HAI  - Lasix 40 mg IV BID              - Monitor UPO and renal function closely   - Continue PTA spironolactone   - Strict I+Os   - Daily weights  - Fluid restriction of 2L   - 2g sodium diet      Non ST elevation myocardial infarction (NSTEMI)  Elevated troponin  History of CAD   History of stent placement in 2018  Patient found to have elevated cardiac enzymes with initial trop of 131 and  subsequent increase to 159. He does have history of cardiac disease with prior angiogram and stent placement. Patient and his family have decided, through shared decision making with cardiology, that they do not wish to pursue angiogram at this time as patient does not have symptomatic ischemia/chest pain.   - Cardiology consulted and will follow              - No diagnostic/therapeutic procedure at this time   - Heparin gtt started, discontinue after 48 hours (tomorrow)  - Continue PTA aspirin and statin      Community acquired pneumonia   Patient hypoxic in the ED with sats at 91% on RA. CTPE negative for pulmonary embolism but notable for right upper lobe infiltrate consistent with pneumonia. No history or risk factors suggestive of aspiration and thus this is more likely community acquired pneumonia. Patient received vanc and Zosyn in the ED due to concern for infection.   - Continue IV vanc/zosyn for now  - Continue oxygen prn      Gluteal abscess   6.1cm gluteal abscess incidentally noted on CT imaging of the abdomen and pelvis. Patient had an initial lactic acid of 2.9 with subsequent improvement to 1.4 without intervention. WB 9.2 and vitally stable other than hypoxia as above. Minimal abdominal tenderness on the bilateral lower quadrants. The patient received vancomycin and Zosyn in the ED.   - Switch to IV vanc/zosyn for now  - General surgery consult placed today. Could consider IR if more appropriate.     Pulmonary nodules   Hepatic lesion   Pancreatic cysts   Bladder lesion   Incidental imaging findings noted at the time of admission on CTPE/CTAP notable for pancreatic cyst, left bladder wall thickening concerning for malignancy, left lung nodule, and hepatic lesion.   - Recommended follow up per radiology      Chronic diarrhea  Patient complains of chronic diarrhea of several months. No significant worsening recently but does feel that this is bothersome for him. Taking loperamide prn for diarrhea with  some improvement.   - PTA loperamide QID     Chronic conditions:  CAD: PTA aspirin, nitroglycerin atorvastatin   HTN: PTA lisinopril held due to HAI   Depression: PTA citalopram and trazodone  Anemia: PTA ferrous sulfate   Dementia: PTA donepezil and methylphenidate         Diet: Combination Diet 2 gm NA Diet; No Caffeine Diet (and additional linked orders)  Fluid restriction 2000 ML FLUID (and additional linked orders)  Fluid restriction 1800 ML FLUID    DVT Prophylaxis: Heparin SQ  Castellano Catheter: Not present  Fluids: PO  Lines: None     Cardiac Monitoring: ACTIVE order. Indication: Acute decompensated heart failure (48 hours)  Code Status: No CPR- Do NOT Intubate      Clinically Significant Risk Factors Present on Admission                 # Drug Induced Platelet Defect: home medication list includes an antiplatelet medication   # Hypertension: Noted on problem list  # Acute heart failure with reduced ejection fraction: last echo with EF <40% and receiving IV diuretics     # Anemia: based on hgb <11           # Financial/Environmental Concerns: none         Social Drivers of Health   Food Insecurity: Unknown (2/25/2025)    Food Insecurity     Within the past 12 months, did you worry that your food would run out before you got money to buy more?: Patient unable to answer     Within the past 12 months, did the food you bought just not last and you didn t have money to get more?: Patient unable to answer   Housing Stability: Unknown (2/25/2025)    Housing Stability     Do you have housing? : Patient unable to answer     Are you worried about losing your housing?: Patient unable to answer   Tobacco Use: Medium Risk (4/11/2025)    Patient History     Smoking Tobacco Use: Former     Smokeless Tobacco Use: Never     Passive Exposure: Never   Financial Resource Strain: Unknown (2/25/2025)    Financial Resource Strain     Within the past 12 months, have you or your family members you live with been unable to get utilities  (heat, electricity) when it was really needed?: Patient unable to answer   Transportation Needs: Unknown (2/25/2025)    Transportation Needs     Within the past 12 months, has lack of transportation kept you from medical appointments, getting your medicines, non-medical meetings or appointments, work, or from getting things that you need?: Patient unable to answer             The patient's care was discussed with the Attending Physician, Dr. Omar MD .    Wicho Parkinson MD  [facility] [facility], PGY-2    ______________________________________________________________________    Interval History   Feeling much better this morning. Feels breathing has greatly improved.     Physical Exam   Vital Signs: Temp: 98.3  F (36.8  C) Temp src: Oral BP: 135/69 Pulse: 75   Resp: 20 SpO2: 97 % O2 Device: Nasal cannula Oxygen Delivery: 1 LPM  Weight: 141 lbs 0 oz    GEN: Pleasant male, in no acute distress.   HEENT: Normocephalic, atraumatic. Extraoccular eye movements intact. Anicteric sclera. Moist mucous membranes.   NECK: Supple. No cervical or supraclavicular adenopathy.   PULM: Non-labored breathing. No use of accessory muscles. Clear to ausculation bilaterally. No wheezes or crackles.   CV: Regular rate and rhythm. Normal S1, S2. No rubs, murmurs, or gallops.    ABDOMEN: Normoactive bowel sounds. Non-tender to palpation. Non-distended.    EXTREMITES:  No clubbing, cyanosis, or edema.    NEURO:  Awake. Oriented to person, place, time and situation. Cranial nerves 2-12 grossly intact. Moving all extremities.    PSYCH: Calm. Appropriate affect, insight, judgment.   MSK: no gluteal abscess or induration palpated. Overlying skin without induration or erythema.       Medical Decision Making             Data     I have personally reviewed the following data over the past 24 hrs:    10.4  \   7.9 (L)   / 201     143 101 29.5 (H) /  95   4.0 29 1.41 (H) \     Trop: 159 (HH) BNP: N/A     Procal: N/A CRP: N/A Lactic  Acid: 1.4         Imaging results reviewed over the past 24 hrs:   Recent Results (from the past 24 hours)   CT Chest Pulmonary Embolism w Contrast    Narrative    EXAM: CT ABDOMEN PELVIS W CONTRAST, CT CHEST PULMONARY EMBOLISM W CONTRAST  LOCATION: [facility]  DATE: 4/18/2025    INDICATION: abdominal tenderness on exam, upper abdomen worse than lower abdomen  COMPARISON: CT 6/16/2018 and 5/23/2018  TECHNIQUE:   CT chest pulmonary angiogram during arterial phase injection of IV contrast. Multiplanar reformats and MIP reconstructions were performed. Dose reduction techniques were used.   CT scan of the abdomen and pelvis was performed following injection of IV contrast. Multiplanar reformats were obtained. Dose reduction techniques were used.  CONTRAST: [id number] 75ml     FINDINGS:     ANGIOGRAM CHEST: Pulmonary arteries are normal caliber and negative for pulmonary emboli. Normal caliber thoracic aorta. The exam is nondiagnostic for a thoracic aortic dissection due to timing of the contrast bolus. No CT evidence of right heart strain.    LUNGS AND PLEURA: Small right-sided and trace left-sided pleural effusions with adjacent atelectasis. Pulmonary edema with interlobular septal thickening and peribronchial cuffing. Mild emphysema. Mild patchy right upper lobar airspace opacities.   Scattered calcified granulomas. There is a 4 mm left lower lobe subpleural nodular opacity image 166 series 2 and 4 mm left lower lobe nodule image 154. These are new since the prior CT exam.    MEDIASTINUM/AXILLAE: Mild chest wall edema. No thoracic adenopathy. Normal heart size and no pericardial effusion. Small hiatal hernia.    CORONARY ARTERY CALCIFICATION: Moderate.    HEPATOBILIARY: Cholelithiasis. Newly visualized 0.8 cm indeterminate hypodense segment 4A lesion image 20 series 3.    PANCREAS: 6 mm pancreatic body cyst image 66 series 3. This is not definitely seen on the previous exam. No main pancreatic  ductal dilatation.    SPLEEN: Normal.    ADRENAL GLANDS: Normal.    KIDNEYS/BLADDER: Multifocal renal cortical scarring. Upper right renal cyst. No follow-up is indicated. Renal vascular calcifications. Irregular asymmetric left-sided bladder wall thickening measuring up to 1.6 cm in thickness.    BOWEL: Small hiatal hernia. No bowel obstruction or inflammatory process. Normal appendix. Colonic diverticulosis without evidence of acute diverticulitis. No free air or free fluid.    LYMPH NODES: Normal.    VASCULATURE: Severe aortoiliac atherosclerosis. 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 Patent central SMA and SMV.    PELVIC ORGANS: Normal.    MUSCULOSKELETAL: Right gluteal 3.4 x 1.4 x 6.1 cm rim-enhancing fluid collection with surrounding muscular edema.      Impression    IMPRESSION:   1.  No pulmonary artery embolism.  2.  Pulmonary edema with small right-sided and trace left-sided pleural effusions.  3.  Mild right upper lobar airspace opacities suspicious for pneumonic infiltrates.  4.  Mild emphysema.  5.  Right gluteal 6.1 cm intramuscular rim-enhancing fluid collection. Differential consideration includes an abscess.  6.  No bowel obstruction, hydronephrosis or intraperitoneal inflammatory process.  7.  Newly visualized 4 mm left lung nodules. These can be followed per the guidelines below.  8.  Newly visualized indeterminate 0.8 cm segment 4A hepatic lesion. Recommend attention on follow-up.  9.  Newly visualized 6 mm pancreatic body cyst. This is likely benign and can be followed per the guidelines below.  10.  Irregular asymmetric left-sided bladder wall thickening suspicious for a bladder tumor. Recommend nonemergent urologic consultation.      REFERENCE:  Guidelines for Management of Incidental Pulmonary Nodules Detected on CT Images: From the Fleischner Society 2017.   Guidelines apply to incidental nodules in patients who are 35 years or older.  Guidelines do not apply to lung cancer  screening, patients with immunosuppression, or patients with known primary cancer.    MULTIPLE NODULES  Nodule size less than or equal to 6 mm  Low-risk patients: No follow-up needed.  High-risk patients: Optional follow-up at 12 months.        REFERENCE:  Revisions of international consensus [place] guidelines for the management of IPMN of the pancreas. Pancreatology 2017;17(5):738-753.    Largest cyst less than 10 mm: CT or MRI/MRCP in 6 months and then every 2 years if no change.     CT Abdomen Pelvis w Contrast    Narrative    EXAM: CT ABDOMEN PELVIS W CONTRAST, CT CHEST PULMONARY EMBOLISM W CONTRAST  LOCATION: [facility]  DATE: 4/18/2025    INDICATION: abdominal tenderness on exam, upper abdomen worse than lower abdomen  COMPARISON: CT 6/16/2018 and 5/23/2018  TECHNIQUE:   CT chest pulmonary angiogram during arterial phase injection of IV contrast. Multiplanar reformats and MIP reconstructions were performed. Dose reduction techniques were used.   CT scan of the abdomen and pelvis was performed following injection of IV contrast. Multiplanar reformats were obtained. Dose reduction techniques were used.  CONTRAST: [id number] 75ml     FINDINGS:     ANGIOGRAM CHEST: Pulmonary arteries are normal caliber and negative for pulmonary emboli. Normal caliber thoracic aorta. The exam is nondiagnostic for a thoracic aortic dissection due to timing of the contrast bolus. No CT evidence of right heart strain.    LUNGS AND PLEURA: Small right-sided and trace left-sided pleural effusions with adjacent atelectasis. Pulmonary edema with interlobular septal thickening and peribronchial cuffing. Mild emphysema. Mild patchy right upper lobar airspace opacities.   Scattered calcified granulomas. There is a 4 mm left lower lobe subpleural nodular opacity image 166 series 2 and 4 mm left lower lobe nodule image 154. These are new since the prior CT exam.    MEDIASTINUM/AXILLAE: Mild chest wall edema. No  thoracic adenopathy. Normal heart size and no pericardial effusion. Small hiatal hernia.    CORONARY ARTERY CALCIFICATION: Moderate.    HEPATOBILIARY: Cholelithiasis. Newly visualized 0.8 cm indeterminate hypodense segment 4A lesion image 20 series 3.    PANCREAS: 6 mm pancreatic body cyst image 66 series 3. This is not definitely seen on the previous exam. No main pancreatic ductal dilatation.    SPLEEN: Normal.    ADRENAL GLANDS: Normal.    KIDNEYS/BLADDER: Multifocal renal cortical scarring. Upper right renal cyst. No follow-up is indicated. Renal vascular calcifications. Irregular asymmetric left-sided bladder wall thickening measuring up to 1.6 cm in thickness.    BOWEL: Small hiatal hernia. No bowel obstruction or inflammatory process. Normal appendix. Colonic diverticulosis without evidence of acute diverticulitis. No free air or free fluid.    LYMPH NODES: Normal.    VASCULATURE: Severe aortoiliac atherosclerosis. 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 Patent central SMA and SMV.    PELVIC ORGANS: Normal.    MUSCULOSKELETAL: Right gluteal 3.4 x 1.4 x 6.1 cm rim-enhancing fluid collection with surrounding muscular edema.      Impression    IMPRESSION:   1.  No pulmonary artery embolism.  2.  Pulmonary edema with small right-sided and trace left-sided pleural effusions.  3.  Mild right upper lobar airspace opacities suspicious for pneumonic infiltrates.  4.  Mild emphysema.  5.  Right gluteal 6.1 cm intramuscular rim-enhancing fluid collection. Differential consideration includes an abscess.  6.  No bowel obstruction, hydronephrosis or intraperitoneal inflammatory process.  7.  Newly visualized 4 mm left lung nodules. These can be followed per the guidelines below.  8.  Newly visualized indeterminate 0.8 cm segment 4A hepatic lesion. Recommend attention on follow-up.  9.  Newly visualized 6 mm pancreatic body cyst. This is likely benign and can be followed per the guidelines below.  10.   Irregular asymmetric left-sided bladder wall thickening suspicious for a bladder tumor. Recommend nonemergent urologic consultation.      REFERENCE:  Guidelines for Management of Incidental Pulmonary Nodules Detected on CT Images: From the Fleischner Society 2017.   Guidelines apply to incidental nodules in patients who are 35 years or older.  Guidelines do not apply to lung cancer screening, patients with immunosuppression, or patients with known primary cancer.    MULTIPLE NODULES  Nodule size less than or equal to 6 mm  Low-risk patients: No follow-up needed.  High-risk patients: Optional follow-up at 12 months.        REFERENCE:  Revisions of international consensus [place] guidelines for the management of IPMN of the pancreas. Pancreatology 2017;17(5):738-753.    Largest cyst less than 10 mm: CT or MRI/MRCP in 6 months and then every 2 years if no change.

## 2025-04-19 NOTE — PLAN OF CARE
Problem: Gas Exchange Impaired  Goal: Optimal Gas Exchange  Outcome: Progressing  Intervention: Optimize Oxygenation and Ventilation  Recent Flowsheet Documentation  Taken 4/19/2025 0410 by Jessica Fry RN  Head of Bed ([facility]) Positioning: HOB at 30-45 degrees  Taken 4/19/2025 0010 by Jessica Fry RN  Head of Bed ([facility]) Positioning: HOB at 30-45 degrees  Taken 4/18/2025 2010 by Jessica Fry RN  Head of Bed ([facility]) Positioning: HOB at 30-45 degrees     Problem: Pneumonia  Goal: Fluid Balance  Outcome: Progressing     Goal Outcome Evaluation:  Pt denies pain. Pleasantly confused but redirectable. 1:1 at bedside for safety. 1800 FR maintained. Heparin gtt running at 700 units/hr. Next anti-xa due around 0922. O2 sats in the upper 90s on 2L NC. Male purewick in place with good urine output. Alert. VSS. Will continue to monitor and notify MD of any changes.

## 2025-04-20 LAB
ANION GAP SERPL CALCULATED.3IONS-SCNC: 9 MMOL/L (ref 7–15)
BUN SERPL-MCNC: 35.2 MG/DL (ref 8–23)
CALCIUM SERPL-MCNC: 8.7 MG/DL (ref 8.8–10.4)
CHLORIDE SERPL-SCNC: 102 MMOL/L (ref 98–107)
CREAT SERPL-MCNC: 1.64 MG/DL (ref 0.67–1.17)
EGFRCR SERPLBLD CKD-EPI 2021: 39 ML/MIN/1.73M2
ERYTHROCYTE [DISTWIDTH] IN BLOOD BY AUTOMATED COUNT: 15.6 % (ref 10–15)
GLUCOSE SERPL-MCNC: 123 MG/DL (ref 70–99)
HCO3 SERPL-SCNC: 31 MMOL/L (ref 22–29)
HCT VFR BLD AUTO: 24.5 % (ref 40–53)
HGB BLD-MCNC: 8 G/DL (ref 13.3–17.7)
MAGNESIUM SERPL-MCNC: 1.9 MG/DL (ref 1.7–2.3)
MCH RBC QN AUTO: 30.3 PG (ref 26.5–33)
MCHC RBC AUTO-ENTMCNC: 32.7 G/DL (ref 31.5–36.5)
MCV RBC AUTO: 93 FL (ref 78–100)
PHOSPHATE SERPL-MCNC: 4.7 MG/DL (ref 2.5–4.5)
PLATELET # BLD AUTO: 194 10E3/UL (ref 150–450)
POTASSIUM SERPL-SCNC: 3.7 MMOL/L (ref 3.4–5.3)
RBC # BLD AUTO: 2.64 10E6/UL (ref 4.4–5.9)
SODIUM SERPL-SCNC: 142 MMOL/L (ref 135–145)
UFH PPP CHRO-ACNC: 0.48 IU/ML
VANCOMYCIN SERPL-MCNC: 12 UG/ML
WBC # BLD AUTO: 8 10E3/UL (ref 4–11)

## 2025-04-20 PROCEDURE — 85520 HEPARIN ASSAY: CPT | Performed by: STUDENT IN AN ORGANIZED HEALTH CARE EDUCATION/TRAINING PROGRAM

## 2025-04-20 PROCEDURE — 250N000013 HC RX MED GY IP 250 OP 250 PS 637

## 2025-04-20 PROCEDURE — 120N000004 HC R&B MS OVERFLOW

## 2025-04-20 PROCEDURE — 83735 ASSAY OF MAGNESIUM: CPT | Performed by: STUDENT IN AN ORGANIZED HEALTH CARE EDUCATION/TRAINING PROGRAM

## 2025-04-20 PROCEDURE — 84100 ASSAY OF PHOSPHORUS: CPT | Performed by: STUDENT IN AN ORGANIZED HEALTH CARE EDUCATION/TRAINING PROGRAM

## 2025-04-20 PROCEDURE — 80048 BASIC METABOLIC PNL TOTAL CA: CPT

## 2025-04-20 PROCEDURE — 99232 SBSQ HOSP IP/OBS MODERATE 35: CPT | Performed by: INTERNAL MEDICINE

## 2025-04-20 PROCEDURE — 250N000011 HC RX IP 250 OP 636

## 2025-04-20 PROCEDURE — 36415 COLL VENOUS BLD VENIPUNCTURE: CPT | Performed by: STUDENT IN AN ORGANIZED HEALTH CARE EDUCATION/TRAINING PROGRAM

## 2025-04-20 PROCEDURE — 82310 ASSAY OF CALCIUM: CPT

## 2025-04-20 PROCEDURE — 85027 COMPLETE CBC AUTOMATED: CPT

## 2025-04-20 PROCEDURE — 250N000013 HC RX MED GY IP 250 OP 250 PS 637: Performed by: INTERNAL MEDICINE

## 2025-04-20 PROCEDURE — 80202 ASSAY OF VANCOMYCIN: CPT | Performed by: STUDENT IN AN ORGANIZED HEALTH CARE EDUCATION/TRAINING PROGRAM

## 2025-04-20 RX ORDER — DOXYCYCLINE 100 MG/1
100 CAPSULE ORAL 2 TIMES DAILY
Status: DISCONTINUED | OUTPATIENT
Start: 2025-04-20 | End: 2025-04-21 | Stop reason: HOSPADM

## 2025-04-20 RX ORDER — AMOXICILLIN AND CLAVULANATE POTASSIUM 500; 125 MG/1; MG/1
1 TABLET, FILM COATED ORAL EVERY 12 HOURS SCHEDULED
Status: DISCONTINUED | OUTPATIENT
Start: 2025-04-20 | End: 2025-04-20

## 2025-04-20 RX ORDER — AZITHROMYCIN 250 MG/1
250 TABLET, FILM COATED ORAL DAILY
Status: DISCONTINUED | OUTPATIENT
Start: 2025-04-20 | End: 2025-04-20

## 2025-04-20 RX ADMIN — ATORVASTATIN CALCIUM 40 MG: 40 TABLET, FILM COATED ORAL at 21:09

## 2025-04-20 RX ADMIN — FUROSEMIDE 40 MG: 40 TABLET ORAL at 08:56

## 2025-04-20 RX ADMIN — DOXYCYCLINE 100 MG: 100 CAPSULE ORAL at 23:34

## 2025-04-20 RX ADMIN — SPIRONOLACTONE 12.5 MG: 25 TABLET, FILM COATED ORAL at 08:56

## 2025-04-20 RX ADMIN — CITALOPRAM HYDROBROMIDE 20 MG: 20 TABLET ORAL at 09:05

## 2025-04-20 RX ADMIN — PIPERACILLIN AND TAZOBACTAM 3.38 G: 3; .375 INJECTION, POWDER, FOR SOLUTION INTRAVENOUS at 06:16

## 2025-04-20 RX ADMIN — DOXYCYCLINE 100 MG: 100 CAPSULE ORAL at 14:22

## 2025-04-20 RX ADMIN — METHYLPHENIDATE HYDROCHLORIDE 5 MG: 5 TABLET ORAL at 08:56

## 2025-04-20 RX ADMIN — LOPERAMIDE HYDROCHLORIDE 2 MG: 2 CAPSULE ORAL at 08:56

## 2025-04-20 RX ADMIN — PIPERACILLIN AND TAZOBACTAM 3.38 G: 3; .375 INJECTION, POWDER, FOR SOLUTION INTRAVENOUS at 00:37

## 2025-04-20 RX ADMIN — DONEPEZIL HYDROCHLORIDE 10 MG: 5 TABLET ORAL at 21:09

## 2025-04-20 RX ADMIN — ASPIRIN 81 MG: 81 TABLET, COATED ORAL at 21:09

## 2025-04-20 RX ADMIN — TRAZODONE HYDROCHLORIDE 25 MG: 50 TABLET ORAL at 21:09

## 2025-04-20 RX ADMIN — METHYLPHENIDATE HYDROCHLORIDE 5 MG: 5 TABLET ORAL at 13:30

## 2025-04-20 ASSESSMENT — ACTIVITIES OF DAILY LIVING (ADL)
ADLS_ACUITY_SCORE: 67
ADLS_ACUITY_SCORE: 67
ADLS_ACUITY_SCORE: 71
ADLS_ACUITY_SCORE: 67
ADLS_ACUITY_SCORE: 66
ADLS_ACUITY_SCORE: 67
ADLS_ACUITY_SCORE: 71
ADLS_ACUITY_SCORE: 67
ADLS_ACUITY_SCORE: 67
ADLS_ACUITY_SCORE: 71
ADLS_ACUITY_SCORE: 67
ADLS_ACUITY_SCORE: 71
ADLS_ACUITY_SCORE: 67
ADLS_ACUITY_SCORE: 67

## 2025-04-20 NOTE — PLAN OF CARE
Problem: Gas Exchange Impaired  Goal: Optimal Gas Exchange  Outcome: Progressing  Intervention: Optimize Oxygenation and Ventilation  Recent Flowsheet Documentation  Taken 4/20/2025 0410 by Jessica Fry RN  Head of Bed (HOB) Positioning: HOB at 20-30 degrees  Taken 4/20/2025 0010 by Jessica Fry RN  Head of Bed (HOB) Positioning: HOB at 20-30 degrees     Goal Outcome Evaluation:  Pt endorses pain in his head at a 3/10. PRN tylenol given with some relief. 1:1 at bedside for safety. Heparin gtt running at 850 units/hr. NSR. Confused but pleasantly redirectable. O2 sats in the mid 90s on RA. 2000 FR maintained. Up with assist of 1 to bathroom. Slept comfortably between cares. VSS. Will continue to monitor and notify MD of any changes.

## 2025-04-20 NOTE — PROGRESS NOTES
HEART CARE NOTE        Thank you, Dr. Lloyd, for asking the [facility] Heart Care team to see Zack Johns to evaluate ADHF.      Assessment/Recommendations   1. Severe HFrEF c/b severe ADHF  Assessment / Plan  Recent decline in LVSF; after extensive discussion wife at bedside (once again this admission), her decision is to continue to hold on invasive testing/procedures at this time and to focus on quality of time   Edy was consulted during most recent admission - please see detailed note in chart re: plan of care  Euvolemic on physical exam; Hold PO diuresis and continue to monitor UOP and renal function closely  Patient is high risk for adverse cardiac events 2/2 advanced age, frailty, systolic dysfunction, HTN, CAD, elevated NTproBNP, elevated troponin, DM2, HLP     Current Pharmacotherapy AHA Guideline-Directed Medical Therapy   Lisinopril 20 mg twice daily - held Lisinopril 20 mg twice daily   Metoprolol succinate - not started Carvedilol 25 mg twice daily   Spironolactone - 12.5 mg Spironolactone 25 mg once daily   Hydralazine NA Hydralazine 100 mg three times daily   Isosorbide dinitrate NA Isosorbide dinitrate 40 mg three times daily   SGLT2 inhibitor:Dapagliflozin/Empagliflozin  - not started Dapagliflozin or Empagliflozin 10 mg daily      2. CAD c/b NSTEMI  Assessment / Plan  Hx of PCI to RCA/ with known mod LM, LAD dz  Denies chest pain or anginal equivalents; recent decline in LVSF; per review and repeat discussion with wife and family, coronary angiogram +/- PCI is not in line with his goals of care  Continue aggressive risk factor modifications; Continue atorvastatin, lisinopril, metoprolol, ASA 81 mg   Completed 48 hours of heparin gtt     3. PAD  Assessment / Plan  Hx of b/l iliac stents     4. HTN  Assessment / Plan  Titrate oral afterload reduction as tolerated     5. DM2  Assessment / Plan  Management per primary team     6. HLP  Assessment / Plan  Resume atorvastatin     7.  Acute hypoxic respiratory failure  Assessment / Plan  2/2 cardiogenic pulmonary edema; diuresis on hold  Supportive care per primary team     8. CKD stage 3  Assessment / Plan  Diuresis on hold; Continue to monitor UOP and renal function closely     Clinically Significant Risk Factors                   # Hypertension: Noted on problem list  # Acute heart failure with reduced ejection fraction: last echo with EF <40% and receiving IV diuretics               # Financial/Environmental Concerns: none        Native vessel CAD  Cardiomyopathy  Systolic acute    Fluid overload, unspecified, Other fluid overload, and Other disorders of electrolyte and fluid balance, not elsewhere classified    CKD POA List: Stage 3a (GFR 45-59)    85 minutes spent reviewing prior records (including documentation, laboratory studies, cardiac testing/imaging), history and physical exam, planning, and subsequent documentation.      History of Present Illness/Subjective    Mr. Zack Johns is a 89 year old male with a PMHx significant for (per Epic notation) CAD s/p stent (2018), PAD, T2DM, HTN, HLD admitted on 2/18/2025 for NSTEMI and acute hypoxic respiratory failure, likely secondary to new CHF.      Today, Mr. Johns has baseline dementia, unable to provide much of ROS; Management plan as detailed above and discussed with wife and family at bedside     ECG: Personally reviewed. normal sinus rhythm, nonspecific ST and T waves changes, occasional PVC noted, unifocal.     ECHO (personally reviewed 4/18/25):repeat study pending  1. The left ventricle is mildly dilated with normal left ventricular wall  thickness.  - Left ventricular function is decreased. The ejection fraction is 20-25%  (severely reduced).  - There is severe global hypokinesia of the left ventricle.  2. The right ventricle is normal size with mildly decreased right ventricular  systolic function  3. No hemodynamically significant valvular abnormalities on 2D or color  "flow  imaging.  4. There is no comparison study available.    Lab results: personally reviewed April 19, 2025; notable for CKD stage 3 with increasing Cr    Medical history and pertinent documents reviewed in Care Everywhere please where applicable see details above          Physical Examination Review of Systems   /64 (BP Location: Left arm)   Pulse 63   Temp 99.4  F (37.4  C) (Oral)   Resp 20   Ht 1.626 m (5' 4\")   Wt 59.3 kg (130 lb 11.2 oz)   SpO2 95%   BMI 22.43 kg/m    Body mass index is 22.43 kg/m .  Wt Readings from Last 3 Encounters:   04/20/25 59.3 kg (130 lb 11.2 oz)   04/11/25 64.4 kg (142 lb)   04/09/25 63 kg (139 lb)     General Appearance:   no distress   ENT/Mouth: membranes moist, no oral lesions or bleeding gums.      EYES:  no scleral icterus, normal conjunctivae   Neck: no carotid bruits or thyromegaly   Chest/Lungs:   lungs are clear to auscultation, no rales or wheezing, equal chest wall expansion    Cardiovascular:   Regular. Normal first and second heart sounds with no murmurs, rubs, or gallops; the carotid, radial and posterior tibial pulses are intact, no LE edema bilaterally    Abdomen:  no organomegaly, masses, bruits, or tenderness; bowel sounds are present  + abdominal distension   Extremities: no cyanosis or clubbing   Skin: no xanthelasma, warm.    Neurologic: NAD     Psychiatric: alert and calm     A complete 10 systems ROS was reviewed  And is negative except what is listed in the HPI.          Medical History  Surgical History Family History Social History   Past Medical History:   Diagnosis Date    Actinic keratitis     Arthritis     bilat shoulders    CAD (coronary artery disease)     Cardiac arrest (H) 09/04/89    Claudication     Claudication     Coronary artery disease     s/p MI    Diabetes mellitus type 2, controlled (H)     Diverticulosis     HTN (hypertension)     Hyperlipidemia     Hypertension     Malignant neoplasm of prostate (H) 8/7/2006-10/5/2006    " "DENIED BY PATIENT (see note from 2014).  RADIOTHERAPY BY     MI, old     Multiple pulmonary nodules     Osteoarthritis of glenohumeral joint     Left. Injected at [facility] 02/10/2015.    Peripheral vascular disease, unspecified     Syncope     Trigger finger, acquired     Type 2 diabetes mellitus (H) 2012    Past Surgical History:   Procedure Laterality Date    APPENDECTOMY      ARTHROSCOPY KNEE      ARTHROSCOPY KNEE      CARDIAC CATHETERIZATION  2018     of rca    CARDIAC SURGERY  2018    two stents placed 18    CV CORONARY ANGIOGRAM N/A 2018    Procedure: Coronary Angiogram;  Surgeon: Jeane Garcia MD;  Location: [facility] Cath Lab;  Service:     CV CORONARY ANGIOGRAM N/A 2018    Procedure: Coronary Angiogram;  Surgeon: Jeane Garcia MD;  Location: [facility] Cath Lab;  Service:     FEMORAL ENDARTERECTOMY Bilateral 2017    IR EXTREMITY ANGIOGRAM BILATERAL  03/10/2017    NOSE SURGERY      PROSTATE BIOPSY, NEEDLE, SATURATION SAMPLING  2003    \"Biopsy fo the Prostate Needle\"    RELEASE TRIGGER FINGER      no family history of premature coronary artery disease Social History     Socioeconomic History    Marital status:      Spouse name: Not on file    Number of children: Not on file    Years of education: Not on file    Highest education level: Not on file   Occupational History    Not on file   Tobacco Use    Smoking status: Former     Current packs/day: 0.00     Average packs/day: 2.0 packs/day for 40.0 years (80.0 ttl pk-yrs)     Types: Cigarettes     Start date: 4/10/1944     Quit date: 4/10/1984     Years since quittin.0     Passive exposure: Never    Smokeless tobacco: Never   Substance and Sexual Activity    Alcohol use: Yes     Alcohol/week: 6.0 standard drinks of alcohol     Comment: occasionally.    Drug use: No    Sexual activity: Not on file   Other Topics Concern    Parent/sibling w/ CABG, MI or angioplasty before 65F 55M? " Not Asked   Social History Narrative    Was drafted into the [employer] between [place] and [place]. Worked in admin.        Had gotten into typing to meet girls.        Worked for [employer] after leaving the [employer].     Social Drivers of Health     Financial Resource Strain: Unknown (2/25/2025)    Financial Resource Strain     Within the past 12 months, have you or your family members you live with been unable to get utilities (heat, electricity) when it was really needed?: Patient unable to answer   Food Insecurity: Unknown (2/25/2025)    Food Insecurity     Within the past 12 months, did you worry that your food would run out before you got money to buy more?: Patient unable to answer     Within the past 12 months, did the food you bought just not last and you didn t have money to get more?: Patient unable to answer   Transportation Needs: Unknown (2/25/2025)    Transportation Needs     Within the past 12 months, has lack of transportation kept you from medical appointments, getting your medicines, non-medical meetings or appointments, work, or from getting things that you need?: Patient unable to answer   Physical Activity: Not on file   Stress: Not on file   Social Connections: Not on file   Interpersonal Safety: Low Risk  (4/18/2025)    Interpersonal Safety     Do you feel physically and emotionally safe where you currently live?: Yes     Within the past 12 months, have you been hit, slapped, kicked or otherwise physically hurt by someone?: No     Within the past 12 months, have you been humiliated or emotionally abused in other ways by your partner or ex-partner?: No   Housing Stability: Unknown (2/25/2025)    Housing Stability     Do you have housing? : Patient unable to answer     Are you worried about losing your housing?: Patient unable to answer           Lab Results    Chemistry/lipid CBC Cardiac Enzymes/BNP/TSH/INR   Lab Results   Component Value Date    CHOL 124 11/30/2021    HDL 41 11/30/2021    TRIG 181 (H)  "11/30/2021    BUN 35.2 (H) 04/20/2025     04/20/2025    CO2 31 (H) 04/20/2025    Lab Results   Component Value Date    WBC 8.0 04/20/2025    HGB 8.0 (L) 04/20/2025    HCT 24.5 (L) 04/20/2025    MCV 93 04/20/2025     04/20/2025    Lab Results   Component Value Date    TROPONINI 0.03 10/27/2019    BNP 59 06/16/2018    TSH 1.45 03/21/2023    INR 1.17 (H) 10/30/2019     Lab Results   Component Value Date    TROPONINI 0.03 10/27/2019          Weight:    Wt Readings from Last 3 Encounters:   04/20/25 59.3 kg (130 lb 11.2 oz)   04/11/25 64.4 kg (142 lb)   04/09/25 63 kg (139 lb)       Allergies  Allergies   Allergen Reactions    Pletal [Cilostazol]          Surgical History  Past Surgical History:   Procedure Laterality Date    APPENDECTOMY      ARTHROSCOPY KNEE      ARTHROSCOPY KNEE      CARDIAC CATHETERIZATION  08/22/2018     of rca    CARDIAC SURGERY  08/22/2018    two stents placed 8/22/18    CV CORONARY ANGIOGRAM N/A 08/06/2018    Procedure: Coronary Angiogram;  Surgeon: Jeane Garcia MD;  Location: [facility] Cath Lab;  Service:     CV CORONARY ANGIOGRAM N/A 08/22/2018    Procedure: Coronary Angiogram;  Surgeon: Jeane Garcia MD;  Location: [facility] Cath Lab;  Service:     FEMORAL ENDARTERECTOMY Bilateral 05/03/2017    IR EXTREMITY ANGIOGRAM BILATERAL  03/10/2017    NOSE SURGERY      PROSTATE BIOPSY, NEEDLE, SATURATION SAMPLING  01/13/2003    \"Biopsy fo the Prostate Needle\"    RELEASE TRIGGER FINGER         Social History  Tobacco:   History   Smoking Status    Former    Types: Cigarettes   Smokeless Tobacco    Never    Alcohol:   Social History    Substance and Sexual Activity      Alcohol use: Yes        Alcohol/week: 6.0 standard drinks of alcohol        Comment: occasionally.   Illicit Drugs:   History   Drug Use No       Family History  Family History   Problem Relation Age of Onset    Diabetes No family hx of     Breast Cancer No family hx of     Colon Cancer No family hx of     Prostate " Cancer No family hx of     Other Cancer No family hx of     No Known Problems Mother     No Known Problems Father           Donovan Sanderson MD  Clinical Cardiac Electrophysiology

## 2025-04-20 NOTE — PHARMACY-VANCOMYCIN DOSING SERVICE
Pharmacy Vancomycin Note  Date of Service 2025  Patient's  1934   89 year old, male    Indication: Abscess, Community Acquired Pneumonia, and Sepsis  Day of Therapy: 3  Current vancomycin regimen:  750 mg IV q24h  Current vancomycin monitoring method: AUC  Current vancomycin therapeutic monitoring goal: 400-600 mg*h/L    Insight[employer] Prediction of Current Vancomycin Regimen  Regimen: 750 mg IV every 24 hours.  Start time: 11:56 on 2025  Exposure target: AUC24 (range)400-600 mg/L.hr   AUC24,ss: 528 mg/L.hr  Probability of AUC24 > 400: 93 %  Ctrough,ss: 18 mg/L  Probability of Ctrough,ss > 20: 35 %  Probability of nephrotoxicity (Lodise ALBERTO ): 14 %    Current estimated CrCl = Estimated Creatinine Clearance: 25.1 mL/min (A) (based on SCr of 1.64 mg/dL (H)).    Creatinine for last 3 days  2025:  9:15 AM Creatinine 1.21 mg/dL  2025:  2:19 AM Creatinine 1.41 mg/dL  2025:  6:10 AM Creatinine 1.64 mg/dL    Recent Vancomycin Levels (past 3 days)  2025:  6:10 AM Vancomycin 12.0 ug/mL    Vancomycin IV Administrations (past 72 hours)                     vancomycin (VANCOCIN) 750 mg in 0.9% NaCl 257.5 mL intermittent infusion (mg) 750 mg New Bag 25 1156    vancomycin (VANCOCIN) 1,250 mg in 0.9% NaCl 262.5 mL intermittent infusion (mg) 1,250 mg New Bag 25 1145                    Nephrotoxins and other renal medications (From now, onward)      Start     Dose/Rate Route Frequency Ordered Stop    25 0900  furosemide (LASIX) tablet 40 mg         40 mg Oral DAILY 25 1413      25 1200  vancomycin (VANCOCIN) 750 mg in 0.9% NaCl 257.5 mL intermittent infusion         750 mg  over 60 Minutes Intravenous EVERY 24 HOURS 25 1728      25 1800  piperacillin-tazobactam (ZOSYN) 3.375 g vial to attach to  mL bag         3.375 g  over 30 Minutes Intravenous EVERY 6 HOURS 25 17225 1600  [Held by provider]  lisinopril (ZESTRIL) tablet 10  mg        (On hold since Fri 4/18/2025 at 1559 until manually unheld; held by Jewels Bunn DOHold Reason: Acute Kidney Injury)   Note to Pharmacy: PTA Sig:Take 1 tablet (10 mg) by mouth daily.      10 mg Oral DAILY 04/18/25 1457                 Contrast Orders - past 72 hours (72h ago, onward)      Start     Dose/Rate Route Frequency Stop    04/18/25 1100  iopamidol (ISOVUE-370) solution 75 mL         75 mL Intravenous ONCE 04/18/25 1046            Interpretation of levels and current regimen:  Vancomycin level is reflective of -600    Has serum creatinine changed greater than 50% in last 72 hours: No    Urine output:  good urine output    Renal Function: Worsening    Plan:  Continue Current Dose. SCr is trending up, but level is predicting well. May need to switch to intermittent dosing if SCr continues to worsen  Vancomycin monitoring method: AUC  Vancomycin therapeutic monitoring goal: 400-600 mg*h/L  Pharmacy will check vancomycin levels as appropriate in 1-3 Days.  Serum creatinine levels will be ordered daily for the first week of therapy and at least twice weekly for subsequent weeks.    JEFFRY LANG, [facility]

## 2025-04-20 NOTE — PLAN OF CARE
Goal Outcome Evaluation:      Plan of Care Reviewed With: patient    Overall Patient Progress: improving       Patient confused to baseline. He is redirectable. Heparin drip discontinued at 1100. IV antibiotics transitioned to oral. 1:1 no longer needed to prevent pulling at IV lines. Imodium given x1 for loose BM this morning. Creatinine up this morning at 1.64. Patient to stay overnight per Resident.    Heart Failure Care Map  GOALS TO BE MET BEFORE DISCHARGE:    1. Decrease congestion and/or edema with diuretic therapy to achieve near optimal volume status.     Dyspnea improved: Yes, satisfactory for discharge.   Edema improved: Yes, satisfactory for discharge.        Last 24 hour I/O:   Intake/Output Summary (Last 24 hours) at 4/20/2025 1437  Last data filed at 4/20/2025 1335  Gross per 24 hour   Intake 1143 ml   Output 250 ml   Net 893 ml           Net I/O and Weights since admission:   03/21 1500 - 04/20 1459  In: 2063 [P.O.:2060; I.V.:3]  Out: 3650 [Urine:3650]  Net: -1587     Vitals:    04/18/25 0843 04/18/25 1443 04/20/25 0630   Weight: 64 kg (141 lb) 64 kg (141 lb) 59.3 kg (130 lb 11.2 oz)       2.  O2 sats > 90% on room air, or at prior home O2 therapy level.      Able to wean O2 this shift to keep sats above 90%?: Yes, satisfactory for discharge.   Does patient use Home O2? No          Current oxygenation status:   SpO2: 94 %     O2 Device: None (Room air),      3.  Tolerates ambulation and mobility near baseline.     Ambulation: Yes, satisfactory for discharge.   Times patient ambulated with staff this shift: Ambulating in room.    Please review the Heart Failure Care Map for additional HF goal outcomes.    Ethan Vargas RN  4/20/2025

## 2025-04-21 ENCOUNTER — TELEPHONE (OUTPATIENT)
Dept: CARDIOLOGY | Facility: CLINIC | Age: OVER 89
End: 2025-04-21
Payer: COMMERCIAL

## 2025-04-21 VITALS
BODY MASS INDEX: 22.51 KG/M2 | HEIGHT: 64 IN | WEIGHT: 131.84 LBS | SYSTOLIC BLOOD PRESSURE: 124 MMHG | HEART RATE: 86 BPM | TEMPERATURE: 98.2 F | OXYGEN SATURATION: 92 % | DIASTOLIC BLOOD PRESSURE: 52 MMHG | RESPIRATION RATE: 20 BRPM

## 2025-04-21 DIAGNOSIS — I50.9 ACUTE DECOMPENSATED HEART FAILURE (H): Primary | ICD-10-CM

## 2025-04-21 LAB
ANION GAP SERPL CALCULATED.3IONS-SCNC: 10 MMOL/L (ref 7–15)
BUN SERPL-MCNC: 33.9 MG/DL (ref 8–23)
CALCIUM SERPL-MCNC: 8.7 MG/DL (ref 8.8–10.4)
CHLORIDE SERPL-SCNC: 103 MMOL/L (ref 98–107)
CREAT SERPL-MCNC: 1.32 MG/DL (ref 0.67–1.17)
EGFRCR SERPLBLD CKD-EPI 2021: 51 ML/MIN/1.73M2
ERYTHROCYTE [DISTWIDTH] IN BLOOD BY AUTOMATED COUNT: 15.3 % (ref 10–15)
GLUCOSE SERPL-MCNC: 123 MG/DL (ref 70–99)
HCO3 SERPL-SCNC: 30 MMOL/L (ref 22–29)
HCT VFR BLD AUTO: 24.2 % (ref 40–53)
HGB BLD-MCNC: 7.9 G/DL (ref 13.3–17.7)
MAGNESIUM SERPL-MCNC: 1.6 MG/DL (ref 1.7–2.3)
MCH RBC QN AUTO: 30.3 PG (ref 26.5–33)
MCHC RBC AUTO-ENTMCNC: 32.6 G/DL (ref 31.5–36.5)
MCV RBC AUTO: 93 FL (ref 78–100)
PHOSPHATE SERPL-MCNC: 4.1 MG/DL (ref 2.5–4.5)
PLATELET # BLD AUTO: 179 10E3/UL (ref 150–450)
POTASSIUM SERPL-SCNC: 3.5 MMOL/L (ref 3.4–5.3)
RBC # BLD AUTO: 2.61 10E6/UL (ref 4.4–5.9)
SODIUM SERPL-SCNC: 143 MMOL/L (ref 135–145)
UFH PPP CHRO-ACNC: <0.1 IU/ML
WBC # BLD AUTO: 7.1 10E3/UL (ref 4–11)

## 2025-04-21 PROCEDURE — 250N000013 HC RX MED GY IP 250 OP 250 PS 637

## 2025-04-21 PROCEDURE — 80048 BASIC METABOLIC PNL TOTAL CA: CPT

## 2025-04-21 PROCEDURE — 99233 SBSQ HOSP IP/OBS HIGH 50: CPT | Performed by: INTERNAL MEDICINE

## 2025-04-21 PROCEDURE — 250N000013 HC RX MED GY IP 250 OP 250 PS 637: Performed by: INTERNAL MEDICINE

## 2025-04-21 PROCEDURE — 84295 ASSAY OF SERUM SODIUM: CPT

## 2025-04-21 PROCEDURE — 85520 HEPARIN ASSAY: CPT | Performed by: STUDENT IN AN ORGANIZED HEALTH CARE EDUCATION/TRAINING PROGRAM

## 2025-04-21 PROCEDURE — 83735 ASSAY OF MAGNESIUM: CPT | Performed by: STUDENT IN AN ORGANIZED HEALTH CARE EDUCATION/TRAINING PROGRAM

## 2025-04-21 PROCEDURE — 36415 COLL VENOUS BLD VENIPUNCTURE: CPT | Performed by: STUDENT IN AN ORGANIZED HEALTH CARE EDUCATION/TRAINING PROGRAM

## 2025-04-21 PROCEDURE — 99238 HOSP IP/OBS DSCHRG MGMT 30/<: CPT | Mod: GC

## 2025-04-21 PROCEDURE — 84100 ASSAY OF PHOSPHORUS: CPT | Performed by: STUDENT IN AN ORGANIZED HEALTH CARE EDUCATION/TRAINING PROGRAM

## 2025-04-21 PROCEDURE — 85014 HEMATOCRIT: CPT

## 2025-04-21 RX ORDER — TORSEMIDE 20 MG/1
20 TABLET ORAL DAILY
Status: DISCONTINUED | OUTPATIENT
Start: 2025-04-21 | End: 2025-04-21 | Stop reason: HOSPADM

## 2025-04-21 RX ORDER — CHOLECALCIFEROL (VITAMIN D3) 50 MCG
1 TABLET ORAL DAILY
Qty: 30 TABLET | Refills: 0 | Status: SHIPPED | OUTPATIENT
Start: 2025-04-21

## 2025-04-21 RX ORDER — DOXYCYCLINE 100 MG/1
100 CAPSULE ORAL 2 TIMES DAILY
Qty: 11 CAPSULE | Refills: 0 | Status: SHIPPED | OUTPATIENT
Start: 2025-04-21

## 2025-04-21 RX ORDER — TORSEMIDE 20 MG/1
20 TABLET ORAL DAILY
Qty: 30 TABLET | Refills: 0 | Status: SHIPPED | OUTPATIENT
Start: 2025-04-22

## 2025-04-21 RX ORDER — FERROUS SULFATE 325(65) MG
325 TABLET, DELAYED RELEASE (ENTERIC COATED) ORAL EVERY OTHER DAY
Qty: 15 TABLET | Refills: 0 | Status: SHIPPED | OUTPATIENT
Start: 2025-04-21

## 2025-04-21 RX ADMIN — FERROUS SULFATE TAB 325 MG (65 MG ELEMENTAL FE) 325 MG: 325 (65 FE) TAB at 08:12

## 2025-04-21 RX ADMIN — METHYLPHENIDATE HYDROCHLORIDE 5 MG: 5 TABLET ORAL at 08:12

## 2025-04-21 RX ADMIN — SPIRONOLACTONE 12.5 MG: 25 TABLET, FILM COATED ORAL at 08:12

## 2025-04-21 RX ADMIN — DOXYCYCLINE 100 MG: 100 CAPSULE ORAL at 08:12

## 2025-04-21 RX ADMIN — TORSEMIDE 20 MG: 20 TABLET ORAL at 08:12

## 2025-04-21 RX ADMIN — CITALOPRAM HYDROBROMIDE 20 MG: 20 TABLET ORAL at 08:12

## 2025-04-21 ASSESSMENT — ACTIVITIES OF DAILY LIVING (ADL)
ADLS_ACUITY_SCORE: 66

## 2025-04-21 NOTE — PLAN OF CARE
Problem: Gas Exchange Impaired  Goal: Optimal Gas Exchange  Outcome: Progressing  Intervention: Optimize Oxygenation and Ventilation  Recent Flowsheet Documentation  Taken 4/21/2025 0410 by Jessica Fry RN  Head of Bed (HOB) Positioning: HOB at 30-45 degrees  Taken 4/21/2025 0010 by Jessica Fry RN  Head of Bed (HOB) Positioning: HOB at 30-45 degrees  Taken 4/20/2025 2010 by Jessica Fry RN  Head of Bed (HOB) Positioning: HOB at 30-45 degrees     Problem: Pneumonia  Goal: Fluid Balance  Outcome: Progressing  Intervention: Monitor and Manage Fluid Balance  Recent Flowsheet Documentation  Taken 4/21/2025 0410 by Jessica Fry RN  Fluid/Electrolyte Management: fluids restricted  Taken 4/21/2025 0010 by Jessica Fry RN  Fluid/Electrolyte Management: fluids restricted  Taken 4/20/2025 2010 by Jessica Fry RN  Fluid/Electrolyte Management: fluids restricted     Goal Outcome Evaluation:  Pt denies pain. Minimal urine output overnight. 2000 FR maintained. NSR. Confused but pleasant. K, mag and phos protocols recheck tomorrow AM. O2 sats in the low 90s on RA. Bed alarm on for safety. Up with assist of 1. Alert and oriented x2-3. VSS. Will continue to monitor and notify MD of any changes.

## 2025-04-21 NOTE — PROGRESS NOTES
HEART CARE NOTE          Assessment/Recommendations   1. Severe HFrEF c/b severe ADHF  Assessment / Plan  Recent decline in LVSF; after extensive discussion wife at bedside (once again this admission), her decision is to continue to hold on invasive testing/procedures at this time and to focus on quality of time   [facility] was consulted during most recent admission - please see detailed note in chart re: plan of care  HAI resolving - initiate oral diuretic regimen and continue to monitor UOP and renal function closely  Patient is high risk for adverse cardiac events 2/2 advanced age, frailty, systolic dysfunction, HTN, CAD, elevated NTproBNP, elevated troponin, DM2, HLP     Current Pharmacotherapy AHA Guideline-Directed Medical Therapy   Lisinopril 20 mg twice daily - held Lisinopril 20 mg twice daily   Metoprolol succinate 25 mg daily Carvedilol 25 mg twice daily   Spironolactone - not started Spironolactone 25 mg once daily   Hydralazine NA Hydralazine 100 mg three times daily   Isosorbide dinitrate NA Isosorbide dinitrate 40 mg three times daily   SGLT2 inhibitor:Dapagliflozin/Empagliflozin  - not started Dapagliflozin or Empagliflozin 10 mg daily      2. CAD c/b NSTEMI  Assessment / Plan  Hx of PCI to RCA/ with known mod LM, LAD dz  Denies chest pain or anginal equivalents; recent decline in LVSF; per review and repeat discussion with wife and family, coronary angiogram +/- PCI is not in line with his goals of care  Continue aggressive risk factor modifications; Continue atorvastatin, lisinopril, metoprolol, ASA 81 mg   Start heparin gtt for a duration of 48 hrs     3. PAD  Assessment / Plan  Hx of b/l iliac stents     4. HTN  Assessment / Plan  Titrate oral afterload reduction as tolerated - management per primary team     5. DM2  Assessment / Plan  Management per primary team     6. HLP  Assessment / Plan  Resume atorvastatin     7. Acute hypoxic respiratory failure  Assessment / Plan  2/2 cardiogenic  pulmonary edema; diuresis as above  Supportive care per primary team     8. HAI on CKD stage 3  Assessment / Plan  Resolving; Diuresis as above; Continue to monitor UOP and renal function closely    Plan of care discussed on April 21, 2025 with patient and family at bedside, and primary team overseeing patient's care    Cardiology team will sign-off for now. Discharge on currently prescribed cardiac meds. Please do not hesitate to consult us again if new questions or concerns arise. Follow-up appointment will be arranged by [facility] clinic.       History of Present Illness/Subjective    Mr. Zack Johns is a 89 year old male with a PMHx significant for (per Epic notation) CAD s/p stent (2018), PAD, T2DM, HTN, HLD admitted on 2/18/2025 for NSTEMI and acute hypoxic respiratory failure, likely secondary to new CHF.      Today, Mr. Johns has baseline dementia, unable to provide much of ROS; Management plan as detailed above and discussed with wife and family at bedside     ECG: Personally reviewed. normal sinus rhythm, nonspecific ST and T waves changes, occasional PVC noted, unifocal.     ECHO (personally reviewed 4/21/25):  1. The left ventricle is mildly dilated with normal left ventricular wall  thickness.  - Left ventricular function is decreased. The ejection fraction is 20-25%  (severely reduced).  - There is severe global hypokinesia of the left ventricle.  2. The right ventricle is normal size with mildly decreased right ventricular  systolic function  3. No hemodynamically significant valvular abnormalities on 2D or color flow  imaging.  4. There is no comparison study available.    Telemetry: personally reviewed April 21, 2025; notable for sinus     Lab results: personally reviewed April 21, 2025; notable for resolving HAI    Medical history and pertinent documents reviewed in Care Everywhere please where applicable see details above        Physical Examination Review of Systems   BP (!) 140/68 (BP  "Location: Left arm)   Pulse 65   Temp 98  F (36.7  C) (Oral)   Resp 20   Ht 1.626 m (5' 4\")   Wt 59.8 kg (131 lb 13.4 oz)   SpO2 (!) 91%   BMI 22.63 kg/m    Body mass index is 22.63 kg/m .  Wt Readings from Last 3 Encounters:   04/21/25 59.8 kg (131 lb 13.4 oz)   04/11/25 64.4 kg (142 lb)   04/09/25 63 kg (139 lb)     General Appearance:   no distress, normal body habitus   ENT/Mouth: membranes moist, no oral lesions or bleeding gums.      EYES:  no scleral icterus, normal conjunctivae   Neck: no carotid bruits or thyromegaly   Chest/Lungs:   lungs are clear to auscultation, no rales or wheezing, equal chest wall expansion    Cardiovascular:   Regular. Normal first and second heart sounds with no murmurs, rubs, or gallops; the carotid, radial and posterior tibial pulses are intact, no JVD or LE edema bilaterally    Abdomen:  no organomegaly, masses, bruits, or tenderness; bowel sounds are present   Extremities: no cyanosis or clubbing   Skin: no xanthelasma, warm.    Neurologic: NAD     Psychiatric: alert and calm     A complete 10 systems ROS was reviewed  And is negative except what is listed in the HPI.          Medical History  Surgical History Family History Social History   Past Medical History:   Diagnosis Date    Actinic keratitis     Arthritis     bilat shoulders    CAD (coronary artery disease)     Cardiac arrest (H) 09/04/89    Claudication     Claudication     Coronary artery disease     s/p MI    Diabetes mellitus type 2, controlled (H)     Diverticulosis     HTN (hypertension)     Hyperlipidemia     Hypertension     Malignant neoplasm of prostate (H) 8/7/2006-10/5/2006    DENIED BY PATIENT (see note from 12/08/2014).  RADIOTHERAPY BY DR.CHO LEGGETT, old     Multiple pulmonary nodules     Osteoarthritis of glenohumeral joint     Left. Injected at [facility] 02/10/2015.    Peripheral vascular disease, unspecified     Syncope     Trigger finger, acquired     Type 2 diabetes mellitus (H) 11/6/2012 " "   Past Surgical History:   Procedure Laterality Date    APPENDECTOMY      ARTHROSCOPY KNEE      ARTHROSCOPY KNEE      CARDIAC CATHETERIZATION  2018     of rca    CARDIAC SURGERY  2018    two stents placed 18    CV CORONARY ANGIOGRAM N/A 2018    Procedure: Coronary Angiogram;  Surgeon: Jeane Garcia MD;  Location: [facility] [place] [facility];  Service:     CV CORONARY ANGIOGRAM N/A 2018    Procedure: Coronary Angiogram;  Surgeon: Jeane Garcia MD;  Location: [facility] [place] [facility];  Service:     FEMORAL ENDARTERECTOMY Bilateral 2017    IR EXTREMITY ANGIOGRAM BILATERAL  03/10/2017    NOSE SURGERY      PROSTATE BIOPSY, NEEDLE, SATURATION SAMPLING  2003    \"Biopsy fo the Prostate Needle\"    RELEASE TRIGGER FINGER      no family history of premature coronary artery disease Social History     Socioeconomic History    Marital status:      Spouse name: Not on file    Number of children: Not on file    Years of education: Not on file    Highest education level: Not on file   Occupational History    Not on file   Tobacco Use    Smoking status: Former     Current packs/day: 0.00     Average packs/day: 2.0 packs/day for 40.0 years (80.0 ttl pk-yrs)     Types: Cigarettes     Start date: 4/10/1944     Quit date: 4/10/1984     Years since quittin.0     Passive exposure: Never    Smokeless tobacco: Never   Substance and Sexual Activity    Alcohol use: Yes     Alcohol/week: 6.0 standard drinks of alcohol     Comment: occasionally.    Drug use: No    Sexual activity: Not on file   Other Topics Concern    Parent/sibling w/ CABG, MI or angioplasty before 65F 55M? 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        Had gotten into typing to meet girls.        Worked for [employer] after leaving the [employer].     Social Drivers of Health     Financial Resource Strain: Unknown (2025)    Financial Resource Strain     Within the " past 12 months, have you or your family members you live with been unable to get utilities (heat, electricity) when it was really needed?: Patient unable to answer   Food Insecurity: Unknown (4/18/2025)    Food Insecurity     Within the past 12 months, did you worry that your food would run out before you got money to buy more?: Patient unable to answer     Within the past 12 months, did the food you bought just not last and you didn t have money to get more?: Patient unable to answer   Transportation Needs: Unknown (4/18/2025)    Transportation Needs     Within the past 12 months, has lack of transportation kept you from medical appointments, getting your medicines, non-medical meetings or appointments, work, or from getting things that you need?: Patient unable to answer   Physical Activity: Not on file   Stress: Not on file   Social Connections: Not on file   Interpersonal Safety: Low Risk  (4/18/2025)    Interpersonal Safety     Do you feel physically and emotionally safe where you currently live?: Yes     Within the past 12 months, have you been hit, slapped, kicked or otherwise physically hurt by someone?: No     Within the past 12 months, have you been humiliated or emotionally abused in other ways by your partner or ex-partner?: No   Housing Stability: Unknown (4/18/2025)    Housing Stability     Do you have housing? : Patient unable to answer     Are you worried about losing your housing?: Patient unable to answer           Lab Results    Chemistry/lipid CBC Cardiac Enzymes/BNP/TSH/INR   Lab Results   Component Value Date    CHOL 124 11/30/2021    HDL 41 11/30/2021    TRIG 181 (H) 11/30/2021    BUN 33.9 (H) 04/21/2025     04/21/2025    CO2 30 (H) 04/21/2025    Lab Results   Component Value Date    WBC 7.1 04/21/2025    HGB 7.9 (L) 04/21/2025    HCT 24.2 (L) 04/21/2025    MCV 93 04/21/2025     04/21/2025    Lab Results   Component Value Date    TROPONINI 0.03 10/27/2019    BNP 59 06/16/2018     "TSH 1.45 03/21/2023    INR 1.17 (H) 10/30/2019     Lab Results   Component Value Date    TROPONINI 0.03 10/27/2019          Weight:    Wt Readings from Last 3 Encounters:   04/21/25 59.8 kg (131 lb 13.4 oz)   04/11/25 64.4 kg (142 lb)   04/09/25 63 kg (139 lb)       Allergies  Allergies   Allergen Reactions    Pletal [Cilostazol]          Surgical History  Past Surgical History:   Procedure Laterality Date    APPENDECTOMY      ARTHROSCOPY KNEE      ARTHROSCOPY KNEE      CARDIAC CATHETERIZATION  08/22/2018     of rca    CARDIAC SURGERY  08/22/2018    two stents placed 8/22/18    CV CORONARY ANGIOGRAM N/A 08/06/2018    Procedure: Coronary Angiogram;  Surgeon: Jeane Garcia MD;  Location: [facility] Cath Lab;  Service:     CV CORONARY ANGIOGRAM N/A 08/22/2018    Procedure: Coronary Angiogram;  Surgeon: Jeane Garcia MD;  Location: [facility] Cath Lab;  Service:     FEMORAL ENDARTERECTOMY Bilateral 05/03/2017    IR EXTREMITY ANGIOGRAM BILATERAL  03/10/2017    NOSE SURGERY      PROSTATE BIOPSY, NEEDLE, SATURATION SAMPLING  01/13/2003    \"Biopsy fo the Prostate Needle\"    RELEASE TRIGGER FINGER         Social History  Tobacco:   History   Smoking Status    Former    Types: Cigarettes   Smokeless Tobacco    Never    Alcohol:   Social History    Substance and Sexual Activity      Alcohol use: Yes        Alcohol/week: 6.0 standard drinks of alcohol        Comment: occasionally.   Illicit Drugs:   History   Drug Use No       Family History  Family History   Problem Relation Age of Onset    Diabetes No family hx of     Breast Cancer No family hx of     Colon Cancer No family hx of     Prostate Cancer No family hx of     Other Cancer No family hx of     No Known Problems Mother     No Known Problems Father           Taryn Taylor MD on 4/21/2025      cc: Haim Galaviz    "

## 2025-04-21 NOTE — DISCHARGE SUMMARY
[facility]  Discharge Summary - Medicine & Pediatrics       Date of Admission:  4/18/2025  Date of Discharge:  4/21/2025  Discharging Provider: Jewels Bunn DO  Discharge Service: Hospitalist Service    Discharge Diagnoses   Acute decompensated heart failure   Acute hypoxic respiratory failure, resolved  HFrEF with EF of 20-25%   Bilateral pleural effusions   Acute Kidney Injury  Community acquired pneumonia   Gluteal abscess   Pulmonary nodules   Hepatic lesion   Pancreatic cysts   Bladder lesion     Clinically Significant Risk Factors          Follow-ups Needed After Discharge   Follow up imaging for incidental imaging findings needed. Per CT chest/abdomen/pelvis from 4/18/25  IMPRESSION:   7.  Newly visualized 4 mm left lung nodules. These can be followed per the guidelines below.  8.  Newly visualized indeterminate 0.8 cm segment 4A hepatic lesion. Recommend attention on follow-up.  9.  Newly visualized 6 mm pancreatic body cyst. This is likely benign and can be followed per the guidelines below.  10.  Irregular asymmetric left-sided bladder wall thickening suspicious for a bladder tumor. Recommend nonemergent urologic consultation.    REFERENCE:  Guidelines for Management of Incidental Pulmonary Nodules Detected on CT Images: From the Fleischner Society 2017.   Guidelines apply to incidental nodules in patients who are 35 years or older.  Guidelines do not apply to lung cancer screening, patients with immunosuppression, or patients with known primary cancer.     MULTIPLE NODULES  Nodule size less than or equal to 6 mm  Low-risk patients: No follow-up needed.  High-risk patients: Optional follow-up at 12 months.     REFERENCE:  Revisions of international consensus Fukuoka guidelines for the management of IPMN of the pancreas. Pancreatology 2017;17(5):738-753.     Largest cyst less than 10 mm: CT or MRI/MRCP in 6 months and then every 2 years if no change.    Unresulted Labs Ordered  in the Past 30 Days of this Admission       Date and Time Order Name Status Description    4/18/2025  9:41 AM Blood Culture Peripheral Blood Preliminary     4/18/2025  9:41 AM Blood Culture Peripheral Blood Preliminary         These results will be followed up by Jewels Bunn DO    Discharge Disposition   Discharged to home  Condition at discharge: Fair    Hospital Course   Zack Johns is a 89 year old male admitted on 4/18/2025. He has a history of dementia, [id number], CAD s/p stents in 2018, HTN, HLD, T2DM, prostate cancer, and PVD and is admitted for heart failure exacerbation, pneumonia, and gluteal abscess.      Acute decompensated heart failure   Acute hypoxic respiratory failure, resolved  HFrEF with EF of 20-25%   Bilateral pleural effusions   Zack is a 89 y.o. male who presented with one day of worsening dyspnea at rest. He was recently hospitalized in February of 2025 for NSTEMI and diagnosed with new HFrEF with an EF of 20-25% at the time. Noted to be hypoxic requiring 2L NC, now resolved.  CTPE negative for pulmonary embolism but did demonstrate bilateral pleural effusions R>L. Also noted to have infiltrate in the RUL concerning for pneumonia. BNP significantly elevated at >11,000. Initial troponin 131 with delta of 159. ECG demonstrating sinus tachycardia without ST elevations. Viral respiratory panel negative for flu/covid/rsv. Overall picture concerning for hypervolemia due to heart failure exacerbation. He received IV diuresis and cardiology was consulted given his history and NSTEMI (see below). No invasive testing/procedures completed per shared decision making. He was transitioned off of IV diuresis prior to discharge.   - Start torsemide 20 mg daily per cardiology  - Resume PTA lisinopril   - Continue PTA spironolactone 12.5 mg  - Recommend 2g sodium diet   - Follow up with cardiology      Acute Kidney Injury  Likely secondary to medications and diuresis as above. ACE-I held during  admission, IV diuresis transitioned to orals. HAI improving at time of discharge.   - Continue PTA spironolactone   - Resume PTA lisinopril   - Follow up with PCP for repeat BMP 7-10 days after discharge      Non ST elevation myocardial infarction (NSTEMI)  Elevated troponin  History of CAD   History of stent placement in 2018  Patient found to have elevated cardiac enzymes with initial trop of 131 and subsequent increase to 159. He does have history of cardiac disease with prior angiogram and stent placement. Patient and his family have decided, through shared decision making with cardiology, that they do not wish to pursue angiogram at this time as patient does not have symptomatic ischemia/chest pain. S/p 48 hours of heparin gtt   - Continue PTA aspirin and statin      Community acquired pneumonia   Patient hypoxic in the ED with sats at 91% on RA. CTPE negative for pulmonary embolism but notable for right upper lobe infiltrate consistent with pneumonia. No history or risk factors suggestive of aspiration and thus this is more likely community acquired pneumonia. Patient received vanc and Zosyn in the ED due to concern for infection. IV vanc/zosyn transitioned to oral doxycycline on 4/20.   - Complete 7 days of doxycycline      Gluteal abscess   6.1cm gluteal abscess incidentally noted on CT imaging of the abdomen and pelvis. Patient had an initial lactic acid of 2.9 with subsequent improvement to 1.4 without intervention. WB 9.2 and vitally stable other than hypoxia as above. Minimal abdominal tenderness on the bilateral lower quadrants. The patient received vancomycin and Zosyn in the ED. Surgery consult placed during the admission and thought this may be due to abscess vs. Residual hematoma. As patient was asymptomatic, surgery and family decided to hold off on intervention. On PO antibiotics for pneumonia which surgery felt was appropriate therapy for now.   - Complete PO course of doxycycline      Pulmonary  nodules   Hepatic lesion   Pancreatic cysts   Bladder lesion   Incidental imaging findings noted at the time of admission on CTPE/CTAP notable for pancreatic cyst, left bladder wall thickening concerning for malignancy, left lung nodule, and hepatic lesion.   - Recommended follow up per radiology   7.  Newly visualized 4 mm left lung nodules. These can be followed per the guidelines below.  8.  Newly visualized indeterminate 0.8 cm segment 4A hepatic lesion. Recommend attention on follow-up.  9.  Newly visualized 6 mm pancreatic body cyst. This is likely benign and can be followed per the guidelines below.  10.  Irregular asymmetric left-sided bladder wall thickening suspicious for a bladder tumor. Recommend nonemergent urologic consultation.    REFERENCE:  Guidelines for Management of Incidental Pulmonary Nodules Detected on CT Images: From the Fleischner Society 2017.   Guidelines apply to incidental nodules in patients who are 35 years or older.  Guidelines do not apply to lung cancer screening, patients with immunosuppression, or patients with known primary cancer.     MULTIPLE NODULES  Nodule size less than or equal to 6 mm  Low-risk patients: No follow-up needed.  High-risk patients: Optional follow-up at 12 months.     REFERENCE:  Revisions of international consensus Fukuoka guidelines for the management of IPMN of the pancreas. Pancreatology 2017;17(5):738-753.     Largest cyst less than 10 mm: CT or MRI/MRCP in 6 months and then every 2 years if no change.     Consultations This Hospital Stay   PHARMACY IP CONSULT  CORE CLINIC EVALUATION IP CONSULT  OCCUPATIONAL THERAPY ADULT IP CONSULT  PHYSICAL THERAPY ADULT IP CONSULT  OCCUPATIONAL THERAPY ADULT IP CONSULT  CARE MANAGEMENT / SOCIAL WORK IP CONSULT  CARDIOLOGY IP CONSULT  PHARMACY TO DOSE VANCO  CARE MANAGEMENT / SOCIAL WORK IP CONSULT  SURGERY GENERAL IP CONSULT  PHARMACY TO DOSE VANCO    Code Status   No CPR- Do NOT Intubate       The patient was  discussed with Dr. Jesus Bunn,   [facility]  [address] [place] [id number]  Phone: [phone]  Fax: [phone]  ______________________________________________________________________    Physical Exam   Vital Signs: Temp: 98.2  F (36.8  C) Temp src: Oral BP: 124/52 Pulse: 86   Resp: 20 SpO2: 92 % O2 Device: None (Room air)    Weight: 131 lbs 13.36 oz  Physical Exam  Constitutional:       General: He is not in acute distress.     Appearance: Normal appearance. He is not ill-appearing.   HENT:      Nose: Nose normal.      Mouth/Throat:      Mouth: Mucous membranes are moist.      Pharynx: Oropharynx is clear.   Cardiovascular:      Rate and Rhythm: Normal rate and regular rhythm.      Pulses: Normal pulses.      Heart sounds: Normal heart sounds.   Pulmonary:      Effort: Pulmonary effort is normal. No respiratory distress.      Breath sounds: Normal breath sounds. No wheezing or rales.   Abdominal:      General: Abdomen is flat. Bowel sounds are normal.      Palpations: Abdomen is soft.   Musculoskeletal:      Right lower leg: No edema.      Left lower leg: No edema.   Skin:     Coloration: Skin is pale.   Neurological:      Mental Status: He is alert.   Psychiatric:         Mood and Affect: Mood normal.         Primary Care Physician   Haim Galaviz    Discharge Orders      Reason for your hospital stay    Heart failure exacerbation  Pneumonia     Activity    Your activity upon discharge: activity as tolerated     Diet    Follow this diet upon discharge: Current Diet:Orders Placed This Encounter      Combination Diet 2 gm NA Diet     Hospital Follow-up with Existing Primary Care Provider (PCP)            Significant Results and Procedures   Most Recent 3 BMP's:  Recent Labs   Lab Test 04/21/25  0452 04/20/25  0610 04/19/25  0219    142 143   POTASSIUM 3.5 3.7 4.0   CHLORIDE 103 102 101   CO2 30* 31* 29   BUN 33.9*  35.2* 29.5*   CR 1.32* 1.64* 1.41*   ANIONGAP 10 9 13   [facility] 8.7* 8.7* 9.2   * 123* 95     Most Recent 3 Troponin's:No lab results found.  Most Recent 3 BNP's:  Recent Labs   Lab Test 04/18/25  0915 02/18/25  2252   NTBNPI 11,663* 2,740*   ,   Results for orders placed or performed during the hospital encounter of 04/18/25   CT Chest Pulmonary Embolism w Contrast    Narrative    EXAM: CT ABDOMEN PELVIS W CONTRAST, CT CHEST PULMONARY EMBOLISM W CONTRAST  LOCATION: [facility]  DATE: 4/18/2025    INDICATION: abdominal tenderness on exam, upper abdomen worse than lower abdomen  COMPARISON: CT 6/16/2018 and 5/23/2018  TECHNIQUE:   CT chest pulmonary angiogram during arterial phase injection of IV contrast. Multiplanar reformats and MIP reconstructions were performed. Dose reduction techniques were used.   CT scan of the abdomen and pelvis was performed following injection of IV contrast. Multiplanar reformats were obtained. Dose reduction techniques were used.  CONTRAST: [id number] 75ml     FINDINGS:     ANGIOGRAM CHEST: Pulmonary arteries are normal caliber and negative for pulmonary emboli. Normal caliber thoracic aorta. The exam is nondiagnostic for a thoracic aortic dissection due to timing of the contrast bolus. No CT evidence of right heart strain.    LUNGS AND PLEURA: Small right-sided and trace left-sided pleural effusions with adjacent atelectasis. Pulmonary edema with interlobular septal thickening and peribronchial cuffing. Mild emphysema. Mild patchy right upper lobar airspace opacities.   Scattered calcified granulomas. There is a 4 mm left lower lobe subpleural nodular opacity image 166 series 2 and 4 mm left lower lobe nodule image 154. These are new since the prior CT exam.    MEDIASTINUM/AXILLAE: Mild chest wall edema. No thoracic adenopathy. Normal heart size and no pericardial effusion. Small hiatal hernia.    CORONARY ARTERY CALCIFICATION: Moderate.    HEPATOBILIARY:  Cholelithiasis. Newly visualized 0.8 cm indeterminate hypodense segment 4A lesion image 20 series 3.    PANCREAS: 6 mm pancreatic body cyst image 66 series 3. This is not definitely seen on the previous exam. No main pancreatic ductal dilatation.    SPLEEN: Normal.    ADRENAL GLANDS: Normal.    KIDNEYS/BLADDER: Multifocal renal cortical scarring. Upper right renal cyst. No follow-up is indicated. Renal vascular calcifications. Irregular asymmetric left-sided bladder wall thickening measuring up to 1.6 cm in thickness.    BOWEL: Small hiatal hernia. No bowel obstruction or inflammatory process. Normal appendix. Colonic diverticulosis without evidence of acute diverticulitis. No free air or free fluid.    LYMPH NODES: Normal.    VASCULATURE: Severe aortoiliac atherosclerosis. 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 Patent central SMA and SMV.    PELVIC ORGANS: Normal.    MUSCULOSKELETAL: Right gluteal 3.4 x 1.4 x 6.1 cm rim-enhancing fluid collection with surrounding muscular edema.      Impression    IMPRESSION:   1.  No pulmonary artery embolism.  2.  Pulmonary edema with small right-sided and trace left-sided pleural effusions.  3.  Mild right upper lobar airspace opacities suspicious for pneumonic infiltrates.  4.  Mild emphysema.  5.  Right gluteal 6.1 cm intramuscular rim-enhancing fluid collection. Differential consideration includes an abscess.  6.  No bowel obstruction, hydronephrosis or intraperitoneal inflammatory process.  7.  Newly visualized 4 mm left lung nodules. These can be followed per the guidelines below.  8.  Newly visualized indeterminate 0.8 cm segment 4A hepatic lesion. Recommend attention on follow-up.  9.  Newly visualized 6 mm pancreatic body cyst. This is likely benign and can be followed per the guidelines below.  10.  Irregular asymmetric left-sided bladder wall thickening suspicious for a bladder tumor. Recommend nonemergent urologic  consultation.      REFERENCE:  Guidelines for Management of Incidental Pulmonary Nodules Detected on CT Images: From the Fleischner Society 2017.   Guidelines apply to incidental nodules in patients who are 35 years or older.  Guidelines do not apply to lung cancer screening, patients with immunosuppression, or patients with known primary cancer.    MULTIPLE NODULES  Nodule size less than or equal to 6 mm  Low-risk patients: No follow-up needed.  High-risk patients: Optional follow-up at 12 months.        REFERENCE:  Revisions of international consensus Fukuoka guidelines for the management of IPMN of the pancreas. Pancreatology 2017;17(5):738-753.    Largest cyst less than 10 mm: CT or MRI/MRCP in 6 months and then every 2 years if no change.     CT Abdomen Pelvis w Contrast    Narrative    EXAM: CT ABDOMEN PELVIS W CONTRAST, CT CHEST PULMONARY EMBOLISM W CONTRAST  LOCATION: [facility]  DATE: 4/18/2025    INDICATION: abdominal tenderness on exam, upper abdomen worse than lower abdomen  COMPARISON: CT 6/16/2018 and 5/23/2018  TECHNIQUE:   CT chest pulmonary angiogram during arterial phase injection of IV contrast. Multiplanar reformats and MIP reconstructions were performed. Dose reduction techniques were used.   CT scan of the abdomen and pelvis was performed following injection of IV contrast. Multiplanar reformats were obtained. Dose reduction techniques were used.  CONTRAST: [id number] 75ml     FINDINGS:     ANGIOGRAM CHEST: Pulmonary arteries are normal caliber and negative for pulmonary emboli. Normal caliber thoracic aorta. The exam is nondiagnostic for a thoracic aortic dissection due to timing of the contrast bolus. No CT evidence of right heart strain.    LUNGS AND PLEURA: Small right-sided and trace left-sided pleural effusions with adjacent atelectasis. Pulmonary edema with interlobular septal thickening and peribronchial cuffing. Mild emphysema. Mild patchy right upper lobar  airspace opacities.   Scattered calcified granulomas. There is a 4 mm left lower lobe subpleural nodular opacity image 166 series 2 and 4 mm left lower lobe nodule image 154. These are new since the prior CT exam.    MEDIASTINUM/AXILLAE: Mild chest wall edema. No thoracic adenopathy. Normal heart size and no pericardial effusion. Small hiatal hernia.    CORONARY ARTERY CALCIFICATION: Moderate.    HEPATOBILIARY: Cholelithiasis. Newly visualized 0.8 cm indeterminate hypodense segment 4A lesion image 20 series 3.    PANCREAS: 6 mm pancreatic body cyst image 66 series 3. This is not definitely seen on the previous exam. No main pancreatic ductal dilatation.    SPLEEN: Normal.    ADRENAL GLANDS: Normal.    KIDNEYS/BLADDER: Multifocal renal cortical scarring. Upper right renal cyst. No follow-up is indicated. Renal vascular calcifications. Irregular asymmetric left-sided bladder wall thickening measuring up to 1.6 cm in thickness.    BOWEL: Small hiatal hernia. No bowel obstruction or inflammatory process. Normal appendix. Colonic diverticulosis without evidence of acute diverticulitis. No free air or free fluid.    LYMPH NODES: Normal.    VASCULATURE: Severe aortoiliac atherosclerosis. 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 Patent central SMA and SMV.    PELVIC ORGANS: Normal.    MUSCULOSKELETAL: Right gluteal 3.4 x 1.4 x 6.1 cm rim-enhancing fluid collection with surrounding muscular edema.      Impression    IMPRESSION:   1.  No pulmonary artery embolism.  2.  Pulmonary edema with small right-sided and trace left-sided pleural effusions.  3.  Mild right upper lobar airspace opacities suspicious for pneumonic infiltrates.  4.  Mild emphysema.  5.  Right gluteal 6.1 cm intramuscular rim-enhancing fluid collection. Differential consideration includes an abscess.  6.  No bowel obstruction, hydronephrosis or intraperitoneal inflammatory process.  7.  Newly visualized 4 mm left lung nodules. These can be  followed per the guidelines below.  8.  Newly visualized indeterminate 0.8 cm segment 4A hepatic lesion. Recommend attention on follow-up.  9.  Newly visualized 6 mm pancreatic body cyst. This is likely benign and can be followed per the guidelines below.  10.  Irregular asymmetric left-sided bladder wall thickening suspicious for a bladder tumor. Recommend nonemergent urologic consultation.      REFERENCE:  Guidelines for Management of Incidental Pulmonary Nodules Detected on CT Images: From the Fleischner Society 2017.   Guidelines apply to incidental nodules in patients who are 35 years or older.  Guidelines do not apply to lung cancer screening, patients with immunosuppression, or patients with known primary cancer.    MULTIPLE NODULES  Nodule size less than or equal to 6 mm  Low-risk patients: No follow-up needed.  High-risk patients: Optional follow-up at 12 months.        REFERENCE:  Revisions of international consensus Fukuoka guidelines for the management of IPMN of the pancreas. Pancreatology 2017;17(5):738-753.    Largest cyst less than 10 mm: CT or MRI/MRCP in 6 months and then every 2 years if no change.     POC US ECHO LIMITED    Narrative    Ethan Lloyd MD     4/18/2025  1:01 PM  POC US ECHO LIMITED    Date/Time: 4/18/2025 12:59 PM    Performed by: Ethan Lloyd MD  Authorized by: Ethan Lloyd MD    Procedure details:     Indications: shortness of breath    Comments:      Diffuse B-lines, bilateral pleural effusions, IVC plump, no significant   pericardial effusion.  Technically difficult imaging.  Images saved and   uploaded       Discharge Medications   Current Discharge Medication List        START taking these medications    Details   doxycycline monohydrate (MONODOX) 100 MG capsule Take 1 capsule (100 mg) by mouth 2 times daily.  Qty: 11 capsule, Refills: 0    Associated Diagnoses: Pneumonia of right upper lobe due to infectious organism      torsemide (DEMADEX) 20 MG tablet Take 1  tablet (20 mg) by mouth daily.  Qty: 30 tablet, Refills: 0    Associated Diagnoses: Chronic systolic heart failure (H); Acute decompensated heart failure (H)           CONTINUE these medications which have CHANGED    Details   ferrous sulfate (FE TABS) 325 (65 Fe) MG EC tablet Take 1 tablet (325 mg) by mouth every other day.  Qty: 15 tablet, Refills: 0    Associated Diagnoses: Anemia, unspecified type      vitamin D3 (CHOLECALCIFEROL) 50 mcg (2000 units) tablet Take 1 tablet (50 mcg) by mouth daily.  Qty: 30 tablet, Refills: 0    Associated Diagnoses: Vitamin D deficiency           CONTINUE these medications which have NOT CHANGED    Details   acetaminophen (TYLENOL) 500 MG tablet Take 500 mg by mouth every 6 hours as needed.      aspirin 81 MG EC tablet Take 1 tablet by mouth at bedtime.    Associated Diagnoses: Peripheral vascular disease      atorvastatin (LIPITOR) 40 MG tablet Take 1 tablet (40 mg) by mouth daily.  Qty: 90 tablet, Refills: 3    Associated Diagnoses: Coronary artery disease with hx of myocardial infarct w/o hx of CABG      citalopram (CELEXA) 20 MG tablet Take 1 tablet (20 mg) by mouth daily.  Qty: 90 tablet, Refills: 3    Associated Diagnoses: Current moderate episode of major depressive disorder without prior episode (H)      donepezil (ARICEPT) 10 MG tablet Take 1 tablet (10 mg) by mouth at bedtime.  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  Qty: 90 tablet, Refills: 1    Associated Diagnoses: Acute decompensated heart failure (H); NSTEMI (non-ST elevated myocardial infarction) (H); Essential hypertension, benign      loperamide (IMODIUM) 2 MG capsule Take 1 capsule (2 mg) by mouth 4 times daily as needed for diarrhea.  Qty: 30 capsule, Refills: 0    Associated Diagnoses: Diarrhea, unspecified type      methylphenidate (RITALIN) 5 MG tablet Take  1 tablet (5 mg) by mouth 2 times daily.  Qty: 60 tablet, Refills: 0    Associated Diagnoses: Current moderate episode of major depressive disorder without prior episode (H); Moderate late onset Alzheimer's dementia with mood disturbance (H)      Multiple Vitamin (MULTIVITAMIN) per tablet Take 1 tablet by mouth daily.      nitroGLYcerin (NITROSTAT) 0.4 MG sublingual tablet Place 1 tablet (0.4 mg) under the tongue every 5 minutes as needed for chest pain. Repeat at 5 min intervals x 2 if needed  Qty: 30 tablet, Refills: 0    Associated Diagnoses: Atherosclerosis of native coronary artery of native heart without angina pectoris      spironolactone (ALDACTONE) 25 MG tablet Take 0.5 tablets (12.5 mg) by mouth daily.  Qty: 45 tablet, Refills: 1    Associated Diagnoses: Acute systolic congestive heart failure (H)      traZODone (DESYREL) 50 MG tablet Take 0.5 tablets (25 mg) by mouth at bedtime.  Qty: 30 tablet, Refills: 0    Associated Diagnoses: Insomnia, unspecified type      zinc 50 MG TABS Take 50 mg by mouth daily.      zinc oxide (DESITIN) 40 % paste Apply topically 2 times daily. And BID PRN to sacral area           Allergies   Allergies   Allergen Reactions    Pletal [Cilostazol]

## 2025-04-21 NOTE — PROGRESS NOTES
Care Management Discharge Note    Discharge Date: 04/21/2025       Discharge Disposition: Home with hospice    Discharge Services: Hospice    Discharge DME:      Discharge Transportation: family or friend will provide    Private pay costs discussed: Not applicable    Does the patient's insurance plan have a 3 day qualifying hospital stay waiver?  No    PAS Confirmation Code:    Patient/family educated on Medicare website which has current facility and service quality ratings: yes (hospice agencies)    Education Provided on the Discharge Plan: Yes  Persons Notified of Discharge Plans: yes  Patient/Family in Agreement with the Plan: yes    Handoff Referral Completed: No, handoff not indicated or clinically appropriate    Additional Information:  Discharge orders placed. CM went to pt room to see if he needs any assistance with discharge planning and pt and pt's wife said they do not need anything and the plan is to discharge home with hospice and wife told CM that they have set up hospice on their own and have no CM needs. CM will sign off.    Aimee Kennedy RN

## 2025-04-21 NOTE — PROGRESS NOTES
Pt discharged to home accompanied by nursing assistant and wife. AVS reviewed and belongings and discharge meds sent with patient.

## 2025-04-22 LAB
ATRIAL RATE - MUSE: 105 BPM
DIASTOLIC BLOOD PRESSURE - MUSE: NORMAL MMHG
INTERPRETATION ECG - MUSE: NORMAL
P AXIS - MUSE: 88 DEGREES
PR INTERVAL - MUSE: 168 MS
QRS DURATION - MUSE: 120 MS
QT - MUSE: 382 MS
QTC - MUSE: 504 MS
R AXIS - MUSE: -66 DEGREES
SYSTOLIC BLOOD PRESSURE - MUSE: NORMAL MMHG
T AXIS - MUSE: 95 DEGREES
VENTRICULAR RATE- MUSE: 105 BPM

## 2025-04-23 LAB
BACTERIA BLD CULT: NO GROWTH
BACTERIA BLD CULT: NO GROWTH

## 2025-05-01 ENCOUNTER — OFFICE VISIT (OUTPATIENT)
Dept: CARDIOLOGY | Facility: CLINIC | Age: OVER 89
End: 2025-05-01
Payer: COMMERCIAL

## 2025-05-01 VITALS
BODY MASS INDEX: 22.36 KG/M2 | HEART RATE: 82 BPM | SYSTOLIC BLOOD PRESSURE: 122 MMHG | DIASTOLIC BLOOD PRESSURE: 54 MMHG | RESPIRATION RATE: 19 BRPM | HEIGHT: 64 IN | WEIGHT: 131 LBS

## 2025-05-01 DIAGNOSIS — I10 ESSENTIAL HYPERTENSION, BENIGN: ICD-10-CM

## 2025-05-01 DIAGNOSIS — N18.31 STAGE 3A CHRONIC KIDNEY DISEASE (H): ICD-10-CM

## 2025-05-01 DIAGNOSIS — I50.9 ACUTE DECOMPENSATED HEART FAILURE (H): Primary | ICD-10-CM

## 2025-05-01 DIAGNOSIS — D64.9 CHRONIC ANEMIA: ICD-10-CM

## 2025-05-01 DIAGNOSIS — I25.10 ATHEROSCLEROSIS OF NATIVE CORONARY ARTERY OF NATIVE HEART WITHOUT ANGINA PECTORIS: ICD-10-CM

## 2025-05-01 DIAGNOSIS — I50.23 ACUTE ON CHRONIC SYSTOLIC HEART FAILURE (H): ICD-10-CM

## 2025-05-01 DIAGNOSIS — I21.4 NSTEMI (NON-ST ELEVATED MYOCARDIAL INFARCTION) (H): ICD-10-CM

## 2025-05-01 PROBLEM — R55 SYNCOPE: Status: RESOLVED | Noted: 2017-12-15 | Resolved: 2025-05-01

## 2025-05-01 PROBLEM — R07.9 CHEST PAIN: Status: RESOLVED | Noted: 2018-06-16 | Resolved: 2025-05-01

## 2025-05-01 PROBLEM — R65.20 SEVERE SEPSIS (H): Status: RESOLVED | Noted: 2025-04-18 | Resolved: 2025-05-01

## 2025-05-01 PROBLEM — D64.89 OTHER SPECIFIED ANEMIAS: Status: ACTIVE | Noted: 2025-05-01

## 2025-05-01 PROBLEM — A41.9 SEVERE SEPSIS (H): Status: RESOLVED | Noted: 2025-04-18 | Resolved: 2025-05-01

## 2025-05-01 LAB
ANION GAP SERPL CALCULATED.3IONS-SCNC: 12 MMOL/L (ref 7–15)
BUN SERPL-MCNC: 35.7 MG/DL (ref 8–23)
CALCIUM SERPL-MCNC: 9.4 MG/DL (ref 8.8–10.4)
CHLORIDE SERPL-SCNC: 105 MMOL/L (ref 98–107)
CREAT SERPL-MCNC: 1.4 MG/DL (ref 0.67–1.17)
EGFRCR SERPLBLD CKD-EPI 2021: 48 ML/MIN/1.73M2
ERYTHROCYTE [DISTWIDTH] IN BLOOD BY AUTOMATED COUNT: 15.3 % (ref 10–15)
GLUCOSE SERPL-MCNC: 105 MG/DL (ref 70–99)
HCO3 SERPL-SCNC: 27 MMOL/L (ref 22–29)
HCT VFR BLD AUTO: 32.8 % (ref 40–53)
HGB BLD-MCNC: 10.1 G/DL (ref 13.3–17.7)
MAGNESIUM SERPL-MCNC: 1.7 MG/DL (ref 1.7–2.3)
MCH RBC QN AUTO: 29 PG (ref 26.5–33)
MCHC RBC AUTO-ENTMCNC: 30.8 G/DL (ref 31.5–36.5)
MCV RBC AUTO: 94 FL (ref 78–100)
NT-PROBNP SERPL-MCNC: 3035 PG/ML (ref 0–1800)
PLATELET # BLD AUTO: 226 10E3/UL (ref 150–450)
POTASSIUM SERPL-SCNC: 4.7 MMOL/L (ref 3.4–5.3)
RBC # BLD AUTO: 3.48 10E6/UL (ref 4.4–5.9)
SODIUM SERPL-SCNC: 144 MMOL/L (ref 135–145)
WBC # BLD AUTO: 4.6 10E3/UL (ref 4–11)

## 2025-05-01 RX ORDER — NITROGLYCERIN 0.4 MG/1
0.4 TABLET SUBLINGUAL EVERY 5 MIN PRN
Qty: 30 TABLET | Refills: 0 | Status: SHIPPED | OUTPATIENT
Start: 2025-05-01

## 2025-05-01 NOTE — PATIENT INSTRUCTIONS
Zack Johns,    It was a pleasure to see you today at the [facility].     My recommendations after this visit include:    - No medications changes made today    - We will follow up with your lab result     - Low sodium diet < 2000 mg/day, daily weight monitoring, and maintain adequate fluid intake at 50 to 60 ounces per day    -Please call if you experience persistent weight gain 2 to 3 pounds 2 days in a row or 5 pounds in a week with shortness of breath, abdominal bloating and leg swelling    - Follow up with palliative care team to discuss goal of care- referral placed    - Follow up with Dr. Galaviz as scheduled on 5/21/25    - Follow up with Janine as scheduled on 6/17/25    - Follow up with Dr. Rocha in 2-3 months    - Please call Heart Failure Nurse Line at [phone], if you have any questions or concerns

## 2025-05-01 NOTE — PROGRESS NOTES
Assessment/Recommendations   Assessment:    #   Acute on chronic systolic heart failure, NYHA Class II:  Recurrent hospitalization with acute decompensated heart failure in the last couple months.  Echocardiogram on 2/19/2025 showed LVEF severely reduced to 20 to 25%.    Discharge weight was 131 pounds.  Current home weight is 128 to 130 pounds.    Patient is well compensated on exam.  Reports following low-sodium diet.  Reports inadequate fluid intake.    Heart failure regimen includes:    -Beta-blocker therapy with metoprolol succinate 12.5 mg daily-stopped due to bradycardia    -ACEI  therapy with lisinopril 10 Mg daily    -Aldosterone blocker/MRA therapy with spironolactone 12.5 mg daily    -Not on SGLT2 Inhibitor     -Diuretic therapy torsemide 20 mg daily    #  Chronic Kidney Disease Stage IIIa: BMP on 4/21/2025 showed sodium 143, potassium 2.5, BUN 33.9 and creatinine 1.32.    # Coronary artery disease status post PCI/stent to RCA in 2018: Recent hospitalization with new onset of acute systolic heart failure and non-STEMI.  Cardiology was consulted.  Patient declined repeat coronary angiogram given significant dementia.  On medical therapy.  Patient had 1 episode of chest pain lasted for seconds and resolved with no intervention.  On PRN nitroglycerin.    # Hypertension: Blood pressure stable.  Home blood pressure stable    # Anemia: hemoglobin of 8 during recent hospitalization    Plan/Recommendation:  -No medication changes made today  -Nitro prescription renewed.  Given instruction on how to utilize nitro.  -If patient is needing frequent nitro, may benefit from long-acting nitrate therapy with isosorbide mononitrate daily maintenance dose  -BMP, NT proBNP, magnesium and CBC pending  - Consider starting on low-dose of carvedilol in the near future  - Continue on low salt diet <2000 mg per day, daily weight monitoring, and maintain adequate fluid intake with 50-60 ounces per day    Given complex  "medical history with recurrent hospitalization with acute decompensated heart failure, patient was recommended to follow-up with palliative care team to discuss about goal of care.  Patient and family agreeable.  Palliative care referral placed    Follow-up with PCP as scheduled on 5/21/2025  Follow-up with me in June as scheduled  Follow-up with Dr. Rocha in 3 months     The longitudinal plan of care for acute on chronic systolic heart failure was addressed during this visit.?Due to the added   complexity in care, I will continue to support Zack Johns in the subsequent management of this   condition(s) and with the ongoing continuity of care of this condition(s)\".     History of Present Illness/Subjective    Mr. Zack Johns is a 89 year old male with a past medical history of coronary disease with status post PCI/stent to RCA in 2018, peripheral artery disease with status post bilateral iliac stents in 2018, diabetes mellitus type 2, hypertension, dyslipidemia, dementia, and recent hospitalization on 2/18/2025 with non-STEMI, acute hypoxic respiratory failure likely secondary to new onset of systolic heart failure, and most recent hospitalization in April with acute hypoxic respiratory failure, bilateral pleural effusion, community-acquired pneumonia, gluteal abscess, and acute decompensated heart failure who is seen at [facility] for post hospitalization heart failure follow up.     Patient was rehospitalized from April 18 through 21 with acute decompensated heart failure, acute hypoxic respiratory failure open (resolved), bilateral pleural effusion, community-acquired pneumonia, gluteal abscess.  Patient was treated with IV diuretic therapy.  NT proBNP was found greater than 11,000.  Patient was discharged on oral torsemide.  Kidney function improved at the time of discharge.  Patient was also found to have non-STEMI with elevated troponin.  Patient and family " "decided through shared decision making with cardiology that they do not want to proceed with angiogram.  Patient was also found to have gluteal abscess and was treated with antibiotic.  Incidentally found to have pulmonary nodules, hepatic lesion, pancreatic cyst and bladder lesion with recent hospitalization imaging.    Today, Zack is accompanied by his son and his spouse.  He reports stable from heart failure standpoint since he was released from the hospital.  He continues to have fatigue which is unchanged from baseline.   accompanied by his spouse and his son.  He is comfortably sitting in his wheelchair.  He reports stable from heart failure standpoint except he has excessive fatigue and daytime sleepiness.  He denies lightheadedness, shortness of breath, dyspnea on exertion, orthopnea, PND, palpitations, chest pain, abdominal fullness/bloating, and lower extremity edema.     Patient's wife Sadie manages his medications.    ECHO from 2/19/25-Reviewed:   Interpretation Summary   1. The left ventricle is mildly dilated with normal left ventricular wall  thickness.  - Left ventricular function is decreased. The ejection fraction is 20-25%  (severely reduced).  - There is severe global hypokinesia of the left ventricle.  2. The right ventricle is normal size with mildly decreased right ventricular  systolic function  3. No hemodynamically significant valvular abnormalities on 2D or color flow  imaging.  4. There is no comparison study available.     Physical Examination Review of Systems   /54 (BP Location: Left arm, Patient Position: Sitting, Cuff Size: Adult Regular)   Pulse 82   Resp 19   Ht 1.626 m (5' 4\")   Wt 59.4 kg (131 lb)   BMI 22.49 kg/m    Body mass index is 22.49 kg/m .  Wt Readings from Last 3 Encounters:   05/01/25 59.4 kg (131 lb)   04/21/25 59.8 kg (131 lb 13.4 oz)   04/11/25 64.4 kg (142 lb)     General Appearance:   no distress, normal body habitus   ENT/Mouth: membranes moist, no " oral lesions or bleeding gums.      EYES:  no scleral icterus, normal conjunctivae   Neck: no carotid bruits or thyromegaly   Chest/Lungs:   lungs are clear to auscultation, no rales or wheezing, equal chest wall expansion    Cardiovascular:   Heart rate regular. Normal first and second heart sounds with no murmurs, rubs, or gallops; Jugular venous pressure flat, no edema bilaterally    Abdomen:  no organomegaly, masses, bruits, or tenderness; bowel sounds are present   Extremities   no cyanosis or clubbing    CMS intact.   Skin: no xanthelasma, warm.    Neurologic: Alert and oriented ; no tremors   Psychiatric: calm and cooperative                                                   Negative unless noted in HPI     Medical History  Surgical History Family History Social History   Past Medical History:   Diagnosis Date    Actinic keratitis     Arthritis     bilat shoulders    CAD (coronary artery disease)     Cardiac arrest (H) 09/04/1989    Claudication     Claudication     Coronary artery disease     s/p MI    Diabetes mellitus type 2, controlled (H)     Diverticulosis     HTN (hypertension)     Hyperlipidemia     Hypertension     Malignant neoplasm of prostate (H) 8/7/2006-10/5/2006    DENIED BY PATIENT (see note from 12/08/2014).  RADIOTHERAPY BY DR.CHO LEGGETT, old     Multiple pulmonary nodules     Osteoarthritis of glenohumeral joint     Left. Injected at [facility] 02/10/2015.    Peripheral vascular disease, unspecified     Severe sepsis (H) 04/18/2025    Syncope     Trigger finger, acquired     Type 2 diabetes mellitus (H) 11/06/2012    Past Surgical History:   Procedure Laterality Date    APPENDECTOMY      ARTHROSCOPY KNEE      ARTHROSCOPY KNEE      CARDIAC CATHETERIZATION  08/22/2018     of rca    CARDIAC SURGERY  08/22/2018    two stents placed 8/22/18    CV CORONARY ANGIOGRAM N/A 08/06/2018    Procedure: Coronary Angiogram;  Surgeon: Jeane Garcia MD;  Location: [facility];  Service:     CV  "CORONARY ANGIOGRAM N/A 2018    Procedure: Coronary Angiogram;  Surgeon: Jeane Garcia MD;  Location: [facility];  Service:     FEMORAL ENDARTERECTOMY Bilateral 2017    IR EXTREMITY ANGIOGRAM BILATERAL  03/10/2017    NOSE SURGERY      PROSTATE BIOPSY, NEEDLE, SATURATION SAMPLING  2003    \"Biopsy fo the Prostate Needle\"    RELEASE TRIGGER FINGER      Family History   Problem Relation Age of Onset    Diabetes No family hx of     Breast Cancer No family hx of     Colon Cancer No family hx of     Prostate Cancer No family hx of     Other Cancer No family hx of     No Known Problems Mother     No Known Problems Father     Social History     Socioeconomic History    Marital status:      Spouse name: Not on file    Number of children: Not on file    Years of education: Not on file    Highest education level: Not on file   Occupational History    Not on file   Tobacco Use    Smoking status: Former     Current packs/day: 0.00     Average packs/day: 2.0 packs/day for 40.0 years (80.0 ttl pk-yrs)     Types: Cigarettes     Start date: 4/10/1944     Quit date: 4/10/1984     Years since quittin.0     Passive exposure: Never    Smokeless tobacco: Never   Substance and Sexual Activity    Alcohol use: Yes     Alcohol/week: 6.0 standard drinks of alcohol     Comment: occasionally.    Drug use: No    Sexual activity: Not on file   Other Topics Concern    Parent/sibling w/ CABG, MI or angioplasty before 65F 55M? 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        Had gotten into typing to meet girls.        Worked for [employer] after leaving the [employer].     Social Drivers of Health     Financial Resource Strain: Unknown (2025)    Financial Resource Strain     Within the past 12 months, have you or your family members you live with been unable to get utilities (heat, electricity) when it was really needed?: Patient unable to answer "   Food Insecurity: Unknown (4/18/2025)    Food Insecurity     Within the past 12 months, did you worry that your food would run out before you got money to buy more?: Patient unable to answer     Within the past 12 months, did the food you bought just not last and you didn t have money to get more?: Patient unable to answer   Transportation Needs: Unknown (4/18/2025)    Transportation Needs     Within the past 12 months, has lack of transportation kept you from medical appointments, getting your medicines, non-medical meetings or appointments, work, or from getting things that you need?: Patient unable to answer   Physical Activity: Not on file   Stress: Not on file   Social Connections: Not on file   Interpersonal Safety: Low Risk  (4/18/2025)    Interpersonal Safety     Do you feel physically and emotionally safe where you currently live?: Yes     Within the past 12 months, have you been hit, slapped, kicked or otherwise physically hurt by someone?: No     Within the past 12 months, have you been humiliated or emotionally abused in other ways by your partner or ex-partner?: No   Housing Stability: Unknown (4/18/2025)    Housing Stability     Do you have housing? : Patient unable to answer     Are you worried about losing your housing?: Patient unable to answer          Medications  Allergies   Current Outpatient Medications   Medication Sig Dispense Refill    acetaminophen (TYLENOL) 500 MG tablet Take 500 mg by mouth every 6 hours as needed.      aspirin 81 MG EC tablet Take 1 tablet by mouth at bedtime.      atorvastatin (LIPITOR) 40 MG tablet Take 1 tablet (40 mg) by mouth daily. 90 tablet 3    citalopram (CELEXA) 20 MG tablet Take 1 tablet (20 mg) by mouth daily. 90 tablet 3    donepezil (ARICEPT) 10 MG tablet Take 1 tablet (10 mg) by mouth at bedtime. 90 tablet 0    doxycycline monohydrate (MONODOX) 100 MG capsule Take 1 capsule (100 mg) by mouth 2 times daily. 11 capsule 0    ferrous sulfate (FE TABS) 325 (65  Fe) MG EC tablet Take 1 tablet (325 mg) by mouth every other day. 15 tablet 0    lisinopril (ZESTRIL) 10 MG tablet Take 1 tablet (10 mg) by mouth daily. 90 tablet 1    loperamide (IMODIUM) 2 MG capsule Take 1 capsule (2 mg) by mouth 4 times daily as needed for diarrhea. 30 capsule 0    methylphenidate (RITALIN) 5 MG tablet Take 1 tablet (5 mg) by mouth 2 times daily. 60 tablet 0    Multiple Vitamin (MULTIVITAMIN) per tablet Take 1 tablet by mouth daily.      nitroGLYcerin (NITROSTAT) 0.4 MG sublingual tablet Place 1 tablet (0.4 mg) under the tongue every 5 minutes as needed for chest pain. Repeat at 5 min intervals x 2 if needed 30 tablet 0    spironolactone (ALDACTONE) 25 MG tablet Take 0.5 tablets (12.5 mg) by mouth daily. 45 tablet 1    torsemide (DEMADEX) 20 MG tablet Take 1 tablet (20 mg) by mouth daily. 30 tablet 0    traZODone (DESYREL) 50 MG tablet Take 0.5 tablets (25 mg) by mouth at bedtime. 30 tablet 0    vitamin D3 (CHOLECALCIFEROL) 50 mcg (2000 units) tablet Take 1 tablet (50 mcg) by mouth daily. 30 tablet 0    zinc 50 MG TABS Take 50 mg by mouth daily.      zinc oxide (DESITIN) 40 % paste Apply topically 2 times daily. And BID PRN to sacral area      Allergies   Allergen Reactions    Pletal [Cilostazol]          Lab Results    Chemistry/lipid CBC Cardiac Enzymes/BNP/TSH/INR   Lab Results   Component Value Date    CHOL 124 11/30/2021    HDL 41 11/30/2021    TRIG 181 (H) 11/30/2021    BUN 33.9 (H) 04/21/2025     04/21/2025    CO2 30 (H) 04/21/2025    Lab Results   Component Value Date    WBC 4.6 05/01/2025    HGB 10.1 (L) 05/01/2025    HCT 32.8 (L) 05/01/2025    MCV 94 05/01/2025     05/01/2025    Lab Results   Component Value Date    TROPONINI 0.03 10/27/2019    BNP 59 06/16/2018    TSH 1.45 03/21/2023    INR 1.17 (H) 10/30/2019        38 minutes spent on the date of encounter doing chart review, review of test results, interpretation with above tests, patient visit, documentation, and  discussion with family.      This note has been dictated using voice recognition software. Any grammatical, typographical, or context distortions are unintentional and inherent to the software

## 2025-05-01 NOTE — LETTER
5/1/2025    Haim Galaviz MD  [address] [place] [address]    RE: Zack JEAN Andreas       Dear Colleague,     I had the pleasure of seeing Zack JEAN Andreas in the [facility].          Assessment/Recommendations   Assessment:    #   Acute on chronic systolic heart failure, NYHA Class II:  Recurrent hospitalization with acute decompensated heart failure in the last couple months.  Echocardiogram on 2/19/2025 showed LVEF severely reduced to 20 to 25%.    Discharge weight was 131 pounds.  Current home weight is 128 to 130 pounds.    Patient is well compensated on exam.  Reports following low-sodium diet.  Reports inadequate fluid intake.    Heart failure regimen includes:    -Beta-blocker therapy with metoprolol succinate 12.5 mg daily-stopped due to bradycardia    -ACEI  therapy with lisinopril 10 Mg daily    -Aldosterone blocker/MRA therapy with spironolactone 12.5 mg daily    -Not on SGLT2 Inhibitor     -Diuretic therapy torsemide 20 mg daily    #  Chronic Kidney Disease Stage IIIa: BMP on 4/21/2025 showed sodium 143, potassium 2.5, BUN 33.9 and creatinine 1.32.    # Coronary artery disease status post PCI/stent to RCA in 2018: Recent hospitalization with new onset of acute systolic heart failure and non-STEMI.  Cardiology was consulted.  Patient declined repeat coronary angiogram given significant dementia.  On medical therapy.  Patient had 1 episode of chest pain lasted for seconds and resolved with no intervention.  On PRN nitroglycerin.    # Hypertension: Blood pressure stable.  Home blood pressure stable    # Anemia: hemoglobin of 8 during recent hospitalization    Plan/Recommendation:  -No medication changes made today  -Nitro prescription renewed.  Given instruction on how to utilize nitro.  -If patient is needing frequent nitro, may benefit from long-acting nitrate therapy with isosorbide mononitrate daily maintenance dose  -BMP, NT proBNP, magnesium and CBC pending  -  "Consider starting on low-dose of carvedilol in the near future  - Continue on low salt diet <2000 mg per day, daily weight monitoring, and maintain adequate fluid intake with 50-60 ounces per day    Given complex medical history with recurrent hospitalization with acute decompensated heart failure, patient was recommended to follow-up with palliative care team to discuss about goal of care.  Patient and family agreeable.  Palliative care referral placed    Follow-up with PCP as scheduled on 5/21/2025  Follow-up with me in June as scheduled  Follow-up with Dr. Rocha in 3 months     The longitudinal plan of care for acute on chronic systolic heart failure was addressed during this visit.?Due to the added   complexity in care, I will continue to support Zack Johns in the subsequent management of this   condition(s) and with the ongoing continuity of care of this condition(s)\".     History of Present Illness/Subjective    Mr. Zack Johns is a 89 year old male with a past medical history of coronary disease with status post PCI/stent to RCA in 2018, peripheral artery disease with status post bilateral iliac stents in 2018, diabetes mellitus type 2, hypertension, dyslipidemia, dementia, and recent hospitalization on 2/18/2025 with non-STEMI, acute hypoxic respiratory failure likely secondary to new onset of systolic heart failure, and most recent hospitalization in April with acute hypoxic respiratory failure, bilateral pleural effusion, community-acquired pneumonia, gluteal abscess, and acute decompensated heart failure who is seen at [facility] for post hospitalization heart failure follow up.     Patient was rehospitalized from April 18 through 21 with acute decompensated heart failure, acute hypoxic respiratory failure open (resolved), bilateral pleural effusion, community-acquired pneumonia, gluteal abscess.  Patient was treated with IV diuretic therapy.  NT proBNP " "was found greater than 11,000.  Patient was discharged on oral torsemide.  Kidney function improved at the time of discharge.  Patient was also found to have non-STEMI with elevated troponin.  Patient and family decided through shared decision making with cardiology that they do not want to proceed with angiogram.  Patient was also found to have gluteal abscess and was treated with antibiotic.  Incidentally found to have pulmonary nodules, hepatic lesion, pancreatic cyst and bladder lesion with recent hospitalization imaging.    Today, Zack is accompanied by his son and his spouse.  He reports stable from heart failure standpoint since he was released from the hospital.  He continues to have fatigue which is unchanged from baseline.   accompanied by his spouse and his son.  He is comfortably sitting in his wheelchair.  He reports stable from heart failure standpoint except he has excessive fatigue and daytime sleepiness.  He denies lightheadedness, shortness of breath, dyspnea on exertion, orthopnea, PND, palpitations, chest pain, abdominal fullness/bloating, and lower extremity edema.     Patient's wife Sadie manages his medications.    ECHO from 2/19/25-Reviewed:   Interpretation Summary   1. The left ventricle is mildly dilated with normal left ventricular wall  thickness.  - Left ventricular function is decreased. The ejection fraction is 20-25%  (severely reduced).  - There is severe global hypokinesia of the left ventricle.  2. The right ventricle is normal size with mildly decreased right ventricular  systolic function  3. No hemodynamically significant valvular abnormalities on 2D or color flow  imaging.  4. There is no comparison study available.     Physical Examination Review of Systems   /54 (BP Location: Left arm, Patient Position: Sitting, Cuff Size: Adult Regular)   Pulse 82   Resp 19   Ht 1.626 m (5' 4\")   Wt 59.4 kg (131 lb)   BMI 22.49 kg/m    Body mass index is 22.49 kg/m .  Wt " Readings from Last 3 Encounters:   05/01/25 59.4 kg (131 lb)   04/21/25 59.8 kg (131 lb 13.4 oz)   04/11/25 64.4 kg (142 lb)     General Appearance:   no distress, normal body habitus   ENT/Mouth: membranes moist, no oral lesions or bleeding gums.      EYES:  no scleral icterus, normal conjunctivae   Neck: no carotid bruits or thyromegaly   Chest/Lungs:   lungs are clear to auscultation, no rales or wheezing, equal chest wall expansion    Cardiovascular:   Heart rate regular. Normal first and second heart sounds with no murmurs, rubs, or gallops; Jugular venous pressure flat, no edema bilaterally    Abdomen:  no organomegaly, masses, bruits, or tenderness; bowel sounds are present   Extremities   no cyanosis or clubbing    CMS intact.   Skin: no xanthelasma, warm.    Neurologic: Alert and oriented ; no tremors   Psychiatric: calm and cooperative                                                   Negative unless noted in HPI     Medical History  Surgical History Family History Social History   Past Medical History:   Diagnosis Date     Actinic keratitis      Arthritis     bilat shoulders     CAD (coronary artery disease)      Cardiac arrest (H) 09/04/1989     Claudication      Claudication      Coronary artery disease     s/p MI     Diabetes mellitus type 2, controlled (H)      Diverticulosis      HTN (hypertension)      Hyperlipidemia      Hypertension      Malignant neoplasm of prostate (H) 8/7/2006-10/5/2006    DENIED BY PATIENT (see note from 12/08/2014).  RADIOTHERAPY BY DR.CHO LEGGETT, old      Multiple pulmonary nodules      Osteoarthritis of glenohumeral joint     Left. Injected at [facility] 02/10/2015.     Peripheral vascular disease, unspecified      Severe sepsis (H) 04/18/2025     Syncope      Trigger finger, acquired      Type 2 diabetes mellitus (H) 11/06/2012    Past Surgical History:   Procedure Laterality Date     APPENDECTOMY       ARTHROSCOPY KNEE       ARTHROSCOPY KNEE       CARDIAC  "CATHETERIZATION  2018     of rca     CARDIAC SURGERY  2018    two stents placed 18     CV CORONARY ANGIOGRAM N/A 2018    Procedure: Coronary Angiogram;  Surgeon: Jeane Garcia MD;  Location: [facility];  Service:      CV CORONARY ANGIOGRAM N/A 2018    Procedure: Coronary Angiogram;  Surgeon: Jeane Garcia MD;  Location: [facility];  Service:      FEMORAL ENDARTERECTOMY Bilateral 2017     IR EXTREMITY ANGIOGRAM BILATERAL  03/10/2017     NOSE SURGERY       PROSTATE BIOPSY, NEEDLE, SATURATION SAMPLING  2003    \"Biopsy fo the Prostate Needle\"     RELEASE TRIGGER FINGER      Family History   Problem Relation Age of Onset     Diabetes No family hx of      Breast Cancer No family hx of      Colon Cancer No family hx of      Prostate Cancer No family hx of      Other Cancer No family hx of      No Known Problems Mother      No Known Problems Father     Social History     Socioeconomic History     Marital status:      Spouse name: Not on file     Number of children: Not on file     Years of education: Not on file     Highest education level: Not on file   Occupational History     Not on file   Tobacco Use     Smoking status: Former     Current packs/day: 0.00     Average packs/day: 2.0 packs/day for 40.0 years (80.0 ttl pk-yrs)     Types: Cigarettes     Start date: 4/10/1944     Quit date: 4/10/1984     Years since quittin.0     Passive exposure: Never     Smokeless tobacco: Never   Substance and Sexual Activity     Alcohol use: Yes     Alcohol/week: 6.0 standard drinks of alcohol     Comment: occasionally.     Drug use: No     Sexual activity: Not on file   Other Topics Concern     Parent/sibling w/ CABG, MI or angioplasty before 65F 55M? 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        Had gotten into typing to meet girls.        Worked for [employer] after leaving the [employer].     Social " Drivers of Health     Financial Resource Strain: Unknown (4/18/2025)    Financial Resource Strain      Within the past 12 months, have you or your family members you live with been unable to get utilities (heat, electricity) when it was really needed?: Patient unable to answer   Food Insecurity: Unknown (4/18/2025)    Food Insecurity      Within the past 12 months, did you worry that your food would run out before you got money to buy more?: Patient unable to answer      Within the past 12 months, did the food you bought just not last and you didn t have money to get more?: Patient unable to answer   Transportation Needs: Unknown (4/18/2025)    Transportation Needs      Within the past 12 months, has lack of transportation kept you from medical appointments, getting your medicines, non-medical meetings or appointments, work, or from getting things that you need?: Patient unable to answer   Physical Activity: Not on file   Stress: Not on file   Social Connections: Not on file   Interpersonal Safety: Low Risk  (4/18/2025)    Interpersonal Safety      Do you feel physically and emotionally safe where you currently live?: Yes      Within the past 12 months, have you been hit, slapped, kicked or otherwise physically hurt by someone?: No      Within the past 12 months, have you been humiliated or emotionally abused in other ways by your partner or ex-partner?: No   Housing Stability: Unknown (4/18/2025)    Housing Stability      Do you have housing? : Patient unable to answer      Are you worried about losing your housing?: Patient unable to answer          Medications  Allergies   Current Outpatient Medications   Medication Sig Dispense Refill     acetaminophen (TYLENOL) 500 MG tablet Take 500 mg by mouth every 6 hours as needed.       aspirin 81 MG EC tablet Take 1 tablet by mouth at bedtime.       atorvastatin (LIPITOR) 40 MG tablet Take 1 tablet (40 mg) by mouth daily. 90 tablet 3     citalopram (CELEXA) 20 MG tablet  Take 1 tablet (20 mg) by mouth daily. 90 tablet 3     donepezil (ARICEPT) 10 MG tablet Take 1 tablet (10 mg) by mouth at bedtime. 90 tablet 0     doxycycline monohydrate (MONODOX) 100 MG capsule Take 1 capsule (100 mg) by mouth 2 times daily. 11 capsule 0     ferrous sulfate (FE TABS) 325 (65 Fe) MG EC tablet Take 1 tablet (325 mg) by mouth every other day. 15 tablet 0     lisinopril (ZESTRIL) 10 MG tablet Take 1 tablet (10 mg) by mouth daily. 90 tablet 1     loperamide (IMODIUM) 2 MG capsule Take 1 capsule (2 mg) by mouth 4 times daily as needed for diarrhea. 30 capsule 0     methylphenidate (RITALIN) 5 MG tablet Take 1 tablet (5 mg) by mouth 2 times daily. 60 tablet 0     Multiple Vitamin (MULTIVITAMIN) per tablet Take 1 tablet by mouth daily.       nitroGLYcerin (NITROSTAT) 0.4 MG sublingual tablet Place 1 tablet (0.4 mg) under the tongue every 5 minutes as needed for chest pain. Repeat at 5 min intervals x 2 if needed 30 tablet 0     spironolactone (ALDACTONE) 25 MG tablet Take 0.5 tablets (12.5 mg) by mouth daily. 45 tablet 1     torsemide (DEMADEX) 20 MG tablet Take 1 tablet (20 mg) by mouth daily. 30 tablet 0     traZODone (DESYREL) 50 MG tablet Take 0.5 tablets (25 mg) by mouth at bedtime. 30 tablet 0     vitamin D3 (CHOLECALCIFEROL) 50 mcg (2000 units) tablet Take 1 tablet (50 mcg) by mouth daily. 30 tablet 0     zinc 50 MG TABS Take 50 mg by mouth daily.       zinc oxide (DESITIN) 40 % paste Apply topically 2 times daily. And BID PRN to sacral area      Allergies   Allergen Reactions     Pletal [Cilostazol]          Lab Results    Chemistry/lipid CBC Cardiac Enzymes/BNP/TSH/INR   Lab Results   Component Value Date    CHOL 124 11/30/2021    HDL 41 11/30/2021    TRIG 181 (H) 11/30/2021    BUN 33.9 (H) 04/21/2025     04/21/2025    CO2 30 (H) 04/21/2025    Lab Results   Component Value Date    WBC 4.6 05/01/2025    HGB 10.1 (L) 05/01/2025    HCT 32.8 (L) 05/01/2025    MCV 94 05/01/2025      05/01/2025    Lab Results   Component Value Date    TROPONINI 0.03 10/27/2019    BNP 59 06/16/2018    TSH 1.45 03/21/2023    INR 1.17 (H) 10/30/2019        38 minutes spent on the date of encounter doing chart review, review of test results, interpretation with above tests, patient visit, documentation, and discussion with family.      This note has been dictated using voice recognition software. Any grammatical, typographical, or context distortions are unintentional and inherent to the software          Thank you for allowing me to participate in the care of your patient.      Sincerely,     CHANDAN [facility]  cc:   Taryn Taylor MD  [address] [place] [address]

## 2025-05-07 DIAGNOSIS — D64.9 ANEMIA, UNSPECIFIED TYPE: ICD-10-CM

## 2025-05-07 DIAGNOSIS — F32.1 CURRENT MODERATE EPISODE OF MAJOR DEPRESSIVE DISORDER WITHOUT PRIOR EPISODE (H): ICD-10-CM

## 2025-05-07 DIAGNOSIS — G30.1 MODERATE LATE ONSET ALZHEIMER'S DEMENTIA WITH MOOD DISTURBANCE (H): ICD-10-CM

## 2025-05-07 DIAGNOSIS — F02.B3 MODERATE LATE ONSET ALZHEIMER'S DEMENTIA WITH MOOD DISTURBANCE (H): ICD-10-CM

## 2025-05-07 RX ORDER — METHYLPHENIDATE HYDROCHLORIDE 5 MG/1
5 TABLET ORAL 2 TIMES DAILY
Qty: 60 TABLET | Refills: 0 | Status: SHIPPED | OUTPATIENT
Start: 2025-05-07

## 2025-05-07 RX ORDER — FERROUS SULFATE 325(65) MG
325 TABLET, DELAYED RELEASE (ENTERIC COATED) ORAL EVERY OTHER DAY
Qty: 15 TABLET | Refills: 1 | Status: SHIPPED | OUTPATIENT
Start: 2025-05-07

## 2025-05-16 ENCOUNTER — RESULTS FOLLOW-UP (OUTPATIENT)
Dept: CARDIOLOGY | Facility: CLINIC | Age: OVER 89
End: 2025-05-16

## 2025-05-21 ENCOUNTER — OFFICE VISIT (OUTPATIENT)
Dept: FAMILY MEDICINE | Facility: CLINIC | Age: OVER 89
End: 2025-05-21
Payer: COMMERCIAL

## 2025-05-21 VITALS
HEART RATE: 77 BPM | RESPIRATION RATE: 20 BRPM | DIASTOLIC BLOOD PRESSURE: 62 MMHG | WEIGHT: 134 LBS | OXYGEN SATURATION: 97 % | BODY MASS INDEX: 22.33 KG/M2 | SYSTOLIC BLOOD PRESSURE: 148 MMHG | HEIGHT: 65 IN | TEMPERATURE: 97.7 F

## 2025-05-21 DIAGNOSIS — G47.00 INSOMNIA, UNSPECIFIED TYPE: ICD-10-CM

## 2025-05-21 DIAGNOSIS — I25.10 CHRONIC TOTAL OCCLUSION OF NATIVE CORONARY ARTERY: ICD-10-CM

## 2025-05-21 DIAGNOSIS — G30.1 MODERATE LATE ONSET ALZHEIMER'S DEMENTIA WITH MOOD DISTURBANCE (H): Primary | ICD-10-CM

## 2025-05-21 DIAGNOSIS — F02.B3 MODERATE LATE ONSET ALZHEIMER'S DEMENTIA WITH MOOD DISTURBANCE (H): Primary | ICD-10-CM

## 2025-05-21 DIAGNOSIS — Z71.89 ADVANCED DIRECTIVES, COUNSELING/DISCUSSION: ICD-10-CM

## 2025-05-21 DIAGNOSIS — I50.22 CHRONIC SYSTOLIC HEART FAILURE (H): ICD-10-CM

## 2025-05-21 DIAGNOSIS — R54 FRAIL ELDERLY: ICD-10-CM

## 2025-05-21 DIAGNOSIS — N18.31 STAGE 3A CHRONIC KIDNEY DISEASE (H): ICD-10-CM

## 2025-05-21 DIAGNOSIS — I25.82 CHRONIC TOTAL OCCLUSION OF NATIVE CORONARY ARTERY: ICD-10-CM

## 2025-05-21 PROCEDURE — 1111F DSCHRG MED/CURRENT MED MERGE: CPT | Performed by: FAMILY MEDICINE

## 2025-05-21 PROCEDURE — 3078F DIAST BP <80 MM HG: CPT | Performed by: FAMILY MEDICINE

## 2025-05-21 PROCEDURE — 99215 OFFICE O/P EST HI 40 MIN: CPT | Performed by: FAMILY MEDICINE

## 2025-05-21 PROCEDURE — 3077F SYST BP >= 140 MM HG: CPT | Performed by: FAMILY MEDICINE

## 2025-05-21 PROCEDURE — G2211 COMPLEX E/M VISIT ADD ON: HCPCS | Performed by: FAMILY MEDICINE

## 2025-05-21 PROCEDURE — 99417 PROLNG OP E/M EACH 15 MIN: CPT | Performed by: FAMILY MEDICINE

## 2025-05-21 RX ORDER — CHOLECALCIFEROL (VITAMIN D3) 50 MCG
1 TABLET ORAL DAILY
Qty: 30 TABLET | Status: CANCELLED | OUTPATIENT
Start: 2025-05-21

## 2025-05-21 RX ORDER — TRAZODONE HYDROCHLORIDE 50 MG/1
25 TABLET ORAL AT BEDTIME
Qty: 90 TABLET | Refills: 1 | Status: SHIPPED | OUTPATIENT
Start: 2025-05-21

## 2025-05-21 RX ORDER — TORSEMIDE 20 MG/1
20 TABLET ORAL DAILY
Qty: 90 TABLET | Refills: 1 | Status: SHIPPED | OUTPATIENT
Start: 2025-05-21

## 2025-05-21 RX ORDER — DOXYCYCLINE 100 MG/1
100 CAPSULE ORAL 2 TIMES DAILY
Qty: 11 CAPSULE | Status: CANCELLED | OUTPATIENT
Start: 2025-05-21

## 2025-05-21 ASSESSMENT — ENCOUNTER SYMPTOMS: CONSTITUTIONAL NEGATIVE: 1

## 2025-05-21 NOTE — PATIENT INSTRUCTIONS
Call to cancel your ultrasound appointment. We won't have you see the vascular clinic anymore.    It was great to see you,    Dr. Galaviz

## 2025-05-21 NOTE — Clinical Note
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  After a long discussion and shared decision making, we will not have him continue to follow with the vascular clinic for his PAD.  This is in line with her goals of care.  They will be calling to cancel his ultrasound.  Thanks for helping me take care of him,  Dr. Zach Galaviz

## 2025-05-21 NOTE — PROGRESS NOTES
Assessment and Plan     1. 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 Chronic total occlusion of native coronary artery  3. Chronic systolic heart failure (H)  4. Stage 3a chronic kidney disease (H)  5. Frail elderly  6. Advanced directives, counseling/discussion  Patient is currently euvolemic and his weights have been stable.  Continue current GDMT and diuretic therapy.  The patient has been very susceptible to falls/bradycardia/dizziness with previous titration attempts.  We are currently operating in a very comfort oriented space.  We had a long discussion about his last hospitalization and they would still prefer to use noninvasive ventilation at this time.  We discussed that it can still be an option to remove this in the future.  Spent time discussing changes in medical cares from increased quantity of life to quality of life.  Reviewed the differences between hospice as a benefit and palliative cares.  Encouraged them to follow-up with cardiology and palliative care for now.  Since they would not want any type of surgery on his legs, we will discontinue his ultrasounds and follow-up with the vascular clinic for his PAD.  Spent a lot of time talking with the patient's wife and son about things that are worth fighting with the patient about.  Handicap parking permit completed.  Support and empathy provided.  Medications reconciled.  Discontinue some of his vitamins although it does not appear that he fights much about pills that are being taken.  Recheck in 1 month.  - torsemide (DEMADEX) 20 MG tablet; Take 1 tablet (20 mg) by mouth daily.  Dispense: 90 tablet; Refill: 1    7. Insomnia, unspecified type  Currently has his days and nights mixed up.  Restart the trazodone that he was taking in the hospital to see if we get any benefit.  - traZODone (DESYREL) 50 MG tablet; Take 0.5 tablets (25 mg) by mouth at bedtime.  Dispense: 90 tablet; Refill: 1        Medications  Discontinued During This Encounter   Medication Reason    loperamide (IMODIUM) 2 MG capsule     doxycycline monohydrate (MONODOX) 100 MG capsule Therapy completed (No AVS)    vitamin D3 (CHOLECALCIFEROL) 50 mcg (2000 units) tablet     ferrous sulfate (FE TABS) 325 (65 Fe) MG EC tablet       108 minutes spent on the date of the encounter doing chart review, history and exam, documentation, and further activities as noted above.  The longitudinal plan of care for the diagnosis(es)/condition(s) as documented were addressed during this visit. Due to the added complexity in care, I will continue to support Zack in the subsequent management and with ongoing continuity of care.    Options for treatment and follow-up care were reviewed with the patient and/or guardian. Zack Johns and/or guardian engaged in the decision making process and verbalized understanding of the options discussed and agreed with the final plan. I answered all of their questions.    This note was created with the aid of dictation software. Any mistakes are unintentional.    Haim Galaviz III, MD, FAAFP  [facility]  05/21/25 11:03 AM           HPI:       Zack Johns is a 89 year old  male  Patient presents with:  Medication Request: Need Doxycycline Monohydrate       Patient presents with spouse and son, as usual.  Overall, how he feels well.  His family reports that he tolerated CPAP well and they would likely do this again if he needed it.  They are not sure if he is more awake or more active with the methylphenidate but they would like to continue it.  They do not note that Imodium helped him at all with fecal accidents but with him being in diapers it is not an issue anymore.  They continue to take daily weights and he is stable.  They have brought all of his pill bottles and his discharge medication list.  They are also curious if he needs to continue to get ultrasounds of his leg.  They would like a refill of  his trazodone.    Summation of previsit letter written by the spouse: Would like to get a handicap parking permit, continues to have arguments around diet (especially around Go's breakfast), weight concerns/dentures, palliative care meeting 17 June, readiness for hospice?, questions for need for further vascular surgery/PAD/ultrasound follow-up enthesis currently scheduled for 30 June), fights about money and use of the checkbook have stopped, is participating some with physical therapy that comes to their home    Summation of previous hospital admission: HFrEF, upper lobe pneumonia, gluteal abscess?, NSTEMI, utilized BiPAP initially (do they want to do again?)    Weights:  Wt Readings from Last 10 Encounters:   05/21/25 60.8 kg (134 lb)   05/01/25 59.4 kg (131 lb)   04/21/25 59.8 kg (131 lb 13.4 oz)   04/11/25 64.4 kg (142 lb)   04/09/25 63 kg (139 lb)   03/14/25 62.8 kg (138 lb 8 oz)   03/11/25 63 kg (139 lb)   03/11/25 62.7 kg (138 lb 3.2 oz)   03/04/25 61.7 kg (136 lb)   02/28/25 59.9 kg (132 lb)            PMHX:     Patient Active Problem List   Diagnosis    Actinic keratosis    Essential hypertension, benign    CT Lung Pulmonary Nodule Multiple    Prediabetes    Diverticulosis of large intestine    Hyperlipidemia    Peripheral vascular disease    Internal hemorrhoids    Local infection of skin and subcutaneous tissue    Disorder of bursae and tendons in shoulder region    Synovial cyst    Trigger finger, acquired    Osteoarthritis of glenohumeral joint    Abnormal cardiovascular stress test    Coronary atherosclerosis    Upper respiratory tract infection, unspecified type    Neck pain    Chronic total occlusion of native coronary artery    NSTEMI (non-ST elevated myocardial infarction) (H)    Acute decompensated heart failure (H)    Acute on chronic systolic heart failure (H)    Functional diarrhea    Abscess of gluteal region    Bilateral pleural effusion    Pulmonary edema with congestive heart failure  with reduced left ventricular function (H)    Pneumonia of right upper lobe due to infectious organism    Stage 3a chronic kidney disease (H)    Chronic anemia    Chronic systolic heart failure (H)       Current Outpatient Medications   Medication Sig Dispense Refill    aspirin 81 MG EC tablet Take 1 tablet by mouth at bedtime.      atorvastatin (LIPITOR) 40 MG tablet Take 1 tablet (40 mg) by mouth daily. 90 tablet 3    citalopram (CELEXA) 20 MG tablet Take 1 tablet (20 mg) by mouth daily. 90 tablet 3    donepezil (ARICEPT) 10 MG tablet Take 1 tablet (10 mg) by mouth at bedtime. 90 tablet 0    doxycycline monohydrate (MONODOX) 100 MG capsule Take 1 capsule (100 mg) by mouth 2 times daily. 11 capsule 0    ferrous sulfate (FE TABS) 325 (65 Fe) MG EC tablet Take 1 tablet (325 mg) by mouth every other day. 15 tablet 1    lisinopril (ZESTRIL) 10 MG tablet Take 1 tablet (10 mg) by mouth daily. 90 tablet 1    methylphenidate (RITALIN) 5 MG tablet Take 1 tablet (5 mg) by mouth 2 times daily. 60 tablet 0    spironolactone (ALDACTONE) 25 MG tablet Take 0.5 tablets (12.5 mg) by mouth daily. 45 tablet 1    torsemide (DEMADEX) 20 MG tablet Take 1 tablet (20 mg) by mouth daily. 30 tablet 0    vitamin D3 (CHOLECALCIFEROL) 50 mcg (2000 units) tablet Take 1 tablet (50 mcg) by mouth daily. 30 tablet 0    acetaminophen (TYLENOL) 500 MG tablet Take 500 mg by mouth every 6 hours as needed.      loperamide (IMODIUM) 2 MG capsule Take 1 capsule (2 mg) by mouth 4 times daily as needed for diarrhea. 30 capsule 0    Multiple Vitamin (MULTIVITAMIN) per tablet Take 1 tablet by mouth daily.      nitroGLYcerin (NITROSTAT) 0.4 MG sublingual tablet Place 1 tablet (0.4 mg) under the tongue every 5 minutes as needed for chest pain. Repeat at 5 min intervals x 2 if needed 30 tablet 0    traZODone (DESYREL) 50 MG tablet Take 0.5 tablets (25 mg) by mouth at bedtime. 30 tablet 0    zinc 50 MG TABS Take 50 mg by mouth daily.      zinc oxide (DESITIN) 40  % paste Apply topically 2 times daily. And BID PRN to sacral area         Social History     Socioeconomic History    Marital status:      Spouse name: Not on file    Number of children: Not on file    Years of education: Not on file    Highest education level: Not on file   Occupational History    Not on file   Tobacco Use    Smoking status: Former     Current packs/day: 0.00     Average packs/day: 2.0 packs/day for 40.0 years (80.0 ttl pk-yrs)     Types: Cigarettes     Start date: 4/10/1944     Quit date: 4/10/1984     Years since quittin.1     Passive exposure: Never    Smokeless tobacco: Never   Substance and Sexual Activity    Alcohol use: Yes     Alcohol/week: 6.0 standard drinks of alcohol     Comment: occasionally.    Drug use: No    Sexual activity: Not on file   Other Topics Concern    Parent/sibling w/ CABG, MI or angioplasty before 65F 55M? 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        Had gotten into typing to meet girls.        Worked for [employer] after leaving the [employer].     Social Drivers of Health     Financial Resource Strain: Unknown (2025)    Financial Resource Strain     Within the past 12 months, have you or your family members you live with been unable to get utilities (heat, electricity) when it was really needed?: Patient unable to answer   Food Insecurity: Unknown (2025)    Food Insecurity     Within the past 12 months, did you worry that your food would run out before you got money to buy more?: Patient unable to answer     Within the past 12 months, did the food you bought just not last and you didn t have money to get more?: Patient unable to answer   Transportation Needs: Unknown (2025)    Transportation Needs     Within the past 12 months, has lack of transportation kept you from medical appointments, getting your medicines, non-medical meetings or appointments, work, or from getting things that  "you need?: Patient unable to answer   Physical Activity: Not on file   Stress: Not on file   Social Connections: Not on file   Interpersonal Safety: Low Risk  (4/18/2025)    Interpersonal Safety     Do you feel physically and emotionally safe where you currently live?: Yes     Within the past 12 months, have you been hit, slapped, kicked or otherwise physically hurt by someone?: No     Within the past 12 months, have you been humiliated or emotionally abused in other ways by your partner or ex-partner?: No   Housing Stability: Unknown (4/18/2025)    Housing Stability     Do you have housing? : Patient unable to answer     Are you worried about losing your housing?: Patient unable to answer       Allergies   Allergen Reactions    Pletal [Cilostazol]        No results found for this or any previous visit (from the past 24 hours).         Review of Systems:     Review of Systems   Constitutional: Negative.             Physical Exam:     Vitals:    05/21/25 1051 05/21/25 1055   BP: (!) 160/73 (!) 148/62   BP Location: Right arm Right arm   Patient Position: Sitting Sitting   Pulse: 77    Resp: 20    Temp: 97.7  F (36.5  C)    TempSrc: Oral    SpO2: 97%    Weight: 60.8 kg (134 lb)    Height: 1.653 m (5' 5.06\")      Body mass index is 22.26 kg/m .    Physical Exam  Vitals and nursing note reviewed.   Constitutional:       General: He is not in acute distress.     Appearance: Normal appearance. He is not ill-appearing, toxic-appearing or diaphoretic.   Pulmonary:      Effort: No respiratory distress.   Musculoskeletal:      Right lower leg: No edema.      Left lower leg: No edema.   Neurological:      Mental Status: He is alert and oriented to person, place, and time.           "

## 2025-06-17 ENCOUNTER — OFFICE VISIT (OUTPATIENT)
Dept: RADIATION ONCOLOGY | Facility: HOSPITAL | Age: OVER 89
End: 2025-06-17
Attending: NURSE PRACTITIONER
Payer: COMMERCIAL

## 2025-06-17 ENCOUNTER — OFFICE VISIT (OUTPATIENT)
Dept: CARDIOLOGY | Facility: CLINIC | Age: OVER 89
End: 2025-06-17
Payer: COMMERCIAL

## 2025-06-17 VITALS
SYSTOLIC BLOOD PRESSURE: 155 MMHG | BODY MASS INDEX: 21.23 KG/M2 | RESPIRATION RATE: 20 BRPM | DIASTOLIC BLOOD PRESSURE: 59 MMHG | HEART RATE: 78 BPM | OXYGEN SATURATION: 95 % | WEIGHT: 127.8 LBS

## 2025-06-17 VITALS
SYSTOLIC BLOOD PRESSURE: 116 MMHG | HEIGHT: 65 IN | WEIGHT: 128 LBS | DIASTOLIC BLOOD PRESSURE: 50 MMHG | BODY MASS INDEX: 21.33 KG/M2 | HEART RATE: 75 BPM | RESPIRATION RATE: 14 BRPM

## 2025-06-17 DIAGNOSIS — D64.9 CHRONIC ANEMIA: ICD-10-CM

## 2025-06-17 DIAGNOSIS — Z71.89 GOALS OF CARE, COUNSELING/DISCUSSION: ICD-10-CM

## 2025-06-17 DIAGNOSIS — I50.9 ACUTE DECOMPENSATED HEART FAILURE (H): ICD-10-CM

## 2025-06-17 DIAGNOSIS — F03.90 DEMENTIA WITHOUT BEHAVIORAL DISTURBANCE (H): ICD-10-CM

## 2025-06-17 DIAGNOSIS — I25.10 ATHEROSCLEROSIS OF NATIVE CORONARY ARTERY OF NATIVE HEART WITHOUT ANGINA PECTORIS: ICD-10-CM

## 2025-06-17 DIAGNOSIS — I50.22 CHRONIC SYSTOLIC HEART FAILURE (H): Primary | ICD-10-CM

## 2025-06-17 DIAGNOSIS — Z51.5 PALLIATIVE CARE PATIENT: Primary | ICD-10-CM

## 2025-06-17 DIAGNOSIS — I21.4 NSTEMI (NON-ST ELEVATED MYOCARDIAL INFARCTION) (H): ICD-10-CM

## 2025-06-17 DIAGNOSIS — N18.31 STAGE 3A CHRONIC KIDNEY DISEASE (H): ICD-10-CM

## 2025-06-17 DIAGNOSIS — I10 ESSENTIAL HYPERTENSION, BENIGN: ICD-10-CM

## 2025-06-17 PROBLEM — J06.9 UPPER RESPIRATORY TRACT INFECTION, UNSPECIFIED TYPE: Status: RESOLVED | Noted: 2019-10-27 | Resolved: 2025-06-17

## 2025-06-17 PROBLEM — I50.1: Status: RESOLVED | Noted: 2025-04-18 | Resolved: 2025-06-17

## 2025-06-17 PROBLEM — I50.23 ACUTE ON CHRONIC SYSTOLIC HEART FAILURE (H): Status: RESOLVED | Noted: 2025-03-24 | Resolved: 2025-06-17

## 2025-06-17 PROBLEM — J90 BILATERAL PLEURAL EFFUSION: Status: RESOLVED | Noted: 2025-04-18 | Resolved: 2025-06-17

## 2025-06-17 PROBLEM — J18.9 PNEUMONIA OF RIGHT UPPER LOBE DUE TO INFECTIOUS ORGANISM: Status: RESOLVED | Noted: 2025-04-18 | Resolved: 2025-06-17

## 2025-06-17 PROCEDURE — 3077F SYST BP >= 140 MM HG: CPT | Performed by: FAMILY MEDICINE

## 2025-06-17 PROCEDURE — 99214 OFFICE O/P EST MOD 30 MIN: CPT | Performed by: NURSE PRACTITIONER

## 2025-06-17 PROCEDURE — 1126F AMNT PAIN NOTED NONE PRSNT: CPT | Performed by: FAMILY MEDICINE

## 2025-06-17 PROCEDURE — G2211 COMPLEX E/M VISIT ADD ON: HCPCS | Performed by: NURSE PRACTITIONER

## 2025-06-17 PROCEDURE — 99205 OFFICE O/P NEW HI 60 MIN: CPT | Performed by: FAMILY MEDICINE

## 2025-06-17 PROCEDURE — 3078F DIAST BP <80 MM HG: CPT | Performed by: FAMILY MEDICINE

## 2025-06-17 PROCEDURE — G0463 HOSPITAL OUTPT CLINIC VISIT: HCPCS | Performed by: FAMILY MEDICINE

## 2025-06-17 PROCEDURE — 3078F DIAST BP <80 MM HG: CPT | Performed by: NURSE PRACTITIONER

## 2025-06-17 PROCEDURE — 3074F SYST BP LT 130 MM HG: CPT | Performed by: NURSE PRACTITIONER

## 2025-06-17 PROCEDURE — G2211 COMPLEX E/M VISIT ADD ON: HCPCS | Performed by: FAMILY MEDICINE

## 2025-06-17 RX ORDER — TORSEMIDE 20 MG/1
20 TABLET ORAL DAILY
Qty: 90 TABLET | Refills: 1 | Status: SHIPPED | OUTPATIENT
Start: 2025-06-17

## 2025-06-17 ASSESSMENT — PAIN SCALES - GENERAL: PAINLEVEL_OUTOF10: NO PAIN (0)

## 2025-06-17 NOTE — PROGRESS NOTES
"Palliative Care Outpatient Clinic Consultation Note    Patient:  Zack Johns    Chief Complaint:   Zack Johns 89 year old male who is presenting to the palliative medicine clinic today at the request of Dr. Galaviz for a palliative care consultation secondary to CHF and dementia.   The patient's primary care provider is:  Haim Galaviz     History of Present Illness:  This is from Gilles Corbin's clinic note dated May 1, 2025:  \"Mr. Zack Johsn is a 89 year old male with a past medical history of coronary disease with status post PCI/stent to RCA in 2018, peripheral artery disease with status post bilateral iliac stents in 2018, diabetes mellitus type 2, hypertension, dyslipidemia, dementia, and recent hospitalization on 2/18/2025 with non-STEMI, acute hypoxic respiratory failure likely secondary to new onset of systolic heart failure, and most recent hospitalization in April with acute hypoxic respiratory failure, bilateral pleural effusion, community-acquired pneumonia, gluteal abscess, and acute decompensated heart failure who is seen at [facility] for post hospitalization heart failure follow up.      Patient was rehospitalized from April 18 through 21 with acute decompensated heart failure, acute hypoxic respiratory failure open (resolved), bilateral pleural effusion, community-acquired pneumonia, gluteal abscess.  Patient was treated with IV diuretic therapy.  NT proBNP was found greater than 11,000.  Patient was discharged on oral torsemide.  Kidney function improved at the time of discharge.  Patient was also found to have non-STEMI with elevated troponin.  Patient and family decided through shared decision making with cardiology that they do not want to proceed with angiogram.  Patient was also found to have gluteal abscess and was treated with antibiotic.  Incidentally found to have pulmonary nodules, hepatic lesion, pancreatic cyst and bladder " "lesion with recent hospitalization imaging.     Today, Zack is accompanied by his son and his spouse.  He reports stable from heart failure standpoint since he was released from the hospital.  He continues to have fatigue which is unchanged from baseline.\"    Distressing Symptom/s:  None at this time    Patient's Disease Understanding: limited due to dementia; family has a good grasp of things    Coping:  overall OK; pretty good support from sons    Social History  Born: [place] one of 8 with three still living  Education: [employer] and [employer] courses at   Living Situation: Independent apartment with meals in [facility]; Zack isn't keen on the food at [employer] [facility];   Relationships:  to Sadie for 69 years in May  Children: 4 boys Ramon, Tera, Ethan, Nadeem; 4 grand children and 4 GGK  Actual/Potential Caregiver(s): wife Sadie and son Ramon  Support System: family;   Occupation:  at   Hobbies: family activities, [employer];   Patient is 'famous for [employer];  at his [employer]; p[resident X2 of  club'  Substance Use/History of misuse: none  Financial Concerns: none  Spiritual Background: [place]--still attends services  Spiritual Concerns/Needs: none    Social History     Tobacco Use    Smoking status: Former     Current packs/day: 0.00     Average packs/day: 2.0 packs/day for 40.0 years (80.0 ttl pk-yrs)     Types: Cigarettes     Start date: 4/10/1944     Quit date: 4/10/1984     Years since quittin.2     Passive exposure: Never    Smokeless tobacco: Never   Substance Use Topics    Alcohol use: Yes     Alcohol/week: 6.0 standard drinks of alcohol     Comment: occasionally.    Drug use: No       Family History  Family History   Problem Relation Age of Onset    Diabetes No family hx of     Breast Cancer No family hx of     Colon Cancer No family hx of     Prostate Cancer No family hx of     Other Cancer No family hx of     No Known Problems Mother     No " Known Problems Father        Advance Care Planning:  Advance Directive:    HCD in Epic from 11/2020  Where is written copy located: home and [facility]  Health Care Agent Contact Information: wife, Sarika HANSEN:   completed and at home on the refrigerator  CODE STATUS: DNAR/DNI    Allergies   Allergen Reactions    Pletal [Cilostazol]      Current Outpatient Medications   Medication Sig Dispense Refill    acetaminophen (TYLENOL) 500 MG tablet Take 500 mg by mouth every 6 hours as needed.      aspirin 81 MG EC tablet Take 1 tablet by mouth at bedtime.      atorvastatin (LIPITOR) 40 MG tablet Take 1 tablet (40 mg) by mouth daily. 90 tablet 3    citalopram (CELEXA) 20 MG tablet Take 1 tablet (20 mg) by mouth daily. 90 tablet 3    donepezil (ARICEPT) 10 MG tablet Take 1 tablet (10 mg) by mouth at bedtime. 90 tablet 0    lisinopril (ZESTRIL) 10 MG tablet Take 1 tablet (10 mg) by mouth daily. 90 tablet 1    methylphenidate (RITALIN) 5 MG tablet Take 1 tablet (5 mg) by mouth 2 times daily. 60 tablet 0    Multiple Vitamin (MULTIVITAMIN) per tablet Take 1 tablet by mouth daily.      nitroGLYcerin (NITROSTAT) 0.4 MG sublingual tablet Place 1 tablet (0.4 mg) under the tongue every 5 minutes as needed for chest pain. Repeat at 5 min intervals x 2 if needed 30 tablet 0    spironolactone (ALDACTONE) 25 MG tablet Take 0.5 tablets (12.5 mg) by mouth daily. 45 tablet 1    torsemide (DEMADEX) 20 MG tablet Take 1 tablet (20 mg) by mouth daily. 90 tablet 1    traZODone (DESYREL) 50 MG tablet Take 0.5 tablets (25 mg) by mouth at bedtime. 90 tablet 1    zinc 50 MG TABS Take 50 mg by mouth daily.      zinc oxide (DESITIN) 40 % paste Apply topically 2 times daily. And BID PRN to sacral area       Past Medical History:   Diagnosis Date    Actinic keratitis     Arthritis     bilat shoulders    CAD (coronary artery disease)     Cardiac arrest (H) 09/04/1989    Claudication     Claudication     Coronary artery disease     s/p MI    Diabetes  "mellitus type 2, controlled (H)     Diverticulosis     HTN (hypertension)     Hyperlipidemia     Hypertension     Malignant neoplasm of prostate (H) 8/7/2006-10/5/2006    DENIED BY PATIENT (see note from 12/08/2014).  RADIOTHERAPY BY DR.CHO LEGGETT, old     Multiple pulmonary nodules     Osteoarthritis of glenohumeral joint     Left. Injected at [facility] 02/10/2015.    Peripheral vascular disease, unspecified     Severe sepsis (H) 04/18/2025    Syncope     Trigger finger, acquired     Type 2 diabetes mellitus (H) 11/06/2012     Past Surgical History:   Procedure Laterality Date    APPENDECTOMY      ARTHROSCOPY KNEE      ARTHROSCOPY KNEE      CARDIAC CATHETERIZATION  08/22/2018     of rca    CARDIAC SURGERY  08/22/2018    two stents placed 8/22/18    CV CORONARY ANGIOGRAM N/A 08/06/2018    Procedure: Coronary Angiogram;  Surgeon: Jeane Garcia MD;  Location: [facility];  Service:     CV CORONARY ANGIOGRAM N/A 08/22/2018    Procedure: Coronary Angiogram;  Surgeon: Jeane Garcia MD;  Location: [facility];  Service:     FEMORAL ENDARTERECTOMY Bilateral 05/03/2017    IR EXTREMITY ANGIOGRAM BILATERAL  03/10/2017    NOSE SURGERY      PROSTATE BIOPSY, NEEDLE, SATURATION SAMPLING  01/13/2003    \"Biopsy fo the Prostate Needle\"    RELEASE TRIGGER FINGER         REVIEW OF SYSTEMS:   ROS: 10 point ROS neg other than the symptoms noted above in the HPI and here:  Palliative Symptom Review (0=no symptom/no concern, 1=mild, 2=moderate, 3=severe):      Pain: 0      Fatigue: 1      Nausea: 0      Constipation: 0      Diarrhea: 0      Depressive Symptoms: 0      Anxiety: 0      Drowsiness: 0      Poor Appetite: 1      Shortness of Breath: 0      Insomnia: 0      Other: 0      Overall (0 good/no concerns, 3 very poor):  1    GENERAL APPEARANCE: pleasantly confused older man; alert and no distress; neatly groomed  EYES: Eyes grossly normal to inspection, PERRLA, conjunctivae and sclerae without injection or " discharge, EOM intact   RESP:  no increased work of breathing; speaks in briefer complete sentences;   MS: No musculoskeletal defects are noted  SKIN: No suspicious lesions or rashes, hydration status appears adequate with normal skin turgor   PSYCH: Alert and oriented x3; speech- coherent, normal rate and volume; able to articulate logical thoughts, able to abstract reason, no tangential thoughts, no hallucinations or delusions, mentation appears normal, Mood is euthymic. Affect is appropriate for this mood state and bright. Thought content is free of suicidal ideation, hallucinations, and delusions.  Eye contact is good during conversation.         Data Reviewed:  LABS: Cr 1.4; albumin 3.8;   IMAGIN2025 CT CHEST (PE) AND PELVIS W/CONTRAST  IMPRESSION:   1.  No pulmonary artery embolism.  2.  Pulmonary edema with small right-sided and trace left-sided pleural effusions.  3.  Mild right upper lobar airspace opacities suspicious for pneumonic infiltrates.  4.  Mild emphysema.  5.  Right gluteal 6.1 cm intramuscular rim-enhancing fluid collection. Differential consideration includes an abscess.  6.  No bowel obstruction, hydronephrosis or intraperitoneal inflammatory process.  7.  Newly visualized 4 mm left lung nodules. These can be followed per the guidelines below.  8.  Newly visualized indeterminate 0.8 cm segment 4A hepatic lesion. Recommend attention on follow-up.  9.  Newly visualized 6 mm pancreatic body cyst. This is likely benign and can be followed per the guidelines below.  10.  Irregular asymmetric left-sided bladder wall thickening suspicious for a bladder tumor. Recommend nonemergent urologic consultation    Impressions:  Palliative Performance Score:  (100% normal, 0% death):  50%  Decision Making Capacity:  not present  PDMP review:  Yes:   reviewed - no record of controlled substances prescribed..    Moderate dementia FAST 6d-e      CHF  CAD  CKD 3a  Pancreatic cyst    GOALS OF CARE:   06/17/2025  Life prolonging with restrictions of DNAR/DNI.  I asked family to consider if they would want care escalated to acute hospital or ICU setting or if they might ever limit NIV support; Zack likely doesn't qualify for hospice at this time.  He also isn't emotionally ready to limit care to comfort care at this time.  I did provide a high level overview of hospice benefit and philosophy of care.     Recommendations & Counseling:  No new meds today  Follow-up in 4 months for ongoing GOC conversation; sooner prn and family is encouraged to seek inpatient palliative care consult prn admission  I encouraged Sarika to ask the leaders at their [facility] [employer] if there is a preferred hospice agency they work with.  I reviewed with Sarika likely end of life scenarios for Zack and she found that comforting.  I also reviewed some behavioral strategies that might help de-escalate some conflict with Zack.  [facility] Line number provided to son to call to see if his parents might qualify for some services.    Counseling: All of the above was explained to the patient in lay language. The patient has verbalized a clear understanding of the discussion, asked appropriate questions, which have been answered to patient's apparent satisfaction. The patient is in agreement with the above plan.    70 minutes spent on the date of the encounter doing chart review, history and exam, patient education & counseling, documentation and other activities as noted above.    The longitudinal plan of care for the diagnosis(es)/condition(s) as documented were addressed during this visit. Due to the added complexity in care, I will continue to support Zack in the subsequent management and with ongoing continuity of care.    Dick Francis MD MS FAAFP CAQHPM  [facility]  Office [phone]  Fax [phone]

## 2025-06-17 NOTE — PATIENT INSTRUCTIONS
It was good to see you today, Sarika Dixon and Ramon.    Here are the things we talked about:  [facility] line [phone]   No new medicines  If you get admitted to the hospital request a palliative care consult to help with decision making  Ask the team at [facility] if they have preferred hospice agencies who visit residents there    Stop to schedule a follow up appointment in 4 months and I can see you sooner, if needed.     How to get a hold of us:  For non-urgent matters, MyChart works best.    For more urgent matters, or if you prefer not to use MyChart, call our clinic nurse coordinator Breann BRAN at [phone]    We have an on-call number for evenings and weekends. Please call this only if you are having uncontrolled symptoms or serious side effects from your medicines: [phone].     For refills, please give us a week (5 working days) notice. We don't always have providers available everyday to do refills. If you call the day you run out of your medicine, we may not be able to refill it in time, so call 5 days in advance!    Dick Francis MD MS FAAFP CAQHPM  [facility]  Office [phone]  Fax [phone]

## 2025-06-17 NOTE — PROGRESS NOTES
"Oncology Rooming Note    June 17, 2025 12:58 PM   Zack Johns is a 89 year old male who presents for:    Chief Complaint   Patient presents with    Palliative Care Referral Automated Outreach    Oncology Clinic Visit     New consult     Initial Vitals: BP (!) 155/59   Pulse 78   Resp 20   Wt 58 kg (127 lb 12.8 oz)   SpO2 95%   BMI 21.23 kg/m   Estimated body mass index is 21.23 kg/m  as calculated from the following:    Height as of 5/21/25: 1.653 m (5' 5.06\").    Weight as of this encounter: 58 kg (127 lb 12.8 oz). Body surface area is 1.63 meters squared.  No Pain (0) Comment: Data Unavailable   No LMP for male patient.  Allergies reviewed: Yes  Medications reviewed: Yes    Medications: Medication refills not needed today.  Pharmacy name entered into EPIC:    [facility] - [facility], [place] - [address] [place] [address] DRUG STORE #[id number] [place], [place] - [address] AT [facility] OF [address]    Frailty Screening:   Is the patient here for a new oncology consult visit in cancer care? 2. No    PHQ9:  Did this patient require a PHQ9?: No      Clinical concerns: New consult for palliative care, pt tired and some aches from \"old age\". Doesn't like the food so doesn't eat, wife states he is picky and she doesn't cook anymore Dr. Francis was notified.      Noemy Levine RN              "

## 2025-06-17 NOTE — PATIENT INSTRUCTIONS
Zack Johns,    It was a pleasure to see you today at the [facility].     My recommendations after this visit include:    - No medications changes made today    - Low sodium diet < 2000 mg/day, daily weight monitoring, and maintain adequate fluid intake at 50 to 60 ounces per day    -Please call if you experience persistent weight gain 2 to 3 pounds 2 days in a row or 5 pounds in a week with shortness of breath, abdominal bloating and leg swelling    - Follow up with Dr. Rocha in 3 months     - Please call Heart Failure Nurse Line at [phone], if you have any questions or concerns

## 2025-06-17 NOTE — LETTER
"6/17/2025      Zack Johns  [address] [place] [id number]      Dear Colleague,    Thank you for referring your patient, Zack Johns, to the [facility] [place]. Please see a copy of my visit note below.    Palliative Care Outpatient Clinic Consultation Note    Patient:  Zack Johns    Chief Complaint:   Zack Johns 89 year old male who is presenting to the palliative medicine clinic today at the request of Dr. Galaviz for a palliative care consultation secondary to CHF and dementia.   The patient's primary care provider is:  Haim Galaviz     History of Present Illness:  This is from Gilles Corbin's clinic note dated May 1, 2025:  \"Mr. Zack Johns is a 89 year old male with a past medical history of coronary disease with status post PCI/stent to RCA in 2018, peripheral artery disease with status post bilateral iliac stents in 2018, diabetes mellitus type 2, hypertension, dyslipidemia, dementia, and recent hospitalization on 2/18/2025 with non-STEMI, acute hypoxic respiratory failure likely secondary to new onset of systolic heart failure, and most recent hospitalization in April with acute hypoxic respiratory failure, bilateral pleural effusion, community-acquired pneumonia, gluteal abscess, and acute decompensated heart failure who is seen at [facility] for post hospitalization heart failure follow up.      Patient was rehospitalized from April 18 through 21 with acute decompensated heart failure, acute hypoxic respiratory failure open (resolved), bilateral pleural effusion, community-acquired pneumonia, gluteal abscess.  Patient was treated with IV diuretic therapy.  NT proBNP was found greater than 11,000.  Patient was discharged on oral torsemide.  Kidney function improved at the time of discharge.  Patient was also found to have non-STEMI with elevated troponin.  Patient and family decided through " "shared decision making with cardiology that they do not want to proceed with angiogram.  Patient was also found to have gluteal abscess and was treated with antibiotic.  Incidentally found to have pulmonary nodules, hepatic lesion, pancreatic cyst and bladder lesion with recent hospitalization imaging.     Today, Zack is accompanied by his son and his spouse.  He reports stable from heart failure standpoint since he was released from the hospital.  He continues to have fatigue which is unchanged from baseline.\"    Distressing Symptom/s:  None at this time    Patient's Disease Understanding: limited due to dementia; family has a good grasp of things    Coping:  overall OK; pretty good support from sons    Social History  Born: [place] one of 8 with three still living  Education: [employer] and then courses at   Living Situation: Independent apartment with meals in [employer]; Zack isn't keen on the food at [employer];   Relationships:  to Sadie for 69 years in May  Children: 4 boys Ramon, Tera, Ethan, Nadeem; 4 grand children and 4 GGK  Actual/Potential Caregiver(s): wife Sadie and son Ramon  Support System: family;   Occupation:  at   Hobbies: family activities, [employer];   Patient is 'famous for [employer];  at his [employer]; p[resident X2 of  club'  Substance Use/History of misuse: none  Financial Concerns: none  Spiritual Background: Humberto--still attends services  Spiritual Concerns/Needs: none    Social History     Tobacco Use     Smoking status: Former     Current packs/day: 0.00     Average packs/day: 2.0 packs/day for 40.0 years (80.0 ttl pk-yrs)     Types: Cigarettes     Start date: 4/10/1944     Quit date: 4/10/1984     Years since quittin.2     Passive exposure: Never     Smokeless tobacco: Never   Substance Use Topics     Alcohol use: Yes     Alcohol/week: 6.0 standard drinks of alcohol     Comment: occasionally.     Drug use: No "       Family History  Family History   Problem Relation Age of Onset     Diabetes No family hx of      Breast Cancer No family hx of      Colon Cancer No family hx of      Prostate Cancer No family hx of      Other Cancer No family hx of      No Known Problems Mother      No Known Problems Father        Advance Care Planning:  Advance Directive:    HCD in Epic from 11/2020  Where is written copy located: home and [facility]  Health Care Agent Contact Information: wife, Sarika HANSEN:   completed and at home on the refrigerator  CODE STATUS: DNAR/DNI    Allergies   Allergen Reactions     Pletal [Cilostazol]      Current Outpatient Medications   Medication Sig Dispense Refill     acetaminophen (TYLENOL) 500 MG tablet Take 500 mg by mouth every 6 hours as needed.       aspirin 81 MG EC tablet Take 1 tablet by mouth at bedtime.       atorvastatin (LIPITOR) 40 MG tablet Take 1 tablet (40 mg) by mouth daily. 90 tablet 3     citalopram (CELEXA) 20 MG tablet Take 1 tablet (20 mg) by mouth daily. 90 tablet 3     donepezil (ARICEPT) 10 MG tablet Take 1 tablet (10 mg) by mouth at bedtime. 90 tablet 0     lisinopril (ZESTRIL) 10 MG tablet Take 1 tablet (10 mg) by mouth daily. 90 tablet 1     methylphenidate (RITALIN) 5 MG tablet Take 1 tablet (5 mg) by mouth 2 times daily. 60 tablet 0     Multiple Vitamin (MULTIVITAMIN) per tablet Take 1 tablet by mouth daily.       nitroGLYcerin (NITROSTAT) 0.4 MG sublingual tablet Place 1 tablet (0.4 mg) under the tongue every 5 minutes as needed for chest pain. Repeat at 5 min intervals x 2 if needed 30 tablet 0     spironolactone (ALDACTONE) 25 MG tablet Take 0.5 tablets (12.5 mg) by mouth daily. 45 tablet 1     torsemide (DEMADEX) 20 MG tablet Take 1 tablet (20 mg) by mouth daily. 90 tablet 1     traZODone (DESYREL) 50 MG tablet Take 0.5 tablets (25 mg) by mouth at bedtime. 90 tablet 1     zinc 50 MG TABS Take 50 mg by mouth daily.       zinc oxide (DESITIN) 40 % paste Apply topically 2 times  "daily. And BID PRN to sacral area       Past Medical History:   Diagnosis Date     Actinic keratitis      Arthritis     bilat shoulders     CAD (coronary artery disease)      Cardiac arrest (H) 09/04/1989     Claudication      Claudication      Coronary artery disease     s/p MI     Diabetes mellitus type 2, controlled (H)      Diverticulosis      HTN (hypertension)      Hyperlipidemia      Hypertension      Malignant neoplasm of prostate (H) 8/7/2006-10/5/2006    DENIED BY PATIENT (see note from 12/08/2014).  RADIOTHERAPY BY DR.CHO LEGGETT, old      Multiple pulmonary nodules      Osteoarthritis of glenohumeral joint     Left. Injected at [facility] 02/10/2015.     Peripheral vascular disease, unspecified      Severe sepsis (H) 04/18/2025     Syncope      Trigger finger, acquired      Type 2 diabetes mellitus (H) 11/06/2012     Past Surgical History:   Procedure Laterality Date     APPENDECTOMY       ARTHROSCOPY KNEE       ARTHROSCOPY KNEE       CARDIAC CATHETERIZATION  08/22/2018     of rca     CARDIAC SURGERY  08/22/2018    two stents placed 8/22/18     CV CORONARY ANGIOGRAM N/A 08/06/2018    Procedure: Coronary Angiogram;  Surgeon: Jeane Garcia MD;  Location: [facility];  Service:      CV CORONARY ANGIOGRAM N/A 08/22/2018    Procedure: Coronary Angiogram;  Surgeon: Jeane Garcia MD;  Location: [facility] Cath Lab;  Service:      FEMORAL ENDARTERECTOMY Bilateral 05/03/2017     IR EXTREMITY ANGIOGRAM BILATERAL  03/10/2017     NOSE SURGERY       PROSTATE BIOPSY, NEEDLE, SATURATION SAMPLING  01/13/2003    \"Biopsy fo the Prostate Needle\"     RELEASE TRIGGER FINGER         REVIEW OF SYSTEMS:   ROS: 10 point ROS neg other than the symptoms noted above in the HPI and here:  Palliative Symptom Review (0=no symptom/no concern, 1=mild, 2=moderate, 3=severe):      Pain: 0      Fatigue: 1      Nausea: 0      Constipation: 0      Diarrhea: 0      Depressive Symptoms: 0      Anxiety: 0      Drowsiness: 0    "   Poor Appetite: 1      Shortness of Breath: 0      Insomnia: 0      Other: 0      Overall (0 good/no concerns, 3 very poor):  1    GENERAL APPEARANCE: pleasantly confused older man; alert and no distress; neatly groomed  EYES: Eyes grossly normal to inspection, PERRLA, conjunctivae and sclerae without injection or discharge, EOM intact   RESP:  no increased work of breathing; speaks in briefer complete sentences;   MS: No musculoskeletal defects are noted  SKIN: No suspicious lesions or rashes, hydration status appears adequate with normal skin turgor   PSYCH: Alert and oriented x3; speech- coherent, normal rate and volume; able to articulate logical thoughts, able to abstract reason, no tangential thoughts, no hallucinations or delusions, mentation appears normal, Mood is euthymic. Affect is appropriate for this mood state and bright. Thought content is free of suicidal ideation, hallucinations, and delusions.  Eye contact is good during conversation.         Data Reviewed:  LABS: Cr 1.4; albumin 3.8;   IMAGIN2025 CT CHEST (PE) AND PELVIS W/CONTRAST  IMPRESSION:   1.  No pulmonary artery embolism.  2.  Pulmonary edema with small right-sided and trace left-sided pleural effusions.  3.  Mild right upper lobar airspace opacities suspicious for pneumonic infiltrates.  4.  Mild emphysema.  5.  Right gluteal 6.1 cm intramuscular rim-enhancing fluid collection. Differential consideration includes an abscess.  6.  No bowel obstruction, hydronephrosis or intraperitoneal inflammatory process.  7.  Newly visualized 4 mm left lung nodules. These can be followed per the guidelines below.  8.  Newly visualized indeterminate 0.8 cm segment 4A hepatic lesion. Recommend attention on follow-up.  9.  Newly visualized 6 mm pancreatic body cyst. This is likely benign and can be followed per the guidelines below.  10.  Irregular asymmetric left-sided bladder wall thickening suspicious for a bladder tumor. Recommend nonemergent  urologic consultation    Impressions:  Palliative Performance Score:  (100% normal, 0% death):  50%  Decision Making Capacity:  not present  PDMP review:  Yes:   reviewed - no record of controlled substances prescribed..    Moderate dementia FAST 6d-e      CHF  CAD  CKD 3a  Pancreatic cyst    GOALS OF CARE:  06/17/2025  Life prolonging with restrictions of DNAR/DNI.  I asked family to consider if they would want care escalated to acute hospital or ICU setting or if they might ever limit NIV support; Zack likely doesn't qualify for hospice at this time.  He also isn't emotionally ready to limit care to comfort care at this time.  I did provide a high level overview of hospice benefit and philosophy of care.     Recommendations & Counseling:  No new meds today  Follow-up in 4 months for ongoing GOC conversation; sooner prn and family is encouraged to seek inpatient palliative care consult prn admission  I encouraged Sarika to ask the leaders at their [employer] if there is a preferred hospice agency they work with.  I reviewed with Sarika likely end of life scenarios for Zack and she found that comforting.  I also reviewed some behavioral strategies that might help de-escalate some conflict with Zack.  [facility] Line number provided to son to call to see if his parents might qualify for some services.    Counseling: All of the above was explained to the patient in lay language. The patient has verbalized a clear understanding of the discussion, asked appropriate questions, which have been answered to patient's apparent satisfaction. The patient is in agreement with the above plan.    70 minutes spent on the date of the encounter doing chart review, history and exam, patient education & counseling, documentation and other activities as noted above.    The longitudinal plan of care for the diagnosis(es)/condition(s) as documented were addressed during this visit. Due to the added complexity in  "care, I will continue to support Zack in the subsequent management and with ongoing continuity of care.    Dick Francis MD MS FAAFP CAQHPM  [facility]  Office [phone]  Fax [phone]          Oncology Rooming Note    June 17, 2025 12:58 PM   Zack Johns is a 89 year old male who presents for:    Chief Complaint   Patient presents with     Palliative Care Referral Automated Outreach     Oncology Clinic Visit     New consult     Initial Vitals: BP (!) 155/59   Pulse 78   Resp 20   Wt 58 kg (127 lb 12.8 oz)   SpO2 95%   BMI 21.23 kg/m   Estimated body mass index is 21.23 kg/m  as calculated from the following:    Height as of 5/21/25: 1.653 m (5' 5.06\").    Weight as of this encounter: 58 kg (127 lb 12.8 oz). Body surface area is 1.63 meters squared.  No Pain (0) Comment: Data Unavailable   No LMP for male patient.  Allergies reviewed: Yes  Medications reviewed: Yes    Medications: Medication refills not needed today.  Pharmacy name entered into EPIC:    [facility] - [address] DRUG STORE #[id number] [facility] [address] AT [facility] OF [facility]Y [address]    Frailty Screening:   Is the patient here for a new oncology consult visit in cancer care? 2. No    PHQ9:  Did this patient require a PHQ9?: No      Clinical concerns: New consult for palliative care, pt tired and some aches from \"old age\". Doesn't like the food so doesn't eat, wife states he is picky and she doesn't cook anymore Dr. Francis was notified.      Noemy Levine, SHANT                Again, thank you for allowing me to participate in the care of your patient.        Sincerely,        Dick Francis MD    Electronically signed"

## 2025-06-17 NOTE — LETTER
"6/17/2025    Haim Galaviz MD  [address] [place] [address]    RE: Zack Johns       Dear Colleague,     I had the pleasure of seeing Zack Johns in the [facility].          Assessment/Recommendations   Assessment:    #   Chronic systolic heart failure, NYHA Class II:  Echocardiogram on 2/19/2025 showed LVEF severely reduced to 20 to 25%.  Most recent NT proBNP improved in 3000's in May 2025.    Current home weight is 126-128 lbs-stable.  Patient is well compensated on exam.  On low-sodium diet.  Reports inadequate fluid intake.    Heart failure regimen includes:    -Beta-blocker therapy with metoprolol succinate 12.5 mg daily-stopped due to bradycardia    -ACEI  therapy with lisinopril 10 Mg daily    -Aldosterone blocker/MRA therapy with spironolactone 12.5 mg daily    -Not on SGLT2 Inhibitor     -Diuretic therapy Torsemide 20 mg daily    #  Chronic Kidney Disease Stage IIIa: BMP in May 2025 showed potassium 4.7, BUN 35.7, and creatinine 1.40.    # Coronary artery disease status post PCI/stent to RCA in 2018: No chest pain.    # Hypertension: Blood pressure stable.    # Chronic Anemia: Most recent hemoglobin 10 in May 2025.  Improved.    # Goal of care: Patient was seen by palliative care.  Note reviewed.    Plan/Recommendation:  -No medication changes made today.  - Continue on low salt diet <2000 mg per day, daily weight monitoring, and maintain adequate fluid intake with 50-60 ounces per day    Follow-up with Dr. Rocha in 3 months.  Patient prefers not to make any further appointment at this time in heart failure clinic.     The longitudinal plan of care for  chronic systolic heart failure was addressed during this visit.?Due to the added   complexity in care, I will continue to support Zack JEAN Andreas in the subsequent management of this   condition(s) and with the ongoing continuity of care of this condition(s)\".     History of Present Illness/Subjective    Mr. " Zack Johns is a 89 year old male with a past medical history of coronary disease with status post PCI/stent to RCA in 2018, peripheral artery disease with status post bilateral iliac stents in 2018, diabetes mellitus type 2, hypertension, dyslipidemia, dementia, and recent hospitalization on 2/18/2025 with non-STEMI, acute hypoxic respiratory failure likely secondary to new onset of systolic heart failure, and most recent hospitalization in April with acute hypoxic respiratory failure, bilateral pleural effusion, community-acquired pneumonia, gluteal abscess, and acute decompensated heart failure who is seen at [facility] for continued heart failure follow-up.    Patient was rehospitalized from April 18 through 21 2025 with acute decompensated heart failure, acute hypoxic respiratory failure open (resolved), bilateral pleural effusion, community-acquired pneumonia, gluteal abscess.  Patient was treated with IV diuretic therapy.  NT proBNP was found greater than 11,000.  Patient was discharged on oral torsemide.  Kidney function improved at the time of discharge.  Patient was also found to have non-STEMI with elevated troponin.  Patient and family decided through shared decision making with cardiology that they do not want to proceed with angiogram.  Patient was also found to have gluteal abscess and was treated with antibiotic.  Incidentally found to have pulmonary nodules, hepatic lesion, pancreatic cyst and bladder lesion with recent hospitalization imaging.    During last heart failure clinic visit, patient was found stable from heart failure standpoint except he continues to have fatigue which is unchanged from baseline.    Today, Zack is accompanied by his son and his spouse.  He continues to remain stable from heart failure standpoint.  He does have some chronic fatigue which is unchanged from baseline.  Patient's son and his spouse helps answer most of the  "questions since patient has dementia and unable to recall.    He denies lightheadedness, shortness of breath, dyspnea on exertion, orthopnea, PND, palpitations, chest pain, abdominal fullness/bloating, and lower extremity edema.     Per spouse, patient's appetite continues to remain poor.  His weight is only down couple pounds since last heart failure clinic visit.  Patient expressed to prefer minimal office visit.    Patient's wife Sadie manages his medications.    ECHO from 2/19/25-Reviewed:   Interpretation Summary   1. The left ventricle is mildly dilated with normal left ventricular wall  thickness.  - Left ventricular function is decreased. The ejection fraction is 20-25%  (severely reduced).  - There is severe global hypokinesia of the left ventricle.  2. The right ventricle is normal size with mildly decreased right ventricular  systolic function  3. No hemodynamically significant valvular abnormalities on 2D or color flow  imaging.  4. There is no comparison study available.     Physical Examination Review of Systems   /50 (BP Location: Right arm, Patient Position: Sitting, Cuff Size: Adult Regular)   Pulse 75   Resp 14   Ht 1.651 m (5' 5\")   Wt 58.1 kg (128 lb)   BMI 21.30 kg/m    Body mass index is 21.3 kg/m .  Wt Readings from Last 3 Encounters:   06/17/25 58.1 kg (128 lb)   06/17/25 58 kg (127 lb 12.8 oz)   05/21/25 60.8 kg (134 lb)     General Appearance:   no distress, normal body habitus   ENT/Mouth: membranes moist, no oral lesions or bleeding gums.      EYES:  no scleral icterus, normal conjunctivae   Neck: no thyromegaly   Chest/Lungs:   lungs are clear to auscultation, no rales or wheezing, equal chest wall expansion    Cardiovascular:   Heart rate regular. Normal first and second heart sounds with no murmurs, rubs, or gallops; Jugular venous pressure flat, no edema bilaterally    Abdomen:  no organomegaly, masses, bruits, or tenderness; bowel sounds are present   Extremities   no " "cyanosis or clubbing    CMS intact.   Skin: no xanthelasma, warm.    Neurologic: Alert and oriented ; no tremors   Psychiatric: calm and cooperative                                                   Negative unless noted in HPI     Medical History  Surgical History Family History Social History   Past Medical History:   Diagnosis Date     Actinic keratitis      Arthritis     bilat shoulders     CAD (coronary artery disease)      Cardiac arrest (H) 09/04/1989     Claudication      Claudication      Coronary artery disease     s/p MI     Diabetes mellitus type 2, controlled (H)      Diverticulosis      HTN (hypertension)      Hyperlipidemia      Hypertension      Malignant neoplasm of prostate (H) 8/7/2006-10/5/2006    DENIED BY PATIENT (see note from 12/08/2014).  RADIOTHERAPY BY DR.CHO LEGGETT, old      Multiple pulmonary nodules      Osteoarthritis of glenohumeral joint     Left. Injected at [facility] 02/10/2015.     Peripheral vascular disease, unspecified      Severe sepsis (H) 04/18/2025     Syncope      Trigger finger, acquired      Type 2 diabetes mellitus (H) 11/06/2012    Past Surgical History:   Procedure Laterality Date     APPENDECTOMY       ARTHROSCOPY KNEE       ARTHROSCOPY KNEE       CARDIAC CATHETERIZATION  08/22/2018     of rca     CARDIAC SURGERY  08/22/2018    two stents placed 8/22/18     CV CORONARY ANGIOGRAM N/A 08/06/2018    Procedure: Coronary Angiogram;  Surgeon: Jeane Garcia MD;  Location: [facility];  Service:      CV CORONARY ANGIOGRAM N/A 08/22/2018    Procedure: Coronary Angiogram;  Surgeon: Jeane Garcia MD;  Location: [facility];  Service:      FEMORAL ENDARTERECTOMY Bilateral 05/03/2017     IR EXTREMITY ANGIOGRAM BILATERAL  03/10/2017     NOSE SURGERY       PROSTATE BIOPSY, NEEDLE, SATURATION SAMPLING  01/13/2003    \"Biopsy fo the Prostate Needle\"     RELEASE TRIGGER FINGER      Family History   Problem Relation Age of Onset     Diabetes No family hx of  "     Breast Cancer No family hx of      Colon Cancer No family hx of      Prostate Cancer No family hx of      Other Cancer No family hx of      No Known Problems Mother      No Known Problems Father     Social History     Socioeconomic History     Marital status:      Spouse name: Not on file     Number of children: Not on file     Years of education: Not on file     Highest education level: Not on file   Occupational History     Not on file   Tobacco Use     Smoking status: Former     Current packs/day: 0.00     Average packs/day: 2.0 packs/day for 40.0 years (80.0 ttl pk-yrs)     Types: Cigarettes     Start date: 4/10/1944     Quit date: 4/10/1984     Years since quittin.2     Passive exposure: Never     Smokeless tobacco: Never   Substance and Sexual Activity     Alcohol use: Yes     Alcohol/week: 6.0 standard drinks of alcohol     Comment: occasionally.     Drug use: No     Sexual activity: Not on file   Other Topics Concern     Parent/sibling w/ CABG, MI or angioplasty before 65F 55M? 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        Had gotten into typing to meet girls.        Worked for [employer] after leaving the [employer].     Social Drivers of Health     Financial Resource Strain: Unknown (2025)    Financial Resource Strain      Within the past 12 months, have you or your family members you live with been unable to get utilities (heat, electricity) when it was really needed?: Patient unable to answer   Food Insecurity: Unknown (2025)    Food Insecurity      Within the past 12 months, did you worry that your food would run out before you got money to buy more?: Patient unable to answer      Within the past 12 months, did the food you bought just not last and you didn t have money to get more?: Patient unable to answer   Transportation Needs: Unknown (2025)    Transportation Needs      Within the past 12 months, has lack of  transportation kept you from medical appointments, getting your medicines, non-medical meetings or appointments, work, or from getting things that you need?: Patient unable to answer   Physical Activity: Not on file   Stress: Not on file   Social Connections: Not on file   Interpersonal Safety: Low Risk  (4/18/2025)    Interpersonal Safety      Do you feel physically and emotionally safe where you currently live?: Yes      Within the past 12 months, have you been hit, slapped, kicked or otherwise physically hurt by someone?: No      Within the past 12 months, have you been humiliated or emotionally abused in other ways by your partner or ex-partner?: No   Housing Stability: Unknown (4/18/2025)    Housing Stability      Do you have housing? : Patient unable to answer      Are you worried about losing your housing?: Patient unable to answer          Medications  Allergies   Current Outpatient Medications   Medication Sig Dispense Refill     acetaminophen (TYLENOL) 500 MG tablet Take 500 mg by mouth every 6 hours as needed.       aspirin 81 MG EC tablet Take 1 tablet by mouth at bedtime.       atorvastatin (LIPITOR) 40 MG tablet Take 1 tablet (40 mg) by mouth daily. 90 tablet 3     citalopram (CELEXA) 20 MG tablet Take 1 tablet (20 mg) by mouth daily. 90 tablet 3     donepezil (ARICEPT) 10 MG tablet Take 1 tablet (10 mg) by mouth at bedtime. 90 tablet 0     lisinopril (ZESTRIL) 10 MG tablet Take 1 tablet (10 mg) by mouth daily. 90 tablet 1     methylphenidate (RITALIN) 5 MG tablet Take 1 tablet (5 mg) by mouth 2 times daily. 60 tablet 0     Multiple Vitamin (MULTIVITAMIN) per tablet Take 1 tablet by mouth daily.       nitroGLYcerin (NITROSTAT) 0.4 MG sublingual tablet Place 1 tablet (0.4 mg) under the tongue every 5 minutes as needed for chest pain. Repeat at 5 min intervals x 2 if needed 30 tablet 0     spironolactone (ALDACTONE) 25 MG tablet Take 0.5 tablets (12.5 mg) by mouth daily. 45 tablet 1     torsemide  (DEMADEX) 20 MG tablet Take 1 tablet (20 mg) by mouth daily. 90 tablet 1     traZODone (DESYREL) 50 MG tablet Take 0.5 tablets (25 mg) by mouth at bedtime. 90 tablet 1     zinc 50 MG TABS Take 50 mg by mouth daily.       zinc oxide (DESITIN) 40 % paste Apply topically 2 times daily. And BID PRN to sacral area      Allergies   Allergen Reactions     Pletal [Cilostazol]          Lab Results    Chemistry/lipid CBC Cardiac Enzymes/BNP/TSH/INR   Lab Results   Component Value Date    CHOL 124 11/30/2021    HDL 41 11/30/2021    TRIG 181 (H) 11/30/2021    BUN 35.7 (H) 05/01/2025     05/01/2025    CO2 27 05/01/2025    Lab Results   Component Value Date    WBC 4.6 05/01/2025    HGB 10.1 (L) 05/01/2025    HCT 32.8 (L) 05/01/2025    MCV 94 05/01/2025     05/01/2025    Lab Results   Component Value Date    TROPONINI 0.03 10/27/2019    BNP 59 06/16/2018    TSH 1.45 03/21/2023    INR 1.17 (H) 10/30/2019        25 minutes spent on the date of encounter doing chart review, review of test results, interpretation with above tests, patient visit, documentation, and discussion with family.      This note has been dictated using voice recognition software. Any grammatical, typographical, or context distortions are unintentional and inherent to the software          Thank you for allowing me to participate in the care of your patient.      Sincerely,     CHANDAN Albert CNP     [facility]  cc:   CHANDAN Albert CNP  [address]  [place] [address]

## 2025-06-17 NOTE — PROGRESS NOTES
"        Assessment/Recommendations   Assessment:    #   Chronic systolic heart failure, NYHA Class II:  Echocardiogram on 2/19/2025 showed LVEF severely reduced to 20 to 25%.  Most recent NT proBNP improved in 3000's in May 2025.    Current home weight is 126-128 lbs-stable.  Patient is well compensated on exam.  On low-sodium diet.  Reports inadequate fluid intake.    Heart failure regimen includes:    -Beta-blocker therapy with metoprolol succinate 12.5 mg daily-stopped due to bradycardia    -ACEI  therapy with lisinopril 10 Mg daily    -Aldosterone blocker/MRA therapy with spironolactone 12.5 mg daily    -Not on SGLT2 Inhibitor     -Diuretic therapy Torsemide 20 mg daily    #  Chronic Kidney Disease Stage IIIa: BMP in May 2025 showed potassium 4.7, BUN 35.7, and creatinine 1.40.    # Coronary artery disease status post PCI/stent to RCA in 2018: No chest pain.    # Hypertension: Blood pressure stable.    # Chronic Anemia: Most recent hemoglobin 10 in May 2025.  Improved.    # Goal of care: Patient was seen by palliative care.  Note reviewed.    Plan/Recommendation:  -No medication changes made today.  - Continue on low salt diet <2000 mg per day, daily weight monitoring, and maintain adequate fluid intake with 50-60 ounces per day    Follow-up with Dr. Rocha in 3 months.  Patient prefers not to make any further appointment at this time in heart failure clinic.     The longitudinal plan of care for  chronic systolic heart failure was addressed during this visit.?Due to the added   complexity in care, I will continue to support Zack Johns in the subsequent management of this   condition(s) and with the ongoing continuity of care of this condition(s)\".     History of Present Illness/Subjective    Mr. Zack Johns is a 89 year old male with a past medical history of coronary disease with status post PCI/stent to RCA in 2018, peripheral artery disease with status post bilateral iliac stents in 2018, " diabetes mellitus type 2, hypertension, dyslipidemia, dementia, and recent hospitalization on 2/18/2025 with non-STEMI, acute hypoxic respiratory failure likely secondary to new onset of systolic heart failure, and most recent hospitalization in April with acute hypoxic respiratory failure, bilateral pleural effusion, community-acquired pneumonia, gluteal abscess, and acute decompensated heart failure who is seen at [facility] for continued heart failure follow-up.    Patient was rehospitalized from April 18 through 21 2025 with acute decompensated heart failure, acute hypoxic respiratory failure open (resolved), bilateral pleural effusion, community-acquired pneumonia, gluteal abscess.  Patient was treated with IV diuretic therapy.  NT proBNP was found greater than 11,000.  Patient was discharged on oral torsemide.  Kidney function improved at the time of discharge.  Patient was also found to have non-STEMI with elevated troponin.  Patient and family decided through shared decision making with cardiology that they do not want to proceed with angiogram.  Patient was also found to have gluteal abscess and was treated with antibiotic.  Incidentally found to have pulmonary nodules, hepatic lesion, pancreatic cyst and bladder lesion with recent hospitalization imaging.    During last heart failure clinic visit, patient was found stable from heart failure standpoint except he continues to have fatigue which is unchanged from baseline.    Today, Zack is accompanied by his son and his spouse.  He continues to remain stable from heart failure standpoint.  He does have some chronic fatigue which is unchanged from baseline.  Patient's son and his spouse helps answer most of the questions since patient has dementia and unable to recall.    He denies lightheadedness, shortness of breath, dyspnea on exertion, orthopnea, PND, palpitations, chest pain, abdominal fullness/bloating, and lower  "extremity edema.     Per spouse, patient's appetite continues to remain poor.  His weight is only down couple pounds since last heart failure clinic visit.  Patient expressed to prefer minimal office visit.    Patient's wife Sadie manages his medications.    ECHO from 2/19/25-Reviewed:   Interpretation Summary   1. The left ventricle is mildly dilated with normal left ventricular wall  thickness.  - Left ventricular function is decreased. The ejection fraction is 20-25%  (severely reduced).  - There is severe global hypokinesia of the left ventricle.  2. The right ventricle is normal size with mildly decreased right ventricular  systolic function  3. No hemodynamically significant valvular abnormalities on 2D or color flow  imaging.  4. There is no comparison study available.     Physical Examination Review of Systems   /50 (BP Location: Right arm, Patient Position: Sitting, Cuff Size: Adult Regular)   Pulse 75   Resp 14   Ht 1.651 m (5' 5\")   Wt 58.1 kg (128 lb)   BMI 21.30 kg/m    Body mass index is 21.3 kg/m .  Wt Readings from Last 3 Encounters:   06/17/25 58.1 kg (128 lb)   06/17/25 58 kg (127 lb 12.8 oz)   05/21/25 60.8 kg (134 lb)     General Appearance:   no distress, normal body habitus   ENT/Mouth: membranes moist, no oral lesions or bleeding gums.      EYES:  no scleral icterus, normal conjunctivae   Neck: no thyromegaly   Chest/Lungs:   lungs are clear to auscultation, no rales or wheezing, equal chest wall expansion    Cardiovascular:   Heart rate regular. Normal first and second heart sounds with no murmurs, rubs, or gallops; Jugular venous pressure flat, no edema bilaterally    Abdomen:  no organomegaly, masses, bruits, or tenderness; bowel sounds are present   Extremities   no cyanosis or clubbing    CMS intact.   Skin: no xanthelasma, warm.    Neurologic: Alert and oriented ; no tremors   Psychiatric: calm and cooperative                                                   Negative unless " "noted in HPI     Medical History  Surgical History Family History Social History   Past Medical History:   Diagnosis Date    Actinic keratitis     Arthritis     bilat shoulders    CAD (coronary artery disease)     Cardiac arrest (H) 09/04/1989    Claudication     Claudication     Coronary artery disease     s/p MI    Diabetes mellitus type 2, controlled (H)     Diverticulosis     HTN (hypertension)     Hyperlipidemia     Hypertension     Malignant neoplasm of prostate (H) 8/7/2006-10/5/2006    DENIED BY PATIENT (see note from 12/08/2014).  RADIOTHERAPY BY DR.CHO LEGGETT, old     Multiple pulmonary nodules     Osteoarthritis of glenohumeral joint     Left. Injected at [facility] 02/10/2015.    Peripheral vascular disease, unspecified     Severe sepsis (H) 04/18/2025    Syncope     Trigger finger, acquired     Type 2 diabetes mellitus (H) 11/06/2012    Past Surgical History:   Procedure Laterality Date    APPENDECTOMY      ARTHROSCOPY KNEE      ARTHROSCOPY KNEE      CARDIAC CATHETERIZATION  08/22/2018     of rca    CARDIAC SURGERY  08/22/2018    two stents placed 8/22/18    CV CORONARY ANGIOGRAM N/A 08/06/2018    Procedure: Coronary Angiogram;  Surgeon: Jeane Garcia MD;  Location: [facility];  Service:     CV CORONARY ANGIOGRAM N/A 08/22/2018    Procedure: Coronary Angiogram;  Surgeon: Jeane Garcia MD;  Location: [facility] Cath Lab;  Service:     FEMORAL ENDARTERECTOMY Bilateral 05/03/2017    IR EXTREMITY ANGIOGRAM BILATERAL  03/10/2017    NOSE SURGERY      PROSTATE BIOPSY, NEEDLE, SATURATION SAMPLING  01/13/2003    \"Biopsy fo the Prostate Needle\"    RELEASE TRIGGER FINGER      Family History   Problem Relation Age of Onset    Diabetes No family hx of     Breast Cancer No family hx of     Colon Cancer No family hx of     Prostate Cancer No family hx of     Other Cancer No family hx of     No Known Problems Mother     No Known Problems Father     Social History     Socioeconomic History    Marital " status:      Spouse name: Not on file    Number of children: Not on file    Years of education: Not on file    Highest education level: Not on file   Occupational History    Not on file   Tobacco Use    Smoking status: Former     Current packs/day: 0.00     Average packs/day: 2.0 packs/day for 40.0 years (80.0 ttl pk-yrs)     Types: Cigarettes     Start date: 4/10/1944     Quit date: 4/10/1984     Years since quittin.2     Passive exposure: Never    Smokeless tobacco: Never   Substance and Sexual Activity    Alcohol use: Yes     Alcohol/week: 6.0 standard drinks of alcohol     Comment: occasionally.    Drug use: No    Sexual activity: Not on file   Other Topics Concern    Parent/sibling w/ CABG, MI or angioplasty before 65F 55M? 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        Had gotten into typing to meet girls.        Worked for [employer] after leaving the [employer].     Social Drivers of Health     Financial Resource Strain: Unknown (2025)    Financial Resource Strain     Within the past 12 months, have you or your family members you live with been unable to get utilities (heat, electricity) when it was really needed?: Patient unable to answer   Food Insecurity: Unknown (2025)    Food Insecurity     Within the past 12 months, did you worry that your food would run out before you got money to buy more?: Patient unable to answer     Within the past 12 months, did the food you bought just not last and you didn t have money to get more?: Patient unable to answer   Transportation Needs: Unknown (2025)    Transportation Needs     Within the past 12 months, has lack of transportation kept you from medical appointments, getting your medicines, non-medical meetings or appointments, work, or from getting things that you need?: Patient unable to answer   Physical Activity: Not on file   Stress: Not on file   Social Connections: Not on file    Interpersonal Safety: Low Risk  (4/18/2025)    Interpersonal Safety     Do you feel physically and emotionally safe where you currently live?: Yes     Within the past 12 months, have you been hit, slapped, kicked or otherwise physically hurt by someone?: No     Within the past 12 months, have you been humiliated or emotionally abused in other ways by your partner or ex-partner?: No   Housing Stability: Unknown (4/18/2025)    Housing Stability     Do you have housing? : Patient unable to answer     Are you worried about losing your housing?: Patient unable to answer          Medications  Allergies   Current Outpatient Medications   Medication Sig Dispense Refill    acetaminophen (TYLENOL) 500 MG tablet Take 500 mg by mouth every 6 hours as needed.      aspirin 81 MG EC tablet Take 1 tablet by mouth at bedtime.      atorvastatin (LIPITOR) 40 MG tablet Take 1 tablet (40 mg) by mouth daily. 90 tablet 3    citalopram (CELEXA) 20 MG tablet Take 1 tablet (20 mg) by mouth daily. 90 tablet 3    donepezil (ARICEPT) 10 MG tablet Take 1 tablet (10 mg) by mouth at bedtime. 90 tablet 0    lisinopril (ZESTRIL) 10 MG tablet Take 1 tablet (10 mg) by mouth daily. 90 tablet 1    methylphenidate (RITALIN) 5 MG tablet Take 1 tablet (5 mg) by mouth 2 times daily. 60 tablet 0    Multiple Vitamin (MULTIVITAMIN) per tablet Take 1 tablet by mouth daily.      nitroGLYcerin (NITROSTAT) 0.4 MG sublingual tablet Place 1 tablet (0.4 mg) under the tongue every 5 minutes as needed for chest pain. Repeat at 5 min intervals x 2 if needed 30 tablet 0    spironolactone (ALDACTONE) 25 MG tablet Take 0.5 tablets (12.5 mg) by mouth daily. 45 tablet 1    torsemide (DEMADEX) 20 MG tablet Take 1 tablet (20 mg) by mouth daily. 90 tablet 1    traZODone (DESYREL) 50 MG tablet Take 0.5 tablets (25 mg) by mouth at bedtime. 90 tablet 1    zinc 50 MG TABS Take 50 mg by mouth daily.      zinc oxide (DESITIN) 40 % paste Apply topically 2 times daily. And BID PRN to  sacral area      Allergies   Allergen Reactions    Pletal [Cilostazol]          Lab Results    Chemistry/lipid CBC Cardiac Enzymes/BNP/TSH/INR   Lab Results   Component Value Date    CHOL 124 11/30/2021    HDL 41 11/30/2021    TRIG 181 (H) 11/30/2021    BUN 35.7 (H) 05/01/2025     05/01/2025    CO2 27 05/01/2025    Lab Results   Component Value Date    WBC 4.6 05/01/2025    HGB 10.1 (L) 05/01/2025    HCT 32.8 (L) 05/01/2025    MCV 94 05/01/2025     05/01/2025    Lab Results   Component Value Date    TROPONINI 0.03 10/27/2019    BNP 59 06/16/2018    TSH 1.45 03/21/2023    INR 1.17 (H) 10/30/2019        25 minutes spent on the date of encounter doing chart review, review of test results, interpretation with above tests, patient visit, documentation, and discussion with family.      This note has been dictated using voice recognition software. Any grammatical, typographical, or context distortions are unintentional and inherent to the software

## 2025-06-18 DIAGNOSIS — F32.1 CURRENT MODERATE EPISODE OF MAJOR DEPRESSIVE DISORDER WITHOUT PRIOR EPISODE (H): ICD-10-CM

## 2025-06-18 DIAGNOSIS — G30.1 MODERATE LATE ONSET ALZHEIMER'S DEMENTIA WITH MOOD DISTURBANCE (H): ICD-10-CM

## 2025-06-18 DIAGNOSIS — F02.B3 MODERATE LATE ONSET ALZHEIMER'S DEMENTIA WITH MOOD DISTURBANCE (H): ICD-10-CM

## 2025-06-18 RX ORDER — METHYLPHENIDATE HYDROCHLORIDE 5 MG/1
5 TABLET ORAL 2 TIMES DAILY
Qty: 60 TABLET | Refills: 0 | Status: SHIPPED | OUTPATIENT
Start: 2025-06-18

## 2025-06-18 NOTE — TELEPHONE ENCOUNTER
Unable to approve due to controlled substance, out of RN scope. Routing to PCP for review and cathie - Lencho RN

## 2025-06-18 NOTE — TELEPHONE ENCOUNTER
1. Current moderate episode of major depressive disorder without prior episode (H)  PDMP Reviewed.  - methylphenidate (RITALIN) 5 MG tablet; Take 1 tablet (5 mg) by mouth 2 times daily.  Dispense: 60 tablet; Refill: 0    2. 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  Dispense: 60 tablet; Refill: 0      Haim Galaviz III, MD, FAAFP  [facility]  06/18/25 2:38 PM

## 2025-06-24 ENCOUNTER — OFFICE VISIT (OUTPATIENT)
Dept: FAMILY MEDICINE | Facility: CLINIC | Age: OVER 89
End: 2025-06-24
Payer: COMMERCIAL

## 2025-06-24 ENCOUNTER — ALLIED HEALTH/NURSE VISIT (OUTPATIENT)
Dept: FAMILY MEDICINE | Facility: CLINIC | Age: OVER 89
End: 2025-06-24
Payer: COMMERCIAL

## 2025-06-24 VITALS
DIASTOLIC BLOOD PRESSURE: 61 MMHG | RESPIRATION RATE: 16 BRPM | HEIGHT: 65 IN | TEMPERATURE: 98.3 F | SYSTOLIC BLOOD PRESSURE: 102 MMHG | BODY MASS INDEX: 22.33 KG/M2 | WEIGHT: 134 LBS | HEART RATE: 75 BPM | OXYGEN SATURATION: 95 %

## 2025-06-24 DIAGNOSIS — N18.32 STAGE 3B CHRONIC KIDNEY DISEASE (H): ICD-10-CM

## 2025-06-24 DIAGNOSIS — G30.1 MODERATE LATE ONSET ALZHEIMER'S DEMENTIA WITH MOOD DISTURBANCE (H): Primary | ICD-10-CM

## 2025-06-24 DIAGNOSIS — I25.2 CORONARY ARTERY DISEASE WITH HX OF MYOCARDIAL INFARCT W/O HX OF CABG: ICD-10-CM

## 2025-06-24 DIAGNOSIS — F32.1 CURRENT MODERATE EPISODE OF MAJOR DEPRESSIVE DISORDER WITHOUT PRIOR EPISODE (H): ICD-10-CM

## 2025-06-24 DIAGNOSIS — G47.00 INSOMNIA, UNSPECIFIED TYPE: ICD-10-CM

## 2025-06-24 DIAGNOSIS — R54 FRAIL ELDERLY: ICD-10-CM

## 2025-06-24 DIAGNOSIS — F02.B3 MODERATE LATE ONSET ALZHEIMER'S DEMENTIA WITH MOOD DISTURBANCE (H): Primary | ICD-10-CM

## 2025-06-24 DIAGNOSIS — I25.10 CORONARY ARTERY DISEASE WITH HX OF MYOCARDIAL INFARCT W/O HX OF CABG: ICD-10-CM

## 2025-06-24 PROCEDURE — 3074F SYST BP LT 130 MM HG: CPT | Performed by: FAMILY MEDICINE

## 2025-06-24 PROCEDURE — G2211 COMPLEX E/M VISIT ADD ON: HCPCS | Performed by: FAMILY MEDICINE

## 2025-06-24 PROCEDURE — 99215 OFFICE O/P EST HI 40 MIN: CPT | Performed by: FAMILY MEDICINE

## 2025-06-24 PROCEDURE — 99207 PR NO CHARGE NURSE ONLY: CPT

## 2025-06-24 PROCEDURE — 3078F DIAST BP <80 MM HG: CPT | Performed by: FAMILY MEDICINE

## 2025-06-24 PROCEDURE — 99417 PROLNG OP E/M EACH 15 MIN: CPT | Performed by: FAMILY MEDICINE

## 2025-06-24 RX ORDER — ATORVASTATIN CALCIUM 40 MG/1
40 TABLET, FILM COATED ORAL DAILY
Qty: 90 TABLET | Refills: 3 | Status: SHIPPED | OUTPATIENT
Start: 2025-06-24

## 2025-06-24 RX ORDER — METHYLPHENIDATE HYDROCHLORIDE 5 MG/1
5 TABLET ORAL 2 TIMES DAILY
Qty: 60 TABLET | Status: CANCELLED | OUTPATIENT
Start: 2025-06-24

## 2025-06-24 ASSESSMENT — PATIENT HEALTH QUESTIONNAIRE - PHQ9
10. IF YOU CHECKED OFF ANY PROBLEMS, HOW DIFFICULT HAVE THESE PROBLEMS MADE IT FOR YOU TO DO YOUR WORK, TAKE CARE OF THINGS AT HOME, OR GET ALONG WITH OTHER PEOPLE: NOT DIFFICULT AT ALL
SUM OF ALL RESPONSES TO PHQ QUESTIONS 1-9: 2
SUM OF ALL RESPONSES TO PHQ QUESTIONS 1-9: 2

## 2025-06-24 ASSESSMENT — ENCOUNTER SYMPTOMS
FEVER: 0
DIAPHORESIS: 0
SHORTNESS OF BREATH: 0
CHILLS: 0
WEIGHT LOSS: 0

## 2025-06-24 NOTE — PROGRESS NOTES
Assessment and Plan     1. 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 Current moderate episode of major depressive disorder without prior episode (H)  3. Frail elderly  4. Insomnia, unspecified type  No medication changes today.  We reviewed his status and his current consultations.  Agree that he is not yet meeting criteria for hospice.  Reviewed this as well.  They should have a fill of his methylphenidate available as I sent it last week.    5. Coronary artery disease with hx of myocardial infarct w/o hx of CABG  I got a letter from insurance that he has not been picking up the Lipitor.  This is simply not the case and likely is leftover medication from being in the hospital.  New refill sent.  - atorvastatin (LIPITOR) 40 MG tablet; Take 1 tablet (40 mg) by mouth daily.  Dispense: 90 tablet; Refill: 3    6. Stage 3b chronic kidney disease (H)  Currently on GDMT for his HFrEF.  This includes his currently highest tolerated dose of lisinopril at 10 mg.  Additionally, I do not think that he would currently tolerate an SGLT2 but we could consider in the future especially if he has more problems with fluid retention.  Cardiology has not been able to advance his GDMT due to hypotension and bradycardia.  Continue to monitor at least once or twice a year.      They are not sure that he will be able to get a ride to his come and see me in clinic in a month.  Is due for bilateral shoulder injections next month.  Plan for home visit.    69 minutes spent on the date of the encounter doing chart review, history and exam, documentation, and further activities as noted above.  The longitudinal plan of care for the diagnosis(es)/condition(s) as documented were addressed during this visit. Due to the added complexity in care, I will continue to support Zack in the subsequent management and with ongoing continuity of care.    Options for treatment and follow-up care were reviewed with the  patient and/or guardian. Zack Johns and/or guardian engaged in the decision making process and verbalized understanding of the options discussed and agreed with the final plan. I answered all of their questions.    This note was created with the aid of dictation software. Any mistakes are unintentional.    Haim Galaviz III, MD, FAAFP  [facility]  06/24/25 3:30 PM           HPI:       Zack Johns is a 89 year old  male  Patient presents with:  Follow Up: Dementia     He really enjoyed the Monday morning day program.  They plan on continuing to do that.    The palliative care visit was really helpful to avoid arguments.  She is also interested in attending our caregiver support group.    Shoulders continue to hurt and they are wondering when his next shots are available.    They continue with home weights and he is up 1 or 2 pounds and they did not bring the log today.  He has been eating better as of late.    Summary of pre-visit letter from [facility]:  He had a good visit with palliative care.  Currently does not qualify for hospice.  Reviewed ways to avoid arguments.  Follow-up in 4 months.  Had a good follow-up with cardiology.  Next visit in August.  Working through some dental issues.  Patient started going to a Monday morning day program for dementia and for respite for [facility].  Patient continues to be tired but he is exercising when physical therapy comes to their home.         PMHX:     Patient Active Problem List   Diagnosis    Actinic keratosis    Essential hypertension, benign    CT Lung Pulmonary Nodule Multiple    Prediabetes    Diverticulosis of large intestine    Hyperlipidemia    Peripheral vascular disease    Internal hemorrhoids    Local infection of skin and subcutaneous tissue    Disorder of bursae and tendons in shoulder region    Synovial cyst    Trigger finger, acquired    Osteoarthritis of glenohumeral joint    Abnormal cardiovascular stress test    Coronary  atherosclerosis    Neck pain    Chronic total occlusion of native coronary artery    NSTEMI (non-ST elevated myocardial infarction) (H)    Functional diarrhea    Abscess of gluteal region    Stage 3a chronic kidney disease (H)    Chronic anemia    Chronic systolic heart failure (H)    Stage 3b chronic kidney disease (H)       Current Outpatient Medications   Medication Sig Dispense Refill    atorvastatin (LIPITOR) 40 MG tablet Take 1 tablet (40 mg) by mouth daily. 90 tablet 3    acetaminophen (TYLENOL) 500 MG tablet Take 500 mg by mouth every 6 hours as needed.      aspirin 81 MG EC tablet Take 1 tablet by mouth at bedtime.      citalopram (CELEXA) 20 MG tablet Take 1 tablet (20 mg) by mouth daily. 90 tablet 3    donepezil (ARICEPT) 10 MG tablet Take 1 tablet (10 mg) by mouth at bedtime. 90 tablet 0    lisinopril (ZESTRIL) 10 MG tablet Take 1 tablet (10 mg) by mouth daily. 90 tablet 1    methylphenidate (RITALIN) 5 MG tablet Take 1 tablet (5 mg) by mouth 2 times daily. 60 tablet 0    Multiple Vitamin (MULTIVITAMIN) per tablet Take 1 tablet by mouth daily.      nitroGLYcerin (NITROSTAT) 0.4 MG sublingual tablet Place 1 tablet (0.4 mg) under the tongue every 5 minutes as needed for chest pain. Repeat at 5 min intervals x 2 if needed 30 tablet 0    spironolactone (ALDACTONE) 25 MG tablet Take 0.5 tablets (12.5 mg) by mouth daily. 45 tablet 1    torsemide (DEMADEX) 20 MG tablet Take 1 tablet (20 mg) by mouth daily. 90 tablet 1    traZODone (DESYREL) 50 MG tablet Take 0.5 tablets (25 mg) by mouth at bedtime. 90 tablet 1    zinc 50 MG TABS Take 50 mg by mouth daily.      zinc oxide (DESITIN) 40 % paste Apply topically 2 times daily. And BID PRN to sacral area         Social History     Socioeconomic History    Marital status:      Spouse name: Not on file    Number of children: Not on file    Years of education: Not on file    Highest education level: Not on file   Occupational History    Not on file   Tobacco Use     Smoking status: Former     Current packs/day: 0.00     Average packs/day: 2.0 packs/day for 40.0 years (80.0 ttl pk-yrs)     Types: Cigarettes     Start date: 4/10/1944     Quit date: 4/10/1984     Years since quittin.2     Passive exposure: Never    Smokeless tobacco: Never   Substance and Sexual Activity    Alcohol use: Yes     Alcohol/week: 6.0 standard drinks of alcohol     Comment: occasionally.    Drug use: No    Sexual activity: Not on file   Other Topics Concern    Parent/sibling w/ CABG, MI or angioplasty before 65F 55M? 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        Had gotten into typing to meet girls.        Worked for [employer] after leaving the [employer].     Social Drivers of Health     Financial Resource Strain: Unknown (2025)    Financial Resource Strain     Within the past 12 months, have you or your family members you live with been unable to get utilities (heat, electricity) when it was really needed?: Patient unable to answer   Food Insecurity: Unknown (2025)    Food Insecurity     Within the past 12 months, did you worry that your food would run out before you got money to buy more?: Patient unable to answer     Within the past 12 months, did the food you bought just not last and you didn t have money to get more?: Patient unable to answer   Transportation Needs: Unknown (2025)    Transportation Needs     Within the past 12 months, has lack of transportation kept you from medical appointments, getting your medicines, non-medical meetings or appointments, work, or from getting things that you need?: Patient unable to answer   Physical Activity: Not on file   Stress: Not on file   Social Connections: Not on file   Interpersonal Safety: Low Risk  (2025)    Interpersonal Safety     Do you feel physically and emotionally safe where you currently live?: Yes     Within the past 12 months, have you been hit, slapped,  "kicked or otherwise physically hurt by someone?: No     Within the past 12 months, have you been humiliated or emotionally abused in other ways by your partner or ex-partner?: No   Housing Stability: Unknown (4/18/2025)    Housing Stability     Do you have housing? : Patient unable to answer     Are you worried about losing your housing?: Patient unable to answer       Allergies   Allergen Reactions    Pletal [Cilostazol]        No results found for this or any previous visit (from the past 24 hours).         Review of Systems:     Review of Systems   Constitutional:  Positive for malaise/fatigue. Negative for chills, diaphoresis, fever and weight loss.   Respiratory:  Negative for shortness of breath.    Cardiovascular:  Negative for chest pain.            Physical Exam:     Vitals:    06/24/25 1557   BP: 102/61   Pulse: 75   Resp: 16   Temp: 98.3  F (36.8  C)   TempSrc: Oral   SpO2: 95%   Weight: 60.8 kg (134 lb)   Height: 1.653 m (5' 5.06\")     Body mass index is 22.26 kg/m .    Physical Exam  Vitals and nursing note reviewed.   Constitutional:       General: He is not in acute distress.     Appearance: Normal appearance. He is not ill-appearing, toxic-appearing or diaphoretic.   Pulmonary:      Effort: No respiratory distress.   Musculoskeletal:      Right lower leg: No edema.      Left lower leg: No edema.   Neurological:      Mental Status: He is alert and oriented to person, place, and time.   Psychiatric:         Mood and Affect: Mood normal.           "

## 2025-06-25 ASSESSMENT — SOCIAL DETERMINANTS OF HEALTH (SDOH)
HOW OFTEN DO YOU GET TOGETHER WITH FRIENDS OR RELATIVES?: MORE THAN THREE TIMES A WEEK
IN A TYPICAL WEEK, HOW MANY TIMES DO YOU TALK ON THE PHONE WITH FAMILY, FRIENDS, OR NEIGHBORS?: MORE THAN THREE TIMES A WEEK

## 2025-06-25 ASSESSMENT — ACTIVITIES OF DAILY LIVING (ADL)
DEPENDENT_IADLS:: CLEANING;COOKING;LAUNDRY;SHOPPING;MEAL PREPARATION;TRANSPORTATION;MEDICATION MANAGEMENT;MONEY MANAGEMENT;INCONTINENCE

## 2025-06-26 NOTE — PROGRESS NOTES
Clinic Care Coordination Contact  Clinic Care Coordination Contact  OUTREACH    Referral Information:  Referral Source: PCP    Primary Diagnosis: Behavioral Health    Chief Complaint   Patient presents with    Clinic Care Coordination - Initial        Universal Utilization:   Clinic Utilization  Difficulty keeping appointments:: No  Compliance Concerns: No  No-Show Concerns: No  No PCP office visit in Past Year: No  Utilization      No Show Count (past year)  2             ED Visits  3             Hospital Admissions  2                    Current as of: 6/25/2025  3:21 PM                Clinical Concerns:  Current Medical Concerns: Coronary Artery Disease, CKD Stage 3  Current Behavioral Concerns: Alzheimer's; Major Depressive Disorder  Education Provided to patient: Care Coordination; Dementia caregiver support group     Health Maintenance Reviewed: Due/Overdue   Clinical Pathway: None    Medication Management:  Medication review status: Medications reviewed and no changes reported per patient.           Functional Status:  Dependent ADLs:: Ambulation-walker, Bathing  Dependent IADLs:: Cleaning, Cooking, Laundry, Shopping, Meal Preparation, Transportation, Medication Management, Money Management, Incontinence  Bed or wheelchair confined:: No  Mobility Status: Independent w/Device    Living Situation:  Current living arrangement:: I live in a private home with spouse  Type of residence:: Apartment    Lifestyle & Psychosocial Needs:    Social Drivers of Health     Food Insecurity: Unknown (4/18/2025)    Food Insecurity     Within the past 12 months, did you worry that your food would run out before you got money to buy more?: Patient unable to answer     Within the past 12 months, did the food you bought just not last and you didn t have money to get more?: Patient unable to answer   Depression: Not at risk (6/24/2025)    PHQ-2     PHQ-2 Score: 0   Housing Stability: Unknown (4/18/2025)    Housing Stability     Do  you have housing? : Patient unable to answer     Are you worried about losing your housing?: Patient unable to answer   Tobacco Use: Medium Risk (6/24/2025)    Patient History     Smoking Tobacco Use: Former     Smokeless Tobacco Use: Never     Passive Exposure: Never   Financial Resource Strain: Unknown (4/18/2025)    Financial Resource Strain     Within the past 12 months, have you or your family members you live with been unable to get utilities (heat, electricity) when it was really needed?: Patient unable to answer   Alcohol Use: Not on file   Transportation Needs: Unknown (4/18/2025)    Transportation Needs     Within the past 12 months, has lack of transportation kept you from medical appointments, getting your medicines, non-medical meetings or appointments, work, or from getting things that you need?: Patient unable to answer   Physical Activity: Not on file   Interpersonal Safety: Low Risk  (4/18/2025)    Interpersonal Safety     Do you feel physically and emotionally safe where you currently live?: Yes     Within the past 12 months, have you been hit, slapped, kicked or otherwise physically hurt by someone?: No     Within the past 12 months, have you been humiliated or emotionally abused in other ways by your partner or ex-partner?: No   Stress: Not on file   Social Connections: Unknown (6/25/2025)    Social Connection and Isolation Panel [NHANES]     Frequency of Communication with Friends and Family: More than three times a week     Frequency of Social Gatherings with Friends and Family: More than three times a week     Attends Rastafarian Services: Not on file     Active Member of Clubs or Organizations: Not on file     Attends Club or Organization Meetings: Not on file     Marital Status: Not on file   Health Literacy: Inadequate Health Literacy (6/25/2025)     Health Literacy     Frequency of need for help with medical instructions: Always     Diet:: Regular  Inadequate nutrition (GOAL):: No  Tube  Feeding: No  Inadequate activity/exercise (GOAL):: No  Significant changes in sleep pattern (GOAL): No  Transportation means:: Family     Rastafari or spiritual beliefs that impact treatment:: No  Mental health DX:: Yes  Mental health DX how managed:: Medication  Mental health management concern (GOAL):: Yes  Chemical Dependency Status: No Current Concerns  Informal Support system:: Children, Friends, Spouse        Resources and Interventions:  Current Resources:   Skilled Home Care Services: Home Health Aid  Community Resources: Home Care, Housekeeping/Chore Agency, Other (see comment)  Supplies Currently Used at Home: Other (partial dentures; glasses.)  Equipment Currently Used at Home: walker, rolling, shower chair  Employment Status: retired, , previous service         Advance Care Plan/Directive  Advanced Care Plans/Directives on file:: Yes    Referrals Placed: [employer] Caregiver support group     Care Plan:  Care Plan: Mental Health       Problem: Mental Health Symptoms Need Improvement       Goal: Improve management of mental health symptoms and establish with mental health/psychosocial supports       Start Date: 6/24/2025    This Visit's Progress: 100%    Priority: High    Note:     I will continue to follow-up with my doctors to continue to manage my memory issues and related concerns.                              Patient/Caregiver understanding: Yes       Future Appointments                In 4 weeks Haim Galaviz MD [facility] [place]    In 1 month Teddy Rocha [facility]    In 4 months Dick Francis MD [facility] Radiation Oncology [facility]            Plan: Patient to attend follow-up appointments as scheduled.    ROSENDO MCCLAIN, KRYSTIAN, LADC

## 2025-09-02 DIAGNOSIS — G30.1 MILD LATE ONSET ALZHEIMER'S DEMENTIA WITH MOOD DISTURBANCE (H): ICD-10-CM

## 2025-09-02 DIAGNOSIS — F02.B3 MODERATE LATE ONSET ALZHEIMER'S DEMENTIA WITH MOOD DISTURBANCE (H): ICD-10-CM

## 2025-09-02 DIAGNOSIS — G30.1 MODERATE LATE ONSET ALZHEIMER'S DEMENTIA WITH MOOD DISTURBANCE (H): ICD-10-CM

## 2025-09-02 DIAGNOSIS — F32.1 CURRENT MODERATE EPISODE OF MAJOR DEPRESSIVE DISORDER WITHOUT PRIOR EPISODE (H): ICD-10-CM

## 2025-09-02 DIAGNOSIS — F02.A3 MILD LATE ONSET ALZHEIMER'S DEMENTIA WITH MOOD DISTURBANCE (H): ICD-10-CM

## 2025-09-02 RX ORDER — METHYLPHENIDATE HYDROCHLORIDE 5 MG/1
5 TABLET ORAL 2 TIMES DAILY
Qty: 60 TABLET | Refills: 0 | Status: SHIPPED | OUTPATIENT
Start: 2025-09-02

## 2025-09-02 RX ORDER — DONEPEZIL HYDROCHLORIDE 10 MG/1
10 TABLET, FILM COATED ORAL AT BEDTIME
Qty: 90 TABLET | Refills: 0 | OUTPATIENT
Start: 2025-09-02